# Patient Record
Sex: FEMALE | Race: WHITE | NOT HISPANIC OR LATINO | Employment: FULL TIME | URBAN - NONMETROPOLITAN AREA
[De-identification: names, ages, dates, MRNs, and addresses within clinical notes are randomized per-mention and may not be internally consistent; named-entity substitution may affect disease eponyms.]

---

## 2017-01-17 ENCOUNTER — TELEPHONE (OUTPATIENT)
Dept: FAMILY MEDICINE CLINIC | Facility: CLINIC | Age: 67
End: 2017-01-17

## 2017-01-17 ENCOUNTER — OFFICE VISIT (OUTPATIENT)
Dept: FAMILY MEDICINE CLINIC | Facility: CLINIC | Age: 67
End: 2017-01-17

## 2017-01-17 VITALS
SYSTOLIC BLOOD PRESSURE: 113 MMHG | HEART RATE: 76 BPM | DIASTOLIC BLOOD PRESSURE: 72 MMHG | OXYGEN SATURATION: 98 % | HEIGHT: 61 IN | WEIGHT: 171.8 LBS | BODY MASS INDEX: 32.44 KG/M2

## 2017-01-17 DIAGNOSIS — E11.9 TYPE 2 DIABETES MELLITUS WITHOUT COMPLICATION, UNSPECIFIED LONG TERM INSULIN USE STATUS: ICD-10-CM

## 2017-01-17 DIAGNOSIS — E03.9 HYPOTHYROIDISM, UNSPECIFIED TYPE: Primary | ICD-10-CM

## 2017-01-17 DIAGNOSIS — H92.02 OTALGIA, LEFT: ICD-10-CM

## 2017-01-17 LAB
25(OH)D3 SERPL-MCNC: 14 NG/ML
ALBUMIN SERPL-MCNC: 4.6 G/DL (ref 3.4–4.8)
ALBUMIN/GLOB SERPL: 1.3 G/DL (ref 1.5–2.5)
ALP SERPL-CCNC: 100 U/L (ref 46–116)
ALT SERPL W P-5'-P-CCNC: 17 U/L (ref 10–36)
ANION GAP SERPL CALCULATED.3IONS-SCNC: 9.2 MMOL/L (ref 3.6–11.2)
AST SERPL-CCNC: 22 U/L (ref 10–30)
BILIRUB SERPL-MCNC: 1.1 MG/DL (ref 0.2–1.8)
BUN BLD-MCNC: 10 MG/DL (ref 7–21)
BUN/CREAT SERPL: 12.5 (ref 7–25)
CALCIUM SPEC-SCNC: 9.8 MG/DL (ref 7.7–10)
CHLORIDE SERPL-SCNC: 107 MMOL/L (ref 99–112)
CHOLEST SERPL-MCNC: 184 MG/DL (ref 0–200)
CO2 SERPL-SCNC: 24.8 MMOL/L (ref 24.3–31.9)
CREAT BLD-MCNC: 0.8 MG/DL (ref 0.43–1.29)
GFR SERPL CREATININE-BSD FRML MDRD: 72 ML/MIN/1.73
GLOBULIN UR ELPH-MCNC: 3.6 GM/DL
GLUCOSE BLD-MCNC: 116 MG/DL (ref 70–110)
HBA1C MFR BLD: 5.4 % (ref 4.5–5.7)
HDLC SERPL-MCNC: 35 MG/DL (ref 60–100)
LDLC SERPL CALC-MCNC: 94 MG/DL (ref 0–100)
LDLC/HDLC SERPL: 2.67 {RATIO}
OSMOLALITY SERPL CALC.SUM OF ELEC: 281.3 MOSM/KG (ref 273–305)
POTASSIUM BLD-SCNC: 4.4 MMOL/L (ref 3.5–5.3)
PROT SERPL-MCNC: 8.2 G/DL (ref 6–8)
SODIUM BLD-SCNC: 141 MMOL/L (ref 135–153)
TRIGL SERPL-MCNC: 277 MG/DL (ref 0–150)
TSH SERPL DL<=0.05 MIU/L-ACNC: 0.02 MIU/ML (ref 0.55–4.78)
VLDLC SERPL-MCNC: 55.4 MG/DL

## 2017-01-17 PROCEDURE — 84443 ASSAY THYROID STIM HORMONE: CPT | Performed by: NURSE PRACTITIONER

## 2017-01-17 PROCEDURE — 80061 LIPID PANEL: CPT | Performed by: NURSE PRACTITIONER

## 2017-01-17 PROCEDURE — 82306 VITAMIN D 25 HYDROXY: CPT | Performed by: NURSE PRACTITIONER

## 2017-01-17 PROCEDURE — 36415 COLL VENOUS BLD VENIPUNCTURE: CPT | Performed by: NURSE PRACTITIONER

## 2017-01-17 PROCEDURE — 80053 COMPREHEN METABOLIC PANEL: CPT | Performed by: NURSE PRACTITIONER

## 2017-01-17 PROCEDURE — 83036 HEMOGLOBIN GLYCOSYLATED A1C: CPT | Performed by: NURSE PRACTITIONER

## 2017-01-17 PROCEDURE — 99213 OFFICE O/P EST LOW 20 MIN: CPT | Performed by: NURSE PRACTITIONER

## 2017-01-17 RX ORDER — CHLORAL HYDRATE 500 MG
2000 CAPSULE ORAL 2 TIMES DAILY WITH MEALS
Refills: 0
Start: 2017-01-17 | End: 2020-06-09

## 2017-01-17 RX ORDER — LEVOTHYROXINE SODIUM 0.12 MG/1
125 TABLET ORAL DAILY
Qty: 90 TABLET | Refills: 3 | Status: SHIPPED | OUTPATIENT
Start: 2017-01-17 | End: 2017-04-17 | Stop reason: SDUPTHER

## 2017-01-17 NOTE — TELEPHONE ENCOUNTER
----- Message from BERT Lucas sent at 1/17/2017  3:04 PM EST -----  TSH just resulted and medication needs to be adjusted lower Synthroid to 100mcg daily Repeat TSH in 4 -6 weeks      She said she is on the 150mcg do you still want to go to 100 or 125?    Rx for 125mcg sent today & patient notified.

## 2017-01-17 NOTE — TELEPHONE ENCOUNTER
----- Message from BERT Lucas sent at 1/17/2017  1:56 PM EST -----  Sugar is great she can actually reduce metformin to 500 mg once daily.  She does need to start fish oil 4 gram daily her triglycerides are 277 goal is less than 150      Left a message for her to return call.    Patient returned call & verbalized understanding.

## 2017-01-17 NOTE — PROGRESS NOTES
Subjective   Shey King is a 66 y.o. female.     Earache    There is pain in the left ear. This is a new problem. The current episode started 1 to 4 weeks ago. The problem has been waxing and waning. The patient is experiencing no pain. Pertinent negatives include no coughing, ear discharge, rash, rhinorrhea or sore throat. Associated symptoms comments: Itching aggravating   . She has tried nothing for the symptoms.   Diabetes   She presents for her follow-up diabetic visit. She has type 2 diabetes mellitus. Her disease course has been stable. There are no hypoglycemic associated symptoms. There are no diabetic associated symptoms. There are no hypoglycemic complications. Symptoms are stable. There are no diabetic complications. Risk factors for coronary artery disease include family history. Current diabetic treatment includes oral agent (monotherapy). She is compliant with treatment all of the time. Her weight is decreasing steadily (recetn diet change with 40# weight loss ). She is following a generally healthy diet. Meal planning includes avoidance of concentrated sweets. She participates in exercise three times a week. Home blood sugar record trend: not monitoring. She does not see a podiatrist.Eye exam is current.   Thyroid Problem   Presents for follow-up visit. (Doing well without problems consistent medication use feeling overall better    ) The symptoms have been stable.      The following portions of the patient's history were reviewed and updated as appropriate: allergies, current medications, past family history, past medical history, past social history, past surgical history and problem list.      Review of Systems   Constitutional: Negative.    HENT: Positive for ear pain. Negative for ear discharge, rhinorrhea and sore throat.    Respiratory: Negative.  Negative for cough.    Cardiovascular: Negative.    Gastrointestinal: Negative.    Skin: Negative.  Negative for rash.   All other systems  reviewed and are negative.      Procedures    Objective   Physical Exam   Constitutional: She is oriented to person, place, and time. She appears well-developed and well-nourished. No distress.   HENT:   Head: Normocephalic.   Right Ear: External ear normal.   Left Ear: External ear normal.   Nose: Nose normal.   Mouth/Throat: Oropharynx is clear and moist. No oropharyngeal exudate.   Eyes: Conjunctivae are normal. Right eye exhibits no discharge.   Neck: Normal range of motion. Neck supple. No thyromegaly present.   Cardiovascular: Normal rate, regular rhythm, normal heart sounds and intact distal pulses.    No murmur heard.  Pulmonary/Chest: Effort normal and breath sounds normal. No respiratory distress.   Neurological: She is alert and oriented to person, place, and time.   Skin: Skin is warm and dry. She is not diaphoretic.   Psychiatric: She has a normal mood and affect. Her behavior is normal.   Nursing note and vitals reviewed.      Assessment/Plan   Discussed with patient impression and plan, patient verbalizes understanding. Continue with diet and exercise.    Shey was seen today for earache and follow-up.    Diagnoses and all orders for this visit:    Hypothyroidism, unspecified type  -     TSH    Type 2 diabetes mellitus without complication, unspecified long term insulin use status  -     Comprehensive Metabolic Panel  -     Hemoglobin A1c  -     Lipid Panel  -     Vitamin D 25 Hydroxy  -     metFORMIN (GLUCOPHAGE) 500 MG tablet; Take 1 tablet by mouth Daily With Breakfast.    Otalgia, left    Other orders  -     levothyroxine (SYNTHROID) 125 MCG tablet; Take 1 tablet by mouth Daily.  -     triamcinolone (KENALOG) 0.1 % ointment; Apply  topically 2 (Two) Times a Day.

## 2017-01-17 NOTE — MR AVS SNAPSHOT
Shey King   1/17/2017 8:20 AM   Office Visit    Dept Phone:  [phone]   Encounter #:  [id number]    Provider:  BERT Lucas   Department:  [facility]                Your Full Care Plan              Today's Medication Changes          These changes are accurate as of: 1/17/17  9:13 AM.  If you have any questions, ask your nurse or doctor.               New Medication(s)Ordered:     triamcinolone 0.1 % ointment   Commonly known as:  KENALOG   Apply  topically 2 (Two) Times a Day.         Stop taking medication(s)listed here:     erythromycin base 500 MG tablet   Commonly known as:  E-MYCIN                Where to Get Your Medications      These medications were sent to [address] [phone] [facility] [phone] [address] [address]     Phone:  [phone]     levothyroxine 125 MCG tablet    metFORMIN 500 MG tablet    triamcinolone 0.1 % ointment                  Your Updated Medication List          This list is accurate as of: 1/17/17  9:13 AM.  Always use your most recent med list.                aspirin 81 MG chewable tablet       glucose blood test strip   Use as instructed       glucose monitor monitoring kit   1 each as needed (DM II).       levothyroxine 125 MCG tablet   Commonly known as:  SYNTHROID   Take 1 tablet by mouth Daily.       metFORMIN 500 MG tablet   Commonly known as:  GLUCOPHAGE   Take 1 tablet by mouth Daily With Breakfast.       triamcinolone 0.1 % ointment   Commonly known as:  KENALOG   Apply  topically 2 (Two) Times a Day.               We Performed the Following     Comprehensive Metabolic Panel     Hemoglobin A1c     Lipid Panel     TSH     Vitamin D 25 Hydroxy       You Were Diagnosed With        Codes Comments    Hypothyroidism, unspecified type    -  Primary ICD-10-CM: E03.9  ICD-9-CM: 244.9     Type 2 diabetes mellitus without  "complication, unspecified long term insulin use status     ICD-10-CM: E11.9  ICD-9-CM: 250.00       Instructions     None    Patient Instructions History      Upcoming Appointments     Visit Type Date Time Department    FOLLOW UP 2017  8:20 AM [facility]    [facility]     [facility] [employer] allows you to send messages to your doctor, view your test results, renew your prescriptions, schedule appointments, and more. To sign up, go to [employer] and click on the Sign Up Now link in the New User? box. Enter your [employer] Activation Code exactly as it appears below along with the last four digits of your Social Security Number and your Date of Birth () to complete the sign-up process. If you do not sign up before the expiration date, you must request a new code.    [employer] Activation Code: [email or web address]  Expires: 2017  9:13 AM    If you have questions, you can email [email or web address] or call [phone] to talk to our [employer] staff. Remember, [employer] is NOT to be used for urgent needs. For medical emergencies, dial 911.               Other Info from Your Visit           Your Appointments     2017  8:40 AM EDT   Follow Up with BERT Lucas   [facility] (--)    [address]  [address]  [facility] [address] [phone]           Arrive 15 minutes prior to appointment.              Allergies     Ciprofloxacin      Penicillins      Sulfa Antibiotics        Reason for Visit     Earache bilateral; itchy    Follow-up           Vital Signs     Blood Pressure Pulse Height Weight Oxygen Saturation Body Mass Index    113/72 76 61\" (154.9 cm) 171 lb 12.8 oz (77.9 kg) 98% 32.46 kg/m2    Smoking Status                   Never Smoker           Problems and Diagnoses Noted     Underactive thyroid    Type 2 diabetes mellitus without complication, unspecified long term insulin use status     "

## 2017-02-28 ENCOUNTER — OFFICE VISIT (OUTPATIENT)
Dept: FAMILY MEDICINE CLINIC | Facility: CLINIC | Age: 67
End: 2017-02-28

## 2017-02-28 VITALS
HEIGHT: 61 IN | BODY MASS INDEX: 32.02 KG/M2 | OXYGEN SATURATION: 99 % | WEIGHT: 169.6 LBS | HEART RATE: 80 BPM | SYSTOLIC BLOOD PRESSURE: 120 MMHG | DIASTOLIC BLOOD PRESSURE: 76 MMHG | TEMPERATURE: 98.5 F

## 2017-02-28 DIAGNOSIS — R50.9 FEVER, UNSPECIFIED FEVER CAUSE: Primary | ICD-10-CM

## 2017-02-28 LAB
BILIRUB BLD-MCNC: NEGATIVE MG/DL
EXPIRATION DATE: NORMAL
FLUAV AG NPH QL: NORMAL
FLUBV AG NPH QL: NORMAL
GLUCOSE UR STRIP-MCNC: NEGATIVE MG/DL
INTERNAL CONTROL: NORMAL
KETONES UR QL: NEGATIVE
LEUKOCYTE EST, POC: NEGATIVE
Lab: NORMAL
NITRITE UR-MCNC: NEGATIVE MG/ML
PH UR: 6 [PH] (ref 5–8)
PROT UR STRIP-MCNC: NEGATIVE MG/DL
RBC # UR STRIP: NEGATIVE /UL
SP GR UR: 1.02 (ref 1–1.03)
UROBILINOGEN UR QL: NORMAL

## 2017-02-28 PROCEDURE — 99213 OFFICE O/P EST LOW 20 MIN: CPT | Performed by: NURSE PRACTITIONER

## 2017-02-28 PROCEDURE — 81003 URINALYSIS AUTO W/O SCOPE: CPT | Performed by: NURSE PRACTITIONER

## 2017-02-28 PROCEDURE — 87804 INFLUENZA ASSAY W/OPTIC: CPT | Performed by: NURSE PRACTITIONER

## 2017-02-28 RX ORDER — LEVOTHYROXINE SODIUM 0.15 MG/1
150 TABLET ORAL DAILY
Refills: 0 | COMMUNITY
Start: 2016-12-28 | End: 2017-04-18 | Stop reason: DRUGHIGH

## 2017-02-28 NOTE — PROGRESS NOTES
Subjective   Shey King is a 66 y.o. female.     URI    This is a new problem. The current episode started yesterday. The problem has been gradually worsening. The maximum temperature recorded prior to her arrival was 100.4 - 100.9 F. The fever has been present for less than 1 day. Associated symptoms include abdominal pain (pressure initally with history of recurrent UTI.  Todya those symptoms have improved with incresased fluids), congestion, coughing, ear pain, headaches, rhinorrhea and a sore throat. Pertinent negatives include no nausea, rash, vomiting or wheezing. She has tried NSAIDs and increased fluids for the symptoms. The treatment provided mild relief.      The following portions of the patient's history were reviewed and updated as appropriate: allergies, current medications, past family history, past medical history, past social history, past surgical history and problem list.      Review of Systems   Constitutional: Positive for chills, fatigue and fever.   HENT: Positive for congestion, ear pain, rhinorrhea and sore throat. Negative for sinus pressure.    Respiratory: Positive for cough. Negative for shortness of breath and wheezing.    Cardiovascular: Negative.    Gastrointestinal: Positive for abdominal pain (pressure initally with history of recurrent UTI.  Todya those symptoms have improved with incresased fluids). Negative for nausea and vomiting.   Genitourinary: Negative.    Musculoskeletal: Positive for myalgias (achy all over).   Skin: Negative.  Negative for rash.   Neurological: Positive for headaches.   All other systems reviewed and are negative.      Procedures    Objective   Physical Exam   Constitutional: She is oriented to person, place, and time. She appears well-developed and well-nourished. No distress.   HENT:   Head: Normocephalic.   Right Ear: Hearing, tympanic membrane, external ear and ear canal normal.   Left Ear: Hearing, tympanic membrane, external ear and ear canal  normal.   Nose: Nose normal. No mucosal edema. Right sinus exhibits no maxillary sinus tenderness and no frontal sinus tenderness. Left sinus exhibits no maxillary sinus tenderness and no frontal sinus tenderness.   Mouth/Throat: Uvula is midline, oropharynx is clear and moist and mucous membranes are normal. No oropharyngeal exudate. No tonsillar exudate.   Eyes: Conjunctivae are normal. Right eye exhibits no discharge. Left eye exhibits no discharge.   Neck: Neck supple. No thyromegaly present.   Cardiovascular: Normal rate, regular rhythm, normal heart sounds and intact distal pulses.    No murmur heard.  Pulmonary/Chest: Effort normal and breath sounds normal.   Neurological: She is alert and oriented to person, place, and time.   Skin: Skin is warm and dry. She is not diaphoretic.   Psychiatric: She has a normal mood and affect. Her behavior is normal.   Nursing note and vitals reviewed.      Assessment/Plan   Discussed with patient impression and plan, patient verbalizes understanding.  Symptomatic treatment with rest and fluids   Shey was seen today for sore throat, urinary tract infection and earache.    Diagnoses and all orders for this visit:    Fever, unspecified fever cause  -     POC Urinalysis Dipstick, Automated  -     POCT Influenza A/B

## 2017-04-17 ENCOUNTER — OFFICE VISIT (OUTPATIENT)
Dept: FAMILY MEDICINE CLINIC | Facility: CLINIC | Age: 67
End: 2017-04-17

## 2017-04-17 ENCOUNTER — TELEPHONE (OUTPATIENT)
Dept: FAMILY MEDICINE CLINIC | Facility: CLINIC | Age: 67
End: 2017-04-17

## 2017-04-17 VITALS
SYSTOLIC BLOOD PRESSURE: 110 MMHG | HEART RATE: 84 BPM | DIASTOLIC BLOOD PRESSURE: 73 MMHG | HEIGHT: 61 IN | OXYGEN SATURATION: 99 % | WEIGHT: 169.6 LBS | BODY MASS INDEX: 32.02 KG/M2

## 2017-04-17 DIAGNOSIS — E03.9 HYPOTHYROIDISM, UNSPECIFIED TYPE: Primary | ICD-10-CM

## 2017-04-17 DIAGNOSIS — E11.9 TYPE 2 DIABETES MELLITUS WITHOUT COMPLICATION, UNSPECIFIED LONG TERM INSULIN USE STATUS: ICD-10-CM

## 2017-04-17 LAB
25(OH)D3 SERPL-MCNC: 13 NG/ML
ALBUMIN SERPL-MCNC: 4.4 G/DL (ref 3.4–4.8)
ALBUMIN/GLOB SERPL: 1.3 G/DL (ref 1.5–2.5)
ALP SERPL-CCNC: 100 U/L (ref 35–104)
ALT SERPL W P-5'-P-CCNC: 15 U/L (ref 10–36)
ANION GAP SERPL CALCULATED.3IONS-SCNC: 3.6 MMOL/L (ref 3.6–11.2)
AST SERPL-CCNC: 18 U/L (ref 10–30)
BILIRUB SERPL-MCNC: 0.6 MG/DL (ref 0.2–1.8)
BUN BLD-MCNC: 9 MG/DL (ref 7–21)
BUN/CREAT SERPL: 12.5 (ref 7–25)
CALCIUM SPEC-SCNC: 9.4 MG/DL (ref 7.7–10)
CHLORIDE SERPL-SCNC: 109 MMOL/L (ref 99–112)
CHOLEST SERPL-MCNC: 180 MG/DL (ref 0–200)
CO2 SERPL-SCNC: 28.4 MMOL/L (ref 24.3–31.9)
CREAT BLD-MCNC: 0.72 MG/DL (ref 0.43–1.29)
GFR SERPL CREATININE-BSD FRML MDRD: 82 ML/MIN/1.73
GLOBULIN UR ELPH-MCNC: 3.5 GM/DL
GLUCOSE BLD-MCNC: 91 MG/DL (ref 70–110)
HDLC SERPL-MCNC: 38 MG/DL (ref 60–100)
LDLC SERPL CALC-MCNC: 99 MG/DL (ref 0–100)
LDLC/HDLC SERPL: 2.61 {RATIO}
OSMOLALITY SERPL CALC.SUM OF ELEC: 279.5 MOSM/KG (ref 273–305)
POTASSIUM BLD-SCNC: 4.1 MMOL/L (ref 3.5–5.3)
PROT SERPL-MCNC: 7.9 G/DL (ref 6–8)
SODIUM BLD-SCNC: 141 MMOL/L (ref 135–153)
T3FREE SERPL-MCNC: 3.3 PG/ML (ref 2.3–4.2)
T4 FREE SERPL-MCNC: 1.65 NG/DL (ref 0.89–1.76)
TRIGL SERPL-MCNC: 215 MG/DL (ref 0–150)
TSH SERPL DL<=0.05 MIU/L-ACNC: 0.02 MIU/ML (ref 0.55–4.78)
VIT B12 BLD-MCNC: 190 PG/ML (ref 211–911)
VLDLC SERPL-MCNC: 43 MG/DL

## 2017-04-17 PROCEDURE — 99213 OFFICE O/P EST LOW 20 MIN: CPT | Performed by: NURSE PRACTITIONER

## 2017-04-17 PROCEDURE — 82306 VITAMIN D 25 HYDROXY: CPT | Performed by: NURSE PRACTITIONER

## 2017-04-17 PROCEDURE — 84481 FREE ASSAY (FT-3): CPT | Performed by: NURSE PRACTITIONER

## 2017-04-17 PROCEDURE — 84439 ASSAY OF FREE THYROXINE: CPT | Performed by: NURSE PRACTITIONER

## 2017-04-17 PROCEDURE — 84443 ASSAY THYROID STIM HORMONE: CPT | Performed by: NURSE PRACTITIONER

## 2017-04-17 PROCEDURE — 36415 COLL VENOUS BLD VENIPUNCTURE: CPT | Performed by: NURSE PRACTITIONER

## 2017-04-17 PROCEDURE — 80061 LIPID PANEL: CPT | Performed by: NURSE PRACTITIONER

## 2017-04-17 PROCEDURE — 80053 COMPREHEN METABOLIC PANEL: CPT | Performed by: NURSE PRACTITIONER

## 2017-04-17 PROCEDURE — 82607 VITAMIN B-12: CPT | Performed by: NURSE PRACTITIONER

## 2017-04-17 RX ORDER — LEVOTHYROXINE SODIUM 0.12 MG/1
125 TABLET ORAL DAILY
Qty: 90 TABLET | Refills: 3 | Status: SHIPPED | OUTPATIENT
Start: 2017-04-17 | End: 2017-04-18 | Stop reason: DRUGHIGH

## 2017-04-17 NOTE — PROGRESS NOTES
Subjective   Shey King is a 66 y.o. female.     Diabetes   She presents for her follow-up diabetic visit. She has type 2 diabetes mellitus. Her disease course has been stable. There are no hypoglycemic associated symptoms. There are no diabetic associated symptoms. Pertinent negatives for diabetes include no fatigue. There are no hypoglycemic complications. Symptoms are stable. There are no diabetic complications. Risk factors for coronary artery disease include family history. Current diabetic treatment includes oral agent (monotherapy). She is compliant with treatment all of the time. Her weight is decreasing steadily (continues with improved diet and exercise ). She is following a generally healthy diet. Meal planning includes avoidance of concentrated sweets. She participates in exercise three times a week. Home blood sugar record trend: not monitoring. She does not see a podiatrist.Eye exam is current.   Thyroid Problem   Presents for follow-up visit. Patient reports no cold intolerance, constipation, depressed mood, diaphoresis, dry skin, fatigue, hair loss or palpitations. (Since medication change feels that her fingers have been tingling at night and feeling her heart race at times    ) The symptoms have been stable.      The following portions of the patient's history were reviewed and updated as appropriate: allergies, current medications, past family history, past medical history, past social history, past surgical history and problem list.      Review of Systems   Constitutional: Negative.  Negative for diaphoresis and fatigue.   HENT: Negative.    Respiratory: Negative.    Cardiovascular: Negative.  Negative for palpitations.   Gastrointestinal: Negative.  Negative for constipation.   Endocrine: Negative for cold intolerance.   Musculoskeletal: Negative.    Skin: Negative.    Psychiatric/Behavioral: Negative.    All other systems reviewed and are negative.      Procedures    Objective   Physical  Exam   Constitutional: She is oriented to person, place, and time. She appears well-developed and well-nourished. No distress.   HENT:   Head: Normocephalic.   Right Ear: External ear normal.   Left Ear: External ear normal.   Nose: Nose normal.   Mouth/Throat: Oropharynx is clear and moist. No oropharyngeal exudate.   Eyes: Conjunctivae are normal. Right eye exhibits no discharge.   Neck: Normal range of motion. Neck supple. No thyromegaly present.   Cardiovascular: Normal rate, regular rhythm, normal heart sounds and intact distal pulses.    No murmur heard.  Pulmonary/Chest: Effort normal and breath sounds normal. No respiratory distress.   Neurological: She is alert and oriented to person, place, and time.   Skin: Skin is warm and dry. She is not diaphoretic.   Psychiatric: She has a normal mood and affect. Her behavior is normal.   Nursing note and vitals reviewed.      Assessment/Plan   Discussed with patient impression and plan, patient verbalizes understanding. Continue with diet and exercise.    Shey was seen today for follow-up and hypothyroidism.    Diagnoses and all orders for this visit:    Hypothyroidism, unspecified type    Type 2 diabetes mellitus without complication, unspecified long term insulin use status  -     metFORMIN (GLUCOPHAGE) 500 MG tablet; Take 1 tablet by mouth Daily With Breakfast.  -     TSH  -     Vitamin B12; Future  -     Vitamin D 25 Hydroxy  -     Comprehensive Metabolic Panel  -     Lipid Panel  -     Vitamin B12    Other orders  -     levothyroxine (SYNTHROID) 125 MCG tablet; Take 1 tablet by mouth Daily.

## 2017-04-17 NOTE — TELEPHONE ENCOUNTER
----- Message from BRET Lucas sent at 4/17/2017  3:05 PM EDT -----  Can we add a free T3 And T4    Yes added

## 2017-04-18 DIAGNOSIS — E03.9 HYPOTHYROIDISM, UNSPECIFIED TYPE: Primary | ICD-10-CM

## 2017-04-18 RX ORDER — LEVOTHYROXINE SODIUM 0.1 MG/1
100 TABLET ORAL DAILY
Qty: 30 TABLET | Refills: 3 | Status: SHIPPED | OUTPATIENT
Start: 2017-04-18 | End: 2017-08-17 | Stop reason: SDUPTHER

## 2017-04-18 RX ORDER — ERGOCALCIFEROL 1.25 MG/1
50000 CAPSULE ORAL WEEKLY
Qty: 12 CAPSULE | Refills: 0 | Status: SHIPPED | OUTPATIENT
Start: 2017-04-18 | End: 2018-01-09

## 2017-04-18 NOTE — TELEPHONE ENCOUNTER
Yes she needs B12 injections weekly X 4 then biweekly X2 then monthly  Also 50,000 units vit d weekly

## 2017-04-18 NOTE — TELEPHONE ENCOUNTER
----- Message from BERT Lucas sent at 4/18/2017 11:18 AM EDT -----  Tell Shey there is no change in her TSH.  She says she felt better with the 150 mcg and not as well with the new 125  However with her number i need to lower her once again to 100 mcg.  Ask her to drop in ablut 4 weeks to have TSH checked and will follow her numbers closer.      Spoke with patient and she asked about her other labs i see that her B12 & vit. D are low any orders related to them?

## 2017-04-21 ENCOUNTER — CLINICAL SUPPORT (OUTPATIENT)
Dept: FAMILY MEDICINE CLINIC | Facility: CLINIC | Age: 67
End: 2017-04-21

## 2017-04-21 DIAGNOSIS — E53.8 VITAMIN B12 DEFICIENCY: Primary | ICD-10-CM

## 2017-04-21 PROCEDURE — 96372 THER/PROPH/DIAG INJ SC/IM: CPT | Performed by: NURSE PRACTITIONER

## 2017-04-21 RX ORDER — CYANOCOBALAMIN 1000 UG/ML
1000 INJECTION, SOLUTION INTRAMUSCULAR; SUBCUTANEOUS
Status: DISCONTINUED | OUTPATIENT
Start: 2017-04-21 | End: 2018-01-09 | Stop reason: ALTCHOICE

## 2017-04-21 RX ADMIN — CYANOCOBALAMIN 1000 MCG: 1000 INJECTION, SOLUTION INTRAMUSCULAR; SUBCUTANEOUS at 08:13

## 2017-08-17 ENCOUNTER — OFFICE VISIT (OUTPATIENT)
Dept: FAMILY MEDICINE CLINIC | Facility: CLINIC | Age: 67
End: 2017-08-17

## 2017-08-17 ENCOUNTER — TELEPHONE (OUTPATIENT)
Dept: FAMILY MEDICINE CLINIC | Facility: CLINIC | Age: 67
End: 2017-08-17

## 2017-08-17 VITALS
OXYGEN SATURATION: 98 % | HEIGHT: 61 IN | WEIGHT: 172 LBS | HEART RATE: 72 BPM | BODY MASS INDEX: 32.47 KG/M2 | SYSTOLIC BLOOD PRESSURE: 125 MMHG | DIASTOLIC BLOOD PRESSURE: 81 MMHG

## 2017-08-17 DIAGNOSIS — E78.2 MIXED HYPERLIPIDEMIA: ICD-10-CM

## 2017-08-17 DIAGNOSIS — E03.9 HYPOTHYROIDISM, UNSPECIFIED TYPE: Primary | ICD-10-CM

## 2017-08-17 DIAGNOSIS — E53.8 B12 DEFICIENCY: ICD-10-CM

## 2017-08-17 DIAGNOSIS — E03.8 ADULT ONSET HYPOTHYROIDISM: ICD-10-CM

## 2017-08-17 DIAGNOSIS — R73.9 HYPERGLYCEMIA: ICD-10-CM

## 2017-08-17 DIAGNOSIS — R53.83 FATIGUE, UNSPECIFIED TYPE: Primary | ICD-10-CM

## 2017-08-17 DIAGNOSIS — E55.9 VITAMIN D DEFICIENCY: ICD-10-CM

## 2017-08-17 LAB
25(OH)D3 SERPL-MCNC: 40 NG/ML
ALBUMIN SERPL-MCNC: 4.7 G/DL (ref 3.4–4.8)
ALBUMIN/GLOB SERPL: 1.4 G/DL (ref 1.5–2.5)
ALP SERPL-CCNC: 110 U/L (ref 35–104)
ALT SERPL W P-5'-P-CCNC: 18 U/L (ref 10–36)
ANION GAP SERPL CALCULATED.3IONS-SCNC: 6.6 MMOL/L (ref 3.6–11.2)
AST SERPL-CCNC: 20 U/L (ref 10–30)
BASOPHILS # BLD AUTO: 0.05 10*3/MM3 (ref 0–0.3)
BASOPHILS NFR BLD AUTO: 0.7 % (ref 0–2)
BILIRUB BLD-MCNC: NEGATIVE MG/DL
BILIRUB SERPL-MCNC: 0.8 MG/DL (ref 0.2–1.8)
BUN BLD-MCNC: 9 MG/DL (ref 7–21)
BUN/CREAT SERPL: 11.4 (ref 7–25)
CALCIUM SPEC-SCNC: 9.7 MG/DL (ref 7.7–10)
CHLORIDE SERPL-SCNC: 107 MMOL/L (ref 99–112)
CHOLEST SERPL-MCNC: 213 MG/DL (ref 0–200)
CO2 SERPL-SCNC: 27.4 MMOL/L (ref 24.3–31.9)
CREAT BLD-MCNC: 0.79 MG/DL (ref 0.43–1.29)
DEPRECATED RDW RBC AUTO: 43 FL (ref 37–54)
EOSINOPHIL # BLD AUTO: 0.25 10*3/MM3 (ref 0–0.7)
EOSINOPHIL NFR BLD AUTO: 3.6 % (ref 0–5)
ERYTHROCYTE [DISTWIDTH] IN BLOOD BY AUTOMATED COUNT: 13.8 % (ref 11.5–14.5)
GFR SERPL CREATININE-BSD FRML MDRD: 73 ML/MIN/1.73
GLOBULIN UR ELPH-MCNC: 3.4 GM/DL
GLUCOSE BLD-MCNC: 113 MG/DL (ref 70–110)
GLUCOSE UR STRIP-MCNC: NEGATIVE MG/DL
HBA1C MFR BLD: 5.3 % (ref 4.5–5.7)
HCT VFR BLD AUTO: 37.7 % (ref 37–47)
HDLC SERPL-MCNC: 41 MG/DL (ref 60–100)
HGB BLD-MCNC: 12.3 G/DL (ref 12–16)
IMM GRANULOCYTES # BLD: 0.02 10*3/MM3 (ref 0–0.03)
IMM GRANULOCYTES NFR BLD: 0.3 % (ref 0–0.5)
KETONES UR QL: NEGATIVE
LDLC SERPL CALC-MCNC: 128 MG/DL (ref 0–100)
LDLC/HDLC SERPL: 3.12 {RATIO}
LEUKOCYTE EST, POC: NEGATIVE
LYMPHOCYTES # BLD AUTO: 1.77 10*3/MM3 (ref 1–3)
LYMPHOCYTES NFR BLD AUTO: 25.2 % (ref 21–51)
MCH RBC QN AUTO: 28.5 PG (ref 27–33)
MCHC RBC AUTO-ENTMCNC: 32.6 G/DL (ref 33–37)
MCV RBC AUTO: 87.5 FL (ref 80–94)
MONOCYTES # BLD AUTO: 0.56 10*3/MM3 (ref 0.1–0.9)
MONOCYTES NFR BLD AUTO: 8 % (ref 0–10)
NEUTROPHILS # BLD AUTO: 4.38 10*3/MM3 (ref 1.4–6.5)
NEUTROPHILS NFR BLD AUTO: 62.2 % (ref 30–70)
NITRITE UR-MCNC: NEGATIVE MG/ML
OSMOLALITY SERPL CALC.SUM OF ELEC: 280.8 MOSM/KG (ref 273–305)
PH UR: 6 [PH] (ref 5–8)
PLATELET # BLD AUTO: 261 10*3/MM3 (ref 130–400)
PMV BLD AUTO: 10.9 FL (ref 6–10)
POTASSIUM BLD-SCNC: 4.5 MMOL/L (ref 3.5–5.3)
PROT SERPL-MCNC: 8.1 G/DL (ref 6–8)
PROT UR STRIP-MCNC: NEGATIVE MG/DL
RBC # BLD AUTO: 4.31 10*6/MM3 (ref 4.2–5.4)
RBC # UR STRIP: NEGATIVE /UL
SODIUM BLD-SCNC: 141 MMOL/L (ref 135–153)
SP GR UR: 1.01 (ref 1–1.03)
T4 FREE SERPL-MCNC: 1.59 NG/DL (ref 0.89–1.76)
TRIGL SERPL-MCNC: 220 MG/DL (ref 0–150)
TSH SERPL DL<=0.05 MIU/L-ACNC: 0.04 MIU/ML (ref 0.55–4.78)
UROBILINOGEN UR QL: NORMAL
VIT B12 BLD-MCNC: 1017 PG/ML (ref 211–911)
VLDLC SERPL-MCNC: 44 MG/DL
WBC NRBC COR # BLD: 7.03 10*3/MM3 (ref 4.5–12.5)

## 2017-08-17 PROCEDURE — 36415 COLL VENOUS BLD VENIPUNCTURE: CPT | Performed by: NURSE PRACTITIONER

## 2017-08-17 PROCEDURE — 80061 LIPID PANEL: CPT | Performed by: NURSE PRACTITIONER

## 2017-08-17 PROCEDURE — 81003 URINALYSIS AUTO W/O SCOPE: CPT | Performed by: NURSE PRACTITIONER

## 2017-08-17 PROCEDURE — 83036 HEMOGLOBIN GLYCOSYLATED A1C: CPT | Performed by: NURSE PRACTITIONER

## 2017-08-17 PROCEDURE — 84443 ASSAY THYROID STIM HORMONE: CPT | Performed by: NURSE PRACTITIONER

## 2017-08-17 PROCEDURE — 82306 VITAMIN D 25 HYDROXY: CPT | Performed by: NURSE PRACTITIONER

## 2017-08-17 PROCEDURE — 85025 COMPLETE CBC W/AUTO DIFF WBC: CPT | Performed by: NURSE PRACTITIONER

## 2017-08-17 PROCEDURE — 80053 COMPREHEN METABOLIC PANEL: CPT | Performed by: NURSE PRACTITIONER

## 2017-08-17 PROCEDURE — 82607 VITAMIN B-12: CPT | Performed by: NURSE PRACTITIONER

## 2017-08-17 PROCEDURE — 99214 OFFICE O/P EST MOD 30 MIN: CPT | Performed by: NURSE PRACTITIONER

## 2017-08-17 PROCEDURE — 84439 ASSAY OF FREE THYROXINE: CPT | Performed by: NURSE PRACTITIONER

## 2017-08-17 RX ORDER — LEVOTHYROXINE SODIUM 0.07 MG/1
75 TABLET ORAL DAILY
Qty: 30 TABLET | Refills: 1 | Status: SHIPPED | OUTPATIENT
Start: 2017-08-17 | End: 2017-09-19 | Stop reason: DRUGHIGH

## 2017-08-17 RX ORDER — LEVOTHYROXINE SODIUM 0.12 MG/1
125 TABLET ORAL DAILY
Refills: 0 | COMMUNITY
Start: 2017-08-07 | End: 2017-09-19 | Stop reason: DRUGHIGH

## 2017-08-17 NOTE — TELEPHONE ENCOUNTER
----- Message from BERT Lucas sent at 8/17/2017  3:56 PM EDT -----  Let her know that her thyroid is most likely the cause of her extreme fatigue.  Her synthroid has been reduced several time with persistent low TSh.  I want her dose reduced to 75 mcg and ask her to RTC 4 weeks for TSH only.  Her sugar is great however her cholesterol is not just up a bit and can improve with her being better with her diet and exercise as she did in the past      Patient notified & verbalized understanding.

## 2017-08-21 NOTE — PROGRESS NOTES
Subjective   Shey King is a 66 y.o. female.   Chief Compliant: The patient presents with Fatigue    Fatigue   This is a new (known history of hypothyroidism, hyperglycemia, vit d and B12 deficiency.  ) problem. The current episode started more than 1 month ago. The problem occurs daily. The problem has been unchanged (tired all the time.  Admits with the poor weather we had with rain she hasnt exercised as consistent. ). Associated symptoms include fatigue. Pertinent negatives include no arthralgias, change in bowel habit, chest pain, chills, congestion, coughing, diaphoresis, fever, headaches, joint swelling, myalgias or nausea. Nothing aggravates the symptoms. She has tried rest for the symptoms. The treatment provided no relief.   Hyperlipidemia   This is a chronic problem. Recent lipid tests were reviewed and are variable. Exacerbating diseases include hypothyroidism. Pertinent negatives include no chest pain or myalgias. Current antihyperlipidemic treatment includes statins, exercise and diet change. The current treatment provides moderate improvement of lipids. Compliance problems include adherence to diet and adherence to exercise.  Risk factors for coronary artery disease include family history, obesity and post-menopausal.      The following portions of the patient's history were reviewed and updated as appropriate: allergies, current medications, past family history, past medical history, past social history, past surgical history and problem list.      Review of Systems   Constitutional: Positive for fatigue. Negative for chills, diaphoresis and fever.   HENT: Negative.  Negative for congestion.    Eyes: Negative.    Respiratory: Negative.  Negative for cough.    Cardiovascular: Negative.  Negative for chest pain.   Gastrointestinal: Negative.  Negative for change in bowel habit and nausea.   Genitourinary:        Requested UA as she often has a UTI     Musculoskeletal: Negative.  Negative for  "arthralgias, joint swelling and myalgias.   Skin: Negative.    Neurological: Negative for headaches.   Psychiatric/Behavioral: Negative.    All other systems reviewed and are negative.      Procedures    Vitals: Blood pressure 125/81, pulse 72, height 61\" (154.9 cm), weight 172 lb (78 kg), SpO2 98 %, not currently breastfeeding.     Allergies:   Allergies   Allergen Reactions   • Bactrim [Sulfamethoxazole-Trimethoprim]    • Ciprofloxacin    • Penicillins    • Sulfa Antibiotics           Objective   Physical Exam   Constitutional: She is oriented to person, place, and time. She appears well-developed and well-nourished. No distress.   HENT:   Head: Normocephalic.   Right Ear: External ear normal.   Left Ear: External ear normal.   Nose: Nose normal.   Mouth/Throat: Oropharynx is clear and moist. No oropharyngeal exudate.   Eyes: Conjunctivae are normal. Right eye exhibits no discharge. Left eye exhibits no discharge.   Neck: Neck supple. No thyromegaly present.   Cardiovascular: Normal rate, regular rhythm, normal heart sounds and intact distal pulses.    No murmur heard.  Pulmonary/Chest: Effort normal and breath sounds normal.   Abdominal: Soft. Bowel sounds are normal. She exhibits no distension and no mass. There is no tenderness. No hernia.   Musculoskeletal: She exhibits no edema.   Lymphadenopathy:     She has no cervical adenopathy.   Neurological: She is alert and oriented to person, place, and time.   Skin: Skin is warm and dry. She is not diaphoretic.   Psychiatric: She has a normal mood and affect. Her behavior is normal.   Nursing note and vitals reviewed.      Assessment/Plan   Discussed with patient impression and plan, patient verbalizes understanding.  For now will continue with routine medications and change her diet and exercise.  Will Await further changes pending lab results. Shey was seen today for fatigue.    Diagnoses and all orders for this visit:    Fatigue, unspecified type  -     CBC & " Differential  -     CBC Auto Differential  -     POCT urinalysis dipstick, automated    Adult onset hypothyroidism  -     TSH  -     T4, Free  -     POCT urinalysis dipstick, automated    Hyperglycemia  -     Hemoglobin A1c  -     POCT urinalysis dipstick, automated    Mixed hyperlipidemia  -     Comprehensive Metabolic Panel  -     Lipid Panel  -     POCT urinalysis dipstick, automated  -     Osmolality, Calculated; Future  -     Osmolality, Calculated    B12 deficiency  -     CBC & Differential  -     Vitamin B12  -     CBC Auto Differential  -     POCT urinalysis dipstick, automated    Vitamin D deficiency  -     Vitamin D 25 Hydroxy  -     POCT urinalysis dipstick, automated

## 2017-09-19 ENCOUNTER — LAB (OUTPATIENT)
Dept: FAMILY MEDICINE CLINIC | Facility: CLINIC | Age: 67
End: 2017-09-19

## 2017-09-19 ENCOUNTER — TELEPHONE (OUTPATIENT)
Dept: FAMILY MEDICINE CLINIC | Facility: CLINIC | Age: 67
End: 2017-09-19

## 2017-09-19 DIAGNOSIS — E03.9 HYPOTHYROIDISM, UNSPECIFIED TYPE: Primary | ICD-10-CM

## 2017-09-19 DIAGNOSIS — E03.9 HYPOTHYROIDISM, UNSPECIFIED TYPE: ICD-10-CM

## 2017-09-19 LAB — TSH SERPL DL<=0.05 MIU/L-ACNC: 0.94 MIU/ML (ref 0.55–4.78)

## 2017-09-19 PROCEDURE — 84443 ASSAY THYROID STIM HORMONE: CPT | Performed by: NURSE PRACTITIONER

## 2017-09-19 PROCEDURE — 36415 COLL VENOUS BLD VENIPUNCTURE: CPT

## 2017-09-19 RX ORDER — LEVOTHYROXINE SODIUM 0.05 MG/1
50 TABLET ORAL DAILY
Qty: 30 TABLET | Refills: 1 | Status: SHIPPED | OUTPATIENT
Start: 2017-09-19 | End: 2017-10-23 | Stop reason: DRUGHIGH

## 2017-09-19 NOTE — TELEPHONE ENCOUNTER
----- Message from BERT Lucas sent at 9/19/2017  2:46 PM EDT -----  Synthroid needs further reduced to 50 mcg daily and repeat again in 4 weeks  TSH Free t3 tand T4        Patient notified & verbalized understanding.

## 2017-10-02 ENCOUNTER — TRANSCRIBE ORDERS (OUTPATIENT)
Dept: ADMINISTRATIVE | Facility: HOSPITAL | Age: 67
End: 2017-10-02

## 2017-10-02 DIAGNOSIS — Z12.31 VISIT FOR SCREENING MAMMOGRAM: Primary | ICD-10-CM

## 2017-10-19 ENCOUNTER — OFFICE VISIT (OUTPATIENT)
Dept: FAMILY MEDICINE CLINIC | Facility: CLINIC | Age: 67
End: 2017-10-19

## 2017-10-19 VITALS
HEART RATE: 69 BPM | BODY MASS INDEX: 33.83 KG/M2 | DIASTOLIC BLOOD PRESSURE: 84 MMHG | TEMPERATURE: 97.9 F | WEIGHT: 179.2 LBS | SYSTOLIC BLOOD PRESSURE: 124 MMHG | HEIGHT: 61 IN | OXYGEN SATURATION: 98 %

## 2017-10-19 DIAGNOSIS — H65.91 RIGHT SEROUS OTITIS MEDIA, UNSPECIFIED CHRONICITY: ICD-10-CM

## 2017-10-19 DIAGNOSIS — R22.1 NECK FULLNESS: ICD-10-CM

## 2017-10-19 DIAGNOSIS — E03.9 HYPOTHYROIDISM, UNSPECIFIED TYPE: ICD-10-CM

## 2017-10-19 DIAGNOSIS — E03.8 ADULT ONSET HYPOTHYROIDISM: Primary | ICD-10-CM

## 2017-10-19 LAB
BASOPHILS # BLD AUTO: 0.05 10*3/MM3 (ref 0–0.3)
BASOPHILS NFR BLD AUTO: 0.7 % (ref 0–2)
DEPRECATED RDW RBC AUTO: 43.7 FL (ref 37–54)
EOSINOPHIL # BLD AUTO: 0.2 10*3/MM3 (ref 0–0.7)
EOSINOPHIL NFR BLD AUTO: 2.9 % (ref 0–5)
ERYTHROCYTE [DISTWIDTH] IN BLOOD BY AUTOMATED COUNT: 14 % (ref 11.5–14.5)
HCT VFR BLD AUTO: 37.7 % (ref 37–47)
HGB BLD-MCNC: 12.7 G/DL (ref 12–16)
IMM GRANULOCYTES # BLD: 0.02 10*3/MM3 (ref 0–0.03)
IMM GRANULOCYTES NFR BLD: 0.3 % (ref 0–0.5)
LYMPHOCYTES # BLD AUTO: 2 10*3/MM3 (ref 1–3)
LYMPHOCYTES NFR BLD AUTO: 29 % (ref 21–51)
MCH RBC QN AUTO: 29.4 PG (ref 27–33)
MCHC RBC AUTO-ENTMCNC: 33.7 G/DL (ref 33–37)
MCV RBC AUTO: 87.3 FL (ref 80–94)
MONOCYTES # BLD AUTO: 0.46 10*3/MM3 (ref 0.1–0.9)
MONOCYTES NFR BLD AUTO: 6.7 % (ref 0–10)
NEUTROPHILS # BLD AUTO: 4.17 10*3/MM3 (ref 1.4–6.5)
NEUTROPHILS NFR BLD AUTO: 60.4 % (ref 30–70)
PLATELET # BLD AUTO: 252 10*3/MM3 (ref 130–400)
PMV BLD AUTO: 10.7 FL (ref 6–10)
RBC # BLD AUTO: 4.32 10*6/MM3 (ref 4.2–5.4)
T3FREE SERPL-MCNC: 2.4 PG/ML (ref 2.3–4.2)
T4 FREE SERPL-MCNC: 1.13 NG/DL (ref 0.89–1.76)
TSH SERPL DL<=0.05 MIU/L-ACNC: 2.81 MIU/ML (ref 0.55–4.78)
WBC NRBC COR # BLD: 6.9 10*3/MM3 (ref 4.5–12.5)

## 2017-10-19 PROCEDURE — 84443 ASSAY THYROID STIM HORMONE: CPT | Performed by: NURSE PRACTITIONER

## 2017-10-19 PROCEDURE — 99214 OFFICE O/P EST MOD 30 MIN: CPT | Performed by: NURSE PRACTITIONER

## 2017-10-19 PROCEDURE — 90686 IIV4 VACC NO PRSV 0.5 ML IM: CPT | Performed by: NURSE PRACTITIONER

## 2017-10-19 PROCEDURE — 84481 FREE ASSAY (FT-3): CPT | Performed by: NURSE PRACTITIONER

## 2017-10-19 PROCEDURE — 90471 IMMUNIZATION ADMIN: CPT | Performed by: NURSE PRACTITIONER

## 2017-10-19 PROCEDURE — 84439 ASSAY OF FREE THYROXINE: CPT | Performed by: NURSE PRACTITIONER

## 2017-10-19 PROCEDURE — 85025 COMPLETE CBC W/AUTO DIFF WBC: CPT | Performed by: NURSE PRACTITIONER

## 2017-10-19 NOTE — PROGRESS NOTES
"Subjective   Shey King is a 66 y.o. female.   Chief Compliant: The patient presents with Earache    Earache    There is pain in the right ear. This is a new problem. The current episode started 1 to 4 weeks ago. The problem occurs every few hours. The problem has been waxing and waning. There has been no fever. The pain is at a severity of 4/10. The pain is mild. Pertinent negatives include no abdominal pain, coughing, diarrhea, ear discharge, headaches, hearing loss, neck pain, rash, rhinorrhea, sore throat or vomiting. She has tried nothing for the symptoms.   Thyroid Problem   Presents for follow-up (recently noticed she has been taking the old strength of her thyroid medication.  ) visit. Symptoms include anxiety, constipation and fatigue. Patient reports no diarrhea. (Night sweats) The symptoms have been stable.      The following portions of the patient's history were reviewed and updated as appropriate: allergies, current medications, past family history, past medical history, past social history, past surgical history and problem list.      Review of Systems   Constitutional: Positive for fatigue.   HENT: Positive for ear pain. Negative for ear discharge, hearing loss, rhinorrhea and sore throat.    Respiratory: Negative.  Negative for cough.    Cardiovascular: Negative.    Gastrointestinal: Positive for constipation. Negative for abdominal pain, diarrhea and vomiting.   Genitourinary: Negative.    Musculoskeletal: Negative.  Negative for neck pain.   Skin: Negative.  Negative for rash.   Neurological: Negative.  Negative for headaches.   Psychiatric/Behavioral: The patient is nervous/anxious.    All other systems reviewed and are negative.      Procedures    Vitals: Blood pressure 124/84, pulse 69, temperature 97.9 °F (36.6 °C), temperature source Oral, height 61\" (154.9 cm), weight 179 lb 3.2 oz (81.3 kg), SpO2 98 %, not currently breastfeeding.     Allergies:   Allergies   Allergen Reactions   • " Bactrim [Sulfamethoxazole-Trimethoprim]    • Ciprofloxacin    • Penicillins    • Sulfa Antibiotics           Objective   Physical Exam   Constitutional: She is oriented to person, place, and time. She appears well-developed and well-nourished.   HENT:   Head: Normocephalic.   Right Ear: Hearing, external ear and ear canal normal. A middle ear effusion is present.   Left Ear: Hearing, tympanic membrane, external ear and ear canal normal.   Nose: Nose normal. Right sinus exhibits no maxillary sinus tenderness and no frontal sinus tenderness. Left sinus exhibits no maxillary sinus tenderness and no frontal sinus tenderness.   Mouth/Throat: Uvula is midline, oropharynx is clear and moist and mucous membranes are normal. No oropharyngeal exudate. No tonsillar exudate.   Neck: Thyromegaly present.   Cardiovascular: Normal rate, regular rhythm, normal heart sounds and intact distal pulses.    No murmur heard.  Pulmonary/Chest: Effort normal and breath sounds normal.   Musculoskeletal: She exhibits no edema.   Lymphadenopathy:     She has cervical adenopathy.   Neurological: She is alert and oriented to person, place, and time.   Skin: Skin is warm and dry. No rash noted. She is not diaphoretic. No erythema. No pallor.   Psychiatric: She has a normal mood and affect. Her behavior is normal.   Nursing note and vitals reviewed.      Assessment/Plan   Discussed with patient impression and plan, patient verbalizes understanding.  Discussed finding on physical exam with patient.  Will follow after ultrasound and lab   Shey was seen today for earache.    Diagnoses and all orders for this visit:    Adult onset hypothyroidism  -     US Thyroid  -     CBC & Differential  -     CBC Auto Differential    Neck fullness  -     US Thyroid  -     CBC & Differential  -     CBC Auto Differential    Right serous otitis media, unspecified chronicity    Other orders  -     neomycin-polymyxin-hydrocortisone (CORTISPORIN) 3.5-82683-8 otic  solution; Administer 3 drops to the right ear 4 (Four) Times a Day.

## 2017-10-23 RX ORDER — LEVOTHYROXINE SODIUM 0.12 MG/1
125 TABLET ORAL DAILY
Qty: 90 TABLET | Refills: 1 | Status: SHIPPED | OUTPATIENT
Start: 2017-10-23 | End: 2018-01-23 | Stop reason: SDUPTHER

## 2017-10-30 ENCOUNTER — HOSPITAL ENCOUNTER (OUTPATIENT)
Dept: ULTRASOUND IMAGING | Facility: HOSPITAL | Age: 67
Discharge: HOME | End: 2017-10-30
Admitting: NURSE PRACTITIONER

## 2017-10-30 ENCOUNTER — TELEPHONE (OUTPATIENT)
Dept: FAMILY MEDICINE CLINIC | Facility: CLINIC | Age: 67
End: 2017-10-30

## 2017-10-30 PROCEDURE — 76536 US EXAM OF HEAD AND NECK: CPT

## 2017-10-30 PROCEDURE — 76536 US EXAM OF HEAD AND NECK: CPT | Performed by: RADIOLOGY

## 2017-10-30 NOTE — TELEPHONE ENCOUNTER
----- Message from BERT Lucas sent at 10/30/2017  3:37 PM EDT -----  Let her know that the thyroid scan is normal        Patient notified & verbalized understanding.

## 2017-11-08 NOTE — TELEPHONE ENCOUNTER
Yes she needs B12 injections weekly X 4 then biweekly X2 then monthly  Also 50,000 units vit d weekly        Patient notified & verbalized understanding.   No

## 2017-11-11 ENCOUNTER — HOSPITAL ENCOUNTER (OUTPATIENT)
Dept: MAMMOGRAPHY | Facility: HOSPITAL | Age: 67
Discharge: HOME | End: 2017-11-11
Admitting: NURSE PRACTITIONER

## 2017-11-11 DIAGNOSIS — Z12.31 VISIT FOR SCREENING MAMMOGRAM: ICD-10-CM

## 2017-11-11 PROCEDURE — G0202 SCR MAMMO BI INCL CAD: HCPCS

## 2017-11-11 PROCEDURE — 77067 SCR MAMMO BI INCL CAD: CPT | Performed by: RADIOLOGY

## 2017-11-11 PROCEDURE — 77063 BREAST TOMOSYNTHESIS BI: CPT

## 2017-11-11 PROCEDURE — 77063 BREAST TOMOSYNTHESIS BI: CPT | Performed by: RADIOLOGY

## 2017-11-16 ENCOUNTER — OFFICE VISIT (OUTPATIENT)
Dept: FAMILY MEDICINE CLINIC | Facility: CLINIC | Age: 67
End: 2017-11-16

## 2017-11-16 ENCOUNTER — TELEPHONE (OUTPATIENT)
Dept: FAMILY MEDICINE CLINIC | Facility: CLINIC | Age: 67
End: 2017-11-16

## 2017-11-16 VITALS
OXYGEN SATURATION: 96 % | WEIGHT: 179 LBS | BODY MASS INDEX: 33.79 KG/M2 | HEIGHT: 61 IN | DIASTOLIC BLOOD PRESSURE: 90 MMHG | TEMPERATURE: 98.3 F | HEART RATE: 90 BPM | SYSTOLIC BLOOD PRESSURE: 145 MMHG

## 2017-11-16 DIAGNOSIS — E11.9 TYPE 2 DIABETES MELLITUS WITHOUT COMPLICATION, UNSPECIFIED LONG TERM INSULIN USE STATUS: ICD-10-CM

## 2017-11-16 DIAGNOSIS — J06.9 URI WITH COUGH AND CONGESTION: Primary | ICD-10-CM

## 2017-11-16 DIAGNOSIS — E03.9 HYPOTHYROIDISM, UNSPECIFIED TYPE: ICD-10-CM

## 2017-11-16 DIAGNOSIS — K80.20 GALLSTONES: ICD-10-CM

## 2017-11-16 PROCEDURE — 99213 OFFICE O/P EST LOW 20 MIN: CPT | Performed by: NURSE PRACTITIONER

## 2017-11-16 RX ORDER — ERYTHROMYCIN STEARATE 250 MG
500 TABLET ORAL 2 TIMES DAILY
Qty: 40 TABLET | Refills: 0 | Status: SHIPPED | OUTPATIENT
Start: 2017-11-16 | End: 2017-11-16 | Stop reason: DRUGHIGH

## 2017-11-16 NOTE — TELEPHONE ENCOUNTER
Patient called reports no pharmacies in [place] have the Erythromycin 250mg,only [facility] has erythromycin but it is 333mg,do you want to change the strength or the antibiotic?

## 2017-11-16 NOTE — PROGRESS NOTES
Subjective   Shey King is a 66 y.o. female.   Chief Compliant: The patient presents with Earache (bilateral)    Earache    There is pain in both ears. This is a new problem. The current episode started in the past 7 days. The problem occurs constantly. The problem has been unchanged. The maximum temperature recorded prior to her arrival was 100.4 - 100.9 F. The pain is at a severity of 4/10. The pain is moderate. Associated symptoms include coughing. Pertinent negatives include no abdominal pain, diarrhea, ear discharge, headaches, hearing loss, neck pain, rash, rhinorrhea, sore throat or vomiting. Associated symptoms comments: Congestion   . Treatments tried: decongestants. The treatment provided mild relief.   Thyroid Problem   Presents for follow-up (Feeling better.  Denies any concerns. ) visit. Symptoms include constipation and fatigue. Patient reports no anxiety or diarrhea. The symptoms have been stable.   Abdominal Pain   Chronicity: known gallstones and wishes to proceed with surgical consultation as the pain is more consistently aggravating. Associated symptoms include constipation. Pertinent negatives include no diarrhea, headaches or vomiting. Associated symptoms comments: Bloating and fullness.      The following portions of the patient's history were reviewed and updated as appropriate: allergies, current medications, past family history, past medical history, past social history, past surgical history and problem list.      Review of Systems   Constitutional: Positive for fatigue.   HENT: Positive for congestion and ear pain. Negative for ear discharge, hearing loss, rhinorrhea and sore throat.    Respiratory: Positive for cough. Negative for wheezing.    Cardiovascular: Negative.    Gastrointestinal: Positive for constipation. Negative for abdominal pain, diarrhea and vomiting.   Genitourinary: Negative.    Musculoskeletal: Negative.  Negative for neck pain.   Skin: Negative.  Negative for rash.  "  Neurological: Negative.  Negative for headaches.   Psychiatric/Behavioral: The patient is not nervous/anxious.    All other systems reviewed and are negative.      Procedures    Vitals: Blood pressure 145/90, pulse 90, temperature 98.3 °F (36.8 °C), height 61\" (154.9 cm), weight 179 lb (81.2 kg), SpO2 96 %, not currently breastfeeding.     Allergies:   Allergies   Allergen Reactions   • Bactrim [Sulfamethoxazole-Trimethoprim]    • Ciprofloxacin    • Penicillins    • Sulfa Antibiotics           Objective   Physical Exam   Constitutional: She is oriented to person, place, and time. She appears well-developed and well-nourished.   HENT:   Head: Normocephalic.   Right Ear: Hearing, external ear and ear canal normal. Tympanic membrane is erythematous. A middle ear effusion is present.   Left Ear: Hearing, external ear and ear canal normal. Tympanic membrane is erythematous. A middle ear effusion is present.   Nose: Mucosal edema and rhinorrhea present. Right sinus exhibits no maxillary sinus tenderness and no frontal sinus tenderness. Left sinus exhibits no maxillary sinus tenderness and no frontal sinus tenderness.   Mouth/Throat: Uvula is midline and mucous membranes are normal. Posterior oropharyngeal erythema present. No oropharyngeal exudate. No tonsillar exudate.   Neck: Thyromegaly present.   Cardiovascular: Normal rate, regular rhythm, normal heart sounds and intact distal pulses.    No murmur heard.  Pulmonary/Chest: Effort normal and breath sounds normal.   Abdominal: Soft. Bowel sounds are normal. She exhibits no distension. There is no tenderness.   Musculoskeletal: She exhibits no edema.   Lymphadenopathy:     She has cervical adenopathy.   Neurological: She is alert and oriented to person, place, and time.   Skin: Skin is warm and dry. No rash noted. She is not diaphoretic. No erythema. No pallor.   Psychiatric: She has a normal mood and affect. Her behavior is normal.   Nursing note and vitals " reviewed.      Assessment/Plan   Discussed with patient impression and plan, patient verbalizes understanding.  Discussed finding on physical exam with patient.   Shey was seen today for earache.    Diagnoses and all orders for this visit:    URI with cough and congestion    Type 2 diabetes mellitus without complication, unspecified long term insulin use status  -     metFORMIN (GLUCOPHAGE) 500 MG tablet; Take 1 tablet by mouth Daily With Breakfast.  -     Comprehensive Metabolic Panel  -     Lipid Panel  -     Hemoglobin A1c  -     TSH    Hypothyroidism, unspecified type    Gallstones  -     Ambulatory Referral to General Surgery    Other orders  -     Discontinue: erythromycin (ERYTHROCIN) 250 MG tablet; Take 2 tablets by mouth 2 (Two) Times a Day.

## 2017-11-16 NOTE — TELEPHONE ENCOUNTER
333mg tid is ok to do  She has many allergies and very sensitive      Sent as requested & patient notified.

## 2017-12-06 ENCOUNTER — OFFICE VISIT (OUTPATIENT)
Dept: SURGERY | Facility: CLINIC | Age: 67
End: 2017-12-06

## 2017-12-06 VITALS
HEIGHT: 61 IN | SYSTOLIC BLOOD PRESSURE: 130 MMHG | HEART RATE: 72 BPM | DIASTOLIC BLOOD PRESSURE: 82 MMHG | BODY MASS INDEX: 33.79 KG/M2 | WEIGHT: 179 LBS

## 2017-12-06 DIAGNOSIS — K44.9 HIATAL HERNIA: ICD-10-CM

## 2017-12-06 DIAGNOSIS — K80.20 GALLSTONES: Primary | ICD-10-CM

## 2017-12-06 PROCEDURE — 99204 OFFICE O/P NEW MOD 45 MIN: CPT | Performed by: SURGERY

## 2017-12-06 NOTE — PROGRESS NOTES
Subjective   Shey King is a 66 y.o. female is being seen for consultation today at the request of BERT Lucas Comments: 66-year-old female with several year history of constant right upper quadrant pain radiating to the right shoulder.  No nausea or vomiting reported.  No specific food associations.  The patient has a known large hiatal hernia.  She states symptoms of reflux associated to her hernia but this improved with 20 pound weight loss.  She denies heartburn reflux or sensation of dysphagia or food getting stuck in her lower esophagus at this time.  No jaundice reported.  Gallstones are noted on 2015 CT scan      Past Medical History:   Diagnosis Date   • Abdominal pain, LLQ (left lower quadrant)    • Cystitis    • Diarrhea    • Gallstones    • Hiatal hernia    • Hyperglycemia    • Hyperlipidemia    • Hypothyroidism    • Muscle spasm     FLANK PAIN   • OAB (overactive bladder)    • UTI (urinary tract infection)        Family History   Problem Relation Age of Onset   • Heart disease Mother    • Cancer Mother      RENAL    • No Known Problems Father    • Breast cancer Neg Hx        Social History     Social History   • Marital status:      Spouse name: N/A   • Number of children: N/A   • Years of education: N/A     Occupational History   • Not on file.     Social History Main Topics   • Smoking status: Never Smoker   • Smokeless tobacco: Never Used   • Alcohol use No   • Drug use: No   • Sexual activity: Defer     Other Topics Concern   • Not on file     Social History Narrative       Past Surgical History:   Procedure Laterality Date   • BREAST BIOPSY Right 2010    benign   • MAMMO BILATERAL  09/2015       The following portions of the patient's history were reviewed and updated as appropriate: allergies, current medications, past family history, past medical history, past social history, past surgical history and problem list.    Review of Systems   Constitutional: Negative for activity  "change, appetite change, chills and fever.   HENT: Negative for sore throat and trouble swallowing.    Eyes: Negative for visual disturbance.   Respiratory: Negative for cough and shortness of breath.    Cardiovascular: Negative for chest pain and palpitations.   Gastrointestinal: Positive for abdominal pain. Negative for abdominal distention, blood in stool, constipation, diarrhea, nausea and vomiting.   Endocrine: Negative for cold intolerance and heat intolerance.   Genitourinary: Negative for dysuria.   Musculoskeletal: Negative for joint swelling.   Skin: Negative for color change, rash and wound.   Allergic/Immunologic: Negative for immunocompromised state.   Neurological: Negative for dizziness, seizures, weakness and headaches.   Hematological: Negative for adenopathy. Does not bruise/bleed easily.   Psychiatric/Behavioral: Negative for agitation and confusion.         /82  Pulse 72  Ht 154.9 cm (61\")  Wt 81.2 kg (179 lb)  BMI 33.82 kg/m2  Objective   Physical Exam   Constitutional: She is oriented to person, place, and time. She appears well-developed.   HENT:   Head: Normocephalic and atraumatic.   Mouth/Throat: Mucous membranes are normal.   Eyes: Conjunctivae are normal. Pupils are equal, round, and reactive to light.   Neck: Neck supple. No JVD present. No tracheal deviation present. No thyromegaly present.   Cardiovascular: Normal rate and regular rhythm.  Exam reveals no gallop and no friction rub.    No murmur heard.  Pulmonary/Chest: Effort normal and breath sounds normal.   Abdominal: Soft. She exhibits no distension. There is no splenomegaly or hepatomegaly. There is no tenderness. No hernia.   Musculoskeletal: Normal range of motion. She exhibits no deformity.   Neurological: She is alert and oriented to person, place, and time.   Skin: Skin is warm and dry.   Psychiatric: She has a normal mood and affect.     OC-ABDOMEN WITHOUT CONTRAST-       CLINICAL INDICATION- Hiatal hernia. "           COMPARISON- 01/19/2015.      PROCEDURE- Axial images were acquired from the lung bases through the  iliac crest after oral, but no IV contrast.      FINDINGS- Today's study shows lung bases to be clear.      There is a large hiatal hernia.      The liver and spleen are homogeneous.      High density material is seen in the dependent portion of the  gallbladder, most likely representing cholelithiasis.      No free fluid or walled off fluid collections are present.      All of the sigmoid colon was not imaged. There are left-sided  diverticula.      IMPRESSION-  Hiatal hernia.  Cholelithiasis.     Shey was seen today for gallstones and hiatal hernia.    Diagnoses and all orders for this visit:    Gallstones  -     US Gallbladder    Hiatal hernia        Assessment     66-year-old female with persistent right upper quadrant pain radiating to the right shoulder.  She states she has a known large hiatal hernia visible on imaging but her reflux symptoms have improved with weight loss.  The pain is not consistent with classic gallstone disease but gallstones are noted on the 2015 CT scan.  I'll obtain ultrasound of the gallbladder further evaluate for signs of chronic or acute inflammation.  Patient will follow-up after imaging to discuss further surgical management.

## 2017-12-08 ENCOUNTER — HOSPITAL ENCOUNTER (OUTPATIENT)
Dept: ULTRASOUND IMAGING | Facility: HOSPITAL | Age: 67
Discharge: HOME | End: 2017-12-08
Attending: SURGERY | Admitting: SURGERY

## 2017-12-08 PROCEDURE — 76705 ECHO EXAM OF ABDOMEN: CPT | Performed by: RADIOLOGY

## 2017-12-08 PROCEDURE — 76705 ECHO EXAM OF ABDOMEN: CPT

## 2017-12-27 ENCOUNTER — OFFICE VISIT (OUTPATIENT)
Dept: SURGERY | Facility: CLINIC | Age: 67
End: 2017-12-27

## 2017-12-27 VITALS — HEIGHT: 61 IN | WEIGHT: 179 LBS | BODY MASS INDEX: 33.79 KG/M2

## 2017-12-27 DIAGNOSIS — K44.9 HIATAL HERNIA: Primary | ICD-10-CM

## 2017-12-27 DIAGNOSIS — K80.20 GALLSTONES: ICD-10-CM

## 2017-12-27 PROCEDURE — 99214 OFFICE O/P EST MOD 30 MIN: CPT | Performed by: SURGERY

## 2017-12-27 NOTE — PROGRESS NOTES
Subjective   Shey King is a 67 y.o. female is here today for follow-up.    HPI Comments: 67-year-old female here for follow-up after ultrasound.  She has gallstones on ultrasound but no signs of chronic or acute cholecystitis.  The patient has a known moderate-sized paraesophageal hernia.  Her symptoms are consistent with back pain and right-sided abdominal pain with minimal food association.  No dysphasia or classic symptoms of paraesophageal hernia.      Past Medical History:   Diagnosis Date   • Abdominal pain, LLQ (left lower quadrant)    • Cystitis    • Diarrhea    • Gallstones    • Hiatal hernia    • Hyperglycemia    • Hyperlipidemia    • Hypothyroidism    • Muscle spasm     FLANK PAIN   • OAB (overactive bladder)    • UTI (urinary tract infection)        Family History   Problem Relation Age of Onset   • Heart disease Mother    • Cancer Mother      RENAL    • No Known Problems Father    • Breast cancer Neg Hx        Social History     Social History   • Marital status:      Spouse name: N/A   • Number of children: N/A   • Years of education: N/A     Occupational History   • Not on file.     Social History Main Topics   • Smoking status: Never Smoker   • Smokeless tobacco: Never Used   • Alcohol use No   • Drug use: No   • Sexual activity: Defer     Other Topics Concern   • Not on file     Social History Narrative       Past Surgical History:   Procedure Laterality Date   • BREAST BIOPSY Right 2010    benign   • MAMMO BILATERAL  09/2015       The following portions of the patient's history were reviewed and updated as appropriate: allergies, current medications, past family history, past medical history, past social history, past surgical history and problem list.    Review of Systems   Constitutional: Negative for activity change, appetite change, chills and fever.   HENT: Negative for sore throat and trouble swallowing.    Eyes: Negative for visual disturbance.   Respiratory: Negative for cough  "and shortness of breath.    Cardiovascular: Negative for chest pain and palpitations.   Gastrointestinal: Positive for abdominal pain. Negative for abdominal distention, blood in stool, constipation, diarrhea, nausea and vomiting.   Endocrine: Negative for cold intolerance and heat intolerance.   Genitourinary: Negative for dysuria.   Musculoskeletal: Negative for joint swelling.   Skin: Negative for color change, rash and wound.   Allergic/Immunologic: Negative for immunocompromised state.   Neurological: Negative for dizziness, seizures, weakness and headaches.   Hematological: Negative for adenopathy. Does not bruise/bleed easily.   Psychiatric/Behavioral: Negative for agitation and confusion.         Ht 154.9 cm (61\")  Wt 81.2 kg (179 lb)  BMI 33.82 kg/m2  Objective   Physical Exam   Constitutional: She is oriented to person, place, and time. She appears well-developed.   HENT:   Head: Normocephalic and atraumatic.   Mouth/Throat: Mucous membranes are normal.   Eyes: Conjunctivae are normal. Pupils are equal, round, and reactive to light.   Neck: Neck supple. No JVD present. No tracheal deviation present. No thyromegaly present.   Cardiovascular: Normal rate and regular rhythm.  Exam reveals no gallop and no friction rub.    No murmur heard.  Pulmonary/Chest: Effort normal and breath sounds normal.   Abdominal: Soft. She exhibits no distension. There is no splenomegaly or hepatomegaly. There is no tenderness. No hernia.   Musculoskeletal: Normal range of motion. She exhibits no deformity.   Neurological: She is alert and oriented to person, place, and time.   Skin: Skin is warm and dry.   Psychiatric: She has a normal mood and affect.         Shey was seen today for gallstones and us follow up.    Diagnoses and all orders for this visit:    Hiatal hernia  -     Case Request; Standing  -     CBC and Differential; Future  -     Comprehensive metabolic panel; Future  -     metroNIDAZOLE (FLAGYL) IVPB 500 mg; " Infuse 100 mL into a venous catheter 1 (One) Time.  -     Case Request    Gallstones  -     Case Request; Standing  -     CBC and Differential; Future  -     Comprehensive metabolic panel; Future  -     metroNIDAZOLE (FLAGYL) IVPB 500 mg; Infuse 100 mL into a venous catheter 1 (One) Time.  -     Case Request    Other orders  -     Provide instructions to patient on NPO status  -     Clorhexidine skin prep  -     Follow Anesthesia Guidelines / Standing Orders; Standing  -     Verify NPO Status; Standing  -     Obtain informed consent; Standing  -     SCD (sequential compression device)- to be placed on patient in Pre-op; Standing  -     Instructions on coughing, deep breathing, and incentive spirometry.; Standing        Assessment     67-year-old female with moderate-sized paraesophageal hernia and gallstones.  It is unclear whether her symptoms are related to the paraesophageal hernia or gallstones.  After discussion of risks and benefits of surgery with the patient will undergo laparoscopic cholecystectomy and EGD to evaluate her paraesophageal hernia.  If symptoms resolve with cholecystectomy she may not require paraesophageal hernia at this time.  If symptoms persist and is truly a symptomatic paraesophageal hernia surgical repair will be recommended a later date.

## 2018-01-09 ENCOUNTER — APPOINTMENT (OUTPATIENT)
Dept: PREADMISSION TESTING | Facility: HOSPITAL | Age: 68
End: 2018-01-09

## 2018-01-09 DIAGNOSIS — K44.9 HIATAL HERNIA: ICD-10-CM

## 2018-01-09 DIAGNOSIS — K80.20 GALLSTONES: ICD-10-CM

## 2018-01-09 LAB
ALBUMIN SERPL-MCNC: 4.4 G/DL (ref 3.4–4.8)
ALBUMIN/GLOB SERPL: 1.3 G/DL (ref 1.5–2.5)
ALP SERPL-CCNC: 99 U/L (ref 35–104)
ALT SERPL W P-5'-P-CCNC: 22 U/L (ref 10–36)
ANION GAP SERPL CALCULATED.3IONS-SCNC: 10.1 MMOL/L (ref 3.6–11.2)
AST SERPL-CCNC: 21 U/L (ref 10–30)
BASOPHILS # BLD AUTO: 0.04 10*3/MM3 (ref 0–0.3)
BASOPHILS NFR BLD AUTO: 0.4 % (ref 0–2)
BILIRUB SERPL-MCNC: 0.5 MG/DL (ref 0.2–1.8)
BUN BLD-MCNC: 8 MG/DL (ref 7–21)
BUN/CREAT SERPL: 10 (ref 7–25)
CALCIUM SPEC-SCNC: 9.2 MG/DL (ref 7.7–10)
CHLORIDE SERPL-SCNC: 103 MMOL/L (ref 99–112)
CO2 SERPL-SCNC: 26.9 MMOL/L (ref 24.3–31.9)
CREAT BLD-MCNC: 0.8 MG/DL (ref 0.43–1.29)
DEPRECATED RDW RBC AUTO: 43 FL (ref 37–54)
EOSINOPHIL # BLD AUTO: 0.19 10*3/MM3 (ref 0–0.7)
EOSINOPHIL NFR BLD AUTO: 2 % (ref 0–7)
ERYTHROCYTE [DISTWIDTH] IN BLOOD BY AUTOMATED COUNT: 13.7 % (ref 11.5–14.5)
GFR SERPL CREATININE-BSD FRML MDRD: 72 ML/MIN/1.73
GLOBULIN UR ELPH-MCNC: 3.5 GM/DL
GLUCOSE BLD-MCNC: 90 MG/DL (ref 70–110)
HCT VFR BLD AUTO: 37.1 % (ref 37–47)
HGB BLD-MCNC: 12.4 G/DL (ref 12–16)
IMM GRANULOCYTES # BLD: 0.02 10*3/MM3 (ref 0–0.03)
IMM GRANULOCYTES NFR BLD: 0.2 % (ref 0–0.5)
LYMPHOCYTES # BLD AUTO: 2.28 10*3/MM3 (ref 1–3)
LYMPHOCYTES NFR BLD AUTO: 23.6 % (ref 16–46)
MCH RBC QN AUTO: 29.5 PG (ref 27–33)
MCHC RBC AUTO-ENTMCNC: 33.4 G/DL (ref 33–37)
MCV RBC AUTO: 88.3 FL (ref 80–94)
MONOCYTES # BLD AUTO: 0.62 10*3/MM3 (ref 0.1–0.9)
MONOCYTES NFR BLD AUTO: 6.4 % (ref 0–12)
NEUTROPHILS # BLD AUTO: 6.51 10*3/MM3 (ref 1.4–6.5)
NEUTROPHILS NFR BLD AUTO: 67.4 % (ref 40–75)
OSMOLALITY SERPL CALC.SUM OF ELEC: 277.3 MOSM/KG (ref 273–305)
PLATELET # BLD AUTO: 264 10*3/MM3 (ref 130–400)
PMV BLD AUTO: 11.2 FL (ref 6–10)
POTASSIUM BLD-SCNC: 4.1 MMOL/L (ref 3.5–5.3)
PROT SERPL-MCNC: 7.9 G/DL (ref 6–8)
RBC # BLD AUTO: 4.2 10*6/MM3 (ref 4.2–5.4)
SODIUM BLD-SCNC: 140 MMOL/L (ref 135–153)
WBC NRBC COR # BLD: 9.66 10*3/MM3 (ref 4.5–12.5)

## 2018-01-09 PROCEDURE — 80053 COMPREHEN METABOLIC PANEL: CPT | Performed by: SURGERY

## 2018-01-09 PROCEDURE — 85025 COMPLETE CBC W/AUTO DIFF WBC: CPT | Performed by: SURGERY

## 2018-01-09 PROCEDURE — 36415 COLL VENOUS BLD VENIPUNCTURE: CPT

## 2018-01-09 RX ORDER — LANOLIN ALCOHOL/MO/W.PET/CERES
1000 CREAM (GRAM) TOPICAL DAILY
COMMUNITY
End: 2019-12-10

## 2018-01-09 NOTE — DISCHARGE INSTRUCTIONS
0730-------1/11/18---------ARRIVAL TIME  TAKE the following medications the morning of surgery:  All heart or blood pressure medications    HOLD all diabetic medications the morning of surgery as ordered by physician.    Please discontinue all blood thinners and anticoagulants (except aspirin) prior to surgery as per your surgeon and cardiologist instructions.  Aspirin may be continued up to the day prior to surgery.     CHLORHEXIDINE CLOTHS GIVEN WITH INSTRUCTIONS AND FORM TO RETURN TO HOSPITAL    General Instructions:  · Do not eat or drink after midnight: includes water, mints, or gum. You may brush your teeth.  Dental appliances that are removable must be taken out day of surgery.  · Do not smoke, chew tobacco, or drink alcohol.  · Bring medications in original bottles, any inhalers and if applicable your C-PAP/BI-PAP machine.  · Bring any papers given to you in the doctor's office.  · Wear clean comfortable clothes and socks.  · Do not wear contact lenses or make-up. Bring a case for your glasses if applicable.  · Bring crutches or walker if applicable.  · Leave all other valuables and jewelry at home.    If you were given a blood bank ID arm band remember to bring it with you the day of surgery.    Preventing a Surgical Site Infection:  Shower on the morning of surgery using a fresh bar of anti-bacterial soap (such as Dial) and clean washcloth. Dry with a clean towel and dress in clean clothing.  For 2 to 3 days before surgery, avoid shaving with a razor near where you will have surgery because the razor can irritate skin and make it easier to develop an infection. Ask your surgeon if you will be receiving antibiotics prior to surgery.  Make sure you, your family, and all healthcare providers clear their hands with soap and water or an alcohol-based hand  before caring for you or your wound.  If at all possible, quit smoking as many days before surgery as you can.    Day of surgery:  Upon arrival, a  Pre-op nurse and Anesthesiologist will review your health history, obtain vital signs, and answer questions you may have. The only belongings needed at this time will be your home medications and if applicable your C-PAP/BI-PAP machine. If you are staying overnight your family can leave the rest of your belongings in the car and bring them to your room later. A Pre-op nurse will start an IV and you may receive medication in preparation for surgery, including something to help you relax. Your family will be able to see you in the Pre-op area. While you are in surgery your family should notify the waiting room  if they leave the waiting room area and provide a contact phone number.    Please be aware that surgery does come with discomfort. We want to make every effort to control your discomfort so please discuss any uncontrolled symptoms with your nurse. Your doctor will most likely have prescribed pain medications.    If you are going home after surgery you will receive individualized written care instructions before being discharged. A responsible adult must drive you to and from the hospital on the day of surgery and stay with you for 24 hours.    If you are staying overnight following surgery, you will be transported to your hospital room following the recovery period.  [facility] has all private rooms.    If you have any questions please call Pre-Admission Testing at [phone].  Deductibles and co-payments are collected on the day of service. Please be prepared to pay the required co-pay, deductible or deposit on the day of service as defined by your plan.

## 2018-01-11 ENCOUNTER — ANESTHESIA (OUTPATIENT)
Dept: PERIOP | Facility: HOSPITAL | Age: 68
End: 2018-01-11

## 2018-01-11 ENCOUNTER — HOSPITAL ENCOUNTER (OUTPATIENT)
Facility: HOSPITAL | Age: 68
Setting detail: HOSPITAL OUTPATIENT SURGERY
Discharge: HOME | End: 2018-01-11
Attending: SURGERY | Admitting: SURGERY

## 2018-01-11 ENCOUNTER — ANESTHESIA EVENT (OUTPATIENT)
Dept: PERIOP | Facility: HOSPITAL | Age: 68
End: 2018-01-11

## 2018-01-11 VITALS
DIASTOLIC BLOOD PRESSURE: 80 MMHG | HEART RATE: 78 BPM | SYSTOLIC BLOOD PRESSURE: 145 MMHG | WEIGHT: 175 LBS | RESPIRATION RATE: 16 BRPM | HEIGHT: 61 IN | OXYGEN SATURATION: 96 % | TEMPERATURE: 97 F | BODY MASS INDEX: 33.04 KG/M2

## 2018-01-11 DIAGNOSIS — K80.20 GALLSTONES: ICD-10-CM

## 2018-01-11 DIAGNOSIS — K44.9 HIATAL HERNIA: ICD-10-CM

## 2018-01-11 LAB — GLUCOSE BLDC GLUCOMTR-MCNC: 113 MG/DL (ref 70–130)

## 2018-01-11 PROCEDURE — 25010000002 ONDANSETRON PER 1 MG: Performed by: NURSE ANESTHETIST, CERTIFIED REGISTERED

## 2018-01-11 PROCEDURE — 47562 LAPAROSCOPIC CHOLECYSTECTOMY: CPT | Performed by: SURGERY

## 2018-01-11 PROCEDURE — 25010000002 FENTANYL CITRATE (PF) 100 MCG/2ML SOLUTION: Performed by: NURSE ANESTHETIST, CERTIFIED REGISTERED

## 2018-01-11 PROCEDURE — 82962 GLUCOSE BLOOD TEST: CPT

## 2018-01-11 PROCEDURE — 25010000002 PROPOFOL 10 MG/ML EMULSION: Performed by: NURSE ANESTHETIST, CERTIFIED REGISTERED

## 2018-01-11 PROCEDURE — 25010000002 MIDAZOLAM PER 1 MG: Performed by: NURSE ANESTHETIST, CERTIFIED REGISTERED

## 2018-01-11 PROCEDURE — 94799 UNLISTED PULMONARY SVC/PX: CPT

## 2018-01-11 PROCEDURE — 43235 EGD DIAGNOSTIC BRUSH WASH: CPT | Performed by: SURGERY

## 2018-01-11 PROCEDURE — 25010000002 NEOSTIGMINE 10 MG/10ML SOLUTION: Performed by: NURSE ANESTHETIST, CERTIFIED REGISTERED

## 2018-01-11 PROCEDURE — 88304 TISSUE EXAM BY PATHOLOGIST: CPT | Performed by: SURGERY

## 2018-01-11 RX ORDER — MAGNESIUM HYDROXIDE 1200 MG/15ML
LIQUID ORAL AS NEEDED
Status: DISCONTINUED | OUTPATIENT
Start: 2018-01-11 | End: 2018-01-11 | Stop reason: HOSPADM

## 2018-01-11 RX ORDER — LIDOCAINE HYDROCHLORIDE 20 MG/ML
INJECTION, SOLUTION INFILTRATION; PERINEURAL AS NEEDED
Status: DISCONTINUED | OUTPATIENT
Start: 2018-01-11 | End: 2018-01-11 | Stop reason: SURG

## 2018-01-11 RX ORDER — HYDROCODONE BITARTRATE AND ACETAMINOPHEN 5; 325 MG/1; MG/1
1-2 TABLET ORAL EVERY 4 HOURS PRN
Qty: 20 TABLET | Refills: 0 | Status: SHIPPED | OUTPATIENT
Start: 2018-01-11 | End: 2018-03-05

## 2018-01-11 RX ORDER — MIDAZOLAM HYDROCHLORIDE 1 MG/ML
INJECTION INTRAMUSCULAR; INTRAVENOUS AS NEEDED
Status: DISCONTINUED | OUTPATIENT
Start: 2018-01-11 | End: 2018-01-11 | Stop reason: SURG

## 2018-01-11 RX ORDER — GLYCOPYRROLATE 0.2 MG/ML
INJECTION INTRAMUSCULAR; INTRAVENOUS AS NEEDED
Status: DISCONTINUED | OUTPATIENT
Start: 2018-01-11 | End: 2018-01-11 | Stop reason: SURG

## 2018-01-11 RX ORDER — BUPIVACAINE HYDROCHLORIDE 5 MG/ML
INJECTION, SOLUTION PERINEURAL AS NEEDED
Status: DISCONTINUED | OUTPATIENT
Start: 2018-01-11 | End: 2018-01-11 | Stop reason: HOSPADM

## 2018-01-11 RX ORDER — FENTANYL CITRATE 50 UG/ML
50 INJECTION, SOLUTION INTRAMUSCULAR; INTRAVENOUS
Status: DISCONTINUED | OUTPATIENT
Start: 2018-01-11 | End: 2018-01-11 | Stop reason: HOSPADM

## 2018-01-11 RX ORDER — IPRATROPIUM BROMIDE AND ALBUTEROL SULFATE 2.5; .5 MG/3ML; MG/3ML
3 SOLUTION RESPIRATORY (INHALATION) ONCE AS NEEDED
Status: DISCONTINUED | OUTPATIENT
Start: 2018-01-11 | End: 2018-01-11 | Stop reason: HOSPADM

## 2018-01-11 RX ORDER — ONDANSETRON 2 MG/ML
INJECTION INTRAMUSCULAR; INTRAVENOUS AS NEEDED
Status: DISCONTINUED | OUTPATIENT
Start: 2018-01-11 | End: 2018-01-11 | Stop reason: SURG

## 2018-01-11 RX ORDER — OXYCODONE HYDROCHLORIDE AND ACETAMINOPHEN 5; 325 MG/1; MG/1
1 TABLET ORAL ONCE AS NEEDED
Status: DISCONTINUED | OUTPATIENT
Start: 2018-01-11 | End: 2018-01-11 | Stop reason: HOSPADM

## 2018-01-11 RX ORDER — ROCURONIUM BROMIDE 10 MG/ML
INJECTION, SOLUTION INTRAVENOUS AS NEEDED
Status: DISCONTINUED | OUTPATIENT
Start: 2018-01-11 | End: 2018-01-11 | Stop reason: SURG

## 2018-01-11 RX ORDER — MEPERIDINE HYDROCHLORIDE 25 MG/ML
12.5 INJECTION INTRAMUSCULAR; INTRAVENOUS; SUBCUTANEOUS
Status: DISCONTINUED | OUTPATIENT
Start: 2018-01-11 | End: 2018-01-11 | Stop reason: HOSPADM

## 2018-01-11 RX ORDER — FENTANYL CITRATE 50 UG/ML
INJECTION, SOLUTION INTRAMUSCULAR; INTRAVENOUS AS NEEDED
Status: DISCONTINUED | OUTPATIENT
Start: 2018-01-11 | End: 2018-01-11 | Stop reason: SURG

## 2018-01-11 RX ORDER — NEOSTIGMINE METHYLSULFATE 1 MG/ML
INJECTION, SOLUTION INTRAVENOUS AS NEEDED
Status: DISCONTINUED | OUTPATIENT
Start: 2018-01-11 | End: 2018-01-11 | Stop reason: SURG

## 2018-01-11 RX ORDER — ONDANSETRON 2 MG/ML
4 INJECTION INTRAMUSCULAR; INTRAVENOUS ONCE AS NEEDED
Status: DISCONTINUED | OUTPATIENT
Start: 2018-01-11 | End: 2018-01-11 | Stop reason: HOSPADM

## 2018-01-11 RX ORDER — FAMOTIDINE 10 MG/ML
INJECTION, SOLUTION INTRAVENOUS AS NEEDED
Status: DISCONTINUED | OUTPATIENT
Start: 2018-01-11 | End: 2018-01-11 | Stop reason: SURG

## 2018-01-11 RX ORDER — SODIUM CHLORIDE 0.9 % (FLUSH) 0.9 %
1-10 SYRINGE (ML) INJECTION AS NEEDED
Status: DISCONTINUED | OUTPATIENT
Start: 2018-01-11 | End: 2018-01-11 | Stop reason: HOSPADM

## 2018-01-11 RX ORDER — PROPOFOL 10 MG/ML
VIAL (ML) INTRAVENOUS AS NEEDED
Status: DISCONTINUED | OUTPATIENT
Start: 2018-01-11 | End: 2018-01-11 | Stop reason: SURG

## 2018-01-11 RX ORDER — SODIUM CHLORIDE, SODIUM LACTATE, POTASSIUM CHLORIDE, CALCIUM CHLORIDE 600; 310; 30; 20 MG/100ML; MG/100ML; MG/100ML; MG/100ML
125 INJECTION, SOLUTION INTRAVENOUS CONTINUOUS
Status: DISCONTINUED | OUTPATIENT
Start: 2018-01-11 | End: 2018-01-11 | Stop reason: HOSPADM

## 2018-01-11 RX ADMIN — GLYCOPYRROLATE 0.4 MG: 0.2 INJECTION, SOLUTION INTRAMUSCULAR; INTRAVENOUS at 09:57

## 2018-01-11 RX ADMIN — ONDANSETRON 4 MG: 2 INJECTION, SOLUTION INTRAMUSCULAR; INTRAVENOUS at 11:55

## 2018-01-11 RX ADMIN — LIDOCAINE HYDROCHLORIDE 60 MG: 20 INJECTION, SOLUTION INFILTRATION; PERINEURAL at 09:29

## 2018-01-11 RX ADMIN — FENTANYL CITRATE 50 MCG: 50 INJECTION INTRAMUSCULAR; INTRAVENOUS at 09:55

## 2018-01-11 RX ADMIN — METRONIDAZOLE 500 MG: 500 INJECTION, SOLUTION INTRAVENOUS at 09:33

## 2018-01-11 RX ADMIN — PROPOFOL 150 MG: 10 INJECTION, EMULSION INTRAVENOUS at 09:29

## 2018-01-11 RX ADMIN — SODIUM CHLORIDE, POTASSIUM CHLORIDE, SODIUM LACTATE AND CALCIUM CHLORIDE 125 ML/HR: 600; 310; 30; 20 INJECTION, SOLUTION INTRAVENOUS at 09:15

## 2018-01-11 RX ADMIN — FAMOTIDINE 20 MG: 10 INJECTION, SOLUTION INTRAVENOUS at 09:33

## 2018-01-11 RX ADMIN — FENTANYL CITRATE 100 MCG: 50 INJECTION INTRAMUSCULAR; INTRAVENOUS at 09:29

## 2018-01-11 RX ADMIN — EPHEDRINE SULFATE 10 MG: 50 INJECTION INTRAMUSCULAR; INTRAVENOUS; SUBCUTANEOUS at 09:47

## 2018-01-11 RX ADMIN — FENTANYL CITRATE 50 MCG: 50 INJECTION INTRAMUSCULAR; INTRAVENOUS at 10:30

## 2018-01-11 RX ADMIN — ROCURONIUM BROMIDE 30 MG: 10 INJECTION INTRAVENOUS at 09:29

## 2018-01-11 RX ADMIN — FENTANYL CITRATE 50 MCG: 50 INJECTION INTRAMUSCULAR; INTRAVENOUS at 10:05

## 2018-01-11 RX ADMIN — FENTANYL CITRATE 50 MCG: 50 INJECTION INTRAMUSCULAR; INTRAVENOUS at 10:35

## 2018-01-11 RX ADMIN — FENTANYL CITRATE 50 MCG: 50 INJECTION INTRAMUSCULAR; INTRAVENOUS at 10:00

## 2018-01-11 RX ADMIN — MIDAZOLAM HYDROCHLORIDE 2 MG: 1 INJECTION, SOLUTION INTRAMUSCULAR; INTRAVENOUS at 09:23

## 2018-01-11 RX ADMIN — ONDANSETRON 4 MG: 2 INJECTION, SOLUTION INTRAMUSCULAR; INTRAVENOUS at 09:33

## 2018-01-11 RX ADMIN — NEOSTIGMINE METHYLSULFATE 3 MG: 1 INJECTION, SOLUTION INTRAVENOUS at 09:57

## 2018-01-11 NOTE — ANESTHESIA PREPROCEDURE EVALUATION
Anesthesia Evaluation     Patient summary reviewed and Nursing notes reviewed   no history of anesthetic complications:  NPO Solid Status: > 8 hours  NPO Liquid Status: > 8 hours     Airway   Mallampati: II  TM distance: >3 FB  Neck ROM: full  no difficulty expected  Dental - normal exam     Pulmonary - negative pulmonary ROS and normal exam   (-) asthma, not a smoker  Cardiovascular - normal exam  Exercise tolerance: good (4-7 METS)    NYHA Classification: II    (+) hyperlipidemia  (-) past MI, dysrhythmias, angina, CHF      Neuro/Psych- negative ROS  (-) seizures, CVA  GI/Hepatic/Renal/Endo    (+) obesity,  hiatal hernia, GERD, diabetes mellitus, hypothyroidism,   (-) liver disease, no renal disease    Musculoskeletal     (+) back pain,   (-) neck pain  Abdominal  - normal exam    Bowel sounds: normal.   Substance History - negative use     OB/GYN negative ob/gyn ROS         Other - negative ROS       (-) arthritis                                          Anesthesia Plan    ASA 2     general     intravenous induction   Anesthetic plan and risks discussed with patient.  Use of blood products discussed with patient  Consented to blood products.

## 2018-01-11 NOTE — ANESTHESIA POSTPROCEDURE EVALUATION
Patient: Shey King    Procedure Summary     Date Anesthesia Start Anesthesia Stop Room / Location    01/11/18 0924 1009 [facility] OR 02 / [facility]       Procedure Diagnosis Surgeon Provider    CHOLECYSTECTOMY LAPAROSCOPIC (N/A Abdomen); ESOPHAGOGASTRODUODENOSCOPY (N/A Esophagus) Hiatal hernia; Gallstones  (Hiatal hernia [K44.9]; Gallstones [K80.20]) MD Glenn Garcia MD          Anesthesia Type: general  Last vitals  BP   117/68 (01/11/18 0904)   Temp   97.8 °F (36.6 °C) (01/11/18 0904)   Pulse   68 (01/11/18 0904)   Resp   20 (01/11/18 0904)     SpO2   97 % (01/11/18 0904)     Post Anesthesia Care and Evaluation    Patient location during evaluation: PHASE II  Patient participation: complete - patient participated  Level of consciousness: awake and alert  Pain score: 1  Pain management: adequate  Airway patency: patent  Anesthetic complications: No anesthetic complications  PONV Status: controlled  Cardiovascular status: acceptable  Respiratory status: acceptable  Hydration status: acceptable

## 2018-01-11 NOTE — ANESTHESIA PROCEDURE NOTES
Airway  Urgency: elective    Airway not difficult    General Information and Staff    Patient location during procedure: OR  CRNA: [facility]    Indications and Patient Condition  Indications for airway management: airway protection    Preoxygenated: yes  MILS maintained throughout  Mask difficulty assessment: 1 - vent by mask    Final Airway Details  Final airway type: endotracheal airway      Successful airway: ETT  Cuffed: yes   Successful intubation technique: direct laryngoscopy  Facilitating devices/methods: intubating stylet  Endotracheal tube insertion site: oral  Blade: Marshall  Blade size: #3  ETT size: 7.0 mm  Cormack-Lehane Classification: grade IIa - partial view of glottis  Placement verified by: chest auscultation and capnometry   Measured from: lips  ETT to lips (cm): 20  Number of attempts at approach: 1

## 2018-01-11 NOTE — OP NOTE
CHOLECYSTECTOMY LAPAROSCOPIC, ESOPHAGOGASTRODUODENOSCOPY  Procedure Note    Shey King  1/11/2018    Pre-op Diagnosis:   Hiatal hernia [K44.9]  Gallstones [K80.20]    Post-op Diagnosis:     Post-Op Diagnosis Codes:     * Hiatal hernia [K44.9]     * Gallstones [K80.20]    Procedure(s):  CHOLECYSTECTOMY LAPAROSCOPIC  ESOPHAGOGASTRODUODENOSCOPY    Surgeon(s):  Dong Mata MD    Anesthesia: General    Staff:   Circulator: Fay Barbour RN  Scrub Person: Christel Hallman  Assistant: Rene Kwong CSA  Other: Kierra Bell    Findings: Distended gallbladder, critical view safety obtained, 3-4 cm hiatal hernia  Operative Procedure:  The patient was taken operating suite and placed supine on operating table.  Bilateral sequential compression devices were applied and general endotracheal anesthesia administered.  The abdomen was prepped and draped in usual sterile fashion.  Preoperative antibiotics were confirmed.  Timeout procedure was performed.   A Veress needle was inserted palmers point the left upper quadrant and saline drop test confirmed entry into the peritoneal space.  Pneumoperitoneum was established.  An 11mm Optiview trocar was inserted through an infraumbilical incision.  Veress needle site was without injury.  Three 5 mm working ports were placed along the right costal margin in the standard fashion.  Omental adhesions to the undersurface of the liver were taken down with sharp dissection.  The fundus of the gallbladder was grasped and retracted anteriorly and superiorly.  The infundibulum was retracted laterally inferiorly.  The peritoneum on the anterior and posterior surface of the gallbladder was opened bluntly.  The cystic duct and artery were dissected free circumferentially.  The gallbladder was elevated from the gallbladder fossa for portion.  The cystic plate was then visible from the anterior posterior views with only the cystic duct and artery entering the gallbladder.  With the  critical view safety obtained 5 mm hemoclips were placed with 2 proximal and one distal on each structure.  The cystic duct and artery were transected between clips with laparoscopic scissors.  The gallbladder was then removed from the gallbladder fossa intact.  The gallbladder was placed in an Endo Catch bag was removed through the infraumbilical fascial defect.  The gallbladder fossa was then made hemostatic and all ports removed under direct visualization.  Pneumoperitoneum was evacuated and all skin incisions were closed with Monocryl subcuticular suture after closure of the infra umbilical fascial defect with Vicryl suture.  Counts were correct.  EGD was then performed.  The endoscope was inserted into the oropharynx and the esophagus was intubated.  The scope was advanced to the third portion of the duodenum and slowly withdrawn.  No abnormalities were identified in the duodenum.  The gastric body was without ulceration or abnormality.  Retroflexion was performed revealing a mild to moderate sized hiatal hernia.  Scope was withdrawn and the length of the hernia was determined to be 3-4 cm.  There is no ulceration and the GE junction showed no evidence of reflux esophagitis.  The remainder the esophageal mucosa was within normal limits and the scope was removed and the patient was awakened from anesthesia and taken recovery.    Estimated Blood Loss: 5 mL    Specimens:   gallbladder                 Drains: None    Grafts/Implants:  None    Complications: None      Dong Mata MD     Date: 1/11/2018  Time: 10:20 AM

## 2018-01-15 LAB
LAB AP CASE REPORT: NORMAL
Lab: NORMAL
PATH REPORT.FINAL DX SPEC: NORMAL

## 2018-01-23 ENCOUNTER — TELEPHONE (OUTPATIENT)
Dept: FAMILY MEDICINE CLINIC | Facility: CLINIC | Age: 68
End: 2018-01-23

## 2018-01-23 ENCOUNTER — HOSPITAL ENCOUNTER (OUTPATIENT)
Dept: GENERAL RADIOLOGY | Facility: HOSPITAL | Age: 68
Discharge: HOME | End: 2018-01-23
Admitting: NURSE PRACTITIONER

## 2018-01-23 ENCOUNTER — OFFICE VISIT (OUTPATIENT)
Dept: FAMILY MEDICINE CLINIC | Facility: CLINIC | Age: 68
End: 2018-01-23

## 2018-01-23 ENCOUNTER — HOSPITAL ENCOUNTER (OUTPATIENT)
Dept: ULTRASOUND IMAGING | Facility: HOSPITAL | Age: 68
Discharge: HOME | End: 2018-01-23

## 2018-01-23 VITALS
SYSTOLIC BLOOD PRESSURE: 122 MMHG | HEIGHT: 61 IN | WEIGHT: 175.8 LBS | OXYGEN SATURATION: 99 % | DIASTOLIC BLOOD PRESSURE: 81 MMHG | HEART RATE: 98 BPM | BODY MASS INDEX: 33.19 KG/M2

## 2018-01-23 DIAGNOSIS — R59.9 PALPABLE LYMPH NODE: ICD-10-CM

## 2018-01-23 DIAGNOSIS — R05.9 COUGH: ICD-10-CM

## 2018-01-23 DIAGNOSIS — R07.89 RIGHT-SIDED CHEST WALL PAIN: ICD-10-CM

## 2018-01-23 DIAGNOSIS — H92.01 OTALGIA OF RIGHT EAR: ICD-10-CM

## 2018-01-23 DIAGNOSIS — M25.511 RIGHT SHOULDER PAIN, UNSPECIFIED CHRONICITY: ICD-10-CM

## 2018-01-23 DIAGNOSIS — R00.0 TACHYCARDIA: Primary | ICD-10-CM

## 2018-01-23 LAB — TSH SERPL DL<=0.05 MIU/L-ACNC: 0.03 MIU/ML (ref 0.55–4.78)

## 2018-01-23 PROCEDURE — 93000 ELECTROCARDIOGRAM COMPLETE: CPT | Performed by: NURSE PRACTITIONER

## 2018-01-23 PROCEDURE — 99214 OFFICE O/P EST MOD 30 MIN: CPT | Performed by: NURSE PRACTITIONER

## 2018-01-23 PROCEDURE — 71046 X-RAY EXAM CHEST 2 VIEWS: CPT | Performed by: RADIOLOGY

## 2018-01-23 PROCEDURE — 76536 US EXAM OF HEAD AND NECK: CPT

## 2018-01-23 PROCEDURE — 84443 ASSAY THYROID STIM HORMONE: CPT | Performed by: NURSE PRACTITIONER

## 2018-01-23 PROCEDURE — 76536 US EXAM OF HEAD AND NECK: CPT | Performed by: RADIOLOGY

## 2018-01-23 PROCEDURE — 71046 X-RAY EXAM CHEST 2 VIEWS: CPT

## 2018-01-23 RX ORDER — LEVOTHYROXINE SODIUM 0.1 MG/1
100 TABLET ORAL DAILY
Qty: 30 TABLET | Refills: 2 | Status: SHIPPED | OUTPATIENT
Start: 2018-01-23 | End: 2018-03-05 | Stop reason: SDUPTHER

## 2018-01-23 NOTE — TELEPHONE ENCOUNTER
Patient called wants to know if she should go ahead & start on some antibiotics?,CT is scheduled for the AM.

## 2018-01-23 NOTE — TELEPHONE ENCOUNTER
Wait till morning \  You have second message regarding her thyroid       Patient notified & verbalized understanding.

## 2018-01-23 NOTE — TELEPHONE ENCOUNTER
----- Message from BERT Lucas sent at 1/23/2018  3:54 PM EST -----  Synthroid needs to be decreased to 100 mcg daily   Make sure she is taking on an empty stomach 30 min before any food    Patient notified & verbalized understanding.

## 2018-01-23 NOTE — PROGRESS NOTES
Subjective   Shey King is a 67 y.o. female.   Chief Compliant: The patient presents with Earache    HPI Comments: Returns today follow up after recent lap cholecystectomy and EGD on Jan 11, 2018.  States procedures went well and on the Third post operative day she experienced  significant pain in the right shoulder and right upper chest severe enough she considered going to the ED.  Pain subsided eventually with ambulation and drinking a carbonated coke which she burped and had improvement.  Since states she has pain in the same area with pain on inspiration.  Cough thick productive early of the morning.  Denies any fever, chills or shortness of breath.       Earache    There is pain in the right ear. This is a recurrent problem. The current episode started 1 to 4 weeks ago. The problem occurs constantly. The problem has been waxing and waning. There has been no fever. The pain is at a severity of 4/10. The pain is mild. Associated symptoms include coughing. Pertinent negatives include no ear discharge, hearing loss, neck pain, rash, rhinorrhea or sore throat. Associated symptoms comments: Congestion, cough and always has a swollen lymph node on the right side of her neck.     . Treatments tried: decongestants. The treatment provided mild relief.   Thyroid Problem   Presents for follow-up (Denies any concerns. Feels seh has been stable for a while) visit. Symptoms include fatigue. Patient reports no anxiety or palpitations. The symptoms have been stable.      The following portions of the patient's history were reviewed and updated as appropriate: allergies, current medications, past family history, past medical history, past social history, past surgical history and problem list.      Review of Systems   Constitutional: Positive for fatigue. Negative for activity change.   HENT: Positive for congestion and ear pain. Negative for ear discharge, hearing loss, rhinorrhea and sore throat.    Respiratory: Positive  "for cough. Negative for wheezing.    Cardiovascular: Negative.  Negative for palpitations.   Genitourinary: Negative.    Musculoskeletal: Negative.  Negative for neck pain.   Skin: Negative.  Negative for rash.   Neurological: Negative.    Psychiatric/Behavioral: The patient is not nervous/anxious.    All other systems reviewed and are negative.        ECG 12 Lead  Date/Time: 1/23/2018 11:02 AM  Performed by: VICKY FERGUSON  Authorized by: VICKY FERGUSON   Comparison: not compared with previous ECG   Previous ECG: no previous ECG available  Rhythm: sinus rhythm  Rate: normal  BPM: 73  Conduction: conduction normal  ST Segments: ST segments normal  T Waves: T waves normal  QRS axis: normal  Other: no other findings  Clinical impression: normal ECG            Vitals: Blood pressure 122/81, pulse 98, height 154.9 cm (61\"), weight 79.7 kg (175 lb 12.8 oz), SpO2 99 %, not currently breastfeeding.     Allergies:   Allergies   Allergen Reactions   • Bactrim [Sulfamethoxazole-Trimethoprim]    • Ciprofloxacin    • Penicillins    • Steri-Strip Compound Benzoin [Benzoin Compound]    • Sulfa Antibiotics           Objective   Physical Exam   Constitutional: She is oriented to person, place, and time. She appears well-developed and well-nourished.   HENT:   Head: Normocephalic.   Right Ear: Hearing, tympanic membrane, external ear and ear canal normal.   Left Ear: Hearing, tympanic membrane, external ear and ear canal normal.   Nose: Mucosal edema and rhinorrhea present. Right sinus exhibits no maxillary sinus tenderness and no frontal sinus tenderness. Left sinus exhibits no maxillary sinus tenderness and no frontal sinus tenderness.   Mouth/Throat: Uvula is midline and mucous membranes are normal. No oropharyngeal exudate or posterior oropharyngeal erythema. No tonsillar exudate.   Neck: Thyromegaly present.   Cardiovascular: Normal rate, regular rhythm, normal heart sounds and intact distal pulses.    No murmur heard.  Pulmonary/Chest: " Effort normal and breath sounds normal.   Abdominal: Soft. Bowel sounds are normal. She exhibits no distension. There is no tenderness.   Musculoskeletal: She exhibits no edema.   Lymphadenopathy:     She has cervical adenopathy.   Neurological: She is alert and oriented to person, place, and time.   Skin: Skin is warm and dry. No rash noted. She is not diaphoretic. No erythema. No pallor.   Psychiatric: She has a normal mood and affect. Her behavior is normal.   Nursing note and vitals reviewed.      Assessment/Plan   Discussed with patient impression and plan, patient verbalizes understanding.  Discussed finding on physical exam with patient.  Agreed to pursue with further testing      Shey was seen today for earache.    Diagnoses and all orders for this visit:    Tachycardia  -     ECG 12 Lead    Palpable lymph node  -     US head neck soft tissue; Future    Cough  -     XR Chest 2 View  -     CT chest pulmonary embolism w contrast; Future    Right shoulder pain, unspecified chronicity  -     XR Chest 2 View  -     CT chest pulmonary embolism w contrast; Future    Right-sided chest wall pain  -     XR Chest 2 View  -     CT chest pulmonary embolism w contrast; Future    Otalgia of right ear

## 2018-01-24 ENCOUNTER — HOSPITAL ENCOUNTER (OUTPATIENT)
Dept: CT IMAGING | Facility: HOSPITAL | Age: 68
Discharge: HOME | End: 2018-01-24
Admitting: NURSE PRACTITIONER

## 2018-01-24 DIAGNOSIS — R07.89 RIGHT-SIDED CHEST WALL PAIN: ICD-10-CM

## 2018-01-24 DIAGNOSIS — R91.1 PULMONARY NODULE: Primary | ICD-10-CM

## 2018-01-24 DIAGNOSIS — R05.9 COUGH: ICD-10-CM

## 2018-01-24 DIAGNOSIS — M25.511 RIGHT SHOULDER PAIN, UNSPECIFIED CHRONICITY: ICD-10-CM

## 2018-01-24 PROCEDURE — 71275 CT ANGIOGRAPHY CHEST: CPT

## 2018-01-24 PROCEDURE — 71275 CT ANGIOGRAPHY CHEST: CPT | Performed by: RADIOLOGY

## 2018-01-24 PROCEDURE — 0 IOPAMIDOL 61 % SOLUTION: Performed by: NURSE PRACTITIONER

## 2018-01-24 RX ADMIN — IOPAMIDOL 80 ML: 612 INJECTION, SOLUTION INTRAVENOUS at 10:30

## 2018-01-26 ENCOUNTER — OFFICE VISIT (OUTPATIENT)
Dept: PULMONOLOGY | Facility: CLINIC | Age: 68
End: 2018-01-26

## 2018-01-26 VITALS
TEMPERATURE: 98 F | DIASTOLIC BLOOD PRESSURE: 80 MMHG | HEART RATE: 86 BPM | SYSTOLIC BLOOD PRESSURE: 130 MMHG | WEIGHT: 175 LBS | OXYGEN SATURATION: 97 % | HEIGHT: 62 IN | BODY MASS INDEX: 32.2 KG/M2

## 2018-01-26 DIAGNOSIS — R91.1 PULMONARY NODULE: Primary | ICD-10-CM

## 2018-01-26 DIAGNOSIS — E66.09 CLASS 1 OBESITY DUE TO EXCESS CALORIES WITHOUT SERIOUS COMORBIDITY WITH BODY MASS INDEX (BMI) OF 32.0 TO 32.9 IN ADULT: ICD-10-CM

## 2018-01-26 PROCEDURE — 99203 OFFICE O/P NEW LOW 30 MIN: CPT | Performed by: INTERNAL MEDICINE

## 2018-01-26 NOTE — PROGRESS NOTES
Subjective    Shey King presents for the following Abnormal CT      History of Present Illness     Mrs. King is a 67 year old female who is a new patient to the clinic today. She had presented to her PCP this month 2 week post gallbladder surgery with complaints of shoulder pain and right chest wall pain. She underwent a CT with PE protocol, negative for PE, however, a 6mm nodule was found. She was referred to pulmonary for further evaluation. She does not smoke and has no history of tobacco use, very minimal second hand exposure in her lifetime. 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 Her medical history consist of hypothyroidism, OAB, Hiatal hernia, hyperlipidemia and b12 deficiency. She has come to her apt alone today.    Were you born premature?  no    Any Childhood infections? no      Breathing problems when you were a child? no    Any childhood allergies?    no             At what age did you begin smoking? No    Smoking marijuana? no    Any IV drugs? no    How many packs per day? 0    Lung Function Test? no  Chest X-Ray? yes    CT Chest? yes Allergy Test? no    Family hx of Lung disease or Lung Cancer?no    If FHx is posivitive for lung cancer, what is the relationship of the family member?    Any hospitalization in the last year? Yes, Gall Bladder Surgery    How far can you walk without getting short of breath? None    Any coughing? no    Any wheezing? no    Acid Reflux? no    Do you snore? yes    Daytime Fatigue? no    Any pets? yes   Any pet allergies? no    Occupation?     Have you been exposed to any chemicals at your job? no    What inhalers are you currently using? None    Have you had the Influenza Vaccine? yes    Would you like to receive this Vaccine today? no    Have you had the Pneumonia Vaccine?  no  Would you like to receive this Vaccine today? no    Review of Systems   Constitutional: Negative for activity change, appetite change, chills, fatigue and unexpected weight change.    HENT: Negative for congestion, postnasal drip and rhinorrhea.    Respiratory: Negative for apnea, cough, chest tightness, shortness of breath and wheezing.    Cardiovascular: Negative for chest pain, palpitations and leg swelling.   Gastrointestinal: Negative for nausea.   Musculoskeletal: Negative for gait problem.   Skin: Negative for pallor.   Allergic/Immunologic: Negative for environmental allergies.   Neurological: Negative for syncope.   Psychiatric/Behavioral: Negative for confusion. The patient is not nervous/anxious.        Active Problems:  Problem List Items Addressed This Visit        Respiratory    Pulmonary nodule - Primary          Past Medical History:  Past Medical History:   Diagnosis Date   • Abdominal pain, LLQ (left lower quadrant)    • Cystitis    • Diabetes mellitus    • Diarrhea    • Gallstones    • Hiatal hernia    • Hyperglycemia    • Hyperlipidemia    • Hypothyroidism    • Muscle spasm     FLANK PAIN   • OAB (overactive bladder)    • UTI (urinary tract infection)        Family History:  Family History   Problem Relation Age of Onset   • Heart disease Mother    • Cancer Mother      RENAL    • No Known Problems Father    • Breast cancer Neg Hx        Social History:  Social History   Substance Use Topics   • Smoking status: Never Smoker   • Smokeless tobacco: Never Used   • Alcohol use No       Current Medications:  Current Outpatient Prescriptions   Medication Sig Dispense Refill   • aspirin 81 MG chewable tablet Chew 81 mg daily.     • glucose blood test strip Use as instructed 50 each 12   • glucose monitor monitoring kit 1 each as needed (DM II). 1 each 0   • HYDROcodone-acetaminophen (NORCO) 5-325 MG per tablet Take 1-2 tablets by mouth Every 4 (Four) Hours As Needed for pain 20 tablet 0   • levothyroxine (SYNTHROID, LEVOTHROID) 100 MCG tablet Take 1 tablet by mouth Daily. Make sure you take on an empty stomach,alone,30 minutes before any food 30 tablet 2   • metFORMIN (GLUCOPHAGE)  "500 MG tablet Take 1 tablet by mouth Daily With Breakfast. 90 tablet 3   • Omega-3 Fatty Acids (FISH OIL) 1000 MG capsule capsule Take 2 capsules by mouth 2 (Two) Times a Day With Meals.  0   • triamcinolone (KENALOG) 0.1 % ointment Apply  topically 2 (Two) Times a Day. 80 g 1   • vitamin B-12 (CYANOCOBALAMIN) 1000 MCG tablet Take 1,000 mcg by mouth Daily.       No current facility-administered medications for this visit.        Allergies:  Allergies   Allergen Reactions   • Bactrim [Sulfamethoxazole-Trimethoprim]    • Ciprofloxacin    • Penicillins    • Steri-Strip Compound Benzoin [Benzoin Compound]    • Sulfa Antibiotics        Vitals:  /80  Pulse 86  Temp 98 °F (36.7 °C) (Oral)   Ht 157.5 cm (62\")  Wt 79.4 kg (175 lb)  SpO2 97%  BMI 32.01 kg/m2    Imaging:    Imaging Results (most recent)     None          Pulmonary Functions Testing Results:    No results found for: FEV1, FVC, VFL4KUP, TLC, DLCO    Results for orders placed or performed during the hospital encounter of 01/11/18   POC Glucose Once   Result Value Ref Range    Glucose 113 70 - 130 mg/dL   Tissue Pathology Exam - Tissue, Gallbladder   Result Value Ref Range    Case Report       Surgical Pathology Report                         Case: [id number]                                  Authorizing Provider:  Dong Mata MD    Collected:           01/11/2018 09:48 AM          Ordering Location:     [facility]      Received:            01/11/2018 10:40 AM                                 OPERATING ROOM DEPARTMENT                                                    Pathologist:           Manuela Arora MD                                                        Specimen:    Gallbladder                                                                                Final Diagnosis       See Scanned Report        Embedded Images         Objective   Physical Exam   Constitutional: She is oriented to person, place, and time. She " appears well-developed.   HENT:   Head: Normocephalic.   Eyes: Pupils are equal, round, and reactive to light.   Neck: Normal range of motion.   Cardiovascular: Normal rate, regular rhythm, normal heart sounds and intact distal pulses.    Pulmonary/Chest: Effort normal.   Abdominal: Soft. Bowel sounds are normal.   Musculoskeletal: Normal range of motion.   Neurological: She is alert and oriented to person, place, and time.   Skin: Skin is warm and dry.   Psychiatric: She has a normal mood and affect. Her behavior is normal. Judgment and thought content normal.   Vitals reviewed.      Assessment/Plan     Pulmonary Nodule: 6mm RLL, no weight loss, no adenopathy, no night sweats or unexplained fevers. Will repeat CT scan in 1 year or sooner if symptoms discussed occurs.    Obesity: BMI is 32 weight loss encouraged diet and exercise discussed for over 10 minutes. Discussed diet and exercise plan.    NOAH- patient does not describe any symptoms. Will keep on monitoring.    Vaccination- up to date for flu, will get pneumonia vaccine near future      ICD-10-CM ICD-9-CM   1. Pulmonary nodule R91.1 793.11       Return in about 6 months (around 7/26/2018).      Scribed for Dr. Carter by BERT Ornelas.     ILeoncio M.D. attest that the above note accurately reflects the work and decisions made  by me.  Patient was seen and evaluated by Dr. Carter, including history of present illness, physical exam, assessment, and treatment plan.  The above note was reviewed and edited by Dr. Carter.

## 2018-01-27 PROBLEM — E66.09 CLASS 1 OBESITY DUE TO EXCESS CALORIES WITHOUT SERIOUS COMORBIDITY WITH BODY MASS INDEX (BMI) OF 32.0 TO 32.9 IN ADULT: Status: ACTIVE | Noted: 2018-01-27

## 2018-01-27 PROBLEM — E66.811 CLASS 1 OBESITY DUE TO EXCESS CALORIES WITHOUT SERIOUS COMORBIDITY WITH BODY MASS INDEX (BMI) OF 32.0 TO 32.9 IN ADULT: Status: ACTIVE | Noted: 2018-01-27

## 2018-01-31 ENCOUNTER — OFFICE VISIT (OUTPATIENT)
Dept: SURGERY | Facility: CLINIC | Age: 68
End: 2018-01-31

## 2018-01-31 VITALS
SYSTOLIC BLOOD PRESSURE: 111 MMHG | BODY MASS INDEX: 32.2 KG/M2 | WEIGHT: 175 LBS | HEIGHT: 62 IN | DIASTOLIC BLOOD PRESSURE: 71 MMHG | HEART RATE: 79 BPM

## 2018-01-31 DIAGNOSIS — K80.20 GALLSTONES: Primary | ICD-10-CM

## 2018-01-31 PROCEDURE — 99024 POSTOP FOLLOW-UP VISIT: CPT | Performed by: SURGERY

## 2018-02-02 NOTE — PROGRESS NOTES
Subjective   Shey King is a 67 y.o. female  is here today for follow-up.         Shey King is a 67 y.o. female here for followup after cholecystectomy.  The patient is doing well and tolerating diet.  Their incisions are healing well.  No complaints reported.              Shey was seen today for post-op lap denny.    Diagnoses and all orders for this visit:    Gallstones        Assessment     67 y.o. female s/p cholecystectomy doing well.  Her symptoms of resolved and her incisions are healed well.  Follow-up as needed.

## 2018-03-05 ENCOUNTER — TELEPHONE (OUTPATIENT)
Dept: FAMILY MEDICINE CLINIC | Facility: CLINIC | Age: 68
End: 2018-03-05

## 2018-03-05 ENCOUNTER — OFFICE VISIT (OUTPATIENT)
Dept: FAMILY MEDICINE CLINIC | Facility: CLINIC | Age: 68
End: 2018-03-05

## 2018-03-05 VITALS
SYSTOLIC BLOOD PRESSURE: 130 MMHG | OXYGEN SATURATION: 98 % | HEART RATE: 82 BPM | DIASTOLIC BLOOD PRESSURE: 83 MMHG | HEIGHT: 62 IN | WEIGHT: 178.8 LBS | BODY MASS INDEX: 32.9 KG/M2

## 2018-03-05 DIAGNOSIS — N64.4 BREAST PAIN, RIGHT: ICD-10-CM

## 2018-03-05 DIAGNOSIS — E03.8 ADULT ONSET HYPOTHYROIDISM: ICD-10-CM

## 2018-03-05 DIAGNOSIS — E03.9 HYPOTHYROIDISM, UNSPECIFIED TYPE: Primary | ICD-10-CM

## 2018-03-05 DIAGNOSIS — E11.9 TYPE 2 DIABETES MELLITUS WITHOUT COMPLICATION, WITHOUT LONG-TERM CURRENT USE OF INSULIN (HCC): ICD-10-CM

## 2018-03-05 DIAGNOSIS — R59.9 ENLARGED LYMPH NODES: ICD-10-CM

## 2018-03-05 DIAGNOSIS — E53.8 B12 DEFICIENCY: Primary | ICD-10-CM

## 2018-03-05 LAB
ALBUMIN SERPL-MCNC: 4.6 G/DL (ref 3.4–4.8)
ALBUMIN/GLOB SERPL: 1.4 G/DL (ref 1.5–2.5)
ALP SERPL-CCNC: 108 U/L (ref 35–104)
ALT SERPL W P-5'-P-CCNC: 19 U/L (ref 10–36)
ANION GAP SERPL CALCULATED.3IONS-SCNC: 8.9 MMOL/L (ref 3.6–11.2)
AST SERPL-CCNC: 21 U/L (ref 10–30)
BASOPHILS # BLD AUTO: 0.04 10*3/MM3 (ref 0–0.3)
BASOPHILS NFR BLD AUTO: 0.5 % (ref 0–2)
BILIRUB SERPL-MCNC: 0.6 MG/DL (ref 0.2–1.8)
BUN BLD-MCNC: 8 MG/DL (ref 7–21)
BUN/CREAT SERPL: 9.9 (ref 7–25)
CALCIUM SPEC-SCNC: 9.3 MG/DL (ref 7.7–10)
CHLORIDE SERPL-SCNC: 104 MMOL/L (ref 99–112)
CO2 SERPL-SCNC: 26.1 MMOL/L (ref 24.3–31.9)
CREAT BLD-MCNC: 0.81 MG/DL (ref 0.43–1.29)
DEPRECATED RDW RBC AUTO: 44.6 FL (ref 37–54)
EOSINOPHIL # BLD AUTO: 0.23 10*3/MM3 (ref 0–0.7)
EOSINOPHIL NFR BLD AUTO: 3.1 % (ref 0–7)
ERYTHROCYTE [DISTWIDTH] IN BLOOD BY AUTOMATED COUNT: 14.1 % (ref 11.5–14.5)
GFR SERPL CREATININE-BSD FRML MDRD: 71 ML/MIN/1.73
GLOBULIN UR ELPH-MCNC: 3.3 GM/DL
GLUCOSE BLD-MCNC: 107 MG/DL (ref 70–110)
HBA1C MFR BLD: 5.3 % (ref 4.5–5.7)
HCT VFR BLD AUTO: 38.3 % (ref 37–47)
HGB BLD-MCNC: 12.8 G/DL (ref 12–16)
IMM GRANULOCYTES # BLD: 0.02 10*3/MM3 (ref 0–0.03)
IMM GRANULOCYTES NFR BLD: 0.3 % (ref 0–0.5)
LYMPHOCYTES # BLD AUTO: 1.85 10*3/MM3 (ref 1–3)
LYMPHOCYTES NFR BLD AUTO: 24.8 % (ref 16–46)
MCH RBC QN AUTO: 29.2 PG (ref 27–33)
MCHC RBC AUTO-ENTMCNC: 33.4 G/DL (ref 33–37)
MCV RBC AUTO: 87.4 FL (ref 80–94)
MONOCYTES # BLD AUTO: 0.52 10*3/MM3 (ref 0.1–0.9)
MONOCYTES NFR BLD AUTO: 7 % (ref 0–12)
NEUTROPHILS # BLD AUTO: 4.79 10*3/MM3 (ref 1.4–6.5)
NEUTROPHILS NFR BLD AUTO: 64.3 % (ref 40–75)
OSMOLALITY SERPL CALC.SUM OF ELEC: 276.3 MOSM/KG (ref 273–305)
PLATELET # BLD AUTO: 282 10*3/MM3 (ref 130–400)
PMV BLD AUTO: 10.7 FL (ref 6–10)
POTASSIUM BLD-SCNC: 4.3 MMOL/L (ref 3.5–5.3)
PROT SERPL-MCNC: 7.9 G/DL (ref 6–8)
RBC # BLD AUTO: 4.38 10*6/MM3 (ref 4.2–5.4)
SODIUM BLD-SCNC: 139 MMOL/L (ref 135–153)
TSH SERPL DL<=0.05 MIU/L-ACNC: 0.06 MIU/ML (ref 0.55–4.78)
VIT B12 BLD-MCNC: 1061 PG/ML (ref 211–911)
WBC NRBC COR # BLD: 7.45 10*3/MM3 (ref 4.5–12.5)

## 2018-03-05 PROCEDURE — 80053 COMPREHEN METABOLIC PANEL: CPT | Performed by: NURSE PRACTITIONER

## 2018-03-05 PROCEDURE — 82607 VITAMIN B-12: CPT | Performed by: NURSE PRACTITIONER

## 2018-03-05 PROCEDURE — 83036 HEMOGLOBIN GLYCOSYLATED A1C: CPT | Performed by: NURSE PRACTITIONER

## 2018-03-05 PROCEDURE — 36415 COLL VENOUS BLD VENIPUNCTURE: CPT | Performed by: NURSE PRACTITIONER

## 2018-03-05 PROCEDURE — 84443 ASSAY THYROID STIM HORMONE: CPT | Performed by: NURSE PRACTITIONER

## 2018-03-05 PROCEDURE — 99214 OFFICE O/P EST MOD 30 MIN: CPT | Performed by: NURSE PRACTITIONER

## 2018-03-05 PROCEDURE — 85025 COMPLETE CBC W/AUTO DIFF WBC: CPT | Performed by: NURSE PRACTITIONER

## 2018-03-05 RX ORDER — LEVOTHYROXINE SODIUM 0.07 MG/1
75 TABLET ORAL DAILY
Qty: 30 TABLET | Refills: 2 | Status: SHIPPED | OUTPATIENT
Start: 2018-03-05 | End: 2018-07-25 | Stop reason: SDUPTHER

## 2018-03-05 RX ORDER — LEVOTHYROXINE SODIUM 0.1 MG/1
100 TABLET ORAL DAILY
Qty: 30 TABLET | Refills: 5 | Status: SHIPPED | OUTPATIENT
Start: 2018-03-05 | End: 2018-03-05 | Stop reason: SDUPTHER

## 2018-03-05 NOTE — PROGRESS NOTES
"Subjective   Shey King is a 67 y.o. female.   Chief Compliant: The patient presents with Cyst (neck); Breast Problem (right); and Follow-up (labs needed)    HPI Comments: Returns today follow up after recent ultrasound and CT chest.  Since here seen Pulmonology with new findings of lung nodule.  Will follow in 6 months.  Denies any further chest pain, shortness of breath or palpitations.  Following also after ultrasound confirming node of right posterior cervical.  Patient states the cervical lymph node has been present for a long time gets bigger at times and now with smaller nodes.              Breast Problem   This is a new (States she has always had tenderness of her breast now she is more aware and concerned of the discomfort of her right breast.  Very tender and always aware. ) problem. The current episode started 1 to 4 weeks ago. The problem occurs constantly. The problem has been unchanged. Treatments tried: Normal mammogram in November 2017.   Thyroid Problem   Presents for follow-up (Needs to repeat lab today as she recently had a dose adjustment) visit. Patient reports no anxiety or palpitations. (Denies any symptoms  ) The symptoms have been stable.      The following portions of the patient's history were reviewed and updated as appropriate: allergies, current medications, past family history, past medical history, past social history, past surgical history and problem list.      Review of Systems   Constitutional: Negative.  Negative for activity change.   HENT: Negative.    Respiratory: Negative.  Negative for wheezing.    Cardiovascular: Negative.  Negative for palpitations.   Genitourinary: Negative.    Musculoskeletal: Negative.    Skin: Negative.    Neurological: Negative.    Psychiatric/Behavioral: The patient is not nervous/anxious.    All other systems reviewed and are negative.      Procedures    Vitals: Blood pressure 130/83, pulse 82, height 157.5 cm (62\"), weight 81.1 kg (178 lb 12.8 " oz), SpO2 98 %, not currently breastfeeding.     Allergies:   Allergies   Allergen Reactions   • Bactrim [Sulfamethoxazole-Trimethoprim]    • Ciprofloxacin    • Penicillins    • Steri-Strip Compound Benzoin [Benzoin Compound]    • Sulfa Antibiotics           Objective   Physical Exam   Constitutional: She is oriented to person, place, and time. She appears well-developed and well-nourished.   HENT:   Head: Normocephalic.   Right Ear: Hearing, tympanic membrane, external ear and ear canal normal.   Left Ear: Hearing, tympanic membrane, external ear and ear canal normal.   Nose: Mucosal edema and rhinorrhea present. Right sinus exhibits no maxillary sinus tenderness and no frontal sinus tenderness. Left sinus exhibits no maxillary sinus tenderness and no frontal sinus tenderness.   Mouth/Throat: Uvula is midline and mucous membranes are normal. No oropharyngeal exudate or posterior oropharyngeal erythema. No tonsillar exudate.   Neck: Thyromegaly present.   Cardiovascular: Normal rate, regular rhythm, normal heart sounds and intact distal pulses.    No murmur heard.  Pulmonary/Chest: Effort normal and breath sounds normal. Right breast exhibits tenderness. Right breast exhibits no inverted nipple, no mass, no nipple discharge and no skin change. Left breast exhibits no inverted nipple, no mass, no nipple discharge, no skin change and no tenderness. Breasts are symmetrical. There is no breast swelling.   Large pendulous breast   Abdominal: Soft. Bowel sounds are normal. She exhibits no distension. There is no tenderness.   Genitourinary: There is breast tenderness. No breast discharge or bleeding.   Musculoskeletal: She exhibits no edema.   Lymphadenopathy:        Head (right side): No submental, no submandibular, no tonsillar, no preauricular, no posterior auricular and no occipital adenopathy present.        Head (left side): No submental, no submandibular, no tonsillar, no preauricular, no posterior auricular and no  occipital adenopathy present.     She has cervical adenopathy.        Right cervical: Posterior cervical adenopathy present.        Left cervical: Posterior cervical adenopathy present.     She has no axillary adenopathy.   Neurological: She is alert and oriented to person, place, and time.   Skin: Skin is warm and dry. No rash noted. She is not diaphoretic. No erythema. No pallor.   Psychiatric: She has a normal mood and affect. Her behavior is normal.   Nursing note and vitals reviewed.      Assessment/Plan   Discussed with patient impression and plan, patient verbalizes understanding.  Reviewed CT and Ultrasound with patient.  Agreed for surgical evaluation of lymph node and repeat labs today        Shey was seen today for cyst, breast problem and follow-up.    Diagnoses and all orders for this visit:    B12 deficiency  -     CBC & Differential  -     Comprehensive Metabolic Panel  -     TSH  -     Vitamin B12  -     Hemoglobin A1c  -     CBC Auto Differential    Adult onset hypothyroidism  -     TSH    Type 2 diabetes mellitus without complication, without long-term current use of insulin    Enlarged lymph nodes  -     Ambulatory Referral to General Surgery    Breast pain, right  -     US breast right complete; Future    Other orders  -     levothyroxine (SYNTHROID, LEVOTHROID) 100 MCG tablet; Take 1 tablet by mouth Daily. Make sure you take on an empty stomach,alone,30 minutes before any food

## 2018-03-05 NOTE — TELEPHONE ENCOUNTER
----- Message from BERT Lucas sent at 3/5/2018  2:22 PM EST -----  Synthroid needs to be decreased to 75 mcg and repeat in 4 -6 weeks    Patient notified & verbalized understanding.

## 2018-03-09 ENCOUNTER — HOSPITAL ENCOUNTER (OUTPATIENT)
Dept: ULTRASOUND IMAGING | Facility: HOSPITAL | Age: 68
Discharge: HOME | End: 2018-03-09
Admitting: NURSE PRACTITIONER

## 2018-03-09 DIAGNOSIS — N64.4 BREAST PAIN, RIGHT: ICD-10-CM

## 2018-03-09 PROCEDURE — 76642 ULTRASOUND BREAST LIMITED: CPT | Performed by: RADIOLOGY

## 2018-03-09 PROCEDURE — 76642 ULTRASOUND BREAST LIMITED: CPT

## 2018-03-20 ENCOUNTER — OFFICE VISIT (OUTPATIENT)
Dept: SURGERY | Facility: CLINIC | Age: 68
End: 2018-03-20

## 2018-03-20 VITALS — BODY MASS INDEX: 32.76 KG/M2 | WEIGHT: 178 LBS | HEIGHT: 62 IN

## 2018-03-20 DIAGNOSIS — R59.1 LYMPHADENOPATHY: Primary | ICD-10-CM

## 2018-03-20 PROCEDURE — 99213 OFFICE O/P EST LOW 20 MIN: CPT | Performed by: SURGERY

## 2018-03-20 NOTE — PROGRESS NOTES
Subjective   Shey King is a 67 y.o. female is being seen for consultation today at the request of No ref. provider found    67-year-old female referred for lymphadenopathy.  No smoking or other risk factors.  She was noted to have 6 mm nodule on the lung on imaging that was stable.  She was however noted to have a 1.8 cm left axillary lymph node that is not clinically palpable.  The patient is had a negative breast workup.  She also has an enlarged right posterior cervical lymph node that fluctuates for several years.  No other lymphadenopathy identified on examination.  This isolated lymph node is readily palpable and measures approximately 2 cm.        Past Medical History:   Diagnosis Date   • Abdominal pain, LLQ (left lower quadrant)    • Cystitis    • Diabetes mellitus    • Diarrhea    • Gallstones    • Hiatal hernia    • Hyperglycemia    • Hyperlipidemia    • Hypothyroidism    • Muscle spasm     FLANK PAIN   • OAB (overactive bladder)    • UTI (urinary tract infection)        Family History   Problem Relation Age of Onset   • Heart disease Mother    • Cancer Mother      RENAL    • No Known Problems Father    • Breast cancer Neg Hx        Social History     Social History   • Marital status:      Spouse name: N/A   • Number of children: N/A   • Years of education: N/A     Occupational History   • Not on file.     Social History Main Topics   • Smoking status: Never Smoker   • Smokeless tobacco: Never Used   • Alcohol use No   • Drug use: No   • Sexual activity: Defer     Other Topics Concern   • Not on file     Social History Narrative   • No narrative on file       Past Surgical History:   Procedure Laterality Date   • BREAST BIOPSY Right 2010    benign   • ENDOSCOPY     • ENDOSCOPY N/A 1/11/2018    Procedure: ESOPHAGOGASTRODUODENOSCOPY;  Surgeon: Dong Mata MD;  Location: [facility];  Service:    • MAMMO BILATERAL  09/2015   • OVARIAN CYST DRAINAGE     • [place] LAP,CHOLECYSTECTOMY N/A  "1/11/2018    Procedure: CHOLECYSTECTOMY LAPAROSCOPIC;  Surgeon: Dong Mata MD;  Location: [facility];  Service: General       The following portions of the patient's history were reviewed and updated as appropriate: allergies, current medications, past family history, past medical history, past social history, past surgical history and problem list.    Review of Systems   Constitutional: Negative for activity change, appetite change, chills and fever.   HENT: Negative for sore throat and trouble swallowing.    Eyes: Negative for visual disturbance.   Respiratory: Negative for cough and shortness of breath.    Cardiovascular: Negative for chest pain and palpitations.   Gastrointestinal: Negative for abdominal distention, abdominal pain, blood in stool, constipation, diarrhea, nausea and vomiting.   Endocrine: Negative for cold intolerance and heat intolerance.   Genitourinary: Negative for dysuria.   Musculoskeletal: Negative for joint swelling.   Skin: Negative for color change, rash and wound.   Allergic/Immunologic: Negative for immunocompromised state.   Neurological: Negative for dizziness, seizures, weakness and headaches.   Hematological: Positive for adenopathy. Does not bruise/bleed easily.   Psychiatric/Behavioral: Negative for agitation and confusion.         Ht 157.5 cm (62\")   Wt 80.7 kg (178 lb)   BMI 32.56 kg/m²   Objective   Physical Exam   Constitutional: She is oriented to person, place, and time. She appears well-developed.   HENT:   Head: Normocephalic and atraumatic.   Mouth/Throat: Mucous membranes are normal.   Eyes: Conjunctivae are normal. Pupils are equal, round, and reactive to light.   Neck: Neck supple. No JVD present. No tracheal deviation present. No thyromegaly present.   Cardiovascular: Normal rate and regular rhythm.  Exam reveals no gallop and no friction rub.    No murmur heard.  Pulmonary/Chest: Effort normal and breath sounds normal.   Abdominal: Soft. She exhibits no " distension. There is no splenomegaly or hepatomegaly. There is no tenderness. No hernia.   Musculoskeletal: Normal range of motion. She exhibits no deformity.   Lymphadenopathy:   Right posterior cervical palpable lymph node that is nontender but greater than 2 cm in size.  No axillary adenopathy palpable clinically.   Neurological: She is alert and oriented to person, place, and time.   Skin: Skin is warm and dry.   Psychiatric: She has a normal mood and affect.         Shey was seen today for enalrged lymph nodes.    Diagnoses and all orders for this visit:    Lymphadenopathy  -     US Guided Lymph Node Biopsy; Future  -     Fine Needle Aspiration; Standing        Assessment     67-year-old female with right posterior cervical lymphadenopathy clinically and left axillary enlarged lymph node on imaging.  She'll be evaluated with ultrasound for biopsy of both sites.  She is aware that the left axillary node may no longer be enlarged and not require intervention.    Discussed the patient's BMI with her. BMI is above normal parameters. Follow-up plan includes:  educational material.

## 2018-03-22 DIAGNOSIS — E07.9 THYROID MASS: Primary | ICD-10-CM

## 2018-03-26 DIAGNOSIS — R22.1 NECK MASS: Primary | ICD-10-CM

## 2018-03-29 ENCOUNTER — OFFICE VISIT (OUTPATIENT)
Dept: FAMILY MEDICINE CLINIC | Facility: CLINIC | Age: 68
End: 2018-03-29

## 2018-03-29 VITALS
DIASTOLIC BLOOD PRESSURE: 77 MMHG | SYSTOLIC BLOOD PRESSURE: 132 MMHG | TEMPERATURE: 98.8 F | BODY MASS INDEX: 33.31 KG/M2 | HEART RATE: 101 BPM | WEIGHT: 181 LBS | HEIGHT: 62 IN | OXYGEN SATURATION: 98 %

## 2018-03-29 DIAGNOSIS — N39.0 URINARY TRACT INFECTION WITHOUT HEMATURIA, SITE UNSPECIFIED: Primary | ICD-10-CM

## 2018-03-29 DIAGNOSIS — R68.89 FLU-LIKE SYMPTOMS: ICD-10-CM

## 2018-03-29 DIAGNOSIS — J06.9 VIRAL UPPER RESPIRATORY TRACT INFECTION: ICD-10-CM

## 2018-03-29 LAB
BILIRUB BLD-MCNC: NEGATIVE MG/DL
CLARITY, POC: ABNORMAL
COLOR UR: YELLOW
EXPIRATION DATE: NORMAL
FLUAV AG NPH QL: NEGATIVE
FLUBV AG NPH QL: NEGATIVE
GLUCOSE UR STRIP-MCNC: NEGATIVE MG/DL
INTERNAL CONTROL: NORMAL
KETONES UR QL: NEGATIVE
LEUKOCYTE EST, POC: ABNORMAL
Lab: NORMAL
NITRITE UR-MCNC: POSITIVE MG/ML
PH UR: 6 [PH] (ref 5–8)
PROT UR STRIP-MCNC: ABNORMAL MG/DL
RBC # UR STRIP: NEGATIVE /UL
SP GR UR: 1.02 (ref 1–1.03)
UROBILINOGEN UR QL: NORMAL

## 2018-03-29 PROCEDURE — 81003 URINALYSIS AUTO W/O SCOPE: CPT | Performed by: FAMILY MEDICINE

## 2018-03-29 PROCEDURE — 99213 OFFICE O/P EST LOW 20 MIN: CPT | Performed by: FAMILY MEDICINE

## 2018-03-29 PROCEDURE — 87086 URINE CULTURE/COLONY COUNT: CPT | Performed by: FAMILY MEDICINE

## 2018-03-29 PROCEDURE — 87186 SC STD MICRODIL/AGAR DIL: CPT | Performed by: FAMILY MEDICINE

## 2018-03-29 PROCEDURE — 87077 CULTURE AEROBIC IDENTIFY: CPT | Performed by: FAMILY MEDICINE

## 2018-03-29 PROCEDURE — 87804 INFLUENZA ASSAY W/OPTIC: CPT | Performed by: FAMILY MEDICINE

## 2018-03-29 RX ORDER — ERYTHROMYCIN 500 MG/1
500 TABLET, COATED ORAL 2 TIMES DAILY
Qty: 14 TABLET | Refills: 0 | Status: SHIPPED | OUTPATIENT
Start: 2018-03-29 | End: 2018-03-29 | Stop reason: SDUPTHER

## 2018-03-29 RX ORDER — ERYTHROMYCIN 500 MG/1
500 TABLET, COATED ORAL 2 TIMES DAILY
Qty: 14 TABLET | Refills: 0 | Status: SHIPPED | OUTPATIENT
Start: 2018-03-29 | End: 2018-05-04

## 2018-03-29 NOTE — TELEPHONE ENCOUNTER
Patient called reported her regular pharmacy is out of Erythromycin,requested it be send to [facility],sent as ordered.

## 2018-03-30 ENCOUNTER — HOSPITAL ENCOUNTER (OUTPATIENT)
Dept: ULTRASOUND IMAGING | Facility: HOSPITAL | Age: 68
Discharge: HOME | End: 2018-03-30
Attending: SURGERY | Admitting: SURGERY

## 2018-03-30 ENCOUNTER — HOSPITAL ENCOUNTER (OUTPATIENT)
Dept: ULTRASOUND IMAGING | Facility: HOSPITAL | Age: 68
Discharge: HOME | End: 2018-03-30
Attending: SURGERY

## 2018-03-30 DIAGNOSIS — R22.1 NECK MASS: ICD-10-CM

## 2018-03-30 DIAGNOSIS — E07.9 THYROID MASS: ICD-10-CM

## 2018-03-30 PROCEDURE — 76536 US EXAM OF HEAD AND NECK: CPT | Performed by: RADIOLOGY

## 2018-03-30 PROCEDURE — 76536 US EXAM OF HEAD AND NECK: CPT

## 2018-04-01 LAB
BACTERIA SPEC AEROBE CULT: ABNORMAL
BACTERIA SPEC AEROBE CULT: ABNORMAL

## 2018-04-02 ENCOUNTER — TELEPHONE (OUTPATIENT)
Dept: FAMILY MEDICINE CLINIC | Facility: CLINIC | Age: 68
End: 2018-04-02

## 2018-04-02 NOTE — TELEPHONE ENCOUNTER
----- Message from Sheila Everett MD sent at 4/1/2018  2:04 PM EDT -----  Please call. She did have an UTI - the erythromycin should cover it. Is she feeling better? If she is not, may need to switch antibiotics. Thanks.      Left a message to return call.    Spoke with patient,she is feeling much better & was very appreciative of your concern.

## 2018-04-11 ENCOUNTER — HOSPITAL ENCOUNTER (OUTPATIENT)
Dept: ULTRASOUND IMAGING | Facility: HOSPITAL | Age: 68
Discharge: HOME | End: 2018-04-11
Attending: SURGERY | Admitting: SURGERY

## 2018-04-11 VITALS
OXYGEN SATURATION: 99 % | DIASTOLIC BLOOD PRESSURE: 76 MMHG | SYSTOLIC BLOOD PRESSURE: 155 MMHG | HEART RATE: 63 BPM | RESPIRATION RATE: 16 BRPM | TEMPERATURE: 97.9 F

## 2018-04-11 DIAGNOSIS — R59.1 LYMPHADENOPATHY: ICD-10-CM

## 2018-04-11 PROCEDURE — 76942 ECHO GUIDE FOR BIOPSY: CPT

## 2018-04-11 PROCEDURE — 10022 US GUIDED LYMPH NODE BIOPSY: CPT | Performed by: RADIOLOGY

## 2018-04-11 PROCEDURE — 88305 TISSUE EXAM BY PATHOLOGIST: CPT | Performed by: SURGERY

## 2018-04-11 PROCEDURE — 88172 CYTP DX EVAL FNA 1ST EA SITE: CPT | Performed by: SURGERY

## 2018-04-11 PROCEDURE — 88173 CYTOPATH EVAL FNA REPORT: CPT | Performed by: SURGERY

## 2018-04-12 NOTE — PROGRESS NOTES
"Shey King     VITALS: Blood pressure 132/77, pulse 101, temperature 98.8 °F (37.1 °C), temperature source Oral, height 157.6 cm (62.04\"), weight 82.1 kg (181 lb), SpO2 98 %, not currently breastfeeding.    Subjective  Chief Complaint:   Chief Complaint   Patient presents with   • Generalized Body Aches   • Sore Throat   • Back Pain   • URI   • Cough        History of Present Illness:  Patient is a 67 y.o.  female who presents to clinic secondary to an acute concern.    Patient was seen today for these conditions and concerns:  Patient is having a two day history of sore throat, nonproductive coughing, body aches, and ear pain with the right greater than the left. Feels drainage down her throat. Denies any fevers, chills, congestion, shortness of breath, or chest pain. Also complains of dysuria, but not hematuria, urinary frequency, or urinary retention. She does have a history of UTIs.     No complaints about any of the medications.    The following portions of the patient's history were reviewed and updated as appropriate: allergies, current medications, past family history, past medical history, past social history, past surgical history and problem list.    Past Medical History  Past Medical History:   Diagnosis Date   • Abdominal pain, LLQ (left lower quadrant)    • Cystitis    • Diabetes mellitus    • Diarrhea    • Gallstones    • Hiatal hernia    • Hyperglycemia    • Hyperlipidemia    • Hypothyroidism    • Muscle spasm     FLANK PAIN   • OAB (overactive bladder)    • UTI (urinary tract infection)        Review of Systems  Constitutional: Denies any recent history of HAs, dizziness,  itching.  Eyes: Denies any changes in vision. Denies any blurry vision or diplopia.  Cardiovascular: Denies any chest pain, pressure, or palpitations.  Respiratory: Denies any shortness of breath or wheezing.  Gastrointestinal: Denies any abdominal pain, nausea, vomiting, diarrhea, or constipation.  Musculoskeletal: Denies any " muscle weakness.  Skin and/or breasts: Denies any rashes.  Neurological: Denies any changes in balance or gait.  Psychiatric: Denies any anxiety, depression, or insomnia. Denies any suicidal or homicidal ideations.  Endocrine: Denies any heat or cold intolerance. Denies any voice changes, polydipsia, or polyuria.  Hematologic/Lymphatic: Denies any anemia or easy bruising.    Surgical History  Past Surgical History:   Procedure Laterality Date   • BREAST BIOPSY Right 2010    benign   • ENDOSCOPY     • ENDOSCOPY N/A 1/11/2018    Procedure: ESOPHAGOGASTRODUODENOSCOPY;  Surgeon: Dong Mata MD;  Location: [facility] OR;  Service:    • MAMMO BILATERAL  09/2015   • OVARIAN CYST DRAINAGE     • [place] LAP,CHOLECYSTECTOMY N/A 1/11/2018    Procedure: CHOLECYSTECTOMY LAPAROSCOPIC;  Surgeon: Dong Mata MD;  Location: [facility] OR;  Service: General   • US GUIDED LYMPH NODE BIOPSY  4/11/2018       Family History  Family History   Problem Relation Age of Onset   • Heart disease Mother    • Cancer Mother      RENAL    • No Known Problems Father    • Breast cancer Neg Hx        Social History  Social History     Social History   • Marital status:      Spouse name: N/A   • Number of children: N/A   • Years of education: N/A     Occupational History   • Not on file.     Social History Main Topics   • Smoking status: Never Smoker   • Smokeless tobacco: Never Used   • Alcohol use No   • Drug use: No   • Sexual activity: Defer     Other Topics Concern   • Not on file     Social History Narrative   • No narrative on file       Objective  Physical Exam  Gen: Patient in NAD. Pleasant and answers appropriately. A&Ox3.    Skin: Warm and dry with normal turgor. No purpura, rashes, or unusual pigmentation noted. Hair is normal in appearance and distribution.    HEENT: NC/AT. No lesions noted. Conjunctiva clear, sclera nonicteric. PERRL. EOMI without nystagmus or strabismus. Fundi appear benign. No hemorrhages or exudates of  eyes. Auditory canals are patent bilaterally without lesions. TMs intact,  erythematous, nonbulging without lesions. Nasal mucosa erythematous, and nonedematous. Frontal and maxillary sinuses are nontender. O/P erythematous and moist without exudate.    Neck: Supple without lymph nodes palpated. FROM.     Lungs: CTA B/L without rales, rhonchi, crackles, or wheezes.    Heart: RRR. S1 and S2 normal. No S3 or S4. No MRGT.    Abd: Soft, nontender,nondistended. (+)BSx4 quadrants.     Extrem: No CCE. Radial pulses 2+/4 and equal B/L. FROMx4. No bone, joint, or muscle tenderness noted.    Neuro: No focal motor/sensory deficits.    Procedures    During this visit the following were done:  Labs Reviewed [x]    Labs Ordered [x]    Radiology Reports Reviewed []    Radiology Ordered []    PCP Records Reviewed []    Referring Provider Records Reviewed []    ER Records Reviewed []    Hospital Records Reviewed []    History Obtained From Family []    Radiology Images Reviewed []    Other Reviewed []    Records Requested []      Assessment/Plan  Shey King is a 67 y.o. here for an acute concern.  Diagnoses and all orders for this visit:    Urinary tract infection without hematuria, site unspecified  -     POCT urinalysis dipstick, automated  -     Urine Culture - Urine, Urine, Clean Catch     -     erythromycin base (E-MYCIN) 500 MG tablet; Take 1 tablet by mouth 2 (Two) Times a Day.    Flu-like symptoms  -     POCT Influenza A/B  Negative.     Viral upper respiratory tract infection  Supportive care indicated, including increased fluids and rest. Patient to monitor. Patient to call if symptoms continue or worsen.     Findings and plans discussed with patient who verbalizes understanding and agreement. Will followup with patient once results are in. Patient to followup at clinic [facility] for further medical followup.    Sheila Everett MD

## 2018-04-16 LAB
LAB AP CASE REPORT: NORMAL
Lab: NORMAL

## 2018-04-18 ENCOUNTER — OFFICE VISIT (OUTPATIENT)
Dept: SURGERY | Facility: CLINIC | Age: 68
End: 2018-04-18

## 2018-04-18 VITALS — BODY MASS INDEX: 33.31 KG/M2 | HEIGHT: 62 IN | WEIGHT: 181 LBS

## 2018-04-18 DIAGNOSIS — R59.1 LYMPHADENOPATHY: Primary | ICD-10-CM

## 2018-04-18 PROCEDURE — 99212 OFFICE O/P EST SF 10 MIN: CPT | Performed by: SURGERY

## 2018-04-19 NOTE — PROGRESS NOTES
Subjective   Shey King is a 67 y.o. female  is here today for follow-up.         Shey King is a 67 y.o. female here for follow up after posterior cervical lymph node biopsy.  She is had long-standing palpable nontender lymphadenopathy of the right posterior cervical chain.  Fine-needle aspiration was performed and final pathology is consistent with benign lymph node.  No change in the distal examination or history.      Shey was seen today for lymph node biopsy follow up.    Diagnoses and all orders for this visit:    Lymphadenopathy        Assessment     Shey King is a 67 y.o. female status post fine-needle aspiration of persistent lymphadenopathy of the right posterior cervical chain.  Benign lymph node noted on pathology.  Patient will follow-up as needed.

## 2018-05-04 ENCOUNTER — OFFICE VISIT (OUTPATIENT)
Dept: FAMILY MEDICINE CLINIC | Facility: CLINIC | Age: 68
End: 2018-05-04

## 2018-05-04 VITALS
HEIGHT: 62 IN | WEIGHT: 186 LBS | HEART RATE: 87 BPM | BODY MASS INDEX: 34.23 KG/M2 | DIASTOLIC BLOOD PRESSURE: 78 MMHG | SYSTOLIC BLOOD PRESSURE: 140 MMHG | OXYGEN SATURATION: 96 %

## 2018-05-04 DIAGNOSIS — R30.0 DYSURIA: Primary | ICD-10-CM

## 2018-05-04 DIAGNOSIS — N39.0 ACUTE UTI: ICD-10-CM

## 2018-05-04 DIAGNOSIS — R82.90 ABNORMAL URINALYSIS: ICD-10-CM

## 2018-05-04 DIAGNOSIS — E03.9 HYPOTHYROIDISM, UNSPECIFIED TYPE: ICD-10-CM

## 2018-05-04 DIAGNOSIS — E55.9 VITAMIN D DEFICIENCY: ICD-10-CM

## 2018-05-04 DIAGNOSIS — E78.2 MIXED HYPERLIPIDEMIA: ICD-10-CM

## 2018-05-04 LAB
BILIRUB BLD-MCNC: NEGATIVE MG/DL
CLARITY, POC: ABNORMAL
COLOR UR: YELLOW
GLUCOSE UR STRIP-MCNC: NEGATIVE MG/DL
KETONES UR QL: NEGATIVE
LEUKOCYTE EST, POC: NEGATIVE
NITRITE UR-MCNC: POSITIVE MG/ML
PH UR: 5.5 [PH] (ref 5–8)
PROT UR STRIP-MCNC: NEGATIVE MG/DL
RBC # UR STRIP: NEGATIVE /UL
SP GR UR: 1.02 (ref 1–1.03)
UROBILINOGEN UR QL: NORMAL

## 2018-05-04 PROCEDURE — 99213 OFFICE O/P EST LOW 20 MIN: CPT | Performed by: NURSE PRACTITIONER

## 2018-05-04 PROCEDURE — 81003 URINALYSIS AUTO W/O SCOPE: CPT | Performed by: NURSE PRACTITIONER

## 2018-05-04 RX ORDER — ERYTHROMYCIN 500 MG/1
500 TABLET, COATED ORAL 2 TIMES DAILY
Qty: 20 TABLET | Refills: 0 | Status: SHIPPED | OUTPATIENT
Start: 2018-05-04 | End: 2018-09-26

## 2018-05-04 NOTE — PROGRESS NOTES
Subjective   Shey King is a 67 y.o. female.     Chief Complaint: Difficulty Urinating    Difficulty Urinating   This is a new problem. The current episode started in the past 7 days. The problem occurs constantly. The problem has been unchanged. Associated symptoms include urinary symptoms. Pertinent negatives include no abdominal pain, change in bowel habit, fever, nausea or vomiting. Nothing aggravates the symptoms. She has tried nothing for the symptoms.        Family History   Problem Relation Age of Onset   • Heart disease Mother    • Cancer Mother      RENAL    • No Known Problems Father    • Breast cancer Neg Hx        Social History     Social History   • Marital status:      Spouse name: N/A   • Number of children: N/A   • Years of education: N/A     Occupational History   • Not on file.     Social History Main Topics   • Smoking status: Never Smoker   • Smokeless tobacco: Never Used   • Alcohol use No   • Drug use: No   • Sexual activity: Defer     Other Topics Concern   • Not on file     Social History Narrative   • No narrative on file       Past Medical History:   Diagnosis Date   • Abdominal pain, LLQ (left lower quadrant)    • Cystitis    • Diabetes mellitus    • Diarrhea    • Gallstones    • Hiatal hernia    • Hyperglycemia    • Hyperlipidemia    • Hypothyroidism    • Muscle spasm     FLANK PAIN   • OAB (overactive bladder)    • UTI (urinary tract infection)        Review of Systems   Constitutional: Negative.  Negative for fever.   HENT: Negative.    Respiratory: Negative.    Cardiovascular: Negative.    Gastrointestinal: Negative.  Negative for abdominal pain, change in bowel habit, nausea and vomiting.   Genitourinary: Positive for difficulty urinating.   Musculoskeletal: Negative.    Skin: Negative.    Neurological: Negative.    Psychiatric/Behavioral: Negative.        Objective   Physical Exam   Constitutional: She is oriented to person, place, and time. She appears well-developed and  "well-nourished.   Neck: Normal range of motion. Neck supple.   Cardiovascular: Normal rate, regular rhythm and normal heart sounds.    Pulmonary/Chest: Effort normal and breath sounds normal.   Neurological: She is alert and oriented to person, place, and time.   Skin: Skin is warm and dry.   Psychiatric: She has a normal mood and affect. Her behavior is normal. Judgment and thought content normal.   Nursing note and vitals reviewed.      Procedures    Vitals: Blood pressure 140/78, pulse 87, height 157.5 cm (62\"), weight 84.4 kg (186 lb), SpO2 96 %, not currently breastfeeding.    Allergies:   Allergies   Allergen Reactions   • Bactrim [Sulfamethoxazole-Trimethoprim]    • Ciprofloxacin    • Penicillins    • Steri-Strip Compound Benzoin [Benzoin Compound]    • Sulfa Antibiotics         During this visit the following were done:  Labs Reviewed []    Labs Ordered []    Radiology Reports Reviewed []    Radiology Ordered []    PCP Records Reviewed []    Referring Provider Records Reviewed []    ER Records Reviewed []    Hospital Records Reviewed []    History Obtained From Family []    Radiology Images Reviewed []    Other Reviewed []    Records Requested []      Assessment/Plan   Shey was seen today for difficulty urinating.    Diagnoses and all orders for this visit:    Dysuria  -     POCT urinalysis dipstick, automated  -     Lipid Panel; Future  -     Magnesium; Future  -     Vitamin D 25 Hydroxy; Future  -     TSH; Future  -     T4, Free; Future  -     erythromycin base (E-MYCIN) 500 MG tablet; Take 1 tablet by mouth 2 (Two) Times a Day.    Abnormal urinalysis  -     Urine Culture - Urine, Urine, Clean Catch  -     Lipid Panel; Future  -     Magnesium; Future  -     Vitamin D 25 Hydroxy; Future  -     TSH; Future  -     T4, Free; Future  -     erythromycin base (E-MYCIN) 500 MG tablet; Take 1 tablet by mouth 2 (Two) Times a Day.    Mixed hyperlipidemia  -     Lipid Panel; Future  -     Magnesium; Future  -     " Vitamin D 25 Hydroxy; Future  -     TSH; Future  -     T4, Free; Future  -     erythromycin base (E-MYCIN) 500 MG tablet; Take 1 tablet by mouth 2 (Two) Times a Day.    Vitamin D deficiency  -     Lipid Panel; Future  -     Magnesium; Future  -     Vitamin D 25 Hydroxy; Future  -     TSH; Future  -     T4, Free; Future  -     erythromycin base (E-MYCIN) 500 MG tablet; Take 1 tablet by mouth 2 (Two) Times a Day.    Hypothyroidism, unspecified type  -     Lipid Panel; Future  -     Magnesium; Future  -     Vitamin D 25 Hydroxy; Future  -     TSH; Future  -     T4, Free; Future  -     erythromycin base (E-MYCIN) 500 MG tablet; Take 1 tablet by mouth 2 (Two) Times a Day.    Acute UTI  -     erythromycin base (E-MYCIN) 500 MG tablet; Take 1 tablet by mouth 2 (Two) Times a Day.

## 2018-05-10 ENCOUNTER — OFFICE VISIT (OUTPATIENT)
Dept: FAMILY MEDICINE CLINIC | Facility: CLINIC | Age: 68
End: 2018-05-10

## 2018-05-10 VITALS
SYSTOLIC BLOOD PRESSURE: 126 MMHG | DIASTOLIC BLOOD PRESSURE: 77 MMHG | HEIGHT: 62 IN | OXYGEN SATURATION: 98 % | HEART RATE: 85 BPM | BODY MASS INDEX: 34.23 KG/M2 | WEIGHT: 186 LBS

## 2018-05-10 DIAGNOSIS — G56.01 CARPAL TUNNEL SYNDROME OF RIGHT WRIST: Primary | ICD-10-CM

## 2018-05-10 PROCEDURE — 99213 OFFICE O/P EST LOW 20 MIN: CPT | Performed by: NURSE PRACTITIONER

## 2018-05-10 NOTE — PROGRESS NOTES
Subjective   Shey King is a 67 y.o. female.     Chief Complaint: Numbness (right hand )    Hand Pain    The incident occurred more than 1 week ago. There was no injury mechanism. The pain is present in the right hand and right fingers. The quality of the pain is described as aching. The pain does not radiate. The pain is mild. The pain has been intermittent since the incident. Associated symptoms include numbness and tingling. Pertinent negatives include no chest pain or muscle weakness. She has tried nothing for the symptoms.   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  She uses repetitive motions at work.  She has N/T in 3rd and 4th digits of right hand that comes and goes.      Family History   Problem Relation Age of Onset   • Heart disease Mother    • Cancer Mother      RENAL    • No Known Problems Father    • Breast cancer Neg Hx        Social History     Social History   • Marital status:      Spouse name: N/A   • Number of children: N/A   • Years of education: N/A     Occupational History   • Not on file.     Social History Main Topics   • Smoking status: Never Smoker   • Smokeless tobacco: Never Used   • Alcohol use No   • Drug use: No   • Sexual activity: Defer     Other Topics Concern   • Not on file     Social History Narrative   • No narrative on file       Past Medical History:   Diagnosis Date   • Abdominal pain, LLQ (left lower quadrant)    • Cystitis    • Diabetes mellitus    • Diarrhea    • Gallstones    • Hiatal hernia    • Hyperglycemia    • Hyperlipidemia    • Hypothyroidism    • Muscle spasm     FLANK PAIN   • OAB (overactive bladder)    • UTI (urinary tract infection)        Review of Systems   Constitutional: Negative.    HENT: Negative.    Respiratory: Negative.    Cardiovascular: Negative.  Negative for chest pain.   Gastrointestinal: Negative.    Musculoskeletal: Positive for arthralgias.   Skin: Negative.    Neurological: Positive for tingling and numbness.  "Negative for weakness.   Psychiatric/Behavioral: Negative.        Objective   Physical Exam   Constitutional: She is oriented to person, place, and time. She appears well-developed and well-nourished.   Neck: Normal range of motion. Neck supple.   Cardiovascular: Normal rate, regular rhythm and normal heart sounds.    Pulmonary/Chest: Effort normal and breath sounds normal.   Musculoskeletal:   Positive tinels sign; positive phalens sign   Neurological: She is alert and oriented to person, place, and time.   Skin: Skin is warm and dry.   Psychiatric: She has a normal mood and affect. Her behavior is normal. Judgment and thought content normal.   Nursing note and vitals reviewed.      Procedures    Vitals: Blood pressure 126/77, pulse 85, height 157.5 cm (62\"), weight 84.4 kg (186 lb), SpO2 98 %, not currently breastfeeding.    Allergies:   Allergies   Allergen Reactions   • Bactrim [Sulfamethoxazole-Trimethoprim]    • Ciprofloxacin    • Penicillins    • Steri-Strip Compound Benzoin [Benzoin Compound]    • Sulfa Antibiotics         During this visit the following were done:  Labs Reviewed []    Labs Ordered []    Radiology Reports Reviewed []    Radiology Ordered []    PCP Records Reviewed []    Referring Provider Records Reviewed []    ER Records Reviewed []    Hospital Records Reviewed []    History Obtained From Family []    Radiology Images Reviewed []    Other Reviewed []    Records Requested []      Assessment/Plan   Shey was seen today for numbness.    Diagnoses and all orders for this visit:    Carpal tunnel syndrome of right wrist    Wrist splint for work and at night  Naproxen BID           "

## 2018-06-12 ENCOUNTER — LAB (OUTPATIENT)
Dept: FAMILY MEDICINE CLINIC | Facility: CLINIC | Age: 68
End: 2018-06-12

## 2018-06-12 DIAGNOSIS — R30.0 DYSURIA: ICD-10-CM

## 2018-06-12 DIAGNOSIS — R82.90 ABNORMAL URINALYSIS: ICD-10-CM

## 2018-06-12 DIAGNOSIS — E55.9 VITAMIN D DEFICIENCY: ICD-10-CM

## 2018-06-12 DIAGNOSIS — E03.9 HYPOTHYROIDISM, UNSPECIFIED TYPE: ICD-10-CM

## 2018-06-12 DIAGNOSIS — E78.2 MIXED HYPERLIPIDEMIA: ICD-10-CM

## 2018-06-12 LAB
25(OH)D3 SERPL-MCNC: 21 NG/ML
CHOLEST SERPL-MCNC: 195 MG/DL (ref 0–200)
HDLC SERPL-MCNC: 39 MG/DL (ref 60–100)
LDLC SERPL CALC-MCNC: 102 MG/DL (ref 0–100)
LDLC/HDLC SERPL: 2.63 {RATIO}
MAGNESIUM SERPL-MCNC: 1.9 MG/DL (ref 1.7–2.6)
T4 FREE SERPL-MCNC: 1.08 NG/DL (ref 0.89–1.76)
TRIGL SERPL-MCNC: 268 MG/DL (ref 0–150)
TSH SERPL DL<=0.05 MIU/L-ACNC: 13.38 MIU/ML (ref 0.55–4.78)
VLDLC SERPL-MCNC: 53.6 MG/DL

## 2018-06-12 PROCEDURE — 87077 CULTURE AEROBIC IDENTIFY: CPT | Performed by: NURSE PRACTITIONER

## 2018-06-12 PROCEDURE — 36415 COLL VENOUS BLD VENIPUNCTURE: CPT

## 2018-06-12 PROCEDURE — 80061 LIPID PANEL: CPT | Performed by: NURSE PRACTITIONER

## 2018-06-12 PROCEDURE — 84439 ASSAY OF FREE THYROXINE: CPT | Performed by: NURSE PRACTITIONER

## 2018-06-12 PROCEDURE — 87186 SC STD MICRODIL/AGAR DIL: CPT | Performed by: NURSE PRACTITIONER

## 2018-06-12 PROCEDURE — 82306 VITAMIN D 25 HYDROXY: CPT | Performed by: NURSE PRACTITIONER

## 2018-06-12 PROCEDURE — 83735 ASSAY OF MAGNESIUM: CPT | Performed by: NURSE PRACTITIONER

## 2018-06-12 PROCEDURE — 87086 URINE CULTURE/COLONY COUNT: CPT | Performed by: NURSE PRACTITIONER

## 2018-06-12 PROCEDURE — 84443 ASSAY THYROID STIM HORMONE: CPT | Performed by: NURSE PRACTITIONER

## 2018-06-13 ENCOUNTER — TELEPHONE (OUTPATIENT)
Dept: FAMILY MEDICINE CLINIC | Facility: CLINIC | Age: 68
End: 2018-06-13

## 2018-06-13 RX ORDER — LEVOTHYROXINE SODIUM 88 UG/1
88 TABLET ORAL DAILY
Qty: 30 TABLET | Refills: 2 | Status: SHIPPED | OUTPATIENT
Start: 2018-06-13 | End: 2018-07-25

## 2018-06-13 RX ORDER — ERGOCALCIFEROL 1.25 MG/1
50000 CAPSULE ORAL WEEKLY
Qty: 12 CAPSULE | Refills: 0 | Status: SHIPPED | OUTPATIENT
Start: 2018-06-13 | End: 2019-07-02 | Stop reason: SDUPTHER

## 2018-06-13 NOTE — TELEPHONE ENCOUNTER
----- Message from BERT Collins sent at 6/13/2018  1:17 PM EDT -----  TSH is elevated so we need to increase her levothyroxine to 88mcg daily.  Please send to pharmacy.   Her Vit D is low.  She needs 17309a weekly x12 weeks. Please send to pharmacy.   Her triglycerides are elevated.  She needs to take fish oil 3000mg daily.  She can get otc.    Recheck labs in 3 months for effectiveness of medication.   Please let her  know.

## 2018-06-13 NOTE — PROGRESS NOTES
TSH is elevated so we need to increase her levothyroxine to 88mcg daily.  Please send to pharmacy.   Her Vit D is low.  She needs 63051q weekly x12 weeks. Please send to pharmacy.   Her triglycerides are elevated.  She needs to take fish oil 3000mg daily.  She can get otc.    Recheck labs in 3 months for effectiveness of medication.   Please let her  know.

## 2018-06-14 LAB — BACTERIA SPEC AEROBE CULT: NORMAL

## 2018-06-14 NOTE — TELEPHONE ENCOUNTER
Culture shows gram neg bacilli but  no sensitivity has been recorded.  Can you call lab and check on the sensitivity please?

## 2018-06-15 NOTE — TELEPHONE ENCOUNTER
Called pt and advised. Pt stated she feels much better. I advised pt that if she has any issues to please let us know and we can recheck her.

## 2018-07-25 ENCOUNTER — OFFICE VISIT (OUTPATIENT)
Dept: FAMILY MEDICINE CLINIC | Facility: CLINIC | Age: 68
End: 2018-07-25

## 2018-07-25 ENCOUNTER — TELEPHONE (OUTPATIENT)
Dept: FAMILY MEDICINE CLINIC | Facility: CLINIC | Age: 68
End: 2018-07-25

## 2018-07-25 VITALS
TEMPERATURE: 98.5 F | WEIGHT: 189.9 LBS | OXYGEN SATURATION: 99 % | SYSTOLIC BLOOD PRESSURE: 113 MMHG | HEIGHT: 62 IN | DIASTOLIC BLOOD PRESSURE: 73 MMHG | HEART RATE: 83 BPM | BODY MASS INDEX: 34.95 KG/M2

## 2018-07-25 DIAGNOSIS — E03.9 HYPOTHYROIDISM, UNSPECIFIED TYPE: Primary | ICD-10-CM

## 2018-07-25 DIAGNOSIS — N30.00 ACUTE CYSTITIS WITHOUT HEMATURIA: ICD-10-CM

## 2018-07-25 DIAGNOSIS — E03.8 ADULT ONSET HYPOTHYROIDISM: ICD-10-CM

## 2018-07-25 DIAGNOSIS — R10.2 PELVIC PRESSURE IN FEMALE: Primary | ICD-10-CM

## 2018-07-25 LAB
BILIRUB BLD-MCNC: NEGATIVE MG/DL
COLOR UR: YELLOW
GLUCOSE UR STRIP-MCNC: NEGATIVE MG/DL
KETONES UR QL: NEGATIVE
LEUKOCYTE EST, POC: ABNORMAL
NITRITE UR-MCNC: POSITIVE MG/ML
PH UR: 6 [PH] (ref 5–8)
PROT UR STRIP-MCNC: ABNORMAL MG/DL
RBC # UR STRIP: NEGATIVE /UL
SP GR UR: 1.03 (ref 1–1.03)
TSH SERPL DL<=0.05 MIU/L-ACNC: 25.21 MIU/ML (ref 0.55–4.78)
UROBILINOGEN UR QL: NORMAL

## 2018-07-25 PROCEDURE — 99214 OFFICE O/P EST MOD 30 MIN: CPT | Performed by: NURSE PRACTITIONER

## 2018-07-25 PROCEDURE — 81003 URINALYSIS AUTO W/O SCOPE: CPT | Performed by: NURSE PRACTITIONER

## 2018-07-25 PROCEDURE — 36415 COLL VENOUS BLD VENIPUNCTURE: CPT | Performed by: NURSE PRACTITIONER

## 2018-07-25 PROCEDURE — 87186 SC STD MICRODIL/AGAR DIL: CPT | Performed by: NURSE PRACTITIONER

## 2018-07-25 PROCEDURE — 84443 ASSAY THYROID STIM HORMONE: CPT | Performed by: NURSE PRACTITIONER

## 2018-07-25 PROCEDURE — 87086 URINE CULTURE/COLONY COUNT: CPT | Performed by: NURSE PRACTITIONER

## 2018-07-25 PROCEDURE — 87077 CULTURE AEROBIC IDENTIFY: CPT | Performed by: NURSE PRACTITIONER

## 2018-07-25 RX ORDER — LEVOTHYROXINE SODIUM 0.07 MG/1
75 TABLET ORAL DAILY
Qty: 90 TABLET | Refills: 1 | Status: SHIPPED | OUTPATIENT
Start: 2018-07-25 | End: 2018-07-25

## 2018-07-25 RX ORDER — LEVOTHYROXINE SODIUM 0.1 MG/1
100 TABLET ORAL DAILY
Qty: 30 TABLET | Refills: 3 | Status: SHIPPED | OUTPATIENT
Start: 2018-07-25 | End: 2018-10-31 | Stop reason: SDUPTHER

## 2018-07-25 RX ORDER — NITROFURANTOIN 25; 75 MG/1; MG/1
100 CAPSULE ORAL EVERY 12 HOURS SCHEDULED
Qty: 28 CAPSULE | Refills: 0 | Status: SHIPPED | OUTPATIENT
Start: 2018-07-25 | End: 2018-09-24

## 2018-07-25 NOTE — TELEPHONE ENCOUNTER
----- Message from BERT Lucas sent at 7/25/2018  2:42 PM EDT -----  Synthroid needs to be increased to 100 mcg  No wonder she is feeling bad   In June she should have been increased to 88 and for some reason this didn't happen.  Ask her to please RTC 4 week for repeat TSH    Patient notified & verbalized understanding.

## 2018-07-25 NOTE — PROGRESS NOTES
"Subjective   Shey King is a 67 y.o. female.   Chief Compliant: The patient presents with Urinary Tract Infection (urinary discomfort)    Urinary Tract Infection    This is a recurrent problem. The current episode started yesterday. The problem occurs every urination. The problem has been unchanged. The quality of the pain is described as aching and burning. The pain is at a severity of 5/10. The pain is mild. There has been no fever. She is not sexually active. There is a history of pyelonephritis. Associated symptoms include frequency, hesitancy and urgency. Pertinent negatives include no chills, discharge, flank pain, hematuria, nausea or possible pregnancy. Associated symptoms comments: Pelvic pressure with urination   . She has tried increased fluids for the symptoms. The treatment provided mild relief. Her past medical history is significant for recurrent UTIs.   Thyroid Problem   Presents for follow-up (Known hypothyroidism with recent medication adjustments) visit. Symptoms include fatigue. The symptoms have been stable.      The following portions of the patient's history were reviewed and updated as appropriate: allergies, current medications, past family history, past medical history, past social history, past surgical history and problem list.      Review of Systems   Constitutional: Positive for fatigue. Negative for chills.   HENT: Negative.    Respiratory: Negative.    Gastrointestinal: Positive for abdominal pain. Negative for nausea.   Genitourinary: Positive for dysuria, frequency, hesitancy, pelvic pain and urgency. Negative for flank pain and hematuria.   All other systems reviewed and are negative.      Procedures    Vitals: Blood pressure 113/73, pulse 83, temperature 98.5 °F (36.9 °C), temperature source Oral, height 157.5 cm (62\"), weight 86.1 kg (189 lb 14.4 oz), SpO2 99 %, not currently breastfeeding.     Allergies:   Allergies   Allergen Reactions   • Bactrim " [Sulfamethoxazole-Trimethoprim]    • Ciprofloxacin    • Penicillins    • Steri-Strip Compound Benzoin [Benzoin Compound]    • Sulfa Antibiotics           Objective   Physical Exam   Constitutional: She is oriented to person, place, and time. She appears well-developed and well-nourished. No distress.   HENT:   Head: Normocephalic.   Cardiovascular: Normal rate, regular rhythm and normal heart sounds.    No murmur heard.  Pulmonary/Chest: Effort normal and breath sounds normal.   Abdominal: Soft. Bowel sounds are normal. She exhibits no distension. There is tenderness in the suprapubic area. There is no guarding and no CVA tenderness.   Neurological: She is alert and oriented to person, place, and time.   Skin: Skin is warm and dry. She is not diaphoretic.   Psychiatric: She has a normal mood and affect. Her behavior is normal.   Nursing note and vitals reviewed.      During this visit the following were done:  Labs Reviewed [x]    Labs Ordered [x]    Radiology Reports Reviewed []    Radiology Ordered []    PCP Records Reviewed []    Referring Provider Records Reviewed []    ER Records Reviewed []    Hospital Records Reviewed []    History Obtained From Family []    Radiology Images Reviewed []    Other Reviewed []    Records Requested []      Assessment/Plan   Discussed with patient impression and plan, patient verbalizes understanding  Push fluids will follow with urine culture and todays labs    Shey was seen today for urinary tract infection.    Diagnoses and all orders for this visit:    Pelvic pressure in female  -     POCT urinalysis dipstick, automated    Acute cystitis without hematuria  -     Urine Culture - Urine, Urine, Clean Catch; Future  -     Urine Culture - Urine, Urine, Clean Catch    Adult onset hypothyroidism  -     TSH    Other orders  -     levothyroxine (SYNTHROID, LEVOTHROID) 75 MCG tablet; Take 1 tablet by mouth Daily. Make sure you take on an empty stomach,alone,30 minutes before any food  -      nitrofurantoin, macrocrystal-monohydrate, (MACROBID) 100 MG capsule; Take 1 capsule by mouth Every 12 (Twelve) Hours.

## 2018-07-27 LAB — BACTERIA SPEC AEROBE CULT: ABNORMAL

## 2018-09-24 ENCOUNTER — OFFICE VISIT (OUTPATIENT)
Dept: FAMILY MEDICINE CLINIC | Facility: CLINIC | Age: 68
End: 2018-09-24

## 2018-09-24 ENCOUNTER — TELEPHONE (OUTPATIENT)
Dept: FAMILY MEDICINE CLINIC | Facility: CLINIC | Age: 68
End: 2018-09-24

## 2018-09-24 VITALS
DIASTOLIC BLOOD PRESSURE: 81 MMHG | HEART RATE: 77 BPM | SYSTOLIC BLOOD PRESSURE: 119 MMHG | OXYGEN SATURATION: 95 % | HEIGHT: 62 IN | WEIGHT: 190.4 LBS | BODY MASS INDEX: 35.04 KG/M2

## 2018-09-24 DIAGNOSIS — R10.11 RUQ PAIN: Primary | ICD-10-CM

## 2018-09-24 DIAGNOSIS — E53.8 B12 DEFICIENCY: ICD-10-CM

## 2018-09-24 DIAGNOSIS — R10.9 RIGHT FLANK PAIN: ICD-10-CM

## 2018-09-24 DIAGNOSIS — R20.0 BILATERAL HAND NUMBNESS: ICD-10-CM

## 2018-09-24 DIAGNOSIS — E03.8 ADULT ONSET HYPOTHYROIDISM: ICD-10-CM

## 2018-09-24 DIAGNOSIS — E55.9 VITAMIN D DEFICIENCY: ICD-10-CM

## 2018-09-24 LAB
25(OH)D3 SERPL-MCNC: 37 NG/ML
ALBUMIN SERPL-MCNC: 4.4 G/DL (ref 3.4–4.8)
ALBUMIN/GLOB SERPL: 1.3 G/DL (ref 1.5–2.5)
ALP SERPL-CCNC: 105 U/L (ref 35–104)
ALT SERPL W P-5'-P-CCNC: 26 U/L (ref 10–36)
ANION GAP SERPL CALCULATED.3IONS-SCNC: 8 MMOL/L (ref 3.6–11.2)
AST SERPL-CCNC: 24 U/L (ref 10–30)
BACTERIA UR QL AUTO: ABNORMAL /HPF
BASOPHILS # BLD AUTO: 0.06 10*3/MM3 (ref 0–0.3)
BASOPHILS NFR BLD AUTO: 0.7 % (ref 0–2)
BILIRUB BLD-MCNC: NEGATIVE MG/DL
BILIRUB SERPL-MCNC: 0.6 MG/DL (ref 0.2–1.8)
BILIRUB UR QL STRIP: NEGATIVE
BUN BLD-MCNC: 9 MG/DL (ref 7–21)
BUN/CREAT SERPL: 11 (ref 7–25)
CALCIUM SPEC-SCNC: 9.1 MG/DL (ref 7.7–10)
CHLORIDE SERPL-SCNC: 106 MMOL/L (ref 99–112)
CLARITY UR: ABNORMAL
CO2 SERPL-SCNC: 24 MMOL/L (ref 24.3–31.9)
COLOR UR: YELLOW
CREAT BLD-MCNC: 0.82 MG/DL (ref 0.43–1.29)
DEPRECATED RDW RBC AUTO: 44.2 FL (ref 37–54)
EOSINOPHIL # BLD AUTO: 0.21 10*3/MM3 (ref 0–0.7)
EOSINOPHIL NFR BLD AUTO: 2.4 % (ref 0–7)
ERYTHROCYTE [DISTWIDTH] IN BLOOD BY AUTOMATED COUNT: 13.9 % (ref 11.5–14.5)
GFR SERPL CREATININE-BSD FRML MDRD: 70 ML/MIN/1.73
GLOBULIN UR ELPH-MCNC: 3.4 GM/DL
GLUCOSE BLD-MCNC: 98 MG/DL (ref 70–110)
GLUCOSE UR STRIP-MCNC: NEGATIVE MG/DL
GLUCOSE UR STRIP-MCNC: NEGATIVE MG/DL
HCT VFR BLD AUTO: 37.1 % (ref 37–47)
HGB BLD-MCNC: 12.4 G/DL (ref 12–16)
HGB UR QL STRIP.AUTO: NEGATIVE
HYALINE CASTS UR QL AUTO: ABNORMAL /LPF
IMM GRANULOCYTES # BLD: 0.01 10*3/MM3 (ref 0–0.03)
IMM GRANULOCYTES NFR BLD: 0.1 % (ref 0–0.5)
KETONES UR QL STRIP: NEGATIVE
KETONES UR QL: NEGATIVE
LEUKOCYTE EST, POC: ABNORMAL
LEUKOCYTE ESTERASE UR QL STRIP.AUTO: ABNORMAL
LYMPHOCYTES # BLD AUTO: 2.17 10*3/MM3 (ref 1–3)
LYMPHOCYTES NFR BLD AUTO: 24.3 % (ref 16–46)
MCH RBC QN AUTO: 29.6 PG (ref 27–33)
MCHC RBC AUTO-ENTMCNC: 33.4 G/DL (ref 33–37)
MCV RBC AUTO: 88.5 FL (ref 80–94)
MONOCYTES # BLD AUTO: 0.52 10*3/MM3 (ref 0.1–0.9)
MONOCYTES NFR BLD AUTO: 5.8 % (ref 0–12)
NEUTROPHILS # BLD AUTO: 5.96 10*3/MM3 (ref 1.4–6.5)
NEUTROPHILS NFR BLD AUTO: 66.7 % (ref 40–75)
NITRITE UR QL STRIP: POSITIVE
NITRITE UR-MCNC: POSITIVE MG/ML
OSMOLALITY SERPL CALC.SUM OF ELEC: 274.3 MOSM/KG (ref 273–305)
PH UR STRIP.AUTO: 7 [PH] (ref 5–8)
PH UR: 7 [PH] (ref 5–8)
PLATELET # BLD AUTO: 256 10*3/MM3 (ref 130–400)
PMV BLD AUTO: 11.4 FL (ref 6–10)
POTASSIUM BLD-SCNC: 4.1 MMOL/L (ref 3.5–5.3)
PROT SERPL-MCNC: 7.8 G/DL (ref 6–8)
PROT UR QL STRIP: NEGATIVE
PROT UR STRIP-MCNC: NEGATIVE MG/DL
RBC # BLD AUTO: 4.19 10*6/MM3 (ref 4.2–5.4)
RBC # UR STRIP: NEGATIVE /UL
RBC # UR: ABNORMAL /HPF
REF LAB TEST METHOD: ABNORMAL
SODIUM BLD-SCNC: 138 MMOL/L (ref 135–153)
SP GR UR STRIP: 1.01 (ref 1–1.03)
SP GR UR: 1.01 (ref 1–1.03)
SQUAMOUS #/AREA URNS HPF: ABNORMAL /HPF
TSH SERPL DL<=0.05 MIU/L-ACNC: 4.13 MIU/ML (ref 0.55–4.78)
UROBILINOGEN UR QL STRIP: ABNORMAL
UROBILINOGEN UR QL: NORMAL
VIT B12 BLD-MCNC: 405 PG/ML (ref 211–911)
WBC NRBC COR # BLD: 8.93 10*3/MM3 (ref 4.5–12.5)
WBC UR QL AUTO: ABNORMAL /HPF

## 2018-09-24 PROCEDURE — 84443 ASSAY THYROID STIM HORMONE: CPT | Performed by: NURSE PRACTITIONER

## 2018-09-24 PROCEDURE — 85025 COMPLETE CBC W/AUTO DIFF WBC: CPT | Performed by: NURSE PRACTITIONER

## 2018-09-24 PROCEDURE — 87086 URINE CULTURE/COLONY COUNT: CPT | Performed by: NURSE PRACTITIONER

## 2018-09-24 PROCEDURE — 81001 URINALYSIS AUTO W/SCOPE: CPT | Performed by: NURSE PRACTITIONER

## 2018-09-24 PROCEDURE — 87077 CULTURE AEROBIC IDENTIFY: CPT | Performed by: NURSE PRACTITIONER

## 2018-09-24 PROCEDURE — 87186 SC STD MICRODIL/AGAR DIL: CPT | Performed by: NURSE PRACTITIONER

## 2018-09-24 PROCEDURE — 82607 VITAMIN B-12: CPT | Performed by: NURSE PRACTITIONER

## 2018-09-24 PROCEDURE — 99214 OFFICE O/P EST MOD 30 MIN: CPT | Performed by: NURSE PRACTITIONER

## 2018-09-24 PROCEDURE — 82306 VITAMIN D 25 HYDROXY: CPT | Performed by: NURSE PRACTITIONER

## 2018-09-24 PROCEDURE — 80053 COMPREHEN METABOLIC PANEL: CPT | Performed by: NURSE PRACTITIONER

## 2018-09-24 PROCEDURE — 36415 COLL VENOUS BLD VENIPUNCTURE: CPT | Performed by: NURSE PRACTITIONER

## 2018-09-24 PROCEDURE — 81003 URINALYSIS AUTO W/O SCOPE: CPT | Performed by: NURSE PRACTITIONER

## 2018-09-24 NOTE — PROGRESS NOTES
Subjective   Shey King is a 67 y.o. female.   Chief Compliant: The patient presents with Back Pain and Numbness    Back Pain   This is a new problem. The current episode started more than 1 month ago. The problem occurs intermittently. The problem has been waxing and waning since onset. Pain location: Pin under right shoulder and mid thoracic.  Pain radiates to RUQ.  Onset sometime following lap denny.   The quality of the pain is described as aching. Radiates to: RUQ. The pain is moderate (varies dull at times other days intense). Associated symptoms include abdominal pain and numbness (bilateral hands at times). Risk factors: HX lap denny. Treatments tried: rest.   Thyroid Problem   Presents for follow-up (Known hypothyroidism with recent medication change.  States she is feeling better ) visit. Symptoms include fatigue. The symptoms have been improving.   Upper Extremity Issue   This is a new (Awakes at times with bilateral hands numb and tingling.  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 The current episode started more than 1 month ago. The problem has been waxing and waning. Associated symptoms include abdominal pain, fatigue and numbness (bilateral hands at times). Pertinent negatives include no nausea or vomiting. Exacerbated by: sleep. She has tried nothing for the symptoms.      The following portions of the patient's history were reviewed and updated as appropriate: allergies, current medications, past family history, past medical history, past social history, past surgical history and problem list.      Review of Systems   Constitutional: Positive for fatigue.   HENT: Negative.    Respiratory: Negative.    Cardiovascular: Negative.    Gastrointestinal: Positive for abdominal pain. Negative for abdominal distention, nausea and vomiting.   Musculoskeletal: Positive for back pain.   Skin: Negative.    Neurological: Positive for numbness  "(bilateral hands at times).   Psychiatric/Behavioral: Negative.    All other systems reviewed and are negative.      Procedures    Vitals: Blood pressure 119/81, pulse 77, height 157.5 cm (62\"), weight 86.4 kg (190 lb 6.4 oz), SpO2 95 %, not currently breastfeeding.     Allergies:   Allergies   Allergen Reactions   • Bactrim [Sulfamethoxazole-Trimethoprim]    • Ciprofloxacin    • Penicillins    • Steri-Strip Compound Benzoin [Benzoin Compound]    • Sulfa Antibiotics           Objective   Physical Exam   Constitutional: She is oriented to person, place, and time. She appears well-developed and well-nourished. No distress.   HENT:   Head: Normocephalic.   Right Ear: External ear normal.   Left Ear: External ear normal.   Nose: Nose normal.   Mouth/Throat: Oropharynx is clear and moist. No oropharyngeal exudate.   Eyes: Pupils are equal, round, and reactive to light. Conjunctivae and EOM are normal. Right eye exhibits no discharge. Left eye exhibits no discharge.   Neck: Neck supple. No thyromegaly present.   Cardiovascular: Normal rate, regular rhythm and normal heart sounds.    No murmur heard.  Pulmonary/Chest: Effort normal and breath sounds normal.   Abdominal: Soft. Normal appearance and bowel sounds are normal. She exhibits no distension. There is no hepatosplenomegaly, splenomegaly or hepatomegaly. There is tenderness (RUQ) in the right upper quadrant. There is no rebound and no CVA tenderness.   Musculoskeletal:        Right wrist: Normal.        Left wrist: Normal.   Neurological: She is alert and oriented to person, place, and time.   Skin: Skin is warm and dry. She is not diaphoretic.   Psychiatric: She has a normal mood and affect. Her behavior is normal.   Nursing note and vitals reviewed.      During this visit the following were done:  Labs Reviewed [x]    Labs Ordered []    Radiology Reports Reviewed []    Radiology Ordered []    PCP Records Reviewed []    Referring Provider Records Reviewed []    ER " Records Reviewed []    Hospital Records Reviewed []    History Obtained From Family []    Radiology Images Reviewed []    Other Reviewed []    Records Requested []      Assessment/Plan   Discussed with patient impression and plan, patient verbalizes understanding  Will follow up after labs  Will     Shey was seen today for back pain and numbness.    Diagnoses and all orders for this visit:    RUQ pain  -     POCT urinalysis dipstick, automated  -     CBC & Differential  -     Comprehensive Metabolic Panel  -     CBC Auto Differential  -     Ambulatory Referral to Gastroenterology    Right flank pain  -     POCT urinalysis dipstick, automated  -     CBC & Differential  -     Comprehensive Metabolic Panel  -     CBC Auto Differential  -     Ambulatory Referral to Gastroenterology    Bilateral hand numbness    B12 deficiency  -     Vitamin B12    Vitamin D deficiency  -     Vitamin D 25 Hydroxy    Adult onset hypothyroidism  -     TSH

## 2018-09-26 ENCOUNTER — TELEPHONE (OUTPATIENT)
Dept: FAMILY MEDICINE CLINIC | Facility: CLINIC | Age: 68
End: 2018-09-26

## 2018-09-26 LAB — BACTERIA SPEC AEROBE CULT: ABNORMAL

## 2018-09-26 RX ORDER — ERYTHROMYCIN 500 MG/1
500 TABLET, COATED ORAL 4 TIMES DAILY
Qty: 40 TABLET | Refills: 0 | Status: SHIPPED | OUTPATIENT
Start: 2018-09-26 | End: 2018-09-27 | Stop reason: SDUPTHER

## 2018-09-26 NOTE — TELEPHONE ENCOUNTER
----- Message from BERT Lucas sent at 9/26/2018 10:12 AM EDT -----  Let patient know her urine culture is positive and I sent erythromycin to her [facility] pharmacy  I want her to return when she completes the antibiotic to ensure this is gone as she had no symptoms    Left a message to return call.    Patient notified & verbalized understanding.

## 2018-09-27 RX ORDER — ERYTHROMYCIN 500 MG/1
500 TABLET, COATED ORAL 4 TIMES DAILY
Qty: 40 TABLET | Refills: 0 | Status: SHIPPED | OUTPATIENT
Start: 2018-09-27 | End: 2019-01-03 | Stop reason: SDUPTHER

## 2018-09-27 NOTE — TELEPHONE ENCOUNTER
Patient called requesting her antibiotic be changed to [facility],sent as requested. ([facility] has closed in [place]).

## 2018-10-31 ENCOUNTER — TELEPHONE (OUTPATIENT)
Dept: FAMILY MEDICINE CLINIC | Facility: CLINIC | Age: 68
End: 2018-10-31

## 2018-10-31 ENCOUNTER — OFFICE VISIT (OUTPATIENT)
Dept: FAMILY MEDICINE CLINIC | Facility: CLINIC | Age: 68
End: 2018-10-31

## 2018-10-31 VITALS
SYSTOLIC BLOOD PRESSURE: 128 MMHG | WEIGHT: 192.8 LBS | BODY MASS INDEX: 35.48 KG/M2 | OXYGEN SATURATION: 95 % | TEMPERATURE: 98.2 F | HEIGHT: 62 IN | DIASTOLIC BLOOD PRESSURE: 84 MMHG | HEART RATE: 66 BPM

## 2018-10-31 DIAGNOSIS — E55.9 VITAMIN D DEFICIENCY: ICD-10-CM

## 2018-10-31 DIAGNOSIS — E03.8 ADULT ONSET HYPOTHYROIDISM: ICD-10-CM

## 2018-10-31 DIAGNOSIS — R30.0 DYSURIA: ICD-10-CM

## 2018-10-31 DIAGNOSIS — R59.1 LYMPHADENOPATHY: ICD-10-CM

## 2018-10-31 DIAGNOSIS — E11.9 TYPE 2 DIABETES MELLITUS WITHOUT COMPLICATION, WITHOUT LONG-TERM CURRENT USE OF INSULIN (HCC): Primary | ICD-10-CM

## 2018-10-31 DIAGNOSIS — M25.50 ARTHRALGIA, UNSPECIFIED JOINT: ICD-10-CM

## 2018-10-31 LAB
25(OH)D3 SERPL-MCNC: 29 NG/ML
ALBUMIN SERPL-MCNC: 4.7 G/DL (ref 3.4–4.8)
ALBUMIN UR-MCNC: 1.3 MG/L
ALBUMIN/GLOB SERPL: 1.4 G/DL (ref 1.5–2.5)
ALP SERPL-CCNC: 100 U/L (ref 35–104)
ALT SERPL W P-5'-P-CCNC: 19 U/L (ref 10–36)
ANION GAP SERPL CALCULATED.3IONS-SCNC: 6.5 MMOL/L (ref 3.6–11.2)
AST SERPL-CCNC: 23 U/L (ref 10–30)
BASOPHILS # BLD AUTO: 0.05 10*3/MM3 (ref 0–0.3)
BASOPHILS NFR BLD AUTO: 0.6 % (ref 0–2)
BILIRUB BLD-MCNC: NEGATIVE MG/DL
BILIRUB SERPL-MCNC: 0.8 MG/DL (ref 0.2–1.8)
BUN BLD-MCNC: 10 MG/DL (ref 7–21)
BUN/CREAT SERPL: 10.3 (ref 7–25)
CALCIUM SPEC-SCNC: 9.4 MG/DL (ref 7.7–10)
CHLORIDE SERPL-SCNC: 105 MMOL/L (ref 99–112)
CHOLEST SERPL-MCNC: 233 MG/DL (ref 0–200)
CO2 SERPL-SCNC: 25.5 MMOL/L (ref 24.3–31.9)
CREAT BLD-MCNC: 0.97 MG/DL (ref 0.43–1.29)
DEPRECATED RDW RBC AUTO: 46.7 FL (ref 37–54)
EOSINOPHIL # BLD AUTO: 0.23 10*3/MM3 (ref 0–0.7)
EOSINOPHIL NFR BLD AUTO: 2.8 % (ref 0–7)
ERYTHROCYTE [DISTWIDTH] IN BLOOD BY AUTOMATED COUNT: 14.6 % (ref 11.5–14.5)
GFR SERPL CREATININE-BSD FRML MDRD: 58 ML/MIN/1.73
GLOBULIN UR ELPH-MCNC: 3.3 GM/DL
GLUCOSE BLD-MCNC: 95 MG/DL (ref 70–110)
GLUCOSE UR STRIP-MCNC: NEGATIVE MG/DL
HBA1C MFR BLD: 5.8 % (ref 4.5–5.7)
HCT VFR BLD AUTO: 37.5 % (ref 37–47)
HDLC SERPL-MCNC: 43 MG/DL (ref 60–100)
HGB BLD-MCNC: 12.3 G/DL (ref 12–16)
IMM GRANULOCYTES # BLD: 0.01 10*3/MM3 (ref 0–0.03)
IMM GRANULOCYTES NFR BLD: 0.1 % (ref 0–0.5)
KETONES UR QL: NEGATIVE
LDLC SERPL CALC-MCNC: 150 MG/DL (ref 0–100)
LDLC/HDLC SERPL: 3.5 {RATIO}
LEUKOCYTE EST, POC: NEGATIVE
LYMPHOCYTES # BLD AUTO: 2.38 10*3/MM3 (ref 1–3)
LYMPHOCYTES NFR BLD AUTO: 29.3 % (ref 16–46)
MAGNESIUM SERPL-MCNC: 2 MG/DL (ref 1.7–2.6)
MCH RBC QN AUTO: 29.1 PG (ref 27–33)
MCHC RBC AUTO-ENTMCNC: 32.8 G/DL (ref 33–37)
MCV RBC AUTO: 88.7 FL (ref 80–94)
MONOCYTES # BLD AUTO: 0.41 10*3/MM3 (ref 0.1–0.9)
MONOCYTES NFR BLD AUTO: 5 % (ref 0–12)
NEUTROPHILS # BLD AUTO: 5.05 10*3/MM3 (ref 1.4–6.5)
NEUTROPHILS NFR BLD AUTO: 62.2 % (ref 40–75)
NITRITE UR-MCNC: POSITIVE MG/ML
OSMOLALITY SERPL CALC.SUM OF ELEC: 272.7 MOSM/KG (ref 273–305)
PH UR: 6 [PH] (ref 5–8)
PLATELET # BLD AUTO: 257 10*3/MM3 (ref 130–400)
PMV BLD AUTO: 10.8 FL (ref 6–10)
POTASSIUM BLD-SCNC: 4.1 MMOL/L (ref 3.5–5.3)
PROT SERPL-MCNC: 8 G/DL (ref 6–8)
PROT UR STRIP-MCNC: NEGATIVE MG/DL
RBC # BLD AUTO: 4.23 10*6/MM3 (ref 4.2–5.4)
RBC # UR STRIP: NEGATIVE /UL
SODIUM BLD-SCNC: 137 MMOL/L (ref 135–153)
SP GR UR: 1.01 (ref 1–1.03)
TRIGL SERPL-MCNC: 198 MG/DL (ref 0–150)
TSH SERPL DL<=0.05 MIU/L-ACNC: 60.31 MIU/ML (ref 0.55–4.78)
UROBILINOGEN UR QL: NORMAL
VIT B12 BLD-MCNC: 451 PG/ML (ref 211–911)
VLDLC SERPL-MCNC: 39.6 MG/DL
WBC NRBC COR # BLD: 8.13 10*3/MM3 (ref 4.5–12.5)

## 2018-10-31 PROCEDURE — 86038 ANTINUCLEAR ANTIBODIES: CPT | Performed by: NURSE PRACTITIONER

## 2018-10-31 PROCEDURE — 80061 LIPID PANEL: CPT | Performed by: NURSE PRACTITIONER

## 2018-10-31 PROCEDURE — 83735 ASSAY OF MAGNESIUM: CPT | Performed by: NURSE PRACTITIONER

## 2018-10-31 PROCEDURE — 87086 URINE CULTURE/COLONY COUNT: CPT | Performed by: NURSE PRACTITIONER

## 2018-10-31 PROCEDURE — 82043 UR ALBUMIN QUANTITATIVE: CPT | Performed by: NURSE PRACTITIONER

## 2018-10-31 PROCEDURE — 84443 ASSAY THYROID STIM HORMONE: CPT | Performed by: NURSE PRACTITIONER

## 2018-10-31 PROCEDURE — 83036 HEMOGLOBIN GLYCOSYLATED A1C: CPT | Performed by: NURSE PRACTITIONER

## 2018-10-31 PROCEDURE — 87186 SC STD MICRODIL/AGAR DIL: CPT | Performed by: NURSE PRACTITIONER

## 2018-10-31 PROCEDURE — 80053 COMPREHEN METABOLIC PANEL: CPT | Performed by: NURSE PRACTITIONER

## 2018-10-31 PROCEDURE — 82306 VITAMIN D 25 HYDROXY: CPT | Performed by: NURSE PRACTITIONER

## 2018-10-31 PROCEDURE — 36415 COLL VENOUS BLD VENIPUNCTURE: CPT | Performed by: NURSE PRACTITIONER

## 2018-10-31 PROCEDURE — 82607 VITAMIN B-12: CPT | Performed by: NURSE PRACTITIONER

## 2018-10-31 PROCEDURE — 87077 CULTURE AEROBIC IDENTIFY: CPT | Performed by: NURSE PRACTITIONER

## 2018-10-31 PROCEDURE — 81003 URINALYSIS AUTO W/O SCOPE: CPT | Performed by: NURSE PRACTITIONER

## 2018-10-31 PROCEDURE — 99214 OFFICE O/P EST MOD 30 MIN: CPT | Performed by: NURSE PRACTITIONER

## 2018-10-31 PROCEDURE — 85025 COMPLETE CBC W/AUTO DIFF WBC: CPT | Performed by: NURSE PRACTITIONER

## 2018-10-31 RX ORDER — LEVOTHYROXINE SODIUM 100 MCG
100 TABLET ORAL DAILY
Qty: 31 TABLET | Refills: 1 | Status: SHIPPED | OUTPATIENT
Start: 2018-10-31 | End: 2018-12-11 | Stop reason: SDUPTHER

## 2018-10-31 RX ORDER — LEVOTHYROXINE SODIUM 0.1 MG/1
100 TABLET ORAL DAILY
Qty: 30 TABLET | Refills: 3 | Status: SHIPPED | OUTPATIENT
Start: 2018-10-31 | End: 2018-10-31

## 2018-10-31 NOTE — PROGRESS NOTES
Subjective   Shey King is a 67 y.o. female.   Chief Compliant: The patient presents with Back Pain (uti?)    Urinary Tract Infection    This is a recurrent problem. The current episode started yesterday. The problem occurs intermittently. The problem has been waxing and waning. The quality of the pain is described as aching and burning. The pain is at a severity of 2/10. The pain is mild. There has been no fever. She is not sexually active. There is a history of pyelonephritis. Associated symptoms include frequency. Pertinent negatives include no chills, discharge, flank pain, hematuria, hesitancy, nausea, possible pregnancy, sweats, urgency or vomiting. Associated symptoms comments: Lower back pain     . She has tried increased fluids for the symptoms. The treatment provided mild relief. Her past medical history is significant for recurrent UTIs.   Thyroid Problem   Presents for follow-up (Known hypothyroidism ) visit. Symptoms include fatigue. Patient reports no diaphoresis. The symptoms have been stable.   Pain   This is a recurrent (right sided lower back pain, bilateral knee and wrist pain.  ) problem. The current episode started more than 1 year ago. The problem occurs intermittently. The problem has been waxing and waning. Associated symptoms include arthralgias, fatigue, myalgias, swollen glands (continued swelling in nodes right side of neck and right axilla.  Biopsy earlier this year showed inflammatory ) and urinary symptoms. Pertinent negatives include no chills, diaphoresis, fever, nausea or vomiting. She has tried rest, heat, NSAIDs and lying down for the symptoms. The treatment provided mild relief.      The following portions of the patient's history were reviewed and updated as appropriate: allergies, current medications, past family history, past medical history, past social history, past surgical history and problem list.      Review of Systems   Constitutional: Positive for fatigue. Negative  "for activity change (afraid to do much activity to aggravate pain ), appetite change, chills, diaphoresis and fever.   HENT: Negative.         Swollen lymph nodes     Respiratory: Negative.    Cardiovascular: Negative.    Gastrointestinal: Negative for nausea and vomiting.   Genitourinary: Positive for frequency. Negative for flank pain, hematuria, hesitancy and urgency.   Musculoskeletal: Positive for arthralgias and myalgias.   All other systems reviewed and are negative.      Procedures    Vitals: Blood pressure 128/84, pulse 66, temperature 98.2 °F (36.8 °C), temperature source Oral, height 157.5 cm (62\"), weight 87.5 kg (192 lb 12.8 oz), SpO2 95 %, not currently breastfeeding.     Allergies:   Allergies   Allergen Reactions   • Bactrim [Sulfamethoxazole-Trimethoprim]    • Ciprofloxacin    • Penicillins    • Steri-Strip Compound Benzoin [Benzoin Compound]    • Sulfa Antibiotics           Objective   Physical Exam   Constitutional: She is oriented to person, place, and time. She appears well-developed and well-nourished. No distress.   HENT:   Head: Normocephalic.   Right Ear: External ear normal.   Left Ear: External ear normal.   Nose: Nose normal.   Mouth/Throat: Oropharynx is clear and moist. No oropharyngeal exudate.   Eyes: Conjunctivae are normal. Right eye exhibits no discharge. Left eye exhibits no discharge.   Neck: Trachea normal. No thyroid mass and no thyromegaly present.   Cardiovascular: Normal rate, regular rhythm and normal heart sounds.    No murmur heard.  Pulmonary/Chest: Effort normal and breath sounds normal.   Abdominal: Soft. Bowel sounds are normal. She exhibits no distension. There is tenderness in the suprapubic area. There is no guarding and no CVA tenderness.   Lymphadenopathy:     She has cervical adenopathy (right sided with node present righ taxillary).   Neurological: She is alert and oriented to person, place, and time.   Skin: Skin is warm and dry. She is not diaphoretic. "   Psychiatric: She has a normal mood and affect. Her behavior is normal.   Nursing note and vitals reviewed.      During this visit the following were done:  Labs Reviewed [x]    Labs Ordered [x]    Radiology Reports Reviewed []    Radiology Ordered []    PCP Records Reviewed []    Referring Provider Records Reviewed []    ER Records Reviewed []    Hospital Records Reviewed []    History Obtained From Family []    Radiology Images Reviewed []    Other Reviewed []    Records Requested []      Assessment/Plan   Discussed with patient impression and plan, patient verbalizes understanding  Push fluids will follow with urine culture and todays labs    Shey was seen today for back pain.    Diagnoses and all orders for this visit:    Type 2 diabetes mellitus without complication, without long-term current use of insulin (CMS/[facility])  -     Comprehensive Metabolic Panel  -     CBC & Differential  -     Vitamin B12  -     TSH  -     Lipid Panel  -     Magnesium  -     MicroAlbumin, Urine, Random - Urine, Clean Catch  -     Hemoglobin A1c  -     CBC Auto Differential    Arthralgia, unspecified joint    Lymphadenopathy  -     Comprehensive Metabolic Panel  -     CBC & Differential  -     Antinuclear Antibody With Reflex Cascade  -     CBC Auto Differential    Adult onset hypothyroidism    Dysuria  -     POCT urinalysis dipstick, automated  -     Urine Culture - Urine, Urine, Clean Catch; Future  -     Urine Culture - Urine, Urine, Clean Catch    Vitamin D deficiency  -     Vitamin D 25 Hydroxy    Other orders  -     levothyroxine (SYNTHROID) 100 MCG tablet; Take 1 tablet by mouth Daily.  -     metFORMIN (GLUCOPHAGE) 500 MG tablet; Take 1 tablet by mouth Daily With Breakfast.

## 2018-11-01 ENCOUNTER — TELEPHONE (OUTPATIENT)
Dept: FAMILY MEDICINE CLINIC | Facility: CLINIC | Age: 68
End: 2018-11-01

## 2018-11-01 NOTE — TELEPHONE ENCOUNTER
----- Message from BERT Lucas sent at 10/31/2018  6:47 PM EDT -----  Tell patient to stop her metformin her kidney function has changed.  Her sugar is fine will also address on return  She is aware of the changes with her thyroid and understands the brand name may have to be authorized.  I will send a RX to her pharmacy   Her cholesterol is elevated we will discuss that once all results have completed and we have a plan on follow up      Left a message to return call.    Patient notified & verbalized understanding.

## 2018-11-02 LAB
ANA SER QL: NEGATIVE
BACTERIA SPEC AEROBE CULT: ABNORMAL
Lab: NORMAL

## 2018-11-05 ENCOUNTER — TELEPHONE (OUTPATIENT)
Dept: FAMILY MEDICINE CLINIC | Facility: CLINIC | Age: 68
End: 2018-11-05

## 2018-11-05 DIAGNOSIS — R59.1 LYMPHADENOPATHY: Primary | ICD-10-CM

## 2018-11-05 RX ORDER — NITROFURANTOIN 25; 75 MG/1; MG/1
100 CAPSULE ORAL EVERY 12 HOURS SCHEDULED
Qty: 20 CAPSULE | Refills: 0 | Status: SHIPPED | OUTPATIENT
Start: 2018-11-05 | End: 2018-12-11

## 2018-11-05 NOTE — TELEPHONE ENCOUNTER
----- Message from BERT Lucas sent at 11/5/2018  9:01 AM EST -----  Sent Macrobid to her pharmacy as she has tolerated in the past  JULIO CESAR panel negative  Agreed to hematology/oncology appt    Patient notified & verbalized understanding.

## 2018-11-07 ENCOUNTER — TRANSCRIBE ORDERS (OUTPATIENT)
Dept: ADMINISTRATIVE | Facility: HOSPITAL | Age: 68
End: 2018-11-07

## 2018-11-07 DIAGNOSIS — R10.11 ABDOMINAL PAIN, RIGHT UPPER QUADRANT: Primary | ICD-10-CM

## 2018-11-12 ENCOUNTER — APPOINTMENT (OUTPATIENT)
Dept: MRI IMAGING | Facility: HOSPITAL | Age: 68
End: 2018-11-12
Attending: INTERNAL MEDICINE

## 2018-11-13 ENCOUNTER — HOSPITAL ENCOUNTER (OUTPATIENT)
Dept: MRI IMAGING | Facility: HOSPITAL | Age: 68
Discharge: HOME | End: 2018-11-13
Attending: INTERNAL MEDICINE | Admitting: INTERNAL MEDICINE

## 2018-11-13 DIAGNOSIS — R10.11 ABDOMINAL PAIN, RIGHT UPPER QUADRANT: ICD-10-CM

## 2018-11-13 PROCEDURE — 74181 MRI ABDOMEN W/O CONTRAST: CPT

## 2018-11-13 PROCEDURE — 74181 MRI ABDOMEN W/O CONTRAST: CPT | Performed by: RADIOLOGY

## 2018-11-14 DIAGNOSIS — R91.1 LUNG NODULE: Primary | ICD-10-CM

## 2018-11-14 DIAGNOSIS — R59.1 LYMPHADENOPATHY: ICD-10-CM

## 2018-11-14 DIAGNOSIS — R53.83 FATIGUE, UNSPECIFIED TYPE: ICD-10-CM

## 2018-11-21 ENCOUNTER — TELEPHONE (OUTPATIENT)
Dept: FAMILY MEDICINE CLINIC | Facility: CLINIC | Age: 68
End: 2018-11-21

## 2018-11-21 DIAGNOSIS — R59.0 LYMPHADENOPATHY OF RIGHT CERVICAL REGION: Primary | ICD-10-CM

## 2018-11-21 NOTE — TELEPHONE ENCOUNTER
Scheduling called and stated pt was scheduled for Thyroid uptake and scan, you have to be off thyroid meds for 1 month inorder to have test completed. Pt has not held meds. Scheduling stated they are sending the order back and if Tashia wants pt to hold meds and have test done we can reschedule that.

## 2018-11-27 ENCOUNTER — OFFICE VISIT (OUTPATIENT)
Dept: FAMILY MEDICINE CLINIC | Facility: CLINIC | Age: 68
End: 2018-11-27

## 2018-11-27 VITALS
OXYGEN SATURATION: 98 % | SYSTOLIC BLOOD PRESSURE: 122 MMHG | WEIGHT: 191.4 LBS | HEIGHT: 62 IN | HEART RATE: 78 BPM | DIASTOLIC BLOOD PRESSURE: 84 MMHG | BODY MASS INDEX: 35.22 KG/M2 | TEMPERATURE: 98.1 F

## 2018-11-27 DIAGNOSIS — R59.0 LYMPHADENOPATHY OF RIGHT CERVICAL REGION: ICD-10-CM

## 2018-11-27 DIAGNOSIS — Z87.440 HISTORY OF UTI: ICD-10-CM

## 2018-11-27 DIAGNOSIS — E03.8 ADULT ONSET HYPOTHYROIDISM: ICD-10-CM

## 2018-11-27 DIAGNOSIS — E11.9 TYPE 2 DIABETES MELLITUS WITHOUT COMPLICATION, WITHOUT LONG-TERM CURRENT USE OF INSULIN (HCC): ICD-10-CM

## 2018-11-27 DIAGNOSIS — R31.9 URINARY TRACT INFECTION WITH HEMATURIA, SITE UNSPECIFIED: Primary | ICD-10-CM

## 2018-11-27 DIAGNOSIS — N39.0 URINARY TRACT INFECTION WITH HEMATURIA, SITE UNSPECIFIED: Primary | ICD-10-CM

## 2018-11-27 PROCEDURE — 99214 OFFICE O/P EST MOD 30 MIN: CPT | Performed by: FAMILY MEDICINE

## 2018-11-27 PROCEDURE — 87086 URINE CULTURE/COLONY COUNT: CPT | Performed by: FAMILY MEDICINE

## 2018-11-30 ENCOUNTER — HOSPITAL ENCOUNTER (OUTPATIENT)
Dept: CT IMAGING | Facility: HOSPITAL | Age: 68
Discharge: HOME | End: 2018-11-30
Admitting: NURSE PRACTITIONER

## 2018-11-30 ENCOUNTER — TELEPHONE (OUTPATIENT)
Dept: FAMILY MEDICINE CLINIC | Facility: CLINIC | Age: 68
End: 2018-11-30

## 2018-11-30 ENCOUNTER — HOSPITAL ENCOUNTER (OUTPATIENT)
Dept: ULTRASOUND IMAGING | Facility: HOSPITAL | Age: 68
Discharge: HOME | End: 2018-11-30

## 2018-11-30 LAB — BACTERIA SPEC AEROBE CULT: NORMAL

## 2018-11-30 PROCEDURE — 76536 US EXAM OF HEAD AND NECK: CPT

## 2018-11-30 PROCEDURE — 25010000002 IOPAMIDOL 61 % SOLUTION: Performed by: NURSE PRACTITIONER

## 2018-11-30 PROCEDURE — 71260 CT THORAX DX C+: CPT

## 2018-11-30 PROCEDURE — 76536 US EXAM OF HEAD AND NECK: CPT | Performed by: RADIOLOGY

## 2018-11-30 PROCEDURE — 71260 CT THORAX DX C+: CPT | Performed by: RADIOLOGY

## 2018-11-30 RX ADMIN — IOPAMIDOL 70 ML: 612 INJECTION, SOLUTION INTRAVENOUS at 10:20

## 2018-12-03 NOTE — TELEPHONE ENCOUNTER
1) Diverticula vs bladder vs female?  2) Thyroid uptake?   3) EGD?    Just thoughts - forwarding to you, Tashia MACE.

## 2018-12-11 ENCOUNTER — OFFICE VISIT (OUTPATIENT)
Dept: FAMILY MEDICINE CLINIC | Facility: CLINIC | Age: 68
End: 2018-12-11

## 2018-12-11 VITALS
OXYGEN SATURATION: 99 % | BODY MASS INDEX: 36.14 KG/M2 | HEART RATE: 68 BPM | HEIGHT: 62 IN | DIASTOLIC BLOOD PRESSURE: 81 MMHG | SYSTOLIC BLOOD PRESSURE: 135 MMHG | TEMPERATURE: 97.9 F | WEIGHT: 196.4 LBS

## 2018-12-11 DIAGNOSIS — E06.9 THYROIDITIS: Primary | ICD-10-CM

## 2018-12-11 DIAGNOSIS — R59.1 LYMPHADENOPATHY: ICD-10-CM

## 2018-12-11 DIAGNOSIS — K44.9 HIATAL HERNIA: ICD-10-CM

## 2018-12-11 PROCEDURE — 90662 IIV NO PRSV INCREASED AG IM: CPT | Performed by: NURSE PRACTITIONER

## 2018-12-11 PROCEDURE — 90472 IMMUNIZATION ADMIN EACH ADD: CPT | Performed by: NURSE PRACTITIONER

## 2018-12-11 PROCEDURE — 99213 OFFICE O/P EST LOW 20 MIN: CPT | Performed by: NURSE PRACTITIONER

## 2018-12-11 PROCEDURE — 90632 HEPA VACCINE ADULT IM: CPT | Performed by: NURSE PRACTITIONER

## 2018-12-11 PROCEDURE — 90471 IMMUNIZATION ADMIN: CPT | Performed by: NURSE PRACTITIONER

## 2018-12-11 RX ORDER — LEVOTHYROXINE SODIUM 100 MCG
100 TABLET ORAL DAILY
Qty: 31 TABLET | Refills: 1 | Status: SHIPPED | OUTPATIENT
Start: 2018-12-11 | End: 2019-02-13 | Stop reason: SDUPTHER

## 2018-12-11 NOTE — PROGRESS NOTES
"Shey King     VITALS: Blood pressure 122/84, pulse 78, temperature 98.1 °F (36.7 °C), temperature source Oral, height 157.5 cm (62\"), weight 86.8 kg (191 lb 6.4 oz), SpO2 98 %, not currently breastfeeding.    Subjective  Chief Complaint:   Chief Complaint   Patient presents with   • Urinary Tract Infection   • Follow-up        History of Present Illness:  Patient is a 68 y.o.  female who presents to clinic secondary to medical followup. She has recently finished a course of antibiotics for an UTI. She feels as though her UTI is back. She is having increased urinary frequency and possible retention. Denies any dysuria, hematuria, fevers or chills.     No complaints about any of the medications.    The following portions of the patient's history were reviewed and updated as appropriate: allergies, current medications, past family history, past medical history, past social history, past surgical history and problem list.    Past Medical History  Past Medical History:   Diagnosis Date   • Abdominal pain, LLQ (left lower quadrant)    • Cystitis    • Diabetes mellitus (CMS/HCC)    • Diarrhea    • Gallstones    • Hiatal hernia    • Hyperglycemia    • Hyperlipidemia    • Hypothyroidism    • Muscle spasm     FLANK PAIN   • OAB (overactive bladder)    • UTI (urinary tract infection)        Review of Systems  Constitutional: Denies any recent history of HAs, dizziness, fevers, chills, itching.  Eyes: Denies any changes in vision. Denies any blurry vision or diplopia.  Ears, Nose, Mouth, Throat: Denies any sore throat, rhinorrhea, or cough.  Cardiovascular: Denies any chest pain, pressure, or palpitations.  Respiratory: Denies any shortness of breath or wheezing.  Gastrointestinal: Denies any abdominal pain, nausea, vomiting, diarrhea, or constipation.  Musculoskeletal: Denies any muscle weakness.  Skin and/or breasts: Denies any rashes.  Neurological: Denies any changes in balance or gait.  Psychiatric: Denies any anxiety, " depression, or insomnia. Denies any suicidal or homicidal ideations.  Endocrine: Denies any heat or cold intolerance. Denies any voice changes, polydipsia, or polyuria.  Hematologic/Lymphatic: Denies any anemia or easy bruising.    Surgical History  Past Surgical History:   Procedure Laterality Date   • BREAST BIOPSY Right 2010    benign   • ENDOSCOPY     • MAMMO BILATERAL  09/2015   • OVARIAN CYST DRAINAGE     • US GUIDED LYMPH NODE BIOPSY  4/11/2018       Family History  Family History   Problem Relation Age of Onset   • Heart disease Mother    • Cancer Mother         RENAL    • No Known Problems Father    • Breast cancer Neg Hx        Social History  Social History     Socioeconomic History   • Marital status:      Spouse name: Not on file   • Number of children: Not on file   • Years of education: Not on file   • Highest education level: Not on file   Social Needs   • Financial resource strain: Not on file   • Food insecurity - worry: Not on file   • Food insecurity - inability: Not on file   • Transportation needs - medical: Not on file   • Transportation needs - non-medical: Not on file   Occupational History   • Not on file   Tobacco Use   • Smoking status: Never Smoker   • Smokeless tobacco: Never Used   Substance and Sexual Activity   • Alcohol use: No   • Drug use: No   • Sexual activity: Defer   Other Topics Concern   • Not on file   Social History Narrative   • Not on file       Objective  Physical Exam  Gen: Patient in NAD. Pleasant and answers appropriately. A&Ox3.    Skin: Warm and dry with normal turgor. No purpura, rashes, or unusual pigmentation noted. Hair is normal in appearance and distribution.    HEENT: NC/AT. No lesions noted. Conjunctiva clear, sclera nonicteric. PERRL. EOMI without nystagmus or strabismus. Fundi appear benign. No hemorrhages or exudates of eyes. Auditory canals are patent bilaterally without lesions. TMs intact,  nonerythematous, nonbulging without lesions. Nasal  mucosa pink, nonerythematous, and nonedematous. Frontal and maxillary sinuses are nontender. O/P nonerythematous and moist without exudate.    Neck: Supple without lymph nodes palpated. FROM.     Lungs: CTA B/L without rales, rhonchi, crackles, or wheezes.    Heart: RRR. S1 and S2 normal. No S3 or S4. No MRGT.    Abd: Soft, nontender,nondistended. (+)BSx4 quadrants.     Extrem: No CCE. Radial pulses 2+/4 and equal B/L. FROMx4. No bone, joint, or muscle tenderness noted.    Neuro: No focal motor/sensory deficits.    Procedures    Assessment/Plan  Shey King is a 68 y.o. here for medical followup.  Diagnoses and all orders for this visit:    Urinary tract infection with hematuria, site unspecified  -     POCT urinalysis dipstick, automated    History of UTI  -     Urine Culture - Urine, Urine, Clean Catch  U/A negative. Will send for culture.     Lymphadenopathy of right cervical region    Type 2 diabetes mellitus without complication, without long-term current use of insulin (CMS/[facility])    Adult onset hypothyroidism          Patient's Body mass index is 35.01 kg/m². BMI is above normal parameters. Recommendations include: exercise counseling and nutrition counseling.     Findings and plans discussed with patient who verbalizes understanding and agreement. Will followup with patient once results are in. Patient to followup at clinic PRN or in one month for further medical followup.    Sheila Everett MD

## 2018-12-11 NOTE — PROGRESS NOTES
"Subjective   Shey King is a 68 y.o. female.   Chief Compliant: The patient presents with Thyroid Problem; Back Pain; and Follow-up    History of Present Illness   Returns today follow up after recent scans.  Denies any changes.  Continues with pain mid right back. No changes.  Continues with work up with GI.  Recent EGD      The following portions of the patient's history were reviewed and updated as appropriate: allergies, current medications, past family history, past medical history, past social history, past surgical history and problem list.      Review of Systems   HENT: Negative.    Gastrointestinal: Positive for abdominal pain.        Right to mid side pain      Genitourinary: Negative.    All other systems reviewed and are negative.      Procedures    Vitals: Blood pressure 135/81, pulse 68, temperature 97.9 °F (36.6 °C), temperature source Oral, height 157.5 cm (62\"), weight 89.1 kg (196 lb 6.4 oz), SpO2 99 %, not currently breastfeeding.     Allergies:   Allergies   Allergen Reactions   • Bactrim [Sulfamethoxazole-Trimethoprim]    • Ciprofloxacin    • Penicillins    • Steri-Strip Compound Benzoin [Benzoin Compound]    • Sulfa Antibiotics           Objective   Physical Exam   Constitutional: She is oriented to person, place, and time. She appears well-developed and well-nourished. No distress.   HENT:   Head: Normocephalic.   Cardiovascular: Normal rate, regular rhythm and normal heart sounds.   No murmur heard.  Pulmonary/Chest: Effort normal and breath sounds normal.   Neurological: She is alert and oriented to person, place, and time.   Skin: Skin is warm and dry. She is not diaphoretic.   Psychiatric: She has a normal mood and affect. Her behavior is normal.   Nursing note and vitals reviewed.      During this visit the following were done:  Labs Reviewed []    Labs Ordered []    Radiology Reports Reviewed [x]    Radiology Ordered []    PCP Records Reviewed []    Referring Provider Records Reviewed " []    ER Records Reviewed []    Hospital Records Reviewed []    History Obtained From Family []    Radiology Images Reviewed []    Other Reviewed []    Records Requested []      Assessment/Plan   Discussed with patient impression and plan, patient verbalizes understanding  Will await EGD and biopsy reports. Will go ahead and follow with endocrinology      Shey was seen today for thyroid problem, back pain and follow-up.    Diagnoses and all orders for this visit:    Thyroiditis  -     Ambulatory Referral to Endocrinology    Hiatal hernia    Lymphadenopathy    Other orders  -     SYNTHROID 100 MCG tablet; Take 1 tablet by mouth Daily.  -     Flu Vaccine High Dose PF 65YR+ ([id number])  -     Hepatitis A Vaccine Adult IM

## 2019-01-03 ENCOUNTER — OFFICE VISIT (OUTPATIENT)
Dept: FAMILY MEDICINE CLINIC | Facility: CLINIC | Age: 69
End: 2019-01-03

## 2019-01-03 VITALS
DIASTOLIC BLOOD PRESSURE: 83 MMHG | OXYGEN SATURATION: 97 % | WEIGHT: 194 LBS | TEMPERATURE: 98.5 F | BODY MASS INDEX: 35.7 KG/M2 | HEIGHT: 62 IN | SYSTOLIC BLOOD PRESSURE: 138 MMHG | HEART RATE: 100 BPM

## 2019-01-03 DIAGNOSIS — J01.80 ACUTE NON-RECURRENT SINUSITIS OF OTHER SINUS: ICD-10-CM

## 2019-01-03 DIAGNOSIS — N39.0 URINARY TRACT INFECTION WITHOUT HEMATURIA, SITE UNSPECIFIED: Primary | ICD-10-CM

## 2019-01-03 LAB
BILIRUB BLD-MCNC: NEGATIVE MG/DL
CLARITY, POC: ABNORMAL
COLOR UR: YELLOW
GLUCOSE UR STRIP-MCNC: NEGATIVE MG/DL
KETONES UR QL: NEGATIVE
LEUKOCYTE EST, POC: ABNORMAL
NITRITE UR-MCNC: POSITIVE MG/ML
PH UR: 6 [PH] (ref 5–8)
PROT UR STRIP-MCNC: NEGATIVE MG/DL
RBC # UR STRIP: NEGATIVE /UL
SP GR UR: 1.02 (ref 1–1.03)
UROBILINOGEN UR QL: NORMAL

## 2019-01-03 PROCEDURE — 99213 OFFICE O/P EST LOW 20 MIN: CPT | Performed by: FAMILY MEDICINE

## 2019-01-03 PROCEDURE — 87086 URINE CULTURE/COLONY COUNT: CPT | Performed by: FAMILY MEDICINE

## 2019-01-03 PROCEDURE — 87077 CULTURE AEROBIC IDENTIFY: CPT | Performed by: FAMILY MEDICINE

## 2019-01-03 PROCEDURE — 81003 URINALYSIS AUTO W/O SCOPE: CPT | Performed by: FAMILY MEDICINE

## 2019-01-03 PROCEDURE — 87186 SC STD MICRODIL/AGAR DIL: CPT | Performed by: FAMILY MEDICINE

## 2019-01-03 RX ORDER — PANTOPRAZOLE SODIUM 40 MG/1
40 TABLET, DELAYED RELEASE ORAL DAILY
Refills: 4 | COMMUNITY
Start: 2018-11-07 | End: 2020-06-09

## 2019-01-03 RX ORDER — PSEUDOEPHEDRINE HCL 120 MG/1
120 TABLET, FILM COATED, EXTENDED RELEASE ORAL EVERY 12 HOURS
Qty: 40 TABLET | Refills: 0 | Status: SHIPPED | OUTPATIENT
Start: 2019-01-03 | End: 2019-12-10

## 2019-01-03 RX ORDER — ERYTHROMYCIN 500 MG/1
500 TABLET, COATED ORAL 4 TIMES DAILY
Qty: 40 TABLET | Refills: 0 | Status: SHIPPED | OUTPATIENT
Start: 2019-01-03 | End: 2019-02-13

## 2019-01-06 LAB — BACTERIA SPEC AEROBE CULT: ABNORMAL

## 2019-01-08 ENCOUNTER — TELEPHONE (OUTPATIENT)
Dept: FAMILY MEDICINE CLINIC | Facility: CLINIC | Age: 69
End: 2019-01-08

## 2019-01-08 NOTE — TELEPHONE ENCOUNTER
----- Message from Sheila Everett MD sent at 1/7/2019 11:17 PM EST -----  Please call. Her urine culture grew out Klebsiella. It is susceptible to the erythromycin. She should be feeling better. Is she? Thanks.

## 2019-01-08 NOTE — TELEPHONE ENCOUNTER
Left a message to return call.    Spoke with patient ,she reports she feels 100% better,feels like a new person.

## 2019-01-22 NOTE — PROGRESS NOTES
"Shey King     VITALS: Blood pressure 138/83, pulse 100, temperature 98.5 °F (36.9 °C), temperature source Oral, height 157.5 cm (62.01\"), weight 88 kg (194 lb), SpO2 97 %, not currently breastfeeding.    Subjective  Chief Complaint:   Chief Complaint   Patient presents with   • Cough   • URI   • Nasal Congestion        History of Present Illness:  Patient is a 68 y.o.  female who presents to clinic secondary to an acute concern. She is having increased urinary frequency and possible retention. Denies any dysuria, hematuria, fevers or chills. She is also complaining of a sore throat, ear congestion, sinus congestion, chest congestion, and nonproductive coughing. No one else is sick around her. These symptoms have been going on for a week. She has not tried any OTC medications.     No complaints about any of the medications.    The following portions of the patient's history were reviewed and updated as appropriate: allergies, current medications, past family history, past medical history, past social history, past surgical history and problem list.    Past Medical History  Past Medical History:   Diagnosis Date   • Abdominal pain, LLQ (left lower quadrant)    • Cystitis    • Diabetes mellitus (CMS/HCC)    • Diarrhea    • Gallstones    • Hiatal hernia    • Hyperglycemia    • Hyperlipidemia    • Hypothyroidism    • Muscle spasm     FLANK PAIN   • OAB (overactive bladder)    • UTI (urinary tract infection)        Review of Systems  Constitutional: Denies any recent history of HAs, dizziness, fevers, chills, itching.  Eyes: Denies any changes in vision. Denies any blurry vision or diplopia.  Cardiovascular: Denies any chest pain, pressure, or palpitations.  Respiratory: Denies any shortness of breath or wheezing.  Gastrointestinal: Denies any abdominal pain, nausea, vomiting, diarrhea, or constipation.  Musculoskeletal: Denies any muscle weakness.  Skin and/or breasts: Denies any rashes.  Neurological: Denies any " changes in balance or gait.  Psychiatric: Denies any anxiety, depression, or insomnia. Denies any suicidal or homicidal ideations.  Endocrine: Denies any heat or cold intolerance. Denies any voice changes, polydipsia, or polyuria.  Hematologic/Lymphatic: Denies any anemia or easy bruising.    Surgical History  Past Surgical History:   Procedure Laterality Date   • BREAST BIOPSY Right 2010    benign   • ENDOSCOPY     • ENDOSCOPY N/A 1/11/2018    Procedure: ESOPHAGOGASTRODUODENOSCOPY;  Surgeon: Dong Mata MD;  Location: [facility];  Service:    • MAMMO BILATERAL  09/2015   • OVARIAN CYST DRAINAGE     • TN LAP,CHOLECYSTECTOMY N/A 1/11/2018    Procedure: CHOLECYSTECTOMY LAPAROSCOPIC;  Surgeon: Dong Mata MD;  Location: [facility];  Service: General   • US GUIDED LYMPH NODE BIOPSY  4/11/2018       Family History  Family History   Problem Relation Age of Onset   • Heart disease Mother    • Cancer Mother         RENAL    • No Known Problems Father    • Breast cancer Neg Hx        Social History  Social History     Socioeconomic History   • Marital status:      Spouse name: Not on file   • Number of children: Not on file   • Years of education: Not on file   • Highest education level: Not on file   Social Needs   • Financial resource strain: Not on file   • Food insecurity - worry: Not on file   • Food insecurity - inability: Not on file   • Transportation needs - medical: Not on file   • Transportation needs - non-medical: Not on file   Occupational History   • Not on file   Tobacco Use   • Smoking status: Never Smoker   • Smokeless tobacco: Never Used   Substance and Sexual Activity   • Alcohol use: No   • Drug use: No   • Sexual activity: Defer   Other Topics Concern   • Not on file   Social History Narrative   • Not on file       Objective  Physical Exam  Gen: Patient in NAD. Pleasant and answers appropriately. A&Ox3.    Skin: Warm and dry with normal turgor. No purpura, rashes, or unusual  pigmentation noted. Hair is normal in appearance and distribution.    HEENT: NC/AT. No lesions noted. Conjunctiva clear, sclera nonicteric. PERRL. EOMI without nystagmus or strabismus. Fundi appear benign. No hemorrhages or exudates of eyes. Auditory canals are patent bilaterally without lesions. TMs intact, erythematous, bulging without lesions. Nasal mucosa erythematous, and nonedematous. Frontal and maxillary sinuses are tender. O/P erythematous and moist without exudate.    Neck: Supple without lymph nodes palpated. FROM.     Lungs: CTA B/L without rales, rhonchi, crackles, or wheezes.    Heart: RRR. S1 and S2 normal. No S3 or S4. No MRGT.    Abd: Soft, nontender,nondistended. (+)BSx4 quadrants.     Extrem: No CCE. Radial pulses 2+/4 and equal B/L. FROMx4. No bone, joint, or muscle tenderness noted.    Neuro: No focal motor/sensory deficits.    Procedures    Assessment/Plan  Shey King is a 68 y.o. here for an acute concern.  Diagnoses and all orders for this visit:    Urinary tract infection without hematuria, site unspecified  -     POCT urinalysis dipstick, automated  -     Urine Culture - Urine, Urine, Clean Catch  -     Ambulatory Referral to Gynecology    Acute non-recurrent sinusitis of other sinus  -     pseudoephedrine (SUDAFED) 120 MG 12 hr tablet; Take 1 tablet by mouth Every 12 (Twelve) Hours.  -     erythromycin base (E-MYCIN) 500 MG tablet; Take 1 tablet by mouth 4 (Four) Times a Day.      Patient's Body mass index is 35.47 kg/m². BMI is above normal parameters. Recommendations include: exercise counseling and nutrition counseling.     Findings and plans discussed with patient who verbalizes understanding and agreement. Will followup with patient once results are in. Patient to followup at clinic PRN for further medical followup.    Sheila Everett MD

## 2019-02-07 ENCOUNTER — HOSPITAL ENCOUNTER (OUTPATIENT)
Dept: MAMMOGRAPHY | Facility: HOSPITAL | Age: 69
Discharge: HOME | End: 2019-02-07
Admitting: NURSE PRACTITIONER

## 2019-02-07 DIAGNOSIS — Z12.39 SCREENING BREAST EXAMINATION: ICD-10-CM

## 2019-02-07 PROCEDURE — 77067 SCR MAMMO BI INCL CAD: CPT

## 2019-02-07 PROCEDURE — 77067 SCR MAMMO BI INCL CAD: CPT | Performed by: RADIOLOGY

## 2019-02-07 PROCEDURE — 77063 BREAST TOMOSYNTHESIS BI: CPT | Performed by: RADIOLOGY

## 2019-02-07 PROCEDURE — 77063 BREAST TOMOSYNTHESIS BI: CPT

## 2019-02-13 ENCOUNTER — OFFICE VISIT (OUTPATIENT)
Dept: FAMILY MEDICINE CLINIC | Facility: CLINIC | Age: 69
End: 2019-02-13

## 2019-02-13 ENCOUNTER — TELEPHONE (OUTPATIENT)
Dept: FAMILY MEDICINE CLINIC | Facility: CLINIC | Age: 69
End: 2019-02-13

## 2019-02-13 VITALS
BODY MASS INDEX: 35.77 KG/M2 | WEIGHT: 194.4 LBS | HEART RATE: 87 BPM | HEIGHT: 62 IN | DIASTOLIC BLOOD PRESSURE: 80 MMHG | OXYGEN SATURATION: 98 % | SYSTOLIC BLOOD PRESSURE: 131 MMHG | TEMPERATURE: 98.5 F

## 2019-02-13 DIAGNOSIS — M25.562 ARTHRALGIA OF BOTH KNEES: ICD-10-CM

## 2019-02-13 DIAGNOSIS — R21 RASH AND NONSPECIFIC SKIN ERUPTION: ICD-10-CM

## 2019-02-13 DIAGNOSIS — E03.8 ADULT ONSET HYPOTHYROIDISM: ICD-10-CM

## 2019-02-13 DIAGNOSIS — N39.0 FREQUENT UTI: ICD-10-CM

## 2019-02-13 DIAGNOSIS — E11.9 TYPE 2 DIABETES MELLITUS WITHOUT COMPLICATION, WITHOUT LONG-TERM CURRENT USE OF INSULIN (HCC): Primary | ICD-10-CM

## 2019-02-13 DIAGNOSIS — E66.01 MORBIDLY OBESE (HCC): ICD-10-CM

## 2019-02-13 DIAGNOSIS — M25.561 ARTHRALGIA OF BOTH KNEES: ICD-10-CM

## 2019-02-13 LAB
25(OH)D3 SERPL-MCNC: 24 NG/ML
ALBUMIN SERPL-MCNC: 4.5 G/DL (ref 3.4–4.8)
ALBUMIN UR-MCNC: 2.6 MG/L
ALBUMIN/GLOB SERPL: 1.3 G/DL (ref 1.5–2.5)
ALP SERPL-CCNC: 106 U/L (ref 35–104)
ALT SERPL W P-5'-P-CCNC: 29 U/L (ref 10–36)
ANION GAP SERPL CALCULATED.3IONS-SCNC: 6.5 MMOL/L (ref 3.6–11.2)
AST SERPL-CCNC: 35 U/L (ref 10–30)
BASOPHILS # BLD AUTO: 0.07 10*3/MM3 (ref 0–0.3)
BASOPHILS NFR BLD AUTO: 0.8 % (ref 0–2)
BILIRUB BLD-MCNC: NEGATIVE MG/DL
BILIRUB SERPL-MCNC: 0.7 MG/DL (ref 0.2–1.8)
BUN BLD-MCNC: 8 MG/DL (ref 7–21)
BUN/CREAT SERPL: 9.6 (ref 7–25)
CALCIUM SPEC-SCNC: 9.2 MG/DL (ref 7.7–10)
CHLORIDE SERPL-SCNC: 106 MMOL/L (ref 99–112)
CHOLEST SERPL-MCNC: 207 MG/DL (ref 0–200)
CHROMATIN AB SERPL-ACNC: 0 IU/ML (ref 0–14)
CLARITY, POC: CLEAR
CO2 SERPL-SCNC: 26.5 MMOL/L (ref 24.3–31.9)
COLOR UR: YELLOW
CREAT BLD-MCNC: 0.83 MG/DL (ref 0.43–1.29)
DEPRECATED RDW RBC AUTO: 45.4 FL (ref 37–54)
EOSINOPHIL # BLD AUTO: 0.27 10*3/MM3 (ref 0–0.7)
EOSINOPHIL NFR BLD AUTO: 3.2 % (ref 0–7)
ERYTHROCYTE [DISTWIDTH] IN BLOOD BY AUTOMATED COUNT: 14.3 % (ref 11.5–14.5)
ERYTHROCYTE [SEDIMENTATION RATE] IN BLOOD: 26 MM/HR (ref 0–30)
GFR SERPL CREATININE-BSD FRML MDRD: 69 ML/MIN/1.73
GLOBULIN UR ELPH-MCNC: 3.4 GM/DL
GLUCOSE BLD-MCNC: 107 MG/DL (ref 70–110)
GLUCOSE UR STRIP-MCNC: NEGATIVE MG/DL
HBA1C MFR BLD: 6.2 % (ref 4.5–5.7)
HCT VFR BLD AUTO: 36.8 % (ref 37–47)
HDLC SERPL-MCNC: 38 MG/DL (ref 60–100)
HGB BLD-MCNC: 12.4 G/DL (ref 12–16)
IMM GRANULOCYTES # BLD AUTO: 0.02 10*3/MM3 (ref 0–0.03)
IMM GRANULOCYTES NFR BLD AUTO: 0.2 % (ref 0–0.5)
KETONES UR QL: NEGATIVE
LDLC SERPL CALC-MCNC: 123 MG/DL (ref 0–100)
LDLC/HDLC SERPL: 3.24 {RATIO}
LEUKOCYTE EST, POC: NEGATIVE
LYMPHOCYTES # BLD AUTO: 2.11 10*3/MM3 (ref 1–3)
LYMPHOCYTES NFR BLD AUTO: 25.4 % (ref 16–46)
MAGNESIUM SERPL-MCNC: 1.9 MG/DL (ref 1.7–2.6)
MCH RBC QN AUTO: 29.5 PG (ref 27–33)
MCHC RBC AUTO-ENTMCNC: 33.7 G/DL (ref 33–37)
MCV RBC AUTO: 87.4 FL (ref 80–94)
MONOCYTES # BLD AUTO: 0.48 10*3/MM3 (ref 0.1–0.9)
MONOCYTES NFR BLD AUTO: 5.8 % (ref 0–12)
NEUTROPHILS # BLD AUTO: 5.37 10*3/MM3 (ref 1.4–6.5)
NEUTROPHILS NFR BLD AUTO: 64.6 % (ref 40–75)
NITRITE UR-MCNC: NEGATIVE MG/ML
OSMOLALITY SERPL CALC.SUM OF ELEC: 276.3 MOSM/KG (ref 273–305)
PH UR: 6 [PH] (ref 5–8)
PLATELET # BLD AUTO: 278 10*3/MM3 (ref 130–400)
PMV BLD AUTO: 11.4 FL (ref 6–10)
POTASSIUM BLD-SCNC: 3.9 MMOL/L (ref 3.5–5.3)
PROT SERPL-MCNC: 7.9 G/DL (ref 6–8)
PROT UR STRIP-MCNC: NEGATIVE MG/DL
RBC # BLD AUTO: 4.21 10*6/MM3 (ref 4.2–5.4)
RBC # UR STRIP: NEGATIVE /UL
SODIUM BLD-SCNC: 139 MMOL/L (ref 135–153)
SP GR UR: 1.02 (ref 1–1.03)
TRIGL SERPL-MCNC: 229 MG/DL (ref 0–150)
TSH SERPL DL<=0.05 MIU/L-ACNC: 6.86 MIU/ML (ref 0.55–4.78)
URATE SERPL-MCNC: 4.1 MG/DL (ref 2.4–5.7)
UROBILINOGEN UR QL: NORMAL
VIT B12 BLD-MCNC: 289 PG/ML (ref 211–911)
VLDLC SERPL-MCNC: 45.8 MG/DL
WBC NRBC COR # BLD: 8.32 10*3/MM3 (ref 4.5–12.5)

## 2019-02-13 PROCEDURE — 83735 ASSAY OF MAGNESIUM: CPT | Performed by: NURSE PRACTITIONER

## 2019-02-13 PROCEDURE — 86038 ANTINUCLEAR ANTIBODIES: CPT | Performed by: NURSE PRACTITIONER

## 2019-02-13 PROCEDURE — 83036 HEMOGLOBIN GLYCOSYLATED A1C: CPT | Performed by: NURSE PRACTITIONER

## 2019-02-13 PROCEDURE — 99214 OFFICE O/P EST MOD 30 MIN: CPT | Performed by: NURSE PRACTITIONER

## 2019-02-13 PROCEDURE — 82607 VITAMIN B-12: CPT | Performed by: NURSE PRACTITIONER

## 2019-02-13 PROCEDURE — 81003 URINALYSIS AUTO W/O SCOPE: CPT | Performed by: NURSE PRACTITIONER

## 2019-02-13 PROCEDURE — 85025 COMPLETE CBC W/AUTO DIFF WBC: CPT | Performed by: NURSE PRACTITIONER

## 2019-02-13 PROCEDURE — 85652 RBC SED RATE AUTOMATED: CPT | Performed by: NURSE PRACTITIONER

## 2019-02-13 PROCEDURE — 80061 LIPID PANEL: CPT | Performed by: NURSE PRACTITIONER

## 2019-02-13 PROCEDURE — 84550 ASSAY OF BLOOD/URIC ACID: CPT | Performed by: NURSE PRACTITIONER

## 2019-02-13 PROCEDURE — 82306 VITAMIN D 25 HYDROXY: CPT | Performed by: NURSE PRACTITIONER

## 2019-02-13 PROCEDURE — 86431 RHEUMATOID FACTOR QUANT: CPT | Performed by: NURSE PRACTITIONER

## 2019-02-13 PROCEDURE — 80053 COMPREHEN METABOLIC PANEL: CPT | Performed by: NURSE PRACTITIONER

## 2019-02-13 PROCEDURE — 84443 ASSAY THYROID STIM HORMONE: CPT | Performed by: NURSE PRACTITIONER

## 2019-02-13 PROCEDURE — 82043 UR ALBUMIN QUANTITATIVE: CPT | Performed by: NURSE PRACTITIONER

## 2019-02-13 RX ORDER — LEVOTHYROXINE SODIUM 0.12 MG/1
125 TABLET ORAL DAILY
Qty: 30 TABLET | Refills: 5 | Status: SHIPPED | OUTPATIENT
Start: 2019-02-13 | End: 2019-06-11 | Stop reason: SDUPTHER

## 2019-02-13 RX ORDER — METFORMIN HYDROCHLORIDE 500 MG/1
500 TABLET, EXTENDED RELEASE ORAL
Qty: 30 TABLET | Refills: 5 | Status: SHIPPED | OUTPATIENT
Start: 2019-02-13 | End: 2020-01-27

## 2019-02-13 RX ORDER — LEVOTHYROXINE SODIUM 100 MCG
100 TABLET ORAL DAILY
Qty: 31 TABLET | Refills: 1 | Status: SHIPPED | OUTPATIENT
Start: 2019-02-13 | End: 2019-02-13

## 2019-02-13 NOTE — TELEPHONE ENCOUNTER
----- Message from BERT Lucas sent at 2/13/2019  2:32 PM EST -----  Needs increase of synthroid sent to pharmacy  Needs to start metformin for sugar and once labs are all resulted need to consider starting cholesterol medication        Called and spoke with patient verbalized understand.

## 2019-02-14 LAB — ANA SER QL: NEGATIVE

## 2019-04-08 ENCOUNTER — OFFICE VISIT (OUTPATIENT)
Dept: FAMILY MEDICINE CLINIC | Facility: CLINIC | Age: 69
End: 2019-04-08

## 2019-04-08 VITALS
SYSTOLIC BLOOD PRESSURE: 135 MMHG | WEIGHT: 198.4 LBS | DIASTOLIC BLOOD PRESSURE: 80 MMHG | HEIGHT: 62 IN | OXYGEN SATURATION: 98 % | HEART RATE: 101 BPM | BODY MASS INDEX: 36.51 KG/M2 | TEMPERATURE: 99 F

## 2019-04-08 DIAGNOSIS — E03.8 ADULT ONSET HYPOTHYROIDISM: ICD-10-CM

## 2019-04-08 DIAGNOSIS — N39.0 RECURRENT UTI: ICD-10-CM

## 2019-04-08 DIAGNOSIS — R10.2 PELVIC PRESSURE IN FEMALE: Primary | ICD-10-CM

## 2019-04-08 DIAGNOSIS — E06.9 THYROIDITIS: ICD-10-CM

## 2019-04-08 DIAGNOSIS — J06.9 URI WITH COUGH AND CONGESTION: ICD-10-CM

## 2019-04-08 LAB
BILIRUB BLD-MCNC: NEGATIVE MG/DL
GLUCOSE UR STRIP-MCNC: NEGATIVE MG/DL
KETONES UR QL: NEGATIVE
LEUKOCYTE EST, POC: NEGATIVE
NITRITE UR-MCNC: POSITIVE MG/ML
PH UR: 6 [PH] (ref 5–8)
PROT UR STRIP-MCNC: NEGATIVE MG/DL
RBC # UR STRIP: NEGATIVE /UL
SP GR UR: 1.02 (ref 1–1.03)
UROBILINOGEN UR QL: NORMAL

## 2019-04-08 PROCEDURE — 87077 CULTURE AEROBIC IDENTIFY: CPT | Performed by: NURSE PRACTITIONER

## 2019-04-08 PROCEDURE — 99214 OFFICE O/P EST MOD 30 MIN: CPT | Performed by: NURSE PRACTITIONER

## 2019-04-08 PROCEDURE — 87086 URINE CULTURE/COLONY COUNT: CPT | Performed by: NURSE PRACTITIONER

## 2019-04-08 PROCEDURE — 87186 SC STD MICRODIL/AGAR DIL: CPT | Performed by: NURSE PRACTITIONER

## 2019-04-08 PROCEDURE — 81003 URINALYSIS AUTO W/O SCOPE: CPT | Performed by: NURSE PRACTITIONER

## 2019-04-08 RX ORDER — ERYTHROMYCIN 500 MG/1
500 TABLET, COATED ORAL 2 TIMES DAILY
Qty: 20 TABLET | Refills: 0 | Status: SHIPPED | OUTPATIENT
Start: 2019-04-08 | End: 2019-12-10

## 2019-04-08 NOTE — PROGRESS NOTES
Subjective   Shey King is a 68 y.o. female.   Chief Compliant: The patient presents with Urinary Tract Infection (pressure, low back pain) and Follow-up (start on cholesterol med?)    Missed ENDO appt due to best friends death.  States she would like to go somewhere else as the staff were extremly rude with rescheduling her appt        Urinary Tract Infection    This is a recurrent problem. The current episode started in the past 7 days. The problem has been waxing and waning. The quality of the pain is described as aching and burning. The pain is at a severity of 5/10. The pain is moderate. Associated symptoms include frequency and urgency. Associated symptoms comments: Pelvic pressure  . She has tried increased fluids for the symptoms. The treatment provided no relief. Her past medical history is significant for recurrent UTIs.   Cough   This is a new problem. Episode onset: Onset 3 days ago.   The problem has been unchanged. The problem occurs every few minutes. The cough is non-productive. Associated symptoms include ear congestion, a fever, myalgias, nasal congestion, postnasal drip, rhinorrhea and a sore throat. Pertinent negatives include no wheezing. Nothing aggravates the symptoms. Risk factors: flu exposure. She has tried rest for the symptoms. The treatment provided no relief.      The following portions of the patient's history were reviewed and updated as appropriate: allergies, current medications, past family history, past medical history, past social history, past surgical history and problem list.      Review of Systems   Constitutional: Positive for activity change, appetite change, fatigue and fever.   HENT: Positive for congestion, postnasal drip, rhinorrhea and sore throat.    Respiratory: Positive for cough. Negative for wheezing.    Genitourinary: Positive for frequency, pelvic pain and urgency.   Musculoskeletal: Positive for myalgias.   All other systems reviewed and are  "negative.      Procedures    Vitals: Blood pressure 135/80, pulse 101, temperature 99 °F (37.2 °C), height 157.5 cm (62\"), weight 90 kg (198 lb 6.4 oz), SpO2 98 %, not currently breastfeeding.     Allergies:   Allergies   Allergen Reactions   • Bactrim [Sulfamethoxazole-Trimethoprim]    • Ciprofloxacin    • Penicillins    • Steri-Strip Compound Benzoin [Benzoin Compound]    • Sulfa Antibiotics           Objective   Physical Exam   Constitutional: She is oriented to person, place, and time. She appears well-developed and well-nourished. No distress.   HENT:   Head: Normocephalic.   Right Ear: External ear normal.   Left Ear: External ear normal.   Nose: Nose normal.   Mouth/Throat: Oropharynx is clear and moist. No oropharyngeal exudate.   Eyes: Conjunctivae are normal. Right eye exhibits no discharge. Left eye exhibits no discharge.   Neck: Neck supple.   Cardiovascular: Normal rate, regular rhythm and normal heart sounds.   No murmur heard.  Pulmonary/Chest: Effort normal and breath sounds normal. No respiratory distress. She has no wheezes.   Abdominal: Soft. Bowel sounds are normal. There is tenderness.   Neurological: She is alert and oriented to person, place, and time.   Skin: Skin is warm and dry. She is not diaphoretic.   Psychiatric: She has a normal mood and affect. Her behavior is normal.   Nursing note and vitals reviewed.      During this visit the following were done:  Labs Reviewed [x]    Labs Ordered [x]    Radiology Reports Reviewed []    Radiology Ordered []    PCP Records Reviewed []    Referring Provider Records Reviewed []    ER Records Reviewed []    Hospital Records Reviewed []    History Obtained From Family []    Radiology Images Reviewed []    Other Reviewed [x]    Records Requested []      Assessment/Plan   Discussed with patient impression and plan, patient verbalizes understanding  Shey was seen today for urinary tract infection and follow-up.    Diagnoses and all orders for this " visit:    Pelvic pressure in female  -     POCT urinalysis dipstick, automated  -     Urine Culture - Urine, Urine, Clean Catch  -     erythromycin base (E-MYCIN) 500 MG tablet; Take 1 tablet by mouth 2 (Two) Times a Day.    Adult onset hypothyroidism  -     Ambulatory Referral to Endocrinology    Thyroiditis  -     Ambulatory Referral to Endocrinology    Recurrent UTI  -     Urine Culture - Urine, Urine, Clean Catch  -     erythromycin base (E-MYCIN) 500 MG tablet; Take 1 tablet by mouth 2 (Two) Times a Day.    URI with cough and congestion  Rest and fluids OTC mucinex  RTC sooner if any new or worsening symptoms

## 2019-04-10 LAB — BACTERIA SPEC AEROBE CULT: ABNORMAL

## 2019-05-13 ENCOUNTER — CLINICAL SUPPORT (OUTPATIENT)
Dept: FAMILY MEDICINE CLINIC | Facility: CLINIC | Age: 69
End: 2019-05-13

## 2019-05-13 DIAGNOSIS — N39.0 FREQUENT UTI: Primary | ICD-10-CM

## 2019-05-13 PROCEDURE — 87086 URINE CULTURE/COLONY COUNT: CPT | Performed by: FAMILY MEDICINE

## 2019-05-13 PROCEDURE — 87186 SC STD MICRODIL/AGAR DIL: CPT | Performed by: FAMILY MEDICINE

## 2019-05-13 PROCEDURE — 99211 OFF/OP EST MAY X REQ PHY/QHP: CPT | Performed by: FAMILY MEDICINE

## 2019-05-13 PROCEDURE — 87077 CULTURE AEROBIC IDENTIFY: CPT | Performed by: FAMILY MEDICINE

## 2019-05-15 LAB — BACTERIA SPEC AEROBE CULT: ABNORMAL

## 2019-05-16 ENCOUNTER — TELEPHONE (OUTPATIENT)
Dept: FAMILY MEDICINE CLINIC | Facility: CLINIC | Age: 69
End: 2019-05-16

## 2019-05-16 RX ORDER — DOXYCYCLINE 100 MG/1
100 CAPSULE ORAL DAILY
Qty: 7 CAPSULE | Refills: 0 | Status: SHIPPED | OUTPATIENT
Start: 2019-05-16 | End: 2019-12-10

## 2019-05-16 NOTE — TELEPHONE ENCOUNTER
----- Message from Sheila Everett MD sent at 5/15/2019  9:57 PM EDT -----  Please call Shey. Urine culture is still positive, and it is the same Klebsiella bug. I'd like to try her on a different antibiotic - doxycycline. What I'd really like to do is also send her to urology if she is okay with it - she may need a cystoscopy. They will re-test her before they make a decision, but she has had this same bug for way too long - I would like to try one last effort to eradicate it with a different antibiotic and if that doesn't work, we need to do other things. If she is okay with this, can send doxycycline 100 mg orally daily x 7 days to the pharmacy. Thanks.      Spoke with patient & she is agreeable to Doxycycline & the urology referral,no preference,Doxy sent to pharmacy as requested.

## 2019-05-16 NOTE — TELEPHONE ENCOUNTER
Regarding: Test Results Question  Contact: [phone]  ----- Message from Hail Varsity, Generic sent at 5/16/2019  8:47 AM EDT -----    Since those results came back positive on the culture, can you prescribe another antibiotic and recommend a gyn?      Called patient ,see previous note.

## 2019-05-16 NOTE — TELEPHONE ENCOUNTER
Can you make sure she's okay with urology and not gyne? Urology would do the cystoscopy. Thanks.    Yes she understood that when I talked to her.

## 2019-05-22 DIAGNOSIS — B96.89 UTI DUE TO KLEBSIELLA SPECIES: Primary | ICD-10-CM

## 2019-05-22 DIAGNOSIS — N39.0 UTI DUE TO KLEBSIELLA SPECIES: Primary | ICD-10-CM

## 2019-05-22 NOTE — PROGRESS NOTES
Patient has a history of UTIs. She came in to give a sample to ensure that the previous UTI was gone. Urine culture sent. Patient asymptomatic. No other concerns. Denies any fevers, chills, dysuria, hematuria, urinary retention, or urinary frequency.

## 2019-06-10 ENCOUNTER — OFFICE VISIT (OUTPATIENT)
Dept: UROLOGY | Facility: CLINIC | Age: 69
End: 2019-06-10

## 2019-06-10 VITALS — WEIGHT: 198 LBS | BODY MASS INDEX: 36.44 KG/M2 | HEIGHT: 62 IN

## 2019-06-10 DIAGNOSIS — N39.0 URINARY TRACT INFECTION WITHOUT HEMATURIA, SITE UNSPECIFIED: Primary | ICD-10-CM

## 2019-06-10 DIAGNOSIS — R33.9 INCOMPLETE BLADDER EMPTYING: ICD-10-CM

## 2019-06-10 LAB
BILIRUB BLD-MCNC: ABNORMAL MG/DL
CLARITY, POC: CLEAR
COLOR UR: YELLOW
GLUCOSE UR STRIP-MCNC: NEGATIVE MG/DL
KETONES UR QL: NEGATIVE
LEUKOCYTE EST, POC: NEGATIVE
NITRITE UR-MCNC: POSITIVE MG/ML
PH UR: 5 [PH] (ref 5–8)
PROT UR STRIP-MCNC: NEGATIVE MG/DL
RBC # UR STRIP: NEGATIVE /UL
SP GR UR: 1.02 (ref 1–1.03)
UROBILINOGEN UR QL: ABNORMAL

## 2019-06-10 PROCEDURE — 87186 SC STD MICRODIL/AGAR DIL: CPT | Performed by: UROLOGY

## 2019-06-10 PROCEDURE — 87086 URINE CULTURE/COLONY COUNT: CPT | Performed by: UROLOGY

## 2019-06-10 PROCEDURE — 87077 CULTURE AEROBIC IDENTIFY: CPT | Performed by: UROLOGY

## 2019-06-10 PROCEDURE — 81003 URINALYSIS AUTO W/O SCOPE: CPT | Performed by: UROLOGY

## 2019-06-10 PROCEDURE — 51798 US URINE CAPACITY MEASURE: CPT | Performed by: UROLOGY

## 2019-06-10 PROCEDURE — 99203 OFFICE O/P NEW LOW 30 MIN: CPT | Performed by: UROLOGY

## 2019-06-10 RX ORDER — NITROFURANTOIN 25; 75 MG/1; MG/1
100 CAPSULE ORAL DAILY
Qty: 56 CAPSULE | Refills: 3 | Status: SHIPPED | OUTPATIENT
Start: 2019-06-10 | End: 2019-09-10 | Stop reason: SDUPTHER

## 2019-06-10 NOTE — PROGRESS NOTES
Chief Complaint:          Chief Complaint   Patient presents with   • Urinary Tract Infection       HPI:   68 y.o. female referred with recurrent urinary tract infections.  Most the time she is actually asymptomatic it is a mildly resistant Klebsiella resistant to ampicillin.  Her sugars well controlled today her urinalysis is negative.  Her postvoid residual is negligible.  She has normal bowel movements.  She has stress urinary incontinence.  She has a cystocele and rectocele that is grade 1 diabetes is well controlled she is had cyst on her ovary that was drained.  We will start her on probiotics and prophylactic Macrobid which is sensitive to her current organism and see her back in 3 months.      Past Medical History:        Past Medical History:   Diagnosis Date   • Abdominal pain, LLQ (left lower quadrant)    • Cystitis    • Diabetes mellitus (CMS/HCC)    • Diarrhea    • Gallstones    • Hiatal hernia    • Hyperglycemia    • Hyperlipidemia    • Hypothyroidism    • Muscle spasm     FLANK PAIN   • OAB (overactive bladder)    • UTI (urinary tract infection)          Current Meds:     Current Outpatient Medications   Medication Sig Dispense Refill   • aspirin 81 MG chewable tablet Chew 81 mg daily.     • Cholecalciferol (VITAMIN D) 1000 units tablet Take 5,000 Units by mouth Daily.     • doxycycline (MONODOX) 100 MG capsule Take 1 capsule by mouth Daily. 7 capsule 0   • erythromycin base (E-MYCIN) 500 MG tablet Take 1 tablet by mouth 2 (Two) Times a Day. 20 tablet 0   • glucose blood test strip Use as instructed 50 each 12   • glucose monitor monitoring kit 1 each as needed (DM II). 1 each 0   • levothyroxine (SYNTHROID) 125 MCG tablet Take 1 tablet by mouth Daily. 30 tablet 5   • metFORMIN ER (GLUCOPHAGE-XR) 500 MG 24 hr tablet Take 1 tablet by mouth Daily With Breakfast. 30 tablet 5   • Omega-3 Fatty Acids (FISH OIL) 1000 MG capsule capsule Take 2 capsules by mouth 2 (Two) Times a Day With Meals.  0   •  pantoprazole (PROTONIX) 40 MG EC tablet Take 40 mg by mouth Daily.  4   • pseudoephedrine (SUDAFED) 120 MG 12 hr tablet Take 1 tablet by mouth Every 12 (Twelve) Hours. 40 tablet 0   • triamcinolone (KENALOG) 0.1 % ointment Apply  topically 2 (Two) Times a Day. 80 g 1   • vitamin B-12 (CYANOCOBALAMIN) 1000 MCG tablet Take 1,000 mcg by mouth Daily.     • vitamin D (ERGOCALCIFEROL) 67691 units capsule capsule Take 1 capsule by mouth 1 (One) Time Per Week. 12 capsule 0     No current facility-administered medications for this visit.         Allergies:      Allergies   Allergen Reactions   • Bactrim [Sulfamethoxazole-Trimethoprim]    • Ciprofloxacin    • Penicillins    • Steri-Strip Compound Benzoin [Benzoin Compound]    • Sulfa Antibiotics         Past Surgical History:     Past Surgical History:   Procedure Laterality Date   • BREAST BIOPSY Right 2010    benign   • ENDOSCOPY     • ENDOSCOPY N/A 1/11/2018    Procedure: ESOPHAGOGASTRODUODENOSCOPY;  Surgeon: Dong Mata MD;  Location: [facility];  Service:    • MAMMO BILATERAL  09/2015   • OVARIAN CYST DRAINAGE     • UT LAP,CHOLECYSTECTOMY N/A 1/11/2018    Procedure: CHOLECYSTECTOMY LAPAROSCOPIC;  Surgeon: Dong Mata MD;  Location: [facility];  Service: General   • US GUIDED LYMPH NODE BIOPSY  4/11/2018         Social History:     Social History     Socioeconomic History   • Marital status:      Spouse name: Not on file   • Number of children: Not on file   • Years of education: Not on file   • Highest education level: Not on file   Tobacco Use   • Smoking status: Never Smoker   • Smokeless tobacco: Never Used   Substance and Sexual Activity   • Alcohol use: No   • Drug use: No   • Sexual activity: Defer       Family History:     Family History   Problem Relation Age of Onset   • Heart disease Mother    • Cancer Mother         RENAL    • No Known Problems Father    • Breast cancer Neg Hx        Review of Systems:     Review of Systems    Constitutional: Negative.  Negative for activity change, appetite change, chills, diaphoresis, fatigue and unexpected weight change.   HENT: Negative for congestion, dental problem, drooling, ear discharge, ear pain, facial swelling, hearing loss, mouth sores, nosebleeds, postnasal drip, rhinorrhea, sinus pressure, sneezing, sore throat, tinnitus, trouble swallowing and voice change.    Eyes: Negative.  Negative for photophobia, pain, discharge, redness, itching and visual disturbance.   Respiratory: Negative.  Negative for apnea, cough, choking, chest tightness, shortness of breath, wheezing and stridor.    Cardiovascular: Negative.  Negative for chest pain, palpitations and leg swelling.   Gastrointestinal: Negative.  Negative for abdominal distention, abdominal pain, anal bleeding, blood in stool, constipation, diarrhea, nausea, rectal pain and vomiting.   Endocrine: Negative.  Negative for cold intolerance, heat intolerance, polydipsia, polyphagia and polyuria.   Musculoskeletal: Negative.  Negative for arthralgias, back pain, gait problem, joint swelling, myalgias, neck pain and neck stiffness.   Skin: Negative.  Negative for color change, pallor, rash and wound.   Allergic/Immunologic: Negative.  Negative for environmental allergies, food allergies and immunocompromised state.   Neurological: Negative.  Negative for dizziness, tremors, seizures, syncope, facial asymmetry, speech difficulty, weakness, light-headedness, numbness and headaches.   Hematological: Negative.  Negative for adenopathy. Does not bruise/bleed easily.   Psychiatric/Behavioral: Negative for agitation, behavioral problems, confusion, decreased concentration, dysphoric mood, hallucinations, self-injury, sleep disturbance and suicidal ideas. The patient is not nervous/anxious and is not hyperactive.    All other systems reviewed and are negative.      Physical Exam:     Physical Exam   Constitutional: She appears well-developed and  well-nourished.   HENT:   Head: Normocephalic and atraumatic.   Right Ear: External ear normal.   Left Ear: External ear normal.   Mouth/Throat: Oropharynx is clear and moist.   Eyes: Conjunctivae are normal. Pupils are equal, round, and reactive to light.   Cardiovascular: Normal rate, regular rhythm, normal heart sounds and intact distal pulses.   Pulmonary/Chest: Effort normal and breath sounds normal.   Abdominal: Soft. Bowel sounds are normal. She exhibits no distension and no mass. There is no tenderness. There is no rebound and no guarding.   Genitourinary: No vaginal discharge found.   Genitourinary Comments: Soft obese abdomen grade 1 cystocele and grade 1 rectocele with no stress urinary incontinence with Valsalva   Musculoskeletal: Normal range of motion.   Neurological: She is alert. She has normal reflexes.   Skin: Skin is warm and dry.   Psychiatric: She has a normal mood and affect. Her behavior is normal. Judgment and thought content normal.       I have reviewed the following portions of the patient's history: allergies, current medications, past family history, past medical history, past social history, past surgical history, problem list and ROS and confirm it's accurate.      Procedure:       Assessment/Plan:   Recurrent urinary tract infections-patient has been referred and diagnosed with recurrent urinary tract infections.  We discussed the types of organisms that are found in the urinary tract indicating that the vast majority are results of the patient's own gastrointestinal isha.  We discussed how many of the antibiotics that are utilized can actually exacerbate these infections by creating resistant organisms and there is only a very few antibiotics that are concentrated in the urine and do not affect the rectal reservoir nor cause recurrent yeast vaginitis.  We discussed the risk factors for recurrent infections being intercourse in younger patients and atrophic changes in older patients.   We discussed the symptoms that are found including pain, pressure, burning, frequency, urgency suprapubic pain and painful intercourse.  I discussed upper tract symptoms including fevers, chills, and indicated the workup would be much more aggressive if the patient were to present with recurrent infections in the face of upper tract symptomatology such as fever.  I discussed the history of vesicoureteral reflux in young patients and finally chronic renal scarring as a result of such.  I recommend concomitant probiotics with treatment with antibiotics to protect the rectal reservoir including over-the-counter yogurt preparations to stone oral pills containing the appropriate probiotics.  Genuine stress urinary incontinence-grade 1 recommend observation      Patient reports that she is  currently experiencing any symptoms of urinary incontinence.      Patient's Body mass index is 36.21 kg/m². BMI is above normal parameters. Recommendations include: educational material.              This document has been electronically signed by JULIANNA SPENCER MD Bhumika 10, 2019 3:16 PM

## 2019-06-11 RX ORDER — LEVOTHYROXINE SODIUM 100 MCG
100 TABLET ORAL DAILY
Qty: 31 TABLET | Refills: 0 | OUTPATIENT
Start: 2019-06-11

## 2019-06-11 RX ORDER — LEVOTHYROXINE SODIUM 0.12 MG/1
125 TABLET ORAL DAILY
Qty: 30 TABLET | Refills: 5 | Status: SHIPPED | OUTPATIENT
Start: 2019-06-11 | End: 2019-11-12 | Stop reason: SDUPTHER

## 2019-06-12 LAB — BACTERIA SPEC AEROBE CULT: ABNORMAL

## 2019-06-19 ENCOUNTER — LAB (OUTPATIENT)
Dept: FAMILY MEDICINE CLINIC | Facility: CLINIC | Age: 69
End: 2019-06-19

## 2019-06-19 DIAGNOSIS — E11.9 TYPE 2 DIABETES MELLITUS WITHOUT COMPLICATION, WITHOUT LONG-TERM CURRENT USE OF INSULIN (HCC): ICD-10-CM

## 2019-06-19 DIAGNOSIS — E55.9 VITAMIN D DEFICIENCY: ICD-10-CM

## 2019-06-19 DIAGNOSIS — E06.9 THYROIDITIS: Primary | ICD-10-CM

## 2019-06-19 DIAGNOSIS — R53.83 FATIGUE, UNSPECIFIED TYPE: ICD-10-CM

## 2019-06-19 LAB
25(OH)D3 SERPL-MCNC: 15.4 NG/ML (ref 30–100)
ALBUMIN SERPL-MCNC: 4.4 G/DL (ref 3.5–5.2)
ALBUMIN/GLOB SERPL: 1.3 G/DL
ALP SERPL-CCNC: 113 U/L (ref 39–117)
ALT SERPL W P-5'-P-CCNC: 27 U/L (ref 1–33)
ANION GAP SERPL CALCULATED.3IONS-SCNC: 12.4 MMOL/L
AST SERPL-CCNC: 33 U/L (ref 1–32)
BILIRUB SERPL-MCNC: 0.6 MG/DL (ref 0.2–1.2)
BUN BLD-MCNC: 7 MG/DL (ref 8–23)
BUN/CREAT SERPL: 7.4 (ref 7–25)
CALCIUM SPEC-SCNC: 9.3 MG/DL (ref 8.6–10.5)
CHLORIDE SERPL-SCNC: 103 MMOL/L (ref 98–107)
CHOLEST SERPL-MCNC: 201 MG/DL (ref 0–200)
CO2 SERPL-SCNC: 23.6 MMOL/L (ref 22–29)
CREAT BLD-MCNC: 0.95 MG/DL (ref 0.57–1)
GFR SERPL CREATININE-BSD FRML MDRD: 59 ML/MIN/1.73
GLOBULIN UR ELPH-MCNC: 3.3 GM/DL
GLUCOSE BLD-MCNC: 123 MG/DL (ref 65–99)
HDLC SERPL-MCNC: 30 MG/DL (ref 40–60)
LDLC SERPL CALC-MCNC: 130 MG/DL (ref 0–100)
LDLC/HDLC SERPL: 4.35 {RATIO}
POTASSIUM BLD-SCNC: 4.4 MMOL/L (ref 3.5–5.2)
PROT SERPL-MCNC: 7.7 G/DL (ref 6–8.5)
SODIUM BLD-SCNC: 139 MMOL/L (ref 136–145)
T4 FREE SERPL-MCNC: 1.59 NG/DL (ref 0.93–1.7)
TRIGL SERPL-MCNC: 203 MG/DL (ref 0–150)
TSH SERPL DL<=0.05 MIU/L-ACNC: 2.09 MIU/ML (ref 0.27–4.2)
VIT B12 BLD-MCNC: 366 PG/ML (ref 211–946)
VLDLC SERPL-MCNC: 40.6 MG/DL (ref 5–40)

## 2019-06-19 PROCEDURE — 82306 VITAMIN D 25 HYDROXY: CPT | Performed by: INTERNAL MEDICINE

## 2019-06-19 PROCEDURE — 80061 LIPID PANEL: CPT | Performed by: INTERNAL MEDICINE

## 2019-06-19 PROCEDURE — 80053 COMPREHEN METABOLIC PANEL: CPT | Performed by: INTERNAL MEDICINE

## 2019-06-19 PROCEDURE — 84439 ASSAY OF FREE THYROXINE: CPT | Performed by: INTERNAL MEDICINE

## 2019-06-19 PROCEDURE — 82607 VITAMIN B-12: CPT | Performed by: NURSE PRACTITIONER

## 2019-06-19 PROCEDURE — 84443 ASSAY THYROID STIM HORMONE: CPT | Performed by: INTERNAL MEDICINE

## 2019-06-24 ENCOUNTER — TELEPHONE (OUTPATIENT)
Dept: FAMILY MEDICINE CLINIC | Facility: CLINIC | Age: 69
End: 2019-06-24

## 2019-06-24 RX ORDER — ROSUVASTATIN CALCIUM 10 MG/1
10 TABLET, COATED ORAL DAILY
Qty: 30 TABLET | Refills: 5 | Status: SHIPPED | OUTPATIENT
Start: 2019-06-24 | End: 2019-07-02 | Stop reason: SDUPTHER

## 2019-06-24 NOTE — TELEPHONE ENCOUNTER
----- Message from BERT Lucas sent at 6/24/2019 10:08 AM EDT -----  Sugar up a little.  Vit D too low needs to be sure she is taking the 50,000 units of VIT D weekly  Cholesterol a little better but needs to start Crestor 10 mg daily

## 2019-07-02 RX ORDER — ERGOCALCIFEROL 1.25 MG/1
50000 CAPSULE ORAL WEEKLY
Qty: 12 CAPSULE | Refills: 0 | Status: SHIPPED | OUTPATIENT
Start: 2019-07-02 | End: 2019-11-12 | Stop reason: SDUPTHER

## 2019-07-02 RX ORDER — ROSUVASTATIN CALCIUM 10 MG/1
10 TABLET, COATED ORAL DAILY
Qty: 30 TABLET | Refills: 5 | Status: SHIPPED | OUTPATIENT
Start: 2019-07-02 | End: 2020-06-09

## 2019-08-20 ENCOUNTER — OFFICE VISIT (OUTPATIENT)
Dept: PULMONOLOGY | Facility: CLINIC | Age: 69
End: 2019-08-20

## 2019-08-20 VITALS
HEART RATE: 117 BPM | SYSTOLIC BLOOD PRESSURE: 128 MMHG | HEIGHT: 62 IN | WEIGHT: 193 LBS | TEMPERATURE: 98.2 F | DIASTOLIC BLOOD PRESSURE: 70 MMHG | BODY MASS INDEX: 35.51 KG/M2 | OXYGEN SATURATION: 93 %

## 2019-08-20 DIAGNOSIS — R91.1 PULMONARY NODULE: Primary | ICD-10-CM

## 2019-08-20 DIAGNOSIS — E66.09 CLASS 2 OBESITY DUE TO EXCESS CALORIES WITHOUT SERIOUS COMORBIDITY WITH BODY MASS INDEX (BMI) OF 35.0 TO 35.9 IN ADULT: ICD-10-CM

## 2019-08-20 PROCEDURE — 99213 OFFICE O/P EST LOW 20 MIN: CPT | Performed by: NURSE PRACTITIONER

## 2019-08-20 NOTE — PROGRESS NOTES
Interval history since last visit: None    Recent hospitalizations: None    Investigations (imaging, PFT's, labs, sleep study, record requests, etc.) None    Have you had the Influenza Vaccine? yes    Would you like to receive this Vaccine today? no    Have you had the Pneumonia Vaccine?  no  Would you like to receive this Vaccine today? no    Subjective    Shey King presents for the following Lung Nodule      History of Present Illness     Ms. King is a 68 year old female that has a medical history significant for diabetes, hyperlipemia, hypothyroidism, and pulmonary nodule.    She is here today for a routine follow up on a pulmonary nodule.  A CT chest without contrast was completed in November 2018 and showed a 6 mm right lower lobe pulmonary nodule.  She denies any complaints at todays visit.  She states that she is a life long non smoker.  She also denies any unintentional weight loss, cough, hemoptysis or night sweats.      Review of Systems   Constitutional: Negative for activity change, fatigue and unexpected weight change.   HENT: Negative for congestion, postnasal drip and rhinorrhea.    Respiratory: Negative for apnea, cough, chest tightness, shortness of breath and wheezing.    Cardiovascular: Negative for chest pain and palpitations.   Gastrointestinal: Negative for nausea.   Allergic/Immunologic: Negative for environmental allergies.   Psychiatric/Behavioral: Negative for agitation and confusion.       Active Problems:  Problem List Items Addressed This Visit        Respiratory    Pulmonary nodule - Primary       Digestive    Class 2 obesity due to excess calories without serious comorbidity with body mass index (BMI) of 35.0 to 35.9 in adult          Past Medical History:  Past Medical History:   Diagnosis Date   • Abdominal pain, LLQ (left lower quadrant)    • Cystitis    • Diabetes mellitus (CMS/HCC)    • Diarrhea    • Gallstones    • Hiatal hernia    • Hyperglycemia    • Hyperlipidemia    •  Hypothyroidism    • Muscle spasm     FLANK PAIN   • OAB (overactive bladder)    • UTI (urinary tract infection)        Family History:  Family History   Problem Relation Age of Onset   • Heart disease Mother    • Cancer Mother         RENAL    • No Known Problems Father    • Breast cancer Neg Hx        Social History:  Social History     Tobacco Use   • Smoking status: Never Smoker   • Smokeless tobacco: Never Used   Substance Use Topics   • Alcohol use: No       Current Medications:  Current Outpatient Medications   Medication Sig Dispense Refill   • aspirin 81 MG chewable tablet Chew 81 mg daily.     • Cholecalciferol (VITAMIN D) 1000 units tablet Take 5,000 Units by mouth Daily.     • doxycycline (MONODOX) 100 MG capsule Take 1 capsule by mouth Daily. 7 capsule 0   • erythromycin base (E-MYCIN) 500 MG tablet Take 1 tablet by mouth 2 (Two) Times a Day. 20 tablet 0   • glucose blood test strip Use as instructed 50 each 12   • glucose monitor monitoring kit 1 each as needed (DM II). 1 each 0   • levothyroxine (SYNTHROID) 125 MCG tablet Take 1 tablet by mouth Daily. 30 tablet 5   • metFORMIN ER (GLUCOPHAGE-XR) 500 MG 24 hr tablet Take 1 tablet by mouth Daily With Breakfast. 30 tablet 5   • nitrofurantoin, macrocrystal-monohydrate, (MACROBID) 100 MG capsule Take 1 capsule by mouth Daily. 56 capsule 3   • Omega-3 Fatty Acids (FISH OIL) 1000 MG capsule capsule Take 2 capsules by mouth 2 (Two) Times a Day With Meals.  0   • pantoprazole (PROTONIX) 40 MG EC tablet Take 40 mg by mouth Daily.  4   • pseudoephedrine (SUDAFED) 120 MG 12 hr tablet Take 1 tablet by mouth Every 12 (Twelve) Hours. 40 tablet 0   • rosuvastatin (CRESTOR) 10 MG tablet Take 1 tablet by mouth Daily. 30 tablet 5   • triamcinolone (KENALOG) 0.1 % ointment Apply  topically 2 (Two) Times a Day. 80 g 1   • vitamin B-12 (CYANOCOBALAMIN) 1000 MCG tablet Take 1,000 mcg by mouth Daily.     • vitamin D (ERGOCALCIFEROL) 39795 units capsule capsule Take 1 capsule  "by mouth 1 (One) Time Per Week. 12 capsule 0     No current facility-administered medications for this visit.        Allergies:  Allergies   Allergen Reactions   • Bactrim [Sulfamethoxazole-Trimethoprim]    • Ciprofloxacin    • Penicillins    • Steri-Strip Compound Benzoin [Benzoin Compound]    • Sulfa Antibiotics        Vitals:  /70   Pulse 117   Temp 98.2 °F (36.8 °C) (Oral)   Ht 156.2 cm (61.5\")   Wt 87.5 kg (193 lb)   SpO2 93%   BMI 35.88 kg/m²     Imaging:    Imaging Results (most recent)     None          Pulmonary Functions Testing Results:    No results found for: FEV1, FVC, PTG1MUG, TLC, DLCO    Results for orders placed or performed in visit on 06/19/19   TSH   Result Value Ref Range    TSH 2.090 0.270 - 4.200 mIU/mL   T4, free   Result Value Ref Range    Free T4 1.59 0.93 - 1.70 ng/dL   Comprehensive metabolic panel   Result Value Ref Range    Glucose 123 (H) 65 - 99 mg/dL    BUN 7 (L) 8 - 23 mg/dL    Creatinine 0.95 0.57 - 1.00 mg/dL    Sodium 139 136 - 145 mmol/L    Potassium 4.4 3.5 - 5.2 mmol/L    Chloride 103 98 - 107 mmol/L    CO2 23.6 22.0 - 29.0 mmol/L    Calcium 9.3 8.6 - 10.5 mg/dL    Total Protein 7.7 6.0 - 8.5 g/dL    Albumin 4.40 3.50 - 5.20 g/dL    ALT (SGPT) 27 1 - 33 U/L    AST (SGOT) 33 (H) 1 - 32 U/L    Alkaline Phosphatase 113 39 - 117 U/L    Total Bilirubin 0.6 0.2 - 1.2 mg/dL    eGFR Non African Amer 59 (L) >60 mL/min/1.73    Globulin 3.3 gm/dL    A/G Ratio 1.3 g/dL    BUN/Creatinine Ratio 7.4 7.0 - 25.0    Anion Gap 12.4 mmol/L   Lipid Panel   Result Value Ref Range    Total Cholesterol 201 (H) 0 - 200 mg/dL    Triglycerides 203 (H) 0 - 150 mg/dL    HDL Cholesterol 30 (L) 40 - 60 mg/dL    LDL Cholesterol  130 (H) 0 - 100 mg/dL    VLDL Cholesterol 40.6 (H) 5 - 40 mg/dL    LDL/HDL Ratio 4.35    Vitamin D 25 Hydroxy   Result Value Ref Range    25 Hydroxy, Vitamin D 15.4 (L) 30.0 - 100.0 ng/ml   Vitamin B12   Result Value Ref Range    Vitamin B-12 366 211 - 946 pg/mL "       Objective   Physical Exam     GENERAL APPEARANCE: Well developed, well nourished, alert and cooperative, and appears to be in no acute distress.    HEAD: normocephalic. Atraumatic.    EYES: PERRL, EOMI.     THROAT: Oral cavity and pharynx normal. No inflammation, swelling, exudate, or lesions.     NECK: Neck supple.  No thyromegaly.    CARDIAC: Normal S1 and S2. No S3, S4 or murmurs. Rhythm is regular. There is no peripheral edema, cyanosis or pallor. Extremities are warm and well perfused. Capillary refill is less than 2 seconds. No carotid bruits.    RESPIRATORY:Bilateral air entry positive. Bilateral diminished breath sounds. No wheezing, crackles or rhonchi noted.    GI: Positive bowel sounds. Soft, nondistended, nontender.     MUSCULOSKELETAL: No significant deformity or joint abnormality. No edema. Peripheral pulses intact. No varicosities.    NEUROLOGICAL: Strength and sensation symmetric and intact throughout.     PSYCHIATRIC: The mental examination revealed the patient was oriented to person, place, and time.       Assessment/Plan      Pulmonary Nodule:  -Previous CT chest completed on 11/30/18 shows  A 6 mm nodule in the right lower lobe.  -Will repeat CT chest without contrast in November 2019 for evaluation of stability of pulmonary nodule.    Patient's Body mass index is 35.88 kg/m². BMI is above normal parameters. Recommendations include: exercise counseling and nutrition counseling.        ICD-10-CM ICD-9-CM   1. Pulmonary nodule R91.1 793.11   2. Class 2 obesity due to excess calories without serious comorbidity with body mass index (BMI) of 35.0 to 35.9 in adult E66.09 278.00    Z68.35 V85.35       Return in about 6 months (around 2/20/2020).

## 2019-08-21 PROBLEM — E66.812 CLASS 2 OBESITY DUE TO EXCESS CALORIES WITHOUT SERIOUS COMORBIDITY WITH BODY MASS INDEX (BMI) OF 35.0 TO 35.9 IN ADULT: Status: ACTIVE | Noted: 2018-01-27

## 2019-09-10 ENCOUNTER — OFFICE VISIT (OUTPATIENT)
Dept: UROLOGY | Facility: CLINIC | Age: 69
End: 2019-09-10

## 2019-09-10 VITALS
BODY MASS INDEX: 36.22 KG/M2 | SYSTOLIC BLOOD PRESSURE: 130 MMHG | TEMPERATURE: 98.8 F | DIASTOLIC BLOOD PRESSURE: 68 MMHG | WEIGHT: 196.8 LBS | HEIGHT: 62 IN

## 2019-09-10 DIAGNOSIS — Z87.440 HISTORY OF RECURRENT UTIS: Primary | ICD-10-CM

## 2019-09-10 DIAGNOSIS — N39.0 URINARY TRACT INFECTION WITHOUT HEMATURIA, SITE UNSPECIFIED: ICD-10-CM

## 2019-09-10 DIAGNOSIS — K59.1 FUNCTIONAL DIARRHEA: ICD-10-CM

## 2019-09-10 LAB
BILIRUB BLD-MCNC: NEGATIVE MG/DL
CLARITY, POC: ABNORMAL
COLOR UR: YELLOW
GLUCOSE UR STRIP-MCNC: NEGATIVE MG/DL
KETONES UR QL: NEGATIVE
LEUKOCYTE EST, POC: ABNORMAL
NITRITE UR-MCNC: POSITIVE MG/ML
PH UR: 6 [PH] (ref 5–8)
PROT UR STRIP-MCNC: ABNORMAL MG/DL
RBC # UR STRIP: NEGATIVE /UL
SP GR UR: 1.02 (ref 1–1.03)
UROBILINOGEN UR QL: NORMAL

## 2019-09-10 PROCEDURE — 99213 OFFICE O/P EST LOW 20 MIN: CPT | Performed by: UROLOGY

## 2019-09-10 PROCEDURE — 81003 URINALYSIS AUTO W/O SCOPE: CPT | Performed by: UROLOGY

## 2019-09-10 PROCEDURE — 87086 URINE CULTURE/COLONY COUNT: CPT | Performed by: NURSE PRACTITIONER

## 2019-09-10 RX ORDER — NITROFURANTOIN 25; 75 MG/1; MG/1
100 CAPSULE ORAL DAILY
Qty: 56 CAPSULE | Refills: 3 | Status: SHIPPED | OUTPATIENT
Start: 2019-09-10 | End: 2020-02-06 | Stop reason: SDUPTHER

## 2019-09-10 NOTE — PROGRESS NOTES
Chief Complaint:          Chief Complaint   Patient presents with   • Urinary Urgency       HPI:   68 y.o. female referred with recurrent urinary tract infections.  Most the time she is actually asymptomatic it is a mildly resistant Klebsiella resistant to ampicillin.  Her sugars well controlled today her urinalysis is negative.  Her postvoid residual is negligible.  She has normal bowel movements.  She has stress urinary incontinence.  She has a cystocele and rectocele that is grade 1 diabetes is well controlled she is had cyst on her ovary that was drained.  We will start her on probiotics and prophylactic Macrobid which is sensitive to her current organism and see her back in 3 months.  She returns today.  Her last diagnosis of Klebsiella was on Bhumika 10, 2019.  She has decreased nocturia still occasional dysuria overall she is better but her urinalysis persisted with 3+ leukocytes and I want to do a culture her bowel movements are fine with probiotics.  I will continue prophylaxis I will see her back in 3 months I have a urine culture pending at the time of this dictation      Past Medical History:        Past Medical History:   Diagnosis Date   • Abdominal pain, LLQ (left lower quadrant)    • Cystitis    • Diabetes mellitus (CMS/[facility])    • Diarrhea    • Encounter for cholecystectomy 2018   • Gallstones    • Hiatal hernia    • Hyperglycemia    • Hyperlipidemia    • Hypothyroidism    • Muscle spasm     FLANK PAIN   • OAB (overactive bladder)    • UTI (urinary tract infection)          Current Meds:     Current Outpatient Medications   Medication Sig Dispense Refill   • aspirin 81 MG chewable tablet Chew 81 mg daily.     • Cholecalciferol (VITAMIN D) 1000 units tablet Take 5,000 Units by mouth Daily.     • erythromycin base (E-MYCIN) 500 MG tablet Take 1 tablet by mouth 2 (Two) Times a Day. 20 tablet 0   • glucose blood test strip Use as instructed 50 each 12   • glucose monitor monitoring kit 1 each as needed (DM  II). 1 each 0   • levothyroxine (SYNTHROID) 125 MCG tablet Take 1 tablet by mouth Daily. 30 tablet 5   • metFORMIN ER (GLUCOPHAGE-XR) 500 MG 24 hr tablet Take 1 tablet by mouth Daily With Breakfast. 30 tablet 5   • nitrofurantoin, macrocrystal-monohydrate, (MACROBID) 100 MG capsule Take 1 capsule by mouth Daily. 56 capsule 3   • Omega-3 Fatty Acids (FISH OIL) 1000 MG capsule capsule Take 2 capsules by mouth 2 (Two) Times a Day With Meals.  0   • pantoprazole (PROTONIX) 40 MG EC tablet Take 40 mg by mouth Daily.  4   • pseudoephedrine (SUDAFED) 120 MG 12 hr tablet Take 1 tablet by mouth Every 12 (Twelve) Hours. 40 tablet 0   • rosuvastatin (CRESTOR) 10 MG tablet Take 1 tablet by mouth Daily. 30 tablet 5   • triamcinolone (KENALOG) 0.1 % ointment Apply  topically 2 (Two) Times a Day. 80 g 1   • vitamin B-12 (CYANOCOBALAMIN) 1000 MCG tablet Take 1,000 mcg by mouth Daily.     • vitamin D (ERGOCALCIFEROL) 24863 units capsule capsule Take 1 capsule by mouth 1 (One) Time Per Week. 12 capsule 0   • doxycycline (MONODOX) 100 MG capsule Take 1 capsule by mouth Daily. 7 capsule 0     No current facility-administered medications for this visit.         Allergies:      Allergies   Allergen Reactions   • Bactrim [Sulfamethoxazole-Trimethoprim]    • Ciprofloxacin    • Penicillins    • Steri-Strip Compound Benzoin [Benzoin Compound]    • Sulfa Antibiotics         Past Surgical History:     Past Surgical History:   Procedure Laterality Date   • BREAST BIOPSY Right 2010    benign   • ENDOSCOPY     • ENDOSCOPY N/A 1/11/2018    Procedure: ESOPHAGOGASTRODUODENOSCOPY;  Surgeon: Dong Mata MD;  Location: [facility] OR;  Service:    • MAMMO BILATERAL  09/2015   • OVARIAN CYST DRAINAGE     • [place] LAP,CHOLECYSTECTOMY N/A 1/11/2018    Procedure: CHOLECYSTECTOMY LAPAROSCOPIC;  Surgeon: Dong Mata MD;  Location: [facility] OR;  Service: General   • US GUIDED LYMPH NODE BIOPSY  4/11/2018         Social History:     Social History      Socioeconomic History   • Marital status:      Spouse name: Not on file   • Number of children: Not on file   • Years of education: Not on file   • Highest education level: Not on file   Tobacco Use   • Smoking status: Never Smoker   • Smokeless tobacco: Never Used   Substance and Sexual Activity   • Alcohol use: No   • Drug use: No   • Sexual activity: Defer       Family History:     Family History   Problem Relation Age of Onset   • Heart disease Mother    • Cancer Mother         RENAL    • No Known Problems Father    • Breast cancer Neg Hx        Review of Systems:     Review of Systems   Constitutional: Negative.  Negative for activity change, appetite change, chills, diaphoresis, fatigue and unexpected weight change.   HENT: Negative for congestion, dental problem, drooling, ear discharge, ear pain, facial swelling, hearing loss, mouth sores, nosebleeds, postnasal drip, rhinorrhea, sinus pressure, sneezing, sore throat, tinnitus, trouble swallowing and voice change.    Eyes: Negative.  Negative for photophobia, pain, discharge, redness, itching and visual disturbance.   Respiratory: Negative.  Negative for apnea, cough, choking, chest tightness, shortness of breath, wheezing and stridor.    Cardiovascular: Negative.  Negative for chest pain, palpitations and leg swelling.   Gastrointestinal: Negative.  Negative for abdominal distention, abdominal pain, anal bleeding, blood in stool, constipation, diarrhea, nausea, rectal pain and vomiting.   Endocrine: Negative.  Negative for cold intolerance, heat intolerance, polydipsia, polyphagia and polyuria.   Genitourinary: Positive for dysuria.   Musculoskeletal: Negative.  Negative for arthralgias, back pain, gait problem, joint swelling, myalgias, neck pain and neck stiffness.   Skin: Negative.  Negative for color change, pallor, rash and wound.   Allergic/Immunologic: Negative.  Negative for environmental allergies, food allergies and immunocompromised  state.   Neurological: Negative.  Negative for dizziness, tremors, seizures, syncope, facial asymmetry, speech difficulty, weakness, light-headedness, numbness and headaches.   Hematological: Negative.  Negative for adenopathy. Does not bruise/bleed easily.   Psychiatric/Behavioral: Negative for agitation, behavioral problems, confusion, decreased concentration, dysphoric mood, hallucinations, self-injury, sleep disturbance and suicidal ideas. The patient is not nervous/anxious and is not hyperactive.    All other systems reviewed and are negative.      Physical Exam:     Physical Exam   Constitutional: She appears well-developed and well-nourished.   HENT:   Head: Normocephalic and atraumatic.   Right Ear: External ear normal.   Left Ear: External ear normal.   Mouth/Throat: Oropharynx is clear and moist.   Eyes: Conjunctivae are normal. Pupils are equal, round, and reactive to light.   Cardiovascular: Normal rate, regular rhythm, normal heart sounds and intact distal pulses.   Pulmonary/Chest: Effort normal and breath sounds normal.   Abdominal: Soft. Bowel sounds are normal. She exhibits no distension and no mass. There is no tenderness. There is no rebound and no guarding.   Genitourinary: No vaginal discharge found.   Musculoskeletal: Normal range of motion.   Neurological: She is alert. She has normal reflexes.   Skin: Skin is warm and dry.   Psychiatric: She has a normal mood and affect. Her behavior is normal. Judgment and thought content normal.       I have reviewed the following portions of the patient's history: allergies, current medications, past family history, past medical history, past social history, past surgical history, problem list and ROS and confirm it's accurate.      Procedure:       Assessment/Plan:   Recurrent urinary tract infections-patient has been referred and diagnosed with recurrent urinary tract infections.  We discussed the types of organisms that are found in the urinary tract  indicating that the vast majority are results of the patient's own gastrointestinal isha.  We discussed how many of the antibiotics that are utilized can actually exacerbate these infections by creating resistant organisms and there is only a very few antibiotics that are concentrated in the urine and do not affect the rectal reservoir nor cause recurrent yeast vaginitis.  We discussed the risk factors for recurrent infections being intercourse in younger patients and atrophic changes in older patients.  We discussed the symptoms that are found including pain, pressure, burning, frequency, urgency suprapubic pain and painful intercourse.  I discussed upper tract symptoms including fevers, chills, and indicated the workup would be much more aggressive if the patient were to present with recurrent infections in the face of upper tract symptomatology such as fever.  I discussed the history of vesicoureteral reflux in young patients and finally chronic renal scarring as a result of such.  I recommend concomitant probiotics with treatment with antibiotics to protect the rectal reservoir including over-the-counter yogurt preparations to stone oral pills containing the appropriate probiotics.  She continues on Macrobid prophylaxis.  She continues with very rare dysuria and a positive urine today with a urine culture pending.  I will see her back in 3 months.  IBS-patient has significant irritable bowel syndrome characterized by extreme amounts of constipation.  We spoke about the impact of this on bladder function.  And its relationship to recurrent urinary tract infections.  We discussed the need for increasing fluid and discussed the physiology of colonic motility as well as use of MiraLAX as a bulk laxative versus the newer class of serotonin uptake blockers such as Linzess.  We stressed the need for a daily bowel movement and discussed the [place] stool scale at length.  We also discussed a referral to the GI nurse  practitioner her bowels are much better    Patient reports that she is not currently experiencing any symptoms of urinary incontinence.      Patient's Body mass index is 36 kg/m². BMI is above normal parameters. Recommendations include: educational material.              This document has been electronically signed by JULIANNA SPENCER MD September 10, 2019 8:33 AM

## 2019-09-11 LAB — BACTERIA SPEC AEROBE CULT: ABNORMAL

## 2019-11-12 RX ORDER — ERGOCALCIFEROL 1.25 MG/1
CAPSULE ORAL
Qty: 12 CAPSULE | Refills: 0 | Status: SHIPPED | OUTPATIENT
Start: 2019-11-12 | End: 2020-03-17

## 2019-11-12 RX ORDER — LEVOTHYROXINE SODIUM 125 MCG
TABLET ORAL
Qty: 90 TABLET | Refills: 0 | Status: SHIPPED | OUTPATIENT
Start: 2019-11-12 | End: 2020-01-27

## 2019-11-20 ENCOUNTER — HOSPITAL ENCOUNTER (OUTPATIENT)
Dept: CT IMAGING | Facility: HOSPITAL | Age: 69
Discharge: HOME | End: 2019-11-20
Admitting: NURSE PRACTITIONER

## 2019-11-20 DIAGNOSIS — R91.1 PULMONARY NODULE: ICD-10-CM

## 2019-11-20 PROCEDURE — 71250 CT THORAX DX C-: CPT | Performed by: RADIOLOGY

## 2019-11-20 PROCEDURE — 71250 CT THORAX DX C-: CPT

## 2019-11-22 ENCOUNTER — TELEPHONE (OUTPATIENT)
Dept: PULMONOLOGY | Facility: CLINIC | Age: 69
End: 2019-11-22

## 2019-11-22 NOTE — TELEPHONE ENCOUNTER
Kymberly Hough, BERT Sherman, Silver Montiel MA             Will you let her know that I reviewed her CT chest.  The pulmonary nodule that was there is no longer present and the enlarged lymph node in her axilla is decreasing in size.

## 2019-12-10 ENCOUNTER — OFFICE VISIT (OUTPATIENT)
Dept: UROLOGY | Facility: CLINIC | Age: 69
End: 2019-12-10

## 2019-12-10 VITALS — BODY MASS INDEX: 37.36 KG/M2 | HEIGHT: 62 IN | WEIGHT: 203 LBS

## 2019-12-10 DIAGNOSIS — Z87.440 HISTORY OF RECURRENT UTIS: Primary | ICD-10-CM

## 2019-12-10 DIAGNOSIS — N30.00 ACUTE CYSTITIS WITHOUT HEMATURIA: ICD-10-CM

## 2019-12-10 LAB
BILIRUB BLD-MCNC: ABNORMAL MG/DL
CLARITY, POC: ABNORMAL
COLOR UR: YELLOW
GLUCOSE UR STRIP-MCNC: NEGATIVE MG/DL
KETONES UR QL: NEGATIVE
LEUKOCYTE EST, POC: NEGATIVE
NITRITE UR-MCNC: POSITIVE MG/ML
PH UR: 5.5 [PH] (ref 5–8)
PROT UR STRIP-MCNC: NEGATIVE MG/DL
RBC # UR STRIP: NEGATIVE /UL
SP GR UR: 1.03 (ref 1–1.03)
UROBILINOGEN UR QL: NORMAL

## 2019-12-10 PROCEDURE — 87186 SC STD MICRODIL/AGAR DIL: CPT | Performed by: UROLOGY

## 2019-12-10 PROCEDURE — 87086 URINE CULTURE/COLONY COUNT: CPT | Performed by: UROLOGY

## 2019-12-10 PROCEDURE — 87077 CULTURE AEROBIC IDENTIFY: CPT | Performed by: UROLOGY

## 2019-12-10 PROCEDURE — 81003 URINALYSIS AUTO W/O SCOPE: CPT | Performed by: UROLOGY

## 2019-12-10 PROCEDURE — 99213 OFFICE O/P EST LOW 20 MIN: CPT | Performed by: UROLOGY

## 2019-12-10 RX ORDER — NITROFURANTOIN 25; 75 MG/1; MG/1
100 CAPSULE ORAL DAILY
Qty: 56 CAPSULE | Refills: 3 | Status: SHIPPED | OUTPATIENT
Start: 2019-12-10 | End: 2020-06-09 | Stop reason: SDUPTHER

## 2019-12-10 NOTE — PROGRESS NOTES
Chief Complaint:          Chief Complaint   Patient presents with   • History of UTI     6 mnth f/u       HPI:   69 y.o. female  referred with recurrent urinary tract infections.  Most the time she is actually asymptomatic it is a mildly resistant Klebsiella resistant to ampicillin.  Her sugars well controlled today her urinalysis is negative.  Her postvoid residual is negligible.  She has normal bowel movements.  She has stress urinary incontinence.  She has a cystocele and rectocele that is grade 1 diabetes is well controlled she is had cyst on her ovary that was drained.  We will start her on probiotics and prophylactic Macrobid which is sensitive to her current organism and see her back in 3 months.  She returns today.  Her last diagnosis of Klebsiella was on Bhumika 10, 2019.  She has decreased nocturia still occasional dysuria overall she is better but her urinalysis persisted with 3+ leukocytes and I want to do a culture her bowel movements are fine with probiotics.  I will continue prophylaxis I will see her back in 3 months I have a urine culture pending at the time of this dictation.  She returns today she is doing great she ran out of Macrobid and developed an infection therefore she clearly needs it her urine was positive we will culture it.  Otherwise she is doing well her bowels are better she continues on probiotics I will see her back in 6 months      Past Medical History:        Past Medical History:   Diagnosis Date   • Abdominal pain, LLQ (left lower quadrant)    • Cystitis    • Diabetes mellitus (CMS/[facility])    • Diarrhea    • Encounter for cholecystectomy 2018   • Gallstones    • Hiatal hernia    • Hyperglycemia    • Hyperlipidemia    • Hypothyroidism    • Muscle spasm     FLANK PAIN   • OAB (overactive bladder)    • UTI (urinary tract infection)          Current Meds:     Current Outpatient Medications   Medication Sig Dispense Refill   • aspirin 81 MG chewable tablet Chew 81 mg daily.     •  Cholecalciferol (VITAMIN D) 1000 units tablet Take 5,000 Units by mouth Daily.     • glucose blood test strip Use as instructed 50 each 12   • glucose monitor monitoring kit 1 each as needed (DM II). 1 each 0   • metFORMIN ER (GLUCOPHAGE-XR) 500 MG 24 hr tablet Take 1 tablet by mouth Daily With Breakfast. 30 tablet 5   • nitrofurantoin, macrocrystal-monohydrate, (MACROBID) 100 MG capsule Take 1 capsule by mouth Daily. 56 capsule 3   • Omega-3 Fatty Acids (FISH OIL) 1000 MG capsule capsule Take 2 capsules by mouth 2 (Two) Times a Day With Meals.  0   • pantoprazole (PROTONIX) 40 MG EC tablet Take 40 mg by mouth Daily.  4   • rosuvastatin (CRESTOR) 10 MG tablet Take 1 tablet by mouth Daily. 30 tablet 5   • SYNTHROID 125 MCG tablet TAKE 1 TABLET BY MOUTH EVERY DAY 90 tablet 0   • triamcinolone (KENALOG) 0.1 % ointment Apply  topically 2 (Two) Times a Day. 80 g 1   • vitamin D (ERGOCALCIFEROL) 1.25 MG (14085 UT) capsule capsule TAKE 1 CAPSULE BY MOUTH 1 TIME EVERY WEEK 12 capsule 0     No current facility-administered medications for this visit.         Allergies:      Allergies   Allergen Reactions   • Bactrim [Sulfamethoxazole-Trimethoprim] Hives   • Ciprofloxacin Hives   • Penicillins Hives   • Steri-Strip Compound Benzoin [Benzoin Compound] Hives   • Sulfa Antibiotics Hives        Past Surgical History:     Past Surgical History:   Procedure Laterality Date   • BREAST BIOPSY Right 2010    benign   • ENDOSCOPY     • ENDOSCOPY N/A 1/11/2018    Procedure: ESOPHAGOGASTRODUODENOSCOPY;  Surgeon: Dong Mata MD;  Location: [facility] OR;  Service:    • MAMMO BILATERAL  09/2015   • OVARIAN CYST DRAINAGE     • [place] LAP,CHOLECYSTECTOMY N/A 1/11/2018    Procedure: CHOLECYSTECTOMY LAPAROSCOPIC;  Surgeon: Dong Mata MD;  Location: [facility] OR;  Service: General   • US GUIDED LYMPH NODE BIOPSY  4/11/2018         Social History:     Social History     Socioeconomic History   • Marital status:      Spouse name:  Not on file   • Number of children: Not on file   • Years of education: Not on file   • Highest education level: Not on file   Tobacco Use   • Smoking status: Never Smoker   • Smokeless tobacco: Never Used   Substance and Sexual Activity   • Alcohol use: No   • Drug use: No   • Sexual activity: Defer       Family History:     Family History   Problem Relation Age of Onset   • Heart disease Mother    • Cancer Mother         RENAL    • No Known Problems Father    • Breast cancer Neg Hx        Review of Systems:     Review of Systems   Constitutional: Negative.  Negative for activity change, appetite change, chills, diaphoresis, fatigue and unexpected weight change.   HENT: Negative for congestion, dental problem, drooling, ear discharge, ear pain, facial swelling, hearing loss, mouth sores, nosebleeds, postnasal drip, rhinorrhea, sinus pressure, sneezing, sore throat, tinnitus, trouble swallowing and voice change.    Eyes: Negative.  Negative for photophobia, pain, discharge, redness, itching and visual disturbance.   Respiratory: Negative.  Negative for apnea, cough, choking, chest tightness, shortness of breath, wheezing and stridor.    Cardiovascular: Negative.  Negative for chest pain, palpitations and leg swelling.   Gastrointestinal: Negative.  Negative for abdominal distention, abdominal pain, anal bleeding, blood in stool, constipation, diarrhea, nausea, rectal pain and vomiting.   Endocrine: Negative.  Negative for cold intolerance, heat intolerance, polydipsia, polyphagia and polyuria.   Musculoskeletal: Negative.  Negative for arthralgias, back pain, gait problem, joint swelling, myalgias, neck pain and neck stiffness.   Skin: Negative.  Negative for color change, pallor, rash and wound.   Allergic/Immunologic: Negative.  Negative for environmental allergies, food allergies and immunocompromised state.   Neurological: Negative.  Negative for dizziness, tremors, seizures, syncope, facial asymmetry, speech  difficulty, weakness, light-headedness, numbness and headaches.   Hematological: Negative.  Negative for adenopathy. Does not bruise/bleed easily.   Psychiatric/Behavioral: Negative for agitation, behavioral problems, confusion, decreased concentration, dysphoric mood, hallucinations, self-injury, sleep disturbance and suicidal ideas. The patient is not nervous/anxious and is not hyperactive.    All other systems reviewed and are negative.      Physical Exam:     Physical Exam   Constitutional: She appears well-developed and well-nourished.   HENT:   Head: Normocephalic and atraumatic.   Right Ear: External ear normal.   Left Ear: External ear normal.   Mouth/Throat: Oropharynx is clear and moist.   Eyes: Pupils are equal, round, and reactive to light. Conjunctivae are normal.   Cardiovascular: Normal rate, regular rhythm, normal heart sounds and intact distal pulses.   Pulmonary/Chest: Effort normal and breath sounds normal.   Abdominal: Soft. Bowel sounds are normal. She exhibits no distension and no mass. There is no tenderness. There is no rebound and no guarding.   Genitourinary: No vaginal discharge found.   Musculoskeletal: Normal range of motion.   Neurological: She is alert. She has normal reflexes.   Skin: Skin is warm and dry.   Psychiatric: She has a normal mood and affect. Her behavior is normal. Judgment and thought content normal.       I have reviewed the following portions of the patient's history: allergies, current medications, past family history, past medical history, past social history, past surgical history, problem list and ROS and confirm it's accurate.      Procedure:       Assessment/Plan:   Recurrent urinary tract infections-patient has been referred and diagnosed with recurrent urinary tract infections.  We discussed the types of organisms that are found in the urinary tract indicating that the vast majority are results of the patient's own gastrointestinal isha.  We discussed how many  of the antibiotics that are utilized can actually exacerbate these infections by creating resistant organisms and there is only a very few antibiotics that are concentrated in the urine and do not affect the rectal reservoir nor cause recurrent yeast vaginitis.  We discussed the risk factors for recurrent infections being intercourse in younger patients and atrophic changes in older patients.  We discussed the symptoms that are found including pain, pressure, burning, frequency, urgency suprapubic pain and painful intercourse.  I discussed upper tract symptoms including fevers, chills, and indicated the workup would be much more aggressive if the patient were to present with recurrent infections in the face of upper tract symptomatology such as fever.  I discussed the history of vesicoureteral reflux in young patients and finally chronic renal scarring as a result of such.  I recommend concomitant probiotics with treatment with antibiotics to protect the rectal reservoir including over-the-counter yogurt preparations to stone oral pills containing the appropriate probiotics.      Patient reports that she is not currently experiencing any symptoms of urinary incontinence.      Patient's Body mass index is 37.13 kg/m². BMI is above normal parameters. Recommendations include: educational material.              This document has been electronically signed by JULIANNA SPENCER MD December 10, 2019 9:01 AM

## 2019-12-12 ENCOUNTER — TELEPHONE (OUTPATIENT)
Dept: UROLOGY | Facility: CLINIC | Age: 69
End: 2019-12-12

## 2019-12-12 DIAGNOSIS — N39.0 URINARY TRACT INFECTION WITHOUT HEMATURIA, SITE UNSPECIFIED: Primary | ICD-10-CM

## 2019-12-12 LAB — BACTERIA SPEC AEROBE CULT: ABNORMAL

## 2019-12-12 RX ORDER — DOXYCYCLINE HYCLATE 100 MG/1
100 CAPSULE ORAL 2 TIMES DAILY
Qty: 20 CAPSULE | Refills: 0 | Status: SHIPPED | OUTPATIENT
Start: 2019-12-12 | End: 2019-12-22

## 2019-12-12 NOTE — TELEPHONE ENCOUNTER
Pt is allergic to Bactrim will call in vibramycin 100 mg BID for 10 days and then back on her Macrobid - notified pt and she voiced understanding.

## 2019-12-12 NOTE — TELEPHONE ENCOUNTER
----- Message from Claudy Conte MD sent at 12/12/2019  7:59 AM EST -----  Bactrim DS 1 po BID x 7 days if not allergtic then back on Macrobid  ----- Message -----  From: Neena Ortiz MA  Sent: 12/10/2019   9:06 AM EST  To: Claudy Conte MD

## 2020-01-13 ENCOUNTER — TELEPHONE (OUTPATIENT)
Dept: UROLOGY | Facility: CLINIC | Age: 70
End: 2020-01-13

## 2020-01-13 NOTE — TELEPHONE ENCOUNTER
Pt called she had been to Urgent care over the wkend just wants to call and let us know she had a very bad UTI and that they had started her on some medication

## 2020-01-15 ENCOUNTER — OFFICE VISIT (OUTPATIENT)
Dept: FAMILY MEDICINE CLINIC | Facility: CLINIC | Age: 70
End: 2020-01-15

## 2020-01-15 VITALS
OXYGEN SATURATION: 95 % | WEIGHT: 193 LBS | HEIGHT: 62 IN | SYSTOLIC BLOOD PRESSURE: 140 MMHG | HEART RATE: 115 BPM | TEMPERATURE: 98.3 F | BODY MASS INDEX: 35.51 KG/M2 | DIASTOLIC BLOOD PRESSURE: 72 MMHG

## 2020-01-15 DIAGNOSIS — N39.0 RECURRENT UTI: Primary | ICD-10-CM

## 2020-01-15 LAB
BILIRUB BLD-MCNC: NEGATIVE MG/DL
GLUCOSE UR STRIP-MCNC: NEGATIVE MG/DL
KETONES UR QL: ABNORMAL
LEUKOCYTE EST, POC: ABNORMAL
NITRITE UR-MCNC: NEGATIVE MG/ML
PH UR: 6 [PH] (ref 5–8)
PROT UR STRIP-MCNC: ABNORMAL MG/DL
RBC # UR STRIP: NEGATIVE /UL
SP GR UR: 1.02 (ref 1–1.03)
UROBILINOGEN UR QL: ABNORMAL

## 2020-01-15 PROCEDURE — 99214 OFFICE O/P EST MOD 30 MIN: CPT | Performed by: NURSE PRACTITIONER

## 2020-01-15 PROCEDURE — 85025 COMPLETE CBC W/AUTO DIFF WBC: CPT | Performed by: NURSE PRACTITIONER

## 2020-01-15 PROCEDURE — 81003 URINALYSIS AUTO W/O SCOPE: CPT | Performed by: NURSE PRACTITIONER

## 2020-01-15 PROCEDURE — 84443 ASSAY THYROID STIM HORMONE: CPT | Performed by: NURSE PRACTITIONER

## 2020-01-15 PROCEDURE — 80053 COMPREHEN METABOLIC PANEL: CPT | Performed by: NURSE PRACTITIONER

## 2020-01-15 RX ORDER — CEPHALEXIN 500 MG/1
1000 CAPSULE ORAL 3 TIMES DAILY
Qty: 84 CAPSULE | Refills: 0 | Status: SHIPPED | OUTPATIENT
Start: 2020-01-15 | End: 2020-02-06

## 2020-01-15 RX ORDER — CEPHALEXIN 500 MG/1
CAPSULE ORAL
COMMUNITY
Start: 2020-01-12 | End: 2020-01-15 | Stop reason: SDUPTHER

## 2020-01-15 NOTE — PROGRESS NOTES
"Subjective   Shey King is a 69 y.o. female.   Chief Compliant: The patient presents with Difficulty Urinating    Urinary Tract Infection    This is a recurrent (HX chronic cystitis.  Seen urgent care on jan 12th for increased urinary frequency and fever.  Started on keflex with no improvments) problem. The current episode started in the past 7 days. The problem has been unchanged. The quality of the pain is described as aching and burning. The pain is at a severity of 6/10. The pain is moderate. The maximum temperature recorded prior to her arrival was 100 - 100.9 F. She is sexually active. There is no history of pyelonephritis. Associated symptoms include chills, flank pain, frequency, hesitancy, nausea and urgency. Pertinent negatives include no discharge or hematuria. She has tried antibiotics and increased fluids for the symptoms. The treatment provided no relief. Her past medical history is significant for recurrent UTIs.      The following portions of the patient's history were reviewed and updated as appropriate: allergies, current medications, past family history, past medical history, past social history, past surgical history and problem list.      Review of Systems   Constitutional: Positive for chills, fatigue and fever.   HENT: Negative.    Respiratory: Negative.    Cardiovascular: Negative.    Gastrointestinal: Positive for nausea.   Genitourinary: Positive for dysuria, flank pain, frequency, hesitancy, pelvic pain and urgency. Negative for hematuria.   Musculoskeletal: Positive for myalgias.   Skin: Negative.    All other systems reviewed and are negative.      Procedures    Vitals: Blood pressure 140/72, pulse 115, temperature 98.3 °F (36.8 °C), temperature source Oral, height 157.5 cm (62\"), weight 87.5 kg (193 lb), SpO2 95 %, not currently breastfeeding.     Allergies:   Allergies   Allergen Reactions   • Bactrim [Sulfamethoxazole-Trimethoprim] Hives   • Ciprofloxacin Hives   • Penicillins " Hives   • Steri-Strip Compound Benzoin [Benzoin Compound] Hives   • Sulfa Antibiotics Hives          Objective   Physical Exam   Constitutional: She is oriented to person, place, and time. She appears well-developed and well-nourished. No distress.   Cardiovascular: Normal rate, regular rhythm and normal heart sounds.   No murmur heard.  Pulmonary/Chest: Effort normal and breath sounds normal.   Abdominal: Soft. Normal appearance and bowel sounds are normal. There is tenderness (generalized). There is CVA tenderness.   Neurological: She is alert and oriented to person, place, and time.   Skin: Skin is warm and dry. No rash noted. She is not diaphoretic.   Psychiatric: She has a normal mood and affect. Her behavior is normal.   Nursing note and vitals reviewed.      During this visit the following were done:  Labs Reviewed []    Labs Ordered []    Radiology Reports Reviewed []    Radiology Ordered []    PCP Records Reviewed []    Referring Provider Records Reviewed []    ER Records Reviewed []    Hospital Records Reviewed []    History Obtained From Family []    Radiology Images Reviewed []    Other Reviewed [x]    Records Requested [x]      Assessment/Plan   Discussed with patient impression and plan, patient verbalizes understanding    Shey was seen today for difficulty urinating.    Diagnoses and all orders for this visit:    Recurrent UTI  -     cephalexin (KEFLEX) 500 MG capsule; Take 2 capsules by mouth 3 (Three) Times a Day.  -     CBC Auto Differential; Future  -     Comprehensive Metabolic Panel; Future  -     TSH; Future  -     CBC Auto Differential  -     Comprehensive Metabolic Panel  -     TSH  -     POCT urinalysis dipstick, automated      Adjusted antibiotic dosage and encouraged rest with increased fluids

## 2020-01-16 LAB
ALBUMIN SERPL-MCNC: 4.2 G/DL (ref 3.5–5.2)
ALBUMIN/GLOB SERPL: 1 G/DL
ALP SERPL-CCNC: 110 U/L (ref 39–117)
ALT SERPL W P-5'-P-CCNC: 17 U/L (ref 1–33)
ANION GAP SERPL CALCULATED.3IONS-SCNC: 15.8 MMOL/L (ref 5–15)
AST SERPL-CCNC: 16 U/L (ref 1–32)
BASOPHILS # BLD AUTO: 0.07 10*3/MM3 (ref 0–0.2)
BASOPHILS NFR BLD AUTO: 0.6 % (ref 0–1.5)
BILIRUB SERPL-MCNC: 1.4 MG/DL (ref 0.2–1.2)
BUN BLD-MCNC: 10 MG/DL (ref 8–23)
BUN/CREAT SERPL: 10.3 (ref 7–25)
CALCIUM SPEC-SCNC: 9.2 MG/DL (ref 8.6–10.5)
CHLORIDE SERPL-SCNC: 96 MMOL/L (ref 98–107)
CO2 SERPL-SCNC: 22.2 MMOL/L (ref 22–29)
CREAT BLD-MCNC: 0.97 MG/DL (ref 0.57–1)
DEPRECATED RDW RBC AUTO: 44.2 FL (ref 37–54)
EOSINOPHIL # BLD AUTO: 0.22 10*3/MM3 (ref 0–0.4)
EOSINOPHIL NFR BLD AUTO: 1.8 % (ref 0.3–6.2)
ERYTHROCYTE [DISTWIDTH] IN BLOOD BY AUTOMATED COUNT: 14.2 % (ref 12.3–15.4)
GFR SERPL CREATININE-BSD FRML MDRD: 57 ML/MIN/1.73
GLOBULIN UR ELPH-MCNC: 4.1 GM/DL
GLUCOSE BLD-MCNC: 124 MG/DL (ref 65–99)
HCT VFR BLD AUTO: 37.1 % (ref 34–46.6)
HGB BLD-MCNC: 12.3 G/DL (ref 12–15.9)
IMM GRANULOCYTES # BLD AUTO: 0.06 10*3/MM3 (ref 0–0.05)
IMM GRANULOCYTES NFR BLD AUTO: 0.5 % (ref 0–0.5)
LYMPHOCYTES # BLD AUTO: 1.69 10*3/MM3 (ref 0.7–3.1)
LYMPHOCYTES NFR BLD AUTO: 13.9 % (ref 19.6–45.3)
MCH RBC QN AUTO: 28.3 PG (ref 26.6–33)
MCHC RBC AUTO-ENTMCNC: 33.2 G/DL (ref 31.5–35.7)
MCV RBC AUTO: 85.3 FL (ref 79–97)
MONOCYTES # BLD AUTO: 0.89 10*3/MM3 (ref 0.1–0.9)
MONOCYTES NFR BLD AUTO: 7.3 % (ref 5–12)
NEUTROPHILS # BLD AUTO: 9.25 10*3/MM3 (ref 1.7–7)
NEUTROPHILS NFR BLD AUTO: 75.9 % (ref 42.7–76)
NRBC BLD AUTO-RTO: 0 /100 WBC (ref 0–0.2)
PLATELET # BLD AUTO: 350 10*3/MM3 (ref 140–450)
PMV BLD AUTO: 11.7 FL (ref 6–12)
POTASSIUM BLD-SCNC: 4.4 MMOL/L (ref 3.5–5.2)
PROT SERPL-MCNC: 8.3 G/DL (ref 6–8.5)
RBC # BLD AUTO: 4.35 10*6/MM3 (ref 3.77–5.28)
SODIUM BLD-SCNC: 134 MMOL/L (ref 136–145)
TSH SERPL DL<=0.05 MIU/L-ACNC: 0.67 UIU/ML (ref 0.27–4.2)
WBC NRBC COR # BLD: 12.18 10*3/MM3 (ref 3.4–10.8)

## 2020-01-20 ENCOUNTER — TELEPHONE (OUTPATIENT)
Dept: FAMILY MEDICINE CLINIC | Facility: CLINIC | Age: 70
End: 2020-01-20

## 2020-01-20 NOTE — TELEPHONE ENCOUNTER
Her white count is elevated appears dehydrated.  Apart from dizziness is she feeling any better?  With her allergies difficult to change her antibiotics.  Is she sick to her stomach would zofran maybe help her better tolerate    Spoke with patient,she is feeling some better,just the headache & dizziness,denies any nausea just concerned if she should continue the keflex,wants to know if she should continue it?

## 2020-01-20 NOTE — TELEPHONE ENCOUNTER
Her white count is elevated appears dehydrated.  Apart from dizziness is she feeling any better?  With her allergies difficult to change her antibiotics.  Is she sick to her stomach would zofran maybe help her better tolerate

## 2020-01-20 NOTE — TELEPHONE ENCOUNTER
Patient called reports she is being treated with Keflex,having problems with Vertigo,looked up is a side effect is questioning if you want to change it?,also requesting her lab results.

## 2020-01-27 RX ORDER — METFORMIN HYDROCHLORIDE 500 MG/1
500 TABLET, EXTENDED RELEASE ORAL
Qty: 30 TABLET | Refills: 5 | Status: SHIPPED | OUTPATIENT
Start: 2020-01-27 | End: 2020-09-08 | Stop reason: SDUPTHER

## 2020-01-27 RX ORDER — LEVOTHYROXINE SODIUM 125 MCG
TABLET ORAL
Qty: 30 TABLET | Refills: 3 | Status: SHIPPED | OUTPATIENT
Start: 2020-01-27 | End: 2020-07-06

## 2020-01-27 RX ORDER — ERGOCALCIFEROL 1.25 MG/1
CAPSULE ORAL
Qty: 12 CAPSULE | Refills: 0 | OUTPATIENT
Start: 2020-01-27

## 2020-02-06 ENCOUNTER — OFFICE VISIT (OUTPATIENT)
Dept: FAMILY MEDICINE CLINIC | Facility: CLINIC | Age: 70
End: 2020-02-06

## 2020-02-06 VITALS
TEMPERATURE: 98.2 F | SYSTOLIC BLOOD PRESSURE: 120 MMHG | WEIGHT: 190.4 LBS | HEIGHT: 62 IN | OXYGEN SATURATION: 97 % | BODY MASS INDEX: 35.04 KG/M2 | HEART RATE: 109 BPM | DIASTOLIC BLOOD PRESSURE: 70 MMHG

## 2020-02-06 DIAGNOSIS — N39.0 RECURRENT UTI: Primary | ICD-10-CM

## 2020-02-06 DIAGNOSIS — J03.90 TONSILLITIS: ICD-10-CM

## 2020-02-06 LAB
BILIRUB BLD-MCNC: NEGATIVE MG/DL
EXPIRATION DATE: NORMAL
EXPIRATION DATE: NORMAL
FLUAV AG NPH QL: NEGATIVE
FLUBV AG NPH QL: NEGATIVE
GLUCOSE UR STRIP-MCNC: NEGATIVE MG/DL
INTERNAL CONTROL: NORMAL
INTERNAL CONTROL: NORMAL
KETONES UR QL: ABNORMAL
LEUKOCYTE EST, POC: NEGATIVE
Lab: NORMAL
Lab: NORMAL
NITRITE UR-MCNC: NEGATIVE MG/ML
PH UR: 6 [PH] (ref 5–8)
PROT UR STRIP-MCNC: ABNORMAL MG/DL
RBC # UR STRIP: ABNORMAL /UL
S PYO AG THROAT QL: NEGATIVE
SP GR UR: 1.01 (ref 1–1.03)
UROBILINOGEN UR QL: NORMAL

## 2020-02-06 PROCEDURE — 81003 URINALYSIS AUTO W/O SCOPE: CPT | Performed by: FAMILY MEDICINE

## 2020-02-06 PROCEDURE — 87880 STREP A ASSAY W/OPTIC: CPT | Performed by: FAMILY MEDICINE

## 2020-02-06 PROCEDURE — 99213 OFFICE O/P EST LOW 20 MIN: CPT | Performed by: FAMILY MEDICINE

## 2020-02-06 PROCEDURE — 87804 INFLUENZA ASSAY W/OPTIC: CPT | Performed by: FAMILY MEDICINE

## 2020-02-06 RX ORDER — ERYTHROMYCIN 500 MG/1
500 TABLET, COATED ORAL 4 TIMES DAILY
Qty: 40 TABLET | Refills: 0 | Status: SHIPPED | OUTPATIENT
Start: 2020-02-06 | End: 2020-03-17

## 2020-02-06 RX ORDER — ERYTHROMYCIN 500 MG/1
500 TABLET, COATED ORAL 4 TIMES DAILY
Qty: 40 TABLET | Refills: 0 | Status: SHIPPED | OUTPATIENT
Start: 2020-02-06 | End: 2020-02-06 | Stop reason: SDUPTHER

## 2020-02-07 PROCEDURE — 87086 URINE CULTURE/COLONY COUNT: CPT | Performed by: FAMILY MEDICINE

## 2020-02-08 LAB — BACTERIA SPEC AEROBE CULT: NORMAL

## 2020-02-10 ENCOUNTER — TELEPHONE (OUTPATIENT)
Dept: FAMILY MEDICINE CLINIC | Facility: CLINIC | Age: 70
End: 2020-02-10

## 2020-02-10 NOTE — TELEPHONE ENCOUNTER
----- Message from Sheila Everett MD sent at 2/10/2020  9:02 AM EST -----  Please call Shey. Let her know that no specific germ was isolated in her urine culture - she may not have wiped well. Is she feeling better with the erythromycin? If she is not, I would have her come in and repeat the urine culture. Thanks.        Left message for patient to call back.      Patient notified, she stated she is feeling much better.

## 2020-02-24 NOTE — PROGRESS NOTES
"Shey King     VITALS: Blood pressure 120/70, pulse 109, temperature 98.2 °F (36.8 °C), temperature source Oral, height 157.5 cm (62\"), weight 86.4 kg (190 lb 6.4 oz), SpO2 97 %, not currently breastfeeding.    Subjective  Chief Complaint:   Chief Complaint   Patient presents with   • Urinary Tract Infection     abdominal pressure        History of Present Illness:  Patient is a 69 y.o.  female with a medical history significant for overactive bladder and interstitial cystitis along with frequent UTIs who presents to clinic secondary to an acute concern.  Patient currently is on UTI prophylaxis of Macrobid 100 mg orally daily.  However, she feels as if she is having another UTI.  She complains of dysuria, urinary frequency, urinary retention.  She denies any fevers, chills, or hematuria.  She denies any vaginal discharge, odor, abnormal bleeding.  Patient also complains of a sore throat today.  She denies any congestion, ear pain, rhinorrhea, coughing, shortness of breath, or chest pain.  She has not taken anything for her symptoms.    No complaints about any of the medications.    The following portions of the patient's history were reviewed and updated as appropriate: allergies, current medications, past family history, past medical history, past social history, past surgical history and problem list.    Past Medical History  Past Medical History:   Diagnosis Date   • Abdominal pain, LLQ (left lower quadrant)    • Abnormal Pap smear of cervix     several years ago   • Cystitis    • Diabetes mellitus (CMS/[facility])    • Diarrhea    • Encounter for cholecystectomy 2018   • Gallstones    • Hiatal hernia    • Hyperglycemia    • Hyperlipidemia    • Hypothyroidism    • Muscle spasm     FLANK PAIN   • OAB (overactive bladder)    • UTI (urinary tract infection)        Review of Systems   Respiratory: Negative for shortness of breath.    Cardiovascular: Negative for chest pain and palpitations.       Surgical History  Past " Surgical History:   Procedure Laterality Date   • BREAST BIOPSY Right 2010    benign   • ENDOSCOPY     • ENDOSCOPY N/A 1/11/2018    Procedure: ESOPHAGOGASTRODUODENOSCOPY;  Surgeon: Dong Mata MD;  Location:  [facility] OR;  Service:    • MAMMO BILATERAL  09/2015   • OVARIAN CYST DRAINAGE     • KS LAP,CHOLECYSTECTOMY N/A 1/11/2018    Procedure: CHOLECYSTECTOMY LAPAROSCOPIC;  Surgeon: Dong Mata MD;  [facility];  Service: General   • US GUIDED LYMPH NODE BIOPSY  4/11/2018       Family History  Family History   Problem Relation Age of Onset   • Heart disease Mother    • Cancer Mother         kidney   • Heart disease Father    • Breast cancer Neg Hx        Social History  Social History     Socioeconomic History   • Marital status:      Spouse name: Not on file   • Number of children: Not on file   • Years of education: Not on file   • Highest education level: Not on file   Tobacco Use   • Smoking status: Never Smoker   • Smokeless tobacco: Never Used   • Tobacco comment: never   Substance and Sexual Activity   • Alcohol use: No   • Drug use: No   • Sexual activity: Yes     Partners: Male       Objective  Physical Exam    Gen: Patient in NAD. Pleasant and answers appropriately. A&Ox3.    Skin: Warm and dry with normal turgor. No purpura, rashes, or unusual pigmentation noted. Hair is normal in appearance and distribution.    HEENT: NC/AT. No lesions noted. Conjunctiva clear, sclera nonicteric. PERRL. EOMI without nystagmus or strabismus. Fundi appear benign. No hemorrhages or exudates of eyes. Auditory canals are patent bilaterally without lesions. TMs intact,  nonerythematous, nonbulging without lesions. Nasal mucosa pink, nonerythematous, and nonedematous. Frontal and maxillary sinuses are nontender. O/P erythematous and moist without exudate.    Neck: Supple without lymph nodes palpated. FROM.     Lungs: CTA B/L without rales, rhonchi, crackles, or wheezes.    Heart: RRR. S1 and S2  normal. No S3 or S4. No MRGT.    Abd: Soft, nontender,nondistended. (+)BSx4 quadrants.     Extrem: No CCE. Radial pulses 2+/4 and equal B/L. FROMx4. No bone, joint, or muscle tenderness noted.    Neuro: No focal motor/sensory deficits.    Procedures    Assessment/Plan  Shey King is a 69 y.o. here for an acute concern.  Diagnoses and all orders for this visit:    Recurrent UTI  -     POCT urinalysis dipstick, automated  -     Urine Culture - Urine, Urine, Clean Catch; Future  -     erythromycin base (E-MYCIN) 500 MG tablet; Take 1 tablet by mouth 4 (Four) Times a Day.  -     Urine Culture - Urine, Urine, Clean Catch  Urinalysis is positive for uro-bilinogen, protein, and ketones.  Will send for urine culture.  We will add on erythromycin to her Macrobid regimen.    Tonsillitis  -     POC Rapid Strep A  -     POC Influenza A / B  Supportive care indicated, including increased fluids and rest. Patient to monitor. Patient to call if symptoms continue or worsen.     Patient's Body mass index is 34.82 kg/m². BMI is above normal parameters. Recommendations include: exercise counseling.     Findings and plans discussed with patient who verbalizes understanding and agreement. Will followup with patient once results are in. Patient to followup at clinic [facility] for further medical followup.    MD NAHID Lewis/Transcription Disclaimer:  Much of this encounter note is an electronic transcription/translation of spoken language to printed text.  The electronic translation of spoken language may permit erroneous, or at times, nonsensical words or phrases to be inadvertently transcribed.  Although I have reviewed the note for such errors, some may still exist.

## 2020-03-05 ENCOUNTER — TRANSCRIBE ORDERS (OUTPATIENT)
Dept: ADMINISTRATIVE | Facility: HOSPITAL | Age: 70
End: 2020-03-05

## 2020-03-05 DIAGNOSIS — H47.521: Primary | ICD-10-CM

## 2020-03-11 ENCOUNTER — HOSPITAL ENCOUNTER (OUTPATIENT)
Dept: MRI IMAGING | Facility: HOSPITAL | Age: 70
Discharge: HOME | End: 2020-03-11
Admitting: OPHTHALMOLOGY

## 2020-03-11 DIAGNOSIS — H47.521: ICD-10-CM

## 2020-03-11 PROCEDURE — 70540 MRI ORBIT/FACE/NECK W/O DYE: CPT

## 2020-03-11 PROCEDURE — 70540 MRI ORBIT/FACE/NECK W/O DYE: CPT | Performed by: RADIOLOGY

## 2020-03-17 ENCOUNTER — OFFICE VISIT (OUTPATIENT)
Dept: FAMILY MEDICINE CLINIC | Facility: CLINIC | Age: 70
End: 2020-03-17

## 2020-03-17 VITALS
SYSTOLIC BLOOD PRESSURE: 120 MMHG | HEART RATE: 85 BPM | BODY MASS INDEX: 35.44 KG/M2 | DIASTOLIC BLOOD PRESSURE: 78 MMHG | OXYGEN SATURATION: 98 % | TEMPERATURE: 97.9 F | HEIGHT: 62 IN | WEIGHT: 192.6 LBS

## 2020-03-17 DIAGNOSIS — S39.012A STRAIN OF LUMBAR REGION, INITIAL ENCOUNTER: Primary | ICD-10-CM

## 2020-03-17 LAB
BILIRUB BLD-MCNC: NEGATIVE MG/DL
GLUCOSE UR STRIP-MCNC: NEGATIVE MG/DL
KETONES UR QL: NEGATIVE
LEUKOCYTE EST, POC: NEGATIVE
NITRITE UR-MCNC: NEGATIVE MG/ML
PH UR: 5.5 [PH] (ref 5–8)
PROT UR STRIP-MCNC: NEGATIVE MG/DL
RBC # UR STRIP: NEGATIVE /UL
SP GR UR: 1.02 (ref 1–1.03)
UROBILINOGEN UR QL: NORMAL

## 2020-03-17 PROCEDURE — 99213 OFFICE O/P EST LOW 20 MIN: CPT | Performed by: NURSE PRACTITIONER

## 2020-03-17 PROCEDURE — 81003 URINALYSIS AUTO W/O SCOPE: CPT | Performed by: NURSE PRACTITIONER

## 2020-03-17 RX ORDER — CYCLOBENZAPRINE HCL 5 MG
5 TABLET ORAL 3 TIMES DAILY PRN
Qty: 30 TABLET | Refills: 0 | Status: SHIPPED | OUTPATIENT
Start: 2020-03-17 | End: 2020-06-09

## 2020-03-17 NOTE — PROGRESS NOTES
"Subjective   Shey King is a 69 y.o. female.   Chief Compliant: The patient presents with Back Pain (lower left side)    Back Pain   This is a new problem. The current episode started 1 to 4 weeks ago. The problem occurs intermittently. The problem has been gradually worsening since onset. The pain is present in the lumbar spine. The quality of the pain is described as aching. The pain is at a severity of 4/10. The pain is mild. The pain is the same all the time. The symptoms are aggravated by bending, standing and twisting. Pertinent negatives include no bladder incontinence, bowel incontinence, dysuria, leg pain, numbness, paresis, paresthesias, pelvic pain, perianal numbness, tingling or weakness. (HX recurrent UTI  ) She has tried heat for the symptoms. The treatment provided mild relief.      The following portions of the patient's history were reviewed and updated as appropriate: allergies, current medications, past family history, past medical history, past social history, past surgical history and problem list.      Review of Systems   Constitutional: Negative.    HENT: Negative.    Respiratory: Negative.    Cardiovascular: Negative.    Gastrointestinal: Negative for bowel incontinence.   Genitourinary: Negative for bladder incontinence, dysuria and pelvic pain.   Musculoskeletal: Positive for back pain.   Neurological: Negative for tingling, weakness, numbness and paresthesias.   All other systems reviewed and are negative.      Procedures    Vitals: Blood pressure 120/78, pulse 85, temperature 97.9 °F (36.6 °C), temperature source Oral, height 157.5 cm (62\"), weight 87.4 kg (192 lb 9.6 oz), SpO2 98 %, not currently breastfeeding.     Allergies:   Allergies   Allergen Reactions   • Bactrim [Sulfamethoxazole-Trimethoprim] Hives   • Ciprofloxacin Hives   • Penicillins Hives   • Steri-Strip Compound Benzoin [Benzoin Compound] Hives   • Sulfa Antibiotics Hives          Objective   Physical Exam "   Constitutional: She is oriented to person, place, and time. She appears well-developed and well-nourished. No distress.   Cardiovascular: Normal rate, regular rhythm and normal heart sounds.   No murmur heard.  Pulmonary/Chest: Effort normal and breath sounds normal.   Abdominal: Soft. Bowel sounds are normal.   Musculoskeletal:   Tenderness right lower paraspinal     Neurological: She is alert and oriented to person, place, and time.   Skin: Skin is warm and dry. She is not diaphoretic.   Psychiatric: She has a normal mood and affect. Her behavior is normal.   Nursing note and vitals reviewed.      During this visit the following were done:  Labs Reviewed []    Labs Ordered []    Radiology Reports Reviewed []    Radiology Ordered []    PCP Records Reviewed []    Referring Provider Records Reviewed []    ER Records Reviewed []    Hospital Records Reviewed []    History Obtained From Family []    Radiology Images Reviewed []    Other Reviewed [x]    Records Requested []      Assessment/Plan   Discussed with patient impression and plan, patient verbalizes understanding  Shey was seen today for back pain.    Diagnoses and all orders for this visit:    Strain of lumbar region, initial encounter  -     POCT urinalysis dipstick, automated  -     cyclobenzaprine (FLEXERIL) 5 MG tablet; Take 1 tablet by mouth 3 (Three) Times a Day As Needed for Muscle Spasms.    RTC if symptoms persist or worsen in any way

## 2020-04-06 ENCOUNTER — APPOINTMENT (OUTPATIENT)
Dept: MAMMOGRAPHY | Facility: HOSPITAL | Age: 70
End: 2020-04-06

## 2020-04-15 ENCOUNTER — TELEPHONE (OUTPATIENT)
Dept: FAMILY MEDICINE CLINIC | Facility: CLINIC | Age: 70
End: 2020-04-15

## 2020-04-15 DIAGNOSIS — Z87.440 HISTORY OF RECURRENT UTIS: ICD-10-CM

## 2020-04-15 RX ORDER — ERYTHROMYCIN 333 MG/1
333 TABLET, DELAYED RELEASE ORAL 4 TIMES DAILY
Qty: 40 TABLET | Refills: 0 | Status: SHIPPED | OUTPATIENT
Start: 2020-04-15 | End: 2020-06-09

## 2020-04-15 NOTE — TELEPHONE ENCOUNTER
Notified Shey and she said the nitrofurantoin does nothing for her, that she only takes it because she has to.  Is there something else you can suggest? She said erythromycin always works well for her.  Please advise.

## 2020-04-15 NOTE — TELEPHONE ENCOUNTER
Shey called complaining of UTI symptoms (kidney pressure, abdomen swelling, burning with urination, low grade fever) and requests an antibiotic.   Please advise.

## 2020-04-15 NOTE — TELEPHONE ENCOUNTER
She is already taking nitrofurantoin from Dr Sutton she can increase to BID to see if this improves her symptoms

## 2020-06-09 ENCOUNTER — OFFICE VISIT (OUTPATIENT)
Dept: UROLOGY | Facility: CLINIC | Age: 70
End: 2020-06-09

## 2020-06-09 VITALS — BODY MASS INDEX: 36.07 KG/M2 | HEIGHT: 62 IN | TEMPERATURE: 98.4 F | WEIGHT: 196 LBS

## 2020-06-09 DIAGNOSIS — Z87.440 HISTORY OF RECURRENT UTIS: ICD-10-CM

## 2020-06-09 DIAGNOSIS — N39.0 RECURRENT UTI: ICD-10-CM

## 2020-06-09 DIAGNOSIS — R35.0 FREQUENCY OF MICTURITION: Primary | ICD-10-CM

## 2020-06-09 DIAGNOSIS — N34.2 INFECTIVE URETHRITIS: ICD-10-CM

## 2020-06-09 LAB
BILIRUB BLD-MCNC: ABNORMAL MG/DL
CLARITY, POC: CLEAR
COLOR UR: YELLOW
GLUCOSE UR STRIP-MCNC: NEGATIVE MG/DL
KETONES UR QL: NEGATIVE
LEUKOCYTE EST, POC: NEGATIVE
NITRITE UR-MCNC: NEGATIVE MG/ML
PH UR: 5 [PH] (ref 5–8)
PROT UR STRIP-MCNC: NEGATIVE MG/DL
RBC # UR STRIP: NEGATIVE /UL
SP GR UR: 1.02 (ref 1–1.03)
UROBILINOGEN UR QL: NORMAL

## 2020-06-09 PROCEDURE — 99213 OFFICE O/P EST LOW 20 MIN: CPT | Performed by: UROLOGY

## 2020-06-09 PROCEDURE — 81003 URINALYSIS AUTO W/O SCOPE: CPT | Performed by: UROLOGY

## 2020-06-09 PROCEDURE — 87086 URINE CULTURE/COLONY COUNT: CPT | Performed by: UROLOGY

## 2020-06-09 RX ORDER — NITROFURANTOIN 25; 75 MG/1; MG/1
100 CAPSULE ORAL DAILY
Qty: 56 CAPSULE | Refills: 3 | Status: SHIPPED | OUTPATIENT
Start: 2020-06-09 | End: 2020-12-10 | Stop reason: SDUPTHER

## 2020-06-09 NOTE — PROGRESS NOTES
"Chief Complaint:          Chief Complaint   Patient presents with   • Recurrent UTI     6 mnth f/u       HPI:   69 y.o. female referred with recurrent urinary tract infections.  Most the time she is actually asymptomatic it is a mildly resistant Klebsiella resistant to ampicillin.  Her sugars well controlled today her urinalysis is negative.  Her postvoid residual is negligible.  She has normal bowel movements.  She has stress urinary incontinence.  She has a cystocele and rectocele that is grade 1 diabetes is well controlled she is had cyst on her ovary that was drained.  We will start her on probiotics and prophylactic Macrobid which is sensitive to her current organism and see her back in 3 months.  She returns today.  Her last diagnosis of Klebsiella was on Bhumika 10, 2019.  She has decreased nocturia still occasional dysuria overall she is better but her urinalysis persisted with 3+ leukocytes and I want to do a culture her bowel movements are fine with probiotics.  I will continue prophylaxis I will see her back in 3 months I have a urine culture pending at the time of this dictation.  She returns today she is doing great she ran out of Macrobid and developed an infection therefore she clearly needs it her urine was positive we will culture it.  Otherwise she is doing well her bowels are better she continues on probiotics I will see her back in 6 months.  She returns today she says she has been \"no good\" and had 3-4 kidney infections since she was last seen.  She been going to her primary care physician.  She still remained attained on nitrofurantoin.  She said she was very sick.  I reviewed her prior information the last time I saw her was Bhumika 10 at which time she had a positive culture for Klebsiella and treated appropriately.  She then had a negative urinalysis on 2 6, negative urine culture with mixed isha on 27- urine culture in January 2015 and a negative urinalysis in March 2017.  I reviewed her chart " extensively.  She really does not understand why she is taking the nitrofurantoin.  She calls and requests antibiotics on a fairly regular basis and states erythromycin works well.  I explained to her again very extensively that the indiscriminate use of antibiotics defeats the purpose of taking prophylaxis.  I have a culture pending today.  I refilled her medication I will follow-up with her based on this.  She is not taking any probiotics.  She currently has no voiding symptomatology.      Past Medical History:        Past Medical History:   Diagnosis Date   • Abdominal pain, LLQ (left lower quadrant)    • Abnormal Pap smear of cervix     several years ago   • Cystitis    • Diabetes mellitus (CMS/HCC)    • Diarrhea    • Encounter for cholecystectomy 2018   • Gallstones    • Hiatal hernia    • Hyperglycemia    • Hyperlipidemia    • Hypothyroidism    • Muscle spasm     FLANK PAIN   • OAB (overactive bladder)    • UTI (urinary tract infection)          Current Meds:     Current Outpatient Medications   Medication Sig Dispense Refill   • aspirin 81 MG chewable tablet Chew 81 mg daily.     • glucose blood test strip Use as instructed 50 each 12   • glucose monitor monitoring kit 1 each as needed (DM II). 1 each 0   • metFORMIN ER (GLUCOPHAGE-XR) 500 MG 24 hr tablet TAKE 1 TABLET BY MOUTH DAILY WITH BREAKFAST 30 tablet 5   • nitrofurantoin, macrocrystal-monohydrate, (MACROBID) 100 MG capsule Take 1 capsule by mouth Daily. 56 capsule 3   • SYNTHROID 125 MCG tablet TAKE 1 TABLET BY MOUTH EVERY DAY 30 tablet 3   • triamcinolone (KENALOG) 0.1 % ointment Apply  topically 2 (Two) Times a Day. 80 g 1     No current facility-administered medications for this visit.         Allergies:      Allergies   Allergen Reactions   • Bactrim [Sulfamethoxazole-Trimethoprim] Hives   • Ciprofloxacin Hives   • Penicillins Hives   • Steri-Strip Compound Benzoin [Benzoin Compound] Hives   • Sulfa Antibiotics Hives        Past Surgical History:       Past Surgical History:   Procedure Laterality Date   • BREAST BIOPSY Right 2010    benign   • ENDOSCOPY     • ENDOSCOPY N/A 1/11/2018    Procedure: ESOPHAGOGASTRODUODENOSCOPY;  Surgeon: Dong Mata MD;  Location: [facility] OR;  Service:    • MAMMO BILATERAL  09/2015   • OVARIAN CYST DRAINAGE     • [place] LAP,CHOLECYSTECTOMY N/A 1/11/2018    Procedure: CHOLECYSTECTOMY LAPAROSCOPIC;  Surgeon: Dong Mata MD;  Location: [facility] OR;  Service: General   • US GUIDED LYMPH NODE BIOPSY  4/11/2018         Social History:     Social History     Socioeconomic History   • Marital status:      Spouse name: Not on file   • Number of children: Not on file   • Years of education: Not on file   • Highest education level: Not on file   Tobacco Use   • Smoking status: Never Smoker   • Smokeless tobacco: Never Used   • Tobacco comment: never   Substance and Sexual Activity   • Alcohol use: No   • Drug use: No   • Sexual activity: Yes     Partners: Male       Family History:     Family History   Problem Relation Age of Onset   • Heart disease Mother    • Cancer Mother         kidney   • Heart disease Father    • Breast cancer Neg Hx        Review of Systems:     Review of Systems   Constitutional: Negative.  Negative for activity change, appetite change, chills, diaphoresis, fatigue and unexpected weight change.   HENT: Negative for congestion, dental problem, drooling, ear discharge, ear pain, facial swelling, hearing loss, mouth sores, nosebleeds, postnasal drip, rhinorrhea, sinus pressure, sneezing, sore throat, tinnitus, trouble swallowing and voice change.    Eyes: Negative.  Negative for photophobia, pain, discharge, redness, itching and visual disturbance.   Respiratory: Negative.  Negative for apnea, cough, choking, chest tightness, shortness of breath, wheezing and stridor.    Cardiovascular: Negative.  Negative for chest pain, palpitations and leg swelling.   Gastrointestinal: Negative.  Negative for  abdominal distention, abdominal pain, anal bleeding, blood in stool, constipation, diarrhea, nausea, rectal pain and vomiting.   Endocrine: Negative.  Negative for cold intolerance, heat intolerance, polydipsia, polyphagia and polyuria.   Musculoskeletal: Negative.  Negative for arthralgias, back pain, gait problem, joint swelling, myalgias, neck pain and neck stiffness.   Skin: Negative.  Negative for color change, pallor, rash and wound.   Allergic/Immunologic: Negative.  Negative for environmental allergies, food allergies and immunocompromised state.   Neurological: Negative.  Negative for dizziness, tremors, seizures, syncope, facial asymmetry, speech difficulty, weakness, light-headedness, numbness and headaches.   Hematological: Negative.  Negative for adenopathy. Does not bruise/bleed easily.   Psychiatric/Behavioral: Negative for agitation, behavioral problems, confusion, decreased concentration, dysphoric mood, hallucinations, self-injury, sleep disturbance and suicidal ideas. The patient is not nervous/anxious and is not hyperactive.    All other systems reviewed and are negative.      Physical Exam:     Physical Exam   Constitutional: She appears well-developed and well-nourished.   HENT:   Head: Normocephalic and atraumatic.   Right Ear: External ear normal.   Left Ear: External ear normal.   Mouth/Throat: Oropharynx is clear and moist.   Eyes: Pupils are equal, round, and reactive to light. Conjunctivae are normal.   Cardiovascular: Normal rate, regular rhythm, normal heart sounds and intact distal pulses.   Pulmonary/Chest: Effort normal and breath sounds normal.   Abdominal: Soft. Bowel sounds are normal. She exhibits no distension and no mass. There is no tenderness. There is no rebound and no guarding.   Genitourinary: No vaginal discharge found.   Musculoskeletal: Normal range of motion.   Neurological: She is alert. She has normal reflexes.   Skin: Skin is warm and dry.   Psychiatric: She has a  normal mood and affect. Her behavior is normal. Judgment and thought content normal.       I have reviewed the following portions of the patient's history: allergies, current medications, past family history, past medical history, past social history, past surgical history, problem list and ROS and confirm it's accurate.      Procedure:       Assessment/Plan:   Recurrent urinary tract infections-patient has been referred and diagnosed with recurrent urinary tract infections.  We discussed the types of organisms that are found in the urinary tract indicating that the vast majority are results of the patient's own gastrointestinal isha.  We discussed how many of the antibiotics that are utilized can actually exacerbate these infections by creating resistant organisms and there is only a very few antibiotics that are concentrated in the urine and do not affect the rectal reservoir nor cause recurrent yeast vaginitis.  We discussed the risk factors for recurrent infections being intercourse in younger patients and atrophic changes in older patients.  We discussed the symptoms that are found including pain, pressure, burning, frequency, urgency suprapubic pain and painful intercourse.  I discussed upper tract symptoms including fevers, chills, and indicated the workup would be much more aggressive if the patient were to present with recurrent infections in the face of upper tract symptomatology such as fever.  I discussed the history of vesicoureteral reflux in young patients and finally chronic renal scarring as a result of such.  I recommend concomitant probiotics with treatment with antibiotics to protect the rectal reservoir including over-the-counter yogurt preparations to stone oral pills containing the appropriate probiotics.  She is on chronic prophylaxis.  She had 1+ infection back in December.  She really does not understand why she is taking prophylaxis.  She calls her primary care physician a fairly regular  basis for an antibiotic for a putative urinary tract infection despite 3- urines and 1- culture.  I again reaffirmed to her the importance of avoidance of indiscriminate antibiotics and the effect on the resistance pattern of urinary tract infections.  I explained to her that not all symptomatology necessarily mean she has infection.  I also explained her the extreme importance of probiotics to treat the rectal reservoir where she is obtaining her chronic reinfection.  I explained to her that erythromycin generally is a poor choice in the urinary tract      Patient reports that she is not currently experiencing any symptoms of urinary incontinence.      Patient's Body mass index is 35.85 kg/m². BMI is above normal parameters. Recommendations include: educational material.              This document has been electronically signed by JULIANNA SPENCER MD June 9, 2020 08:12

## 2020-06-10 LAB — BACTERIA SPEC AEROBE CULT: NORMAL

## 2020-06-11 ENCOUNTER — TELEPHONE (OUTPATIENT)
Dept: UROLOGY | Facility: CLINIC | Age: 70
End: 2020-06-11

## 2020-06-11 NOTE — TELEPHONE ENCOUNTER
----- Message from Claudy Conte MD sent at 6/11/2020  7:57 AM EDT -----  Notify that she has a negative culture  ----- Message -----  From: Neena Ortiz MA  Sent: 6/9/2020   8:14 AM EDT  To: Claudy Conte MD

## 2020-06-19 ENCOUNTER — HOSPITAL ENCOUNTER (OUTPATIENT)
Dept: MAMMOGRAPHY | Facility: HOSPITAL | Age: 70
Discharge: HOME | End: 2020-06-19
Admitting: NURSE PRACTITIONER

## 2020-06-19 DIAGNOSIS — Z12.31 VISIT FOR SCREENING MAMMOGRAM: ICD-10-CM

## 2020-06-19 PROCEDURE — 77067 SCR MAMMO BI INCL CAD: CPT | Performed by: RADIOLOGY

## 2020-06-19 PROCEDURE — 77063 BREAST TOMOSYNTHESIS BI: CPT

## 2020-06-19 PROCEDURE — 77063 BREAST TOMOSYNTHESIS BI: CPT | Performed by: RADIOLOGY

## 2020-06-19 PROCEDURE — 77067 SCR MAMMO BI INCL CAD: CPT

## 2020-07-06 RX ORDER — ERGOCALCIFEROL 1.25 MG/1
CAPSULE ORAL
Qty: 12 CAPSULE | Refills: 0 | OUTPATIENT
Start: 2020-07-06

## 2020-07-06 RX ORDER — LEVOTHYROXINE SODIUM 125 MCG
TABLET ORAL
Qty: 30 TABLET | Refills: 3 | Status: SHIPPED | OUTPATIENT
Start: 2020-07-06 | End: 2020-12-01

## 2020-08-04 ENCOUNTER — OFFICE VISIT (OUTPATIENT)
Dept: PULMONOLOGY | Facility: CLINIC | Age: 70
End: 2020-08-04

## 2020-08-04 VITALS
SYSTOLIC BLOOD PRESSURE: 118 MMHG | HEART RATE: 88 BPM | OXYGEN SATURATION: 96 % | BODY MASS INDEX: 35.88 KG/M2 | TEMPERATURE: 98.4 F | DIASTOLIC BLOOD PRESSURE: 68 MMHG | WEIGHT: 195 LBS | HEIGHT: 62 IN

## 2020-08-04 DIAGNOSIS — R91.1 PULMONARY NODULE: Primary | ICD-10-CM

## 2020-08-04 PROCEDURE — 99212 OFFICE O/P EST SF 10 MIN: CPT | Performed by: INTERNAL MEDICINE

## 2020-08-04 RX ORDER — CYCLOBENZAPRINE HCL 5 MG
TABLET ORAL
COMMUNITY
Start: 2020-06-10 | End: 2021-03-09

## 2020-08-04 NOTE — PROGRESS NOTES
Subjective    Shey King presents for the following Lung Nodule  .    History of Present Illness     Ms. King is a very pleasant 69-year-old female with a past medical history of pulmonary nodule.  She presents to pulmonary outpatient clinic as a regular follow-up.  Repeat CT scan of the chest showed resolution of the nodule.  She denies any shortness of breath at rest on exertion.  She denies any chest pain wheezing or coughing.  She denies any fever or chills.  She denies any night sweats or weight loss.  She denies any morning headaches, excessive daytime sleepiness and unrefreshed sleep.      Review of system  ROS  General: Negative for fatigue or fever  Psychological: Negative for anxiety or depression  Ophthalmic: Negative for blurry vision or double vision  ENT: Negative for epistaxis or hearing changes  Allergy and immunology: Negative for hives or nasal congestion  Hematological and lymphatic: Negative for jaundice or night sweats  Endocrine: Negative for lethargy, temperature intolerance  Respiratory: Negative for shortness of breath.  Negative for cough.  Negative for wheezing.  Cardiovascular: Negative for chest pain.  Negative for dyspnea on exertion  Gastrointestinal: No abdominal pain, no change in bowel habits  Musculoskeletal: Negative for joint swelling and joint pain  Neurological: No TIA or stroke symptoms  Dermatological: Negative for acne or eczema      Past Medical History:  Past Medical History:   Diagnosis Date   • Abdominal pain, LLQ (left lower quadrant)    • Abnormal Pap smear of cervix     several years ago   • Cystitis    • Diabetes mellitus (CMS/HCC)    • Diarrhea    • Encounter for cholecystectomy 2018   • Gallstones    • Hiatal hernia    • Hyperglycemia    • Hyperlipidemia    • Hypothyroidism    • Muscle spasm     FLANK PAIN   • OAB (overactive bladder)    • UTI (urinary tract infection)        Family History:  Family History   Problem Relation Age of Onset   • Heart disease  "Mother    • Cancer Mother         kidney   • Heart disease Father    • Breast cancer Neg Hx        Social History:  Social History     Tobacco Use   • Smoking status: Never Smoker   • Smokeless tobacco: Never Used   • Tobacco comment: never   Substance Use Topics   • Alcohol use: No       Current Medications:  Current Outpatient Medications   Medication Sig Dispense Refill   • aspirin 81 MG chewable tablet Chew 81 mg daily.     • cyclobenzaprine (FLEXERIL) 5 MG tablet      • glucose blood test strip Use as instructed 50 each 12   • glucose monitor monitoring kit 1 each as needed (DM II). 1 each 0   • metFORMIN ER (GLUCOPHAGE-XR) 500 MG 24 hr tablet TAKE 1 TABLET BY MOUTH DAILY WITH BREAKFAST 30 tablet 5   • nitrofurantoin, macrocrystal-monohydrate, (Macrobid) 100 MG capsule Take 1 capsule by mouth Daily. 56 capsule 3   • SYNTHROID 125 MCG tablet TAKE 1 TABLET BY MOUTH EVERY DAY 30 tablet 3   • triamcinolone (KENALOG) 0.1 % ointment Apply  topically 2 (Two) Times a Day. 80 g 1     No current facility-administered medications for this visit.        Allergies:  Allergies   Allergen Reactions   • Bactrim [Sulfamethoxazole-Trimethoprim] Hives   • Ciprofloxacin Hives   • Penicillins Hives   • Steri-Strip Compound Benzoin [Benzoin Compound] Hives   • Sulfa Antibiotics Hives       Vitals:  /68   Pulse 88   Temp 98.4 °F (36.9 °C) (Temporal)   Ht 157.5 cm (62\")   Wt 88.5 kg (195 lb)   SpO2 96%   BMI 35.67 kg/m²     Results for orders placed or performed in visit on 06/09/20   Urine Culture - Urine, Urine, Random Void   Result Value Ref Range    Urine Culture 50,000 CFU/mL Mixed Kathy Isolated    POC Urinalysis Dipstick, Automated   Result Value Ref Range    Color Yellow Yellow, Straw, Dark Yellow, Ana    Clarity, UA Clear Clear    Specific Gravity  1.025 1.005 - 1.030    pH, Urine 5.0 5.0 - 8.0    Leukocytes Negative Negative    Nitrite, UA Negative Negative    Protein, POC Negative Negative mg/dL    Glucose, UA " Negative Negative, 1000 mg/dL (3+) mg/dL    Ketones, UA Negative Negative    Urobilinogen, UA Normal Normal    Bilirubin Small (1+) (A) Negative    Blood, UA Negative Negative       Objective   Physical Exam  General Appearance:  In no acute distress and comfortable.    HEENT: Normocephalic, atraumatic, normal right and the left external ear.  Normal nose.  Lungs:  Normal effort and normal respiratory rate.  Negative for rales.  Negative for wheezes.  Negative for rhonchi.    Heart: Normal rate.  Regular rhythm.  S1 normal and S2 normal.    Abdomen: Abdomen is soft.  Bowel sounds are normal.   There is no abdominal tenderness.     Extremities: Normal range of motion.  Negative for dependent edema    Pulses: Distal pulses are intact.  There are no decreased pulses.    Neurological: Patient is alert and oriented to person, place and time.  Normal strength.    Pupils:  Pupils are equal, round, and reactive to light.    Skin:  Warm and dry.      I have reviewed the past medical history, past surgical history, family history and social history of the patient.        Labs:  No results found for: PHART, ZUF9PRG, PO2ART, XZL4JLA, BASEEXCESS, Q0DXUHMV  Lab Results   Component Value Date    GLUCOSE 124 (H) 01/15/2020    CALCIUM 9.2 01/15/2020     (L) 01/15/2020    K 4.4 01/15/2020    CO2 22.2 01/15/2020    CL 96 (L) 01/15/2020    BUN 10 01/15/2020    CREATININE 0.97 01/15/2020    EGFRIFAFRI 74 09/06/2016    EGFRIFNONA 57 (L) 01/15/2020    BCR 10.3 01/15/2020    ANIONGAP 15.8 (H) 01/15/2020     Lab Results   Component Value Date    WBC 12.18 (H) 01/15/2020    HGB 12.3 01/15/2020    HCT 37.1 01/15/2020    MCV 85.3 01/15/2020     01/15/2020         Imaging  I have reviewed the images of the CT scan of the chest was performed in November 2019 which showed right lower lobe nodule not demonstratable.      Assessment/Plan    Diagnosis Plan   1. Pulmonary nodule   repeat CT scan of the chest in November 2019 showed no  right lower lobe nodule.  No further imaging required.        juana understanding of the disease process and treatment modality.  Patient also understands the risk of untreated obstructive sleep apnea and benefits of the treatment.      Follow up as needed.

## 2020-09-08 ENCOUNTER — OFFICE VISIT (OUTPATIENT)
Dept: FAMILY MEDICINE CLINIC | Facility: CLINIC | Age: 70
End: 2020-09-08

## 2020-09-08 VITALS
TEMPERATURE: 97.8 F | BODY MASS INDEX: 36.14 KG/M2 | OXYGEN SATURATION: 98 % | HEIGHT: 62 IN | DIASTOLIC BLOOD PRESSURE: 78 MMHG | WEIGHT: 196.4 LBS | HEART RATE: 97 BPM | SYSTOLIC BLOOD PRESSURE: 140 MMHG

## 2020-09-08 DIAGNOSIS — R10.12 LEFT UPPER QUADRANT PAIN: ICD-10-CM

## 2020-09-08 DIAGNOSIS — E06.9 THYROIDITIS: ICD-10-CM

## 2020-09-08 DIAGNOSIS — E11.9 TYPE 2 DIABETES MELLITUS WITHOUT COMPLICATION, WITHOUT LONG-TERM CURRENT USE OF INSULIN (HCC): ICD-10-CM

## 2020-09-08 DIAGNOSIS — R53.83 FATIGUE, UNSPECIFIED TYPE: ICD-10-CM

## 2020-09-08 DIAGNOSIS — E55.9 VITAMIN D DEFICIENCY: ICD-10-CM

## 2020-09-08 DIAGNOSIS — M72.0 DUPUYTREN'S DISEASE OF PALM OF RIGHT HAND: Primary | ICD-10-CM

## 2020-09-08 PROCEDURE — 99214 OFFICE O/P EST MOD 30 MIN: CPT | Performed by: NURSE PRACTITIONER

## 2020-09-08 RX ORDER — METFORMIN HYDROCHLORIDE 500 MG/1
500 TABLET, EXTENDED RELEASE ORAL
Qty: 30 TABLET | Refills: 5 | Status: SHIPPED | OUTPATIENT
Start: 2020-09-08 | End: 2021-03-25

## 2020-09-09 NOTE — PROGRESS NOTES
"Subjective   Shey King is a 69 y.o. female.   Chief Compliant: The patient presents with Numbness (left side); Ear Fullness; and Cyst ((knots) right hand)    Ear Fullness    There is pain in both (HX cerumen impaction..  Using ear drops at home.  ) ears. This is a recurrent problem. The problem has been waxing and waning. There has been no fever. The patient is experiencing no pain. Associated symptoms include abdominal pain. Pertinent negatives include no diarrhea or vomiting.   Hand Pain    Incident onset: new formations of \"knots\" in the palm of her hand.  Painful with use such as a .   There was no injury mechanism. Pain location: right dominant hand. The quality of the pain is described as shooting. The symptoms are aggravated by palpation. She has tried nothing for the symptoms.   Abdominal Pain   This is a new problem. The current episode started more than 1 month ago. The onset quality is undetermined. The problem occurs daily. The problem has been waxing and waning. The pain is located in the LUQ. The pain is at a severity of 4/10. The pain is mild. The quality of the pain is aching and a sensation of fullness (Feels numb when laying on her left side ). The abdominal pain does not radiate. Pertinent negatives include no constipation, diarrhea, fever, flatus, frequency, hematochezia, nausea or vomiting. The pain is aggravated by certain positions. The pain is relieved by movement.   Thyroid Problem   Presents for follow-up (Thyroiditis.  Denies any current concerns) visit. Patient reports no constipation or diarrhea. The symptoms have been stable.   Diabetes   She presents for her follow-up (Denies any concerns and admits she is not monitoring but trying to watch her diet) diabetic visit. She has type 2 diabetes mellitus.      The following portions of the patient's history were reviewed and updated as appropriate: allergies, current medications, past family history, past medical history, past social " "history, past surgical history and problem list.      Review of Systems   Constitutional: Negative.  Negative for chills and fever.   HENT: Negative.    Respiratory: Negative.    Cardiovascular: Negative.    Gastrointestinal: Positive for abdominal pain. Negative for abdominal distention, anal bleeding, blood in stool, constipation, diarrhea, flatus, hematochezia, nausea and vomiting.   Genitourinary: Negative.  Negative for frequency.   Musculoskeletal:        Right hand pain    Neurological: Negative.    All other systems reviewed and are negative.      Procedures    Vitals: Blood pressure 140/78, pulse 97, temperature 97.8 °F (36.6 °C), temperature source Temporal, height 157.5 cm (62\"), weight 89.1 kg (196 lb 6.4 oz), SpO2 98 %, not currently breastfeeding.     Allergies:   Allergies   Allergen Reactions   • Bactrim [Sulfamethoxazole-Trimethoprim] Hives   • Ciprofloxacin Hives   • Penicillins Hives   • Steri-Strip Compound Benzoin [Benzoin Compound] Hives   • Sulfa Antibiotics Hives          Objective   Physical Exam   Constitutional: She is oriented to person, place, and time. She appears well-developed and well-nourished. No distress.   HENT:   Head: Normocephalic.   Right Ear: Hearing, tympanic membrane, external ear and ear canal normal.   Left Ear: Hearing, tympanic membrane, external ear and ear canal normal.   Neck: Neck supple.   Cardiovascular: Normal rate, regular rhythm and normal heart sounds.   No murmur heard.  Pulmonary/Chest: Effort normal and breath sounds normal.   Abdominal: Soft. Bowel sounds are normal. There is tenderness (LUQ).   Musculoskeletal:   Right palm with formation of cyst   Neurological: She is alert and oriented to person, place, and time.   Skin: Skin is warm and dry. She is not diaphoretic.   Psychiatric: She has a normal mood and affect. Her behavior is normal.   Nursing note and vitals reviewed.      During this visit the following were done:  Labs Reviewed []    Labs " Ordered []    Radiology Reports Reviewed []    Radiology Ordered []    PCP Records Reviewed []    Referring Provider Records Reviewed []    ER Records Reviewed []    Hospital Records Reviewed []    History Obtained From Family []    Radiology Images Reviewed []    Other Reviewed []    Records Requested []      Assessment/Plan   Discussed with patient impression and plan, patient verbalizes understanding  Shey was seen today for numbness, ear fullness and cyst.    Diagnoses and all orders for this visit:    Dupuytren's disease of palm of right hand  -     Ambulatory Referral to Orthopedic Surgery    Left upper quadrant pain  -     US Abdomen Complete    Type 2 diabetes mellitus without complication, without long-term current use of insulin (CMS/[facility])  -     metFORMIN ER (GLUCOPHAGE-XR) 500 MG 24 hr tablet; Take 1 tablet by mouth Daily With Breakfast.  -     CBC Auto Differential; Future  -     Comprehensive Metabolic Panel; Future  -     Hemoglobin A1c; Future  -     Lipid Panel; Future  -     Vitamin B12; Future    Fatigue, unspecified type    Vitamin D deficiency   -     Vitamin D 25 Hydroxy; Future    Thyroiditis  -     TSH; Future      Patient's Body mass index is 35.92 kg/m². BMI is above normal parameters. Recommendations include: exercise counseling and nutrition counseling.

## 2020-09-14 ENCOUNTER — LAB (OUTPATIENT)
Dept: FAMILY MEDICINE CLINIC | Facility: CLINIC | Age: 70
End: 2020-09-14

## 2020-09-14 DIAGNOSIS — E06.9 THYROIDITIS: ICD-10-CM

## 2020-09-14 DIAGNOSIS — E55.9 VITAMIN D DEFICIENCY: ICD-10-CM

## 2020-09-14 DIAGNOSIS — E11.9 TYPE 2 DIABETES MELLITUS WITHOUT COMPLICATION, WITHOUT LONG-TERM CURRENT USE OF INSULIN (HCC): ICD-10-CM

## 2020-09-14 PROCEDURE — 83036 HEMOGLOBIN GLYCOSYLATED A1C: CPT | Performed by: NURSE PRACTITIONER

## 2020-09-14 PROCEDURE — 82306 VITAMIN D 25 HYDROXY: CPT | Performed by: NURSE PRACTITIONER

## 2020-09-14 PROCEDURE — 85025 COMPLETE CBC W/AUTO DIFF WBC: CPT | Performed by: NURSE PRACTITIONER

## 2020-09-14 PROCEDURE — 80053 COMPREHEN METABOLIC PANEL: CPT | Performed by: NURSE PRACTITIONER

## 2020-09-14 PROCEDURE — 80061 LIPID PANEL: CPT | Performed by: NURSE PRACTITIONER

## 2020-09-14 PROCEDURE — 82607 VITAMIN B-12: CPT | Performed by: NURSE PRACTITIONER

## 2020-09-14 PROCEDURE — 84443 ASSAY THYROID STIM HORMONE: CPT | Performed by: NURSE PRACTITIONER

## 2020-09-15 ENCOUNTER — HOSPITAL ENCOUNTER (OUTPATIENT)
Dept: ULTRASOUND IMAGING | Facility: HOSPITAL | Age: 70
Discharge: HOME | End: 2020-09-15
Admitting: NURSE PRACTITIONER

## 2020-09-15 LAB
25(OH)D3 SERPL-MCNC: 17 NG/ML (ref 30–100)
ALBUMIN SERPL-MCNC: 4.3 G/DL (ref 3.5–5.2)
ALBUMIN/GLOB SERPL: 1.4 G/DL
ALP SERPL-CCNC: 111 U/L (ref 39–117)
ALT SERPL W P-5'-P-CCNC: 31 U/L (ref 1–33)
ANION GAP SERPL CALCULATED.3IONS-SCNC: 14.1 MMOL/L (ref 5–15)
AST SERPL-CCNC: 37 U/L (ref 1–32)
BASOPHILS # BLD AUTO: 0.07 10*3/MM3 (ref 0–0.2)
BASOPHILS NFR BLD AUTO: 0.9 % (ref 0–1.5)
BILIRUB SERPL-MCNC: 0.5 MG/DL (ref 0–1.2)
BUN SERPL-MCNC: 6 MG/DL (ref 8–23)
BUN/CREAT SERPL: 6.3 (ref 7–25)
CALCIUM SPEC-SCNC: 8.6 MG/DL (ref 8.6–10.5)
CHLORIDE SERPL-SCNC: 105 MMOL/L (ref 98–107)
CHOLEST SERPL-MCNC: 184 MG/DL (ref 0–200)
CO2 SERPL-SCNC: 21.9 MMOL/L (ref 22–29)
CREAT SERPL-MCNC: 0.96 MG/DL (ref 0.57–1)
DEPRECATED RDW RBC AUTO: 42.7 FL (ref 37–54)
EOSINOPHIL # BLD AUTO: 0.23 10*3/MM3 (ref 0–0.4)
EOSINOPHIL NFR BLD AUTO: 2.9 % (ref 0.3–6.2)
ERYTHROCYTE [DISTWIDTH] IN BLOOD BY AUTOMATED COUNT: 13.9 % (ref 12.3–15.4)
GFR SERPL CREATININE-BSD FRML MDRD: 58 ML/MIN/1.73
GLOBULIN UR ELPH-MCNC: 3 GM/DL
GLUCOSE SERPL-MCNC: 132 MG/DL (ref 65–99)
HBA1C MFR BLD: 6.53 % (ref 4.8–5.6)
HCT VFR BLD AUTO: 34.7 % (ref 34–46.6)
HDLC SERPL-MCNC: 27 MG/DL (ref 40–60)
HGB BLD-MCNC: 11.9 G/DL (ref 12–15.9)
IMM GRANULOCYTES # BLD AUTO: 0.05 10*3/MM3 (ref 0–0.05)
IMM GRANULOCYTES NFR BLD AUTO: 0.6 % (ref 0–0.5)
LDLC SERPL CALC-MCNC: 104 MG/DL (ref 0–100)
LDLC/HDLC SERPL: 3.85 {RATIO}
LYMPHOCYTES # BLD AUTO: 1.89 10*3/MM3 (ref 0.7–3.1)
LYMPHOCYTES NFR BLD AUTO: 24.1 % (ref 19.6–45.3)
MCH RBC QN AUTO: 28.7 PG (ref 26.6–33)
MCHC RBC AUTO-ENTMCNC: 34.3 G/DL (ref 31.5–35.7)
MCV RBC AUTO: 83.8 FL (ref 79–97)
MONOCYTES # BLD AUTO: 0.48 10*3/MM3 (ref 0.1–0.9)
MONOCYTES NFR BLD AUTO: 6.1 % (ref 5–12)
NEUTROPHILS NFR BLD AUTO: 5.13 10*3/MM3 (ref 1.7–7)
NEUTROPHILS NFR BLD AUTO: 65.4 % (ref 42.7–76)
NRBC BLD AUTO-RTO: 0 /100 WBC (ref 0–0.2)
PLATELET # BLD AUTO: 254 10*3/MM3 (ref 140–450)
PMV BLD AUTO: 11.6 FL (ref 6–12)
POTASSIUM SERPL-SCNC: 4.3 MMOL/L (ref 3.5–5.2)
PROT SERPL-MCNC: 7.3 G/DL (ref 6–8.5)
RBC # BLD AUTO: 4.14 10*6/MM3 (ref 3.77–5.28)
SODIUM SERPL-SCNC: 141 MMOL/L (ref 136–145)
TRIGL SERPL-MCNC: 265 MG/DL (ref 0–150)
TSH SERPL DL<=0.05 MIU/L-ACNC: 0.83 UIU/ML (ref 0.27–4.2)
VIT B12 BLD-MCNC: 273 PG/ML (ref 211–946)
VLDLC SERPL-MCNC: 53 MG/DL (ref 5–40)
WBC # BLD AUTO: 7.85 10*3/MM3 (ref 3.4–10.8)

## 2020-09-15 PROCEDURE — 76705 ECHO EXAM OF ABDOMEN: CPT | Performed by: RADIOLOGY

## 2020-09-15 PROCEDURE — 76700 US EXAM ABDOM COMPLETE: CPT

## 2020-09-22 ENCOUNTER — TELEPHONE (OUTPATIENT)
Dept: FAMILY MEDICINE CLINIC | Facility: CLINIC | Age: 70
End: 2020-09-22

## 2020-09-22 RX ORDER — FENOFIBRATE 145 MG/1
145 TABLET, COATED ORAL DAILY
Qty: 90 TABLET | Refills: 1 | Status: SHIPPED | OUTPATIENT
Start: 2020-09-22 | End: 2021-03-25

## 2020-09-22 NOTE — TELEPHONE ENCOUNTER
No changes to her current medications.  She does need to start a cholesterol medication to improve her triglycerides.  I will send tricor to her pharmacy

## 2020-09-22 NOTE — TELEPHONE ENCOUNTER
Patient states that she had lab work done last week and you all had discussed changing her medication. Patient is completely out of medication and wondering about her lab results.

## 2020-09-23 RX ORDER — ERGOCALCIFEROL 1.25 MG/1
50000 CAPSULE ORAL WEEKLY
Qty: 5 CAPSULE | Refills: 2 | Status: SHIPPED | OUTPATIENT
Start: 2020-09-23 | End: 2021-06-09 | Stop reason: SDUPTHER

## 2020-09-23 NOTE — TELEPHONE ENCOUNTER
Called pt no answer.       Spoke with patient & she is agreeable to Tricor but is questioning her Vit. D level she saw on My Chart?

## 2020-11-23 ENCOUNTER — OFFICE VISIT (OUTPATIENT)
Dept: ORTHOPEDIC SURGERY | Facility: CLINIC | Age: 70
End: 2020-11-23

## 2020-11-23 VITALS
TEMPERATURE: 97.1 F | SYSTOLIC BLOOD PRESSURE: 148 MMHG | WEIGHT: 190 LBS | HEART RATE: 71 BPM | BODY MASS INDEX: 34.96 KG/M2 | HEIGHT: 62 IN | DIASTOLIC BLOOD PRESSURE: 76 MMHG

## 2020-11-23 DIAGNOSIS — M72.0 DUPUYTREN'S CONTRACTURE OF RIGHT HAND: Primary | ICD-10-CM

## 2020-11-23 PROCEDURE — 99203 OFFICE O/P NEW LOW 30 MIN: CPT | Performed by: ORTHOPAEDIC SURGERY

## 2020-11-23 NOTE — PROGRESS NOTES
New Patient Visit      Patient: Shey King  YOB: 1950  Date of Encounter: 11/23/2020        Chief Complaint:   Chief Complaint   Patient presents with   • Right Hand - Pain, Edema, Initial Evaluation           HPI:   Shey King, 69 y.o. female, referred by Tashia Vallejo APRN presents for evaluation of painful nodule within her right hand.  She has noticed gradual onset.  Has noticed slight worsening.  She does not have contractures of her hand and she does not complain of numbness or significant pain in her hand.  Her medical history is remarkable for type 2 diabetes reports allergies to Bactrim Cipro penicillin and sulfa drugs.  Family history is positive for heart disease in mother and father.        Active Problem List:  Patient Active Problem List   Diagnosis   • Abdominal pain, LLQ (left lower quadrant)   • Cystitis   • Diarrhea   • Hiatal hernia   • Hyperglycemia   • Hyperlipidemia   • Adult onset hypothyroidism   • Muscle spasm   • OAB (overactive bladder)   • UTI (urinary tract infection)   • Gallstones   • B12 deficiency   • Pulmonary nodule   • Class 2 obesity due to excess calories without serious comorbidity with body mass index (BMI) of 35.0 to 35.9 in adult   • Diabetes mellitus (CMS/HCC)   • Lymphadenopathy   • Dupuytren's contracture of right hand           Past Medical History:  Past Medical History:   Diagnosis Date   • Abdominal pain, LLQ (left lower quadrant)    • Abnormal Pap smear of cervix     several years ago   • Cystitis    • Diabetes mellitus (CMS/HCC)    • Diarrhea    • Encounter for cholecystectomy 2018   • Gallstones    • Hiatal hernia    • Hyperglycemia    • Hyperlipidemia    • Hypothyroidism    • Muscle spasm     FLANK PAIN   • OAB (overactive bladder)    • UTI (urinary tract infection)            Past Surgical History:  Past Surgical History:   Procedure Laterality Date   • BREAST BIOPSY Bilateral 2005    benign   • ENDOSCOPY     • ENDOSCOPY N/A 1/11/2018     Procedure: ESOPHAGOGASTRODUODENOSCOPY;  Surgeon: Dong Mata MD;  Location: [facility];  Service:    • MAMMO BILATERAL  09/2015   • OVARIAN CYST DRAINAGE     • [place] LAP,CHOLECYSTECTOMY N/A 1/11/2018    Procedure: CHOLECYSTECTOMY LAPAROSCOPIC;  Surgeon: Dong Mata MD;  Location: [facility];  Service: General   • US GUIDED LYMPH NODE BIOPSY  4/11/2018           Family History:  Family History   Problem Relation Age of Onset   • Heart disease Mother    • Cancer Mother         kidney   • Heart disease Father    • Breast cancer Neg Hx            Social History:  Social History     Socioeconomic History   • Marital status:      Spouse name: Not on file   • Number of children: Not on file   • Years of education: Not on file   • Highest education level: Not on file   Tobacco Use   • Smoking status: Never Smoker   • Smokeless tobacco: Never Used   • Tobacco comment: never   Substance and Sexual Activity   • Alcohol use: No   • Drug use: No   • Sexual activity: Yes     Partners: Male     Body mass index is 34.75 kg/m².      Medications:  Current Outpatient Medications   Medication Sig Dispense Refill   • aspirin 81 MG chewable tablet Chew 81 mg daily.     • fenofibrate (Tricor) 145 MG tablet Take 1 tablet by mouth Daily. 90 tablet 1   • metFORMIN ER (GLUCOPHAGE-XR) 500 MG 24 hr tablet Take 1 tablet by mouth Daily With Breakfast. 30 tablet 5   • nitrofurantoin, macrocrystal-monohydrate, (Macrobid) 100 MG capsule Take 1 capsule by mouth Daily. 56 capsule 3   • SYNTHROID 125 MCG tablet TAKE 1 TABLET BY MOUTH EVERY DAY 30 tablet 3   • vitamin D (ERGOCALCIFEROL) 1.25 MG (11463 UT) capsule capsule Take 1 capsule by mouth 1 (One) Time Per Week. 5 capsule 2   • cyclobenzaprine (FLEXERIL) 5 MG tablet      • glucose blood test strip Use as instructed 50 each 12   • glucose monitor monitoring kit 1 each as needed (DM II). 1 each 0     No current facility-administered medications for this visit.   "          Allergies:  Allergies   Allergen Reactions   • Bactrim [Sulfamethoxazole-Trimethoprim] Hives   • Ciprofloxacin Hives   • Penicillins Hives   • Steri-Strip Compound Benzoin [Benzoin Compound] Hives   • Sulfa Antibiotics Hives           Review of Systems:   Review of Systems   Constitutional: Negative.    HENT: Negative.    Eyes: Negative.    Respiratory: Negative.    Cardiovascular: Negative.    Gastrointestinal: Negative.    Endocrine: Negative.    Genitourinary: Negative.    Musculoskeletal: Positive for arthralgias.   Skin: Negative.    Allergic/Immunologic: Negative.    Neurological: Negative.    Hematological: Negative.    Psychiatric/Behavioral: Negative.            Physical Exam:   Physical Exam  GENERAL: 69 y.o. female, alert and oriented X 3 in no acute distress.   Visit Vitals  /76   Pulse 71   Temp 97.1 °F (36.2 °C)   Ht 157.5 cm (62\")   Wt 86.2 kg (190 lb)   BMI 34.75 kg/m²         Musculoskeletal:   Examination right hand reveals a transcontracture within the right palm with palpable nodules mildly tender she has no contractures or extension into her fingers.  She has normal sensation.        Radiology/Labs:     No radiology results for the last 30 days.        Assessment & Plan:   69 y.o. female presents with early Dupuytren's contracture right palm without involvement of fingers.  As she demonstrates no contracture and has minimal discomfort has agreed to return in the future if symptoms worsen.  For now we will simply observe.      ICD-10-CM ICD-9-CM   1. Dupuytren's contracture of right hand  M72.0 728.6             Cc:   Tashia Vallejo APRN                This document has been electronically signed by Christopher Peter MD   November 23, 2020 17:40 EST      "

## 2020-12-01 RX ORDER — LEVOTHYROXINE SODIUM 125 MCG
TABLET ORAL
Qty: 30 TABLET | Refills: 3 | Status: SHIPPED | OUTPATIENT
Start: 2020-12-01 | End: 2021-03-25

## 2020-12-10 ENCOUNTER — OFFICE VISIT (OUTPATIENT)
Dept: UROLOGY | Facility: CLINIC | Age: 70
End: 2020-12-10

## 2020-12-10 VITALS — BODY MASS INDEX: 34.96 KG/M2 | TEMPERATURE: 98 F | HEIGHT: 62 IN | WEIGHT: 190 LBS

## 2020-12-10 DIAGNOSIS — N39.0 RECURRENT UTI: Primary | ICD-10-CM

## 2020-12-10 DIAGNOSIS — N30.00 ACUTE CYSTITIS WITHOUT HEMATURIA: ICD-10-CM

## 2020-12-10 DIAGNOSIS — Z87.440 HISTORY OF RECURRENT UTIS: ICD-10-CM

## 2020-12-10 LAB
BILIRUB BLD-MCNC: NEGATIVE MG/DL
CLARITY, POC: CLEAR
COLOR UR: YELLOW
GLUCOSE UR STRIP-MCNC: NEGATIVE MG/DL
KETONES UR QL: NEGATIVE
LEUKOCYTE EST, POC: ABNORMAL
NITRITE UR-MCNC: NEGATIVE MG/ML
PH UR: 5.5 [PH] (ref 5–8)
PROT UR STRIP-MCNC: NEGATIVE MG/DL
RBC # UR STRIP: NEGATIVE /UL
SP GR UR: 1.01 (ref 1–1.03)
UROBILINOGEN UR QL: NORMAL

## 2020-12-10 PROCEDURE — 99213 OFFICE O/P EST LOW 20 MIN: CPT | Performed by: UROLOGY

## 2020-12-10 PROCEDURE — 87086 URINE CULTURE/COLONY COUNT: CPT | Performed by: UROLOGY

## 2020-12-10 PROCEDURE — 81003 URINALYSIS AUTO W/O SCOPE: CPT | Performed by: UROLOGY

## 2020-12-10 PROCEDURE — 87077 CULTURE AEROBIC IDENTIFY: CPT | Performed by: UROLOGY

## 2020-12-10 PROCEDURE — 87186 SC STD MICRODIL/AGAR DIL: CPT | Performed by: UROLOGY

## 2020-12-10 RX ORDER — PHENAZOPYRIDINE HYDROCHLORIDE 200 MG/1
200 TABLET, FILM COATED ORAL 3 TIMES DAILY PRN
Qty: 60 TABLET | Refills: 3 | Status: SHIPPED | OUTPATIENT
Start: 2020-12-10 | End: 2021-06-10

## 2020-12-10 RX ORDER — NITROFURANTOIN 25; 75 MG/1; MG/1
100 CAPSULE ORAL DAILY
Qty: 56 CAPSULE | Refills: 3 | Status: SHIPPED | OUTPATIENT
Start: 2020-12-10 | End: 2021-07-13

## 2020-12-10 NOTE — TELEPHONE ENCOUNTER
Patient requesting a prescription for azighromiycin for her uti sent into [facility] in [place].

## 2020-12-10 NOTE — TELEPHONE ENCOUNTER
unable to reach the pt - Azithromycin is for upper respiratory infections and will not help with urinary symptoms. Dr Conte sent in Macrobid which is meant for UTI symptoms.

## 2020-12-10 NOTE — PROGRESS NOTES
Chief Complaint:          Chief Complaint   Patient presents with   • Urinary Tract Infection     6 month fu        HPI:   70 y.o. female known to me with recurrent infections been on Macrobid prophylaxis.  She drinks copious cola products she thinks she has an infection I would culture it call I refilled her Macrobid we will see her back based on this.      Past Medical History:        Past Medical History:   Diagnosis Date   • Abdominal pain, LLQ (left lower quadrant)    • Abnormal Pap smear of cervix     several years ago   • Cystitis    • Diabetes mellitus (CMS/HCC)    • Diarrhea    • Encounter for cholecystectomy 2018   • Gallstones    • Hiatal hernia    • Hyperglycemia    • Hyperlipidemia    • Hypothyroidism    • Muscle spasm     FLANK PAIN   • OAB (overactive bladder)    • UTI (urinary tract infection)          Current Meds:     Current Outpatient Medications   Medication Sig Dispense Refill   • aspirin 81 MG chewable tablet Chew 81 mg daily.     • fenofibrate (Tricor) 145 MG tablet Take 1 tablet by mouth Daily. 90 tablet 1   • glucose blood test strip Use as instructed 50 each 12   • glucose monitor monitoring kit 1 each as needed (DM II). 1 each 0   • metFORMIN ER (GLUCOPHAGE-XR) 500 MG 24 hr tablet Take 1 tablet by mouth Daily With Breakfast. 30 tablet 5   • nitrofurantoin, macrocrystal-monohydrate, (Macrobid) 100 MG capsule Take 1 capsule by mouth Daily. 56 capsule 3   • Synthroid 125 MCG tablet TAKE 1 TABLET BY MOUTH EVERY DAY 30 tablet 3   • vitamin D (ERGOCALCIFEROL) 1.25 MG (95570 UT) capsule capsule Take 1 capsule by mouth 1 (One) Time Per Week. 5 capsule 2   • cyclobenzaprine (FLEXERIL) 5 MG tablet        No current facility-administered medications for this visit.         Allergies:      Allergies   Allergen Reactions   • Bactrim [Sulfamethoxazole-Trimethoprim] Hives   • Ciprofloxacin Hives   • Penicillins Hives   • Steri-Strip Compound Benzoin [Benzoin Compound] Hives   • Sulfa Antibiotics Hives           Past Surgical History:     Past Surgical History:   Procedure Laterality Date   • BREAST BIOPSY Bilateral 2005    benign   • ENDOSCOPY     • ENDOSCOPY N/A 1/11/2018    Procedure: ESOPHAGOGASTRODUODENOSCOPY;  Surgeon: Dong Mtaa MD;  Location: [facility] OR;  Service:    • MAMMO BILATERAL  09/2015   • OVARIAN CYST DRAINAGE     • [place] LAP,CHOLECYSTECTOMY N/A 1/11/2018    Procedure: CHOLECYSTECTOMY LAPAROSCOPIC;  Surgeon: Dong Mata MD;  Location: [facility] OR;  Service: General   • US GUIDED LYMPH NODE BIOPSY  4/11/2018         Social History:     Social History     Socioeconomic History   • Marital status:      Spouse name: Not on file   • Number of children: Not on file   • Years of education: Not on file   • Highest education level: Not on file   Tobacco Use   • Smoking status: Never Smoker   • Smokeless tobacco: Never Used   • Tobacco comment: never   Substance and Sexual Activity   • Alcohol use: No   • Drug use: No   • Sexual activity: Yes     Partners: Male       Family History:     Family History   Problem Relation Age of Onset   • Heart disease Mother    • Cancer Mother         kidney   • Heart disease Father    • Breast cancer Neg Hx        Review of Systems:     Review of Systems   Constitutional: Negative.  Negative for activity change, appetite change, chills, diaphoresis, fatigue and unexpected weight change.   HENT: Negative for congestion, dental problem, drooling, ear discharge, ear pain, facial swelling, hearing loss, mouth sores, nosebleeds, postnasal drip, rhinorrhea, sinus pressure, sneezing, sore throat, tinnitus, trouble swallowing and voice change.    Eyes: Negative.  Negative for photophobia, pain, discharge, redness, itching and visual disturbance.   Respiratory: Negative.  Negative for apnea, cough, choking, chest tightness, shortness of breath, wheezing and stridor.    Cardiovascular: Negative.  Negative for chest pain, palpitations and leg swelling.    Gastrointestinal: Negative.  Negative for abdominal distention, abdominal pain, anal bleeding, blood in stool, constipation, diarrhea, nausea, rectal pain and vomiting.   Endocrine: Negative.  Negative for cold intolerance, heat intolerance, polydipsia, polyphagia and polyuria.   Genitourinary: Positive for difficulty urinating, dysuria, frequency, pelvic pain and urgency.   Musculoskeletal: Negative.  Negative for arthralgias, back pain, gait problem, joint swelling, myalgias, neck pain and neck stiffness.   Skin: Negative.  Negative for color change, pallor, rash and wound.   Allergic/Immunologic: Negative.  Negative for environmental allergies, food allergies and immunocompromised state.   Neurological: Negative.  Negative for dizziness, tremors, seizures, syncope, facial asymmetry, speech difficulty, weakness, light-headedness, numbness and headaches.   Hematological: Negative.  Negative for adenopathy. Does not bruise/bleed easily.   Psychiatric/Behavioral: Negative for agitation, behavioral problems, confusion, decreased concentration, dysphoric mood, hallucinations, self-injury, sleep disturbance and suicidal ideas. The patient is not nervous/anxious and is not hyperactive.    All other systems reviewed and are negative.      Physical Exam:     Physical Exam  Constitutional:       Appearance: She is well-developed.   HENT:      Head: Normocephalic and atraumatic.      Right Ear: External ear normal.      Left Ear: External ear normal.   Eyes:      Conjunctiva/sclera: Conjunctivae normal.      Pupils: Pupils are equal, round, and reactive to light.   Cardiovascular:      Rate and Rhythm: Normal rate and regular rhythm.      Heart sounds: Normal heart sounds.   Pulmonary:      Effort: Pulmonary effort is normal.      Breath sounds: Normal breath sounds.   Abdominal:      General: Bowel sounds are normal. There is no distension.      Palpations: Abdomen is soft. There is no mass.      Tenderness: There is no  abdominal tenderness. There is no guarding or rebound.   Genitourinary:     Vagina: No vaginal discharge.   Musculoskeletal: Normal range of motion.   Skin:     General: Skin is warm and dry.   Neurological:      Mental Status: She is alert.      Deep Tendon Reflexes: Reflexes are normal and symmetric.   Psychiatric:         Behavior: Behavior normal.         Thought Content: Thought content normal.         Judgment: Judgment normal.         I have reviewed the following portions of the patient's history: allergies, current medications, past family history, past medical history, past social history, past surgical history, problem list and ROS and confirm it's accurate.      Procedure:       Assessment/Plan:   Recurrent urinary tract infections-patient has been referred and diagnosed with recurrent urinary tract infections.  We discussed the types of organisms that are found in the urinary tract indicating that the vast majority are results of the patient's own gastrointestinal isha.  We discussed how many of the antibiotics that are utilized can actually exacerbate these infections by creating resistant organisms and there is only a very few antibiotics that are concentrated in the urine and do not affect the rectal reservoir nor cause recurrent yeast vaginitis.  We discussed the risk factors for recurrent infections being intercourse in younger patients and atrophic changes in older patients.  We discussed the symptoms that are found including pain, pressure, burning, frequency, urgency suprapubic pain and painful intercourse.  I discussed upper tract symptoms including fevers, chills, and indicated the workup would be much more aggressive if the patient were to present with recurrent infections in the face of upper tract symptomatology such as fever.  I discussed the history of vesicoureteral reflux in young patients and finally chronic renal scarring as a result of such.  I recommend concomitant probiotics with  treatment with antibiotics to protect the rectal reservoir including over-the-counter yogurt preparations to stone oral pills containing the appropriate probiotics.  I have a urine culture pending I refilled her Macrobid I will follow up with her based on this            Patient's Body mass index is 34.75 kg/m². BMI is above normal parameters. Recommendations include: educational material.              This document has been electronically signed by JULIANNA SPENCER MD December 10, 2020 09:19 EST

## 2020-12-12 LAB — BACTERIA SPEC AEROBE CULT: ABNORMAL

## 2020-12-14 NOTE — TELEPHONE ENCOUNTER
"Pt called back to speak to me about my message - tried returning her call - no answer - upon reviewing her chart, she had stated to Dr Conte in his last note \"  She calls and requests antibiotics on a fairly regular basis and states erythromycin works well.\" Looking at the cx that came back from her appt on 12/10/20 is resistant to Macrobid, will have Dr Conte review her cx and let me know what our next step should be.   "

## 2020-12-15 RX ORDER — DOXYCYCLINE HYCLATE 100 MG/1
100 CAPSULE ORAL 2 TIMES DAILY
Qty: 20 CAPSULE | Refills: 0 | Status: SHIPPED | OUTPATIENT
Start: 2020-12-15 | End: 2020-12-25

## 2020-12-15 NOTE — TELEPHONE ENCOUNTER
Claudy Conte MD McNeil, Katanna, MA                Doxycycline 100 mg p.o. twice daily x10 days      Unable to reach the pt to tell her that Dr Conte's recommendation is to take the above medication, sent into local pharmacy - please advise pt of this if she calls back.

## 2021-02-22 DIAGNOSIS — Z23 IMMUNIZATION DUE: ICD-10-CM

## 2021-02-26 ENCOUNTER — TELEPHONE (OUTPATIENT)
Dept: FAMILY MEDICINE CLINIC | Facility: CLINIC | Age: 71
End: 2021-02-26

## 2021-02-26 NOTE — TELEPHONE ENCOUNTER
PATIENT CALLED AND SAID THAT HER INSURANCE WONT PAY FOR HER SYNTHROID ANY LONGER AND SHE WILL NEED A GENERIC...    REN: [phone]    [facility]     SHE STILL HAS PLENTY BUT CALLING AHEAD OF TIME

## 2021-03-09 ENCOUNTER — OFFICE VISIT (OUTPATIENT)
Dept: FAMILY MEDICINE CLINIC | Facility: CLINIC | Age: 71
End: 2021-03-09

## 2021-03-09 VITALS
TEMPERATURE: 97.3 F | BODY MASS INDEX: 37.58 KG/M2 | HEIGHT: 62 IN | OXYGEN SATURATION: 98 % | HEART RATE: 78 BPM | DIASTOLIC BLOOD PRESSURE: 88 MMHG | WEIGHT: 204.2 LBS | SYSTOLIC BLOOD PRESSURE: 142 MMHG

## 2021-03-09 DIAGNOSIS — E55.9 VITAMIN D DEFICIENCY: ICD-10-CM

## 2021-03-09 DIAGNOSIS — I10 HTN (HYPERTENSION), BENIGN: ICD-10-CM

## 2021-03-09 DIAGNOSIS — E11.9 TYPE 2 DIABETES MELLITUS WITHOUT COMPLICATION, WITHOUT LONG-TERM CURRENT USE OF INSULIN (HCC): ICD-10-CM

## 2021-03-09 DIAGNOSIS — E03.8 ADULT ONSET HYPOTHYROIDISM: ICD-10-CM

## 2021-03-09 DIAGNOSIS — R35.0 URINARY FREQUENCY: Primary | ICD-10-CM

## 2021-03-09 DIAGNOSIS — B37.9 CANDIDIASIS: ICD-10-CM

## 2021-03-09 LAB
BILIRUB BLD-MCNC: NEGATIVE MG/DL
CLARITY, POC: CLEAR
COLOR UR: YELLOW
GLUCOSE UR STRIP-MCNC: NEGATIVE MG/DL
KETONES UR QL: NEGATIVE
LEUKOCYTE EST, POC: NEGATIVE
NITRITE UR-MCNC: NEGATIVE MG/ML
PH UR: 6 [PH] (ref 5–8)
PROT UR STRIP-MCNC: NEGATIVE MG/DL
RBC # UR STRIP: NEGATIVE /UL
SP GR UR: 1.02 (ref 1–1.03)
UROBILINOGEN UR QL: NORMAL

## 2021-03-09 PROCEDURE — 81003 URINALYSIS AUTO W/O SCOPE: CPT | Performed by: NURSE PRACTITIONER

## 2021-03-09 PROCEDURE — 83036 HEMOGLOBIN GLYCOSYLATED A1C: CPT | Performed by: NURSE PRACTITIONER

## 2021-03-09 PROCEDURE — 99214 OFFICE O/P EST MOD 30 MIN: CPT | Performed by: NURSE PRACTITIONER

## 2021-03-09 PROCEDURE — 80053 COMPREHEN METABOLIC PANEL: CPT | Performed by: NURSE PRACTITIONER

## 2021-03-09 PROCEDURE — 84443 ASSAY THYROID STIM HORMONE: CPT | Performed by: NURSE PRACTITIONER

## 2021-03-09 PROCEDURE — 82306 VITAMIN D 25 HYDROXY: CPT | Performed by: NURSE PRACTITIONER

## 2021-03-09 PROCEDURE — 36415 COLL VENOUS BLD VENIPUNCTURE: CPT | Performed by: NURSE PRACTITIONER

## 2021-03-09 PROCEDURE — 80061 LIPID PANEL: CPT | Performed by: NURSE PRACTITIONER

## 2021-03-09 PROCEDURE — 85025 COMPLETE CBC W/AUTO DIFF WBC: CPT | Performed by: NURSE PRACTITIONER

## 2021-03-09 PROCEDURE — 82607 VITAMIN B-12: CPT | Performed by: NURSE PRACTITIONER

## 2021-03-09 RX ORDER — NYSTATIN AND TRIAMCINOLONE ACETONIDE 100000; 1 [USP'U]/G; MG/G
OINTMENT TOPICAL 2 TIMES DAILY
Qty: 60 G | Refills: 1 | Status: SHIPPED | OUTPATIENT
Start: 2021-03-09 | End: 2021-09-21

## 2021-03-09 RX ORDER — LISINOPRIL 2.5 MG/1
2.5 TABLET ORAL DAILY
Qty: 90 TABLET | Refills: 1 | Status: SHIPPED | OUTPATIENT
Start: 2021-03-09 | End: 2021-08-24

## 2021-03-10 ENCOUNTER — TELEPHONE (OUTPATIENT)
Dept: FAMILY MEDICINE CLINIC | Facility: CLINIC | Age: 71
End: 2021-03-10

## 2021-03-10 LAB
25(OH)D3 SERPL-MCNC: 37.3 NG/ML (ref 30–100)
ALBUMIN SERPL-MCNC: 4.4 G/DL (ref 3.5–5.2)
ALBUMIN/GLOB SERPL: 1.3 G/DL
ALP SERPL-CCNC: 73 U/L (ref 39–117)
ALT SERPL W P-5'-P-CCNC: 26 U/L (ref 1–33)
ANION GAP SERPL CALCULATED.3IONS-SCNC: 11.4 MMOL/L (ref 5–15)
AST SERPL-CCNC: 31 U/L (ref 1–32)
BASOPHILS # BLD AUTO: 0.08 10*3/MM3 (ref 0–0.2)
BASOPHILS NFR BLD AUTO: 1.1 % (ref 0–1.5)
BILIRUB SERPL-MCNC: 0.5 MG/DL (ref 0–1.2)
BUN SERPL-MCNC: 9 MG/DL (ref 8–23)
BUN/CREAT SERPL: 10.5 (ref 7–25)
CALCIUM SPEC-SCNC: 9.5 MG/DL (ref 8.6–10.5)
CHLORIDE SERPL-SCNC: 104 MMOL/L (ref 98–107)
CHOLEST SERPL-MCNC: 204 MG/DL (ref 0–200)
CO2 SERPL-SCNC: 22.6 MMOL/L (ref 22–29)
CREAT SERPL-MCNC: 0.86 MG/DL (ref 0.57–1)
DEPRECATED RDW RBC AUTO: 43.7 FL (ref 37–54)
EOSINOPHIL # BLD AUTO: 0.21 10*3/MM3 (ref 0–0.4)
EOSINOPHIL NFR BLD AUTO: 2.9 % (ref 0.3–6.2)
ERYTHROCYTE [DISTWIDTH] IN BLOOD BY AUTOMATED COUNT: 13.3 % (ref 12.3–15.4)
GFR SERPL CREATININE-BSD FRML MDRD: 66 ML/MIN/1.73
GLOBULIN UR ELPH-MCNC: 3.5 GM/DL
GLUCOSE SERPL-MCNC: 132 MG/DL (ref 65–99)
HBA1C MFR BLD: 6.53 % (ref 4.8–5.6)
HCT VFR BLD AUTO: 37.7 % (ref 34–46.6)
HDLC SERPL-MCNC: 36 MG/DL (ref 40–60)
HGB BLD-MCNC: 12.5 G/DL (ref 12–15.9)
IMM GRANULOCYTES # BLD AUTO: 0.02 10*3/MM3 (ref 0–0.05)
IMM GRANULOCYTES NFR BLD AUTO: 0.3 % (ref 0–0.5)
LDLC SERPL CALC-MCNC: 136 MG/DL (ref 0–100)
LDLC/HDLC SERPL: 3.69 {RATIO}
LYMPHOCYTES # BLD AUTO: 1.66 10*3/MM3 (ref 0.7–3.1)
LYMPHOCYTES NFR BLD AUTO: 23.3 % (ref 19.6–45.3)
MCH RBC QN AUTO: 29.7 PG (ref 26.6–33)
MCHC RBC AUTO-ENTMCNC: 33.2 G/DL (ref 31.5–35.7)
MCV RBC AUTO: 89.5 FL (ref 79–97)
MONOCYTES # BLD AUTO: 0.43 10*3/MM3 (ref 0.1–0.9)
MONOCYTES NFR BLD AUTO: 6 % (ref 5–12)
NEUTROPHILS NFR BLD AUTO: 4.73 10*3/MM3 (ref 1.7–7)
NEUTROPHILS NFR BLD AUTO: 66.4 % (ref 42.7–76)
NRBC BLD AUTO-RTO: 0 /100 WBC (ref 0–0.2)
PLATELET # BLD AUTO: 275 10*3/MM3 (ref 140–450)
PMV BLD AUTO: 11.8 FL (ref 6–12)
POTASSIUM SERPL-SCNC: 4.4 MMOL/L (ref 3.5–5.2)
PROT SERPL-MCNC: 7.9 G/DL (ref 6–8.5)
RBC # BLD AUTO: 4.21 10*6/MM3 (ref 3.77–5.28)
SODIUM SERPL-SCNC: 138 MMOL/L (ref 136–145)
TRIGL SERPL-MCNC: 175 MG/DL (ref 0–150)
TSH SERPL DL<=0.05 MIU/L-ACNC: 0.73 UIU/ML (ref 0.27–4.2)
VIT B12 BLD-MCNC: 250 PG/ML (ref 211–946)
VLDLC SERPL-MCNC: 32 MG/DL (ref 5–40)
WBC # BLD AUTO: 7.13 10*3/MM3 (ref 3.4–10.8)

## 2021-03-10 NOTE — PROGRESS NOTES
Subjective   Shey King is a 70 y.o. female.   Chief Compliant: The patient presents with Urinary Frequency    Thyroid Problem  Presents for follow-up (Thyroiditis.  Denies any current concerns) visit. Symptoms include fatigue. Patient reports no palpitations. The symptoms have been stable.   Diabetes  She presents for her follow-up diabetic visit. She has type 2 diabetes mellitus. There are no hypoglycemic associated symptoms. Pertinent negatives for hypoglycemia include no headaches or sweats. Associated symptoms include fatigue. Pertinent negatives for diabetes include no blurred vision and no chest pain. There are no hypoglycemic complications. (Not monitoring  )   Urinary Frequency   This is a recurrent problem. The current episode started 1 to 4 weeks ago. The problem has been waxing and waning. The quality of the pain is described as burning. The pain is at a severity of 2/10. The pain is mild. There has been no fever. She is not sexually active. There is no history of pyelonephritis. Pertinent negatives include no chills, flank pain, hesitancy, sweats or urgency. She has tried increased fluids for the symptoms. The treatment provided mild relief.   Hypertension  This is a new problem. Associated symptoms include malaise/fatigue. Pertinent negatives include no anxiety, blurred vision, chest pain, headaches, neck pain, orthopnea, palpitations, peripheral edema, PND, shortness of breath or sweats. Past treatments include nothing. Identifiable causes of hypertension include a thyroid problem.      The following portions of the patient's history were reviewed and updated as appropriate: allergies, current medications, past family history, past medical history, past social history, past surgical history and problem list.      Review of Systems   Constitutional: Positive for fatigue and malaise/fatigue. Negative for chills.   HENT: Negative.    Eyes: Negative for blurred vision.   Respiratory: Negative.   "Negative for shortness of breath.    Cardiovascular: Negative.  Negative for chest pain, palpitations, orthopnea and PND.   Gastrointestinal: Negative for abdominal distention, anal bleeding and blood in stool.   Genitourinary: Negative for flank pain, hesitancy and urgency.   Musculoskeletal: Negative for neck pain.        Right hand pain    Neurological: Negative.  Negative for headaches.   All other systems reviewed and are negative.      Procedures    Vitals: Blood pressure 142/88, pulse 78, temperature 97.3 °F (36.3 °C), temperature source Temporal, height 157.5 cm (62\"), weight 92.6 kg (204 lb 3.2 oz), SpO2 98 %, not currently breastfeeding.     Allergies:   Allergies   Allergen Reactions   • Bactrim [Sulfamethoxazole-Trimethoprim] Hives   • Ciprofloxacin Hives   • Penicillins Hives   • Steri-Strip Compound Benzoin [Benzoin Compound] Hives   • Sulfa Antibiotics Hives          Objective   Physical Exam   Constitutional: She is oriented to person, place, and time. She appears well-developed. No distress.   HENT:   Head: Normocephalic.   Right Ear: Hearing normal.   Left Ear: Hearing normal.   Cardiovascular: Normal rate, regular rhythm and normal heart sounds.   No murmur heard.  Pulmonary/Chest: Effort normal and breath sounds normal.   Abdominal: Soft. Bowel sounds are normal.   Musculoskeletal:      Comments: Right palm with formation of cyst   Neurological: She is alert and oriented to person, place, and time.   Skin: Skin is warm and dry. She is not diaphoretic.   Psychiatric: Her behavior is normal.   Nursing note and vitals reviewed.      During this visit the following were done:  Labs Reviewed []    Labs Ordered [x]    Radiology Reports Reviewed []    Radiology Ordered []    PCP Records Reviewed []    Referring Provider Records Reviewed []    ER Records Reviewed []    Hospital Records Reviewed []    History Obtained From Family []    Radiology Images Reviewed []    Other Reviewed []    Records Requested " []      Assessment/Plan   Discussed with patient impression and plan, patient verbalizes understanding  Diagnoses and all orders for this visit:    1. Urinary frequency (Primary)  -     POCT urinalysis dipstick, automated  Encouraged to increase water intake and monitor glucose    2. Type 2 diabetes mellitus without complication, without long-term current use of insulin (CMS/[facility])  -     CBC Auto Differential; Future  -     Comprehensive Metabolic Panel; Future  -     Hemoglobin A1c; Future  -     Lipid Panel; Future  -     TSH; Future  -     Vitamin B12; Future  -     Vitamin D 25 hydroxy; Future  -     CBC Auto Differential  -     Comprehensive Metabolic Panel  -     Hemoglobin A1c  -     Lipid Panel  -     TSH  -     Vitamin B12  -     Vitamin D 25 hydroxy    3. Adult onset hypothyroidism  -     TSH; Future  -     TSH    4. Vitamin D deficiency  -     Vitamin D 25 hydroxy; Future  -     Vitamin D 25 hydroxy    5. HTN (hypertension), benign  -     lisinopril (Zestril) 2.5 MG tablet; Take 1 tablet by mouth Daily.  Dispense: 90 tablet; Refill: 1  -     Comprehensive Metabolic Panel  -     Lipid Panel  -     TSH    6. Candidiasis  -     nystatin-triamcinolone (MYCOLOG) 909257-0.1 UNIT/GM-% ointment; Apply  topically to the appropriate area as directed 2 (Two) Times a Day.  Dispense: 60 g; Refill: 1      Patient's Body mass index is 37.35 kg/m². BMI is above normal parameters. Recommendations include: exercise counseling and nutrition counseling.

## 2021-03-10 NOTE — TELEPHONE ENCOUNTER
----- Message from BERT Lucas sent at 3/10/2021 11:31 AM EST -----  Labs are ok except her cholesterol and triglycerides.  Is she taking the Tricor daily.  Results may be a reflection of her diet change.  Sugar is ok.        Patient notified & verbalized understanding.

## 2021-03-25 DIAGNOSIS — E11.9 TYPE 2 DIABETES MELLITUS WITHOUT COMPLICATION, WITHOUT LONG-TERM CURRENT USE OF INSULIN (HCC): ICD-10-CM

## 2021-03-25 RX ORDER — LEVOTHYROXINE SODIUM 125 MCG
TABLET ORAL
Qty: 30 TABLET | Refills: 2 | Status: SHIPPED | OUTPATIENT
Start: 2021-03-25 | End: 2021-06-09 | Stop reason: SDUPTHER

## 2021-03-25 RX ORDER — METFORMIN HYDROCHLORIDE 500 MG/1
500 TABLET, EXTENDED RELEASE ORAL
Qty: 30 TABLET | Refills: 5 | Status: SHIPPED | OUTPATIENT
Start: 2021-03-25 | End: 2021-07-13 | Stop reason: SDUPTHER

## 2021-03-25 RX ORDER — FENOFIBRATE 145 MG/1
145 TABLET, COATED ORAL DAILY
Qty: 90 TABLET | Refills: 1 | Status: SHIPPED | OUTPATIENT
Start: 2021-03-25 | End: 2021-08-24 | Stop reason: SDUPTHER

## 2021-05-18 ENCOUNTER — TELEPHONE (OUTPATIENT)
Dept: FAMILY MEDICINE CLINIC | Facility: CLINIC | Age: 71
End: 2021-05-18

## 2021-05-18 ENCOUNTER — OFFICE VISIT (OUTPATIENT)
Dept: FAMILY MEDICINE CLINIC | Facility: CLINIC | Age: 71
End: 2021-05-18

## 2021-05-18 VITALS
HEIGHT: 62 IN | TEMPERATURE: 96.8 F | BODY MASS INDEX: 38.35 KG/M2 | WEIGHT: 208.4 LBS | DIASTOLIC BLOOD PRESSURE: 82 MMHG | OXYGEN SATURATION: 97 % | HEART RATE: 84 BPM | SYSTOLIC BLOOD PRESSURE: 130 MMHG

## 2021-05-18 DIAGNOSIS — I10 HTN (HYPERTENSION), BENIGN: ICD-10-CM

## 2021-05-18 DIAGNOSIS — E66.01 MORBID (SEVERE) OBESITY DUE TO EXCESS CALORIES (HCC): ICD-10-CM

## 2021-05-18 DIAGNOSIS — E03.8 ADULT ONSET HYPOTHYROIDISM: ICD-10-CM

## 2021-05-18 DIAGNOSIS — E11.9 TYPE 2 DIABETES MELLITUS WITHOUT COMPLICATION, WITHOUT LONG-TERM CURRENT USE OF INSULIN (HCC): ICD-10-CM

## 2021-05-18 DIAGNOSIS — R35.0 URINARY FREQUENCY: Primary | ICD-10-CM

## 2021-05-18 LAB
BILIRUB BLD-MCNC: NEGATIVE MG/DL
CLARITY, POC: CLEAR
COLOR UR: YELLOW
GLUCOSE UR STRIP-MCNC: ABNORMAL MG/DL
KETONES UR QL: NEGATIVE
LEUKOCYTE EST, POC: NEGATIVE
NITRITE UR-MCNC: NEGATIVE MG/ML
PH UR: 6 [PH] (ref 5–8)
PROT UR STRIP-MCNC: NEGATIVE MG/DL
RBC # UR STRIP: ABNORMAL /UL
SP GR UR: 1.02 (ref 1–1.03)
UROBILINOGEN UR QL: NORMAL

## 2021-05-18 PROCEDURE — 87086 URINE CULTURE/COLONY COUNT: CPT | Performed by: NURSE PRACTITIONER

## 2021-05-18 PROCEDURE — 84443 ASSAY THYROID STIM HORMONE: CPT | Performed by: NURSE PRACTITIONER

## 2021-05-18 PROCEDURE — 36415 COLL VENOUS BLD VENIPUNCTURE: CPT | Performed by: NURSE PRACTITIONER

## 2021-05-18 PROCEDURE — 82607 VITAMIN B-12: CPT | Performed by: NURSE PRACTITIONER

## 2021-05-18 PROCEDURE — 99214 OFFICE O/P EST MOD 30 MIN: CPT | Performed by: NURSE PRACTITIONER

## 2021-05-18 PROCEDURE — 83036 HEMOGLOBIN GLYCOSYLATED A1C: CPT | Performed by: NURSE PRACTITIONER

## 2021-05-18 PROCEDURE — 87186 SC STD MICRODIL/AGAR DIL: CPT | Performed by: NURSE PRACTITIONER

## 2021-05-18 PROCEDURE — 81003 URINALYSIS AUTO W/O SCOPE: CPT | Performed by: NURSE PRACTITIONER

## 2021-05-18 PROCEDURE — 87077 CULTURE AEROBIC IDENTIFY: CPT | Performed by: NURSE PRACTITIONER

## 2021-05-18 RX ORDER — FLUCONAZOLE 150 MG/1
150 TABLET ORAL ONCE
Qty: 1 TABLET | Refills: 0 | Status: SHIPPED | OUTPATIENT
Start: 2021-05-18 | End: 2021-05-18

## 2021-05-18 RX ORDER — TRIAMCINOLONE ACETONIDE 0.25 MG/G
0.03 CREAM TOPICAL ONCE
COMMUNITY
Start: 2021-05-11 | End: 2021-09-21

## 2021-05-18 NOTE — PROGRESS NOTES
Subjective   Shey King is a 70 y.o. female.   Chief Compliant: The patient presents with Difficulty Urinating (dysuria)    Urinary Frequency   This is a recurrent (Following urology on daily antibiotic) problem. The current episode started 1 to 4 weeks ago. The problem occurs every urination. The problem has been unchanged. The quality of the pain is described as burning. The pain is at a severity of 2/10. The pain is mild. There has been no fever. She is not sexually active. There is no history of pyelonephritis. Associated symptoms include frequency. Pertinent negatives include no flank pain, hesitancy, sweats or urgency. She has tried increased fluids for the symptoms. The treatment provided mild relief.   Thyroid Problem  Presents for follow-up (Thyroiditis.  Denies any current concerns) visit. Symptoms include weight gain. Patient reports no palpitations. The symptoms have been stable.   Diabetes  She presents for her follow-up diabetic visit. She has type 2 diabetes mellitus. Her disease course has been stable. There are no hypoglycemic associated symptoms. Pertinent negatives for hypoglycemia include no sweats. There are no diabetic associated symptoms. Pertinent negatives for diabetes include no blurred vision. There are no hypoglycemic complications. Symptoms are stable. Risk factors for coronary artery disease include family history and dyslipidemia. (Not monitoring  )   Hypertension  This is a new problem. Pertinent negatives include no anxiety, blurred vision, orthopnea, palpitations, peripheral edema, PND, shortness of breath or sweats. Past treatments include nothing. Identifiable causes of hypertension include a thyroid problem.      The following portions of the patient's history were reviewed and updated as appropriate: allergies, current medications, past family history, past medical history, past social history, past surgical history and problem list.      Review of Systems   Constitutional:  "Positive for weight gain.   HENT: Negative.    Eyes: Negative for blurred vision.   Respiratory: Negative.  Negative for shortness of breath.    Cardiovascular: Negative.  Negative for palpitations, orthopnea and PND.   Gastrointestinal: Negative for abdominal distention, anal bleeding and blood in stool.   Genitourinary: Positive for frequency. Negative for flank pain, hesitancy and urgency.   Musculoskeletal:        Right hand pain    Neurological: Negative.    All other systems reviewed and are negative.      Procedures    Vitals: Blood pressure 130/82, pulse 84, temperature 96.8 °F (36 °C), height 157.5 cm (62\"), weight 94.5 kg (208 lb 6.4 oz), SpO2 97 %, not currently breastfeeding.     Allergies:   Allergies   Allergen Reactions   • Bactrim [Sulfamethoxazole-Trimethoprim] Hives   • Ciprofloxacin Hives   • Penicillins Hives   • Steri-Strip Compound Benzoin [Benzoin Compound] Hives   • Sulfa Antibiotics Hives          Objective   Physical Exam   Constitutional: She is oriented to person, place, and time. She appears well-developed. No distress.   HENT:   Head: Normocephalic.   Right Ear: Hearing normal.   Left Ear: Hearing normal.   Cardiovascular: Normal rate, regular rhythm and normal heart sounds.   No murmur heard.  Pulmonary/Chest: Effort normal and breath sounds normal.   Abdominal: Soft. Bowel sounds are normal.   Neurological: She is alert and oriented to person, place, and time.   Skin: Skin is warm and dry. She is not diaphoretic.   Psychiatric: Her behavior is normal.   Nursing note and vitals reviewed.      During this visit the following were done:  Labs Reviewed []    Labs Ordered [x]    Radiology Reports Reviewed []    Radiology Ordered []    PCP Records Reviewed []    Referring Provider Records Reviewed []    ER Records Reviewed []    Hospital Records Reviewed []    History Obtained From Family []    Radiology Images Reviewed []    Other Reviewed []    Records Requested []      Diagnoses and all " orders for this visit:    1. Urinary frequency (Primary)  -     POCT urinalysis dipstick, automated  -     Urine Culture - Urine, Urine, Clean Catch; Future  -     fluconazole (Diflucan) 150 MG tablet; Take 1 tablet by mouth 1 (One) Time for 1 dose.  Dispense: 1 tablet; Refill: 0  -     Urine Culture - Urine, Urine, Clean Catch    2. Type 2 diabetes mellitus without complication, without long-term current use of insulin (CMS/[facility])  -     Hemoglobin A1c; Future  -     Vitamin B12; Future  -     Empagliflozin 10 MG tablet; Take 10 mg by mouth Daily.  Dispense: 90 tablet; Refill: 1  -     Hemoglobin A1c  -     Vitamin B12    3. Adult onset hypothyroidism  -     TSH; Future  -     TSH    4. HTN (hypertension), benign  Continue with current medications    5. Morbid (severe) obesity due to excess calories (CMS/[facility])      Patient's Body mass index is 38.12 kg/m². BMI is above normal parameters. Recommendations include: exercise counseling and nutrition counseling.

## 2021-05-19 ENCOUNTER — TELEPHONE (OUTPATIENT)
Dept: FAMILY MEDICINE CLINIC | Facility: CLINIC | Age: 71
End: 2021-05-19

## 2021-05-19 LAB
HBA1C MFR BLD: 7.3 % (ref 4.8–5.6)
TSH SERPL DL<=0.05 MIU/L-ACNC: 0.96 UIU/ML (ref 0.27–4.2)
VIT B12 BLD-MCNC: 228 PG/ML (ref 211–946)

## 2021-05-19 RX ORDER — ERYTHROMYCIN STEARATE 250 MG
500 TABLET ORAL 2 TIMES DAILY
Qty: 40 TABLET | Refills: 0 | Status: SHIPPED | OUTPATIENT
Start: 2021-05-19 | End: 2021-06-10

## 2021-05-19 NOTE — TELEPHONE ENCOUNTER
----- Message from BERT Lucas sent at 5/19/2021 10:41 AM EDT -----  Thyroid is stable.  Sugar is up as assumed continue with plans as discussed yesterday.  Culture is positive I sent erythromycin to the pharmacy and will await the culture to make sure sensitive to medications         Patient notified & verbalized understanding.

## 2021-05-20 LAB — BACTERIA SPEC AEROBE CULT: ABNORMAL

## 2021-05-24 ENCOUNTER — TELEPHONE (OUTPATIENT)
Dept: FAMILY MEDICINE CLINIC | Facility: CLINIC | Age: 71
End: 2021-05-24

## 2021-05-24 NOTE — TELEPHONE ENCOUNTER
----- Message from BERT Lcuas sent at 5/24/2021  1:10 PM EDT -----  I had already sent erythromycin I just wanted her to know because she was on daily nitrofurantoin.     Not available at this time.      Patient notified & verbalized understanding.

## 2021-06-09 ENCOUNTER — OFFICE VISIT (OUTPATIENT)
Dept: FAMILY MEDICINE CLINIC | Facility: CLINIC | Age: 71
End: 2021-06-09

## 2021-06-09 VITALS
DIASTOLIC BLOOD PRESSURE: 80 MMHG | BODY MASS INDEX: 37.73 KG/M2 | TEMPERATURE: 96.9 F | OXYGEN SATURATION: 100 % | WEIGHT: 205 LBS | HEIGHT: 62 IN | SYSTOLIC BLOOD PRESSURE: 130 MMHG | HEART RATE: 80 BPM

## 2021-06-09 DIAGNOSIS — N39.0 RECURRENT UTI: Primary | ICD-10-CM

## 2021-06-09 PROCEDURE — 99213 OFFICE O/P EST LOW 20 MIN: CPT | Performed by: NURSE PRACTITIONER

## 2021-06-09 RX ORDER — LEVOTHYROXINE SODIUM 125 MCG
125 TABLET ORAL DAILY
Qty: 30 TABLET | Refills: 2 | Status: SHIPPED | OUTPATIENT
Start: 2021-06-09 | End: 2021-08-24 | Stop reason: DRUGHIGH

## 2021-06-09 RX ORDER — ERGOCALCIFEROL 1.25 MG/1
50000 CAPSULE ORAL WEEKLY
Qty: 5 CAPSULE | Refills: 2 | Status: SHIPPED | OUTPATIENT
Start: 2021-06-09 | End: 2021-12-29 | Stop reason: SDUPTHER

## 2021-06-09 NOTE — PROGRESS NOTES
"Answers for HPI/ROS submitted by the patient on 6/8/2021  Please describe your symptoms.: frequent urination pain. This is a follow up appointment  Have you had these symptoms before?: Yes  How long have you been having these symptoms?: Greater than 2 weeks  Please list any medications you are currently taking for this condition.: 1 tab macro bid  Please describe any probable cause for these symptoms. : dropped bladder  What is the primary reason for your visit?: Other    Chief Complaint  Urinary Tract Infection (follow up)    Subjective          Shey King presents to [facility]  Urinary Tract Infection   This is a chronic problem. The problem occurs intermittently. The problem has been waxing and waning. The quality of the pain is described as aching and burning. The pain is at a severity of 4/10. The pain is mild. There has been no fever. Associated symptoms include frequency and urgency. She has tried antibiotics and increased fluids for the symptoms. The treatment provided mild relief. Her past medical history is significant for recurrent UTIs.       Objective   Vital Signs:   /80   Pulse 80   Temp 96.9 °F (36.1 °C) (Temporal)   Ht 157.5 cm (62\")   Wt 93 kg (205 lb)   SpO2 100%   BMI 37.49 kg/m²     Physical Exam  Vitals and nursing note reviewed.   Constitutional:       Appearance: She is well-developed.   Cardiovascular:      Rate and Rhythm: Normal rate and regular rhythm.      Heart sounds: Normal heart sounds. No murmur heard.     Pulmonary:      Effort: Pulmonary effort is normal.      Breath sounds: Normal breath sounds.   Skin:     General: Skin is warm and dry.   Neurological:      Mental Status: She is alert and oriented to person, place, and time.   Psychiatric:         Behavior: Behavior normal.        Result Review :{Labs  Result Review  Imaging  Med Tab  Media  Procedures :23}                 Assessment and Plan    Diagnoses and all orders for " this visit:    1. Recurrent UTI (Primary)  Keep urology appt in the am    Other orders  -     Synthroid 125 MCG tablet; Take 1 tablet by mouth Daily.  Dispense: 30 tablet; Refill: 2  -     vitamin D (ERGOCALCIFEROL) 1.25 MG (13153 UT) capsule capsule; Take 1 capsule by mouth 1 (One) Time Per Week.  Dispense: 5 capsule; Refill: 2        Follow Up   Return in about 3 months (around 9/9/2021), or if symptoms worsen or fail to improve, for Recheck.  Patient was given instructions and counseling regarding her condition or for health maintenance advice. Please see specific information pulled into the AVS if appropriate.

## 2021-06-10 ENCOUNTER — OFFICE VISIT (OUTPATIENT)
Dept: UROLOGY | Facility: CLINIC | Age: 71
End: 2021-06-10

## 2021-06-10 VITALS — BODY MASS INDEX: 37.91 KG/M2 | HEIGHT: 62 IN | WEIGHT: 206 LBS

## 2021-06-10 DIAGNOSIS — N39.0 RECURRENT UTI: Primary | ICD-10-CM

## 2021-06-10 DIAGNOSIS — N30.00 ACUTE CYSTITIS WITHOUT HEMATURIA: ICD-10-CM

## 2021-06-10 LAB
BILIRUB BLD-MCNC: NEGATIVE MG/DL
CLARITY, POC: ABNORMAL
COLOR UR: YELLOW
GLUCOSE UR STRIP-MCNC: NEGATIVE MG/DL
KETONES UR QL: NEGATIVE
LEUKOCYTE EST, POC: ABNORMAL
NITRITE UR-MCNC: POSITIVE MG/ML
PH UR: 6 [PH] (ref 5–8)
PROT UR STRIP-MCNC: NEGATIVE MG/DL
RBC # UR STRIP: ABNORMAL /UL
SP GR UR: 1.02 (ref 1–1.03)
UROBILINOGEN UR QL: NORMAL

## 2021-06-10 PROCEDURE — 81003 URINALYSIS AUTO W/O SCOPE: CPT | Performed by: UROLOGY

## 2021-06-10 PROCEDURE — 99213 OFFICE O/P EST LOW 20 MIN: CPT | Performed by: UROLOGY

## 2021-06-10 PROCEDURE — 87086 URINE CULTURE/COLONY COUNT: CPT | Performed by: UROLOGY

## 2021-06-10 PROCEDURE — 87077 CULTURE AEROBIC IDENTIFY: CPT | Performed by: UROLOGY

## 2021-06-10 PROCEDURE — 87186 SC STD MICRODIL/AGAR DIL: CPT | Performed by: UROLOGY

## 2021-06-10 RX ORDER — TRIMETHOPRIM 100 MG/1
100 TABLET ORAL 2 TIMES DAILY
Qty: 30 TABLET | Refills: 3 | Status: SHIPPED | OUTPATIENT
Start: 2021-06-10 | End: 2021-07-30 | Stop reason: SDUPTHER

## 2021-06-10 NOTE — PROGRESS NOTES
Chief Complaint:          Chief Complaint   Patient presents with   • Recurrent UTI       HPI:   70 y.o. female returns today for follow-up. She has had 2-3 infections since her last visit. She had a culture yesterday with her PCP and had her urine was positive today unfortunately she has had got problems. She has had multiple rounds of different antibiotics, to restart her on trimethoprim pending the results of the culture and see her back in 2 weeks      Past Medical History:        Past Medical History:   Diagnosis Date   • Abdominal pain, LLQ (left lower quadrant)    • Abnormal Pap smear of cervix     several years ago   • Cystitis    • Diabetes mellitus (CMS/HCC)    • Diarrhea    • Encounter for cholecystectomy 2018   • Gallstones    • Hiatal hernia    • Hyperglycemia    • Hyperlipidemia    • Hypothyroidism    • Muscle spasm     FLANK PAIN   • OAB (overactive bladder)    • UTI (urinary tract infection)          Current Meds:     Current Outpatient Medications   Medication Sig Dispense Refill   • aspirin 81 MG chewable tablet Chew 81 mg daily.     • Empagliflozin 10 MG tablet Take 10 mg by mouth Daily. 90 tablet 1   • fenofibrate (TRICOR) 145 MG tablet TAKE 1 TABLET BY MOUTH DAILY 90 tablet 1   • glucose blood test strip Use as instructed 50 each 12   • glucose monitor monitoring kit 1 each as needed (DM II). 1 each 0   • lisinopril (Zestril) 2.5 MG tablet Take 1 tablet by mouth Daily. 90 tablet 1   • metFORMIN ER (GLUCOPHAGE-XR) 500 MG 24 hr tablet TAKE 1 TABLET BY MOUTH DAILY WITH BREAKFAST 30 tablet 5   • nitrofurantoin, macrocrystal-monohydrate, (Macrobid) 100 MG capsule Take 1 capsule by mouth Daily. 56 capsule 3   • nystatin-triamcinolone (MYCOLOG) 869554-5.1 UNIT/GM-% ointment Apply  topically to the appropriate area as directed 2 (Two) Times a Day. 60 g 1   • Synthroid 125 MCG tablet Take 1 tablet by mouth Daily. 30 tablet 2   • triamcinolone (KENALOG) 0.025 % cream Apply 0.025 application topically to  the appropriate area as directed 1 (One) Time.     • vitamin D (ERGOCALCIFEROL) 1.25 MG (50963 UT) capsule capsule Take 1 capsule by mouth 1 (One) Time Per Week. 5 capsule 2     No current facility-administered medications for this visit.        Allergies:      Allergies   Allergen Reactions   • Bactrim [Sulfamethoxazole-Trimethoprim] Hives   • Ciprofloxacin Hives   • Penicillins Hives   • Steri-Strip Compound Benzoin [Benzoin Compound] Hives   • Sulfa Antibiotics Hives        Past Surgical History:     Past Surgical History:   Procedure Laterality Date   • BREAST BIOPSY Bilateral 2005    benign   • ENDOSCOPY     • ENDOSCOPY N/A 1/11/2018    Procedure: ESOPHAGOGASTRODUODENOSCOPY;  Surgeon: Dong Mata MD;  Location: [facility] OR;  Service:    • MAMMO BILATERAL  09/2015   • OVARIAN CYST DRAINAGE     • MN LAP,CHOLECYSTECTOMY N/A 1/11/2018    Procedure: CHOLECYSTECTOMY LAPAROSCOPIC;  Surgeon: Dong Mata MD;  Location: [facility] OR;  Service: General   • US GUIDED LYMPH NODE BIOPSY  4/11/2018         Social History:     Social History     Socioeconomic History   • Marital status:      Spouse name: Not on file   • Number of children: Not on file   • Years of education: Not on file   • Highest education level: Not on file   Tobacco Use   • Smoking status: Never Smoker   • Smokeless tobacco: Never Used   • Tobacco comment: never   Substance and Sexual Activity   • Alcohol use: No   • Drug use: No   • Sexual activity: Yes     Partners: Male       Family History:     Family History   Problem Relation Age of Onset   • Heart disease Mother    • Cancer Mother         kidney   • Heart disease Father    • Breast cancer Neg Hx        Review of Systems:     Review of Systems   Constitutional: Negative.  Negative for activity change, appetite change, chills, diaphoresis, fatigue and unexpected weight change.   HENT: Negative for congestion, dental problem, drooling, ear discharge, ear pain, facial swelling,  hearing loss, mouth sores, nosebleeds, postnasal drip, rhinorrhea, sinus pressure, sneezing, sore throat, tinnitus, trouble swallowing and voice change.    Eyes: Negative.  Negative for photophobia, pain, discharge, redness, itching and visual disturbance.   Respiratory: Negative.  Negative for apnea, cough, choking, chest tightness, shortness of breath, wheezing and stridor.    Cardiovascular: Negative.  Negative for chest pain, palpitations and leg swelling.   Gastrointestinal: Negative.  Negative for abdominal distention, abdominal pain, anal bleeding, blood in stool, constipation, diarrhea, nausea, rectal pain and vomiting.   Endocrine: Negative.  Negative for cold intolerance, heat intolerance, polydipsia, polyphagia and polyuria.   Musculoskeletal: Negative.  Negative for arthralgias, back pain, gait problem, joint swelling, myalgias, neck pain and neck stiffness.   Skin: Negative.  Negative for color change, pallor, rash and wound.   Allergic/Immunologic: Negative.  Negative for environmental allergies, food allergies and immunocompromised state.   Neurological: Negative.  Negative for dizziness, tremors, seizures, syncope, facial asymmetry, speech difficulty, weakness, light-headedness, numbness and headaches.   Hematological: Negative.  Negative for adenopathy. Does not bruise/bleed easily.   Psychiatric/Behavioral: Negative for agitation, behavioral problems, confusion, decreased concentration, dysphoric mood, hallucinations, self-injury, sleep disturbance and suicidal ideas. The patient is not nervous/anxious and is not hyperactive.    All other systems reviewed and are negative.      Physical Exam:     Physical Exam  Constitutional:       Appearance: She is well-developed.   HENT:      Head: Normocephalic and atraumatic.      Right Ear: External ear normal.      Left Ear: External ear normal.   Eyes:      Conjunctiva/sclera: Conjunctivae normal.      Pupils: Pupils are equal, round, and reactive to  light.   Cardiovascular:      Rate and Rhythm: Normal rate and regular rhythm.      Heart sounds: Normal heart sounds.   Pulmonary:      Effort: Pulmonary effort is normal.      Breath sounds: Normal breath sounds.   Abdominal:      General: Bowel sounds are normal. There is no distension.      Palpations: Abdomen is soft. There is no mass.      Tenderness: There is no abdominal tenderness. There is no guarding or rebound.   Genitourinary:     Vagina: No vaginal discharge.   Musculoskeletal:         General: Normal range of motion.   Skin:     General: Skin is warm and dry.   Neurological:      Mental Status: She is alert.      Deep Tendon Reflexes: Reflexes are normal and symmetric.   Psychiatric:         Behavior: Behavior normal.         Thought Content: Thought content normal.         Judgment: Judgment normal.         I have reviewed the following portions of the patient's history: allergies, current medications, past family history, past medical history, past social history, past surgical history, problem list and ROS and confirm it's accurate.      Procedure:       Assessment/Plan:   Recurrent urinary tract infections-patient has been referred and diagnosed with recurrent urinary tract infections.  We discussed the types of organisms that are found in the urinary tract indicating that the vast majority are results of the patient's own gastrointestinal isha.  We discussed how many of the antibiotics that are utilized can actually exacerbate these infections by creating resistant organisms and there is only a very few antibiotics that are concentrated in the urine and do not affect the rectal reservoir nor cause recurrent yeast vaginitis.  We discussed the risk factors for recurrent infections being intercourse in younger patients and atrophic changes in older patients.  We discussed the symptoms that are found including pain, pressure, burning, frequency, urgency suprapubic pain and painful intercourse.  I  discussed upper tract symptoms including fevers, chills, and indicated the workup would be much more aggressive if the patient were to present with recurrent infections in the face of upper tract symptomatology such as fever.  I discussed the history of vesicoureteral reflux in young patients and finally chronic renal scarring as a result of such.  I recommend concomitant probiotics with treatment with antibiotics to protect the rectal reservoir including over-the-counter yogurt preparations to stone oral pills containing the appropriate probiotics. Has been on multiple rounds of different antibiotics per her PCP I reaffirmed the importance of avoiding toxic antibiotics. We discussed gout activity etc. I will recommend trimethoprim pending the results of his current urine culture  IBS-patient has significant irritable bowel syndrome characterized by extreme amounts of constipation.  We spoke about the impact of this on bladder function.  And its relationship to recurrent urinary tract infections.  We discussed the need for increasing fluid and discussed the physiology of colonic motility as well as use of MiraLAX as a bulk laxative versus the newer class of serotonin uptake blockers such as Linzess.  We stressed the need for a daily bowel movement and discussed the [place] stool scale at length.  We also discussed a referral to the GI nurse practitioner      Patient reports that she is not currently experiencing any symptoms of urinary incontinence.                    This document has been electronically signed by JULIANNA SPENCER MD Bhumika 10, 2021 08:14 EDT

## 2021-06-12 LAB — BACTERIA SPEC AEROBE CULT: ABNORMAL

## 2021-06-24 ENCOUNTER — OFFICE VISIT (OUTPATIENT)
Dept: UROLOGY | Facility: CLINIC | Age: 71
End: 2021-06-24

## 2021-06-24 VITALS — HEIGHT: 62 IN | BODY MASS INDEX: 37.89 KG/M2 | WEIGHT: 205.91 LBS

## 2021-06-24 DIAGNOSIS — N39.0 RECURRENT UTI: Primary | ICD-10-CM

## 2021-06-24 DIAGNOSIS — N30.00 ACUTE CYSTITIS WITHOUT HEMATURIA: ICD-10-CM

## 2021-06-24 DIAGNOSIS — N30.90 CYSTITIS: ICD-10-CM

## 2021-06-24 LAB
BILIRUB BLD-MCNC: ABNORMAL MG/DL
CLARITY, POC: ABNORMAL
COLOR UR: YELLOW
GLUCOSE UR STRIP-MCNC: NEGATIVE MG/DL
KETONES UR QL: NEGATIVE
LEUKOCYTE EST, POC: ABNORMAL
NITRITE UR-MCNC: POSITIVE MG/ML
PH UR: 5.5 [PH] (ref 5–8)
PROT UR STRIP-MCNC: NEGATIVE MG/DL
RBC # UR STRIP: NEGATIVE /UL
SP GR UR: 1.02 (ref 1–1.03)
UROBILINOGEN UR QL: NORMAL

## 2021-06-24 PROCEDURE — 81003 URINALYSIS AUTO W/O SCOPE: CPT | Performed by: UROLOGY

## 2021-06-24 PROCEDURE — 87077 CULTURE AEROBIC IDENTIFY: CPT | Performed by: UROLOGY

## 2021-06-24 PROCEDURE — 87186 SC STD MICRODIL/AGAR DIL: CPT | Performed by: UROLOGY

## 2021-06-24 PROCEDURE — 99213 OFFICE O/P EST LOW 20 MIN: CPT | Performed by: UROLOGY

## 2021-06-24 PROCEDURE — 87086 URINE CULTURE/COLONY COUNT: CPT | Performed by: UROLOGY

## 2021-06-24 PROCEDURE — 96372 THER/PROPH/DIAG INJ SC/IM: CPT | Performed by: UROLOGY

## 2021-06-24 RX ORDER — GENTAMICIN SULFATE 40 MG/ML
80 INJECTION, SOLUTION INTRAMUSCULAR; INTRAVENOUS ONCE
Status: COMPLETED | OUTPATIENT
Start: 2021-06-24 | End: 2021-06-24

## 2021-06-24 RX ADMIN — GENTAMICIN SULFATE 80 MG: 40 INJECTION, SOLUTION INTRAMUSCULAR; INTRAVENOUS at 09:37

## 2021-06-24 NOTE — PROGRESS NOTES
Chief Complaint:          Chief Complaint   Patient presents with   • RECURRENT UTI       HPI:   70 y.o. female turns today. She has a positive urinalysis. She has multiple allergies including Macrobid, Cipro, sulfa and penicillin. She is exhausted from frequency. I am to start her on trimethoprim. I am to give her an injection of gentamicin pending the results of her culture I will follow up with her based on this.      Past Medical History:        Past Medical History:   Diagnosis Date   • Abdominal pain, LLQ (left lower quadrant)    • Abnormal Pap smear of cervix     several years ago   • Anemia     years ago   • Cystitis    • Diabetes mellitus (CMS/HCC)    • Diarrhea    • Diverticulosis    • Encounter for cholecystectomy 2018   • Gallstones    • GERD (gastroesophageal reflux disease)    • Hiatal hernia    • Hyperglycemia    • Hyperlipidemia    • Hypothyroidism    • Muscle spasm     FLANK PAIN   • OAB (overactive bladder)    • Pneumonia     years ago   • UTI (urinary tract infection)          Current Meds:     Current Outpatient Medications   Medication Sig Dispense Refill   • aspirin 81 MG chewable tablet Chew 81 mg daily.     • Empagliflozin 10 MG tablet Take 10 mg by mouth Daily. 90 tablet 1   • fenofibrate (TRICOR) 145 MG tablet TAKE 1 TABLET BY MOUTH DAILY 90 tablet 1   • glucose blood test strip Use as instructed 50 each 12   • glucose monitor monitoring kit 1 each as needed (DM II). 1 each 0   • lisinopril (Zestril) 2.5 MG tablet Take 1 tablet by mouth Daily. 90 tablet 1   • metFORMIN ER (GLUCOPHAGE-XR) 500 MG 24 hr tablet TAKE 1 TABLET BY MOUTH DAILY WITH BREAKFAST 30 tablet 5   • nitrofurantoin, macrocrystal-monohydrate, (Macrobid) 100 MG capsule Take 1 capsule by mouth Daily. 56 capsule 3   • nystatin-triamcinolone (MYCOLOG) 587204-9.1 UNIT/GM-% ointment Apply  topically to the appropriate area as directed 2 (Two) Times a Day. 60 g 1   • Synthroid 125 MCG tablet Take 1 tablet by mouth Daily. 30 tablet 2    • triamcinolone (KENALOG) 0.025 % cream Apply 0.025 application topically to the appropriate area as directed 1 (One) Time.     • trimethoprim (TRIMPEX) 100 MG tablet Take 1 tablet by mouth 2 (Two) Times a Day. 30 tablet 3   • vitamin D (ERGOCALCIFEROL) 1.25 MG (91247 UT) capsule capsule Take 1 capsule by mouth 1 (One) Time Per Week. 5 capsule 2     No current facility-administered medications for this visit.        Allergies:      Allergies   Allergen Reactions   • Bactrim [Sulfamethoxazole-Trimethoprim] Hives   • Ciprofloxacin Hives   • Penicillins Hives   • Steri-Strip Compound Benzoin [Benzoin Compound] Hives   • Sulfa Antibiotics Hives        Past Surgical History:     Past Surgical History:   Procedure Laterality Date   • BREAST BIOPSY Bilateral 2005    benign   • CHOLECYSTECTOMY  2018?   • ENDOSCOPY     • ENDOSCOPY N/A 1/11/2018    Procedure: ESOPHAGOGASTRODUODENOSCOPY;  Surgeon: Dong Mata MD;  Location: [facility];  Service:    • MAMMO BILATERAL  09/2015   • OVARIAN CYST DRAINAGE     • OH LAP,CHOLECYSTECTOMY N/A 1/11/2018    Procedure: CHOLECYSTECTOMY LAPAROSCOPIC;  Surgeon: Dong Mata MD;  Location: [facility];  Service: General   • US GUIDED LYMPH NODE BIOPSY  4/11/2018         Social History:     Social History     Socioeconomic History   • Marital status:      Spouse name: Not on file   • Number of children: Not on file   • Years of education: Not on file   • Highest education level: Not on file   Tobacco Use   • Smoking status: Never Smoker   • Smokeless tobacco: Never Used   • Tobacco comment: never   Substance and Sexual Activity   • Alcohol use: No   • Drug use: No   • Sexual activity: Yes     Partners: Male       Family History:     Family History   Problem Relation Age of Onset   • Heart disease Mother    • Cancer Mother         kidney   • Heart disease Father    • Diabetes Brother    • Breast cancer Neg Hx        Review of Systems:     Review of Systems    Constitutional: Negative.  Negative for activity change, appetite change, chills, diaphoresis, fatigue and unexpected weight change.   HENT: Negative for congestion, dental problem, drooling, ear discharge, ear pain, facial swelling, hearing loss, mouth sores, nosebleeds, postnasal drip, rhinorrhea, sinus pressure, sneezing, sore throat, tinnitus, trouble swallowing and voice change.    Eyes: Negative.  Negative for photophobia, pain, discharge, redness, itching and visual disturbance.   Respiratory: Negative.  Negative for apnea, cough, choking, chest tightness, shortness of breath, wheezing and stridor.    Cardiovascular: Negative.  Negative for chest pain, palpitations and leg swelling.   Gastrointestinal: Negative.  Negative for abdominal distention, abdominal pain, anal bleeding, blood in stool, constipation, diarrhea, nausea, rectal pain and vomiting.   Endocrine: Negative.  Negative for cold intolerance, heat intolerance, polydipsia, polyphagia and polyuria.   Genitourinary: Positive for frequency, pelvic pain and urgency.   Musculoskeletal: Negative.  Negative for arthralgias, back pain, gait problem, joint swelling, myalgias, neck pain and neck stiffness.   Skin: Negative.  Negative for color change, pallor, rash and wound.   Allergic/Immunologic: Negative.  Negative for environmental allergies, food allergies and immunocompromised state.   Neurological: Negative.  Negative for dizziness, tremors, seizures, syncope, facial asymmetry, speech difficulty, weakness, light-headedness, numbness and headaches.   Hematological: Negative.  Negative for adenopathy. Does not bruise/bleed easily.   Psychiatric/Behavioral: Negative for agitation, behavioral problems, confusion, decreased concentration, dysphoric mood, hallucinations, self-injury, sleep disturbance and suicidal ideas. The patient is not nervous/anxious and is not hyperactive.    All other systems reviewed and are negative.      Physical Exam:      Physical Exam  Constitutional:       Appearance: She is well-developed.   HENT:      Head: Normocephalic and atraumatic.      Right Ear: External ear normal.      Left Ear: External ear normal.   Eyes:      Conjunctiva/sclera: Conjunctivae normal.      Pupils: Pupils are equal, round, and reactive to light.   Cardiovascular:      Rate and Rhythm: Normal rate and regular rhythm.      Heart sounds: Normal heart sounds.   Pulmonary:      Effort: Pulmonary effort is normal.      Breath sounds: Normal breath sounds.   Abdominal:      General: Bowel sounds are normal. There is no distension.      Palpations: Abdomen is soft. There is no mass.      Tenderness: There is no abdominal tenderness. There is no guarding or rebound.   Genitourinary:     Vagina: No vaginal discharge.   Musculoskeletal:         General: Normal range of motion.   Skin:     General: Skin is warm and dry.   Neurological:      Mental Status: She is alert.      Deep Tendon Reflexes: Reflexes are normal and symmetric.   Psychiatric:         Behavior: Behavior normal.         Thought Content: Thought content normal.         Judgment: Judgment normal.         I have reviewed the following portions of the patient's history: allergies, current medications, past family history, past medical history, past social history, past surgical history, problem list and ROS and confirm it's accurate.      Procedure:       Assessment/Plan:   Recurrent urinary tract infections-patient has been referred and diagnosed with recurrent urinary tract infections.  We discussed the types of organisms that are found in the urinary tract indicating that the vast majority are results of the patient's own gastrointestinal isha.  We discussed how many of the antibiotics that are utilized can actually exacerbate these infections by creating resistant organisms and there is only a very few antibiotics that are concentrated in the urine and do not affect the rectal reservoir nor  cause recurrent yeast vaginitis.  We discussed the risk factors for recurrent infections being intercourse in younger patients and atrophic changes in older patients.  We discussed the symptoms that are found including pain, pressure, burning, frequency, urgency suprapubic pain and painful intercourse.  I discussed upper tract symptoms including fevers, chills, and indicated the workup would be much more aggressive if the patient were to present with recurrent infections in the face of upper tract symptomatology such as fever.  I discussed the history of vesicoureteral reflux in young patients and finally chronic renal scarring as a result of such.  I recommend concomitant probiotics with treatment with antibiotics to protect the rectal reservoir including over-the-counter yogurt preparations to stone oral pills containing the appropriate probiotics. She was given a dose of gentamicin parenterally to initiate therapy because of significant symptomatology should be started on trimethoprim because of multiple allergies. The next step would be fosfomycin. I will follow up with her based on this.      Patient reports that she is not currently experiencing any symptoms of urinary incontinence.                    This document has been electronically signed by JULIANNA SPENCER MD June 24, 2021 09:11 EDT

## 2021-06-26 LAB — BACTERIA SPEC AEROBE CULT: ABNORMAL

## 2021-07-01 ENCOUNTER — TELEPHONE (OUTPATIENT)
Dept: UROLOGY | Facility: CLINIC | Age: 71
End: 2021-07-01

## 2021-07-01 NOTE — TELEPHONE ENCOUNTER
Routing to Dg    I called the patient and told her this based on what this is what Dg sent me:    She had a resistant organism as reported to my chart however she was placed on trimethoprim which should be sensitive she is resistant to the sulfa component of the medication unfortunately, she has multiple allergies and should get some relief from the gentamicin the next step will be IV therapy.       I also made her a 1 mth follow up zeeshan to make sure her infection does clear up

## 2021-07-02 ENCOUNTER — TELEPHONE (OUTPATIENT)
Dept: FAMILY MEDICINE CLINIC | Facility: CLINIC | Age: 71
End: 2021-07-02

## 2021-07-02 NOTE — TELEPHONE ENCOUNTER
SPOKE WITH PATIENT, SHE STATES THAT LEG IS GETTING BETTER WITH WALKING. SHE STATES IT IS NOT RED, SWOLLEN, OR HOT. ADVISED PATIENT TO CALL OUR OFFICE AT 8:00 ON 7/6/21 IF STILL HAVING SYMPTOMS AND WE CAN WORK IN FOR SAME DAY APPT. ALSO ADVISED PATIENT IF SYMPTOMS CHANGE TO GO TO EMERGENCY ROOM. PATIENT VERBALIZED UNDERSTANDING.

## 2021-07-02 NOTE — TELEPHONE ENCOUNTER
Caller: Shey King    Relationship to patient: Self    Best call back number: [phone]    Chief complaint: LEFT LEG PAIN. SHARP PAINS. SORE TO WALK    Type of visit: OFFICE    Requested date: 07/02/21     If rescheduling, when is the original appointment: N/A    Additional notes: PATIENT CALLED TO SCHEDULE AN APPOINTMENT FOR TODAY WITH LILIANE FERGUSON OR DR. GUO. CALENDER WAS BOOKED TIL AUGUST. PLEASE ADVISE AND CALL PATIENT REGARDING THIS MATTER.  [phone]    [facility][id number] [address] AT [facility] [place] [address] - [phone] PH - [phone] FX  [phone]

## 2021-07-08 ENCOUNTER — TELEPHONE (OUTPATIENT)
Dept: FAMILY MEDICINE CLINIC | Facility: CLINIC | Age: 71
End: 2021-07-08

## 2021-07-08 NOTE — TELEPHONE ENCOUNTER
I would be concerned too. Please have her schedule a followup appointment with either me or Tashia so we can run some tests and do some in person evaluation. Thanks.

## 2021-07-08 NOTE — TELEPHONE ENCOUNTER
Patient called & is concerned because her heart rate goes up to 112 with any activity,is fine when she lays down but goes up as soon as she gets up,no C/P,B/P she reports has been normal,please advise.

## 2021-07-08 NOTE — TELEPHONE ENCOUNTER
I would be concerned too. Please have her schedule a followup appointment with either me or Tashia so we can run some tests and do some in person evaluation. Thanks.      Spoke with patient & a follow up was scheduled.

## 2021-07-13 ENCOUNTER — OFFICE VISIT (OUTPATIENT)
Dept: FAMILY MEDICINE CLINIC | Facility: CLINIC | Age: 71
End: 2021-07-13

## 2021-07-13 VITALS
TEMPERATURE: 97.1 F | OXYGEN SATURATION: 98 % | HEIGHT: 62 IN | WEIGHT: 202.8 LBS | HEART RATE: 82 BPM | BODY MASS INDEX: 37.32 KG/M2 | SYSTOLIC BLOOD PRESSURE: 128 MMHG | DIASTOLIC BLOOD PRESSURE: 78 MMHG

## 2021-07-13 DIAGNOSIS — E03.8 ADULT ONSET HYPOTHYROIDISM: ICD-10-CM

## 2021-07-13 DIAGNOSIS — R30.0 DYSURIA: Primary | ICD-10-CM

## 2021-07-13 DIAGNOSIS — E11.9 TYPE 2 DIABETES MELLITUS WITHOUT COMPLICATION, WITHOUT LONG-TERM CURRENT USE OF INSULIN (HCC): ICD-10-CM

## 2021-07-13 DIAGNOSIS — R53.83 FATIGUE, UNSPECIFIED TYPE: ICD-10-CM

## 2021-07-13 DIAGNOSIS — R00.0 EXERCISE-INDUCED TACHYCARDIA: ICD-10-CM

## 2021-07-13 LAB
BILIRUB BLD-MCNC: NEGATIVE MG/DL
CLARITY, POC: ABNORMAL
COLOR UR: YELLOW
GLUCOSE BLDC GLUCOMTR-MCNC: 148 MG/DL (ref 70–130)
GLUCOSE UR STRIP-MCNC: NEGATIVE MG/DL
KETONES UR QL: NEGATIVE
LEUKOCYTE EST, POC: ABNORMAL
NITRITE UR-MCNC: NEGATIVE MG/ML
PH UR: 6 [PH] (ref 5–8)
PROT UR STRIP-MCNC: ABNORMAL MG/DL
RBC # UR STRIP: ABNORMAL /UL
SP GR UR: 1.02 (ref 1–1.03)
UROBILINOGEN UR QL: NORMAL

## 2021-07-13 PROCEDURE — 82947 ASSAY GLUCOSE BLOOD QUANT: CPT | Performed by: FAMILY MEDICINE

## 2021-07-13 PROCEDURE — 84439 ASSAY OF FREE THYROXINE: CPT | Performed by: FAMILY MEDICINE

## 2021-07-13 PROCEDURE — 87077 CULTURE AEROBIC IDENTIFY: CPT | Performed by: FAMILY MEDICINE

## 2021-07-13 PROCEDURE — 84443 ASSAY THYROID STIM HORMONE: CPT | Performed by: FAMILY MEDICINE

## 2021-07-13 PROCEDURE — 85652 RBC SED RATE AUTOMATED: CPT | Performed by: FAMILY MEDICINE

## 2021-07-13 PROCEDURE — 80053 COMPREHEN METABOLIC PANEL: CPT | Performed by: FAMILY MEDICINE

## 2021-07-13 PROCEDURE — 83880 ASSAY OF NATRIURETIC PEPTIDE: CPT | Performed by: FAMILY MEDICINE

## 2021-07-13 PROCEDURE — 96372 THER/PROPH/DIAG INJ SC/IM: CPT | Performed by: FAMILY MEDICINE

## 2021-07-13 PROCEDURE — 87186 SC STD MICRODIL/AGAR DIL: CPT | Performed by: FAMILY MEDICINE

## 2021-07-13 PROCEDURE — 87086 URINE CULTURE/COLONY COUNT: CPT | Performed by: FAMILY MEDICINE

## 2021-07-13 PROCEDURE — 85025 COMPLETE CBC W/AUTO DIFF WBC: CPT | Performed by: FAMILY MEDICINE

## 2021-07-13 PROCEDURE — 99214 OFFICE O/P EST MOD 30 MIN: CPT | Performed by: FAMILY MEDICINE

## 2021-07-13 PROCEDURE — 82043 UR ALBUMIN QUANTITATIVE: CPT | Performed by: FAMILY MEDICINE

## 2021-07-13 PROCEDURE — 81003 URINALYSIS AUTO W/O SCOPE: CPT | Performed by: FAMILY MEDICINE

## 2021-07-13 PROCEDURE — 83735 ASSAY OF MAGNESIUM: CPT | Performed by: FAMILY MEDICINE

## 2021-07-13 PROCEDURE — 36415 COLL VENOUS BLD VENIPUNCTURE: CPT | Performed by: FAMILY MEDICINE

## 2021-07-13 RX ORDER — CYANOCOBALAMIN 1000 UG/ML
1000 INJECTION, SOLUTION INTRAMUSCULAR; SUBCUTANEOUS ONCE
Status: COMPLETED | OUTPATIENT
Start: 2021-07-13 | End: 2021-07-13

## 2021-07-13 RX ORDER — METFORMIN HYDROCHLORIDE 500 MG/1
500 TABLET, EXTENDED RELEASE ORAL
Qty: 30 TABLET | Refills: 5 | Status: SHIPPED | OUTPATIENT
Start: 2021-07-13 | End: 2021-12-21

## 2021-07-13 RX ADMIN — CYANOCOBALAMIN 1000 MCG: 1000 INJECTION, SOLUTION INTRAMUSCULAR; SUBCUTANEOUS at 11:43

## 2021-07-14 LAB
ALBUMIN SERPL-MCNC: 4.3 G/DL (ref 3.5–5.2)
ALBUMIN UR-MCNC: 4.4 MG/DL
ALBUMIN/GLOB SERPL: 1.4 G/DL
ALP SERPL-CCNC: 66 U/L (ref 39–117)
ALT SERPL W P-5'-P-CCNC: 24 U/L (ref 1–33)
ANION GAP SERPL CALCULATED.3IONS-SCNC: 8 MMOL/L (ref 5–15)
AST SERPL-CCNC: 30 U/L (ref 1–32)
BACTERIA SPEC AEROBE CULT: ABNORMAL
BASOPHILS # BLD AUTO: 0.06 10*3/MM3 (ref 0–0.2)
BASOPHILS NFR BLD AUTO: 0.8 % (ref 0–1.5)
BILIRUB SERPL-MCNC: 0.5 MG/DL (ref 0–1.2)
BUN SERPL-MCNC: 8 MG/DL (ref 8–23)
BUN/CREAT SERPL: 7 (ref 7–25)
CALCIUM SPEC-SCNC: 9.1 MG/DL (ref 8.6–10.5)
CHLORIDE SERPL-SCNC: 109 MMOL/L (ref 98–107)
CO2 SERPL-SCNC: 23 MMOL/L (ref 22–29)
CREAT SERPL-MCNC: 1.15 MG/DL (ref 0.57–1)
DEPRECATED RDW RBC AUTO: 43.4 FL (ref 37–54)
EOSINOPHIL # BLD AUTO: 0.26 10*3/MM3 (ref 0–0.4)
EOSINOPHIL NFR BLD AUTO: 3.5 % (ref 0.3–6.2)
ERYTHROCYTE [DISTWIDTH] IN BLOOD BY AUTOMATED COUNT: 13.3 % (ref 12.3–15.4)
ERYTHROCYTE [SEDIMENTATION RATE] IN BLOOD: 29 MM/HR (ref 0–30)
GFR SERPL CREATININE-BSD FRML MDRD: 47 ML/MIN/1.73
GLOBULIN UR ELPH-MCNC: 3.1 GM/DL
GLUCOSE SERPL-MCNC: 122 MG/DL (ref 65–99)
HCT VFR BLD AUTO: 34 % (ref 34–46.6)
HGB BLD-MCNC: 11.5 G/DL (ref 12–15.9)
IMM GRANULOCYTES # BLD AUTO: 0.05 10*3/MM3 (ref 0–0.05)
IMM GRANULOCYTES NFR BLD AUTO: 0.7 % (ref 0–0.5)
LYMPHOCYTES # BLD AUTO: 1.81 10*3/MM3 (ref 0.7–3.1)
LYMPHOCYTES NFR BLD AUTO: 24.4 % (ref 19.6–45.3)
MAGNESIUM SERPL-MCNC: 1.7 MG/DL (ref 1.6–2.4)
MCH RBC QN AUTO: 29.9 PG (ref 26.6–33)
MCHC RBC AUTO-ENTMCNC: 33.8 G/DL (ref 31.5–35.7)
MCV RBC AUTO: 88.5 FL (ref 79–97)
MONOCYTES # BLD AUTO: 0.43 10*3/MM3 (ref 0.1–0.9)
MONOCYTES NFR BLD AUTO: 5.8 % (ref 5–12)
NEUTROPHILS NFR BLD AUTO: 4.82 10*3/MM3 (ref 1.7–7)
NEUTROPHILS NFR BLD AUTO: 64.8 % (ref 42.7–76)
NRBC BLD AUTO-RTO: 0 /100 WBC (ref 0–0.2)
NT-PROBNP SERPL-MCNC: 141.4 PG/ML (ref 0–900)
PLATELET # BLD AUTO: 305 10*3/MM3 (ref 140–450)
PMV BLD AUTO: 11.3 FL (ref 6–12)
POTASSIUM SERPL-SCNC: 4.3 MMOL/L (ref 3.5–5.2)
PROT SERPL-MCNC: 7.4 G/DL (ref 6–8.5)
RBC # BLD AUTO: 3.84 10*6/MM3 (ref 3.77–5.28)
SODIUM SERPL-SCNC: 140 MMOL/L (ref 136–145)
T4 FREE SERPL-MCNC: 2.11 NG/DL (ref 0.93–1.7)
TSH SERPL DL<=0.05 MIU/L-ACNC: 0.14 UIU/ML (ref 0.27–4.2)
WBC # BLD AUTO: 7.43 10*3/MM3 (ref 3.4–10.8)

## 2021-07-16 ENCOUNTER — TELEPHONE (OUTPATIENT)
Dept: FAMILY MEDICINE CLINIC | Facility: CLINIC | Age: 71
End: 2021-07-16

## 2021-07-16 DIAGNOSIS — R00.0 TACHYCARDIA: ICD-10-CM

## 2021-07-16 DIAGNOSIS — N39.0 RECURRENT UTI: Primary | ICD-10-CM

## 2021-07-16 RX ORDER — DOXYCYCLINE 100 MG/1
100 TABLET ORAL 2 TIMES DAILY
Qty: 14 TABLET | Refills: 0 | Status: SHIPPED | OUTPATIENT
Start: 2021-07-16 | End: 2021-08-17

## 2021-07-16 NOTE — TELEPHONE ENCOUNTER
----- Message from Sheila Everett MD sent at 7/16/2021  1:07 AM EDT -----  Please call Shey. I have lots of things for her.   1) First, the UCx came back positive. I am going to send in doxycycline in for her UTI to [facility].  and start taking. Will also send to Dr. Conte so he is aware that you guys might like to do something more. This could cause some tachycardia.  2) Her body thinks that her thyroid numbers are too much. This too could be causing symptoms. She is on 125 mcg. Would she agree to a decrease to 112 mcg and see how she feels then?  3) Her creatinine is a little elevated. Is she drinking enough fluids? 64 oz? Cut the coffee and pop. This also might be causing her symptoms.  4) She's also lost some blood. Used to be 12.5 4 months ago, now 11.5. Has she noticed any tarry stools? Not enough blood (but 11.5 is not that bad) could also be giving her symptoms.Would monitor this one and recheck if she is still having tachycardia after all of the above changes.  5) So I curbsided cardiology regarding her EKG and we all think that it just might be artefact instead of something. However, if she continues to have tachycardia, a Holter monitor would be reasonable. I'm going to put it in and if she is still having problems after the UTI is resolved and after two weeks of the new thyroid medication, it would be reasonable for her to pick this up and do.   6) I would like her back in about a month for followup. Please either schedule with me or Tashia, whoever she wants. Will see how she is, maybe do a repeat EKG, will definitely recheck labs.     Please also forward a copy of the UCx to Dr. Conte. I'll also send him a message. Thanks.      Spoke with Shey & she verbalized understanding,she is questioning if she should continue the Trimethoprim Dr. Conte has her on also?,Results faxed to Dr oCnte as requested.Agreeable to decreasing the Synthroid. Reports she probably does not drink  enough,will increase to 64 oz daily & cut out the coffee & Soda,denies any dark tarry stools,Scheduled the F/U as requested.        Patient called back,is she supposed to take both antibiotics? Reports you gave angy a B12 injection on her last visit & it made her feel so much better is questioning if she could have another & take it on a regular basis?

## 2021-07-21 NOTE — TELEPHONE ENCOUNTER
Yes. Take both antibiotics. My antibiotic to get rid of it (because it is just this one urine at this point) and Dr. Conte's antibiotic for maintenance. I had sent a note to Dr. Conte about this. If he hasn't called her, he still wants her on it. I think he might want more evidence that his antibiotic is truly not working anymore before changing it as this one might have been a fluke. How is she feeling?    Does she want to come in for the B12 injections or does she want to give them to herself at home? 1-2 weeks every time here. If she wants them at home, I'll send in a script.

## 2021-07-21 NOTE — TELEPHONE ENCOUNTER
Yes. Take both antibiotics. My antibiotic to get rid of it (because it is just this one urine at this point) and Dr. Conte's antibiotic for maintenance. I had sent a note to Dr. Conte about this. If he hasn't called her, he still wants her on it. I think he might want more evidence that his antibiotic is truly not working anymore before changing it as this one might have been a fluke. How is she feeling?    Does she want to come in for the B12 injections or does she want to give them to herself at home? 1-2 weeks every time here. If she wants them at home, I'll send in a script.      Spoke with patient & she verbalized understanding,feeling better,prefers to come here for B12 injections.

## 2021-07-22 ENCOUNTER — CLINICAL SUPPORT (OUTPATIENT)
Dept: FAMILY MEDICINE CLINIC | Facility: CLINIC | Age: 71
End: 2021-07-22

## 2021-07-22 DIAGNOSIS — E53.8 B12 DEFICIENCY: Primary | ICD-10-CM

## 2021-07-22 PROCEDURE — 96372 THER/PROPH/DIAG INJ SC/IM: CPT | Performed by: FAMILY MEDICINE

## 2021-07-22 RX ORDER — CYANOCOBALAMIN 1000 UG/ML
1000 INJECTION, SOLUTION INTRAMUSCULAR; SUBCUTANEOUS ONCE
Status: COMPLETED | OUTPATIENT
Start: 2021-07-22 | End: 2021-07-22

## 2021-07-22 RX ADMIN — CYANOCOBALAMIN 1000 MCG: 1000 INJECTION, SOLUTION INTRAMUSCULAR; SUBCUTANEOUS at 15:52

## 2021-07-30 DIAGNOSIS — N39.0 RECURRENT UTI: ICD-10-CM

## 2021-07-30 RX ORDER — TRIMETHOPRIM 100 MG/1
100 TABLET ORAL 2 TIMES DAILY
Qty: 30 TABLET | Refills: 3 | Status: SHIPPED | OUTPATIENT
Start: 2021-07-30 | End: 2021-10-26

## 2021-08-04 ENCOUNTER — CLINICAL SUPPORT (OUTPATIENT)
Dept: FAMILY MEDICINE CLINIC | Facility: CLINIC | Age: 71
End: 2021-08-04

## 2021-08-04 DIAGNOSIS — E53.8 B12 DEFICIENCY: Primary | ICD-10-CM

## 2021-08-04 PROCEDURE — 96372 THER/PROPH/DIAG INJ SC/IM: CPT | Performed by: FAMILY MEDICINE

## 2021-08-04 RX ORDER — CYANOCOBALAMIN 1000 UG/ML
1000 INJECTION, SOLUTION INTRAMUSCULAR; SUBCUTANEOUS ONCE
Status: COMPLETED | OUTPATIENT
Start: 2021-08-04 | End: 2021-08-04

## 2021-08-04 RX ADMIN — CYANOCOBALAMIN 1000 MCG: 1000 INJECTION, SOLUTION INTRAMUSCULAR; SUBCUTANEOUS at 15:21

## 2021-08-17 ENCOUNTER — OFFICE VISIT (OUTPATIENT)
Dept: UROLOGY | Facility: CLINIC | Age: 71
End: 2021-08-17

## 2021-08-17 VITALS — WEIGHT: 201 LBS | HEIGHT: 62 IN | BODY MASS INDEX: 36.99 KG/M2

## 2021-08-17 DIAGNOSIS — N30.90 CYSTITIS: ICD-10-CM

## 2021-08-17 DIAGNOSIS — N39.0 RECURRENT UTI: Primary | ICD-10-CM

## 2021-08-17 LAB
BILIRUB BLD-MCNC: ABNORMAL MG/DL
CLARITY, POC: ABNORMAL
COLOR UR: YELLOW
GLUCOSE UR STRIP-MCNC: NEGATIVE MG/DL
KETONES UR QL: NEGATIVE
LEUKOCYTE EST, POC: ABNORMAL
NITRITE UR-MCNC: POSITIVE MG/ML
PH UR: 7 [PH] (ref 5–8)
PROT UR STRIP-MCNC: ABNORMAL MG/DL
RBC # UR STRIP: NEGATIVE /UL
SP GR UR: 1.01 (ref 1–1.03)
UROBILINOGEN UR QL: NORMAL

## 2021-08-17 PROCEDURE — 87086 URINE CULTURE/COLONY COUNT: CPT | Performed by: UROLOGY

## 2021-08-17 PROCEDURE — 87077 CULTURE AEROBIC IDENTIFY: CPT | Performed by: UROLOGY

## 2021-08-17 PROCEDURE — 81003 URINALYSIS AUTO W/O SCOPE: CPT | Performed by: UROLOGY

## 2021-08-17 PROCEDURE — 87186 SC STD MICRODIL/AGAR DIL: CPT | Performed by: UROLOGY

## 2021-08-17 PROCEDURE — 99213 OFFICE O/P EST LOW 20 MIN: CPT | Performed by: UROLOGY

## 2021-08-17 NOTE — PROGRESS NOTES
Chief Complaint:          Chief Complaint   Patient presents with   • Recurrent Uti     follow up        HPI:   70 y.o. female very well-known to me with recurrent urinary tract infections a positive urine today she has been on so many antibiotics is hard to keep track she most recently was on doxycycline.  She has a culture pending.  I will await the results of the culture she is a very difficult situation as she is resistant to most everything we have      Past Medical History:        Past Medical History:   Diagnosis Date   • Abdominal pain, LLQ (left lower quadrant)    • Abnormal Pap smear of cervix     several years ago   • Anemia     years ago   • Cystitis    • Diabetes mellitus (CMS/HCC)    • Diarrhea    • Diverticulosis    • Encounter for cholecystectomy 2018   • Gallstones    • GERD (gastroesophageal reflux disease)    • Hiatal hernia    • Hyperglycemia    • Hyperlipidemia    • Hypothyroidism    • Muscle spasm     FLANK PAIN   • OAB (overactive bladder)    • Pneumonia     years ago   • UTI (urinary tract infection)          Current Meds:     Current Outpatient Medications   Medication Sig Dispense Refill   • aspirin 81 MG chewable tablet Chew 81 mg daily.     • Empagliflozin 10 MG tablet Take 10 mg by mouth Daily. 90 tablet 1   • fenofibrate (TRICOR) 145 MG tablet TAKE 1 TABLET BY MOUTH DAILY 90 tablet 1   • glucose blood test strip Use as instructed 50 each 12   • glucose monitor monitoring kit 1 each as needed (DM II). 1 each 0   • lisinopril (Zestril) 2.5 MG tablet Take 1 tablet by mouth Daily. 90 tablet 1   • metFORMIN ER (GLUCOPHAGE-XR) 500 MG 24 hr tablet Take 1 tablet by mouth Daily With Breakfast. 30 tablet 5   • nystatin-triamcinolone (MYCOLOG) 721097-4.1 UNIT/GM-% ointment Apply  topically to the appropriate area as directed 2 (Two) Times a Day. 60 g 1   • Synthroid 125 MCG tablet Take 1 tablet by mouth Daily. 30 tablet 2   • triamcinolone (KENALOG) 0.025 % cream Apply 0.025 application topically  to the appropriate area as directed 1 (One) Time.     • trimethoprim (TRIMPEX) 100 MG tablet Take 1 tablet by mouth 2 (Two) Times a Day. 30 tablet 3   • vitamin D (ERGOCALCIFEROL) 1.25 MG (85303 UT) capsule capsule Take 1 capsule by mouth 1 (One) Time Per Week. 5 capsule 2     No current facility-administered medications for this visit.        Allergies:      Allergies   Allergen Reactions   • Bactrim [Sulfamethoxazole-Trimethoprim] Hives   • Ciprofloxacin Hives   • Penicillins Hives   • Steri-Strip Compound Benzoin [Benzoin Compound] Hives   • Sulfa Antibiotics Hives        Past Surgical History:     Past Surgical History:   Procedure Laterality Date   • BREAST BIOPSY Bilateral 2005    benign   • CHOLECYSTECTOMY  2018?   • ENDOSCOPY     • ENDOSCOPY N/A 1/11/2018    Procedure: ESOPHAGOGASTRODUODENOSCOPY;  Surgeon: Dong Mata MD;  Location: [facility];  Service:    • MAMMO BILATERAL  09/2015   • OVARIAN CYST DRAINAGE     • AL LAP,CHOLECYSTECTOMY N/A 1/11/2018    Procedure: CHOLECYSTECTOMY LAPAROSCOPIC;  Surgeon: Dong Mata MD;  Location: [facility];  Service: General   • US GUIDED LYMPH NODE BIOPSY  4/11/2018         Social History:     Social History     Socioeconomic History   • Marital status:      Spouse name: Not on file   • Number of children: Not on file   • Years of education: Not on file   • Highest education level: Not on file   Tobacco Use   • Smoking status: Never Smoker   • Smokeless tobacco: Never Used   • Tobacco comment: never   Substance and Sexual Activity   • Alcohol use: No   • Drug use: No   • Sexual activity: Yes     Partners: Male       Family History:     Family History   Problem Relation Age of Onset   • Heart disease Mother    • Cancer Mother         kidney   • Heart disease Father    • Diabetes Brother    • Breast cancer Neg Hx        Review of Systems:     Review of Systems   Constitutional: Negative.  Negative for activity change, appetite change, chills,  diaphoresis, fatigue and unexpected weight change.   HENT: Negative for congestion, dental problem, drooling, ear discharge, ear pain, facial swelling, hearing loss, mouth sores, nosebleeds, postnasal drip, rhinorrhea, sinus pressure, sneezing, sore throat, tinnitus, trouble swallowing and voice change.    Eyes: Negative.  Negative for photophobia, pain, discharge, redness, itching and visual disturbance.   Respiratory: Negative.  Negative for apnea, cough, choking, chest tightness, shortness of breath, wheezing and stridor.    Cardiovascular: Negative.  Negative for chest pain, palpitations and leg swelling.   Gastrointestinal: Negative.  Negative for abdominal distention, abdominal pain, anal bleeding, blood in stool, constipation, diarrhea, nausea, rectal pain and vomiting.   Endocrine: Negative.  Negative for cold intolerance, heat intolerance, polydipsia, polyphagia and polyuria.   Musculoskeletal: Negative.  Negative for arthralgias, back pain, gait problem, joint swelling, myalgias, neck pain and neck stiffness.   Skin: Negative.  Negative for color change, pallor, rash and wound.   Allergic/Immunologic: Negative.  Negative for environmental allergies, food allergies and immunocompromised state.   Neurological: Negative.  Negative for dizziness, tremors, seizures, syncope, facial asymmetry, speech difficulty, weakness, light-headedness, numbness and headaches.   Hematological: Negative.  Negative for adenopathy. Does not bruise/bleed easily.   Psychiatric/Behavioral: Negative for agitation, behavioral problems, confusion, decreased concentration, dysphoric mood, hallucinations, self-injury, sleep disturbance and suicidal ideas. The patient is not nervous/anxious and is not hyperactive.    All other systems reviewed and are negative.      Physical Exam:     Physical Exam  Constitutional:       Appearance: She is well-developed.   HENT:      Head: Normocephalic and atraumatic.      Right Ear: External ear  normal.      Left Ear: External ear normal.   Eyes:      Conjunctiva/sclera: Conjunctivae normal.      Pupils: Pupils are equal, round, and reactive to light.   Cardiovascular:      Rate and Rhythm: Normal rate and regular rhythm.      Heart sounds: Normal heart sounds.   Pulmonary:      Effort: Pulmonary effort is normal.      Breath sounds: Normal breath sounds.   Abdominal:      General: Bowel sounds are normal. There is no distension.      Palpations: Abdomen is soft. There is no mass.      Tenderness: There is no abdominal tenderness. There is no guarding or rebound.   Genitourinary:     Vagina: No vaginal discharge.   Musculoskeletal:         General: Normal range of motion.   Skin:     General: Skin is warm and dry.   Neurological:      Mental Status: She is alert.      Deep Tendon Reflexes: Reflexes are normal and symmetric.   Psychiatric:         Behavior: Behavior normal.         Thought Content: Thought content normal.         Judgment: Judgment normal.         I have reviewed the following portions of the patient's history: allergies, current medications, past family history, past medical history, past social history, past surgical history, problem list and ROS and confirm it's accurate.      Procedure:       Assessment/Plan:   Recurrent urinary tract infections-patient has been referred and diagnosed with recurrent urinary tract infections.  We discussed the types of organisms that are found in the urinary tract indicating that the vast majority are results of the patient's own gastrointestinal isha.  We discussed how many of the antibiotics that are utilized can actually exacerbate these infections by creating resistant organisms and there is only a very few antibiotics that are concentrated in the urine and do not affect the rectal reservoir nor cause recurrent yeast vaginitis.  We discussed the risk factors for recurrent infections being intercourse in younger patients and atrophic changes in older  patients.  We discussed the symptoms that are found including pain, pressure, burning, frequency, urgency suprapubic pain and painful intercourse.  I discussed upper tract symptoms including fevers, chills, and indicated the workup would be much more aggressive if the patient were to present with recurrent infections in the face of upper tract symptomatology such as fever.  I discussed the history of vesicoureteral reflux in young patients and finally chronic renal scarring as a result of such.  I recommend concomitant probiotics with treatment with antibiotics to protect the rectal reservoir including over-the-counter yogurt preparations to stone oral pills containing the appropriate probiotics.  She has a very resistant organism from polymicrobial treatments I will waiting the results of the culture to decide the next step      Patient reports that she is not currently experiencing any symptoms of urinary incontinence.                    This document has been electronically signed by JULIANNA SPENCER MD August 17, 2021 08:09 EDT

## 2021-08-19 LAB — BACTERIA SPEC AEROBE CULT: ABNORMAL

## 2021-08-23 ENCOUNTER — TELEPHONE (OUTPATIENT)
Dept: UROLOGY | Facility: CLINIC | Age: 71
End: 2021-08-23

## 2021-08-24 ENCOUNTER — TELEPHONE (OUTPATIENT)
Dept: UROLOGY | Facility: CLINIC | Age: 71
End: 2021-08-24

## 2021-08-24 ENCOUNTER — OFFICE VISIT (OUTPATIENT)
Dept: FAMILY MEDICINE CLINIC | Facility: CLINIC | Age: 71
End: 2021-08-24

## 2021-08-24 ENCOUNTER — TRANSCRIBE ORDERS (OUTPATIENT)
Dept: INFUSION THERAPY | Facility: HOSPITAL | Age: 71
End: 2021-08-24

## 2021-08-24 VITALS
OXYGEN SATURATION: 99 % | BODY MASS INDEX: 37.47 KG/M2 | SYSTOLIC BLOOD PRESSURE: 120 MMHG | HEIGHT: 62 IN | TEMPERATURE: 96.9 F | HEART RATE: 98 BPM | DIASTOLIC BLOOD PRESSURE: 62 MMHG | WEIGHT: 203.6 LBS

## 2021-08-24 DIAGNOSIS — R00.0 EXERCISE-INDUCED TACHYCARDIA: ICD-10-CM

## 2021-08-24 DIAGNOSIS — D64.9 ANEMIA, UNSPECIFIED TYPE: ICD-10-CM

## 2021-08-24 DIAGNOSIS — E53.8 B12 DEFICIENCY: ICD-10-CM

## 2021-08-24 DIAGNOSIS — E11.9 TYPE 2 DIABETES MELLITUS WITHOUT COMPLICATION, WITHOUT LONG-TERM CURRENT USE OF INSULIN (HCC): ICD-10-CM

## 2021-08-24 DIAGNOSIS — N39.0 RECURRENT UTI: ICD-10-CM

## 2021-08-24 DIAGNOSIS — N39.0 URINARY TRACT INFECTION WITHOUT HEMATURIA, SITE UNSPECIFIED: Primary | ICD-10-CM

## 2021-08-24 DIAGNOSIS — E66.01 CLASS 2 SEVERE OBESITY DUE TO EXCESS CALORIES WITH SERIOUS COMORBIDITY AND BODY MASS INDEX (BMI) OF 37.0 TO 37.9 IN ADULT (HCC): ICD-10-CM

## 2021-08-24 DIAGNOSIS — R00.2 PALPITATIONS: Primary | ICD-10-CM

## 2021-08-24 DIAGNOSIS — E03.8 ADULT ONSET HYPOTHYROIDISM: ICD-10-CM

## 2021-08-24 LAB
ANION GAP SERPL CALCULATED.3IONS-SCNC: 11.7 MMOL/L (ref 5–15)
BASOPHILS # BLD AUTO: 0.07 10*3/MM3 (ref 0–0.2)
BASOPHILS NFR BLD AUTO: 0.9 % (ref 0–1.5)
BILIRUB BLD-MCNC: NEGATIVE MG/DL
BUN SERPL-MCNC: 12 MG/DL (ref 8–23)
BUN/CREAT SERPL: 8.3 (ref 7–25)
CALCIUM SPEC-SCNC: 9.5 MG/DL (ref 8.6–10.5)
CHLORIDE SERPL-SCNC: 104 MMOL/L (ref 98–107)
CLARITY, POC: CLEAR
CO2 SERPL-SCNC: 22.3 MMOL/L (ref 22–29)
COLOR UR: YELLOW
CREAT SERPL-MCNC: 1.44 MG/DL (ref 0.57–1)
DEPRECATED RDW RBC AUTO: 47.1 FL (ref 37–54)
EOSINOPHIL # BLD AUTO: 0.19 10*3/MM3 (ref 0–0.4)
EOSINOPHIL NFR BLD AUTO: 2.5 % (ref 0.3–6.2)
ERYTHROCYTE [DISTWIDTH] IN BLOOD BY AUTOMATED COUNT: 14 % (ref 12.3–15.4)
GFR SERPL CREATININE-BSD FRML MDRD: 36 ML/MIN/1.73
GLUCOSE SERPL-MCNC: 178 MG/DL (ref 65–99)
GLUCOSE UR STRIP-MCNC: NEGATIVE MG/DL
HBA1C MFR BLD: 7.1 % (ref 4.8–5.6)
HCT VFR BLD AUTO: 35.4 % (ref 34–46.6)
HGB BLD-MCNC: 11.3 G/DL (ref 12–15.9)
IMM GRANULOCYTES # BLD AUTO: 0.05 10*3/MM3 (ref 0–0.05)
IMM GRANULOCYTES NFR BLD AUTO: 0.6 % (ref 0–0.5)
IRON 24H UR-MRATE: 54 MCG/DL (ref 37–145)
IRON SATN MFR SERPL: 9 % (ref 20–50)
KETONES UR QL: NEGATIVE
LEUKOCYTE EST, POC: ABNORMAL
LYMPHOCYTES # BLD AUTO: 1.74 10*3/MM3 (ref 0.7–3.1)
LYMPHOCYTES NFR BLD AUTO: 22.6 % (ref 19.6–45.3)
MCH RBC QN AUTO: 29.2 PG (ref 26.6–33)
MCHC RBC AUTO-ENTMCNC: 31.9 G/DL (ref 31.5–35.7)
MCV RBC AUTO: 91.5 FL (ref 79–97)
MONOCYTES # BLD AUTO: 0.56 10*3/MM3 (ref 0.1–0.9)
MONOCYTES NFR BLD AUTO: 7.3 % (ref 5–12)
NEUTROPHILS NFR BLD AUTO: 5.1 10*3/MM3 (ref 1.7–7)
NEUTROPHILS NFR BLD AUTO: 66.1 % (ref 42.7–76)
NITRITE UR-MCNC: POSITIVE MG/ML
NRBC BLD AUTO-RTO: 0 /100 WBC (ref 0–0.2)
PH UR: 6.5 [PH] (ref 5–8)
PLATELET # BLD AUTO: 299 10*3/MM3 (ref 140–450)
PMV BLD AUTO: 10.9 FL (ref 6–12)
POTASSIUM SERPL-SCNC: 4.6 MMOL/L (ref 3.5–5.2)
PROT UR STRIP-MCNC: ABNORMAL MG/DL
RBC # BLD AUTO: 3.87 10*6/MM3 (ref 3.77–5.28)
RBC # UR STRIP: ABNORMAL /UL
SODIUM SERPL-SCNC: 138 MMOL/L (ref 136–145)
SP GR UR: 1.02 (ref 1–1.03)
TIBC SERPL-MCNC: 581 MCG/DL (ref 298–536)
TRANSFERRIN SERPL-MCNC: 390 MG/DL (ref 200–360)
TSH SERPL DL<=0.05 MIU/L-ACNC: 0.85 UIU/ML (ref 0.27–4.2)
UROBILINOGEN UR QL: NORMAL
WBC # BLD AUTO: 7.71 10*3/MM3 (ref 3.4–10.8)

## 2021-08-24 PROCEDURE — 87186 SC STD MICRODIL/AGAR DIL: CPT | Performed by: NURSE PRACTITIONER

## 2021-08-24 PROCEDURE — 80048 BASIC METABOLIC PNL TOTAL CA: CPT | Performed by: NURSE PRACTITIONER

## 2021-08-24 PROCEDURE — 87086 URINE CULTURE/COLONY COUNT: CPT | Performed by: NURSE PRACTITIONER

## 2021-08-24 PROCEDURE — 83540 ASSAY OF IRON: CPT | Performed by: NURSE PRACTITIONER

## 2021-08-24 PROCEDURE — 85025 COMPLETE CBC W/AUTO DIFF WBC: CPT | Performed by: NURSE PRACTITIONER

## 2021-08-24 PROCEDURE — 84443 ASSAY THYROID STIM HORMONE: CPT | Performed by: NURSE PRACTITIONER

## 2021-08-24 PROCEDURE — 82607 VITAMIN B-12: CPT | Performed by: NURSE PRACTITIONER

## 2021-08-24 PROCEDURE — 83036 HEMOGLOBIN GLYCOSYLATED A1C: CPT | Performed by: NURSE PRACTITIONER

## 2021-08-24 PROCEDURE — 99214 OFFICE O/P EST MOD 30 MIN: CPT | Performed by: NURSE PRACTITIONER

## 2021-08-24 PROCEDURE — 84466 ASSAY OF TRANSFERRIN: CPT | Performed by: NURSE PRACTITIONER

## 2021-08-24 PROCEDURE — 93005 ELECTROCARDIOGRAM TRACING: CPT | Performed by: NURSE PRACTITIONER

## 2021-08-24 PROCEDURE — 81003 URINALYSIS AUTO W/O SCOPE: CPT | Performed by: NURSE PRACTITIONER

## 2021-08-24 PROCEDURE — 36415 COLL VENOUS BLD VENIPUNCTURE: CPT | Performed by: NURSE PRACTITIONER

## 2021-08-24 PROCEDURE — 87077 CULTURE AEROBIC IDENTIFY: CPT | Performed by: NURSE PRACTITIONER

## 2021-08-24 RX ORDER — FENOFIBRATE 145 MG/1
145 TABLET, COATED ORAL DAILY
Qty: 90 TABLET | Refills: 1 | Status: SHIPPED | OUTPATIENT
Start: 2021-08-24 | End: 2022-03-21 | Stop reason: SDUPTHER

## 2021-08-24 RX ORDER — LEVOTHYROXINE SODIUM 112 UG/1
112 TABLET ORAL DAILY
COMMUNITY
End: 2021-08-24 | Stop reason: SDUPTHER

## 2021-08-24 RX ORDER — LEVOTHYROXINE SODIUM 112 UG/1
112 TABLET ORAL DAILY
Qty: 90 TABLET | Refills: 0 | Status: SHIPPED | OUTPATIENT
Start: 2021-08-24 | End: 2021-12-29

## 2021-08-24 NOTE — PROGRESS NOTES
Subjective   Shey King is a 70 y.o. female.   Chief Compliant: The patient presents with Fatigue    Urinary Frequency   This is a recurrent (Following urology on daily antibiotic.  Scheduled to start IV antibiotics in the AM.  ) problem. The current episode started more than 1 month ago. The problem occurs every urination. The problem has been unchanged. The quality of the pain is described as burning and aching. The pain is at a severity of 3/10. The pain is mild. There has been no fever. She is not sexually active. There is no history of pyelonephritis. Associated symptoms include frequency. Pertinent negatives include no flank pain, hesitancy, sweats or urgency. She has tried increased fluids and antibiotics for the symptoms. The treatment provided no relief. Her past medical history is significant for recurrent UTIs.   Thyroid Problem  Presents for follow-up (Thyroiditis.  Denies any current concerns) visit. Symptoms include fatigue, palpitations and weight gain. Symptom course: unchanged.   Diabetes  She presents for her follow-up diabetic visit. She has type 2 diabetes mellitus. Her disease course has been stable. There are no hypoglycemic associated symptoms. Pertinent negatives for hypoglycemia include no sweats. Associated symptoms include fatigue. Pertinent negatives for diabetes include no blurred vision. There are no hypoglycemic complications. Symptoms are stable. There are no diabetic complications. Risk factors for coronary artery disease include family history and dyslipidemia. (Not monitoring  )   Hypertension  This is a new problem. Associated symptoms include palpitations and shortness of breath. Pertinent negatives include no anxiety, blurred vision, orthopnea, peripheral edema, PND or sweats. Past treatments include nothing. Identifiable causes of hypertension include a thyroid problem.   Fatigue  This is a recurrent (known B12 def) problem. The current episode started more than 1 year ago.  "The problem occurs intermittently. The problem has been waxing and waning. Associated symptoms include fatigue. Pertinent negatives include no fever.      The following portions of the patient's history were reviewed and updated as appropriate: allergies, current medications, past family history, past medical history, past social history, past surgical history and problem list.      Review of Systems   Constitutional: Positive for fatigue and weight gain. Negative for fever.   HENT: Negative.    Eyes: Negative for blurred vision.   Respiratory: Positive for shortness of breath.    Cardiovascular: Positive for palpitations. Negative for orthopnea and PND.   Gastrointestinal: Negative for abdominal distention, anal bleeding and blood in stool.   Genitourinary: Positive for frequency. Negative for flank pain, hesitancy and urgency.   Musculoskeletal:        Right hand pain    Neurological: Negative.    All other systems reviewed and are negative.      Procedures    Vitals: Blood pressure 120/62, pulse 98, temperature 96.9 °F (36.1 °C), temperature source Temporal, height 157.5 cm (62\"), weight 92.4 kg (203 lb 9.6 oz), SpO2 99 %, not currently breastfeeding.     Allergies:   Allergies   Allergen Reactions   • Bactrim [Sulfamethoxazole-Trimethoprim] Hives   • Ciprofloxacin Hives   • Penicillins Hives   • Steri-Strip Compound Benzoin [Benzoin Compound] Hives   • Sulfa Antibiotics Hives          Objective   Physical Exam   Constitutional: She is oriented to person, place, and time. She appears well-developed. No distress.   HENT:   Head: Normocephalic.   Right Ear: Hearing normal.   Left Ear: Hearing normal.   Cardiovascular: Normal rate, regular rhythm and normal heart sounds.   No murmur heard.  Pulmonary/Chest: Effort normal and breath sounds normal.   Abdominal: Soft. Bowel sounds are normal.   Neurological: She is alert and oriented to person, place, and time.   Skin: Skin is warm and dry. She is not diaphoretic. "   Psychiatric: Her behavior is normal.   Nursing note and vitals reviewed.      During this visit the following were done:  Labs Reviewed [x]    Labs Ordered [x]    Radiology Reports Reviewed []    Radiology Ordered []    PCP Records Reviewed []    Referring Provider Records Reviewed []    ER Records Reviewed []    Hospital Records Reviewed []    History Obtained From Family []    Radiology Images Reviewed []    Other Reviewed [x]    Records Requested []        Diagnoses and all orders for this visit:    1. Palpitations (Primary)  -     ECG 12 Lead    2. B12 deficiency  -     Vitamin B12; Future  -     Iron and TIBC; Future  -     Vitamin B12  -     Iron and TIBC    3. Recurrent UTI  -     Basic metabolic panel; Future  -     CBC Auto Differential; Future  -     Basic metabolic panel  -     CBC Auto Differential  -     Urine Culture - Urine, Urine, Clean Catch; Future  -     POCT urinalysis dipstick, automated  -     Urine Culture - Urine, Urine, Clean Catch    4. Type 2 diabetes mellitus without complication, without long-term current use of insulin (CMS/[facility])  -     Hemoglobin A1c; Future  -     Hemoglobin A1c    5. Exercise-induced tachycardia  -     Holter monitor - 48 hour; Future    6. Adult onset hypothyroidism  -     levothyroxine (SYNTHROID, LEVOTHROID) 112 MCG tablet; Take 1 tablet by mouth Daily.  Dispense: 90 tablet; Refill: 0  -     TSH; Future  -     TSH    7. Anemia, unspecified type  -     CBC Auto Differential; Future  -     Vitamin B12; Future  -     Iron and TIBC; Future  -     CBC Auto Differential  -     Vitamin B12  -     Iron and TIBC    8. Class 2 severe obesity due to excess calories with serious comorbidity and body mass index (BMI) of 37.0 to 37.9 in adult (CMS/[facility])    Other orders  -     fenofibrate (TRICOR) 145 MG tablet; Take 1 tablet by mouth Daily.  Dispense: 90 tablet; Refill: 1          Patient's Body mass index is 37.24 kg/m². BMI is above normal parameters. Recommendations include:  exercise counseling and nutrition counseling.

## 2021-08-24 NOTE — TELEPHONE ENCOUNTER
Called patient to check on her and to discuss Dr. She has been scheduled for IV antibiotics Rocephin 1 g x 7 days Dr. Conte's request.    Patient notified her appointment has been scheduled for 3 PM August 25 at infusion clinic.  Patient verbalized understanding, agreeable with plan of care and will follow up in clinic post IV antibiotics

## 2021-08-25 ENCOUNTER — TELEPHONE (OUTPATIENT)
Dept: FAMILY MEDICINE CLINIC | Facility: CLINIC | Age: 71
End: 2021-08-25

## 2021-08-25 ENCOUNTER — HOSPITAL ENCOUNTER (OUTPATIENT)
Dept: INFUSION THERAPY | Facility: HOSPITAL | Age: 71
Setting detail: INFUSION SERIES
Discharge: HOME | End: 2021-08-25

## 2021-08-25 DIAGNOSIS — N30.00 ACUTE CYSTITIS WITHOUT HEMATURIA: Primary | ICD-10-CM

## 2021-08-25 LAB — VIT B12 BLD-MCNC: 391 PG/ML (ref 211–946)

## 2021-08-25 PROCEDURE — 96365 THER/PROPH/DIAG IV INF INIT: CPT

## 2021-08-25 PROCEDURE — 25010000002 CEFTRIAXONE PER 250 MG: Performed by: UROLOGY

## 2021-08-25 RX ADMIN — CEFTRIAXONE 1 G: 1 INJECTION, POWDER, FOR SOLUTION INTRAMUSCULAR; INTRAVENOUS at 15:02

## 2021-08-25 NOTE — TELEPHONE ENCOUNTER
----- Message from BERT Lucas sent at 8/25/2021  9:27 AM EDT -----  Stop jardiance and metformin both.  Strict diet no sugar and limited carbs until kidney infection has been resolved..Renal function has further decreased and at present unsure if its the infection or medication related or a combination of both..  So for now try her best to control with diet and will follow closely.        Patient notified & verbalized understanding.

## 2021-08-25 NOTE — PATIENT INSTRUCTIONS
Ceftriaxone Injection  What is this medicine?  CEFTRIAXONE (sef try AX one) is a cephalosporin antibiotic. It treats some infections caused by bacteria. It will not work for colds, the flu, or other viruses.  This medicine may be used for other purposes; ask your health care provider or pharmacist if you have questions.  COMMON BRAND NAME(S): Ceftrisol Plus, Rocephin  What should I tell my health care provider before I take this medicine?  They need to know if you have any of these conditions:  · any chronic illness  · bowel disease, like colitis  · both kidney and liver disease  · high bilirubin level in  patients  · an unusual or allergic reaction to ceftriaxone, other cephalosporin or penicillin antibiotics, foods, dyes, or preservatives  · pregnant or trying to get pregnant  · breast-feeding  How should I use this medicine?  This drug is injected into a muscle or a vein. It is usually given by a health care provider in a hospital or clinic setting.  If you get this drug at home, you will be taught how to prepare and give it. Use exactly as directed. Take it as directed on the prescription label at the same time every day. Keep taking it unless your health care provider tells you to stop.  It is important that you put your used needles and syringes in a special sharps container. Do not put them in a trash can. If you do not have a sharps container, call your pharmacist or health care provider to get one.  Talk to your health care provider about the use of this drug in children. While it may be prescribed for children as young as newborns for selected conditions, precautions do apply.  Overdosage: If you think you have taken too much of this medicine contact a poison control center or emergency room at once.  NOTE: This medicine is only for you. Do not share this medicine with others.  What if I miss a dose?  It is important not to miss your dose. Call your health care provider if you are unable to keep an  appointment. If you give yourself this drug at home and you miss a dose, take it as soon as you can. If it is almost time for your next dose, take only that dose. Do not take double or extra doses.  What may interact with this medicine?  Do not take this medicine with any of the following medications:  · intravenous calcium  This medicine may also interact with the following medications:  · birth control pills  This list may not describe all possible interactions. Give your health care provider a list of all the medicines, herbs, non-prescription drugs, or dietary supplements you use. Also tell them if you smoke, drink alcohol, or use illegal drugs. Some items may interact with your medicine.  What should I watch for while using this medicine?  Tell your doctor or health care provider if your symptoms do not improve or if they get worse.  This medicine may cause serious skin reactions. They can happen weeks to months after starting the medicine. Contact your health care provider right away if you notice fevers or flu-like symptoms with a rash. The rash may be red or purple and then turn into blisters or peeling of the skin. Or, you might notice a red rash with swelling of the face, lips or lymph nodes in your neck or under your arms.  Do not treat diarrhea with over the counter products. Contact your doctor if you have diarrhea that lasts more than 2 days or if it is severe and watery.  If you are being treated for a sexually transmitted disease, avoid sexual contact until you have finished your treatment. Having sex can infect your sexual partner.  Calcium may bind to this medicine and cause lung or kidney problems. Avoid calcium products while taking this medicine and for 48 hours after taking the last dose of this medicine.  What side effects may I notice from receiving this medicine?  Side effects that you should report to your doctor or health care professional as soon as possible:  · allergic reactions like  skin rash, itching or hives, swelling of the face, lips, or tongue  · breathing problems  · fever, chills  · irregular heartbeat  · pain when passing urine  · redness, blistering, peeling, or loosening of the skin, including inside the mouth  · seizures  · stomach pain, cramps  · unusual bleeding, bruising  · unusually weak or tired  Side effects that usually do not require medical attention (report to your doctor or health care professional if they continue or are bothersome):  · diarrhea  · dizzy, drowsy  · headache  · nausea, vomiting  · pain, swelling, irritation where injected  · stomach upset  · sweating  This list may not describe all possible side effects. Call your doctor for medical advice about side effects. You may report side effects to FDA at 9-874-NSZ-5765.  Where should I keep my medicine?  Keep out of the reach of children and pets.  You will be instructed on how to store this drug. Protect from light. Throw away any unused drug after the expiration date.  NOTE: This sheet is a summary. It may not cover all possible information. If you have questions about this medicine, talk to your doctor, pharmacist, or health care provider.  © 2021 [employer] (2020-07-23 18:29:21)

## 2021-08-26 ENCOUNTER — HOSPITAL ENCOUNTER (OUTPATIENT)
Dept: INFUSION THERAPY | Facility: HOSPITAL | Age: 71
Setting detail: INFUSION SERIES
Discharge: HOME | End: 2021-08-26

## 2021-08-26 ENCOUNTER — HOSPITAL ENCOUNTER (OUTPATIENT)
Dept: RESPIRATORY THERAPY | Facility: HOSPITAL | Age: 71
Discharge: HOME | End: 2021-08-26
Admitting: NURSE PRACTITIONER

## 2021-08-26 VITALS
SYSTOLIC BLOOD PRESSURE: 150 MMHG | HEART RATE: 74 BPM | RESPIRATION RATE: 20 BRPM | DIASTOLIC BLOOD PRESSURE: 74 MMHG | TEMPERATURE: 98.1 F

## 2021-08-26 DIAGNOSIS — R00.0 EXERCISE-INDUCED TACHYCARDIA: ICD-10-CM

## 2021-08-26 DIAGNOSIS — N30.00 ACUTE CYSTITIS WITHOUT HEMATURIA: Primary | ICD-10-CM

## 2021-08-26 LAB — BACTERIA SPEC AEROBE CULT: ABNORMAL

## 2021-08-26 PROCEDURE — 25010000002 CEFTRIAXONE PER 250 MG: Performed by: UROLOGY

## 2021-08-26 PROCEDURE — 96365 THER/PROPH/DIAG IV INF INIT: CPT

## 2021-08-26 PROCEDURE — 93225 XTRNL ECG REC<48 HRS REC: CPT

## 2021-08-26 RX ADMIN — CEFTRIAXONE SODIUM 1 G: 1 INJECTION, POWDER, FOR SOLUTION INTRAMUSCULAR; INTRAVENOUS at 08:13

## 2021-08-27 ENCOUNTER — HOSPITAL ENCOUNTER (OUTPATIENT)
Dept: INFUSION THERAPY | Facility: HOSPITAL | Age: 71
Setting detail: INFUSION SERIES
Discharge: HOME | End: 2021-08-27

## 2021-08-27 VITALS
RESPIRATION RATE: 18 BRPM | SYSTOLIC BLOOD PRESSURE: 130 MMHG | TEMPERATURE: 97.7 F | DIASTOLIC BLOOD PRESSURE: 69 MMHG | HEART RATE: 81 BPM

## 2021-08-27 DIAGNOSIS — N30.00 ACUTE CYSTITIS WITHOUT HEMATURIA: Primary | ICD-10-CM

## 2021-08-27 PROCEDURE — 96365 THER/PROPH/DIAG IV INF INIT: CPT

## 2021-08-27 PROCEDURE — 25010000002 CEFTRIAXONE PER 250 MG: Performed by: UROLOGY

## 2021-08-27 RX ADMIN — CEFTRIAXONE 1 G: 1 INJECTION, POWDER, FOR SOLUTION INTRAMUSCULAR; INTRAVENOUS at 08:35

## 2021-08-28 ENCOUNTER — HOSPITAL ENCOUNTER (OUTPATIENT)
Dept: INFUSION THERAPY | Facility: HOSPITAL | Age: 71
Setting detail: INFUSION SERIES
Discharge: HOME | End: 2021-08-28

## 2021-08-28 VITALS
RESPIRATION RATE: 18 BRPM | SYSTOLIC BLOOD PRESSURE: 134 MMHG | TEMPERATURE: 97.9 F | HEART RATE: 70 BPM | DIASTOLIC BLOOD PRESSURE: 69 MMHG

## 2021-08-28 DIAGNOSIS — N30.00 ACUTE CYSTITIS WITHOUT HEMATURIA: Primary | ICD-10-CM

## 2021-08-28 PROCEDURE — 25010000002 CEFTRIAXONE PER 250 MG: Performed by: UROLOGY

## 2021-08-28 PROCEDURE — 96365 THER/PROPH/DIAG IV INF INIT: CPT

## 2021-08-28 RX ADMIN — CEFTRIAXONE 1 G: 1 INJECTION, POWDER, FOR SOLUTION INTRAMUSCULAR; INTRAVENOUS at 08:30

## 2021-08-29 ENCOUNTER — HOSPITAL ENCOUNTER (OUTPATIENT)
Dept: INFUSION THERAPY | Facility: HOSPITAL | Age: 71
Setting detail: INFUSION SERIES
Discharge: HOME | End: 2021-08-29

## 2021-08-29 VITALS
HEART RATE: 70 BPM | RESPIRATION RATE: 18 BRPM | SYSTOLIC BLOOD PRESSURE: 127 MMHG | DIASTOLIC BLOOD PRESSURE: 60 MMHG | TEMPERATURE: 97.3 F

## 2021-08-29 DIAGNOSIS — N30.00 ACUTE CYSTITIS WITHOUT HEMATURIA: Primary | ICD-10-CM

## 2021-08-29 PROCEDURE — 96365 THER/PROPH/DIAG IV INF INIT: CPT

## 2021-08-29 PROCEDURE — 25010000002 CEFTRIAXONE PER 250 MG: Performed by: UROLOGY

## 2021-08-29 RX ADMIN — CEFTRIAXONE 1 G: 1 INJECTION, POWDER, FOR SOLUTION INTRAMUSCULAR; INTRAVENOUS at 08:28

## 2021-08-30 ENCOUNTER — HOSPITAL ENCOUNTER (OUTPATIENT)
Dept: INFUSION THERAPY | Facility: HOSPITAL | Age: 71
Setting detail: INFUSION SERIES
Discharge: HOME | End: 2021-08-30

## 2021-08-30 VITALS
HEART RATE: 70 BPM | SYSTOLIC BLOOD PRESSURE: 125 MMHG | RESPIRATION RATE: 18 BRPM | TEMPERATURE: 97 F | DIASTOLIC BLOOD PRESSURE: 62 MMHG

## 2021-08-30 DIAGNOSIS — N30.00 ACUTE CYSTITIS WITHOUT HEMATURIA: Primary | ICD-10-CM

## 2021-08-30 PROCEDURE — 25010000002 CEFTRIAXONE PER 250 MG: Performed by: UROLOGY

## 2021-08-30 PROCEDURE — 96365 THER/PROPH/DIAG IV INF INIT: CPT

## 2021-08-30 RX ADMIN — CEFTRIAXONE 1 G: 1 INJECTION, POWDER, FOR SOLUTION INTRAMUSCULAR; INTRAVENOUS at 09:00

## 2021-08-31 ENCOUNTER — TELEPHONE (OUTPATIENT)
Dept: FAMILY MEDICINE CLINIC | Facility: CLINIC | Age: 71
End: 2021-08-31

## 2021-08-31 ENCOUNTER — TELEPHONE (OUTPATIENT)
Dept: UROLOGY | Facility: CLINIC | Age: 71
End: 2021-08-31

## 2021-08-31 ENCOUNTER — HOSPITAL ENCOUNTER (OUTPATIENT)
Dept: INFUSION THERAPY | Facility: HOSPITAL | Age: 71
Setting detail: INFUSION SERIES
Discharge: HOME | End: 2021-08-31

## 2021-08-31 VITALS
SYSTOLIC BLOOD PRESSURE: 120 MMHG | HEART RATE: 73 BPM | TEMPERATURE: 97.3 F | DIASTOLIC BLOOD PRESSURE: 64 MMHG | RESPIRATION RATE: 18 BRPM

## 2021-08-31 DIAGNOSIS — N30.00 ACUTE CYSTITIS WITHOUT HEMATURIA: Primary | ICD-10-CM

## 2021-08-31 PROCEDURE — 96365 THER/PROPH/DIAG IV INF INIT: CPT

## 2021-08-31 PROCEDURE — 25010000002 CEFTRIAXONE PER 250 MG: Performed by: UROLOGY

## 2021-08-31 RX ADMIN — CEFTRIAXONE 1 G: 1 INJECTION, POWDER, FOR SOLUTION INTRAMUSCULAR; INTRAVENOUS at 08:24

## 2021-08-31 NOTE — TELEPHONE ENCOUNTER
She can take her jardiance and yes return for BMP and can then decide to restart the metformin      Patient notified & verbalized understanding.

## 2021-08-31 NOTE — TELEPHONE ENCOUNTER
Shey called reports she has completed her Iv antibiotics,her blood sugar this am was 200,has been running 150-160's,is questioning if she needs to come in for repeat labs to see if she can restart her Diabetic medications?

## 2021-09-01 ENCOUNTER — LAB (OUTPATIENT)
Dept: FAMILY MEDICINE CLINIC | Facility: CLINIC | Age: 71
End: 2021-09-01

## 2021-09-01 DIAGNOSIS — E11.9 TYPE 2 DIABETES MELLITUS WITHOUT COMPLICATION, WITHOUT LONG-TERM CURRENT USE OF INSULIN (HCC): Primary | ICD-10-CM

## 2021-09-01 NOTE — TELEPHONE ENCOUNTER
Pt called back to see what her next step is to be, I looked thru the past 2 notes and they stated that the pt was start trimpex qhs and we will see her on Friday to drop off urine for a culture - pt voiced understanding.

## 2021-09-03 ENCOUNTER — LAB (OUTPATIENT)
Dept: FAMILY MEDICINE CLINIC | Facility: CLINIC | Age: 71
End: 2021-09-03

## 2021-09-03 ENCOUNTER — TELEPHONE (OUTPATIENT)
Dept: FAMILY MEDICINE CLINIC | Facility: CLINIC | Age: 71
End: 2021-09-03

## 2021-09-03 ENCOUNTER — LAB (OUTPATIENT)
Dept: UROLOGY | Facility: CLINIC | Age: 71
End: 2021-09-03

## 2021-09-03 DIAGNOSIS — N39.0 RECURRENT UTI: Primary | ICD-10-CM

## 2021-09-03 DIAGNOSIS — R30.0 DYSURIA: ICD-10-CM

## 2021-09-03 DIAGNOSIS — E53.8 B12 DEFICIENCY: ICD-10-CM

## 2021-09-03 DIAGNOSIS — E11.9 TYPE 2 DIABETES MELLITUS WITHOUT COMPLICATION (HCC): ICD-10-CM

## 2021-09-03 DIAGNOSIS — E03.8 ADULT ONSET HYPOTHYROIDISM: Primary | ICD-10-CM

## 2021-09-03 LAB
ANION GAP SERPL CALCULATED.3IONS-SCNC: 16.1 MMOL/L (ref 5–15)
BILIRUB BLD-MCNC: NEGATIVE MG/DL
BUN SERPL-MCNC: 20 MG/DL (ref 8–23)
BUN/CREAT SERPL: 14.9 (ref 7–25)
CALCIUM SPEC-SCNC: 9.5 MG/DL (ref 8.6–10.5)
CHLORIDE SERPL-SCNC: 97 MMOL/L (ref 98–107)
CLARITY, POC: CLEAR
CO2 SERPL-SCNC: 20.9 MMOL/L (ref 22–29)
COLOR UR: YELLOW
CREAT SERPL-MCNC: 1.34 MG/DL (ref 0.57–1)
GFR SERPL CREATININE-BSD FRML MDRD: 39 ML/MIN/1.73
GLUCOSE SERPL-MCNC: 112 MG/DL (ref 65–99)
GLUCOSE UR STRIP-MCNC: ABNORMAL MG/DL
KETONES UR QL: NEGATIVE
LEUKOCYTE EST, POC: NEGATIVE
NITRITE UR-MCNC: NEGATIVE MG/ML
PH UR: 6 [PH] (ref 5–8)
POTASSIUM SERPL-SCNC: 4.1 MMOL/L (ref 3.5–5.2)
PROT UR STRIP-MCNC: NEGATIVE MG/DL
RBC # UR STRIP: NEGATIVE /UL
SODIUM SERPL-SCNC: 134 MMOL/L (ref 136–145)
SP GR UR: 1.01 (ref 1–1.03)
UROBILINOGEN UR QL: NORMAL

## 2021-09-03 PROCEDURE — 81003 URINALYSIS AUTO W/O SCOPE: CPT | Performed by: NURSE PRACTITIONER

## 2021-09-03 PROCEDURE — 80048 BASIC METABOLIC PNL TOTAL CA: CPT | Performed by: NURSE PRACTITIONER

## 2021-09-03 PROCEDURE — 87086 URINE CULTURE/COLONY COUNT: CPT | Performed by: NURSE PRACTITIONER

## 2021-09-03 PROCEDURE — 36415 COLL VENOUS BLD VENIPUNCTURE: CPT

## 2021-09-04 LAB — BACTERIA SPEC AEROBE CULT: NORMAL

## 2021-09-07 ENCOUNTER — TELEPHONE (OUTPATIENT)
Dept: UROLOGY | Facility: CLINIC | Age: 71
End: 2021-09-07

## 2021-09-07 DIAGNOSIS — N28.9 RENAL INSUFFICIENCY: Primary | ICD-10-CM

## 2021-09-07 PROCEDURE — 93227 XTRNL ECG REC<48 HR R&I: CPT | Performed by: INTERNAL MEDICINE

## 2021-09-07 NOTE — TELEPHONE ENCOUNTER
Called patient to check on her post clinic visit and to discuss urine culture results negative at this time.    Urine Culture 50,000 CFU/mL Mixed Kathy Isolated        Up in clinic in 3 months as discussed, patient is agreeable plan of care.

## 2021-09-08 ENCOUNTER — TELEPHONE (OUTPATIENT)
Dept: FAMILY MEDICINE CLINIC | Facility: CLINIC | Age: 71
End: 2021-09-08

## 2021-09-08 NOTE — TELEPHONE ENCOUNTER
----- Message from BERT Lucas sent at 9/7/2021  7:26 PM EDT -----  Holter is ok     Patient notified and verbally understands.

## 2021-09-08 NOTE — TELEPHONE ENCOUNTER
----- Message from BERT Lucas sent at 9/7/2021  6:01 PM EDT -----  No improvements with renal function.  I am referring to nephrology

## 2021-09-21 ENCOUNTER — OFFICE VISIT (OUTPATIENT)
Dept: FAMILY MEDICINE CLINIC | Facility: CLINIC | Age: 71
End: 2021-09-21

## 2021-09-21 VITALS
BODY MASS INDEX: 36.17 KG/M2 | SYSTOLIC BLOOD PRESSURE: 120 MMHG | HEIGHT: 61 IN | HEART RATE: 88 BPM | WEIGHT: 191.6 LBS | OXYGEN SATURATION: 99 % | DIASTOLIC BLOOD PRESSURE: 68 MMHG | TEMPERATURE: 96.9 F

## 2021-09-21 DIAGNOSIS — E11.9 TYPE 2 DIABETES MELLITUS WITHOUT COMPLICATION, WITHOUT LONG-TERM CURRENT USE OF INSULIN (HCC): ICD-10-CM

## 2021-09-21 DIAGNOSIS — Z12.11 ENCOUNTER FOR SCREENING FOR MALIGNANT NEOPLASM OF COLON: ICD-10-CM

## 2021-09-21 DIAGNOSIS — N28.9 RENAL INSUFFICIENCY: Primary | ICD-10-CM

## 2021-09-21 DIAGNOSIS — R00.0 EXERCISE-INDUCED TACHYCARDIA: ICD-10-CM

## 2021-09-21 DIAGNOSIS — Z00.00 MEDICARE ANNUAL WELLNESS VISIT, SUBSEQUENT: ICD-10-CM

## 2021-09-21 LAB
BILIRUB BLD-MCNC: NEGATIVE MG/DL
CLARITY, POC: CLEAR
COLOR UR: YELLOW
GLUCOSE UR STRIP-MCNC: ABNORMAL MG/DL
KETONES UR QL: NEGATIVE
LEUKOCYTE EST, POC: NEGATIVE
NITRITE UR-MCNC: NEGATIVE MG/ML
PH UR: 6 [PH] (ref 5–8)
PROT UR STRIP-MCNC: NEGATIVE MG/DL
RBC # UR STRIP: NEGATIVE /UL
SP GR UR: 1.02 (ref 1–1.03)
UROBILINOGEN UR QL: NORMAL

## 2021-09-21 PROCEDURE — G0439 PPPS, SUBSEQ VISIT: HCPCS | Performed by: NURSE PRACTITIONER

## 2021-09-21 PROCEDURE — 81003 URINALYSIS AUTO W/O SCOPE: CPT | Performed by: NURSE PRACTITIONER

## 2021-09-21 PROCEDURE — 80048 BASIC METABOLIC PNL TOTAL CA: CPT | Performed by: NURSE PRACTITIONER

## 2021-09-21 PROCEDURE — 36415 COLL VENOUS BLD VENIPUNCTURE: CPT | Performed by: NURSE PRACTITIONER

## 2021-09-21 NOTE — PROGRESS NOTES
The ABCs of the Annual Wellness Visit  Subsequent Medicare Wellness Visit    Chief Complaint   Patient presents with   • Medicare Wellness-subsequent   • Follow-up      Subjective    History of Present Illness:  Shey King is a 70 y.o. female who presents for a Subsequent Medicare Wellness Visit.  Doing better following infusions from urology.  Sugar improving with diet.  Continues to have palpitations with minimal strenuous activity.    The following portions of the patient's history were reviewed and   updated as appropriate: allergies, current medications, past family history, past medical history, past social history, past surgical history and problem list.    Compared to one year ago, the patient feels her physical   health is better.    Compared to one year ago, the patient feels her mental   health is better.    Recent Hospitalizations:  This patient has had a [facility] admission record on file within the last 365 days.    Current Medical Providers:  Patient Care Team:  Tashia Vallejo APRN as PCP - General  Tashia Vallejo APRN as PCP - Family Medicine    Outpatient Medications Prior to Visit   Medication Sig Dispense Refill   • aspirin 81 MG chewable tablet Chew 81 mg daily.     • Empagliflozin 10 MG tablet Take 10 mg by mouth Daily. 90 tablet 1   • glucose blood test strip For daily testing  E11.65  (One Touch Ultra) 50 each 11   • glucose monitor monitoring kit 1 each as needed (DM II). 1 each 0   • Lancets Misc. misc For Daily testing  E11.65 50 each 11   • levothyroxine (SYNTHROID, LEVOTHROID) 112 MCG tablet Take 1 tablet by mouth Daily. 90 tablet 0   • trimethoprim (TRIMPEX) 100 MG tablet Take 1 tablet by mouth 2 (Two) Times a Day. 30 tablet 3   • vitamin D (ERGOCALCIFEROL) 1.25 MG (37568 UT) capsule capsule Take 1 capsule by mouth 1 (One) Time Per Week. 5 capsule 2   • fenofibrate (TRICOR) 145 MG tablet Take 1 tablet by mouth Daily. 90 tablet 1   • metFORMIN ER (GLUCOPHAGE-XR) 500 MG 24 hr tablet Take  "1 tablet by mouth Daily With Breakfast. 30 tablet 5   • nystatin-triamcinolone (MYCOLOG) 646429-8.1 UNIT/GM-% ointment Apply  topically to the appropriate area as directed 2 (Two) Times a Day. 60 g 1   • triamcinolone (KENALOG) 0.025 % cream Apply 0.025 application topically to the appropriate area as directed 1 (One) Time.       No facility-administered medications prior to visit.       No opioid medication identified on active medication list. I have reviewed chart for other potential  high risk medication/s and harmful drug interactions in the elderly.          Aspirin is on active medication list. Aspirin use is not indicated based on review of current medical condition/s. Risk of harm outweighs potential benefits. Patient instructed to discontinue this medication.  .      Patient Active Problem List   Diagnosis   • Abdominal pain, LLQ (left lower quadrant)   • Cystitis   • Diarrhea   • Hiatal hernia   • Hyperglycemia   • Hyperlipidemia   • Adult onset hypothyroidism   • Muscle spasm   • OAB (overactive bladder)   • UTI (urinary tract infection)   • Gallstones   • B12 deficiency   • Pulmonary nodule   • Class 2 obesity due to excess calories without serious comorbidity with body mass index (BMI) of 35.0 to 35.9 in adult   • Diabetes mellitus (CMS/[facility])   • Lymphadenopathy   • Dupuytren's contracture of right hand     Advance Care Planning  Advance Directive is not on file.  ACP discussion was declined by the patient. Patient does not have an advance directive, information provided.          Objective    Vitals:    09/21/21 0923   BP: 120/68   Pulse: 88   Temp: 96.9 °F (36.1 °C)   TempSrc: Temporal   SpO2: 99%   Weight: 86.9 kg (191 lb 9.6 oz)   Height: 154.9 cm (61\")     BMI Readings from Last 1 Encounters:   09/21/21 36.20 kg/m²   BMI is above normal parameters. Recommendations include: educational material, exercise counseling and nutrition counseling    Does the patient have evidence of cognitive impairment? " No    Physical Exam  Vitals and nursing note reviewed.   Constitutional:       General: She is not in acute distress.     Appearance: She is well-developed. She is obese. She is not ill-appearing.   HENT:      Head: Normocephalic.   Eyes:      General:         Right eye: No discharge.         Left eye: No discharge.      Conjunctiva/sclera: Conjunctivae normal.   Cardiovascular:      Rate and Rhythm: Normal rate and regular rhythm.      Heart sounds: Normal heart sounds. No murmur heard.     Pulmonary:      Effort: Pulmonary effort is normal. No respiratory distress.      Breath sounds: Normal breath sounds. No wheezing.   Musculoskeletal:      Right lower leg: No edema.      Left lower leg: No edema.   Skin:     General: Skin is warm and dry.   Neurological:      Mental Status: She is alert and oriented to person, place, and time.   Psychiatric:         Mood and Affect: Mood normal.         Behavior: Behavior normal.         Thought Content: Thought content normal.         Judgment: Judgment normal.       Lab Results   Component Value Date    HGBA1C 7.10 (H) 08/24/2021            HEALTH RISK ASSESSMENT    Smoking Status:  Social History     Tobacco Use   Smoking Status Never Smoker   Smokeless Tobacco Never Used   Tobacco Comment    never     Alcohol Consumption:  Social History     Substance and Sexual Activity   Alcohol Use No     Fall Risk Screen:    STEADI Fall Risk Assessment was completed, and patient is at MODERATE risk for falls. Assessment completed on:8/24/2021    Depression Screening:  PHQ-2/PHQ-9 Depression Screening 8/24/2021   Little interest or pleasure in doing things 0   Feeling down, depressed, or hopeless 0   Trouble falling or staying asleep, or sleeping too much -   Feeling tired or having little energy -   Poor appetite or overeating -   Feeling bad about yourself - or that you are a failure or have let yourself or your family down -   Trouble concentrating on things, such as reading the  newspaper or watching television -   Moving or speaking so slowly that other people could have noticed. Or the opposite - being so fidgety or restless that you have been moving around a lot more than usual -   Thoughts that you would be better off dead, or of hurting yourself in some way -   Total Score 0   If you checked off any problems, how difficult have these problems made it for you to do your work, take care of things at home, or get along with other people? -       Health Habits and Functional and Cognitive Screening:  Functional & Cognitive Status 8/24/2021   Do you have difficulty preparing food and eating? No   Do you have difficulty bathing yourself, getting dressed or grooming yourself? No   Do you have difficulty using the toilet? No   Do you have difficulty moving around from place to place? No   Do you have trouble with steps or getting out of a bed or a chair? No   Current Diet Well Balanced Diet   Dental Exam Up to date   Eye Exam Up to date   Exercise (times per week) 2 times per week   Current Exercises Include House Cleaning   Do you need help using the phone?  No   Are you deaf or do you have serious difficulty hearing?  No   Do you need help with transportation? No   Do you need help shopping? No   Do you need help preparing meals?  No   Do you need help with housework?  No   Do you need help with laundry? No   Do you need help taking your medications? No   Do you need help managing money? No   Do you ever drive or ride in a car without wearing a seat belt? No   Have you felt unusual stress, anger or loneliness in the last month? No   Who do you live with? Spouse   If you need help, do you have trouble finding someone available to you? No   Have you been bothered in the last four weeks by sexual problems? No   Do you have difficulty concentrating, remembering or making decisions? No       Age-appropriate Screening Schedule:  Refer to the list below for future screening recommendations based on  patient's age, sex and/or medical conditions. Orders for these recommended tests are listed in the plan section. The patient has been provided with a written plan.    Health Maintenance   Topic Date Due   • DXA SCAN  Never done   • TDAP/TD VACCINES (1 - Tdap) Never done   • ZOSTER VACCINE (1 of 2) Never done   • DIABETIC FOOT EXAM  Never done   • PAP SMEAR  01/01/2018   • DIABETIC EYE EXAM  02/26/2021   • INFLUENZA VACCINE  10/01/2021   • HEMOGLOBIN A1C  02/24/2022   • LIPID PANEL  03/09/2022   • MAMMOGRAM  06/19/2022   • URINE MICROALBUMIN  07/13/2022              Assessment/Plan   CMS Preventative Services Quick Reference  Risk Factors Identified During Encounter  Obesity/Overweight   The above risks/problems have been discussed with the patient.  Follow up actions/plans if indicated are seen below in the Assessment/Plan Section.  Pertinent information has been shared with the patient in the After Visit Summary.    Diagnoses and all orders for this visit:    1. Renal insufficiency (Primary)  -     POCT urinalysis dipstick, automated  -     Basic metabolic panel; Future  -     Basic metabolic panel    2. Type 2 diabetes mellitus without complication, without long-term current use of insulin (CMS/[facility])  -     Ambulatory Referral to Diabetic Education    3. Exercise-induced tachycardia  -     Ambulatory Referral to Cardiology    4. Medicare annual wellness visit, subsequent    5. Encounter for screening for malignant neoplasm of colon         Follow Up:   Return in about 3 months (around 12/21/2021), or if symptoms worsen or fail to improve, for Recheck lab today.     An After Visit Summary and PPPS were made available to the patient.

## 2021-09-22 ENCOUNTER — TELEPHONE (OUTPATIENT)
Dept: FAMILY MEDICINE CLINIC | Facility: CLINIC | Age: 71
End: 2021-09-22

## 2021-09-22 LAB
ANION GAP SERPL CALCULATED.3IONS-SCNC: 12.4 MMOL/L (ref 5–15)
BUN SERPL-MCNC: 14 MG/DL (ref 8–23)
BUN/CREAT SERPL: 11.6 (ref 7–25)
CALCIUM SPEC-SCNC: 9.7 MG/DL (ref 8.6–10.5)
CHLORIDE SERPL-SCNC: 102 MMOL/L (ref 98–107)
CO2 SERPL-SCNC: 21.6 MMOL/L (ref 22–29)
CREAT SERPL-MCNC: 1.21 MG/DL (ref 0.57–1)
GFR SERPL CREATININE-BSD FRML MDRD: 44 ML/MIN/1.73
GLUCOSE SERPL-MCNC: 108 MG/DL (ref 65–99)
POTASSIUM SERPL-SCNC: 4.6 MMOL/L (ref 3.5–5.2)
SODIUM SERPL-SCNC: 136 MMOL/L (ref 136–145)

## 2021-09-22 NOTE — TELEPHONE ENCOUNTER
----- Message from BERT Lucas sent at 9/22/2021 10:08 AM EDT -----  Slight improvement of renal function.  Keep Nephrology appt    Spoke with patient & she verbalized understanding.    Reports she faxed a handicap placard form a couple of weeks ago?do you have this

## 2021-10-11 ENCOUNTER — OFFICE VISIT (OUTPATIENT)
Dept: CARDIOLOGY | Facility: CLINIC | Age: 71
End: 2021-10-11

## 2021-10-11 VITALS — BODY MASS INDEX: 35.3 KG/M2 | OXYGEN SATURATION: 99 % | HEART RATE: 71 BPM | HEIGHT: 61 IN | WEIGHT: 187 LBS

## 2021-10-11 DIAGNOSIS — R00.2 PALPITATIONS: ICD-10-CM

## 2021-10-11 DIAGNOSIS — R73.9 HYPERGLYCEMIA: ICD-10-CM

## 2021-10-11 DIAGNOSIS — E78.2 MIXED HYPERLIPIDEMIA: ICD-10-CM

## 2021-10-11 DIAGNOSIS — R06.09 DOE (DYSPNEA ON EXERTION): Primary | ICD-10-CM

## 2021-10-11 PROCEDURE — 99204 OFFICE O/P NEW MOD 45 MIN: CPT | Performed by: INTERNAL MEDICINE

## 2021-10-11 PROCEDURE — 93000 ELECTROCARDIOGRAM COMPLETE: CPT | Performed by: INTERNAL MEDICINE

## 2021-10-11 RX ORDER — METOPROLOL SUCCINATE 25 MG/1
25 TABLET, EXTENDED RELEASE ORAL DAILY
Qty: 30 TABLET | Refills: 11 | Status: SHIPPED | OUTPATIENT
Start: 2021-10-11 | End: 2021-10-11

## 2021-10-11 RX ORDER — METOPROLOL SUCCINATE 25 MG/1
12.5 TABLET, EXTENDED RELEASE ORAL DAILY
Qty: 30 TABLET | Refills: 1 | Status: SHIPPED | OUTPATIENT
Start: 2021-10-11 | End: 2022-02-15 | Stop reason: ALTCHOICE

## 2021-10-11 NOTE — PROGRESS NOTES
Subjective   Chief Complaint   Patient presents with   • Palpitations   • Rapid Heart Rate       History of Present Illness  Patient is 70 years old white female who is here for cardiac evaluation because of abnormal Holter monitor.  According to her she gets fast heartbeat on exertion however is getting a lot better  She also states that she used to be very tired and fatigued which is getting better.    She denies any chest pain, tightness or pressure sensation.  There is no history of rheumatic fever or heart murmur.  No prior history of coronary artery disease.    She has history of hyperlipidemia and hyperglycemia  She is taking TriCor 145 mg daily but no statin therapy.    Diabetes mellitus is controlled with Metformin and empagliflozin    Past Surgical History:   Procedure Laterality Date   • BREAST BIOPSY Bilateral 2005    benign   • CHOLECYSTECTOMY  2018?   • ENDOSCOPY     • ENDOSCOPY N/A 1/11/2018    Procedure: ESOPHAGOGASTRODUODENOSCOPY;  Surgeon: Dong Mata MD;  Location: [facility];  Service:    • MAMMO BILATERAL  09/2015   • OVARIAN CYST DRAINAGE     • MN LAP,CHOLECYSTECTOMY N/A 1/11/2018    Procedure: CHOLECYSTECTOMY LAPAROSCOPIC;  Surgeon: Dong Mata MD;  Location: [facility];  Service: General   • US GUIDED LYMPH NODE BIOPSY  4/11/2018     Family History   Problem Relation Age of Onset   • Heart disease Mother         also had cancer kidney   • Cancer Mother         kidney   • Thyroid disease Mother    • Heart disease Father    • Diabetes Brother    • Breast cancer Neg Hx      Past Medical History:   Diagnosis Date   • Abdominal pain, LLQ (left lower quadrant)    • Abnormal Pap smear of cervix     several years ago   • Anemia     years ago   • Cataract early stage   • Cystitis    • Diabetes mellitus (HCC)    • Diarrhea    • Diverticulosis    • Encounter for cholecystectomy 2018   • Gallstones    • GERD (gastroesophageal reflux disease)    • Hiatal hernia    • Hyperglycemia    •  Hyperlipidemia    • Hypothyroidism    • Muscle spasm     FLANK PAIN   • OAB (overactive bladder)    • Pneumonia     years ago   • UTI (urinary tract infection)        Patient Active Problem List   Diagnosis   • Abdominal pain, LLQ (left lower quadrant)   • Cystitis   • Diarrhea   • Hiatal hernia   • Hyperglycemia   • Hyperlipidemia   • Adult onset hypothyroidism   • Muscle spasm   • OAB (overactive bladder)   • UTI (urinary tract infection)   • Gallstones   • B12 deficiency   • Pulmonary nodule   • Class 2 obesity due to excess calories without serious comorbidity with body mass index (BMI) of 35.0 to 35.9 in adult   • Diabetes mellitus (HCC)   • Lymphadenopathy   • Dupuytren's contracture of right hand   • Palpitations   • ELKINS (dyspnea on exertion)         Social History     Tobacco Use   • Smoking status: Never Smoker   • Smokeless tobacco: Never Used   • Tobacco comment: never   Substance Use Topics   • Alcohol use: No   • Drug use: No         The following portions of the patient's history were reviewed and updated as appropriate: allergies, current medications, past family history, past medical history, past social history, past surgical history and problem list.    Allergies   Allergen Reactions   • Bactrim [Sulfamethoxazole-Trimethoprim] Hives   • Ciprofloxacin Hives   • Penicillins Hives   • Steri-Strip Compound Benzoin [Benzoin Compound] Hives   • Sulfa Antibiotics Hives         Current Outpatient Medications:   •  aspirin 81 MG chewable tablet, Chew 81 mg daily., Disp: , Rfl:   •  Empagliflozin 10 MG tablet, Take 10 mg by mouth Daily., Disp: 90 tablet, Rfl: 1  •  fenofibrate (TRICOR) 145 MG tablet, Take 1 tablet by mouth Daily., Disp: 90 tablet, Rfl: 1  •  glucose blood test strip, For daily testing  E11.65  (One Touch Ultra), Disp: 50 each, Rfl: 11  •  glucose monitor monitoring kit, 1 each as needed (DM II)., Disp: 1 each, Rfl: 0  •  Lancets Misc. misc, For Daily testing  E11.65, Disp: 50 each, Rfl:  "11  •  levothyroxine (SYNTHROID, LEVOTHROID) 112 MCG tablet, Take 1 tablet by mouth Daily., Disp: 90 tablet, Rfl: 0  •  metFORMIN ER (GLUCOPHAGE-XR) 500 MG 24 hr tablet, Take 1 tablet by mouth Daily With Breakfast., Disp: 30 tablet, Rfl: 5  •  trimethoprim (TRIMPEX) 100 MG tablet, Take 1 tablet by mouth 2 (Two) Times a Day., Disp: 30 tablet, Rfl: 3  •  vitamin D (ERGOCALCIFEROL) 1.25 MG (34957 UT) capsule capsule, Take 1 capsule by mouth 1 (One) Time Per Week., Disp: 5 capsule, Rfl: 2  •  metoprolol succinate XL (TOPROL-XL) 25 MG 24 hr tablet, Take 1 tablet by mouth Daily., Disp: 30 tablet, Rfl: 11    Review of Systems   Constitutional: Negative.   HENT: Negative.  Negative for congestion.    Eyes: Negative.    Cardiovascular: Negative.  Negative for chest pain, cyanosis, dyspnea on exertion, irregular heartbeat, leg swelling, near-syncope, orthopnea, palpitations, paroxysmal nocturnal dyspnea and syncope.        Fast heartbeat   Respiratory: Negative.  Negative for shortness of breath.    Hematologic/Lymphatic: Negative.    Musculoskeletal: Negative.    Gastrointestinal: Negative.    Neurological: Negative.  Negative for headaches.        Objective      Pulse 71   Ht 154.9 cm (61\")   Wt 84.8 kg (187 lb)   SpO2 99%   BMI 35.33 kg/m²     Pulmonary:      Effort: Pulmonary effort is normal.      Breath sounds: Normal breath sounds. No stridor. No wheezing. No rhonchi. No rales.   Cardiovascular:      PMI at left midclavicular line. Normal rate. Regular rhythm. Normal S1. Normal S2.      Murmurs: There is no murmur.      No gallop. No click. No rub.   Pulses:     Intact distal pulses.   Edema:     Peripheral edema absent.         Lab Review:    Lab Results   Component Value Date     09/21/2021    K 4.6 09/21/2021     09/21/2021    BUN 14 09/21/2021    CREATININE 1.21 (H) 09/21/2021    GLU 99 09/06/2016    GLUCOSE 108 (H) 09/21/2021    CALCIUM 9.7 09/21/2021    ALT 24 07/13/2021    ALKPHOS 66 07/13/2021 "    LABIL2 1.2 (L) 09/06/2016     No results found for: CKTOTAL  Lab Results   Component Value Date    WBC 7.71 08/24/2021    HGB 11.3 (L) 08/24/2021    HCT 35.4 08/24/2021     08/24/2021     No results found for: INR  Lab Results   Component Value Date    MG 1.7 07/13/2021     Lab Results   Component Value Date    CHOL 204 (H) 03/09/2021    CHLPL 220 (H) 09/06/2016    TRIG 175 (H) 03/09/2021    HDL 36 (L) 03/09/2021    VLDL 32 03/09/2021    LDLHDL 3.69 03/09/2021     No results found for: BNP        1 Result Note     1 Follow-up Encounter     1  Topic    Component   Ref Range & Units 7 mo ago   (3/9/21) 1 yr ago   (9/14/20) 2 yr ago   (6/19/19) 2 yr ago   (2/13/19) 2 yr ago   (10/31/18) 3 yr ago   (6/12/18) 4 yr ago   (8/17/17)   Total Cholesterol   0 - 200 mg/dL 204 High   184  201 High   207 High   233 High   195  213 High     Triglycerides   0 - 150 mg/dL 175 High   265 High   203 High   229 High   198 High   268 High   220 High     HDL Cholesterol   40 - 60 mg/dL 36 Low   27 Low   30 Low   38 Low  R  43 Low  R  39 Low  R  41 Low  R    LDL Cholesterol    0 - 100 mg/dL 136 High   104 High   130 High   123 High   150 High   102 High   128 High     VLDL Cholesterol   5 - 40 mg/dL 32  53 High   40.6 High   45.8 R  39.6 R  53.6 R  44 R    LDL/HDL Ratio  3.69  3.85  4.35  3.24  3.50  2.63  3.12                 ECG 12 Lead    Date/Time: 10/11/2021 4:51 PM  Performed by: Garima Gallardo MD  Authorized by: Garima Gallardo MD   Comparison: compared with previous ECG from 1/23/2018  Similar to previous ECG  Rhythm: sinus rhythm  Rate: normal  BPM: 71  Conduction: conduction normal  QRS axis: normal  Other findings: non-specific ST-T wave changes    Clinical impression: non-specific ECG          Holter monitor did not show any significant arrhythmia  Maximum heart rate was 132 bpm average heart rate 75.  I reviewed the patient's new clinical results.  I personally viewed and interpreted the patient's  EKG/lab data        Assessment:   Diagnosis Plan   1. ELKINS (dyspnea on exertion)  Adult Transthoracic Echo Complete W/ Cont if Necessary Per Protocol    ECG 12 Lead   2. Palpitations  Adult Transthoracic Echo Complete W/ Cont if Necessary Per Protocol    ECG 12 Lead   3. Mixed hyperlipidemia     4. Hyperglycemia            Plan:  Patient complains of dyspnea on exertion and fatigue and heart beats fast after exertion.  EKG does not show any acute changes.  Holter monitor done earlier does not show any significant arrhythmia.    Patient was advised to take metoprolol ER 12.5 daily  An echocardiogram was scheduled for further evaluation.  Because her palpitations are worse after exercise and she gets short of breath is treadmill exercise stress test was recommended but patient declined she wants to wait until she gets stronger.  She is willing to have echocardiogram which was scheduled.    She is diabetic and should be taking statin therapy.  She will discuss that with BERT Lucas.  Crestor 5 mg daily to start with was recommended.        Thank you for giving me the oppertunity to participate in your patient's cardiac care.    Sincerely,    ECHO Gallardo M.D. FACP FACC     No follow-ups on file.

## 2021-10-15 ENCOUNTER — PATIENT ROUNDING (BHMG ONLY) (OUTPATIENT)
Dept: CARDIOLOGY | Facility: CLINIC | Age: 71
End: 2021-10-15

## 2021-10-15 NOTE — PROGRESS NOTES
"October 15, 2021    Hello, may I speak with Shey King?    My name is Chelsie Weiner.      I am  with [facility]  [facility]  [address] [place] [address] [phone].    Before we get started may I verify your date of birth? 1950    I am calling to officially welcome you to our practice and ask about your recent visit. Is this a good time to talk? yes    Tell me about your visit with us. What things went well?  \"I was very satisfied with my appointment. The doctor is a very nice man and he explained everything to me very well\".        We're always looking for ways to make our patients' experiences even better. Do you have recommendations on ways we may improve?  no    Overall were you satisfied with your first visit to our practice? yes       I appreciate you taking the time to speak with me today. Is there anything else I can do for you? no      Thank you, and have a great day.      "

## 2021-10-26 ENCOUNTER — OFFICE VISIT (OUTPATIENT)
Dept: FAMILY MEDICINE CLINIC | Facility: CLINIC | Age: 71
End: 2021-10-26

## 2021-10-26 VITALS
DIASTOLIC BLOOD PRESSURE: 68 MMHG | HEIGHT: 61 IN | OXYGEN SATURATION: 96 % | TEMPERATURE: 97.2 F | WEIGHT: 182.6 LBS | BODY MASS INDEX: 34.48 KG/M2 | SYSTOLIC BLOOD PRESSURE: 126 MMHG | HEART RATE: 100 BPM

## 2021-10-26 DIAGNOSIS — N39.0 RECURRENT UTI: ICD-10-CM

## 2021-10-26 DIAGNOSIS — R10.30 LOWER ABDOMINAL PAIN: ICD-10-CM

## 2021-10-26 DIAGNOSIS — K57.92 DIVERTICULITIS: Primary | ICD-10-CM

## 2021-10-26 DIAGNOSIS — R22.1 NECK MASS: ICD-10-CM

## 2021-10-26 DIAGNOSIS — M94.0 SLIPPED RIB SYNDROME: ICD-10-CM

## 2021-10-26 DIAGNOSIS — E03.8 ADULT ONSET HYPOTHYROIDISM: ICD-10-CM

## 2021-10-26 DIAGNOSIS — R30.0 DYSURIA: ICD-10-CM

## 2021-10-26 PROCEDURE — 81003 URINALYSIS AUTO W/O SCOPE: CPT | Performed by: FAMILY MEDICINE

## 2021-10-26 PROCEDURE — 87086 URINE CULTURE/COLONY COUNT: CPT | Performed by: FAMILY MEDICINE

## 2021-10-26 PROCEDURE — 99214 OFFICE O/P EST MOD 30 MIN: CPT | Performed by: FAMILY MEDICINE

## 2021-10-26 RX ORDER — METRONIDAZOLE 500 MG/1
500 TABLET ORAL 3 TIMES DAILY
Qty: 21 TABLET | Refills: 0 | Status: SHIPPED | OUTPATIENT
Start: 2021-10-26 | End: 2021-12-21

## 2021-10-26 RX ORDER — TRIMETHOPRIM 100 MG/1
TABLET ORAL
Qty: 108 TABLET | Refills: 0 | Status: SHIPPED | OUTPATIENT
Start: 2021-10-26 | End: 2021-12-21

## 2021-10-27 LAB — BACTERIA SPEC AEROBE CULT: NORMAL

## 2021-10-28 ENCOUNTER — TELEPHONE (OUTPATIENT)
Dept: FAMILY MEDICINE CLINIC | Facility: CLINIC | Age: 71
End: 2021-10-28

## 2021-10-28 NOTE — TELEPHONE ENCOUNTER
----- Message from Sheila Everett MD sent at 10/28/2021 11:47 AM EDT -----  Please call Shey and let her know that the UCx shows isha. If she is having symptoms, need to recheck. Otherwise, will just monitor.      Left a message to return call.

## 2021-10-29 ENCOUNTER — TELEPHONE (OUTPATIENT)
Dept: FAMILY MEDICINE CLINIC | Facility: CLINIC | Age: 71
End: 2021-10-29

## 2021-10-29 DIAGNOSIS — E11.9 TYPE 2 DIABETES MELLITUS WITHOUT COMPLICATION, WITHOUT LONG-TERM CURRENT USE OF INSULIN (HCC): ICD-10-CM

## 2021-11-01 ENCOUNTER — HOSPITAL ENCOUNTER (OUTPATIENT)
Dept: CARDIOLOGY | Facility: HOSPITAL | Age: 71
Discharge: HOME | End: 2021-11-01
Admitting: INTERNAL MEDICINE

## 2021-11-01 DIAGNOSIS — R00.2 PALPITATIONS: ICD-10-CM

## 2021-11-01 DIAGNOSIS — R06.09 DOE (DYSPNEA ON EXERTION): ICD-10-CM

## 2021-11-01 LAB
BH CV ECHO MEAS - % IVS THICK: 22.7 %
BH CV ECHO MEAS - % LVPW THICK: 60.2 %
BH CV ECHO MEAS - ACS: 2.1 CM
BH CV ECHO MEAS - AO MAX PG: 9.1 MMHG
BH CV ECHO MEAS - AO MEAN PG: 5 MMHG
BH CV ECHO MEAS - AO ROOT AREA (BSA CORRECTED): 1.7
BH CV ECHO MEAS - AO ROOT AREA: 7.5 CM^2
BH CV ECHO MEAS - AO ROOT DIAM: 3.1 CM
BH CV ECHO MEAS - AO V2 MAX: 151 CM/SEC
BH CV ECHO MEAS - AO V2 MEAN: 102 CM/SEC
BH CV ECHO MEAS - AO V2 VTI: 34.7 CM
BH CV ECHO MEAS - BSA(HAYCOCK): 1.9 M^2
BH CV ECHO MEAS - BSA: 1.8 M^2
BH CV ECHO MEAS - BZI_BMI: 35.5 KILOGRAMS/M^2
BH CV ECHO MEAS - BZI_METRIC_HEIGHT: 152.4 CM
BH CV ECHO MEAS - BZI_METRIC_WEIGHT: 82.6 KG
BH CV ECHO MEAS - EDV(CUBED): 105.5 ML
BH CV ECHO MEAS - EDV(MOD-SP4): 48.2 ML
BH CV ECHO MEAS - EDV(TEICH): 103.6 ML
BH CV ECHO MEAS - EF(CUBED): 57.8 %
BH CV ECHO MEAS - EF(MOD-SP4): 67.8 %
BH CV ECHO MEAS - EF(TEICH): 49.4 %
BH CV ECHO MEAS - ESV(CUBED): 44.6 ML
BH CV ECHO MEAS - ESV(MOD-SP4): 15.5 ML
BH CV ECHO MEAS - ESV(TEICH): 52.5 ML
BH CV ECHO MEAS - FS: 25 %
BH CV ECHO MEAS - IVS/LVPW: 1.2
BH CV ECHO MEAS - IVSD: 1.3 CM
BH CV ECHO MEAS - IVSS: 1.6 CM
BH CV ECHO MEAS - LA DIMENSION: 2.5 CM
BH CV ECHO MEAS - LA/AO: 0.81
BH CV ECHO MEAS - LV DIASTOLIC VOL/BSA (35-75): 26.9 ML/M^2
BH CV ECHO MEAS - LV MASS(C)D: 202.5 GRAMS
BH CV ECHO MEAS - LV MASS(C)DI: 112.9 GRAMS/M^2
BH CV ECHO MEAS - LV MASS(C)S: 221.1 GRAMS
BH CV ECHO MEAS - LV MASS(C)SI: 123.3 GRAMS/M^2
BH CV ECHO MEAS - LV SYSTOLIC VOL/BSA (12-30): 8.6 ML/M^2
BH CV ECHO MEAS - LVIDD: 4.7 CM
BH CV ECHO MEAS - LVIDS: 3.5 CM
BH CV ECHO MEAS - LVLD AP4: 6.8 CM
BH CV ECHO MEAS - LVLS AP4: 5.3 CM
BH CV ECHO MEAS - LVOT AREA (M): 3.1 CM^2
BH CV ECHO MEAS - LVOT AREA: 3.1 CM^2
BH CV ECHO MEAS - LVOT DIAM: 2 CM
BH CV ECHO MEAS - LVPWD: 1 CM
BH CV ECHO MEAS - LVPWS: 1.7 CM
BH CV ECHO MEAS - MV A MAX VEL: 119 CM/SEC
BH CV ECHO MEAS - MV E MAX VEL: 95.5 CM/SEC
BH CV ECHO MEAS - MV E/A: 0.8
BH CV ECHO MEAS - PA ACC TIME: 0.11 SEC
BH CV ECHO MEAS - PA PR(ACCEL): 31.3 MMHG
BH CV ECHO MEAS - RAP SYSTOLE: 10 MMHG
BH CV ECHO MEAS - RVSP: 33.2 MMHG
BH CV ECHO MEAS - SI(AO): 146 ML/M^2
BH CV ECHO MEAS - SI(CUBED): 34 ML/M^2
BH CV ECHO MEAS - SI(MOD-SP4): 18.2 ML/M^2
BH CV ECHO MEAS - SI(TEICH): 28.5 ML/M^2
BH CV ECHO MEAS - SV(AO): 261.9 ML
BH CV ECHO MEAS - SV(CUBED): 60.9 ML
BH CV ECHO MEAS - SV(MOD-SP4): 32.7 ML
BH CV ECHO MEAS - SV(TEICH): 51.2 ML
BH CV ECHO MEAS - TR MAX VEL: 240.7 CM/SEC
MAXIMAL PREDICTED HEART RATE: 152 BPM
STRESS TARGET HR: 129 BPM

## 2021-11-01 PROCEDURE — 93306 TTE W/DOPPLER COMPLETE: CPT

## 2021-11-01 PROCEDURE — 93306 TTE W/DOPPLER COMPLETE: CPT | Performed by: INTERNAL MEDICINE

## 2021-11-03 ENCOUNTER — APPOINTMENT (OUTPATIENT)
Dept: CARDIOLOGY | Facility: HOSPITAL | Age: 71
End: 2021-11-03

## 2021-11-15 LAB
BILIRUB BLD-MCNC: NEGATIVE MG/DL
CLARITY, POC: CLEAR
COLOR UR: YELLOW
EXPIRATION DATE: 0
GLUCOSE UR STRIP-MCNC: NEGATIVE MG/DL
KETONES UR QL: NEGATIVE
LEUKOCYTE EST, POC: NEGATIVE
Lab: 0
NITRITE UR-MCNC: NEGATIVE MG/ML
PH UR: 6 [PH] (ref 5–8)
PROT UR STRIP-MCNC: ABNORMAL MG/DL
RBC # UR STRIP: NEGATIVE /UL
SP GR UR: 1.02 (ref 1–1.03)
UROBILINOGEN UR QL: NORMAL

## 2021-11-19 ENCOUNTER — HOSPITAL ENCOUNTER (OUTPATIENT)
Dept: ULTRASOUND IMAGING | Facility: HOSPITAL | Age: 71
Discharge: HOME | End: 2021-11-19

## 2021-11-19 ENCOUNTER — HOSPITAL ENCOUNTER (OUTPATIENT)
Dept: CT IMAGING | Facility: HOSPITAL | Age: 71
Discharge: HOME | End: 2021-11-19

## 2021-11-19 DIAGNOSIS — R10.30 LOWER ABDOMINAL PAIN: ICD-10-CM

## 2021-11-19 PROCEDURE — 76536 US EXAM OF HEAD AND NECK: CPT | Performed by: RADIOLOGY

## 2021-11-19 PROCEDURE — 74176 CT ABD & PELVIS W/O CONTRAST: CPT

## 2021-11-19 PROCEDURE — 74176 CT ABD & PELVIS W/O CONTRAST: CPT | Performed by: RADIOLOGY

## 2021-11-19 PROCEDURE — 76536 US EXAM OF HEAD AND NECK: CPT

## 2021-11-22 ENCOUNTER — TELEPHONE (OUTPATIENT)
Dept: FAMILY MEDICINE CLINIC | Facility: CLINIC | Age: 71
End: 2021-11-22

## 2021-11-22 NOTE — TELEPHONE ENCOUNTER
----- Message from Sheila Everett MD sent at 11/21/2021 10:13 PM EST -----  Labs stable. Okay to either call or send letter to patient. Thanks.

## 2021-12-01 ENCOUNTER — LAB (OUTPATIENT)
Dept: FAMILY MEDICINE CLINIC | Facility: CLINIC | Age: 71
End: 2021-12-01

## 2021-12-01 DIAGNOSIS — N17.9 ACUTE RENAL FAILURE, UNSPECIFIED ACUTE RENAL FAILURE TYPE (HCC): Primary | ICD-10-CM

## 2021-12-01 PROCEDURE — 82550 ASSAY OF CK (CPK): CPT | Performed by: INTERNAL MEDICINE

## 2021-12-01 PROCEDURE — 83970 ASSAY OF PARATHORMONE: CPT | Performed by: INTERNAL MEDICINE

## 2021-12-01 PROCEDURE — 36415 COLL VENOUS BLD VENIPUNCTURE: CPT

## 2021-12-01 PROCEDURE — 86038 ANTINUCLEAR ANTIBODIES: CPT | Performed by: INTERNAL MEDICINE

## 2021-12-01 PROCEDURE — 80069 RENAL FUNCTION PANEL: CPT | Performed by: INTERNAL MEDICINE

## 2021-12-01 PROCEDURE — 84156 ASSAY OF PROTEIN URINE: CPT | Performed by: INTERNAL MEDICINE

## 2021-12-01 PROCEDURE — 82553 CREATINE MB FRACTION: CPT | Performed by: INTERNAL MEDICINE

## 2021-12-01 PROCEDURE — 87205 SMEAR GRAM STAIN: CPT | Performed by: INTERNAL MEDICINE

## 2021-12-01 PROCEDURE — 82570 ASSAY OF URINE CREATININE: CPT | Performed by: INTERNAL MEDICINE

## 2021-12-01 PROCEDURE — 81001 URINALYSIS AUTO W/SCOPE: CPT | Performed by: INTERNAL MEDICINE

## 2021-12-02 LAB
ALBUMIN SERPL-MCNC: 4.7 G/DL (ref 3.5–5.2)
ANA SER QL: NEGATIVE
ANION GAP SERPL CALCULATED.3IONS-SCNC: 12.8 MMOL/L (ref 5–15)
BACTERIA UR QL AUTO: ABNORMAL /HPF
BILIRUB UR QL STRIP: NEGATIVE
BUN SERPL-MCNC: 16 MG/DL (ref 8–23)
BUN/CREAT SERPL: 16.2 (ref 7–25)
CALCIUM SPEC-SCNC: 9.4 MG/DL (ref 8.6–10.5)
CHLORIDE SERPL-SCNC: 105 MMOL/L (ref 98–107)
CK MB SERPL-CCNC: 1.07 NG/ML
CK SERPL-CCNC: 49 U/L (ref 20–180)
CLARITY UR: CLEAR
CO2 SERPL-SCNC: 23.2 MMOL/L (ref 22–29)
COLOR UR: YELLOW
CREAT SERPL-MCNC: 0.99 MG/DL (ref 0.57–1)
CREAT UR-MCNC: 84.7 MG/DL
EOSINOPHIL SPEC QL MICRO: 0 % EOS/100 CELLS (ref 0–0)
GFR SERPL CREATININE-BSD FRML MDRD: 56 ML/MIN/1.73
GLUCOSE SERPL-MCNC: 119 MG/DL (ref 65–99)
GLUCOSE UR STRIP-MCNC: ABNORMAL MG/DL
HGB UR QL STRIP.AUTO: NEGATIVE
HYALINE CASTS UR QL AUTO: ABNORMAL /LPF
KETONES UR QL STRIP: NEGATIVE
LEUKOCYTE ESTERASE UR QL STRIP.AUTO: NEGATIVE
NITRITE UR QL STRIP: POSITIVE
PH UR STRIP.AUTO: 5.5 [PH] (ref 5–8)
PHOSPHATE SERPL-MCNC: 3.5 MG/DL (ref 2.5–4.5)
POTASSIUM SERPL-SCNC: 4.4 MMOL/L (ref 3.5–5.2)
PROT ?TM UR-MCNC: 8.6 MG/DL
PROT UR QL STRIP: NEGATIVE
PTH-INTACT SERPL-MCNC: 21.4 PG/ML (ref 15–65)
RBC # UR STRIP: ABNORMAL /HPF
REF LAB TEST METHOD: ABNORMAL
SODIUM SERPL-SCNC: 141 MMOL/L (ref 136–145)
SP GR UR STRIP: >=1.03 (ref 1–1.03)
SQUAMOUS #/AREA URNS HPF: ABNORMAL /HPF
UROBILINOGEN UR QL STRIP: ABNORMAL
WBC # UR STRIP: ABNORMAL /HPF

## 2021-12-06 ENCOUNTER — TELEPHONE (OUTPATIENT)
Dept: FAMILY MEDICINE CLINIC | Facility: CLINIC | Age: 71
End: 2021-12-06

## 2021-12-06 NOTE — TELEPHONE ENCOUNTER
----- Message from Sheila Everett MD sent at 12/5/2021 11:13 PM EST -----  Please call Shey. Her CT shows a large hiatal hernia and a lot of diverticulosis (pouches in her colon that can get easily infected if she does not have normal BMs). Is she still having any abdominal pain?      Spoke with patient & she verbalized understanding,reports she is doing good at present with no abdominal pain.

## 2021-12-07 ENCOUNTER — OFFICE VISIT (OUTPATIENT)
Dept: UROLOGY | Facility: CLINIC | Age: 71
End: 2021-12-07

## 2021-12-07 VITALS — WEIGHT: 187 LBS | BODY MASS INDEX: 35.3 KG/M2 | HEIGHT: 61 IN

## 2021-12-07 DIAGNOSIS — R30.0 DYSURIA: ICD-10-CM

## 2021-12-07 DIAGNOSIS — N39.0 RECURRENT UTI: Primary | ICD-10-CM

## 2021-12-07 DIAGNOSIS — B37.31 YEAST VAGINITIS: ICD-10-CM

## 2021-12-07 LAB
BILIRUB BLD-MCNC: NEGATIVE MG/DL
CLARITY, POC: CLEAR
COLOR UR: YELLOW
EXPIRATION DATE: ABNORMAL
GLUCOSE UR STRIP-MCNC: ABNORMAL MG/DL
KETONES UR QL: NEGATIVE
LEUKOCYTE EST, POC: NEGATIVE
Lab: ABNORMAL
NITRITE UR-MCNC: POSITIVE MG/ML
PH UR: 6 [PH] (ref 5–8)
PROT UR STRIP-MCNC: NEGATIVE MG/DL
RBC # UR STRIP: NEGATIVE /UL
SP GR UR: 1.02 (ref 1–1.03)
UROBILINOGEN UR QL: ABNORMAL

## 2021-12-07 PROCEDURE — 87086 URINE CULTURE/COLONY COUNT: CPT | Performed by: NURSE PRACTITIONER

## 2021-12-07 PROCEDURE — 87186 SC STD MICRODIL/AGAR DIL: CPT | Performed by: NURSE PRACTITIONER

## 2021-12-07 PROCEDURE — 81003 URINALYSIS AUTO W/O SCOPE: CPT | Performed by: NURSE PRACTITIONER

## 2021-12-07 PROCEDURE — 96372 THER/PROPH/DIAG INJ SC/IM: CPT | Performed by: NURSE PRACTITIONER

## 2021-12-07 PROCEDURE — 99214 OFFICE O/P EST MOD 30 MIN: CPT | Performed by: NURSE PRACTITIONER

## 2021-12-07 RX ORDER — GENTAMICIN SULFATE 40 MG/ML
80 INJECTION, SOLUTION INTRAMUSCULAR; INTRAVENOUS ONCE
Status: COMPLETED | OUTPATIENT
Start: 2021-12-07 | End: 2021-12-07

## 2021-12-07 RX ORDER — PHENAZOPYRIDINE HYDROCHLORIDE 200 MG/1
200 TABLET, FILM COATED ORAL 3 TIMES DAILY PRN
Qty: 20 TABLET | Refills: 0 | Status: SHIPPED | OUTPATIENT
Start: 2021-12-07 | End: 2022-01-19 | Stop reason: SDUPTHER

## 2021-12-07 RX ORDER — FLUCONAZOLE 100 MG/1
100 TABLET ORAL DAILY
Qty: 3 TABLET | Refills: 0 | Status: SHIPPED | OUTPATIENT
Start: 2021-12-07 | End: 2021-12-10

## 2021-12-07 RX ADMIN — GENTAMICIN SULFATE 80 MG: 40 INJECTION, SOLUTION INTRAMUSCULAR; INTRAVENOUS at 08:40

## 2021-12-07 NOTE — PROGRESS NOTES
Chief Complaint  Recurrent UTIs / Yeast Vaginitis (follow up )    Subjective     {Problem List  Visit Diagnosis   Encounters  Notes  Medications  Labs  Result Review Imaging  Media :23}     Shey King presents to [facility]  History of Present Illness    Ms Shey King is a pleasant 70 year old female, who returns to clinic today for follow-up on recurrent UTIs.  She reports she has been miserable the last few days, with vaginal itching, discharge and irritation consistent with yeast vaginitis. She is recently finished # 21  ABX Flagyl per her PCP for Diverticulitis she reports, she had resumed suppressive Antibiotic therapy with daily Trimethorprim. Patient was on IV rocephin x 7 days in august for recurrent utis(with positive cultures including Klebseilla in the past, Proteus, and Ecoli).    Sheis I no apparent distress today and actually  reports feeling great she states. Denies any side effects as she is currently doing better on antibiotic prophylaxis with Trimpex.  She also reports an improvement in her urinary symptoms until recently she has developed the yeast with minimal   episodes of frequency every 1-2 hour, urgency, and nocturia 2-3 times.  She denies dysuria, burning on urination, flank pain, back pain and denies any CVA tenderness..  She also denies  pelvic pain or pressure, denies suprapubic discomfort.  She denies fever or chills, she does  have nausea, denies vomiting, or diarrhea.  Her urine dipstick today is negative for any leukocyte leukocyte esterase, But positive for nitrites which is consistent in most of her urine dips. She is negative groos/ microscopic hematuria.  PVR is 28 cc.    Overall she reports doing better, she states she is currently taking her antibiotics and probiotics diligently.  She reports  increasing her p.o. fluid intake to at least 2 L daily, and has significantly reduced her intake of soda drinks.     Objective  "  Vital Signs:   Ht 154.9 cm (60.98\")   Wt 84.8 kg (187 lb)   BMI 35.35 kg/m²     Physical Exam  Constitutional:       General: She is not in acute distress.     Appearance: She is well-developed.   HENT:      Head: Normocephalic and atraumatic.   Eyes:      Pupils: Pupils are equal, round, and reactive to light.   Neck:      Thyroid: No thyromegaly.      Trachea: No tracheal deviation.   Cardiovascular:      Rate and Rhythm: Normal rate and regular rhythm.      Heart sounds: No murmur heard.      Pulmonary:      Effort: Pulmonary effort is normal. No respiratory distress.      Breath sounds: Normal breath sounds. No stridor. No wheezing.   Abdominal:      General: Bowel sounds are normal. There is distension.      Palpations: Abdomen is soft.      Tenderness: There is abdominal tenderness.   Genitourinary:     Labia:         Right: No tenderness.         Left: No tenderness.       Vagina: Normal. No vaginal discharge.   Musculoskeletal:         General: Tenderness present. No deformity. Normal range of motion.      Cervical back: Normal range of motion.   Skin:     General: Skin is warm and dry.      Capillary Refill: Capillary refill takes less than 2 seconds.      Coloration: Skin is pale.      Findings: No erythema or rash.   Neurological:      Mental Status: She is alert and oriented to person, place, and time.      Cranial Nerves: No cranial nerve deficit.      Sensory: No sensory deficit.      Coordination: Coordination normal.   Psychiatric:         Behavior: Behavior normal.         Thought Content: Thought content normal.         Judgment: Judgment normal.        Result Review :     CMP    CMP 9/3/21 9/21/21 12/1/21   Glucose 112 (A) 108 (A) 119 (A)   BUN 20 14 16   Creatinine 1.34 (A) 1.21 (A) 0.99   eGFR Non African Am 39 (A) 44 (A) 56 (A)   Sodium 134 (A) 136 141   Potassium 4.1 4.6 4.4   Chloride 97 (A) 102 105   Calcium 9.5 9.7 9.4   Albumin   4.70   (A) Abnormal value            CBC w/diff    CBC " w/Diff 3/9/21 7/13/21 8/24/21   WBC 7.13 7.43 7.71   RBC 4.21 3.84 3.87   Hemoglobin 12.5 11.5 (A) 11.3 (A)   Hematocrit 37.7 34.0 35.4   MCV 89.5 88.5 91.5   MCH 29.7 29.9 29.2   MCHC 33.2 33.8 31.9   RDW 13.3 13.3 14.0   Platelets 275 305 299   Neutrophil Rel % 66.4 64.8 66.1   Immature Granulocyte Rel % 0.3 0.7 (A) 0.6 (A)   Lymphocyte Rel % 23.3 24.4 22.6   Monocyte Rel % 6.0 5.8 7.3   Eosinophil Rel % 2.9 3.5 2.5   Basophil Rel % 1.1 0.8 0.9   (A) Abnormal value            UA    Urinalysis 11/15/21 12/1/21 12/1/21 12/7/21     0848 0848    Squamous Epithelial Cells, UA   3-6 (A)    Specific Gravity, UA  >=1.030     Ketones, UA Negative Negative  Negative   Blood, UA  Negative     Leukocytes, UA Negative Negative  Negative   Nitrite, UA  Positive (A)     RBC, UA   0-2    WBC, UA   3-5 (A)    Bacteria, UA   4+ (A)    (A) Abnormal value            Urine Culture    Urine Culture 8/24/21 9/3/21 10/26/21   Urine Culture >100,000 CFU/mL Proteus mirabilis (A) 50,000 CFU/mL Mixed Kathy Isolated 50,000 CFU/mL Mixed Kathy Isolated   (A) Abnormal value                          Assessment and Plan    Diagnoses and all orders for this visit:    1. Recurrent UTI (Primary)  -     POC Urinalysis Dipstick, Automated  -     fluconazole (Diflucan) 100 MG tablet; Take 1 tablet by mouth Daily for 3 days.  Dispense: 3 tablet; Refill: 0  -     phenazopyridine (Pyridium) 200 MG tablet; Take 1 tablet by mouth 3 (Three) Times a Day As Needed for Bladder Spasms.  Dispense: 20 tablet; Refill: 0  -     gentamicin (GARAMYCIN) injection 80 mg  -     Urine Culture - Urine, Urine, Clean Catch    2. Dysuria  -     POC Urinalysis Dipstick, Automated  -     fluconazole (Diflucan) 100 MG tablet; Take 1 tablet by mouth Daily for 3 days.  Dispense: 3 tablet; Refill: 0  -     phenazopyridine (Pyridium) 200 MG tablet; Take 1 tablet by mouth 3 (Three) Times a Day As Needed for Bladder Spasms.  Dispense: 20 tablet; Refill: 0  -     Urine Culture - Urine,  Urine, Clean Catch    3. Yeast vaginitis  -     fluconazole (Diflucan) 100 MG tablet; Take 1 tablet by mouth Daily for 3 days.  Dispense: 3 tablet; Refill: 0  -     phenazopyridine (Pyridium) 200 MG tablet; Take 1 tablet by mouth 3 (Three) Times a Day As Needed for Bladder Spasms.  Dispense: 20 tablet; Refill: 0                                                         ASSESSMENT  Recurrent urinary tract infections/Atrophic/yeast vaginitis: Besides her irritation from the yeast vaginitis, She is in no apparent distress and reports a remarkable improvement in her overall symptoms. She is happy to be feeling better she states.She reports doing relatively better on her antibiotic prophylaxis, and would like to continue.     Again,  We discussed the types of organisms that are found in the urinary tract indicating that the vast majority are results of the patient's own gastrointestinal isha.  We discussed how many of the antibiotics that are utilized can actually exacerbate these infections by creating resistant organisms and there is only a very few antibiotics that are concentrated in the urine and do not affect the rectal reservoir nor cause recurrent yeast vaginitis.      We discussed the risk factors for recurrent infections being intercourse in younger patients and atrophic changes in older patients.  We discussed the symptoms that are found including pain, pressure, burning, frequency, urgency suprapubic pain and painful intercourse.  I discussed upper tract symptoms including fevers, chills, and indicated the workup would be much more aggressive if the patient were to present with recurrent infections in the face of upper tract symptomatology such as fever. I discussed the history of vesicoureteral reflux in young patients and finally chronic renal scarring as a result of such.         PLAN  We resent her urine for culture. I will call her with results if any bacteria growth not sensitive to her current therapy  of Trimpex.    Gentamycin 80 mg IM x 1 dose given in clinic    Will Continue Trimpex 100 mg PO Daily -Suppressive Therapy. She should increase her p.o. fluid intake to at least 1 to 2 L daily and avoid bladder irritants such as caffeine products, spicy foods, and citrusy foods.     I recommend concomitant probiotics with treatment with antibiotics to protect the rectal reservoir including over-the-counter yogurt preparations to stone oral pills containing the appropriate probiotics. Patient reports the diligent use of Probiotics.    She is to also continue other medications for yeast vaginitis, atrophic vaginitis as prescribed above.    Will see her back in 3 months for Follow up in clinic and recheck her urinalysis at that time.    Patient is agreeable to plan of care.    Follow Up   Return in about 3 months (around 3/7/2022) for Next scheduled follow up, RECURRENT UTI/DYSURIA/DETRUSSOR INSTABILITY.  Patient was given instructions and counseling regarding her condition or for health maintenance advice. Please see specific information pulled into the AVS if appropriate.

## 2021-12-07 NOTE — PATIENT INSTRUCTIONS
Urinary Tract Infection, Adult  A urinary tract infection (UTI) is an infection of any part of the urinary tract. The urinary tract includes:  · The kidneys.  · The ureters.  · The bladder.  · The urethra.  These organs make, store, and get rid of pee (urine) in the body.  What are the causes?  This is caused by germs (bacteria) in your genital area. These germs grow and cause swelling (inflammation) of your urinary tract.  What increases the risk?  You are more likely to develop this condition if:  · You have a small, thin tube (catheter) to drain pee.  · You cannot control when you pee or poop (incontinence).  · You are female, and:  ? You use these methods to prevent pregnancy:  § A medicine that kills sperm (spermicide).  § A device that blocks sperm (diaphragm).  ? You have low levels of a female hormone (estrogen).  ? You are pregnant.  · You have genes that add to your risk.  · You are sexually active.  · You take antibiotic medicines.  · You have trouble peeing because of:  ? A prostate that is bigger than normal, if you are male.  ? A blockage in the part of your body that drains pee from the bladder (urethra).  ? A kidney stone.  ? A nerve condition that affects your bladder (neurogenic bladder).  ? Not getting enough to drink.  ? Not peeing often enough.  · You have other conditions, such as:  ? Diabetes.  ? A weak disease-fighting system (immune system).  ? Sickle cell disease.  ? Gout.  ? Injury of the spine.  What are the signs or symptoms?  Symptoms of this condition include:  · Needing to pee right away (urgently).  · Peeing often.  · Peeing small amounts often.  · Pain or burning when peeing.  · Blood in the pee.  · Pee that smells bad or not like normal.  · Trouble peeing.  · Pee that is cloudy.  · Fluid coming from the vagina, if you are female.  · Pain in the belly or lower back.  Other symptoms include:  · Throwing up (vomiting).  · No urge to eat.  · Feeling mixed up (confused).  · Being tired  and grouchy (irritable).  · A fever.  · Watery poop (diarrhea).  How is this treated?  This condition may be treated with:  · Antibiotic medicine.  · Other medicines.  · Drinking enough water.  Follow these instructions at home:    Medicines  · Take over-the-counter and prescription medicines only as told by your doctor.  · If you were prescribed an antibiotic medicine, take it as told by your doctor. Do not stop taking it even if you start to feel better.  General instructions  · Make sure you:  ? Pee until your bladder is empty.  ? Do not hold pee for a long time.  ? Empty your bladder after sex.  ? Wipe from front to back after pooping if you are a female. Use each tissue one time when you wipe.  · Drink enough fluid to keep your pee pale yellow.  · Keep all follow-up visits as told by your doctor. This is important.  Contact a doctor if:  · You do not get better after 1-2 days.  · Your symptoms go away and then come back.  Get help right away if:  · You have very bad back pain.  · You have very bad pain in your lower belly.  · You have a fever.  · You are sick to your stomach (nauseous).  · You are throwing up.  Summary  · A urinary tract infection (UTI) is an infection of any part of the urinary tract.  · This condition is caused by germs in your genital area.  · There are many risk factors for a UTI. These include having a small, thin tube to drain pee and not being able to control when you pee or poop.  · Treatment includes antibiotic medicines for germs.  · Drink enough fluid to keep your pee pale yellow.  This information is not intended to replace advice given to you by your health care provider. Make sure you discuss any questions you have with your health care provider.  Document Revised: 12/05/2019 Document Reviewed: 06/27/2019  [employer] Patient Education © 2021 [employer]  Vaginal Yeast Infection, Adult    Vaginal yeast infection is a condition that causes vaginal discharge as well as soreness,  swelling, and redness (inflammation) of the vagina. This is a common condition. Some women get this infection frequently.  What are the causes?  This condition is caused by a change in the normal balance of the yeast (candida) and bacteria that live in the vagina. This change causes an overgrowth of yeast, which causes the inflammation.  What increases the risk?  The condition is more likely to develop in women who:  · Take antibiotic medicines.  · Have diabetes.  · Take birth control pills.  · Are pregnant.  · Douche often.  · Have a weak body defense system (immune system).  · Have been taking steroid medicines for a long time.  · Frequently wear tight clothing.  What are the signs or symptoms?  Symptoms of this condition include:  · White, thick, creamy vaginal discharge.  · Swelling, itching, redness, and irritation of the vagina. The lips of the vagina (vulva) may be affected as well.  · Pain or a burning feeling while urinating.  · Pain during sex.  How is this diagnosed?  This condition is diagnosed based on:  · Your medical history.  · A physical exam.  · A pelvic exam. Your health care provider will examine a sample of your vaginal discharge under a microscope. Your health care provider may send this sample for testing to confirm the diagnosis.  How is this treated?  This condition is treated with medicine. Medicines may be over-the-counter or prescription. You may be told to use one or more of the following:  · Medicine that is taken by mouth (orally).  · Medicine that is applied as a cream (topically).  · Medicine that is inserted directly into the vagina (suppository).  Follow these instructions at home:    Lifestyle  · Do not have sex until your health care provider approves. Tell your sex partner that you have a yeast infection. That person should go to his or her health care provider and ask if they should also be treated.  · Do not wear tight clothes, such as pantyhose or tight pants.  · Wear  breathable cotton underwear.  General instructions  · Take or apply over-the-counter and prescription medicines only as told by your health care provider.  · Eat more yogurt. This may help to keep your yeast infection from returning.  · Do not use tampons until your health care provider approves.  · Try taking a sitz bath to help with discomfort. This is a warm water bath that is taken while you are sitting down. The water should only come up to your hips and should cover your buttocks. Do this 3-4 times per day or as told by your health care provider.  · Do not douche.  · If you have diabetes, keep your blood sugar levels under control.  · Keep all follow-up visits as told by your health care provider. This is important.  Contact a health care provider if:  · You have a fever.  · Your symptoms go away and then return.  · Your symptoms do not get better with treatment.  · Your symptoms get worse.  · You have new symptoms.  · You develop blisters in or around your vagina.  · You have blood coming from your vagina and it is not your menstrual period.  · You develop pain in your abdomen.  Summary  · Vaginal yeast infection is a condition that causes discharge as well as soreness, swelling, and redness (inflammation) of the vagina.  · This condition is treated with medicine. Medicines may be over-the-counter or prescription.  · Take or apply over-the-counter and prescription medicines only as told by your health care provider.  · Do not douche. Do not have sex or use tampons until your health care provider approves.  · Contact a health care provider if your symptoms do not get better with treatment or your symptoms go away and then return.  This information is not intended to replace advice given to you by your health care provider. Make sure you discuss any questions you have with your health care provider.  Document Revised: 07/17/2020 Document Reviewed: 05/06/2019  [employer] Patient Education © 2021 [employer]

## 2021-12-10 LAB
BACTERIA UR CULT: ABNORMAL
BACTERIA UR CULT: ABNORMAL
OTHER ANTIBIOTIC SUSC ISLT: ABNORMAL

## 2021-12-13 ENCOUNTER — TELEPHONE (OUTPATIENT)
Dept: UROLOGY | Facility: CLINIC | Age: 71
End: 2021-12-13

## 2021-12-13 DIAGNOSIS — Z16.12 INFECTION DUE TO ESBL-PRODUCING KLEBSIELLA PNEUMONIAE: ICD-10-CM

## 2021-12-13 DIAGNOSIS — N39.0 RECURRENT UTI: Primary | ICD-10-CM

## 2021-12-13 DIAGNOSIS — A49.8 INFECTION DUE TO ESBL-PRODUCING KLEBSIELLA PNEUMONIAE: ICD-10-CM

## 2021-12-13 RX ORDER — GRANULES FOR ORAL 3 G/1
3 POWDER ORAL DAILY
Qty: 9 G | Refills: 0 | Status: SHIPPED | OUTPATIENT
Start: 2021-12-13 | End: 2021-12-16

## 2021-12-13 NOTE — TELEPHONE ENCOUNTER
Called patient post clinic visit, to check on her, and to also discuss urine culture results again positive for UTI.    Sent fosfomycin 3 g daily x3 days, recheck urine culture on Friday.      Component    Urine Culture Final report Abnormal     Result 1 Klebsiella pneumoniae Abnormal      Comment: Cefazolin <=4 ug/mL   Cefazolin with an RACHELE <=16 predicts susceptibility to the oral agents   cefaclor, cefdinir, cefpodoxime, cefprozil, cefuroxime, cephalexin,   and loracarbef when used for therapy of uncomplicated urinary tract   infections due to E. coli, Klebsiella pneumoniae, and Proteus   mirabilis.   Greater than 100,000 colony forming units per mL   Susceptibility Testing Comment     Comment:       ** S = Susceptible; I = Intermediate; R = Resistant **                      P = Positive; N = Negative               MICS are expressed in micrograms per mL      Antibiotic                 RSLT#1    RSLT#2    RSLT#3    RSLT#4   Amoxicillin/Clavulanic Acid    S   Ampicillin                     R   Cefepime                       S   Ceftriaxone                    S   Cefuroxime                     S   Ciprofloxacin                  S   Ertapenem                      S   Gentamicin                     S   Imipenem                       S   Levofloxacin                   S   Meropenem                      S   Nitrofurantoin                 I   Piperacillin/Tazobactam        S   Tetracycline                   R   Tobramycin                     S   Trimethoprim/Sulfa             R

## 2021-12-15 ENCOUNTER — TELEPHONE (OUTPATIENT)
Dept: UROLOGY | Facility: CLINIC | Age: 71
End: 2021-12-15

## 2021-12-21 ENCOUNTER — OFFICE VISIT (OUTPATIENT)
Dept: FAMILY MEDICINE CLINIC | Facility: CLINIC | Age: 71
End: 2021-12-21

## 2021-12-21 ENCOUNTER — TELEPHONE (OUTPATIENT)
Dept: UROLOGY | Facility: CLINIC | Age: 71
End: 2021-12-21

## 2021-12-21 VITALS
SYSTOLIC BLOOD PRESSURE: 120 MMHG | TEMPERATURE: 97.1 F | BODY MASS INDEX: 34.95 KG/M2 | HEART RATE: 82 BPM | WEIGHT: 178 LBS | HEIGHT: 60 IN | OXYGEN SATURATION: 98 % | DIASTOLIC BLOOD PRESSURE: 64 MMHG

## 2021-12-21 DIAGNOSIS — I10 HTN (HYPERTENSION), BENIGN: ICD-10-CM

## 2021-12-21 DIAGNOSIS — E55.9 VITAMIN D DEFICIENCY: ICD-10-CM

## 2021-12-21 DIAGNOSIS — N39.0 RECURRENT UTI: Primary | ICD-10-CM

## 2021-12-21 DIAGNOSIS — E11.22 TYPE 2 DIABETES MELLITUS WITH CHRONIC KIDNEY DISEASE, WITHOUT LONG-TERM CURRENT USE OF INSULIN, UNSPECIFIED CKD STAGE (HCC): ICD-10-CM

## 2021-12-21 DIAGNOSIS — E66.9 OBESITY (BMI 30.0-34.9): ICD-10-CM

## 2021-12-21 DIAGNOSIS — E03.8 ADULT ONSET HYPOTHYROIDISM: ICD-10-CM

## 2021-12-21 LAB
BILIRUB BLD-MCNC: NEGATIVE MG/DL
CLARITY, POC: ABNORMAL
COLOR UR: YELLOW
EXPIRATION DATE: ABNORMAL
GLUCOSE UR STRIP-MCNC: ABNORMAL MG/DL
KETONES UR QL: NEGATIVE
LEUKOCYTE EST, POC: NEGATIVE
Lab: ABNORMAL
NITRITE UR-MCNC: POSITIVE MG/ML
PH UR: 6 [PH] (ref 5–8)
PROT UR STRIP-MCNC: NEGATIVE MG/DL
RBC # UR STRIP: NEGATIVE /UL
SP GR UR: 1.02 (ref 1–1.03)
UROBILINOGEN UR QL: NORMAL

## 2021-12-21 PROCEDURE — 82306 VITAMIN D 25 HYDROXY: CPT | Performed by: NURSE PRACTITIONER

## 2021-12-21 PROCEDURE — 87186 SC STD MICRODIL/AGAR DIL: CPT | Performed by: NURSE PRACTITIONER

## 2021-12-21 PROCEDURE — 99214 OFFICE O/P EST MOD 30 MIN: CPT | Performed by: NURSE PRACTITIONER

## 2021-12-21 PROCEDURE — 81003 URINALYSIS AUTO W/O SCOPE: CPT | Performed by: NURSE PRACTITIONER

## 2021-12-21 PROCEDURE — 83036 HEMOGLOBIN GLYCOSYLATED A1C: CPT | Performed by: NURSE PRACTITIONER

## 2021-12-21 PROCEDURE — 84443 ASSAY THYROID STIM HORMONE: CPT | Performed by: NURSE PRACTITIONER

## 2021-12-21 PROCEDURE — 87086 URINE CULTURE/COLONY COUNT: CPT | Performed by: NURSE PRACTITIONER

## 2021-12-21 PROCEDURE — 80053 COMPREHEN METABOLIC PANEL: CPT | Performed by: NURSE PRACTITIONER

## 2021-12-21 PROCEDURE — 80061 LIPID PANEL: CPT | Performed by: NURSE PRACTITIONER

## 2021-12-21 PROCEDURE — 36415 COLL VENOUS BLD VENIPUNCTURE: CPT | Performed by: NURSE PRACTITIONER

## 2021-12-21 PROCEDURE — 82607 VITAMIN B-12: CPT | Performed by: NURSE PRACTITIONER

## 2021-12-21 RX ORDER — GRANULES FOR ORAL 3 G/1
3 POWDER ORAL ONCE
COMMUNITY
Start: 2021-12-17 | End: 2021-12-21

## 2021-12-21 RX ORDER — COVID-19 MOLECULAR TEST ASSAY
KIT MISCELLANEOUS
COMMUNITY
Start: 2021-11-23 | End: 2021-12-21

## 2021-12-21 NOTE — PROGRESS NOTES
Subjective   Shey King is a 71 y.o. female.   Chief Compliant: The patient presents with Follow-up and Chronic Kidney Disease    Fatigue  This is a recurrent (known B12 def and VIT D def) problem. The current episode started more than 1 year ago. The problem occurs intermittently. The problem has been waxing and waning. Associated symptoms include fatigue. Pertinent negatives include no chest pain, fever, headaches or neck pain.   Thyroid Problem  Presents for follow-up (Hypothyroid) visit. Symptoms include fatigue. Patient reports no palpitations or weight gain. The symptoms have been stable.   Diabetes  She presents for her follow-up diabetic visit. She has type 2 diabetes mellitus. Her disease course has been improving. There are no hypoglycemic associated symptoms. Pertinent negatives for hypoglycemia include no headaches or sweats. Associated symptoms include fatigue. Pertinent negatives for diabetes include no blurred vision and no chest pain. There are no hypoglycemic complications. Symptoms are stable. There are no diabetic complications. Risk factors for coronary artery disease include family history and dyslipidemia. She is compliant with treatment most of the time. (Improved under 130 on most days fasting    )   Hypertension  This is a chronic (following along with cardiology) problem. Associated symptoms include malaise/fatigue and shortness of breath. Pertinent negatives include no anxiety, blurred vision, chest pain, headaches, neck pain, orthopnea, palpitations, peripheral edema, PND or sweats. Current antihypertension treatment includes beta blockers. The current treatment provides moderate improvement. Compliance problems include diet and exercise.  Hypertensive end-organ damage includes kidney disease. Identifiable causes of hypertension include a thyroid problem.   Chronic Kidney Disease  Chronicity: with recurrent UTI's following Urology and Nephrology. The problem has been waxing and waning.  "Associated symptoms include fatigue. Pertinent negatives include no chest pain, fever, headaches or neck pain. Exacerbated by: unknown.      The following portions of the patient's history were reviewed and updated as appropriate: allergies, current medications, past family history, past medical history, past social history, past surgical history and problem list.      Review of Systems   Constitutional: Positive for fatigue and malaise/fatigue. Negative for fever and weight gain.   HENT: Negative.    Eyes: Negative for blurred vision.   Respiratory: Positive for shortness of breath.    Cardiovascular: Negative for chest pain, palpitations, orthopnea and PND.   Gastrointestinal: Negative for abdominal distention, anal bleeding and blood in stool.   Musculoskeletal: Negative for neck pain.        Right hand pain    Neurological: Negative.  Negative for headaches.   All other systems reviewed and are negative.      Procedures    Vitals: Blood pressure 120/64, pulse 82, temperature 97.1 °F (36.2 °C), height 152.4 cm (60\"), weight 80.7 kg (178 lb), SpO2 98 %, not currently breastfeeding.     Allergies:   Allergies   Allergen Reactions   • Bactrim [Sulfamethoxazole-Trimethoprim] Hives   • Ciprofloxacin Hives   • Penicillins Hives   • Steri-Strip Compound Benzoin [Benzoin Compound] Hives   • Sulfa Antibiotics Hives          Objective   Physical Exam   Constitutional: She is oriented to person, place, and time. She appears well-developed. No distress.   HENT:   Head: Normocephalic.   Right Ear: Hearing normal.   Left Ear: Hearing normal.   Cardiovascular: Normal rate, regular rhythm and normal heart sounds.   No murmur heard.  Pulmonary/Chest: Effort normal and breath sounds normal.   Abdominal: Soft. Bowel sounds are normal.   Musculoskeletal:      Right lower leg: No edema.      Left lower leg: No edema.   Neurological: She is alert and oriented to person, place, and time.   Skin: Skin is warm and dry. She is not " diaphoretic.   Psychiatric: Her behavior is normal.   Nursing note and vitals reviewed.      During this visit the following were done:  Labs Reviewed [x]    Labs Ordered [x]    Radiology Reports Reviewed []    Radiology Ordered []    PCP Records Reviewed []    Referring Provider Records Reviewed []    ER Records Reviewed []    Hospital Records Reviewed []    History Obtained From Family []    Radiology Images Reviewed []    Other Reviewed [x]    Records Requested []      Diagnoses and all orders for this visit:    1. Recurrent UTI (Primary)  -     POCT urinalysis dipstick, automated  -     Urine Culture - Urine, Urine, Clean Catch  Await urine culture    2. Type 2 diabetes mellitus with chronic kidney disease, without long-term current use of insulin, unspecified CKD stage (HCC)  -     Comprehensive Metabolic Panel; Future  -     Lipid Panel; Future  -     Hemoglobin A1c; Future  -     TSH; Future  -     Vitamin B12; Future  -     Vitamin D 25 Hydroxy; Future  -     Comprehensive Metabolic Panel  -     Lipid Panel  -     Hemoglobin A1c  -     TSH  -     Vitamin B12  -     Vitamin D 25 Hydroxy  Continue with diet and current medications    3. Vitamin D deficiency  -     Vitamin D 25 Hydroxy; Future  -     Vitamin D 25 Hydroxy  Continue supplements    4. Adult onset hypothyroidism  Continue with current dose of synthroid    5. HTN (hypertension), benign  Continue with current meds    6. Obesity (BMI 30.0-34.9)    Patient's Body mass index is 34.76 kg/m². BMI is above normal parameters. Recommendations include: exercise counseling and nutrition counseling.

## 2021-12-21 NOTE — TELEPHONE ENCOUNTER
PATIENT SEEN VICKY FERGUSON TODAY SHE HAS A UTI AND WAS WANTING TO KNOW IF SHE NEEDS TO COME IN OR IF SHE CAN HAVE SOMETHING SENT IN FOR HER

## 2021-12-22 ENCOUNTER — TELEPHONE (OUTPATIENT)
Dept: FAMILY MEDICINE CLINIC | Facility: CLINIC | Age: 71
End: 2021-12-22

## 2021-12-22 LAB
25(OH)D3 SERPL-MCNC: 25.7 NG/ML (ref 30–100)
ALBUMIN SERPL-MCNC: 4.4 G/DL (ref 3.5–5.2)
ALBUMIN/GLOB SERPL: 1.2 G/DL
ALP SERPL-CCNC: 62 U/L (ref 39–117)
ALT SERPL W P-5'-P-CCNC: 21 U/L (ref 1–33)
ANION GAP SERPL CALCULATED.3IONS-SCNC: 9.2 MMOL/L (ref 5–15)
AST SERPL-CCNC: 27 U/L (ref 1–32)
BILIRUB SERPL-MCNC: 0.3 MG/DL (ref 0–1.2)
BUN SERPL-MCNC: 11 MG/DL (ref 8–23)
BUN/CREAT SERPL: 14.1 (ref 7–25)
CALCIUM SPEC-SCNC: 9.6 MG/DL (ref 8.6–10.5)
CHLORIDE SERPL-SCNC: 106 MMOL/L (ref 98–107)
CHOLEST SERPL-MCNC: 148 MG/DL (ref 0–200)
CO2 SERPL-SCNC: 24.8 MMOL/L (ref 22–29)
CREAT SERPL-MCNC: 0.78 MG/DL (ref 0.57–1)
GFR SERPL CREATININE-BSD FRML MDRD: 73 ML/MIN/1.73
GLOBULIN UR ELPH-MCNC: 3.6 GM/DL
GLUCOSE SERPL-MCNC: 107 MG/DL (ref 65–99)
HBA1C MFR BLD: 6.03 % (ref 4.8–5.6)
HDLC SERPL-MCNC: 38 MG/DL (ref 40–60)
LDLC SERPL CALC-MCNC: 81 MG/DL (ref 0–100)
LDLC/HDLC SERPL: 1.99 {RATIO}
POTASSIUM SERPL-SCNC: 4.4 MMOL/L (ref 3.5–5.2)
PROT SERPL-MCNC: 8 G/DL (ref 6–8.5)
SODIUM SERPL-SCNC: 140 MMOL/L (ref 136–145)
TRIGL SERPL-MCNC: 171 MG/DL (ref 0–150)
TSH SERPL DL<=0.05 MIU/L-ACNC: 0.55 UIU/ML (ref 0.27–4.2)
VIT B12 BLD-MCNC: 305 PG/ML (ref 211–946)
VLDLC SERPL-MCNC: 29 MG/DL (ref 5–40)

## 2021-12-22 NOTE — TELEPHONE ENCOUNTER
----- Message from BERT Lucas sent at 12/22/2021 10:36 AM EST -----  Urine culture is still pending.  Labs look great Sugar is perfect cholesterol improved.  Keep up the good work with sugar control.  VIT D and B 12 needs daily OTC supplements      Patient notified & verbalized understanding. She wants to know if you are going to sign for her a Handicapped sticker? She said the paperwork has been faxed.

## 2021-12-27 ENCOUNTER — TELEPHONE (OUTPATIENT)
Dept: FAMILY MEDICINE CLINIC | Facility: CLINIC | Age: 71
End: 2021-12-27

## 2021-12-27 LAB
BACTERIA UR CULT: ABNORMAL
BACTERIA UR CULT: ABNORMAL
OTHER ANTIBIOTIC SUSC ISLT: ABNORMAL

## 2021-12-27 NOTE — TELEPHONE ENCOUNTER
I dont see any documentation of that conversation or medication.  This culture and sensitivity has just resulted today.  Ask her to contact urology and I will send a message as well.       Spoke with patient& she verbalized understanding.

## 2021-12-27 NOTE — TELEPHONE ENCOUNTER
----- Message from BERT Lucas sent at 12/27/2021  3:31 PM EST -----  Urine culture is positive.  Sensitivity returned susceptible to Rocephin is she agreeable to return for daily injections X 5?  She tolerated this in the past.  I would prefer she follow with urology sooner as well as she is difficult to treat and they may a different plan in place for the return of infection      Spoke with patient she reports the NP from urology called her & she took 2 doses of a powder that you mixed in water she does not know the name of it,something new but she cannot tell it did anything for her,her question is would she need to repeat the urine before taking the Rocephin or just go ahead & do it?

## 2021-12-27 NOTE — TELEPHONE ENCOUNTER
I dont see any documentation of that conversation or medication.  This culture and sensitivity has just resulted today.  Ask her to contact urology and I will send a message as well.

## 2021-12-28 ENCOUNTER — TELEPHONE (OUTPATIENT)
Dept: FAMILY MEDICINE CLINIC | Facility: CLINIC | Age: 71
End: 2021-12-28

## 2021-12-28 ENCOUNTER — LAB (OUTPATIENT)
Dept: FAMILY MEDICINE CLINIC | Facility: CLINIC | Age: 71
End: 2021-12-28

## 2021-12-28 DIAGNOSIS — N39.0 RECURRENT UTI: Primary | ICD-10-CM

## 2021-12-28 DIAGNOSIS — N39.0 RECURRENT UTI: ICD-10-CM

## 2021-12-28 PROCEDURE — 87077 CULTURE AEROBIC IDENTIFY: CPT | Performed by: NURSE PRACTITIONER

## 2021-12-28 PROCEDURE — 87186 SC STD MICRODIL/AGAR DIL: CPT

## 2021-12-28 PROCEDURE — 81001 URINALYSIS AUTO W/SCOPE: CPT | Performed by: NURSE PRACTITIONER

## 2021-12-28 PROCEDURE — 87086 URINE CULTURE/COLONY COUNT: CPT | Performed by: NURSE PRACTITIONER

## 2021-12-28 NOTE — TELEPHONE ENCOUNTER
Lets ask her to return for repeat UA and culture    Spoke with patient she will come by later today & leave a urine.

## 2021-12-28 NOTE — TELEPHONE ENCOUNTER
----- Message from BERT Lucas sent at 12/28/2021 11:55 AM EST -----  We did place her on Fosfomycin 3gm to take  daily x 3 days ONLY, which should  have cleared her UTI completely. Initially she only took 1 dose, due to pharmacy not having meds in stock SHE REPORTED. I had told her to wait after treatment to recheck her urine but apparently this was done prior to her FINISHING therapy.     We recommend she finish the dosage of fosfomycin  as prescribed, then recheck he urine. If still positive, ok  with us to repeat the IM rocephin after a repeat urine culture.     We do appreciate your input in ms Fernando Gibson.   Again thank you.     Spoke with Shey she took it for 2 days on 12/17/2021 & 12/18/2021.

## 2021-12-29 ENCOUNTER — CLINICAL SUPPORT (OUTPATIENT)
Dept: FAMILY MEDICINE CLINIC | Facility: CLINIC | Age: 71
End: 2021-12-29

## 2021-12-29 ENCOUNTER — TELEPHONE (OUTPATIENT)
Dept: FAMILY MEDICINE CLINIC | Facility: CLINIC | Age: 71
End: 2021-12-29

## 2021-12-29 DIAGNOSIS — N30.00 ACUTE CYSTITIS WITHOUT HEMATURIA: ICD-10-CM

## 2021-12-29 DIAGNOSIS — E03.8 ADULT ONSET HYPOTHYROIDISM: ICD-10-CM

## 2021-12-29 DIAGNOSIS — N39.0 RECURRENT UTI: Primary | ICD-10-CM

## 2021-12-29 LAB
BACTERIA UR QL AUTO: ABNORMAL /HPF
BILIRUB UR QL STRIP: NEGATIVE
CLARITY UR: CLEAR
COLOR UR: YELLOW
GLUCOSE UR STRIP-MCNC: ABNORMAL MG/DL
HGB UR QL STRIP.AUTO: ABNORMAL
HYALINE CASTS UR QL AUTO: ABNORMAL /LPF
KETONES UR QL STRIP: NEGATIVE
LEUKOCYTE ESTERASE UR QL STRIP.AUTO: ABNORMAL
NITRITE UR QL STRIP: POSITIVE
PH UR STRIP.AUTO: 5.5 [PH] (ref 5–8)
PROT UR QL STRIP: NEGATIVE
RBC # UR STRIP: ABNORMAL /HPF
REF LAB TEST METHOD: ABNORMAL
SP GR UR STRIP: 1.03 (ref 1–1.03)
SQUAMOUS #/AREA URNS HPF: ABNORMAL /HPF
UROBILINOGEN UR QL STRIP: ABNORMAL
WBC # UR STRIP: ABNORMAL /HPF

## 2021-12-29 PROCEDURE — 96372 THER/PROPH/DIAG INJ SC/IM: CPT | Performed by: NURSE PRACTITIONER

## 2021-12-29 RX ORDER — CEFTRIAXONE 1 G/1
1 INJECTION, POWDER, FOR SOLUTION INTRAMUSCULAR; INTRAVENOUS EVERY 24 HOURS
Status: SHIPPED | OUTPATIENT
Start: 2021-12-29 | End: 2022-01-03

## 2021-12-29 RX ORDER — ERGOCALCIFEROL 1.25 MG/1
50000 CAPSULE ORAL WEEKLY
Qty: 5 CAPSULE | Refills: 2 | Status: CANCELLED | OUTPATIENT
Start: 2021-12-29

## 2021-12-29 RX ORDER — ERGOCALCIFEROL 1.25 MG/1
50000 CAPSULE ORAL WEEKLY
Qty: 5 CAPSULE | Refills: 2 | Status: SHIPPED | OUTPATIENT
Start: 2021-12-29 | End: 2022-03-21 | Stop reason: SDUPTHER

## 2021-12-29 RX ORDER — FLUCONAZOLE 150 MG/1
150 TABLET ORAL ONCE
Qty: 1 TABLET | Refills: 0 | Status: SHIPPED | OUTPATIENT
Start: 2021-12-29 | End: 2021-12-29

## 2021-12-29 RX ORDER — LEVOTHYROXINE SODIUM 112 UG/1
112 TABLET ORAL DAILY
Qty: 90 TABLET | Refills: 0 | Status: SHIPPED | OUTPATIENT
Start: 2021-12-29 | End: 2022-03-21 | Stop reason: SDUPTHER

## 2021-12-29 RX ADMIN — CEFTRIAXONE 1 G: 1 INJECTION, POWDER, FOR SOLUTION INTRAMUSCULAR; INTRAVENOUS at 12:56

## 2021-12-29 NOTE — TELEPHONE ENCOUNTER
----- Message from BERT Lucas sent at 12/29/2021  9:44 AM EST -----  With positive urine.  Go ahead and set up Rocephin 1GM daily 5 days  Keep urology follow up      Patient notified and expressed understanding.  She will come to our office today and tomorrow for Rocephin 1gm IM and will go to the infusion clinic Friday, Saturday and Sunday.  I spoke with Silver at the infusion clinic and she will call the patient to set up a convenient time as the clinic is on modified hours on the weekend.

## 2021-12-30 ENCOUNTER — CLINICAL SUPPORT (OUTPATIENT)
Dept: FAMILY MEDICINE CLINIC | Facility: CLINIC | Age: 71
End: 2021-12-30

## 2021-12-30 LAB — BACTERIA SPEC AEROBE CULT: ABNORMAL

## 2021-12-30 PROCEDURE — 96372 THER/PROPH/DIAG INJ SC/IM: CPT | Performed by: NURSE PRACTITIONER

## 2021-12-30 RX ADMIN — CEFTRIAXONE 1 G: 1 INJECTION, POWDER, FOR SOLUTION INTRAMUSCULAR; INTRAVENOUS at 11:39

## 2021-12-31 ENCOUNTER — INFUSION (OUTPATIENT)
Dept: ONCOLOGY | Facility: HOSPITAL | Age: 71
End: 2021-12-31

## 2021-12-31 DIAGNOSIS — R31.9 URINARY TRACT INFECTION WITH HEMATURIA, SITE UNSPECIFIED: Primary | ICD-10-CM

## 2021-12-31 DIAGNOSIS — N39.0 URINARY TRACT INFECTION WITH HEMATURIA, SITE UNSPECIFIED: Primary | ICD-10-CM

## 2021-12-31 PROCEDURE — 96372 THER/PROPH/DIAG INJ SC/IM: CPT

## 2021-12-31 PROCEDURE — 25010000002 CEFTRIAXONE PER 250 MG: Performed by: NURSE PRACTITIONER

## 2021-12-31 RX ADMIN — CEFTRIAXONE 1 G: 1 INJECTION, POWDER, FOR SOLUTION INTRAMUSCULAR; INTRAVENOUS at 08:09

## 2022-01-01 ENCOUNTER — INFUSION (OUTPATIENT)
Dept: ONCOLOGY | Facility: HOSPITAL | Age: 72
End: 2022-01-01

## 2022-01-01 VITALS
RESPIRATION RATE: 20 BRPM | DIASTOLIC BLOOD PRESSURE: 71 MMHG | HEART RATE: 74 BPM | OXYGEN SATURATION: 97 % | TEMPERATURE: 97.8 F | SYSTOLIC BLOOD PRESSURE: 138 MMHG

## 2022-01-01 DIAGNOSIS — N30.00 ACUTE CYSTITIS WITHOUT HEMATURIA: Primary | ICD-10-CM

## 2022-01-01 PROCEDURE — 0 LIDOCAINE 1 % SOLUTION: Performed by: NURSE PRACTITIONER

## 2022-01-01 PROCEDURE — 25010000002 CEFTRIAXONE PER 250 MG: Performed by: NURSE PRACTITIONER

## 2022-01-01 PROCEDURE — 96372 THER/PROPH/DIAG INJ SC/IM: CPT

## 2022-01-01 RX ORDER — LIDOCAINE HYDROCHLORIDE 10 MG/ML
2.1 INJECTION, SOLUTION INFILTRATION; PERINEURAL ONCE
Status: COMPLETED | OUTPATIENT
Start: 2022-01-01 | End: 2022-01-01

## 2022-01-01 RX ORDER — LIDOCAINE HYDROCHLORIDE 10 MG/ML
2.1 INJECTION, SOLUTION INFILTRATION; PERINEURAL ONCE
Status: CANCELLED
Start: 2022-01-02 | End: 2022-01-02

## 2022-01-01 RX ORDER — CEFTRIAXONE 1 G/1
1 INJECTION, POWDER, FOR SOLUTION INTRAMUSCULAR; INTRAVENOUS ONCE
Status: CANCELLED
Start: 2022-01-02 | End: 2022-01-02

## 2022-01-01 RX ORDER — CEFTRIAXONE 1 G/1
1 INJECTION, POWDER, FOR SOLUTION INTRAMUSCULAR; INTRAVENOUS ONCE
Status: COMPLETED | OUTPATIENT
Start: 2022-01-01 | End: 2022-01-01

## 2022-01-01 RX ADMIN — LIDOCAINE HYDROCHLORIDE 2.1 ML: 10 INJECTION, SOLUTION INFILTRATION; PERINEURAL at 11:13

## 2022-01-01 RX ADMIN — CEFTRIAXONE 1 G: 1 INJECTION, POWDER, FOR SOLUTION INTRAMUSCULAR; INTRAVENOUS at 11:12

## 2022-01-02 ENCOUNTER — INFUSION (OUTPATIENT)
Dept: ONCOLOGY | Facility: HOSPITAL | Age: 72
End: 2022-01-02

## 2022-01-02 VITALS
TEMPERATURE: 97.7 F | RESPIRATION RATE: 18 BRPM | SYSTOLIC BLOOD PRESSURE: 130 MMHG | DIASTOLIC BLOOD PRESSURE: 65 MMHG | HEART RATE: 81 BPM | OXYGEN SATURATION: 97 %

## 2022-01-02 DIAGNOSIS — N30.00 ACUTE CYSTITIS WITHOUT HEMATURIA: Primary | ICD-10-CM

## 2022-01-02 PROCEDURE — 0 LIDOCAINE 1 % SOLUTION: Performed by: NURSE PRACTITIONER

## 2022-01-02 PROCEDURE — 25010000002 CEFTRIAXONE PER 250 MG: Performed by: NURSE PRACTITIONER

## 2022-01-02 PROCEDURE — 96372 THER/PROPH/DIAG INJ SC/IM: CPT

## 2022-01-02 RX ORDER — LIDOCAINE HYDROCHLORIDE 10 MG/ML
2.1 INJECTION, SOLUTION INFILTRATION; PERINEURAL ONCE
Status: COMPLETED | OUTPATIENT
Start: 2022-01-02 | End: 2022-01-02

## 2022-01-02 RX ORDER — LIDOCAINE HYDROCHLORIDE 10 MG/ML
2.1 INJECTION, SOLUTION INFILTRATION; PERINEURAL ONCE
Status: CANCELLED
Start: 2022-01-02 | End: 2022-01-02

## 2022-01-02 RX ORDER — CEFTRIAXONE 1 G/1
1 INJECTION, POWDER, FOR SOLUTION INTRAMUSCULAR; INTRAVENOUS ONCE
Status: COMPLETED | OUTPATIENT
Start: 2022-01-02 | End: 2022-01-02

## 2022-01-02 RX ORDER — CEFTRIAXONE 1 G/1
1 INJECTION, POWDER, FOR SOLUTION INTRAMUSCULAR; INTRAVENOUS ONCE
Status: CANCELLED
Start: 2022-01-02 | End: 2022-01-02

## 2022-01-02 RX ADMIN — CEFTRIAXONE 1 G: 1 INJECTION, POWDER, FOR SOLUTION INTRAMUSCULAR; INTRAVENOUS at 09:15

## 2022-01-02 RX ADMIN — LIDOCAINE HYDROCHLORIDE 2.1 ML: 10 INJECTION, SOLUTION INFILTRATION; PERINEURAL at 09:16

## 2022-01-03 ENCOUNTER — TELEPHONE (OUTPATIENT)
Dept: FAMILY MEDICINE CLINIC | Facility: CLINIC | Age: 72
End: 2022-01-03

## 2022-01-03 NOTE — TELEPHONE ENCOUNTER
Patient called reports she has completed her injections & wanted to know if she needs to come in for a repeat UA,F/U with urology is on the 19th.

## 2022-01-19 ENCOUNTER — OFFICE VISIT (OUTPATIENT)
Dept: UROLOGY | Facility: CLINIC | Age: 72
End: 2022-01-19

## 2022-01-19 VITALS — WEIGHT: 178 LBS | BODY MASS INDEX: 34.95 KG/M2 | HEIGHT: 60 IN

## 2022-01-19 DIAGNOSIS — R30.0 DYSURIA: ICD-10-CM

## 2022-01-19 DIAGNOSIS — N39.0 RECURRENT UTI: ICD-10-CM

## 2022-01-19 DIAGNOSIS — B37.31 YEAST VAGINITIS: Primary | ICD-10-CM

## 2022-01-19 DIAGNOSIS — R35.0 FREQUENCY OF MICTURITION: ICD-10-CM

## 2022-01-19 LAB
BILIRUB BLD-MCNC: NEGATIVE MG/DL
CLARITY, POC: CLEAR
COLOR UR: YELLOW
EXPIRATION DATE: ABNORMAL
GLUCOSE UR STRIP-MCNC: ABNORMAL MG/DL
KETONES UR QL: NEGATIVE
LEUKOCYTE EST, POC: NEGATIVE
Lab: ABNORMAL
NITRITE UR-MCNC: NEGATIVE MG/ML
PH UR: 6 [PH] (ref 5–8)
PROT UR STRIP-MCNC: NEGATIVE MG/DL
RBC # UR STRIP: NEGATIVE /UL
SP GR UR: 1.02 (ref 1–1.03)
UROBILINOGEN UR QL: NORMAL

## 2022-01-19 PROCEDURE — 87086 URINE CULTURE/COLONY COUNT: CPT | Performed by: NURSE PRACTITIONER

## 2022-01-19 PROCEDURE — 81003 URINALYSIS AUTO W/O SCOPE: CPT | Performed by: NURSE PRACTITIONER

## 2022-01-19 PROCEDURE — 87147 CULTURE TYPE IMMUNOLOGIC: CPT | Performed by: NURSE PRACTITIONER

## 2022-01-19 PROCEDURE — 99214 OFFICE O/P EST MOD 30 MIN: CPT | Performed by: NURSE PRACTITIONER

## 2022-01-19 RX ORDER — FLUCONAZOLE 100 MG/1
100 TABLET ORAL DAILY
Qty: 3 TABLET | Refills: 0 | Status: SHIPPED | OUTPATIENT
Start: 2022-01-19 | End: 2022-01-22

## 2022-01-19 RX ORDER — PHENAZOPYRIDINE HYDROCHLORIDE 200 MG/1
200 TABLET, FILM COATED ORAL 3 TIMES DAILY PRN
Qty: 20 TABLET | Refills: 0 | Status: SHIPPED | OUTPATIENT
Start: 2022-01-19 | End: 2022-02-15

## 2022-01-19 NOTE — PATIENT INSTRUCTIONS
Urinary Tract Infection, Adult  A urinary tract infection (UTI) is an infection of any part of the urinary tract. The urinary tract includes:  · The kidneys.  · The ureters.  · The bladder.  · The urethra.  These organs make, store, and get rid of pee (urine) in the body.  What are the causes?  This is caused by germs (bacteria) in your genital area. These germs grow and cause swelling (inflammation) of your urinary tract.  What increases the risk?  You are more likely to develop this condition if:  · You have a small, thin tube (catheter) to drain pee.  · You cannot control when you pee or poop (incontinence).  · You are female, and:  ? You use these methods to prevent pregnancy:  § A medicine that kills sperm (spermicide).  § A device that blocks sperm (diaphragm).  ? You have low levels of a female hormone (estrogen).  ? You are pregnant.  · You have genes that add to your risk.  · You are sexually active.  · You take antibiotic medicines.  · You have trouble peeing because of:  ? A prostate that is bigger than normal, if you are male.  ? A blockage in the part of your body that drains pee from the bladder (urethra).  ? A kidney stone.  ? A nerve condition that affects your bladder (neurogenic bladder).  ? Not getting enough to drink.  ? Not peeing often enough.  · You have other conditions, such as:  ? Diabetes.  ? A weak disease-fighting system (immune system).  ? Sickle cell disease.  ? Gout.  ? Injury of the spine.  What are the signs or symptoms?  Symptoms of this condition include:  · Needing to pee right away (urgently).  · Peeing often.  · Peeing small amounts often.  · Pain or burning when peeing.  · Blood in the pee.  · Pee that smells bad or not like normal.  · Trouble peeing.  · Pee that is cloudy.  · Fluid coming from the vagina, if you are female.  · Pain in the belly or lower back.  Other symptoms include:  · Throwing up (vomiting).  · No urge to eat.  · Feeling mixed up (confused).  · Being tired  and grouchy (irritable).  · A fever.  · Watery poop (diarrhea).  How is this treated?  This condition may be treated with:  · Antibiotic medicine.  · Other medicines.  · Drinking enough water.  Follow these instructions at home:    Medicines  · Take over-the-counter and prescription medicines only as told by your doctor.  · If you were prescribed an antibiotic medicine, take it as told by your doctor. Do not stop taking it even if you start to feel better.  General instructions  · Make sure you:  ? Pee until your bladder is empty.  ? Do not hold pee for a long time.  ? Empty your bladder after sex.  ? Wipe from front to back after pooping if you are a female. Use each tissue one time when you wipe.  · Drink enough fluid to keep your pee pale yellow.  · Keep all follow-up visits as told by your doctor. This is important.  Contact a doctor if:  · You do not get better after 1-2 days.  · Your symptoms go away and then come back.  Get help right away if:  · You have very bad back pain.  · You have very bad pain in your lower belly.  · You have a fever.  · You are sick to your stomach (nauseous).  · You are throwing up.  Summary  · A urinary tract infection (UTI) is an infection of any part of the urinary tract.  · This condition is caused by germs in your genital area.  · There are many risk factors for a UTI. These include having a small, thin tube to drain pee and not being able to control when you pee or poop.  · Treatment includes antibiotic medicines for germs.  · Drink enough fluid to keep your pee pale yellow.  This information is not intended to replace advice given to you by your health care provider. Make sure you discuss any questions you have with your health care provider.  Document Revised: 12/05/2019 Document Reviewed: 06/27/2019  [employer] Patient Education © 2021 [employer]  Dysuria  Dysuria is pain or discomfort while urinating. The pain or discomfort may be felt in the part of your body that drains  urine from the bladder (urethra) or in the surrounding tissue of the genitals. The pain may also be felt in the groin area, lower abdomen, or lower back. You may have to urinate frequently or have the sudden feeling that you have to urinate (urgency). Dysuria can affect both men and women, but it is more common in women.  Dysuria can be caused by many different things, including:  · Urinary tract infection.  · Kidney stones or bladder stones.  · Certain sexually transmitted infections (STIs), such as chlamydia.  · Dehydration.  · Inflammation of the tissues of the vagina.  · Use of certain medicines.  · Use of certain soaps or scented products that cause irritation.  Follow these instructions at home:  General instructions  · Watch your condition for any changes.  · Urinate often. Avoid holding urine for long periods of time.  · After a bowel movement or urination, women should cleanse from front to back, using each tissue only once.  · Urinate after sexual intercourse.  · Keep all follow-up visits as told by your health care provider. This is important.  · If you had any tests done to find the cause of dysuria, it is up to you to get your test results. Ask your health care provider, or the department that is doing the test, when your results will be ready.  Eating and drinking    · Drink enough fluid to keep your urine pale yellow.  · Avoid caffeine, tea, and alcohol. They can irritate the bladder and make dysuria worse. In men, alcohol may irritate the prostate.    Medicines  · Take over-the-counter and prescription medicines only as told by your health care provider.  · If you were prescribed an antibiotic medicine, take it as told by your health care provider. Do not stop taking the antibiotic even if you start to feel better.  Contact a health care provider if:  · You have a fever.  · You develop pain in your back or sides.  · You have nausea or vomiting.  · You have blood in your urine.  · You are not  urinating as often as you usually do.  Get help right away if:  · Your pain is severe and not relieved with medicines.  · You cannot eat or drink without vomiting.  · You are confused.  · You have a rapid heartbeat while at rest.  · You have shaking or chills.  · You feel extremely weak.  Summary  · Dysuria is pain or discomfort while urinating. Many different conditions can lead to dysuria.  · If you have dysuria, you may have to urinate frequently or have the sudden feeling that you have to urinate (urgency).  · Watch your condition for any changes. Keep all follow-up visits as told by your health care provider.  · Make sure that you urinate often and drink enough fluid to keep your urine pale yellow.  This information is not intended to replace advice given to you by your health care provider. Make sure you discuss any questions you have with your health care provider.  Document Revised: 11/30/2018 Document Reviewed: 10/04/2018  [employer] Patient Education © 2021 [employer]

## 2022-01-19 NOTE — PROGRESS NOTES
Chief Complaint  E. coli/Klebsiella Pneumonia Recurrent UTIs /YEAST VAGINITIS (FOLLOW UP)    Subjective          Shey King presents to [facility]  History of Present Illness    Ms Shey King is a pleasant 70 year old female, who returns to clinic today for evaluation. This is her 6 week follow-up on Klebsiella P. recurrent UTIs. She is in no apparent distress today and reports doing relatively well, until recently in The last few days, she has been miserable with vaginal itching, discharge and irritation consistent with yeast vaginitis.     She is 2 weeks post IV infusions by her PCP with Rocephin for her recent bout of Klebsiella pneumonia.  (December 30 -January 4), prior to therapy, patient had completed 3-day therapy with fosfomycin 3 g December 14 through 17.  She had been on suppressive antibiotic therapy with trimethoprim, patient reports noncompliance.  However prior to that, she had recently finished # 21  ABX Flagyl per her PCP for Diverticulitis she reports, she had resumed suppressive Antibiotic therapy with daily Trimethorprim. Patient was on IV rocephin x 7 days in august for recurrent utis(with positive cultures including Klebseilla in the past, Proteus, and Ecoli).     Overall she reports doing better and would like to take a break off all antibiotics..  She also reports an improvement in her urinary symptoms.  She denies any recent   episodes of frequency, urgency, or nocturia .  She denies dysuria, burning on urination, flank pain, back pain and denies any CVA tenderness..  She also denies  pelvic pain or pressure, denies suprapubic discomfort.  She denies fever or chills, she does  have nausea, denies vomiting, or diarrhea.  Her urine dipstick today showed 3+ glucosuria otherwise is completely negative for any  leukocyte esterase, negative nitrites, it is also negative for groos/ microscopic hematuria.  PVR is 20 cc.     She states she is  "currently taking her probiotics diligently. She reports  increasing her p.o. fluid intake to at least 2 L daily, and has significantly reduced her intake of soda drinks.     Objective   Vital Signs:   Ht 152.4 cm (60\")   Wt 80.7 kg (178 lb)   BMI 34.76 kg/m²     Physical Exam  Constitutional:       General: She is in acute distress.      Appearance: She is well-developed. She is obese.   HENT:      Head: Normocephalic and atraumatic.   Eyes:      Pupils: Pupils are equal, round, and reactive to light.   Neck:      Thyroid: No thyromegaly.      Trachea: No tracheal deviation.   Cardiovascular:      Rate and Rhythm: Normal rate and regular rhythm.      Heart sounds: No murmur heard.      Pulmonary:      Effort: Pulmonary effort is normal. No respiratory distress.      Breath sounds: Normal breath sounds. No stridor. No wheezing.   Abdominal:      General: Bowel sounds are normal. There is distension.      Palpations: Abdomen is soft.      Tenderness: There is abdominal tenderness.   Genitourinary:     Labia:         Right: No tenderness.         Left: No tenderness.       Vagina: Normal. No vaginal discharge.   Musculoskeletal:         General: Tenderness present. No deformity. Normal range of motion.      Cervical back: Normal range of motion.   Skin:     General: Skin is warm and dry.      Capillary Refill: Capillary refill takes less than 2 seconds.      Coloration: Skin is not pale.      Findings: No erythema or rash.   Neurological:      Mental Status: She is alert and oriented to person, place, and time.      Cranial Nerves: No cranial nerve deficit.      Sensory: No sensory deficit.      Coordination: Coordination normal.   Psychiatric:         Behavior: Behavior normal.         Thought Content: Thought content normal.         Judgment: Judgment normal.        Result Review :     UA    Urinalysis 12/21/21 12/28/21 12/28/21 1/19/22     1301 1301    Squamous Epithelial Cells, UA   0-2    Specific [place], UA  " 1.027     Ketones, UA Negative Negative  Negative   Blood, UA  Trace (A)     Leukocytes, UA Negative Small (1+) (A)  Negative   Nitrite, UA  Positive (A)     RBC, UA   0-2    WBC, UA   Too Numerous to Count (A)    Bacteria, UA   4+ (A)    (A) Abnormal value            Urine Culture    Urine Culture 12/7/21 12/21/21 12/28/21   Urine Culture Final report (A) Final report (A) >100,000 CFU/mL Klebsiella pneumoniae ssp pneumoniae (A)   (A) Abnormal value                      Assessment and Plan    Diagnoses and all orders for this visit:    1. Yeast vaginitis (Primary)  -     phenazopyridine (Pyridium) 200 MG tablet; Take 1 tablet by mouth 3 (Three) Times a Day As Needed for Bladder Spasms.  Dispense: 20 tablet; Refill: 0  -     fluconazole (Diflucan) 100 MG tablet; Take 1 tablet by mouth Daily for 3 days.  Dispense: 3 tablet; Refill: 0    2. Recurrent UTI  -     phenazopyridine (Pyridium) 200 MG tablet; Take 1 tablet by mouth 3 (Three) Times a Day As Needed for Bladder Spasms.  Dispense: 20 tablet; Refill: 0  -     fluconazole (Diflucan) 100 MG tablet; Take 1 tablet by mouth Daily for 3 days.  Dispense: 3 tablet; Refill: 0    3. Frequency of micturition  -     POC Urinalysis Dipstick, Automated  -     Urine Culture - Urine, Urine, Clean Catch  -     phenazopyridine (Pyridium) 200 MG tablet; Take 1 tablet by mouth 3 (Three) Times a Day As Needed for Bladder Spasms.  Dispense: 20 tablet; Refill: 0  -     fluconazole (Diflucan) 100 MG tablet; Take 1 tablet by mouth Daily for 3 days.  Dispense: 3 tablet; Refill: 0    4. Dysuria  -     phenazopyridine (Pyridium) 200 MG tablet; Take 1 tablet by mouth 3 (Three) Times a Day As Needed for Bladder Spasms.  Dispense: 20 tablet; Refill: 0  -     fluconazole (Diflucan) 100 MG tablet; Take 1 tablet by mouth Daily for 3 days.  Dispense: 3 tablet; Refill: 0                          ASSESSMENT  Recurrent urinary tract infections/Atrophic/yeast vaginitis: Besides her irritation from the  yeast vaginitis, She is in no apparent distress and reports a remarkable improvement in her overall symptoms. She is happy to be feeling better she states.She reports doing relatively better off her antibiotic prophylaxis, and would like to take a break(finished 5-7 day therapy with IV rocephin 2 weeks ago.      Again,  We discussed the types of organisms that are found in the urinary tract indicating that the vast majority are results of the patient's own gastrointestinal isha.  We discussed how many of the antibiotics that are utilized can actually exacerbate these infections by creating resistant organisms and there is only a very few antibiotics that are concentrated in the urine and do not affect the rectal reservoir nor cause recurrent yeast vaginitis.       We discussed the risk factors for recurrent infections being intercourse in younger patients and atrophic changes in older patients.  We discussed the symptoms that are found including pain, pressure, burning, frequency, urgency suprapubic pain and painful intercourse.  I discussed upper tract symptoms including fevers, chills, and indicated the workup would be much more aggressive if the patient were to present with recurrent infections in the face of upper tract symptomatology such as fever. I discussed the history of vesicoureteral reflux in young patients and finally chronic renal scarring as a result of such.                                                      PLAN  AGAIN, We resent her urine for Culture. I will call her with results if any bacteria growth post IV infusions with Rocephin.      We stopped her Trimpex 100 mg PO Daily -Suppressive Therapy, patient would like a break off ABX she reports. We will treat her infectios as they surface intermittently.     She should increase her p.o. fluid intake to at least 1 to 2 L daily and avoid bladder irritants such as caffeine products, spicy foods, and citrusy foods.      I recommend  She continue  probiotics with any treatment with antibiotics to protect the rectal reservoir including over-the-counter yogurt preparations to stone oral pills containing the appropriate probiotics. Patient reports the diligent use of Probiotics.     She is to also continue other medications for yeast vaginitis, atrophic vaginitis as prescribed above.    SHE WILL follow up with her PCP Ms Genevieve LOVE 3 months for urine recheck.     Will see her back in 6 months for Follow up in clinic and also recheck her urinalysis at that time.She may return sooner if need be.     Patient is agreeable to plan of care.     Follow Up   Return in about 6 months (around 7/19/2022) for RECURRENT UTI/DYSURIA/DETRUSSOR INSTABILITY.     Patient was given instructions and counseling regarding her condition or for health maintenance advice. Please see specific information pulled into the AVS if appropriate.

## 2022-01-20 LAB — BACTERIA SPEC AEROBE CULT: ABNORMAL

## 2022-01-21 ENCOUNTER — TELEPHONE (OUTPATIENT)
Dept: GASTROENTEROLOGY | Facility: CLINIC | Age: 72
End: 2022-01-21

## 2022-01-24 ENCOUNTER — TELEPHONE (OUTPATIENT)
Dept: UROLOGY | Facility: CLINIC | Age: 72
End: 2022-01-24

## 2022-01-24 NOTE — TELEPHONE ENCOUNTER
I called the pt and let her know that her urine culture was good overall that it strarted to grow a little infection and that she needed to continue her probiotics and restrart her suppressive therapy.

## 2022-01-26 ENCOUNTER — OFFICE VISIT (OUTPATIENT)
Dept: FAMILY MEDICINE CLINIC | Facility: CLINIC | Age: 72
End: 2022-01-26

## 2022-01-26 VITALS
SYSTOLIC BLOOD PRESSURE: 122 MMHG | TEMPERATURE: 97 F | BODY MASS INDEX: 32.04 KG/M2 | HEART RATE: 82 BPM | WEIGHT: 163.2 LBS | HEIGHT: 60 IN | OXYGEN SATURATION: 99 % | DIASTOLIC BLOOD PRESSURE: 72 MMHG

## 2022-01-26 DIAGNOSIS — R50.9 FEVER, UNSPECIFIED FEVER CAUSE: Primary | ICD-10-CM

## 2022-01-26 DIAGNOSIS — R00.0 INCREASED HEART RATE: ICD-10-CM

## 2022-01-26 DIAGNOSIS — R53.83 OTHER FATIGUE: ICD-10-CM

## 2022-01-26 LAB
BILIRUB BLD-MCNC: NEGATIVE MG/DL
CLARITY, POC: CLEAR
COLOR UR: YELLOW
EXPIRATION DATE: ABNORMAL
EXPIRATION DATE: NORMAL
FLUAV AG NPH QL: NEGATIVE
FLUBV AG NPH QL: NEGATIVE
GLUCOSE UR STRIP-MCNC: ABNORMAL MG/DL
INTERNAL CONTROL: NORMAL
KETONES UR QL: ABNORMAL
LEUKOCYTE EST, POC: NEGATIVE
Lab: ABNORMAL
Lab: NORMAL
NITRITE UR-MCNC: NEGATIVE MG/ML
PH UR: 6 [PH] (ref 5–8)
PROT UR STRIP-MCNC: ABNORMAL MG/DL
RBC # UR STRIP: NEGATIVE /UL
SP GR UR: 1.02 (ref 1–1.03)
UROBILINOGEN UR QL: NORMAL

## 2022-01-26 PROCEDURE — 36415 COLL VENOUS BLD VENIPUNCTURE: CPT | Performed by: FAMILY MEDICINE

## 2022-01-26 PROCEDURE — U0005 INFEC AGEN DETEC AMPLI PROBE: HCPCS | Performed by: FAMILY MEDICINE

## 2022-01-26 PROCEDURE — 99214 OFFICE O/P EST MOD 30 MIN: CPT | Performed by: FAMILY MEDICINE

## 2022-01-26 PROCEDURE — 85025 COMPLETE CBC W/AUTO DIFF WBC: CPT | Performed by: FAMILY MEDICINE

## 2022-01-26 PROCEDURE — U0004 COV-19 TEST NON-CDC HGH THRU: HCPCS | Performed by: FAMILY MEDICINE

## 2022-01-26 PROCEDURE — 81003 URINALYSIS AUTO W/O SCOPE: CPT | Performed by: FAMILY MEDICINE

## 2022-01-26 PROCEDURE — 93005 ELECTROCARDIOGRAM TRACING: CPT | Performed by: FAMILY MEDICINE

## 2022-01-26 PROCEDURE — 87086 URINE CULTURE/COLONY COUNT: CPT | Performed by: FAMILY MEDICINE

## 2022-01-26 PROCEDURE — 80053 COMPREHEN METABOLIC PANEL: CPT | Performed by: FAMILY MEDICINE

## 2022-01-26 PROCEDURE — 87804 INFLUENZA ASSAY W/OPTIC: CPT | Performed by: FAMILY MEDICINE

## 2022-01-26 RX ORDER — TRIMETHOPRIM 100 MG/1
100 TABLET ORAL 2 TIMES DAILY
COMMUNITY
End: 2022-04-01 | Stop reason: SDUPTHER

## 2022-01-27 LAB
ALBUMIN SERPL-MCNC: 4.6 G/DL (ref 3.5–5.2)
ALBUMIN/GLOB SERPL: 1.3 G/DL
ALP SERPL-CCNC: 69 U/L (ref 39–117)
ALT SERPL W P-5'-P-CCNC: 20 U/L (ref 1–33)
ANION GAP SERPL CALCULATED.3IONS-SCNC: 16.5 MMOL/L (ref 5–15)
AST SERPL-CCNC: 31 U/L (ref 1–32)
BACTERIA SPEC AEROBE CULT: NORMAL
BASOPHILS # BLD AUTO: 0.03 10*3/MM3 (ref 0–0.2)
BASOPHILS NFR BLD AUTO: 0.6 % (ref 0–1.5)
BILIRUB SERPL-MCNC: 0.5 MG/DL (ref 0–1.2)
BUN SERPL-MCNC: 15 MG/DL (ref 8–23)
BUN/CREAT SERPL: 15.5 (ref 7–25)
CALCIUM SPEC-SCNC: 9.7 MG/DL (ref 8.6–10.5)
CHLORIDE SERPL-SCNC: 97 MMOL/L (ref 98–107)
CO2 SERPL-SCNC: 23.5 MMOL/L (ref 22–29)
CREAT SERPL-MCNC: 0.97 MG/DL (ref 0.57–1)
DEPRECATED RDW RBC AUTO: 44.2 FL (ref 37–54)
EOSINOPHIL # BLD AUTO: 0.1 10*3/MM3 (ref 0–0.4)
EOSINOPHIL NFR BLD AUTO: 2 % (ref 0.3–6.2)
ERYTHROCYTE [DISTWIDTH] IN BLOOD BY AUTOMATED COUNT: 13.9 % (ref 12.3–15.4)
GFR SERPL CREATININE-BSD FRML MDRD: 57 ML/MIN/1.73
GLOBULIN UR ELPH-MCNC: 3.6 GM/DL
GLUCOSE SERPL-MCNC: 88 MG/DL (ref 65–99)
HCT VFR BLD AUTO: 42.9 % (ref 34–46.6)
HGB BLD-MCNC: 14 G/DL (ref 12–15.9)
IMM GRANULOCYTES # BLD AUTO: 0.03 10*3/MM3 (ref 0–0.05)
IMM GRANULOCYTES NFR BLD AUTO: 0.6 % (ref 0–0.5)
LYMPHOCYTES # BLD AUTO: 1.53 10*3/MM3 (ref 0.7–3.1)
LYMPHOCYTES NFR BLD AUTO: 30.4 % (ref 19.6–45.3)
MCH RBC QN AUTO: 28.5 PG (ref 26.6–33)
MCHC RBC AUTO-ENTMCNC: 32.6 G/DL (ref 31.5–35.7)
MCV RBC AUTO: 87.2 FL (ref 79–97)
MONOCYTES # BLD AUTO: 0.39 10*3/MM3 (ref 0.1–0.9)
MONOCYTES NFR BLD AUTO: 7.8 % (ref 5–12)
NEUTROPHILS NFR BLD AUTO: 2.95 10*3/MM3 (ref 1.7–7)
NEUTROPHILS NFR BLD AUTO: 58.6 % (ref 42.7–76)
NRBC BLD AUTO-RTO: 0 /100 WBC (ref 0–0.2)
PLATELET # BLD AUTO: 276 10*3/MM3 (ref 140–450)
PMV BLD AUTO: 11.8 FL (ref 6–12)
POTASSIUM SERPL-SCNC: 4.6 MMOL/L (ref 3.5–5.2)
PROT SERPL-MCNC: 8.2 G/DL (ref 6–8.5)
RBC # BLD AUTO: 4.92 10*6/MM3 (ref 3.77–5.28)
SARS-COV-2 RNA PNL SPEC NAA+PROBE: DETECTED
SODIUM SERPL-SCNC: 137 MMOL/L (ref 136–145)
WBC NRBC COR # BLD: 5.03 10*3/MM3 (ref 3.4–10.8)

## 2022-01-28 ENCOUNTER — TELEPHONE (OUTPATIENT)
Dept: FAMILY MEDICINE CLINIC | Facility: CLINIC | Age: 72
End: 2022-01-28

## 2022-01-28 DIAGNOSIS — R30.0 DYSURIA: Primary | ICD-10-CM

## 2022-01-28 NOTE — TELEPHONE ENCOUNTER
----- Message from Sheila Everett MD sent at 1/28/2022  3:10 AM EST -----  Please call Shey and let her know she is COVID positive. How is she doing? Can she take her O2 sats/vitals? Symptoms other than being achy and fatigued? I don't think this is an UTI. Her culture suggested recollection so I have a new culture order that she can come in to do once she is out of isolation. The results on the U/A might just be dehydration so she needs to increase her fluids.     Please make sure she gets on Vitamin C, Vitamin D, zinc. She needs to isolate. She needs to sleep on her stomach. She needs to start doing deep breathing exercises.      We do not have monoclonal antibodies because it is not as effective with the omicron variant.

## 2022-01-28 NOTE — TELEPHONE ENCOUNTER
----- Message from Sheila Everett MD sent at 1/28/2022  3:10 AM EST -----  Please call Shey and let her know she is COVID positive. How is she doing? Can she take her O2 sats/vitals? Symptoms other than being achy and fatigued? I don't think this is an UTI. Her culture suggested recollection so I have a new culture order that she can come in to do once she is out of isolation. The results on the U/A might just be dehydration so she needs to increase her fluids.     Please make sure she gets on Vitamin C, Vitamin D, zinc. She needs to isolate. She needs to sleep on her stomach. She needs to start doing deep breathing exercises.      We do not have monoclonal antibodies because it is not as effective with the omicron variant.        Left voicemail message to return call.     Patient returned call.  Her symptoms seem to be improving.  She will return once she is out of isolation for repeat U/A.

## 2022-02-15 ENCOUNTER — OFFICE VISIT (OUTPATIENT)
Dept: CARDIOLOGY | Facility: CLINIC | Age: 72
End: 2022-02-15

## 2022-02-15 VITALS
BODY MASS INDEX: 32.98 KG/M2 | HEIGHT: 60 IN | OXYGEN SATURATION: 99 % | SYSTOLIC BLOOD PRESSURE: 104 MMHG | WEIGHT: 168 LBS | TEMPERATURE: 97.3 F | HEART RATE: 85 BPM | DIASTOLIC BLOOD PRESSURE: 62 MMHG

## 2022-02-15 DIAGNOSIS — E03.8 ADULT ONSET HYPOTHYROIDISM: ICD-10-CM

## 2022-02-15 DIAGNOSIS — R00.2 PALPITATIONS: Primary | ICD-10-CM

## 2022-02-15 DIAGNOSIS — E78.2 MIXED HYPERLIPIDEMIA: ICD-10-CM

## 2022-02-15 PROCEDURE — 99213 OFFICE O/P EST LOW 20 MIN: CPT | Performed by: INTERNAL MEDICINE

## 2022-02-15 NOTE — PROGRESS NOTES
subjective     Chief Complaint   Patient presents with   • Palpitations     follow up   • Results     heart echo     History of Present Illness  Shey is 71 years old white female who is here for cardiac evaluation because of palpitations.  She denies any chest pain or shortness of breath.  She was initially seen by me in October for palpitations.  She did not have any chest discomfort tightness pressure or pain at that time either.    She underwent echocardiogram and Holter.  Since then she had COVID and got quite sick and lost weight.  She is better now and is asymptomatic.  Palpitations are a lot better she actually discontinued her Toprol.  Currently she is not taking any medications and denies any palpitations chest pain or shortness of breath.    She also has hypothyroidism on Synthroid replacement therapy  She is diabetic and has hyperlipidemia also recent lab work showed much improvement in her lipid status.    Past Surgical History:   Procedure Laterality Date   • BREAST BIOPSY Bilateral 2005    benign   • CHOLECYSTECTOMY  2018?   • ENDOSCOPY     • ENDOSCOPY N/A 1/11/2018    Procedure: ESOPHAGOGASTRODUODENOSCOPY;  Surgeon: Dong Mata MD;  Location: [facility] OR;  Service:    • MAMMO BILATERAL  09/2015   • OVARIAN CYST DRAINAGE     • IL LAP,CHOLECYSTECTOMY N/A 1/11/2018    Procedure: CHOLECYSTECTOMY LAPAROSCOPIC;  Surgeon: Dong Mata MD;  Location: [facility] OR;  Service: General   • US GUIDED LYMPH NODE BIOPSY  4/11/2018     Family History   Problem Relation Age of Onset   • Heart disease Mother         also had cancer kidney   • Cancer Mother         kidney   • Thyroid disease Mother    • Heart disease Father    • Diabetes Brother    • Breast cancer Neg Hx      Past Medical History:   Diagnosis Date   • Abdominal pain, LLQ (left lower quadrant)    • Abnormal Pap smear of cervix     several years ago   • Anemia     years ago   • Cataract early stage   • Cystitis    • Diabetes mellitus (HCC)     • Diarrhea    • Diverticulosis    • Encounter for cholecystectomy 2018   • Gallstones    • GERD (gastroesophageal reflux disease)    • Hiatal hernia    • Hyperglycemia    • Hyperlipidemia    • Hypothyroidism    • Muscle spasm     FLANK PAIN   • OAB (overactive bladder)    • Pneumonia     years ago   • UTI (urinary tract infection)      Patient Active Problem List   Diagnosis   • Abdominal pain, LLQ (left lower quadrant)   • Cystitis   • Diarrhea   • Hiatal hernia   • Hyperglycemia   • Hyperlipidemia   • Adult onset hypothyroidism   • Muscle spasm   • OAB (overactive bladder)   • UTI (urinary tract infection)   • Gallstones   • B12 deficiency   • Pulmonary nodule   • Class 2 obesity due to excess calories without serious comorbidity with body mass index (BMI) of 35.0 to 35.9 in adult   • Diabetes mellitus (HCC)   • Lymphadenopathy   • Dupuytren's contracture of right hand   • Palpitations   • ELKINS (dyspnea on exertion)       Social History     Tobacco Use   • Smoking status: Never Smoker   • Smokeless tobacco: Never Used   • Tobacco comment: never   Vaping Use   • Vaping Use: Never used   Substance Use Topics   • Alcohol use: No   • Drug use: No       Allergies   Allergen Reactions   • Bactrim [Sulfamethoxazole-Trimethoprim] Hives   • Ciprofloxacin Hives   • Penicillins Hives   • Steri-Strip Compound Benzoin [Benzoin Compound] Hives   • Sulfa Antibiotics Hives       Current Outpatient Medications on File Prior to Visit   Medication Sig   • aspirin 81 MG chewable tablet Chew 81 mg daily.   • empagliflozin (JARDIANCE) 10 MG tablet tablet Take 1 tablet by mouth Daily.   • fenofibrate (TRICOR) 145 MG tablet Take 1 tablet by mouth Daily.   • glucose blood test strip For daily testing  E11.65  (One Touch Ultra)   • glucose monitor monitoring kit 1 each as needed (DM II).   • Lancets Misc. misc For Daily testing  E11.65   • levothyroxine (SYNTHROID, LEVOTHROID) 112 MCG tablet TAKE 1 TABLET BY MOUTH DAILY   • trimethoprim  "(TRIMPEX) 100 MG tablet Take 100 mg by mouth 2 (Two) Times a Day.   • vitamin D (ERGOCALCIFEROL) 1.25 MG (28769 UT) capsule capsule Take 1 capsule by mouth 1 (One) Time Per Week.   • [DISCONTINUED] metoprolol succinate XL (TOPROL-XL) 25 MG 24 hr tablet Take 0.5 tablets by mouth Daily.   • [DISCONTINUED] phenazopyridine (Pyridium) 200 MG tablet Take 1 tablet by mouth 3 (Three) Times a Day As Needed for Bladder Spasms.     No current facility-administered medications on file prior to visit.         The following portions of the patient's history were reviewed and updated as appropriate: allergies, current medications, past family history, past medical history, past social history, past surgical history and problem list.    Review of Systems   Constitutional: Negative.   HENT: Negative.  Negative for congestion.    Eyes: Negative.    Cardiovascular: Negative.  Negative for chest pain, cyanosis, dyspnea on exertion, irregular heartbeat, leg swelling, near-syncope, orthopnea, palpitations, paroxysmal nocturnal dyspnea and syncope.   Respiratory: Negative.  Negative for shortness of breath.    Hematologic/Lymphatic: Negative.    Musculoskeletal: Negative.    Gastrointestinal: Negative.    Neurological: Negative.  Negative for headaches.          Objective:     /62 (BP Location: Left arm, Patient Position: Sitting, Cuff Size: Adult)   Pulse 85   Temp 97.3 °F (36.3 °C)   Ht 152.4 cm (60\")   Wt 76.2 kg (168 lb)   SpO2 99%   BMI 32.81 kg/m²   Pulmonary:      Effort: Pulmonary effort is normal.      Breath sounds: Normal breath sounds. No stridor. No wheezing. No rhonchi. No rales.   Cardiovascular:      PMI at left midclavicular line. Normal rate. Regular rhythm. Normal S1. Normal S2.      Murmurs: There is no murmur.      No gallop. No click. No rub.   Pulses:     Intact distal pulses.   Edema:     Peripheral edema absent.           Lab Review  Lab Results   Component Value Date     01/26/2022    K 4.6 " 01/26/2022    CL 97 (L) 01/26/2022    BUN 15 01/26/2022    CREATININE 0.97 01/26/2022    GLUCOSE 88 01/26/2022    CALCIUM 9.7 01/26/2022    ALT 20 01/26/2022    ALKPHOS 69 01/26/2022    LABIL2 1.2 (L) 09/06/2016     Lab Results   Component Value Date    CKTOTAL 49 12/01/2021     Lab Results   Component Value Date    WBC 5.03 01/26/2022    HGB 14.0 01/26/2022    HCT 42.9 01/26/2022     01/26/2022     No results found for: INR  Lab Results   Component Value Date    MG 1.7 07/13/2021     Lab Results   Component Value Date    TSH 0.547 12/21/2021     No results found for: BNP  Lab Results   Component Value Date    CHLPL 220 (H) 09/06/2016    CHOL 148 12/21/2021    TRIG 171 (H) 12/21/2021    HDL 38 (L) 12/21/2021    VLDL 29 12/21/2021    LDLHDL 1.99 12/21/2021     Lab Results   Component Value Date    LDL 81 12/21/2021     (H) 03/09/2021       Procedures  Interpretation Summary  Echocardiogram November 11, 2021    · Normal left ventricular cavity size with mild concentric hypertrophy.  · Left ventricular ejection fraction appears to be 61 - 65%. Left ventricular systolic function is normal.  · Left ventricular diastolic function is consistent with (grade I) impaired relaxation.  · The aortic valve is structurally normal with no regurgitation or stenosis present. The aortic valve appears trileaflet.  · Mild mitral valve regurgitation is present.  · Mild tricuspid valve regurgitation is present. No evidence of pulmonary hypertension is present.  · There is trace pulmonic valve regurgitation present.  · There is no evidence of pericardial effusion.         I personally viewed and interpreted the patient's LAB data         Assessment:     1. Palpitations    2. Mixed hyperlipidemia    3. Adult onset hypothyroidism          Plan:     Patient was initially seen by me because of palpitations in October, she was started on Toprol-XL 12.5 mg daily however she discontinued it and states that she has no further  palpitations.  She had an EKG done at Dr Everett's office on January 26, 2022 which was normal, heart rate was 80.  Palpitations were not related to thyroid medication because her thyroid functions are normal with medicine.  She is clinically euthyroid normal TSH and T4 level    Echocardiogram results were reviewed with the patient.  LV ejection fraction was normal with mild concentric left ventricle hypertrophy and grade 1 LV diastolic dysfunction.  She also had mild mitral and tricuspid regurgitation which is hemodynamically insignificant.    Lab work were also reviewed.  Recent lab work from PCP showed significant improvement with LDL of 81.    Trying to lose weight, healthy lifestyle emphasized.    Patient is doing very well and was advised to continue care with her PCP and will be seen by me in 1 year however she can return anytime on as needed basis.    Thank you for giving me the oppertunity to participate in your patient's cardiac care.    Sincerely,    ECHO Gallardo M.D. FACP FAC        No follow-ups on file.

## 2022-03-07 ENCOUNTER — OFFICE VISIT (OUTPATIENT)
Dept: UROLOGY | Facility: CLINIC | Age: 72
End: 2022-03-07

## 2022-03-07 VITALS — WEIGHT: 167.99 LBS | BODY MASS INDEX: 32.98 KG/M2 | HEIGHT: 60 IN

## 2022-03-07 DIAGNOSIS — N39.0 RECURRENT UTI: Primary | ICD-10-CM

## 2022-03-07 DIAGNOSIS — N32.81 OAB (OVERACTIVE BLADDER): ICD-10-CM

## 2022-03-07 LAB
BILIRUB BLD-MCNC: NEGATIVE MG/DL
CLARITY, POC: CLEAR
COLOR UR: YELLOW
EXPIRATION DATE: NORMAL
GLUCOSE UR STRIP-MCNC: NORMAL MG/DL
KETONES UR QL: NEGATIVE
LEUKOCYTE EST, POC: NEGATIVE
Lab: NORMAL
NITRITE UR-MCNC: NEGATIVE MG/ML
PH UR: 5 [PH] (ref 5–8)
PROT UR STRIP-MCNC: NEGATIVE MG/DL
RBC # UR STRIP: NEGATIVE /UL
SP GR UR: 1 (ref 1–1.03)
UROBILINOGEN UR QL: NORMAL

## 2022-03-07 PROCEDURE — 99214 OFFICE O/P EST MOD 30 MIN: CPT | Performed by: NURSE PRACTITIONER

## 2022-03-07 PROCEDURE — 81003 URINALYSIS AUTO W/O SCOPE: CPT | Performed by: NURSE PRACTITIONER

## 2022-03-07 PROCEDURE — 87086 URINE CULTURE/COLONY COUNT: CPT | Performed by: NURSE PRACTITIONER

## 2022-03-07 NOTE — PROGRESS NOTES
"Chief Complaint  RECURRENT UTI (6 week follow up)    Subjective          Shey King presents to [facility] for   History of Present Illness    Ms Shey King is a pleasant 70 year old female with a significant hisory of recurrent UTIs, who returns to clinic today for evaluation. This is her 6 week follow-up on Klebsiella P. recurrent UTIs. She is in no apparent distress today and reports doing relatively well,  patient reports she has resumed antibiotic suppressive therapy with trimethoprim for recurrent UTIs.  She reports doing relatively well and denies any side effects from medications. She reports significant weight loss over 40 pounds from dieting, and feels great she states.    Overall she reports doing better. She also reports an improvement in her urinary symptoms.  She denies any recent   episodes of frequency, urgency, or nocturia . She denies dysuria, burning on urination, flank pain, back pain and denies any CVA tenderness..  She also denies  pelvic pain or pressure, denies suprapubic discomfort.  She denies fever or chills, she does  have nausea, denies vomiting, or diarrhea.  Her urine dipstick today  is complete negative for any infection, it is negative for gross/microscopic hematuria. She has 3+ glucosuria,   PVR is 21 cc.     Objective   Vital Signs:   Ht 152.4 cm (60\")   Wt 76.2 kg (167 lb 15.9 oz)   BMI 32.81 kg/m²     Physical Exam  Constitutional:       General: She is in acute distress.      Appearance: She is well-developed.   HENT:      Head: Normocephalic and atraumatic.   Eyes:      Pupils: Pupils are equal, round, and reactive to light.   Neck:      Thyroid: No thyromegaly.      Trachea: No tracheal deviation.   Cardiovascular:      Rate and Rhythm: Normal rate and regular rhythm.      Heart sounds: No murmur heard.  Pulmonary:      Effort: Pulmonary effort is normal. No respiratory distress.      Breath sounds: Normal breath sounds. No " stridor. No wheezing.   Abdominal:      General: Bowel sounds are normal.      Palpations: Abdomen is soft.      Tenderness: There is no abdominal tenderness.   Genitourinary:     Labia:         Right: No tenderness.         Left: No tenderness.       Vagina: Normal. No vaginal discharge.   Musculoskeletal:         General: No deformity. Normal range of motion.      Cervical back: Normal range of motion.   Skin:     General: Skin is warm and dry.      Capillary Refill: Capillary refill takes less than 2 seconds.      Coloration: Skin is not pale.      Findings: No erythema or rash.   Neurological:      Mental Status: She is alert and oriented to person, place, and time.      Cranial Nerves: No cranial nerve deficit.      Sensory: No sensory deficit.      Coordination: Coordination normal.   Psychiatric:         Behavior: Behavior normal.         Thought Content: Thought content normal.         Judgment: Judgment normal.        Result Review :     UA    Urinalysis 1/19/22 1/26/22 3/7/22   Ketones, UA Negative Trace (A) Negative   Leukocytes, UA Negative Negative Negative   (A) Abnormal value            Urine Culture    Urine Culture 1/19/22 1/26/22 3/7/22   Urine Culture <25,000 CFU/mL Streptococcus agalactiae (Group B) (A) <25,000 CFU/mL Mixed Kathy Isolated No growth (P)   (A) Abnormal value       Comments are available for some flowsheets but are not being displayed.                     Assessment and Plan    Problem List Items Addressed This Visit        Genitourinary and Reproductive     OAB (overactive bladder)      Other Visit Diagnoses     Recurrent UTI    -  Primary    Relevant Orders    POC Urinalysis Dipstick, Automated (Completed)    Urine Culture - Urine, Urine, Clean Catch (Completed)                                                  ASSESSMENT  Recurrent urinary tract infections/Atrophic/yeast vaginitis: Ms. Shey Veliz is a pleasant 71-year-old female who returns to clinic today for evaluation of  recurrent UTIs, acute cystitis, and yeast vaginitis. She is in no apparent distress and reports a remarkable improvement in her overall symptoms. She is happy to be feeling better she states.She reports doing relatively better, and had resumed suppressive therapy with Trimpex 100 mg nightly.  Her urine dipstick today is complete negative for any infection, it is negative for gross/microscopic hematuria, her PVR is 21 cc.      Again,  We discussed the types of organisms that are found in the urinary tract indicating that the vast majority are results of the patient's own gastrointestinal isha.  We discussed how many of the antibiotics that are utilized can actually exacerbate these infections by creating resistant organisms and there is only a very few antibiotics that are concentrated in the urine and do not affect the rectal reservoir nor cause recurrent yeast vaginitis.       We discussed the risk factors for recurrent infections being intercourse in younger patients and atrophic changes in older patients.  We discussed the symptoms that are found including pain, pressure, burning, frequency, urgency suprapubic pain and painful intercourse.  I discussed upper tract symptoms including fevers, chills, and indicated the workup would be much more aggressive if the patient were to present with recurrent infections in the face of upper tract symptomatology such as fever. I discussed the history of vesicoureteral reflux in young patients and finally chronic renal scarring as a result of such.                                                      PLAN  AGAIN, We resent her urine for Culture. I will call her with results if any bacteria growth resistant to her current therapy with Timethortprim     ContinueTrimpex 100 mg PO Daily -Suppressive Therapy, patient had requested  break off ABX , she reports she restarted.      She has been encouraged to increase her p.o. fluid intake to at least 1 to 2 L daily and avoid bladder  irritants such as caffeine products, spicy foods, and citrusy foods.      I recommend  She continue probiotics with any treatment with antibiotics to protect the rectal reservoir including over-the-counter yogurt preparations to stone oral pills containing the appropriate probiotics. Patient reports the diligent use of Probiotics.     She is to also continue other medications for yeast vaginitis, atrophic vaginitis as prescribed above.     Will see her back in 6 months for Follow up in clinic and also recheck her urinalysis at that time.She may return sooner if need be.     Patient is agreeable to plan of care.    Patient reports that she is not currently experiencing any symptoms of urinary incontinence.    Patient's Body mass index is 32.81 kg/m². indicating that she is obese (BMI >30). Obesity-related health conditions include the following: hypertension and diabetes mellitus. Obesity is improving with lifestyle modifications. BMI is is above average; BMI management plan is completed. We discussed portion control and increasing exercise..    Smoking Cessation Counseling:  Never a smoker.  Patient does not currently use any tobacco products.     Follow Up   Return in about 4 months (around 7/19/2022) for Next scheduled follow up, RECURRENT UTI/DYSURIA/DETRUSSOR INSTABILITY.  Patient was given instructions and counseling regarding her condition or for health maintenance advice. Please see specific information pulled into the AVS if appropriate.

## 2022-03-08 LAB — BACTERIA SPEC AEROBE CULT: NO GROWTH

## 2022-03-09 ENCOUNTER — TELEPHONE (OUTPATIENT)
Dept: UROLOGY | Facility: CLINIC | Age: 72
End: 2022-03-09

## 2022-03-09 NOTE — TELEPHONE ENCOUNTER
I called he patient and let her know her urine culture was negative of bacteria and that she should continue her suppressive therapy and follow us as discussed. She expressed understanding.    ----- Message from Griselda Cheng-Akwa, APRN sent at 3/8/2022  5:09 PM EST -----  PLEASE CALL HER WITH HER RESULTS. SHE REQUESTED TO KNOW    0 Result Notes    Urine Culture   No growth     CONTINUE SUPPRESSIVE THERAPY WITH TRIMPEX, FOLLOW UP AS DISCUSSED.  THANK YOU        ----- Message -----  From: Carri Suazo MA  Sent: 3/7/2022   2:03 PM EST  To: Griselda Cheng-Akwa, APRN

## 2022-03-21 ENCOUNTER — TELEPHONE (OUTPATIENT)
Dept: FAMILY MEDICINE CLINIC | Facility: CLINIC | Age: 72
End: 2022-03-21

## 2022-03-21 ENCOUNTER — OFFICE VISIT (OUTPATIENT)
Dept: FAMILY MEDICINE CLINIC | Facility: CLINIC | Age: 72
End: 2022-03-21

## 2022-03-21 VITALS
BODY MASS INDEX: 32.63 KG/M2 | WEIGHT: 166.2 LBS | HEIGHT: 60 IN | SYSTOLIC BLOOD PRESSURE: 110 MMHG | DIASTOLIC BLOOD PRESSURE: 58 MMHG | HEART RATE: 63 BPM | TEMPERATURE: 96.6 F | OXYGEN SATURATION: 100 %

## 2022-03-21 DIAGNOSIS — I10 HTN (HYPERTENSION), BENIGN: ICD-10-CM

## 2022-03-21 DIAGNOSIS — E66.9 OBESITY (BMI 30.0-34.9): ICD-10-CM

## 2022-03-21 DIAGNOSIS — M25.561 RIGHT KNEE PAIN, UNSPECIFIED CHRONICITY: Primary | ICD-10-CM

## 2022-03-21 DIAGNOSIS — E78.5 HYPERLIPIDEMIA, UNSPECIFIED HYPERLIPIDEMIA TYPE: ICD-10-CM

## 2022-03-21 DIAGNOSIS — E11.9 TYPE 2 DIABETES MELLITUS WITHOUT COMPLICATION, WITHOUT LONG-TERM CURRENT USE OF INSULIN: ICD-10-CM

## 2022-03-21 DIAGNOSIS — E03.8 ADULT ONSET HYPOTHYROIDISM: ICD-10-CM

## 2022-03-21 DIAGNOSIS — E55.9 VITAMIN D DEFICIENCY: ICD-10-CM

## 2022-03-21 LAB
25(OH)D3 SERPL-MCNC: 31 NG/ML (ref 30–100)
ALBUMIN SERPL-MCNC: 5 G/DL (ref 3.5–5.2)
ALBUMIN/GLOB SERPL: 1.6 G/DL
ALP SERPL-CCNC: 70 U/L (ref 39–117)
ALT SERPL W P-5'-P-CCNC: 16 U/L (ref 1–33)
ANION GAP SERPL CALCULATED.3IONS-SCNC: 13.1 MMOL/L (ref 5–15)
AST SERPL-CCNC: 22 U/L (ref 1–32)
BILIRUB SERPL-MCNC: 0.5 MG/DL (ref 0–1.2)
BUN SERPL-MCNC: 16 MG/DL (ref 8–23)
BUN/CREAT SERPL: 14.5 (ref 7–25)
CALCIUM SPEC-SCNC: 9.8 MG/DL (ref 8.6–10.5)
CHLORIDE SERPL-SCNC: 103 MMOL/L (ref 98–107)
CHOLEST SERPL-MCNC: 177 MG/DL (ref 0–200)
CO2 SERPL-SCNC: 22.9 MMOL/L (ref 22–29)
CREAT SERPL-MCNC: 1.1 MG/DL (ref 0.57–1)
EGFRCR SERPLBLD CKD-EPI 2021: 54.5 ML/MIN/1.73
GLOBULIN UR ELPH-MCNC: 3.2 GM/DL
GLUCOSE SERPL-MCNC: 98 MG/DL (ref 65–99)
HBA1C MFR BLD: 5.2 % (ref 4.8–5.6)
HDLC SERPL-MCNC: 46 MG/DL (ref 40–60)
LDLC SERPL CALC-MCNC: 111 MG/DL (ref 0–100)
LDLC/HDLC SERPL: 2.38 {RATIO}
POTASSIUM SERPL-SCNC: 4.5 MMOL/L (ref 3.5–5.2)
PROT SERPL-MCNC: 8.2 G/DL (ref 6–8.5)
SODIUM SERPL-SCNC: 139 MMOL/L (ref 136–145)
TRIGL SERPL-MCNC: 108 MG/DL (ref 0–150)
TSH SERPL DL<=0.05 MIU/L-ACNC: 3.23 UIU/ML (ref 0.27–4.2)
VIT B12 BLD-MCNC: 290 PG/ML (ref 211–946)
VLDLC SERPL-MCNC: 20 MG/DL (ref 5–40)

## 2022-03-21 PROCEDURE — 82306 VITAMIN D 25 HYDROXY: CPT | Performed by: NURSE PRACTITIONER

## 2022-03-21 PROCEDURE — 82607 VITAMIN B-12: CPT | Performed by: NURSE PRACTITIONER

## 2022-03-21 PROCEDURE — 99214 OFFICE O/P EST MOD 30 MIN: CPT | Performed by: NURSE PRACTITIONER

## 2022-03-21 PROCEDURE — 84443 ASSAY THYROID STIM HORMONE: CPT | Performed by: NURSE PRACTITIONER

## 2022-03-21 PROCEDURE — 80053 COMPREHEN METABOLIC PANEL: CPT | Performed by: NURSE PRACTITIONER

## 2022-03-21 PROCEDURE — 36415 COLL VENOUS BLD VENIPUNCTURE: CPT | Performed by: NURSE PRACTITIONER

## 2022-03-21 PROCEDURE — 83036 HEMOGLOBIN GLYCOSYLATED A1C: CPT | Performed by: NURSE PRACTITIONER

## 2022-03-21 PROCEDURE — 80061 LIPID PANEL: CPT | Performed by: NURSE PRACTITIONER

## 2022-03-21 RX ORDER — ERGOCALCIFEROL 1.25 MG/1
50000 CAPSULE ORAL WEEKLY
Qty: 5 CAPSULE | Refills: 2 | Status: SHIPPED | OUTPATIENT
Start: 2022-03-21 | End: 2022-12-12

## 2022-03-21 RX ORDER — LEVOTHYROXINE SODIUM 112 UG/1
112 TABLET ORAL DAILY
Qty: 90 TABLET | Refills: 0 | Status: SHIPPED | OUTPATIENT
Start: 2022-03-21 | End: 2022-07-11

## 2022-03-21 RX ORDER — FENOFIBRATE 145 MG/1
145 TABLET, COATED ORAL DAILY
Qty: 90 TABLET | Refills: 1 | Status: SHIPPED | OUTPATIENT
Start: 2022-03-21 | End: 2023-01-30

## 2022-03-21 RX ORDER — FLUCONAZOLE 150 MG/1
150 TABLET ORAL ONCE
Qty: 1 TABLET | Refills: 0 | Status: SHIPPED | OUTPATIENT
Start: 2022-03-21 | End: 2022-03-21

## 2022-03-21 NOTE — TELEPHONE ENCOUNTER
----- Message from BERT Lucas sent at 3/21/2022 10:57 AM EDT -----  XR shows arthritis she may benefit from a orthopedic evaluation possibly injections may help    Spoke with patient & she verbalized understanding,she is agreeable,no preference,prefers someone local.

## 2022-03-21 NOTE — PROGRESS NOTES
Subjective   Shey King is a 71 y.o. female.   Chief Compliant: The patient presents with Knee Pain    Thyroid Problem  Presents for follow-up (Hypothyroid) visit. Patient reports no palpitations or weight gain. The symptoms have been stable.   Diabetes  She presents for her follow-up diabetic visit. She has type 2 diabetes mellitus. Her disease course has been improving (Since here chagned diet and feeling overall improved). There are no hypoglycemic associated symptoms. Pertinent negatives for hypoglycemia include no headaches or sweats. Pertinent negatives for diabetes include no blurred vision and no chest pain. There are no hypoglycemic complications. Symptoms are stable. There are no diabetic complications. Risk factors for coronary artery disease include family history and dyslipidemia. She is compliant with treatment most of the time.   Hypertension  This is a chronic (following along with cardiology) problem. Associated symptoms include malaise/fatigue. Pertinent negatives include no anxiety, blurred vision, chest pain, headaches, neck pain, orthopnea, palpitations, peripheral edema, PND, shortness of breath or sweats. Current antihypertension treatment includes beta blockers. The current treatment provides moderate improvement. Compliance problems include diet and exercise.  Hypertensive end-organ damage includes kidney disease. Identifiable causes of hypertension include a thyroid problem.   Knee Pain   There was no injury mechanism. The pain is present in the right knee. The quality of the pain is described as aching, burning and shooting. The pain is at a severity of 5/10. The pain is moderate. The pain has been intermittent since onset. Associated symptoms comments: Painful ambulation. She reports no foreign bodies present. The symptoms are aggravated by movement, palpation and weight bearing. She has tried rest for the symptoms. The treatment provided mild relief.      The following portions of the  "patient's history were reviewed and updated as appropriate: allergies, current medications, past family history, past medical history, past social history, past surgical history and problem list.      Review of Systems   Constitutional: Positive for malaise/fatigue. Negative for weight gain.   HENT: Negative.    Eyes: Negative for blurred vision.   Respiratory: Negative for shortness of breath.    Cardiovascular: Negative for chest pain, palpitations, orthopnea and PND.   Gastrointestinal: Negative for abdominal distention, anal bleeding and blood in stool.   Musculoskeletal: Negative for neck pain.        Right hand pain    Neurological: Negative.  Negative for headaches.   All other systems reviewed and are negative.      Procedures    Vitals: Blood pressure 110/58, pulse 63, temperature 96.6 °F (35.9 °C), temperature source Temporal, height 152.4 cm (60\"), weight 75.4 kg (166 lb 3.2 oz), SpO2 100 %, not currently breastfeeding.     Allergies:   Allergies   Allergen Reactions   • Bactrim [Sulfamethoxazole-Trimethoprim] Hives   • Ciprofloxacin Hives   • Penicillins Hives   • Steri-Strip Compound Benzoin [Benzoin Compound] Hives   • Sulfa Antibiotics Hives          Objective   Physical Exam   Constitutional: She is oriented to person, place, and time. She appears well-developed. No distress.   HENT:   Head: Normocephalic.   Right Ear: Hearing normal.   Left Ear: Hearing normal.   Cardiovascular: Normal rate, regular rhythm and normal heart sounds.   No murmur heard.  Pulmonary/Chest: Effort normal and breath sounds normal.   Abdominal: Soft. Bowel sounds are normal.   Musculoskeletal:      Right knee: Tenderness found. Lateral joint line and patellar tendon tenderness noted.   Neurological: She is alert and oriented to person, place, and time.   Skin: Skin is warm and dry. She is not diaphoretic.   Psychiatric: Her behavior is normal.   Nursing note and vitals reviewed.      During this visit the following were " done:  Labs Reviewed [x]    Labs Ordered [x]    Radiology Reports Reviewed [x]    Radiology Ordered [x]    PCP Records Reviewed []    Referring Provider Records Reviewed []    ER Records Reviewed []    Hospital Records Reviewed []    History Obtained From Family []    Radiology Images Reviewed []    Other Reviewed [x]    Records Requested []      Diagnoses and all orders for this visit:    1. Right knee pain, unspecified chronicity (Primary)  -     XR Knee 1 or 2 View Right (In Office)    2. Type 2 diabetes mellitus without complication, without long-term current use of insulin (HCC)  -     empagliflozin (JARDIANCE) 10 MG tablet tablet; Take 1 tablet by mouth Daily.  Dispense: 90 tablet; Refill: 1  -     Comprehensive Metabolic Panel; Future  -     Hemoglobin A1c; Future  -     Lipid Panel; Future  -     TSH; Future  -     Vitamin B12; Future  -     Vitamin D 25 Hydroxy; Future  -     Comprehensive Metabolic Panel  -     Hemoglobin A1c  -     Lipid Panel  -     TSH  -     Vitamin B12  -     Vitamin D 25 Hydroxy    3. Adult onset hypothyroidism  -     levothyroxine (SYNTHROID, LEVOTHROID) 112 MCG tablet; Take 1 tablet by mouth Daily.  Dispense: 90 tablet; Refill: 0  -     TSH; Future  -     TSH    4. Vitamin D deficiency  -     vitamin D (ERGOCALCIFEROL) 1.25 MG (05105 UT) capsule capsule; Take 1 capsule by mouth 1 (One) Time Per Week.  Dispense: 5 capsule; Refill: 2  -     Vitamin D 25 Hydroxy; Future  -     Vitamin D 25 Hydroxy    5. HTN (hypertension), benign    6. Obesity (BMI 30.0-34.9)    7. Hyperlipidemia, unspecified hyperlipidemia type  -     fenofibrate (TRICOR) 145 MG tablet; Take 1 tablet by mouth Daily.  Dispense: 90 tablet; Refill: 1    Other orders  -     fluconazole (Diflucan) 150 MG tablet; Take 1 tablet by mouth 1 (One) Time for 1 dose.  Dispense: 1 tablet; Refill: 0        Patient's Body mass index is 32.46 kg/m². indicating that she is obese (BMI >30). Obesity-related health conditions include the  following: hypertension, diabetes mellitus, dyslipidemias and osteoarthritis. Obesity is improving with lifestyle modifications. BMI is is above average; BMI management plan is completed. We discussed portion control and increasing exercise..

## 2022-03-22 ENCOUNTER — TELEPHONE (OUTPATIENT)
Dept: FAMILY MEDICINE CLINIC | Facility: CLINIC | Age: 72
End: 2022-03-22

## 2022-03-22 NOTE — TELEPHONE ENCOUNTER
----- Message from BERT Lucas sent at 3/22/2022  9:27 AM EDT -----  A1C is perfect.  Kidney function ok still a little low.  Cholesterol is much improved.  With her diet I think she can at least half the jardiance if not stop all together as long as she keeps control with the diet        Spoke with patient & she verbalized understanding,reports she needs something for a yeast infection.

## 2022-03-31 ENCOUNTER — TELEPHONE (OUTPATIENT)
Dept: FAMILY MEDICINE CLINIC | Facility: CLINIC | Age: 72
End: 2022-03-31

## 2022-03-31 ENCOUNTER — OFFICE VISIT (OUTPATIENT)
Dept: ORTHOPEDIC SURGERY | Facility: CLINIC | Age: 72
End: 2022-03-31

## 2022-03-31 VITALS
WEIGHT: 161 LBS | HEART RATE: 78 BPM | OXYGEN SATURATION: 96 % | SYSTOLIC BLOOD PRESSURE: 142 MMHG | BODY MASS INDEX: 29.63 KG/M2 | TEMPERATURE: 97.1 F | DIASTOLIC BLOOD PRESSURE: 81 MMHG | HEIGHT: 62 IN

## 2022-03-31 DIAGNOSIS — G89.29 CHRONIC PAIN OF RIGHT KNEE: Primary | ICD-10-CM

## 2022-03-31 DIAGNOSIS — M22.2X1 PATELLOFEMORAL PAIN SYNDROME OF RIGHT KNEE: ICD-10-CM

## 2022-03-31 DIAGNOSIS — M17.11 PRIMARY OSTEOARTHRITIS OF RIGHT KNEE: ICD-10-CM

## 2022-03-31 DIAGNOSIS — M25.561 CHRONIC PAIN OF RIGHT KNEE: Primary | ICD-10-CM

## 2022-03-31 PROCEDURE — 99213 OFFICE O/P EST LOW 20 MIN: CPT | Performed by: FAMILY MEDICINE

## 2022-04-01 ENCOUNTER — TELEPHONE (OUTPATIENT)
Dept: UROLOGY | Facility: CLINIC | Age: 72
End: 2022-04-01

## 2022-04-01 DIAGNOSIS — N39.0 RECURRENT UTI: Primary | ICD-10-CM

## 2022-04-01 RX ORDER — TRIMETHOPRIM 100 MG/1
100 TABLET ORAL 2 TIMES DAILY
Qty: 30 TABLET | Refills: 5 | Status: SHIPPED | OUTPATIENT
Start: 2022-04-01 | End: 2022-07-19 | Stop reason: SDUPTHER

## 2022-05-09 ENCOUNTER — OFFICE VISIT (OUTPATIENT)
Dept: FAMILY MEDICINE CLINIC | Facility: CLINIC | Age: 72
End: 2022-05-09

## 2022-05-09 VITALS
HEART RATE: 69 BPM | TEMPERATURE: 97.9 F | HEIGHT: 62 IN | OXYGEN SATURATION: 99 % | DIASTOLIC BLOOD PRESSURE: 60 MMHG | BODY MASS INDEX: 29.88 KG/M2 | SYSTOLIC BLOOD PRESSURE: 124 MMHG | WEIGHT: 162.4 LBS

## 2022-05-09 DIAGNOSIS — R30.0 DYSURIA: Primary | ICD-10-CM

## 2022-05-09 DIAGNOSIS — N39.0 RECURRENT UTI: ICD-10-CM

## 2022-05-09 LAB
BILIRUB BLD-MCNC: NEGATIVE MG/DL
CLARITY, POC: CLEAR
COLOR UR: YELLOW
EXPIRATION DATE: ABNORMAL
GLUCOSE UR STRIP-MCNC: ABNORMAL MG/DL
KETONES UR QL: NEGATIVE
LEUKOCYTE EST, POC: NEGATIVE
Lab: ABNORMAL
NITRITE UR-MCNC: NEGATIVE MG/ML
PH UR: 5.5 [PH] (ref 5–8)
PROT UR STRIP-MCNC: NEGATIVE MG/DL
RBC # UR STRIP: NEGATIVE /UL
SP GR UR: 1.02 (ref 1–1.03)
UROBILINOGEN UR QL: NORMAL

## 2022-05-09 PROCEDURE — 81003 URINALYSIS AUTO W/O SCOPE: CPT | Performed by: NURSE PRACTITIONER

## 2022-05-09 PROCEDURE — 87086 URINE CULTURE/COLONY COUNT: CPT | Performed by: NURSE PRACTITIONER

## 2022-05-09 PROCEDURE — 99213 OFFICE O/P EST LOW 20 MIN: CPT | Performed by: NURSE PRACTITIONER

## 2022-05-09 PROCEDURE — 96372 THER/PROPH/DIAG INJ SC/IM: CPT | Performed by: NURSE PRACTITIONER

## 2022-05-09 RX ORDER — CEFTRIAXONE 1 G/1
1 INJECTION, POWDER, FOR SOLUTION INTRAMUSCULAR; INTRAVENOUS ONCE
Status: DISCONTINUED | OUTPATIENT
Start: 2022-05-09 | End: 2022-05-09

## 2022-05-09 RX ORDER — CEFTRIAXONE 1 G/1
1 INJECTION, POWDER, FOR SOLUTION INTRAMUSCULAR; INTRAVENOUS EVERY 24 HOURS
Status: COMPLETED | OUTPATIENT
Start: 2022-05-09 | End: 2022-05-11

## 2022-05-09 RX ADMIN — CEFTRIAXONE 1 G: 1 INJECTION, POWDER, FOR SOLUTION INTRAMUSCULAR; INTRAVENOUS at 10:05

## 2022-05-09 NOTE — PROGRESS NOTES
"Chief Complaint  Difficulty Urinating    Subjective          Shey King presents to [facility]  Difficulty Urinating  This is a chronic (chronic UTI) problem. The current episode started in the past 7 days. The problem occurs constantly. The problem has been gradually worsening. Associated symptoms include fatigue and urinary symptoms (back pain). Pertinent negatives include no fever. Nothing aggravates the symptoms. She has tried drinking for the symptoms. The treatment provided no relief.       Objective   Vital Signs:  /60 (BP Location: Right arm, Patient Position: Sitting)   Pulse 69   Temp 97.9 °F (36.6 °C) (Temporal)   Ht 156.2 cm (61.5\")   Wt 73.7 kg (162 lb 6.4 oz)   SpO2 99%   BMI 30.19 kg/m²           Physical Exam  Vitals and nursing note reviewed.   Constitutional:       General: She is not in acute distress.     Appearance: She is well-developed. She is not ill-appearing.   HENT:      Head: Normocephalic.   Cardiovascular:      Rate and Rhythm: Normal rate and regular rhythm.      Heart sounds: Normal heart sounds. No murmur heard.  Pulmonary:      Effort: Pulmonary effort is normal.      Breath sounds: Normal breath sounds.   Abdominal:      Palpations: Abdomen is soft.      Tenderness: There is no abdominal tenderness.   Skin:     General: Skin is warm and dry.   Neurological:      Mental Status: She is alert and oriented to person, place, and time.   Psychiatric:         Behavior: Behavior normal.        Result Review :  During this visit the following were done:  Labs Reviewed [x]    Labs Ordered []    Radiology Reports Reviewed []    Radiology Ordered []    PCP Records Reviewed []    Referring Provider Records Reviewed []    ER Records Reviewed []    Hospital Records Reviewed []    History Obtained From Family []    Radiology Images Reviewed []    Other Reviewed []    Records Requested []          Assessment and Plan    Diagnoses and all orders for " this visit:    1. Dysuria (Primary)  -     POCT urinalysis dipstick, automated  -     cefTRIAXone (ROCEPHIN) injection 1 g  -     Urine Culture - Urine, Urine, Clean Catch  -     Discontinue: cefTRIAXone (ROCEPHIN) injection 1 g    2. Recurrent UTI  -     POCT urinalysis dipstick, automated  -     cefTRIAXone (ROCEPHIN) injection 1 g  -     Urine Culture - Urine, Urine, Clean Catch      BMI is >= 30 and <= 34.9 (Class 1 obesity). The following options were offered after discussion: exercise counseling/recommendations and nutrition counseling/recommendations         Follow Up   Return in about 1 week (around 5/16/2022), or if symptoms worsen or fail to improve, for Recheck.  Patient was given instructions and counseling regarding her condition or for health maintenance advice. Please see specific information pulled into the AVS if appropriate.

## 2022-05-10 ENCOUNTER — CLINICAL SUPPORT (OUTPATIENT)
Dept: FAMILY MEDICINE CLINIC | Facility: CLINIC | Age: 72
End: 2022-05-10

## 2022-05-10 LAB — BACTERIA SPEC AEROBE CULT: NO GROWTH

## 2022-05-10 PROCEDURE — 96372 THER/PROPH/DIAG INJ SC/IM: CPT | Performed by: NURSE PRACTITIONER

## 2022-05-10 RX ADMIN — CEFTRIAXONE 1 G: 1 INJECTION, POWDER, FOR SOLUTION INTRAMUSCULAR; INTRAVENOUS at 09:36

## 2022-05-11 ENCOUNTER — TELEPHONE (OUTPATIENT)
Dept: FAMILY MEDICINE CLINIC | Facility: CLINIC | Age: 72
End: 2022-05-11

## 2022-05-11 ENCOUNTER — CLINICAL SUPPORT (OUTPATIENT)
Dept: FAMILY MEDICINE CLINIC | Facility: CLINIC | Age: 72
End: 2022-05-11

## 2022-05-11 DIAGNOSIS — R30.0 DYSURIA: Primary | ICD-10-CM

## 2022-05-11 PROCEDURE — 96372 THER/PROPH/DIAG INJ SC/IM: CPT | Performed by: NURSE PRACTITIONER

## 2022-05-11 RX ORDER — FLUCONAZOLE 150 MG/1
150 TABLET ORAL ONCE
Qty: 1 TABLET | Refills: 0 | Status: SHIPPED | OUTPATIENT
Start: 2022-05-11 | End: 2022-05-11

## 2022-05-11 RX ADMIN — CEFTRIAXONE 1 G: 1 INJECTION, POWDER, FOR SOLUTION INTRAMUSCULAR; INTRAVENOUS at 08:31

## 2022-05-11 NOTE — TELEPHONE ENCOUNTER
Patient called indicating she felt much better since Rocephin shots, but now has a yeast infection and is requesting something called in.    Farideh Mccord

## 2022-06-22 ENCOUNTER — OFFICE VISIT (OUTPATIENT)
Dept: FAMILY MEDICINE CLINIC | Facility: CLINIC | Age: 72
End: 2022-06-22

## 2022-06-22 VITALS
SYSTOLIC BLOOD PRESSURE: 118 MMHG | HEIGHT: 62 IN | WEIGHT: 165.8 LBS | HEART RATE: 64 BPM | OXYGEN SATURATION: 99 % | DIASTOLIC BLOOD PRESSURE: 60 MMHG | BODY MASS INDEX: 30.51 KG/M2 | TEMPERATURE: 97.3 F

## 2022-06-22 DIAGNOSIS — E03.8 OTHER SPECIFIED HYPOTHYROIDISM: ICD-10-CM

## 2022-06-22 DIAGNOSIS — N39.0 RECURRENT UTI: ICD-10-CM

## 2022-06-22 DIAGNOSIS — I10 HTN (HYPERTENSION), BENIGN: ICD-10-CM

## 2022-06-22 DIAGNOSIS — E11.9 TYPE 2 DIABETES MELLITUS WITHOUT COMPLICATION, WITHOUT LONG-TERM CURRENT USE OF INSULIN: Primary | ICD-10-CM

## 2022-06-22 LAB
BILIRUB BLD-MCNC: NEGATIVE MG/DL
CLARITY, POC: CLEAR
COLOR UR: YELLOW
EXPIRATION DATE: ABNORMAL
GLUCOSE UR STRIP-MCNC: ABNORMAL MG/DL
KETONES UR QL: NEGATIVE
LEUKOCYTE EST, POC: NEGATIVE
Lab: ABNORMAL
NITRITE UR-MCNC: NEGATIVE MG/ML
PH UR: 6.5 [PH] (ref 5–8)
PROT UR STRIP-MCNC: NEGATIVE MG/DL
RBC # UR STRIP: NEGATIVE /UL
SP GR UR: 1.01 (ref 1–1.03)
UROBILINOGEN UR QL: NORMAL

## 2022-06-22 PROCEDURE — 81003 URINALYSIS AUTO W/O SCOPE: CPT | Performed by: NURSE PRACTITIONER

## 2022-06-22 PROCEDURE — 99214 OFFICE O/P EST MOD 30 MIN: CPT | Performed by: NURSE PRACTITIONER

## 2022-06-22 RX ORDER — LANCETS 33 GAUGE
EACH MISCELLANEOUS
COMMUNITY
Start: 2022-06-02

## 2022-06-22 RX ORDER — FLUCONAZOLE 150 MG/1
150 TABLET ORAL ONCE
COMMUNITY
Start: 2022-05-11 | End: 2022-06-22

## 2022-06-22 NOTE — PROGRESS NOTES
Subjective   Shey King is a 71 y.o. female.   Chief Compliant: The patient presents with Difficulty Urinating (Follow up)    Thyroid Problem  Presents for follow-up (Hypothyroid) visit. Symptoms include fatigue. Patient reports no palpitations or weight gain. The symptoms have been stable.   Diabetes  She presents for her follow-up diabetic visit. She has type 2 diabetes mellitus. Her disease course has been improving (Struggles with continued diet restrictions). There are no hypoglycemic associated symptoms. Pertinent negatives for hypoglycemia include no headaches or sweats. Associated symptoms include fatigue. Pertinent negatives for diabetes include no blurred vision and no chest pain. There are no hypoglycemic complications. Symptoms are stable. There are no diabetic complications. Risk factors for coronary artery disease include family history and dyslipidemia. She is compliant with treatment most of the time.   Hypertension  This is a chronic (following along with cardiology) problem. Associated symptoms include malaise/fatigue. Pertinent negatives include no anxiety, blurred vision, chest pain, headaches, neck pain, orthopnea, palpitations, peripheral edema, PND, shortness of breath or sweats. Current antihypertension treatment includes beta blockers. The current treatment provides moderate improvement. Compliance problems include diet and exercise.  Hypertensive end-organ damage includes kidney disease. Identifiable causes of hypertension include a thyroid problem.   Difficulty Urinating  This is a chronic (Chronic cystitis no concerns today) problem. Associated symptoms include fatigue. Pertinent negatives include no chest pain, headaches or neck pain.      The following portions of the patient's history were reviewed and updated as appropriate: allergies, current medications, past family history, past medical history, past social history, past surgical history and problem list.      Review of Systems  "  Constitutional: Positive for fatigue and malaise/fatigue. Negative for weight gain.   HENT: Negative.    Eyes: Negative for blurred vision.   Respiratory: Negative for shortness of breath.    Cardiovascular: Negative for chest pain, palpitations, orthopnea and PND.   Gastrointestinal: Negative for abdominal distention, anal bleeding and blood in stool.   Genitourinary: Positive for difficulty urinating.   Musculoskeletal: Negative for neck pain.        Right hand pain    Neurological: Negative.  Negative for headaches.   All other systems reviewed and are negative.      Procedures    Vitals: Blood pressure 118/60, pulse 64, temperature 97.3 °F (36.3 °C), temperature source Temporal, height 156.2 cm (61.5\"), weight 75.2 kg (165 lb 12.8 oz), SpO2 99 %, not currently breastfeeding.     Allergies:   Allergies   Allergen Reactions   • Bactrim [Sulfamethoxazole-Trimethoprim] Hives   • Ciprofloxacin Hives   • Penicillins Hives   • Steri-Strip Compound Benzoin [Benzoin Compound] Hives   • Sulfa Antibiotics Hives          Objective   Physical Exam   Constitutional: She is oriented to person, place, and time. She appears well-developed. No distress.   HENT:   Head: Normocephalic.   Right Ear: Hearing normal.   Left Ear: Hearing normal.   Cardiovascular: Normal rate, regular rhythm and normal heart sounds.   No murmur heard.  Pulmonary/Chest: Effort normal and breath sounds normal.   Abdominal: Soft. Bowel sounds are normal.   Neurological: She is alert and oriented to person, place, and time.   Skin: Skin is warm and dry. She is not diaphoretic.   Psychiatric: Her behavior is normal.   Nursing note and vitals reviewed.      During this visit the following were done:  Labs Reviewed [x]    Labs Ordered [x]    Radiology Reports Reviewed []    Radiology Ordered []    PCP Records Reviewed []    Referring Provider Records Reviewed []    ER Records Reviewed []    Hospital Records Reviewed []    History Obtained From Family []  "   Radiology Images Reviewed []    Other Reviewed [x]    Records Requested []      Diagnoses and all orders for this visit:    1. Type 2 diabetes mellitus without complication, without long-term current use of insulin (HCC) (Primary)  -     Comprehensive Metabolic Panel; Future  -     Hemoglobin A1c; Future  -     Lipid Panel; Future  -     Semaglutide,0.25 or 0.5MG/DOS, (OZEMPIC) 2 MG/1.5ML solution pen-injector; Inject 0.25 mg under the skin into the appropriate area as directed 1 (One) Time Per Week for 14 days, THEN 0.5 mg 1 (One) Time Per Week for 14 days, THEN 1 mg 1 (One) Time Per Week for 30 days.  Dispense: 3 pen; Refill: 0    2. Recurrent UTI  -     POCT urinalysis dipstick, automated    3. HTN (hypertension), benign  -     Comprehensive Metabolic Panel; Future  -     Lipid Panel; Future    4. Other specified hypothyroidism  -     TSH; Future        Patient's Body mass index is 30.82 kg/m². indicating that she is obese (BMI >30). Obesity-related health conditions include the following: hypertension, diabetes mellitus, dyslipidemias and osteoarthritis. Obesity is improving with lifestyle modifications. BMI is is above average; BMI management plan is completed. We discussed portion control and increasing exercise..

## 2022-06-24 ENCOUNTER — LAB (OUTPATIENT)
Dept: FAMILY MEDICINE CLINIC | Facility: CLINIC | Age: 72
End: 2022-06-24

## 2022-06-24 DIAGNOSIS — E11.9 TYPE 2 DIABETES MELLITUS WITHOUT COMPLICATION, WITHOUT LONG-TERM CURRENT USE OF INSULIN: ICD-10-CM

## 2022-06-24 DIAGNOSIS — E03.8 OTHER SPECIFIED HYPOTHYROIDISM: ICD-10-CM

## 2022-06-24 DIAGNOSIS — I10 HTN (HYPERTENSION), BENIGN: ICD-10-CM

## 2022-06-24 PROCEDURE — 80061 LIPID PANEL: CPT | Performed by: NURSE PRACTITIONER

## 2022-06-24 PROCEDURE — 80053 COMPREHEN METABOLIC PANEL: CPT | Performed by: NURSE PRACTITIONER

## 2022-06-24 PROCEDURE — 83036 HEMOGLOBIN GLYCOSYLATED A1C: CPT | Performed by: NURSE PRACTITIONER

## 2022-06-24 PROCEDURE — 84443 ASSAY THYROID STIM HORMONE: CPT | Performed by: NURSE PRACTITIONER

## 2022-06-25 LAB
ALBUMIN SERPL-MCNC: 4.5 G/DL (ref 3.5–5.2)
ALBUMIN/GLOB SERPL: 1.5 G/DL
ALP SERPL-CCNC: 74 U/L (ref 39–117)
ALT SERPL W P-5'-P-CCNC: 15 U/L (ref 1–33)
ANION GAP SERPL CALCULATED.3IONS-SCNC: 12 MMOL/L (ref 5–15)
AST SERPL-CCNC: 19 U/L (ref 1–32)
BILIRUB SERPL-MCNC: 0.4 MG/DL (ref 0–1.2)
BUN SERPL-MCNC: 17 MG/DL (ref 8–23)
BUN/CREAT SERPL: 17 (ref 7–25)
CALCIUM SPEC-SCNC: 9.6 MG/DL (ref 8.6–10.5)
CHLORIDE SERPL-SCNC: 105 MMOL/L (ref 98–107)
CHOLEST SERPL-MCNC: 204 MG/DL (ref 0–200)
CO2 SERPL-SCNC: 23 MMOL/L (ref 22–29)
CREAT SERPL-MCNC: 1 MG/DL (ref 0.57–1)
EGFRCR SERPLBLD CKD-EPI 2021: 61.1 ML/MIN/1.73
GLOBULIN UR ELPH-MCNC: 3.1 GM/DL
GLUCOSE SERPL-MCNC: 93 MG/DL (ref 65–99)
HBA1C MFR BLD: 5.2 % (ref 4.8–5.6)
HDLC SERPL-MCNC: 52 MG/DL (ref 40–60)
LDLC SERPL CALC-MCNC: 131 MG/DL (ref 0–100)
LDLC/HDLC SERPL: 2.47 {RATIO}
POTASSIUM SERPL-SCNC: 4.8 MMOL/L (ref 3.5–5.2)
PROT SERPL-MCNC: 7.6 G/DL (ref 6–8.5)
SODIUM SERPL-SCNC: 140 MMOL/L (ref 136–145)
TRIGL SERPL-MCNC: 119 MG/DL (ref 0–150)
TSH SERPL DL<=0.05 MIU/L-ACNC: 1.45 UIU/ML (ref 0.27–4.2)
VLDLC SERPL-MCNC: 21 MG/DL (ref 5–40)

## 2022-07-06 ENCOUNTER — TELEPHONE (OUTPATIENT)
Dept: FAMILY MEDICINE CLINIC | Facility: CLINIC | Age: 72
End: 2022-07-06

## 2022-07-07 NOTE — TELEPHONE ENCOUNTER
07/07/2022 PA for Ozempic was approved, [facility] [phone] and patient notified.

## 2022-07-11 DIAGNOSIS — E03.8 ADULT ONSET HYPOTHYROIDISM: ICD-10-CM

## 2022-07-11 RX ORDER — LEVOTHYROXINE SODIUM 112 UG/1
112 TABLET ORAL DAILY
Qty: 90 TABLET | Refills: 0 | Status: SHIPPED | OUTPATIENT
Start: 2022-07-11 | End: 2022-10-24

## 2022-07-19 ENCOUNTER — OFFICE VISIT (OUTPATIENT)
Dept: UROLOGY | Facility: CLINIC | Age: 72
End: 2022-07-19

## 2022-07-19 VITALS — BODY MASS INDEX: 31.15 KG/M2 | HEIGHT: 61 IN | WEIGHT: 165 LBS

## 2022-07-19 DIAGNOSIS — R33.9 URINARY RETENTION: ICD-10-CM

## 2022-07-19 DIAGNOSIS — R35.0 FREQUENCY OF MICTURITION: ICD-10-CM

## 2022-07-19 DIAGNOSIS — N39.0 RECURRENT UTI: Primary | ICD-10-CM

## 2022-07-19 DIAGNOSIS — N39.0 RECURRENT UTI: ICD-10-CM

## 2022-07-19 LAB
BILIRUB BLD-MCNC: NEGATIVE MG/DL
CLARITY, POC: CLEAR
COLOR UR: YELLOW
EXPIRATION DATE: ABNORMAL
GLUCOSE UR STRIP-MCNC: ABNORMAL MG/DL
KETONES UR QL: NEGATIVE
LEUKOCYTE EST, POC: NEGATIVE
Lab: ABNORMAL
NITRITE UR-MCNC: NEGATIVE MG/ML
PH UR: 5 [PH] (ref 5–8)
PROT UR STRIP-MCNC: NEGATIVE MG/DL
RBC # UR STRIP: NEGATIVE /UL
SP GR UR: 1.02 (ref 1–1.03)
UROBILINOGEN UR QL: NORMAL

## 2022-07-19 PROCEDURE — 99214 OFFICE O/P EST MOD 30 MIN: CPT | Performed by: NURSE PRACTITIONER

## 2022-07-19 PROCEDURE — 87086 URINE CULTURE/COLONY COUNT: CPT | Performed by: NURSE PRACTITIONER

## 2022-07-19 PROCEDURE — 81003 URINALYSIS AUTO W/O SCOPE: CPT | Performed by: NURSE PRACTITIONER

## 2022-07-19 PROCEDURE — 51798 US URINE CAPACITY MEASURE: CPT | Performed by: NURSE PRACTITIONER

## 2022-07-19 RX ORDER — TAMSULOSIN HYDROCHLORIDE 0.4 MG/1
1 CAPSULE ORAL DAILY
Qty: 30 CAPSULE | Refills: 1 | Status: SHIPPED | OUTPATIENT
Start: 2022-07-19 | End: 2022-07-19

## 2022-07-19 RX ORDER — TRIMETHOPRIM 100 MG/1
100 TABLET ORAL DAILY
Qty: 30 TABLET | Refills: 5 | Status: SHIPPED | OUTPATIENT
Start: 2022-07-19 | End: 2022-11-14

## 2022-07-19 RX ORDER — TAMSULOSIN HYDROCHLORIDE 0.4 MG/1
1 CAPSULE ORAL DAILY
Qty: 90 CAPSULE | Refills: 1 | Status: SHIPPED | OUTPATIENT
Start: 2022-07-19 | End: 2022-12-27

## 2022-07-19 NOTE — PROGRESS NOTES
"Chief Complaint  RECURRENT UTI  (3 month follow up FOR RECURRENT UTIS/ACUTE CYSTITIS/ATROPHIC VAGINITIS)    Subjective          Shey King presents to [facility] for   History of Present Illness       Ms Shey King is a pleasant 70 year old female with a significant hisory of recurrent UTIs, who returns to clinic today for evaluation. This is her 3 MONTH  follow-up on recurrent UTIs. She is in no apparent distress today and reports doing relatively well,  patient reports she has resumed antibiotic suppressive therapy with trimethoprim for recurrent UTIs.  She reports doing relatively well and denies any side effects from medications. She reports significant weight loss over 40 pounds from dieting, and feels great she states.     Overall she reports doing better. She also reports an improvement in her urinary symptoms.  She denies any recent   episodes of frequency, urgency, or nocturia . She denies dysuria, burning on urination, flank pain, back pain and denies any CVA tenderness..  She also denies  pelvic pain or pressure, denies suprapubic discomfort.  She denies fever or chills, she does  have nausea, denies vomiting, or diarrhea.  Her urine dipstick today  is complete negative for any infection, it is negative for gross/microscopic hematuria. She has 3+ glucosuria,   PVR is >156 cc.     Objective   Vital Signs:   Ht 156.2 cm (61.5\")   Wt 74.8 kg (165 lb)   BMI 30.68 kg/m²       ROS:   Review of Systems   Constitutional: Positive for activity change and fatigue. Negative for appetite change, chills, diaphoresis, fever, unexpected weight gain and unexpected weight loss.   HENT: Negative for congestion, ear discharge, ear pain, nosebleeds, rhinorrhea, sinus pressure and sore throat.    Eyes: Negative for blurred vision, double vision, photophobia, pain, redness and visual disturbance.   Respiratory: Negative for apnea, cough, chest tightness, shortness of " breath, wheezing and stridor.    Cardiovascular: Negative for chest pain and palpitations.   Gastrointestinal: Positive for abdominal pain. Negative for abdominal distention, constipation, diarrhea, nausea and vomiting.   Endocrine: Negative for polydipsia, polyphagia and polyuria.   Genitourinary: Positive for decreased urine volume. Negative for difficulty urinating, dyspareunia, dysuria, flank pain, frequency, genital sores, hematuria, pelvic pain, pelvic pressure, urgency, urinary incontinence and vaginal discharge.   Musculoskeletal: Negative for arthralgias, back pain and joint swelling.   Skin: Negative for pallor, rash and wound.   Neurological: Negative for dizziness, tremors, syncope, weakness, light-headedness, headache, memory problem and confusion.   Psychiatric/Behavioral: Negative for behavioral problems and decreased concentration.        Physical Exam  Constitutional:       General: She is not in acute distress.     Appearance: She is well-developed.   HENT:      Head: Normocephalic and atraumatic.   Eyes:      Pupils: Pupils are equal, round, and reactive to light.   Neck:      Thyroid: No thyromegaly.      Trachea: No tracheal deviation.   Cardiovascular:      Rate and Rhythm: Normal rate and regular rhythm.      Heart sounds: No murmur heard.  Pulmonary:      Effort: Pulmonary effort is normal. No respiratory distress.      Breath sounds: Normal breath sounds. No stridor. No wheezing.   Abdominal:      General: Bowel sounds are normal.      Palpations: Abdomen is soft.      Tenderness: There is abdominal tenderness.   Genitourinary:     Labia:         Right: No tenderness.         Left: No tenderness.       Vagina: Normal. No vaginal discharge.   Musculoskeletal:         General: No deformity. Normal range of motion.      Cervical back: Normal range of motion.   Skin:     General: Skin is warm and dry.      Capillary Refill: Capillary refill takes less than 2 seconds.      Coloration: Skin is not  pale.      Findings: No erythema or rash.   Neurological:      General: No focal deficit present.      Mental Status: She is alert and oriented to person, place, and time.      Cranial Nerves: No cranial nerve deficit.      Sensory: No sensory deficit.      Motor: Weakness present.      Coordination: Coordination normal.   Psychiatric:         Behavior: Behavior normal.         Thought Content: Thought content normal.         Judgment: Judgment normal.        Result Review :     UA    Urinalysis 5/9/22 6/22/22 7/19/22   Ketones, UA Negative Negative Negative   Leukocytes, UA Negative Negative Negative           Urine Culture    Urine Culture 3/7/22 5/9/22 7/19/22   Urine Culture No growth No growth No growth                Assessment and Plan    Problem List Items Addressed This Visit    None     Visit Diagnoses     Recurrent UTI    -  Primary    Relevant Medications    trimethoprim (TRIMPEX) 100 MG tablet    Other Relevant Orders    Urine Culture - Urine, Urine, Clean Catch (Completed)    Bladder Scan (Completed)    Frequency of micturition        Relevant Medications    trimethoprim (TRIMPEX) 100 MG tablet    Other Relevant Orders    POC Urinalysis Dipstick, Automated (Completed)    Urinary retention              Patient reports that she is not currently experiencing any symptoms of urinary incontinence.    BMI is >= 30 and <35. (Class 1 Obesity). The following options were offered after discussion;: weight loss educational material (shared in after visit summary), exercise counseling/recommendations and nutrition counseling/recommendations    RADIOLOGY (CT AND/OR KUB):    CT Abdomen and Pelvis: No results found for this or any previous visit.     CT Stone Protocol: No results found for this or any previous visit.     KUB: No results found for this or any previous visit.       LABS (3 MONTHS):    Office Visit on 07/19/2022   Component Date Value Ref Range Status   • Color 07/19/2022 Yellow  Yellow, Straw, Dark  Yellow, Ana Final   • Clarity, UA 07/19/2022 Clear  Clear Final   • Specific Gravity  07/19/2022 1.020  1.005 - 1.030 Final   • pH, Urine 07/19/2022 5.0  5.0 - 8.0 Final   • Leukocytes 07/19/2022 Negative  Negative Final   • Nitrite, UA 07/19/2022 Negative  Negative Final   • Protein, POC 07/19/2022 Negative  Negative mg/dL Final   • Glucose, UA 07/19/2022 3+ (A) Negative mg/dL Final   • Ketones, UA 07/19/2022 Negative  Negative Final   • Urobilinogen, UA 07/19/2022 Normal  Normal Final   • Bilirubin 07/19/2022 Negative  Negative Final   • Blood, UA 07/19/2022 Negative  Negative Final   • Lot Number 07/19/2022 [id number]   Final   • Expiration Date 07/19/2022 12/21/23   Final   • Urine Culture 07/19/2022 No growth   Final   Lab on 06/24/2022   Component Date Value Ref Range Status   • Glucose 06/24/2022 93  65 - 99 mg/dL Final   • BUN 06/24/2022 17  8 - 23 mg/dL Final   • Creatinine 06/24/2022 1.00  0.57 - 1.00 mg/dL Final   • Sodium 06/24/2022 140  136 - 145 mmol/L Final   • Potassium 06/24/2022 4.8  3.5 - 5.2 mmol/L Final   • Chloride 06/24/2022 105  98 - 107 mmol/L Final   • CO2 06/24/2022 23.0  22.0 - 29.0 mmol/L Final   • Calcium 06/24/2022 9.6  8.6 - 10.5 mg/dL Final   • Total Protein 06/24/2022 7.6  6.0 - 8.5 g/dL Final   • Albumin 06/24/2022 4.50  3.50 - 5.20 g/dL Final   • ALT (SGPT) 06/24/2022 15  1 - 33 U/L Final   • AST (SGOT) 06/24/2022 19  1 - 32 U/L Final   • Alkaline Phosphatase 06/24/2022 74  39 - 117 U/L Final   • Total Bilirubin 06/24/2022 0.4  0.0 - 1.2 mg/dL Final   • Globulin 06/24/2022 3.1  gm/dL Final   • A/G Ratio 06/24/2022 1.5  g/dL Final   • BUN/Creatinine Ratio 06/24/2022 17.0  7.0 - 25.0 Final   • Anion Gap 06/24/2022 12.0  5.0 - 15.0 mmol/L Final   • eGFR 06/24/2022 61.1  >60.0 mL/min/1.73 Final    National Kidney Foundation and American Society of Nephrology (ASN) Task Force recommended calculation based on the Chronic Kidney Disease Epidemiology Collaboration (CKD-EPI) equation  refit without adjustment for race.   • Hemoglobin A1C 06/24/2022 5.20  4.80 - 5.60 % Final   • Total Cholesterol 06/24/2022 204 (A) 0 - 200 mg/dL Final   • Triglycerides 06/24/2022 119  0 - 150 mg/dL Final   • HDL Cholesterol 06/24/2022 52  40 - 60 mg/dL Final   • LDL Cholesterol  06/24/2022 131 (A) 0 - 100 mg/dL Final   • VLDL Cholesterol 06/24/2022 21  5 - 40 mg/dL Final   • LDL/HDL Ratio 06/24/2022 2.47   Final   • TSH 06/24/2022 1.450  0.270 - 4.200 uIU/mL Final   Office Visit on 06/22/2022   Component Date Value Ref Range Status   • Color 06/22/2022 Yellow  Yellow, Straw, Dark Yellow, Ana Final   • Clarity, UA 06/22/2022 Clear  Clear Final   • Specific Gravity  06/22/2022 1.015  1.005 - 1.030 Final   • pH, Urine 06/22/2022 6.5  5.0 - 8.0 Final   • Leukocytes 06/22/2022 Negative  Negative Final   • Nitrite, UA 06/22/2022 Negative  Negative Final   • Protein, POC 06/22/2022 Negative  Negative mg/dL Final   • Glucose, UA 06/22/2022 3+ (A) Negative mg/dL Final   • Ketones, UA 06/22/2022 Negative  Negative Final   • Urobilinogen, UA 06/22/2022 Normal  Normal Final   • Bilirubin 06/22/2022 Negative  Negative Final   • Blood, UA 06/22/2022 Negative  Negative Final   • Lot Number 06/22/2022 [id number]   Final   • Expiration Date 06/22/2022 12/17/2023   Final   Office Visit on 05/09/2022   Component Date Value Ref Range Status   • Color 05/09/2022 Yellow  Yellow, Straw, Dark Yellow, Ana Final   • Clarity, UA 05/09/2022 Clear  Clear Final   • Specific Gravity  05/09/2022 1.025  1.005 - 1.030 Final   • pH, Urine 05/09/2022 5.5  5.0 - 8.0 Final   • Leukocytes 05/09/2022 Negative  Negative Final   • Nitrite, UA 05/09/2022 Negative  Negative Final   • Protein, POC 05/09/2022 Negative  Negative mg/dL Final   • Glucose, UA 05/09/2022 3+ (A) Negative, 1000 mg/dL (3+) mg/dL Final   • Ketones, UA 05/09/2022 Negative  Negative Final   • Urobilinogen, UA 05/09/2022 Normal  Normal Final   • Bilirubin 05/09/2022 Negative   Negative Final   • Blood, UA 05/09/2022 Negative  Negative Final   • Lot Number 05/09/2022 [id number]   Final   • Expiration Date 05/09/2022 06/30/2023   Final   • Urine Culture 05/09/2022 No growth   Final                              ASSESSMENT  Recurrent urinary UTIs/Atrophic/yeast vaginitis:     Ms. Shey Veliz is a pleasant 71-year-old female who returns to clinic today for evaluation of recurrent UTIs, acute cystitis, and yeast vaginitis. She is in no apparent distress and reports a remarkable improvement in her overall symptoms. She is happy to be feeling better she states.She reports doing relatively better, and had resumed suppressive therapy with Trimpex 100 mg nightly.  Her urine dipstick today is completely negative for any infection, it is negative for gross/microscopic hematuria, her PVR is >156 cc.      Again,  We discussed the types of organisms that are found in the urinary tract indicating that the vast majority are results of the patient's own gastrointestinal isha.  We discussed how many of the antibiotics that are utilized can actually exacerbate these infections by creating resistant organisms and there is only a very few antibiotics that are concentrated in the urine and do not affect the rectal reservoir nor cause recurrent yeast vaginitis.       We discussed the risk factors for recurrent infections being intercourse in younger patients and atrophic changes in older patients.  We discussed the symptoms that are found including pain, pressure, burning, frequency, urgency suprapubic pain and painful intercourse.  I discussed upper tract symptoms including fevers, chills, and indicated the workup would be much more aggressive if the patient were to present with recurrent infections in the face of upper tract symptomatology such as fever. I discussed the history of vesicoureteral reflux in young patients and finally chronic renal scarring as a result of such.                                  PLAN  AGAIN, We resent her urine for Culture. I will call her with results if any bacteria growth resistant to her current therapy with Timethortprim     ContinueTrimpex 100 mg PO Daily -Suppressive Therapy, patient had requested  break off ABX , she reports she restarted.      START FLOMAX 0.4 MG DAILY     She has been encouraged to increase her p.o. fluid intake to at least 1 to 2 L daily and avoid bladder irritants such as caffeine products, spicy foods, and citrusy foods.      I recommend  She continue probiotics with any treatment with antibiotics to protect the rectal reservoir including over-the-counter yogurt preparations to stone oral pills containing the appropriate probiotics. Patient reports the diligent use of Probiotics.     She is to also continue other medications for yeast vaginitis, atrophic vaginitis as prescribed above.     Will see her back in 1 month for Follow up in clinic and also recheck her urinalysis at that time AND ALSO BLADDER RESCAN .    She may return sooner if need be.     Patient is agreeable to plan of care.     Patient reports that she is not currently experiencing any symptoms of urinary incontinence.     Patient's Body mass index is 32.81 kg/m². indicating that she is obese (BMI >30). Obesity-related health conditions include the following: hypertension and diabetes mellitus. Obesity is improving with lifestyle modifications. BMI is is above average; BMI management plan is completed. We discussed portion control and increasing exercise..    Smoking Cessation Counseling:  Never a smoker.  Patient does not currently use any tobacco products.     Follow Up   Return in about 5 weeks (around 8/23/2022) for Next scheduled follow up, RECURRENT UTI/DYSURIA/DETRUSSOR INSTABILITY/ BLADDER RESCAN.    Patient was given instructions and counseling regarding her condition or for health maintenance advice. Please see specific information pulled into the AVS if  appropriate.          This document has been electronically signed by Griselda Cheng-Akwa, APRN   July 31, 2022 01:12 EDT      Please note that portions of this note were completed with a voice recognition program. Efforts were made to edit dictation, but occasionally words are mistranscribed.

## 2022-07-20 LAB — BACTERIA SPEC AEROBE CULT: NO GROWTH

## 2022-07-21 ENCOUNTER — TELEPHONE (OUTPATIENT)
Dept: UROLOGY | Facility: CLINIC | Age: 72
End: 2022-07-21

## 2022-07-21 NOTE — TELEPHONE ENCOUNTER
----- Message from Griselda Cheng-Akwa, APRN sent at 7/20/2022 11:43 PM EDT -----  Regarding: Urine culture negative please let her know    Urine culture negative please let her know to continue therapy and follow-up as discussed.  Thank you    Urine Culture   No growth     ----- Message -----  From: Matilda Zamarripa CMA  Sent: 7/19/2022   1:35 PM EDT  To: Griselda Cheng-Akwa, APRN

## 2022-08-11 ENCOUNTER — TELEPHONE (OUTPATIENT)
Dept: FAMILY MEDICINE CLINIC | Facility: CLINIC | Age: 72
End: 2022-08-11

## 2022-08-11 NOTE — TELEPHONE ENCOUNTER
Patient called indicating she had been on Ozempic  X 1 month and she wasn't sure if that is something you wanted her to continue or if you wanted her to resume Jardiance again.  I wasn't sure what to tell her due to the nationwide backorder of Ozempic.    Please advise...

## 2022-08-12 DIAGNOSIS — E11.9 TYPE 2 DIABETES MELLITUS WITHOUT COMPLICATION, WITHOUT LONG-TERM CURRENT USE OF INSULIN: ICD-10-CM

## 2022-08-17 RX ORDER — SEMAGLUTIDE 1.34 MG/ML
INJECTION, SOLUTION SUBCUTANEOUS
Qty: 1.5 ML | Refills: 2 | Status: SHIPPED | OUTPATIENT
Start: 2022-08-17 | End: 2022-12-27

## 2022-08-17 NOTE — TELEPHONE ENCOUNTER
Left voicemail message to return call.     Spoke with patient. She said pharmacy had told her she just needed a refill on Ozempic.  I told her you had sent it today and to notify us if there were any problems with filling.  Also, informed her to take Jardiance.  She expressed understanding.

## 2022-08-23 ENCOUNTER — OFFICE VISIT (OUTPATIENT)
Dept: UROLOGY | Facility: CLINIC | Age: 72
End: 2022-08-23

## 2022-08-23 VITALS — HEIGHT: 61 IN | BODY MASS INDEX: 30.21 KG/M2 | WEIGHT: 160 LBS

## 2022-08-23 DIAGNOSIS — N39.0 RECURRENT UTI: Primary | ICD-10-CM

## 2022-08-23 DIAGNOSIS — R39.14 FEELING OF INCOMPLETE BLADDER EMPTYING: ICD-10-CM

## 2022-08-23 DIAGNOSIS — N32.81 OAB (OVERACTIVE BLADDER): ICD-10-CM

## 2022-08-23 PROCEDURE — 99214 OFFICE O/P EST MOD 30 MIN: CPT | Performed by: NURSE PRACTITIONER

## 2022-08-23 PROCEDURE — 81003 URINALYSIS AUTO W/O SCOPE: CPT | Performed by: NURSE PRACTITIONER

## 2022-08-23 NOTE — PROGRESS NOTES
"Chief Complaint  Urinary Tract Infection (Follow up recurrent utis/acute cystitis/)    Subjective          Shey King presents to [facility] GASTROENTEROLOGY & UROLOGY RECURRENT UTIs/OAB  History of Present Illness    Ms Shey King is a pleasant 71 year old female with a significant hisory of recurrent UTIs, who returns to clinic today for evaluation. This is her 1 MONTH  follow-up on incomplete bladder emptying, and also recurrent UTIs.  When evaluated in clinic last month, patient's PVR was greater than 156 cc.  She was started on Flomax 0.4 mg daily and is here for bladder rescan.  Her PVR today is 26 cc, it was>156 PRIOR     She is in no apparent distress today and reports doing relatively well.  Patient reports tolerating tamsulosin with no discomfort, and is voiding well.  Patient reports she has also been consistent with p.o. antibiotic suppressive therapy with trimethoprim for recurrent UTIs.  She reports doing relatively well and denies any side effects from medications. She reports significant weight loss over 50 pounds from dieting, and feels great she states.     Overall she reports doing better. She also reports an improvement in her urinary symptoms.  She denies any recent   episodes of frequency, urgency, or nocturia . She denies dysuria, burning on urination, flank pain, back pain and denies any CVA tenderness..  She also denies  pelvic pain or pressure, denies suprapubic discomfort.  She denies fever or chills, she does  have nausea, denies vomiting, or diarrhea.  Her urine dipstick today  is completely negative for any infection, it is negative for gross/microscopic hematuria. She has 3+ glucosuria.    Rest of her PMHx as listed below     Objective   Vital Signs:   Ht 156.2 cm (61.5\")   Wt 72.6 kg (160 lb)   BMI 29.75 kg/m²       ROS:   Review of Systems   Constitutional: Positive for activity change, appetite change and fatigue. Negative for chills, diaphoresis, fever, " unexpected weight gain and unexpected weight loss.   HENT: Negative for congestion, ear discharge, ear pain, nosebleeds, rhinorrhea, sinus pressure and sore throat.    Eyes: Negative for blurred vision, double vision, photophobia, pain, redness and visual disturbance.   Respiratory: Negative for apnea, cough, chest tightness, shortness of breath, wheezing and stridor.    Cardiovascular: Negative for chest pain, palpitations and leg swelling.   Gastrointestinal: Positive for abdominal distention and nausea. Negative for abdominal pain, constipation, diarrhea and vomiting.   Endocrine: Negative for polydipsia, polyphagia and polyuria.   Genitourinary: Positive for decreased urine volume and flank pain. Negative for difficulty urinating, dyspareunia, dysuria, frequency, genital sores, hematuria, pelvic pain, pelvic pressure, urgency, urinary incontinence and vaginal discharge.   Musculoskeletal: Positive for gait problem. Negative for arthralgias, back pain and joint swelling.   Skin: Negative for pallor, rash and wound.   Neurological: Negative for dizziness, tremors, syncope, weakness, light-headedness, headache, memory problem and confusion.   Psychiatric/Behavioral: Negative for behavioral problems and decreased concentration.        Physical Exam  Constitutional:       General: She is in acute distress.      Appearance: She is well-developed. She is obese.   HENT:      Head: Normocephalic and atraumatic.   Eyes:      Pupils: Pupils are equal, round, and reactive to light.   Neck:      Thyroid: No thyromegaly.      Trachea: No tracheal deviation.   Cardiovascular:      Rate and Rhythm: Normal rate and regular rhythm.      Heart sounds: No murmur heard.  Pulmonary:      Effort: Pulmonary effort is normal. No respiratory distress.      Breath sounds: Normal breath sounds. No stridor. No wheezing.   Abdominal:      General: Bowel sounds are normal. There is distension.      Palpations: Abdomen is soft.       Tenderness: There is abdominal tenderness.   Genitourinary:     Labia:         Right: No tenderness.         Left: No tenderness.       Vagina: Normal. No vaginal discharge.   Musculoskeletal:         General: Tenderness present. No deformity. Normal range of motion.      Cervical back: Normal range of motion.   Skin:     General: Skin is warm and dry.      Capillary Refill: Capillary refill takes less than 2 seconds.      Coloration: Skin is pale.      Findings: No erythema or rash.   Neurological:      Mental Status: She is alert and oriented to person, place, and time.      Cranial Nerves: No cranial nerve deficit.      Sensory: No sensory deficit.      Coordination: Coordination normal.   Psychiatric:         Behavior: Behavior normal.         Thought Content: Thought content normal.         Judgment: Judgment normal.        Result Review :     UA    Urinalysis 6/22/22 7/19/22 8/23/22   Ketones, UA Negative Negative Negative   Leukocytes, UA Negative Negative Negative           Urine Culture    Urine Culture 3/7/22 5/9/22 7/19/22   Urine Culture No growth No growth No growth                Assessment and Plan    Problem List Items Addressed This Visit        Genitourinary and Reproductive     OAB (overactive bladder)      Other Visit Diagnoses     Recurrent UTI    -  Primary    Relevant Orders    POC Urinalysis Dipstick, Automated (Completed)    Feeling of incomplete bladder emptying              Patient reports that she is not currently experiencing any symptoms of urinary incontinence.    BMI is >= 30 and <35. (Class 1 Obesity). The following options were offered after discussion;: weight loss educational material (shared in after visit summary), exercise counseling/recommendations and nutrition counseling/recommendations    RADIOLOGY (CT AND/OR KUB):    CT Abdomen and Pelvis: No results found for this or any previous visit.     CT Stone Protocol: No results found for this or any previous visit.     KUB: No  results found for this or any previous visit.       LABS (3 MONTHS):    Office Visit on 08/23/2022   Component Date Value Ref Range Status   • Color 08/23/2022 Yellow  Yellow, Straw, Dark Yellow, Ana Final   • Clarity, UA 08/23/2022 Clear  Clear Final   • Specific Gravity  08/23/2022 1.020  1.005 - 1.030 Final   • pH, Urine 08/23/2022 5.0  5.0 - 8.0 Final   • Leukocytes 08/23/2022 Negative  Negative Final   • Nitrite, UA 08/23/2022 Negative  Negative Final   • Protein, POC 08/23/2022 Negative  Negative mg/dL Final   • Glucose, UA 08/23/2022 3+ (A) Negative mg/dL Final   • Ketones, UA 08/23/2022 Negative  Negative Final   • Urobilinogen, UA 08/23/2022 Normal  Normal, 0.2 E.U./dL Final   • Bilirubin 08/23/2022 Negative  Negative Final   • Blood, UA 08/23/2022 Negative  Negative Final   • Lot Number 08/23/2022 [id number]   Final   • Expiration Date 08/23/2022 122,[id number]   Final   Office Visit on 07/19/2022   Component Date Value Ref Range Status   • Color 07/19/2022 Yellow  Yellow, Straw, Dark Yellow, Ana Final   • Clarity, UA 07/19/2022 Clear  Clear Final   • Specific Gravity  07/19/2022 1.020  1.005 - 1.030 Final   • pH, Urine 07/19/2022 5.0  5.0 - 8.0 Final   • Leukocytes 07/19/2022 Negative  Negative Final   • Nitrite, UA 07/19/2022 Negative  Negative Final   • Protein, POC 07/19/2022 Negative  Negative mg/dL Final   • Glucose, UA 07/19/2022 3+ (A) Negative mg/dL Final   • Ketones, UA 07/19/2022 Negative  Negative Final   • Urobilinogen, UA 07/19/2022 Normal  Normal Final   • Bilirubin 07/19/2022 Negative  Negative Final   • Blood, UA 07/19/2022 Negative  Negative Final   • Lot Number 07/19/2022 [id number]   Final   • Expiration Date 07/19/2022 12/21/23   Final   • Urine Culture 07/19/2022 No growth   Final   Lab on 06/24/2022   Component Date Value Ref Range Status   • Glucose 06/24/2022 93  65 - 99 mg/dL Final   • BUN 06/24/2022 17  8 - 23 mg/dL Final   • Creatinine 06/24/2022 1.00  0.57 - 1.00 mg/dL Final    • Sodium 06/24/2022 140  136 - 145 mmol/L Final   • Potassium 06/24/2022 4.8  3.5 - 5.2 mmol/L Final   • Chloride 06/24/2022 105  98 - 107 mmol/L Final   • CO2 06/24/2022 23.0  22.0 - 29.0 mmol/L Final   • Calcium 06/24/2022 9.6  8.6 - 10.5 mg/dL Final   • Total Protein 06/24/2022 7.6  6.0 - 8.5 g/dL Final   • Albumin 06/24/2022 4.50  3.50 - 5.20 g/dL Final   • ALT (SGPT) 06/24/2022 15  1 - 33 U/L Final   • AST (SGOT) 06/24/2022 19  1 - 32 U/L Final   • Alkaline Phosphatase 06/24/2022 74  39 - 117 U/L Final   • Total Bilirubin 06/24/2022 0.4  0.0 - 1.2 mg/dL Final   • Globulin 06/24/2022 3.1  gm/dL Final   • A/G Ratio 06/24/2022 1.5  g/dL Final   • BUN/Creatinine Ratio 06/24/2022 17.0  7.0 - 25.0 Final   • Anion Gap 06/24/2022 12.0  5.0 - 15.0 mmol/L Final   • eGFR 06/24/2022 61.1  >60.0 mL/min/1.73 Final    National Kidney Foundation and American Society of Nephrology (ASN) Task Force recommended calculation based on the Chronic Kidney Disease Epidemiology Collaboration (CKD-EPI) equation refit without adjustment for race.   • Hemoglobin A1C 06/24/2022 5.20  4.80 - 5.60 % Final   • Total Cholesterol 06/24/2022 204 (A) 0 - 200 mg/dL Final   • Triglycerides 06/24/2022 119  0 - 150 mg/dL Final   • HDL Cholesterol 06/24/2022 52  40 - 60 mg/dL Final   • LDL Cholesterol  06/24/2022 131 (A) 0 - 100 mg/dL Final   • VLDL Cholesterol 06/24/2022 21  5 - 40 mg/dL Final   • LDL/HDL Ratio 06/24/2022 2.47   Final   • TSH 06/24/2022 1.450  0.270 - 4.200 uIU/mL Final   Office Visit on 06/22/2022   Component Date Value Ref Range Status   • Color 06/22/2022 Yellow  Yellow, Straw, Dark Yellow, Ana Final   • Clarity, UA 06/22/2022 Clear  Clear Final   • Specific Gravity  06/22/2022 1.015  1.005 - 1.030 Final   • pH, Urine 06/22/2022 6.5  5.0 - 8.0 Final   • Leukocytes 06/22/2022 Negative  Negative Final   • Nitrite, UA 06/22/2022 Negative  Negative Final   • Protein, POC 06/22/2022 Negative  Negative mg/dL Final   • Glucose, UA  06/22/2022 3+ (A) Negative mg/dL Final   • Ketones, UA 06/22/2022 Negative  Negative Final   • Urobilinogen, UA 06/22/2022 Normal  Normal Final   • Bilirubin 06/22/2022 Negative  Negative Final   • Blood, UA 06/22/2022 Negative  Negative Final   • Lot Number 06/22/2022 [id number]   Final   • Expiration Date 06/22/2022 12/17/2023   Final                                                ASSESSMENT    Recurrent urinary UTIs/Atrophic/yeast vaginitis/incomplete bladder emptying:      Ms. Shey Veliz is a pleasant 71-year-old female who returns to clinic today for evaluation of incomplete bladder emptying, recurrent UTIs, acute cystitis, and yeast vaginitis. She is in no apparent distress and reports a remarkable improvement in her overall symptoms. She is happy to be feeling better she states. She reports doing relatively better, since starting Flomax last month, She denies any issues with urinary frequency urgency or dysuria.  Today she denies any burning with urination.Her urine dipstick today is completely negative for any infection, it is negative for gross/microscopic hematuria, her PVR is >26CC today, it was >156 cc 4 weeks prior.      Again,  We discussed the types of organisms that are found in the urinary tract indicating that the vast majority are results of the patient's own gastrointestinal isha.  We discussed how many of the antibiotics that are utilized can actually exacerbate these infections by creating resistant organisms and there is only a very few antibiotics that are concentrated in the urine and do not affect the rectal reservoir nor cause recurrent yeast vaginitis.       We discussed the risk factors for recurrent infections being intercourse in younger patients and atrophic changes in older patients.  We discussed the symptoms that are found including pain, pressure, burning, frequency, urgency suprapubic pain and painful intercourse.  I discussed upper tract symptoms including fevers,  chills, and indicated the workup would be much more aggressive if the patient were to present with recurrent infections in the face of upper tract symptomatology such as fever. I discussed the history of vesicoureteral reflux in young patients and finally chronic renal scarring as a result of such.                                 PLAN  AGAIN, We resent her urine for Culture. I will call her with results if any bacteria growth resistant to her current therapy with Timethortprim     ContinueTrimpex 100 mg PO Daily -Suppressive Therapy, patient had requested  break off ABX , she reports she restarted.      START FLOMAX 0.4 MG DAILY      She has been encouraged to increase her p.o. fluid intake to at least 1 to 2 L daily and avoid bladder irritants such as caffeine products, spicy foods, and citrusy foods.      I recommend  She continue probiotics with any treatment with antibiotics to protect the rectal reservoir including over-the-counter yogurt preparations to stone oral pills containing the appropriate probiotics. Patient reports the diligent use of Probiotics.     She is to also continue other medications for yeast vaginitis, atrophic vaginitis as prescribed above.     Will see her back for Follow up in clinic and also recheck her urinalysis at that time AND ALSO BLADDER RESCAN .     She may return sooner if need be.     Patient is agreeable to plan of care.     Patient reports that she is not currently experiencing any symptoms of urinary incontinence.     Smoking Cessation Counseling:  Never a smoker.  Patient does not currently use any tobacco products.     Follow Up   Return in about 6 months (around 2/23/2023) for Next scheduled follow up, RECURRENT UTI/DYSURIA/DETRUSSOR INSTABILITY.    Patient was given instructions and counseling regarding her condition or for health maintenance advice. Please see specific information pulled into the AVS if appropriate.          This document has been electronically signed  by Griselda Cheng-Akwa, APRN   August 29, 2022 20:02 EDT      Dictated Utilizing Dragon Dictation: Part of this note may be an electronic transcription/translation of spoken language to printed text using the Dragon Dictation System.

## 2022-09-26 ENCOUNTER — OFFICE VISIT (OUTPATIENT)
Dept: FAMILY MEDICINE CLINIC | Facility: CLINIC | Age: 72
End: 2022-09-26

## 2022-09-26 VITALS
HEIGHT: 62 IN | HEART RATE: 70 BPM | WEIGHT: 171.2 LBS | SYSTOLIC BLOOD PRESSURE: 100 MMHG | DIASTOLIC BLOOD PRESSURE: 60 MMHG | OXYGEN SATURATION: 99 % | TEMPERATURE: 96.9 F | BODY MASS INDEX: 31.5 KG/M2

## 2022-09-26 DIAGNOSIS — Z87.19 HX OF DIVERTICULITIS OF COLON: ICD-10-CM

## 2022-09-26 DIAGNOSIS — M25.512 ACUTE PAIN OF LEFT SHOULDER: Primary | ICD-10-CM

## 2022-09-26 DIAGNOSIS — Z12.31 SCREENING MAMMOGRAM FOR BREAST CANCER: ICD-10-CM

## 2022-09-26 DIAGNOSIS — E11.9 TYPE 2 DIABETES MELLITUS WITHOUT COMPLICATION, WITHOUT LONG-TERM CURRENT USE OF INSULIN: ICD-10-CM

## 2022-09-26 DIAGNOSIS — R10.84 GENERALIZED ABDOMINAL PAIN: ICD-10-CM

## 2022-09-26 DIAGNOSIS — I10 HTN (HYPERTENSION), BENIGN: ICD-10-CM

## 2022-09-26 DIAGNOSIS — E03.8 ADULT ONSET HYPOTHYROIDISM: ICD-10-CM

## 2022-09-26 PROCEDURE — 80053 COMPREHEN METABOLIC PANEL: CPT | Performed by: NURSE PRACTITIONER

## 2022-09-26 PROCEDURE — 99214 OFFICE O/P EST MOD 30 MIN: CPT | Performed by: NURSE PRACTITIONER

## 2022-09-26 PROCEDURE — 83036 HEMOGLOBIN GLYCOSYLATED A1C: CPT | Performed by: NURSE PRACTITIONER

## 2022-09-26 PROCEDURE — 85025 COMPLETE CBC W/AUTO DIFF WBC: CPT | Performed by: NURSE PRACTITIONER

## 2022-09-26 PROCEDURE — 82607 VITAMIN B-12: CPT | Performed by: NURSE PRACTITIONER

## 2022-09-26 PROCEDURE — 80061 LIPID PANEL: CPT | Performed by: NURSE PRACTITIONER

## 2022-09-26 PROCEDURE — 82306 VITAMIN D 25 HYDROXY: CPT | Performed by: NURSE PRACTITIONER

## 2022-09-26 RX ORDER — DOXYCYCLINE 100 MG/1
100 CAPSULE ORAL 2 TIMES DAILY
Qty: 14 CAPSULE | Refills: 0 | Status: SHIPPED | OUTPATIENT
Start: 2022-09-26 | End: 2022-12-27

## 2022-09-26 NOTE — PROGRESS NOTES
Subjective   Shey Kign is a 71 y.o. female.   Chief Compliant: The patient presents with Shoulder Pain (Patient was jump-roping and hurt shoulder)    Thyroid Problem  Presents for follow-up (Hypothyroid) visit. Symptoms include diarrhea. Patient reports no constipation, palpitations or weight gain. The symptoms have been stable.   Diabetes  She presents for her follow-up diabetic visit. She has type 2 diabetes mellitus. Her disease course has been stable (Struggles with continued diet restrictions). There are no hypoglycemic associated symptoms. Pertinent negatives for hypoglycemia include no headaches or sweats. Pertinent negatives for diabetes include no blurred vision and no chest pain. There are no hypoglycemic complications. Symptoms are stable. There are no diabetic complications. Risk factors for coronary artery disease include family history and dyslipidemia. She is compliant with treatment most of the time.   Hypertension  This is a chronic (following along with cardiology) problem. The problem is controlled. Associated symptoms include malaise/fatigue. Pertinent negatives include no anxiety, blurred vision, chest pain, headaches, neck pain, orthopnea, palpitations, peripheral edema, PND, shortness of breath or sweats. Current antihypertension treatment includes beta blockers. The current treatment provides moderate improvement. Compliance problems include diet and exercise.  Hypertensive end-organ damage includes kidney disease. Identifiable causes of hypertension include a thyroid problem.   Pain  This is a new (left shoulder pain onset with jumping rope with grandchildren. ) problem. The current episode started 1 to 4 weeks ago (3 weeks). The problem occurs daily. Progression since onset: Gradual improvement over the past 2 days. Associated symptoms include abdominal pain. Pertinent negatives include no chest pain, fever, headaches, nausea, neck pain or vomiting. Exacerbated by: movement. She has  "tried rest for the symptoms. The treatment provided mild relief.   Abdominal Pain  This is a recurrent (HX diverticulitis) problem. The current episode started in the past 7 days. The problem occurs intermittently. The problem has been waxing and waning. The pain is located in the generalized abdominal region. The pain is at a severity of 4/10. The pain is mild. The quality of the pain is aching, colicky, dull and a sensation of fullness. The abdominal pain does not radiate. Associated symptoms include diarrhea. Pertinent negatives include no constipation, fever, headaches, nausea or vomiting.      The following portions of the patient's history were reviewed and updated as appropriate: allergies, current medications, past family history, past medical history, past social history, past surgical history and problem list.      Review of Systems   Constitutional: Positive for malaise/fatigue. Negative for fever and weight gain.   HENT: Negative.    Eyes: Negative for blurred vision.   Respiratory: Negative for shortness of breath.    Cardiovascular: Negative for chest pain, palpitations, orthopnea and PND.   Gastrointestinal: Positive for abdominal pain and diarrhea. Negative for abdominal distention, anal bleeding, blood in stool, constipation, nausea and vomiting.   Musculoskeletal: Negative for neck pain.        Right hand pain    Neurological: Negative.  Negative for headaches.   All other systems reviewed and are negative.      Procedures    Vitals: Blood pressure 100/60, pulse 70, temperature 96.9 °F (36.1 °C), temperature source Temporal, height 156.2 cm (61.5\"), weight 77.7 kg (171 lb 3.2 oz), SpO2 99 %, not currently breastfeeding.     Allergies:   Allergies   Allergen Reactions   • Bactrim [Sulfamethoxazole-Trimethoprim] Hives   • Ciprofloxacin Hives   • Penicillins Hives   • Steri-Strip Compound Benzoin [Benzoin Compound] Hives   • Sulfa Antibiotics Hives          Objective   Physical Exam   Constitutional: " She is oriented to person, place, and time. She appears well-developed. No distress.   HENT:   Head: Normocephalic.   Right Ear: Hearing normal.   Left Ear: Hearing normal.   Cardiovascular: Normal rate, regular rhythm and normal heart sounds.   No murmur heard.  Pulmonary/Chest: Effort normal and breath sounds normal.   Abdominal: Soft. Bowel sounds are normal.   Musculoskeletal:      Left shoulder: She exhibits bony tenderness. She exhibits normal range of motion.   Neurological: She is alert and oriented to person, place, and time.   Skin: Skin is warm and dry. She is not diaphoretic.   Psychiatric: Her behavior is normal.   Nursing note and vitals reviewed.      During this visit the following were done:  Labs Reviewed [x]    Labs Ordered [x]    Radiology Reports Reviewed []    Radiology Ordered []    PCP Records Reviewed []    Referring Provider Records Reviewed []    ER Records Reviewed []    Hospital Records Reviewed []    History Obtained From Family []    Radiology Images Reviewed []    Other Reviewed [x]    Records Requested []      Diagnoses and all orders for this visit:    1. Acute pain of left shoulder (Primary)  Patient wishes to continue with home exercise if no improvements will RTC     2. Screening mammogram for breast cancer  -     Mammo Screening Bilateral With CAD; Future    3. Type 2 diabetes mellitus without complication, without long-term current use of insulin (HCC)  -     Comprehensive Metabolic Panel; Future  -     Hemoglobin A1c; Future  -     Lipid Panel; Future  -     Vitamin B12; Future  -     Vitamin D 25 Hydroxy; Future  -     CBC w AUTO Differential; Future  -     Comprehensive Metabolic Panel  -     Hemoglobin A1c  -     Lipid Panel  -     Vitamin B12  -     Vitamin D 25 Hydroxy  -     CBC w AUTO Differential  Continue with Ozempic.    Encouraged improved diet and exercise    4. Adult onset hypothyroidism  Continue with current dose of synthroid    5. HTN (hypertension),  benign  Continue Current medications    Abdominal Pain   Hx diverticulitis  -     doxycycline (MONODOX) 100 MG capsule; Take 1 capsule by mouth 2 (Two) Times a Day.  Dispense: 14 capsule; Refill: 0  Diet restrictions with GI rest.   RTC if any new or worsening concerns        Patient's Body mass index is 31.82 kg/m². indicating that she is obese (BMI >30). Obesity-related health conditions include the following: hypertension, diabetes mellitus, dyslipidemias and osteoarthritis. Obesity is improving with lifestyle modifications. BMI is is above average; BMI management plan is completed. We discussed portion control and increasing exercise..

## 2022-09-27 ENCOUNTER — TELEPHONE (OUTPATIENT)
Dept: FAMILY MEDICINE CLINIC | Facility: CLINIC | Age: 72
End: 2022-09-27

## 2022-09-27 LAB
25(OH)D3 SERPL-MCNC: 34.2 NG/ML (ref 30–100)
ALBUMIN SERPL-MCNC: 4.5 G/DL (ref 3.5–5.2)
ALBUMIN/GLOB SERPL: 1.7 G/DL
ALP SERPL-CCNC: 72 U/L (ref 39–117)
ALT SERPL W P-5'-P-CCNC: 17 U/L (ref 1–33)
ANION GAP SERPL CALCULATED.3IONS-SCNC: 10.2 MMOL/L (ref 5–15)
AST SERPL-CCNC: 21 U/L (ref 1–32)
BASOPHILS # BLD AUTO: 0.09 10*3/MM3 (ref 0–0.2)
BASOPHILS NFR BLD AUTO: 1.2 % (ref 0–1.5)
BILIRUB SERPL-MCNC: 0.4 MG/DL (ref 0–1.2)
BUN SERPL-MCNC: 12 MG/DL (ref 8–23)
BUN/CREAT SERPL: 12.5 (ref 7–25)
CALCIUM SPEC-SCNC: 9.6 MG/DL (ref 8.6–10.5)
CHLORIDE SERPL-SCNC: 103 MMOL/L (ref 98–107)
CHOLEST SERPL-MCNC: 164 MG/DL (ref 0–200)
CO2 SERPL-SCNC: 23.8 MMOL/L (ref 22–29)
CREAT SERPL-MCNC: 0.96 MG/DL (ref 0.57–1)
DEPRECATED RDW RBC AUTO: 43 FL (ref 37–54)
EGFRCR SERPLBLD CKD-EPI 2021: 64.2 ML/MIN/1.73
EOSINOPHIL # BLD AUTO: 0.23 10*3/MM3 (ref 0–0.4)
EOSINOPHIL NFR BLD AUTO: 3 % (ref 0.3–6.2)
ERYTHROCYTE [DISTWIDTH] IN BLOOD BY AUTOMATED COUNT: 13.1 % (ref 12.3–15.4)
GLOBULIN UR ELPH-MCNC: 2.7 GM/DL
GLUCOSE SERPL-MCNC: 89 MG/DL (ref 65–99)
HBA1C MFR BLD: 5.2 % (ref 4.8–5.6)
HCT VFR BLD AUTO: 37.9 % (ref 34–46.6)
HDLC SERPL-MCNC: 49 MG/DL (ref 40–60)
HGB BLD-MCNC: 12.7 G/DL (ref 12–15.9)
IMM GRANULOCYTES # BLD AUTO: 0.04 10*3/MM3 (ref 0–0.05)
IMM GRANULOCYTES NFR BLD AUTO: 0.5 % (ref 0–0.5)
LDLC SERPL CALC-MCNC: 90 MG/DL (ref 0–100)
LDLC/HDLC SERPL: 1.76 {RATIO}
LYMPHOCYTES # BLD AUTO: 1.88 10*3/MM3 (ref 0.7–3.1)
LYMPHOCYTES NFR BLD AUTO: 24.6 % (ref 19.6–45.3)
MCH RBC QN AUTO: 30 PG (ref 26.6–33)
MCHC RBC AUTO-ENTMCNC: 33.5 G/DL (ref 31.5–35.7)
MCV RBC AUTO: 89.4 FL (ref 79–97)
MONOCYTES # BLD AUTO: 0.46 10*3/MM3 (ref 0.1–0.9)
MONOCYTES NFR BLD AUTO: 6 % (ref 5–12)
NEUTROPHILS NFR BLD AUTO: 4.94 10*3/MM3 (ref 1.7–7)
NEUTROPHILS NFR BLD AUTO: 64.7 % (ref 42.7–76)
NRBC BLD AUTO-RTO: 0 /100 WBC (ref 0–0.2)
PLATELET # BLD AUTO: 265 10*3/MM3 (ref 140–450)
PMV BLD AUTO: 11.6 FL (ref 6–12)
POTASSIUM SERPL-SCNC: 4.5 MMOL/L (ref 3.5–5.2)
PROT SERPL-MCNC: 7.2 G/DL (ref 6–8.5)
RBC # BLD AUTO: 4.24 10*6/MM3 (ref 3.77–5.28)
SODIUM SERPL-SCNC: 137 MMOL/L (ref 136–145)
TRIGL SERPL-MCNC: 144 MG/DL (ref 0–150)
VIT B12 BLD-MCNC: 337 PG/ML (ref 211–946)
VLDLC SERPL-MCNC: 25 MG/DL (ref 5–40)
WBC NRBC COR # BLD: 7.64 10*3/MM3 (ref 3.4–10.8)

## 2022-09-27 NOTE — TELEPHONE ENCOUNTER
----- Message from BERT Lucas sent at 9/27/2022  9:30 AM EDT -----  Labs look great.  Needs daily B12 supplement      Patient notified and verbalized understanding.   Patient said she thought you were going to call in weekly B 12 injections?     
Sent  
Sent    Patient notified.  
Labs

## 2022-10-20 DIAGNOSIS — E03.8 ADULT ONSET HYPOTHYROIDISM: ICD-10-CM

## 2022-10-20 DIAGNOSIS — E11.9 TYPE 2 DIABETES MELLITUS WITHOUT COMPLICATION, WITHOUT LONG-TERM CURRENT USE OF INSULIN: ICD-10-CM

## 2022-10-24 RX ORDER — EMPAGLIFLOZIN 10 MG/1
TABLET, FILM COATED ORAL
Qty: 90 TABLET | Refills: 1 | Status: SHIPPED | OUTPATIENT
Start: 2022-10-24 | End: 2022-12-27 | Stop reason: SDUPTHER

## 2022-10-24 RX ORDER — LEVOTHYROXINE SODIUM 112 UG/1
112 TABLET ORAL DAILY
Qty: 90 TABLET | Refills: 1 | Status: SHIPPED | OUTPATIENT
Start: 2022-10-24 | End: 2022-12-28 | Stop reason: SDUPTHER

## 2022-10-27 ENCOUNTER — HOSPITAL ENCOUNTER (OUTPATIENT)
Dept: MAMMOGRAPHY | Facility: HOSPITAL | Age: 72
Discharge: HOME | End: 2022-10-27
Admitting: NURSE PRACTITIONER

## 2022-10-27 DIAGNOSIS — Z12.31 SCREENING MAMMOGRAM FOR BREAST CANCER: ICD-10-CM

## 2022-10-27 PROCEDURE — 77063 BREAST TOMOSYNTHESIS BI: CPT | Performed by: RADIOLOGY

## 2022-10-27 PROCEDURE — 77063 BREAST TOMOSYNTHESIS BI: CPT

## 2022-10-27 PROCEDURE — 77067 SCR MAMMO BI INCL CAD: CPT

## 2022-10-27 PROCEDURE — 77067 SCR MAMMO BI INCL CAD: CPT | Performed by: RADIOLOGY

## 2022-11-12 DIAGNOSIS — R35.0 FREQUENCY OF MICTURITION: ICD-10-CM

## 2022-11-12 DIAGNOSIS — N39.0 RECURRENT UTI: ICD-10-CM

## 2022-11-14 RX ORDER — TRIMETHOPRIM 100 MG/1
TABLET ORAL
Qty: 30 TABLET | Refills: 5 | Status: SHIPPED | OUTPATIENT
Start: 2022-11-14

## 2022-12-06 ENCOUNTER — HOSPITAL ENCOUNTER (OUTPATIENT)
Dept: MAMMOGRAPHY | Facility: HOSPITAL | Age: 72
Discharge: HOME | End: 2022-12-06
Admitting: RADIOLOGY

## 2022-12-06 DIAGNOSIS — R92.8 ABNORMAL MAMMOGRAM OF LEFT BREAST: ICD-10-CM

## 2022-12-06 PROCEDURE — 77065 DX MAMMO INCL CAD UNI: CPT | Performed by: RADIOLOGY

## 2022-12-06 PROCEDURE — 77065 DX MAMMO INCL CAD UNI: CPT

## 2022-12-12 DIAGNOSIS — E55.9 VITAMIN D DEFICIENCY: ICD-10-CM

## 2022-12-12 RX ORDER — ERGOCALCIFEROL 1.25 MG/1
CAPSULE ORAL
Qty: 5 CAPSULE | Refills: 2 | Status: SHIPPED | OUTPATIENT
Start: 2022-12-12

## 2022-12-14 ENCOUNTER — HOSPITAL ENCOUNTER (OUTPATIENT)
Dept: MAMMOGRAPHY | Facility: HOSPITAL | Age: 72
Discharge: HOME | End: 2022-12-14

## 2022-12-14 DIAGNOSIS — R92.8 ABNORMAL MAMMOGRAM OF LEFT BREAST: ICD-10-CM

## 2022-12-14 PROCEDURE — 77065 DX MAMMO INCL CAD UNI: CPT | Performed by: RADIOLOGY

## 2022-12-14 PROCEDURE — 19081 BX BREAST 1ST LESION STRTCTC: CPT | Performed by: RADIOLOGY

## 2022-12-14 PROCEDURE — A4648 IMPLANTABLE TISSUE MARKER: HCPCS

## 2022-12-14 PROCEDURE — 88305 TISSUE EXAM BY PATHOLOGIST: CPT

## 2022-12-14 PROCEDURE — 88360 TUMOR IMMUNOHISTOCHEM/MANUAL: CPT

## 2022-12-16 LAB — REF LAB TEST METHOD: NORMAL

## 2022-12-20 ENCOUNTER — TELEPHONE (OUTPATIENT)
Dept: MAMMOGRAPHY | Facility: HOSPITAL | Age: 72
End: 2022-12-20

## 2022-12-20 NOTE — TELEPHONE ENCOUNTER
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    The pathology result is concordant with the imaging findings.    Recommend surgical consultation.    The patient will be notified of the results and recommendations by our breast care nurse.

## 2022-12-21 ENCOUNTER — OFFICE VISIT (OUTPATIENT)
Dept: SURGERY | Facility: CLINIC | Age: 72
End: 2022-12-21

## 2022-12-21 VITALS
BODY MASS INDEX: 34.97 KG/M2 | WEIGHT: 185.2 LBS | SYSTOLIC BLOOD PRESSURE: 130 MMHG | HEIGHT: 61 IN | DIASTOLIC BLOOD PRESSURE: 78 MMHG

## 2022-12-21 DIAGNOSIS — D05.12 DUCTAL CARCINOMA IN SITU (DCIS) OF LEFT BREAST: Primary | ICD-10-CM

## 2022-12-21 PROCEDURE — 99203 OFFICE O/P NEW LOW 30 MIN: CPT | Performed by: SURGERY

## 2022-12-21 RX ORDER — SODIUM CHLORIDE 9 MG/ML
40 INJECTION, SOLUTION INTRAVENOUS AS NEEDED
Status: CANCELLED | OUTPATIENT
Start: 2022-12-29

## 2022-12-21 RX ORDER — SODIUM CHLORIDE 0.9 % (FLUSH) 0.9 %
10 SYRINGE (ML) INJECTION EVERY 12 HOURS SCHEDULED
Status: CANCELLED | OUTPATIENT
Start: 2022-12-29

## 2022-12-21 RX ORDER — SODIUM CHLORIDE 0.9 % (FLUSH) 0.9 %
10 SYRINGE (ML) INJECTION AS NEEDED
Status: CANCELLED | OUTPATIENT
Start: 2022-12-29

## 2022-12-21 NOTE — H&P (VIEW-ONLY)
Subjective   Shey King is a 72 y.o. female is being seen for consultation today at the request of Tashia Vallejo APRN    Shey King is a 72 y.o. female History of Present Illness  With recent screening mammogram showing an imaging abnormality of the lower outer quadrant of the left breast.  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  No palpable mass noted.  No lymphadenopathy identified on examination.  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   • Encounter for cholecystectomy 2018   • Gallstones    • GERD (gastroesophageal reflux disease)    • Hiatal hernia    • Hyperglycemia    • Hyperlipidemia    • Hypothyroidism    • Muscle spasm     FLANK PAIN   • OAB (overactive bladder)    • Pneumonia     years ago   • Rectal bleeding    • UTI (urinary tract infection)        Family History   Problem Relation Age of Onset   • Heart disease Mother         also had cancer kidney   • Cancer Mother         kidney   • Thyroid disease Mother    • Heart disease Father    • Diabetes Brother    • Breast cancer Neg Hx        Social History     Socioeconomic History   • Marital status:    Tobacco Use   • Smoking status: Never   • Smokeless tobacco: Never   • Tobacco comments:     never   Vaping Use   • Vaping Use: Never used   Substance and Sexual Activity   • Alcohol use: No   • Drug use: No   • Sexual activity: Not Currently     Partners: Male       Past Surgical History:   Procedure Laterality Date   • BREAST BIOPSY Bilateral 2005     benign   • CHOLECYSTECTOMY  2018?   • ENDOSCOPY     • ENDOSCOPY N/A 1/11/2018    Procedure: ESOPHAGOGASTRODUODENOSCOPY;  Surgeon: Dong Mata MD;  Location:  [facility];  Service:    • MAMMO BILATERAL  09/2015   • OVARIAN CYST DRAINAGE     • [place] LAP,CHOLECYSTECTOMY N/A 1/11/2018    Procedure: CHOLECYSTECTOMY LAPAROSCOPIC;  Surgeon: Dong Mata MD;  Location:  [facility];  Service: General   • US GUIDED LYMPH NODE BIOPSY  4/11/2018       Review of Systems   Constitutional: Negative for activity change, appetite change, chills and fever.   HENT: Negative for sore throat and trouble swallowing.    Eyes: Negative for visual disturbance.   Respiratory: Negative for cough and shortness of breath.    Cardiovascular: Negative for chest pain and palpitations.   Gastrointestinal: Negative for abdominal distention, abdominal pain, blood in stool, constipation, diarrhea, nausea and vomiting.   Endocrine: Negative for cold intolerance and heat intolerance.   Genitourinary: Negative for dysuria.   Musculoskeletal: Negative for joint swelling.   Skin: Negative for color change, rash and wound.   Allergic/Immunologic: Negative for immunocompromised state.   Neurological: Negative for dizziness, seizures, weakness and headaches.   Hematological: Negative for adenopathy. Does not bruise/bleed easily.   Psychiatric/Behavioral: Negative for agitation and confusion.         /78   Ht 154.9 cm (61\")   Wt 84 kg (185 lb 3.2 oz)   BMI 34.99 kg/m²   Objective   Physical Exam  Vitals reviewed.   Constitutional:       Appearance: She is well-developed.   HENT:      Head: Normocephalic and atraumatic.   Eyes:      General: No scleral icterus.     Pupils: Pupils are equal, round, and reactive to light.   Neck:      Vascular: No JVD.   Cardiovascular:      Rate and Rhythm: Normal rate and regular rhythm.      Heart sounds: No murmur heard.    No friction rub. No gallop.   Pulmonary:      Effort: Pulmonary effort is normal. No  retractions.      Breath sounds: Normal breath sounds.   Chest:      Chest wall: No tenderness.   Abdominal:      General: Bowel sounds are normal. There is no distension.      Palpations: Abdomen is soft. There is no mass.      Tenderness: There is no abdominal tenderness.   Musculoskeletal:      Cervical back: Neck supple.   Lymphadenopathy:      Cervical: No cervical adenopathy.   Skin:     General: Skin is warm and dry.      Findings: No rash.   Neurological:      Mental Status: She is alert and oriented to person, place, and time.               Assessment   Diagnoses and all orders for this visit:    1. Ductal carcinoma in situ (DCIS) of left breast (Primary)  -     Case Request; Standing  -     sodium chloride 0.9 % flush 10 mL  -     sodium chloride 0.9 % flush 10 mL  -     sodium chloride 0.9 % infusion 40 mL  -     Mammo Breast Placement Device Initial Without Biopsy Left; Future  -     Case Request    Other orders  -     Follow Anesthesia Guidelines / Protocol; Future  -     Obtain Informed Consent; Future  -     Provide NPO Instructions to Patient; Future  -     Chlorhexidine Skin Prep; Future  -     Follow Anesthesia Guidelines / Protocol; Standing  -     Verify / Perform Chlorhexidine Skin Prep; Standing  -     Verify / Perform Chlorhexidine Skin Prep if Indicated (If Not Already Completed); Standing  -     Insert Peripheral IV; Standing  -     Saline Lock & Maintain IV Access; Standing  -     No Lab Testing Needed; Standing      Shey King is a 72 y.o. female with newly diagnosed left breast DCIS with comedonecrosis and calcifications.  The patient will undergo wire localized excisional biopsy and is aware of the risks and benefits of the procedure.     Body mass index is 34.99 kg/m².

## 2022-12-21 NOTE — PROGRESS NOTES
Subjective   Shey King is a 72 y.o. female is being seen for consultation today at the request of Tashia Vallejo APRN    Shey King is a 72 y.o. female History of Present Illness  With recent screening mammogram showing an imaging abnormality of the lower outer quadrant of the left breast.  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  No palpable mass noted.  No lymphadenopathy identified on examination.  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   • Encounter for cholecystectomy 2018   • Gallstones    • GERD (gastroesophageal reflux disease)    • Hiatal hernia    • Hyperglycemia    • Hyperlipidemia    • Hypothyroidism    • Muscle spasm     FLANK PAIN   • OAB (overactive bladder)    • Pneumonia     years ago   • Rectal bleeding    • UTI (urinary tract infection)        Family History   Problem Relation Age of Onset   • Heart disease Mother         also had cancer kidney   • Cancer Mother         kidney   • Thyroid disease Mother    • Heart disease Father    • Diabetes Brother    • Breast cancer Neg Hx        Social History     Socioeconomic History   • Marital status:    Tobacco Use   • Smoking status: Never   • Smokeless tobacco: Never   • Tobacco comments:     never   Vaping Use   • Vaping Use: Never used   Substance and Sexual Activity   • Alcohol use: No   • Drug use: No   • Sexual activity: Not Currently     Partners: Male       Past Surgical History:   Procedure Laterality Date   • BREAST BIOPSY Bilateral 2005     "benign   • CHOLECYSTECTOMY  2018?   • ENDOSCOPY     • ENDOSCOPY N/A 1/11/2018    Procedure: ESOPHAGOGASTRODUODENOSCOPY;  Surgeon: Dong Mata MD;  Location:  [facility];  Service:    • MAMMO BILATERAL  09/2015   • OVARIAN CYST DRAINAGE     • [place] LAP,CHOLECYSTECTOMY N/A 1/11/2018    Procedure: CHOLECYSTECTOMY LAPAROSCOPIC;  Surgeon: Dong Mata MD;  Location:  [facility];  Service: General   • US GUIDED LYMPH NODE BIOPSY  4/11/2018       Review of Systems   Constitutional: Negative for activity change, appetite change, chills and fever.   HENT: Negative for sore throat and trouble swallowing.    Eyes: Negative for visual disturbance.   Respiratory: Negative for cough and shortness of breath.    Cardiovascular: Negative for chest pain and palpitations.   Gastrointestinal: Negative for abdominal distention, abdominal pain, blood in stool, constipation, diarrhea, nausea and vomiting.   Endocrine: Negative for cold intolerance and heat intolerance.   Genitourinary: Negative for dysuria.   Musculoskeletal: Negative for joint swelling.   Skin: Negative for color change, rash and wound.   Allergic/Immunologic: Negative for immunocompromised state.   Neurological: Negative for dizziness, seizures, weakness and headaches.   Hematological: Negative for adenopathy. Does not bruise/bleed easily.   Psychiatric/Behavioral: Negative for agitation and confusion.         /78   Ht 154.9 cm (61\")   Wt 84 kg (185 lb 3.2 oz)   BMI 34.99 kg/m²   Objective   Physical Exam  Vitals reviewed.   Constitutional:       Appearance: She is well-developed.   HENT:      Head: Normocephalic and atraumatic.   Eyes:      General: No scleral icterus.     Pupils: Pupils are equal, round, and reactive to light.   Neck:      Vascular: No JVD.   Cardiovascular:      Rate and Rhythm: Normal rate and regular rhythm.      Heart sounds: No murmur heard.    No friction rub. No gallop.   Pulmonary:      Effort: Pulmonary effort is normal. No " retractions.      Breath sounds: Normal breath sounds.   Chest:      Chest wall: No tenderness.   Abdominal:      General: Bowel sounds are normal. There is no distension.      Palpations: Abdomen is soft. There is no mass.      Tenderness: There is no abdominal tenderness.   Musculoskeletal:      Cervical back: Neck supple.   Lymphadenopathy:      Cervical: No cervical adenopathy.   Skin:     General: Skin is warm and dry.      Findings: No rash.   Neurological:      Mental Status: She is alert and oriented to person, place, and time.               Assessment   Diagnoses and all orders for this visit:    1. Ductal carcinoma in situ (DCIS) of left breast (Primary)  -     Case Request; Standing  -     sodium chloride 0.9 % flush 10 mL  -     sodium chloride 0.9 % flush 10 mL  -     sodium chloride 0.9 % infusion 40 mL  -     Mammo Breast Placement Device Initial Without Biopsy Left; Future  -     Case Request    Other orders  -     Follow Anesthesia Guidelines / Protocol; Future  -     Obtain Informed Consent; Future  -     Provide NPO Instructions to Patient; Future  -     Chlorhexidine Skin Prep; Future  -     Follow Anesthesia Guidelines / Protocol; Standing  -     Verify / Perform Chlorhexidine Skin Prep; Standing  -     Verify / Perform Chlorhexidine Skin Prep if Indicated (If Not Already Completed); Standing  -     Insert Peripheral IV; Standing  -     Saline Lock & Maintain IV Access; Standing  -     No Lab Testing Needed; Standing      Shey King is a 72 y.o. female with newly diagnosed left breast DCIS with comedonecrosis and calcifications.  The patient will undergo wire localized excisional biopsy and is aware of the risks and benefits of the procedure.     Body mass index is 34.99 kg/m².

## 2022-12-22 ENCOUNTER — TELEPHONE (OUTPATIENT)
Dept: SURGERY | Facility: CLINIC | Age: 72
End: 2022-12-22

## 2022-12-27 ENCOUNTER — OFFICE VISIT (OUTPATIENT)
Dept: FAMILY MEDICINE CLINIC | Facility: CLINIC | Age: 72
End: 2022-12-27

## 2022-12-27 ENCOUNTER — PRE-ADMISSION TESTING (OUTPATIENT)
Dept: PREADMISSION TESTING | Facility: HOSPITAL | Age: 72
End: 2022-12-27
Payer: COMMERCIAL

## 2022-12-27 VITALS
TEMPERATURE: 97.5 F | OXYGEN SATURATION: 99 % | HEIGHT: 61 IN | BODY MASS INDEX: 34.85 KG/M2 | WEIGHT: 184.6 LBS | DIASTOLIC BLOOD PRESSURE: 78 MMHG | SYSTOLIC BLOOD PRESSURE: 126 MMHG | HEART RATE: 73 BPM

## 2022-12-27 DIAGNOSIS — E03.8 ADULT ONSET HYPOTHYROIDISM: ICD-10-CM

## 2022-12-27 DIAGNOSIS — I10 HTN (HYPERTENSION), BENIGN: ICD-10-CM

## 2022-12-27 DIAGNOSIS — E11.9 TYPE 2 DIABETES MELLITUS WITHOUT COMPLICATION, WITHOUT LONG-TERM CURRENT USE OF INSULIN: Primary | ICD-10-CM

## 2022-12-27 DIAGNOSIS — N30.20 CHRONIC CYSTITIS: ICD-10-CM

## 2022-12-27 LAB
ALBUMIN SERPL-MCNC: 4.7 G/DL (ref 3.5–5.2)
ALBUMIN/GLOB SERPL: 1.5 G/DL
ALP SERPL-CCNC: 86 U/L (ref 39–117)
ALT SERPL W P-5'-P-CCNC: 12 U/L (ref 1–33)
ANION GAP SERPL CALCULATED.3IONS-SCNC: 9 MMOL/L (ref 5–15)
AST SERPL-CCNC: 16 U/L (ref 1–32)
BASOPHILS # BLD AUTO: 0.08 10*3/MM3 (ref 0–0.2)
BASOPHILS NFR BLD AUTO: 1.1 % (ref 0–1.5)
BILIRUB BLD-MCNC: NEGATIVE MG/DL
BILIRUB SERPL-MCNC: 0.5 MG/DL (ref 0–1.2)
BUN SERPL-MCNC: 17 MG/DL (ref 8–23)
BUN/CREAT SERPL: 16.8 (ref 7–25)
CALCIUM SPEC-SCNC: 9.6 MG/DL (ref 8.6–10.5)
CHLORIDE SERPL-SCNC: 104 MMOL/L (ref 98–107)
CHOLEST SERPL-MCNC: 229 MG/DL (ref 0–200)
CLARITY, POC: CLEAR
CO2 SERPL-SCNC: 27 MMOL/L (ref 22–29)
COLOR UR: ABNORMAL
CREAT SERPL-MCNC: 1.01 MG/DL (ref 0.57–1)
DEPRECATED RDW RBC AUTO: 43.8 FL (ref 37–54)
EGFRCR SERPLBLD CKD-EPI 2021: 60 ML/MIN/1.73
EOSINOPHIL # BLD AUTO: 0.2 10*3/MM3 (ref 0–0.4)
EOSINOPHIL NFR BLD AUTO: 2.6 % (ref 0.3–6.2)
ERYTHROCYTE [DISTWIDTH] IN BLOOD BY AUTOMATED COUNT: 13.3 % (ref 12.3–15.4)
EXPIRATION DATE: ABNORMAL
GLOBULIN UR ELPH-MCNC: 3.2 GM/DL
GLUCOSE SERPL-MCNC: 111 MG/DL (ref 65–99)
GLUCOSE UR STRIP-MCNC: ABNORMAL MG/DL
HBA1C MFR BLD: 6.1 % (ref 4.8–5.6)
HCT VFR BLD AUTO: 42 % (ref 34–46.6)
HDLC SERPL-MCNC: 50 MG/DL (ref 40–60)
HGB BLD-MCNC: 13.5 G/DL (ref 12–15.9)
IMM GRANULOCYTES # BLD AUTO: 0.05 10*3/MM3 (ref 0–0.05)
IMM GRANULOCYTES NFR BLD AUTO: 0.7 % (ref 0–0.5)
KETONES UR QL: NEGATIVE
LDLC SERPL CALC-MCNC: 146 MG/DL (ref 0–100)
LDLC/HDLC SERPL: 2.84 {RATIO}
LEUKOCYTE EST, POC: NEGATIVE
LYMPHOCYTES # BLD AUTO: 1.72 10*3/MM3 (ref 0.7–3.1)
LYMPHOCYTES NFR BLD AUTO: 22.7 % (ref 19.6–45.3)
Lab: ABNORMAL
MCH RBC QN AUTO: 28.7 PG (ref 26.6–33)
MCHC RBC AUTO-ENTMCNC: 32.1 G/DL (ref 31.5–35.7)
MCV RBC AUTO: 89.4 FL (ref 79–97)
MONOCYTES # BLD AUTO: 0.56 10*3/MM3 (ref 0.1–0.9)
MONOCYTES NFR BLD AUTO: 7.4 % (ref 5–12)
NEUTROPHILS NFR BLD AUTO: 4.98 10*3/MM3 (ref 1.7–7)
NEUTROPHILS NFR BLD AUTO: 65.5 % (ref 42.7–76)
NITRITE UR-MCNC: NEGATIVE MG/ML
NRBC BLD AUTO-RTO: 0 /100 WBC (ref 0–0.2)
PH UR: 5.5 [PH] (ref 5–8)
PLATELET # BLD AUTO: 266 10*3/MM3 (ref 140–450)
PMV BLD AUTO: 11.3 FL (ref 6–12)
POTASSIUM SERPL-SCNC: 4.6 MMOL/L (ref 3.5–5.2)
PROT SERPL-MCNC: 7.9 G/DL (ref 6–8.5)
PROT UR STRIP-MCNC: ABNORMAL MG/DL
RBC # BLD AUTO: 4.7 10*6/MM3 (ref 3.77–5.28)
RBC # UR STRIP: ABNORMAL /UL
SODIUM SERPL-SCNC: 140 MMOL/L (ref 136–145)
SP GR UR: 1.03 (ref 1–1.03)
TRIGL SERPL-MCNC: 185 MG/DL (ref 0–150)
TSH SERPL DL<=0.05 MIU/L-ACNC: 14.1 UIU/ML (ref 0.27–4.2)
UROBILINOGEN UR QL: NORMAL
VLDLC SERPL-MCNC: 33 MG/DL (ref 5–40)
WBC NRBC COR # BLD: 7.59 10*3/MM3 (ref 3.4–10.8)

## 2022-12-27 PROCEDURE — 80061 LIPID PANEL: CPT | Performed by: NURSE PRACTITIONER

## 2022-12-27 PROCEDURE — 80053 COMPREHEN METABOLIC PANEL: CPT | Performed by: NURSE PRACTITIONER

## 2022-12-27 PROCEDURE — 81003 URINALYSIS AUTO W/O SCOPE: CPT | Performed by: NURSE PRACTITIONER

## 2022-12-27 PROCEDURE — 99214 OFFICE O/P EST MOD 30 MIN: CPT | Performed by: NURSE PRACTITIONER

## 2022-12-27 PROCEDURE — 83036 HEMOGLOBIN GLYCOSYLATED A1C: CPT | Performed by: NURSE PRACTITIONER

## 2022-12-27 PROCEDURE — 84443 ASSAY THYROID STIM HORMONE: CPT | Performed by: NURSE PRACTITIONER

## 2022-12-27 PROCEDURE — 85025 COMPLETE CBC W/AUTO DIFF WBC: CPT | Performed by: NURSE PRACTITIONER

## 2022-12-27 RX ORDER — FLUCONAZOLE 150 MG/1
150 TABLET ORAL ONCE
Qty: 1 TABLET | Refills: 0 | Status: SHIPPED | OUTPATIENT
Start: 2022-12-27 | End: 2022-12-27

## 2022-12-27 NOTE — PROGRESS NOTES
Subjective   Shey King is a 72 y.o. female.   Chief Compliant: The patient presents with Diabetes (Follow up )    History of Present Illness  New findings breast cancer following with surgeon scheduled this week for removal    Thyroid Problem  Presents for follow-up (Hypothyroid) visit. Patient reports no palpitations or weight gain. The symptoms have been stable.   Diabetes  She presents for her follow-up diabetic visit. She has type 2 diabetes mellitus. Her disease course has been fluctuating (Admits she is eating anything she wants with the added stress of Breast cancer and upcoming surgery this week). There are no hypoglycemic associated symptoms. Pertinent negatives for hypoglycemia include no headaches or sweats. Pertinent negatives for diabetes include no blurred vision and no chest pain. There are no hypoglycemic complications. There are no diabetic complications. Risk factors for coronary artery disease include family history and dyslipidemia. She is compliant with treatment most of the time.   Hypertension  This is a chronic (following along with cardiology) problem. The problem is controlled. Associated symptoms include malaise/fatigue. Pertinent negatives include no anxiety, blurred vision, chest pain, headaches, neck pain, orthopnea, palpitations, peripheral edema, PND, shortness of breath or sweats. Current antihypertension treatment includes beta blockers. The current treatment provides moderate improvement. Compliance problems include diet and exercise.  Hypertensive end-organ damage includes kidney disease. Identifiable causes of hypertension include a thyroid problem.      The following portions of the patient's history were reviewed and updated as appropriate: allergies, current medications, past family history, past medical history, past social history, past surgical history and problem list.      Review of Systems   Constitutional: Positive for malaise/fatigue. Negative for weight gain.  "  HENT: Negative.    Eyes: Negative for blurred vision.   Respiratory: Negative for shortness of breath.    Cardiovascular: Negative for chest pain, palpitations, orthopnea and PND.   Gastrointestinal: Negative for abdominal distention, anal bleeding and blood in stool.   Genitourinary: Positive for difficulty urinating.   Musculoskeletal: Negative for neck pain.        Right hand pain    Neurological: Negative.  Negative for headaches.   All other systems reviewed and are negative.      Procedures    Vitals: Blood pressure 126/78, pulse 73, temperature 97.5 °F (36.4 °C), temperature source Temporal, height 154.9 cm (61\"), weight 83.7 kg (184 lb 9.6 oz), SpO2 99 %, not currently breastfeeding.     Allergies:   Allergies   Allergen Reactions   • Bactrim [Sulfamethoxazole-Trimethoprim] Hives   • Ciprofloxacin Hives   • Penicillins Hives   • Steri-Strip Compound Benzoin [Benzoin Compound] Hives   • Sulfa Antibiotics Hives          Objective   Physical Exam   Constitutional: She is oriented to person, place, and time. She appears well-developed. No distress.   HENT:   Head: Normocephalic.   Right Ear: Hearing normal.   Left Ear: Hearing normal.   Cardiovascular: Normal rate, regular rhythm and normal heart sounds.   No murmur heard.  Pulmonary/Chest: Effort normal and breath sounds normal.   Abdominal: Soft. Bowel sounds are normal.   Neurological: She is alert and oriented to person, place, and time.   Skin: Skin is warm and dry. She is not diaphoretic.   Psychiatric: Her behavior is normal.   Nursing note and vitals reviewed.      During this visit the following were done:  Labs Reviewed [x]    Labs Ordered [x]    Radiology Reports Reviewed []    Radiology Ordered []    PCP Records Reviewed []    Referring Provider Records Reviewed []    ER Records Reviewed []    Hospital Records Reviewed []    History Obtained From Family []    Radiology Images Reviewed []    Other Reviewed [x]    Records Requested []      Diagnoses " and all orders for this visit:    1. Type 2 diabetes mellitus without complication, without long-term current use of insulin (HCC) (Primary)  -     empagliflozin (Jardiance) 25 MG tablet tablet; Take 1 tablet by mouth Daily.  Dispense: 90 tablet; Refill: 1  -     CBC Auto Differential; Future  -     Comprehensive Metabolic Panel; Future  -     Hemoglobin A1c; Future  -     Lipid Panel; Future  -     TSH; Future  -     POCT urinalysis dipstick, automated  -     CBC Auto Differential  -     Comprehensive Metabolic Panel  -     Hemoglobin A1c  -     Lipid Panel  -     TSH    2. Chronic cystitis  -     fluconazole (Diflucan) 150 MG tablet; Take 1 tablet by mouth 1 (One) Time for 1 dose.  Dispense: 1 tablet; Refill: 0  -     POCT urinalysis dipstick, automated    3. Adult onset hypothyroidism  -     TSH; Future  Continue with current dose of synthroid    4. HTN (hypertension), benign  -     Comprehensive Metabolic Panel; Future  -     Lipid Panel; Future  Continue with current medications              Patient's Body mass index is 34.88 kg/m². indicating that she is obese (BMI >30). Obesity-related health conditions include the following: hypertension, diabetes mellitus, dyslipidemias and osteoarthritis. Obesity is improving with lifestyle modifications. BMI is is above average; BMI management plan is completed. We discussed portion control and increasing exercise..

## 2022-12-27 NOTE — DISCHARGE INSTRUCTIONS
ARRIVAL TIME per Dr. Mata's office    TAKE the following medications the morning of surgery:  All heart or blood pressure medications    HOLD all diabetic medications the morning of surgery as ordered by physician.    Please discontinue all blood thinners and anticoagulants (except aspirin) prior to surgery as per your surgeon and cardiologist instructions.  Aspirin may be continued up to the day prior to surgery.     CHLORHEXIDINE CLOTHS GIVEN WITH INSTRUCTIONS AND FORM TO RETURN TO HOSPITAL, IF APPLICABLE.    General Instructions:  Do not eat or drink after midnight: includes water, mints, or gum. You may brush your teeth.  Dental appliances that are removable must be taken out day of surgery.  Do not smoke, chew tobacco, or drink alcohol.  Bring medications in original bottles, any inhalers and if applicable your C-PAP/BI-PAP machine.  Bring any papers given to you in the doctor's office.  Wear clean comfortable clothes and socks.  Do not wear contact lenses or make-up. Bring a case for your glasses if applicable.  Bring crutches or walker if applicable.  Leave all other valuables and jewelry at home.    If you were given a blood bank ID arm band remember to bring it with you the day of surgery.    A RESPONSIBLE PERSON MUST REMAIN IN THE WAITING ROOM DURING YOUR PROCEDURE AND A RESPONSIBLE  MUST BE AVAILABLE UPON YOUR DISCHARGE.    Preventing a Surgical Site Infection:  Shower the night before surgery (unless instructed other wise) using a fresh bar of anti-bacterial soap (such as Dial) and clean washcloth. Dry with a clean towel and dress in clean clothing.  For 2 to 3 days before surgery, avoid shaving with a razor near where you will have surgery because the razor can irritate skin and make it easier to develop an infection. Ask your surgeon if you will be receiving antibiotics prior to surgery.  Make sure you, your family, and all healthcare providers clear their hands with soap and water or an  alcohol-based hand  before caring for you or your wound.  If at all possible, quit smoking as many days before surgery as you can.    Day of surgery:  Upon arrival, a Pre-op nurse and Anesthesiologist will review your health history, obtain vital signs, and answer questions you may have. The only belongings needed at this time will be your home medications and if applicable your C-PAP/BI-PAP machine. If you are staying overnight your family can leave the rest of your belongings in the car and bring them to your room later. A Pre-op nurse will start an IV and you may receive medication in preparation for surgery, including something to help you relax. Your family will be able to see you in the Pre-op area. While you are in surgery your family should notify the waiting room  if they leave the waiting room area and provide a contact phone number.    Please be aware that surgery does come with discomfort. We want to make every effort to control your discomfort so please discuss any uncontrolled symptoms with your nurse. Your doctor will most likely have prescribed pain medications.    If you are going home after surgery you will receive individualized written care instructions before being discharged. A responsible adult must drive you to and from the hospital on the day of surgery and stay with you for 24 hours.    If you are staying overnight following surgery, you will be transported to your hospital room following the recovery period.  [facility] has all private rooms.    If you have any questions please call Pre-Admission Testing at [phone].  Deductibles and co-payments are collected on the day of service. Please be prepared to pay the required co-pay, deductible or deposit on the day of service as defined by your plan.

## 2022-12-28 DIAGNOSIS — E11.9 TYPE 2 DIABETES MELLITUS WITHOUT COMPLICATION, WITHOUT LONG-TERM CURRENT USE OF INSULIN: ICD-10-CM

## 2022-12-28 DIAGNOSIS — E03.8 ADULT ONSET HYPOTHYROIDISM: ICD-10-CM

## 2022-12-28 DIAGNOSIS — E03.8 ADULT ONSET HYPOTHYROIDISM: Primary | ICD-10-CM

## 2022-12-28 RX ORDER — LEVOTHYROXINE SODIUM 0.12 MG/1
125 TABLET ORAL DAILY
Qty: 90 TABLET | Refills: 1 | Status: SHIPPED | OUTPATIENT
Start: 2022-12-28

## 2022-12-29 ENCOUNTER — HOSPITAL ENCOUNTER (OUTPATIENT)
Facility: HOSPITAL | Age: 72
Setting detail: HOSPITAL OUTPATIENT SURGERY
Discharge: HOME | End: 2022-12-29
Attending: SURGERY | Admitting: SURGERY
Payer: COMMERCIAL

## 2022-12-29 ENCOUNTER — ANESTHESIA EVENT (OUTPATIENT)
Dept: PERIOP | Facility: HOSPITAL | Age: 72
End: 2022-12-29
Payer: COMMERCIAL

## 2022-12-29 ENCOUNTER — HOSPITAL ENCOUNTER (OUTPATIENT)
Dept: MAMMOGRAPHY | Facility: HOSPITAL | Age: 72
Discharge: HOME | End: 2022-12-29
Payer: COMMERCIAL

## 2022-12-29 ENCOUNTER — ANESTHESIA (OUTPATIENT)
Dept: PERIOP | Facility: HOSPITAL | Age: 72
End: 2022-12-29
Payer: COMMERCIAL

## 2022-12-29 VITALS
HEIGHT: 62 IN | OXYGEN SATURATION: 96 % | DIASTOLIC BLOOD PRESSURE: 75 MMHG | WEIGHT: 184 LBS | HEART RATE: 66 BPM | RESPIRATION RATE: 16 BRPM | TEMPERATURE: 97.3 F | SYSTOLIC BLOOD PRESSURE: 139 MMHG | BODY MASS INDEX: 33.86 KG/M2

## 2022-12-29 DIAGNOSIS — D05.12 DUCTAL CARCINOMA IN SITU (DCIS) OF LEFT BREAST: ICD-10-CM

## 2022-12-29 LAB — GLUCOSE BLDC GLUCOMTR-MCNC: 122 MG/DL (ref 70–130)

## 2022-12-29 PROCEDURE — 25010000002 FENTANYL CITRATE (PF) 50 MCG/ML SOLUTION: Performed by: NURSE ANESTHETIST, CERTIFIED REGISTERED

## 2022-12-29 PROCEDURE — 25010000002 PROPOFOL 10 MG/ML EMULSION: Performed by: NURSE ANESTHETIST, CERTIFIED REGISTERED

## 2022-12-29 PROCEDURE — 19281 PERQ DEVICE BREAST 1ST IMAG: CPT | Performed by: RADIOLOGY

## 2022-12-29 PROCEDURE — 82962 GLUCOSE BLOOD TEST: CPT

## 2022-12-29 PROCEDURE — 25010000002 ONDANSETRON PER 1 MG: Performed by: NURSE ANESTHETIST, CERTIFIED REGISTERED

## 2022-12-29 PROCEDURE — 88307 TISSUE EXAM BY PATHOLOGIST: CPT

## 2022-12-29 PROCEDURE — 19125 EXCISION BREAST LESION: CPT | Performed by: SURGERY

## 2022-12-29 PROCEDURE — C1819 TISSUE LOCALIZATION-EXCISION: HCPCS

## 2022-12-29 RX ORDER — SODIUM CHLORIDE 9 MG/ML
40 INJECTION, SOLUTION INTRAVENOUS AS NEEDED
Status: DISCONTINUED | OUTPATIENT
Start: 2022-12-29 | End: 2022-12-29 | Stop reason: HOSPADM

## 2022-12-29 RX ORDER — ONDANSETRON 2 MG/ML
4 INJECTION INTRAMUSCULAR; INTRAVENOUS AS NEEDED
Status: DISCONTINUED | OUTPATIENT
Start: 2022-12-29 | End: 2022-12-29 | Stop reason: HOSPADM

## 2022-12-29 RX ORDER — ACETAMINOPHEN 325 MG/1
650 TABLET ORAL EVERY 4 HOURS PRN
Qty: 30 TABLET | Refills: 0 | Status: SHIPPED | OUTPATIENT
Start: 2022-12-29

## 2022-12-29 RX ORDER — SODIUM CHLORIDE 0.9 % (FLUSH) 0.9 %
10 SYRINGE (ML) INJECTION EVERY 12 HOURS SCHEDULED
Status: DISCONTINUED | OUTPATIENT
Start: 2022-12-29 | End: 2022-12-29 | Stop reason: HOSPADM

## 2022-12-29 RX ORDER — TRAMADOL HYDROCHLORIDE 50 MG/1
50 TABLET ORAL EVERY 8 HOURS PRN
Qty: 6 TABLET | Refills: 0 | Status: SHIPPED | OUTPATIENT
Start: 2022-12-29 | End: 2023-02-01

## 2022-12-29 RX ORDER — IBUPROFEN 600 MG/1
600 TABLET ORAL EVERY 6 HOURS PRN
Qty: 30 TABLET | Refills: 0 | Status: SHIPPED | OUTPATIENT
Start: 2022-12-29 | End: 2023-02-01

## 2022-12-29 RX ORDER — PROPOFOL 10 MG/ML
VIAL (ML) INTRAVENOUS AS NEEDED
Status: DISCONTINUED | OUTPATIENT
Start: 2022-12-29 | End: 2022-12-29 | Stop reason: SURG

## 2022-12-29 RX ORDER — FENTANYL CITRATE 50 UG/ML
50 INJECTION, SOLUTION INTRAMUSCULAR; INTRAVENOUS
Status: DISCONTINUED | OUTPATIENT
Start: 2022-12-29 | End: 2022-12-29 | Stop reason: HOSPADM

## 2022-12-29 RX ORDER — MAGNESIUM HYDROXIDE 1200 MG/15ML
LIQUID ORAL AS NEEDED
Status: DISCONTINUED | OUTPATIENT
Start: 2022-12-29 | End: 2022-12-29 | Stop reason: HOSPADM

## 2022-12-29 RX ORDER — IPRATROPIUM BROMIDE AND ALBUTEROL SULFATE 2.5; .5 MG/3ML; MG/3ML
3 SOLUTION RESPIRATORY (INHALATION) ONCE AS NEEDED
Status: DISCONTINUED | OUTPATIENT
Start: 2022-12-29 | End: 2022-12-29 | Stop reason: HOSPADM

## 2022-12-29 RX ORDER — SODIUM CHLORIDE 0.9 % (FLUSH) 0.9 %
10 SYRINGE (ML) INJECTION AS NEEDED
Status: DISCONTINUED | OUTPATIENT
Start: 2022-12-29 | End: 2022-12-29 | Stop reason: HOSPADM

## 2022-12-29 RX ORDER — SODIUM CHLORIDE, SODIUM LACTATE, POTASSIUM CHLORIDE, CALCIUM CHLORIDE 600; 310; 30; 20 MG/100ML; MG/100ML; MG/100ML; MG/100ML
125 INJECTION, SOLUTION INTRAVENOUS ONCE
Status: COMPLETED | OUTPATIENT
Start: 2022-12-29 | End: 2022-12-29

## 2022-12-29 RX ORDER — MEPERIDINE HYDROCHLORIDE 25 MG/ML
12.5 INJECTION INTRAMUSCULAR; INTRAVENOUS; SUBCUTANEOUS
Status: DISCONTINUED | OUTPATIENT
Start: 2022-12-29 | End: 2022-12-29 | Stop reason: HOSPADM

## 2022-12-29 RX ORDER — FAMOTIDINE 10 MG/ML
INJECTION, SOLUTION INTRAVENOUS AS NEEDED
Status: DISCONTINUED | OUTPATIENT
Start: 2022-12-29 | End: 2022-12-29 | Stop reason: SURG

## 2022-12-29 RX ORDER — OXYCODONE HYDROCHLORIDE AND ACETAMINOPHEN 5; 325 MG/1; MG/1
1 TABLET ORAL ONCE AS NEEDED
Status: DISCONTINUED | OUTPATIENT
Start: 2022-12-29 | End: 2022-12-29 | Stop reason: HOSPADM

## 2022-12-29 RX ORDER — CLINDAMYCIN PHOSPHATE 900 MG/50ML
900 INJECTION INTRAVENOUS ONCE
Status: COMPLETED | OUTPATIENT
Start: 2022-12-29 | End: 2022-12-29

## 2022-12-29 RX ORDER — FENTANYL CITRATE 50 UG/ML
INJECTION, SOLUTION INTRAMUSCULAR; INTRAVENOUS AS NEEDED
Status: DISCONTINUED | OUTPATIENT
Start: 2022-12-29 | End: 2022-12-29 | Stop reason: SURG

## 2022-12-29 RX ORDER — SODIUM CHLORIDE, SODIUM LACTATE, POTASSIUM CHLORIDE, CALCIUM CHLORIDE 600; 310; 30; 20 MG/100ML; MG/100ML; MG/100ML; MG/100ML
100 INJECTION, SOLUTION INTRAVENOUS ONCE AS NEEDED
Status: DISCONTINUED | OUTPATIENT
Start: 2022-12-29 | End: 2022-12-29 | Stop reason: HOSPADM

## 2022-12-29 RX ORDER — SODIUM CHLORIDE, SODIUM LACTATE, POTASSIUM CHLORIDE, CALCIUM CHLORIDE 600; 310; 30; 20 MG/100ML; MG/100ML; MG/100ML; MG/100ML
INJECTION, SOLUTION INTRAVENOUS CONTINUOUS PRN
Status: DISCONTINUED | OUTPATIENT
Start: 2022-12-29 | End: 2022-12-29 | Stop reason: SURG

## 2022-12-29 RX ORDER — BUPIVACAINE HYDROCHLORIDE AND EPINEPHRINE 2.5; 5 MG/ML; UG/ML
INJECTION, SOLUTION EPIDURAL; INFILTRATION; INTRACAUDAL; PERINEURAL AS NEEDED
Status: DISCONTINUED | OUTPATIENT
Start: 2022-12-29 | End: 2022-12-29 | Stop reason: HOSPADM

## 2022-12-29 RX ORDER — ONDANSETRON 2 MG/ML
INJECTION INTRAMUSCULAR; INTRAVENOUS AS NEEDED
Status: DISCONTINUED | OUTPATIENT
Start: 2022-12-29 | End: 2022-12-29 | Stop reason: SURG

## 2022-12-29 RX ORDER — MIDAZOLAM HYDROCHLORIDE 1 MG/ML
0.5 INJECTION INTRAMUSCULAR; INTRAVENOUS
Status: DISCONTINUED | OUTPATIENT
Start: 2022-12-29 | End: 2022-12-29 | Stop reason: HOSPADM

## 2022-12-29 RX ADMIN — SODIUM CHLORIDE, POTASSIUM CHLORIDE, SODIUM LACTATE AND CALCIUM CHLORIDE 125 ML/HR: 600; 310; 30; 20 INJECTION, SOLUTION INTRAVENOUS at 09:46

## 2022-12-29 RX ADMIN — FENTANYL CITRATE 50 MCG: 50 INJECTION, SOLUTION INTRAMUSCULAR; INTRAVENOUS at 10:43

## 2022-12-29 RX ADMIN — FENTANYL CITRATE 50 MCG: 50 INJECTION, SOLUTION INTRAMUSCULAR; INTRAVENOUS at 10:09

## 2022-12-29 RX ADMIN — ONDANSETRON 4 MG: 2 INJECTION INTRAMUSCULAR; INTRAVENOUS at 10:09

## 2022-12-29 RX ADMIN — PROPOFOL 130 MG: 10 INJECTION, EMULSION INTRAVENOUS at 10:14

## 2022-12-29 RX ADMIN — SODIUM CHLORIDE, POTASSIUM CHLORIDE, SODIUM LACTATE AND CALCIUM CHLORIDE: 600; 310; 30; 20 INJECTION, SOLUTION INTRAVENOUS at 10:07

## 2022-12-29 RX ADMIN — CLINDAMYCIN PHOSPHATE 900 MG: 900 INJECTION, SOLUTION INTRAVENOUS at 10:16

## 2022-12-29 RX ADMIN — FAMOTIDINE 20 MG: 10 INJECTION, SOLUTION INTRAVENOUS at 10:09

## 2022-12-29 NOTE — OP NOTE
BREAST BIOPSY WITH NEEDLE LOCALIZATION  Procedure Note    Shey King  12/29/2022    Pre-op Diagnosis:   Ductal carcinoma in situ (DCIS) of left breast [D05.12]    Post-op Diagnosis:     Post-Op Diagnosis Codes:     * Ductal carcinoma in situ (DCIS) of left breast [D05.12]    Procedure(s):  BREAST BIOPSY WITH NEEDLE LOCALIZATION    Surgeon(s):  Dong Mata MD    Anesthesia: Choice    Staff:   Circulator: Wilson Tan RN  Scrub Person: Christel Hallman  Assistant: Zaheer Duke    Findings: Successful removal of biopsy proven, wire localized left breast DCIS confirmed with specimen mammogram     Operative Procedure: The patient underwent preoperative wire localization and mammography was used to aid in incision planning.  She was then taken operating suite and placed upon operating table.  Bilateral sequential compression devices were in place and LMA anesthesia was administered.  The left breast and guidewire were prepped and draped in usual sterile fashion after ultrasound visualization identify the biopsy clip and targeted area for excision.  Timeout procedure was performed and preoperative antibiotics were confirmed.  A curvilinear incision parallel to the border the nipple-areolar complex was made in the lower outer quadrant of the left breast between the guidewire entry site and border the nipple areolar complex.  Subcutaneous flaps were elevated circumferentially and the guidewire was pulled to the wound.  All tissue surrounding the guidewire was then excised inferiorly to allow removal of the specimen which was sent for specimen mammogram.  Specimen mammogram confirmed imaging abnormality, biopsy clip, and guidewire all contained within the specimen.  At this time the wound was irrigated with sterile water and all irrigant was suctioned free.  Hemostasis was achieved with cautery and the wound was closed with deep dermal Vicryl followed by Monocryl subcuticular skin approximation and skin  affix.  Patient tolerated the procedure well    Estimated Blood Loss: 5 mL    Specimens:   Wire localized excisional biopsy left           Drains: None    Grafts/Implants: None    Complications: None      Dong Mata MD     Date: 12/29/2022  Time: 10:40 EST

## 2022-12-29 NOTE — ANESTHESIA PROCEDURE NOTES
Airway  Urgency: elective    Date/Time: 12/29/2022 10:15 AM  Airway not difficult    General Information and Staff    Patient location during procedure: OR  CRNA/CAA: Nieves Barger CRNA    Indications and Patient Condition  Indications for airway management: airway protection    Preoxygenated: yes  MILS maintained throughout  Mask difficulty assessment: 0 - not attempted    Final Airway Details  Final airway type: supraglottic airway      Successful airway: unique  Size 3     Number of attempts at approach: 1  Assessment: lips, teeth, and gum same as pre-op

## 2022-12-29 NOTE — ANESTHESIA POSTPROCEDURE EVALUATION
Patient: Shey King    Procedure Summary     Date: 12/29/22 Room / Location: [facility] OR  /  [facility] OR    Anesthesia Start: 1007 Anesthesia Stop: 1048    Procedure: BREAST BIOPSY WITH NEEDLE LOCALIZATION (Left: Breast) Diagnosis:       Ductal carcinoma in situ (DCIS) of left breast      (Ductal carcinoma in situ (DCIS) of left breast [D05.12])    Surgeons: Dong Mata MD Provider: Jacob Hoffman MD    Anesthesia Type: general ASA Status: 2          Anesthesia Type: general    Vitals  Vitals Value Taken Time   /72 12/29/22 1049   Temp 97.1 °F (36.2 °C) 12/29/22 1049   Pulse 78 12/29/22 1049   Resp 12 12/29/22 1049   SpO2 98 % 12/29/22 1049           Post Anesthesia Care and Evaluation    Patient location during evaluation: PHASE II  Patient participation: complete - patient participated  Level of consciousness: awake and alert  Pain score: 1  Pain management: adequate    Airway patency: patent  Anesthetic complications: No anesthetic complications  PONV Status: controlled  Cardiovascular status: acceptable  Respiratory status: acceptable  Hydration status: acceptable

## 2022-12-29 NOTE — ANESTHESIA PREPROCEDURE EVALUATION
Anesthesia Evaluation     Patient summary reviewed and Nursing notes reviewed   no history of anesthetic complications:  NPO Solid Status: > 8 hours  NPO Liquid Status: > 8 hours           Airway   Mallampati: II  TM distance: >3 FB  Neck ROM: full  no difficulty expected  Dental - normal exam     Pulmonary - normal exam   (+) pneumonia , shortness of breath,   (-) asthma, not a smoker  Cardiovascular - normal exam  Exercise tolerance: good (4-7 METS)    NYHA Classification: II    (+) hyperlipidemia,   (-) past MI, dysrhythmias, angina, CHF      Neuro/Psych- negative ROS  (-) seizures, CVA  GI/Hepatic/Renal/Endo    (+) obesity,  hiatal hernia, GERD, GI bleeding , diabetes mellitus, thyroid problem   (-) liver disease, no renal disease    Musculoskeletal     (+) back pain,   (-) neck pain  Abdominal  - normal exam    Bowel sounds: normal.   Substance History - negative use     OB/GYN negative ob/gyn ROS         Other - negative ROS       (-) arthritis                    Anesthesia Plan    ASA 2     general     intravenous induction     Anesthetic plan, risks, benefits, and alternatives have been provided, discussed and informed consent has been obtained with: patient.    Use of blood products discussed with patient  Consented to blood products.

## 2023-01-17 ENCOUNTER — OFFICE VISIT (OUTPATIENT)
Dept: SURGERY | Facility: CLINIC | Age: 73
End: 2023-01-17
Payer: MEDICARE

## 2023-01-17 VITALS — BODY MASS INDEX: 33.86 KG/M2 | WEIGHT: 184 LBS | HEIGHT: 62 IN

## 2023-01-17 DIAGNOSIS — D05.12 DUCTAL CARCINOMA IN SITU (DCIS) OF LEFT BREAST: Primary | ICD-10-CM

## 2023-01-17 PROCEDURE — 99024 POSTOP FOLLOW-UP VISIT: CPT | Performed by: SURGERY

## 2023-01-17 NOTE — PROGRESS NOTES
Subjective   Shey King is a 72 y.o. female  is here today for follow-up.         Shey King is a 72 y.o. female here for follow up after wire localized lumpectomy of the left breast.  The patient is doing well and her incision has healed without issue.  She had a small area of skin dehiscence that is granulating nicely.  Final pathology is consistent with DCIS and margins of excision are negative.    LEFT BREAST, LUMPECTOMY: Intermediate grade DCIS, margins free            RLL     Procedure: Excision (less than total mastectomy)   Specimen Laterality: Left   Tumor Site: Not specified   Histologic Type: Ductal carcinoma in situ   Size of DCIS: 10 x 3 x 2 mm, 4 of 19 blocks   Architectural Patterns: Cribriform   Nuclear Grade: Grade II (intermediate)   Necrosis: Present, focal (small foci of necrosis)   Microcalcifications: Present in DCIS and in nonneoplastic tissue   Margin Status: All margins negative for DCIS   Distance from DCIS to Closest Margin: 1 mm from inferior margin   Distance from DCIS to Anterior Margin: 2 mm   Distance from DCIS to Posterior Margin: 4 mm   Regional Lymph Node Status: No regional lymph nodes submitted or found   PATHOLOGIC STAGE (pTNM, AJCC 8th Edition): pTis (DCIS), pNx   Biomarker Testing Performed on Previous Biopsy:  (ER) Positive 100 %   (PgR) Positive 5 %         Assessment     Diagnoses and all orders for this visit:    1. Ductal carcinoma in situ (DCIS) of left breast (Primary)      Shey King is a 72 y.o. female doing well after wire localized lumpectomy for an area of DCIS of the left breast.  Final pathology is consistent with hormone positive DCIS with no invasive disease.  Margins of excision are negative.  The patient will be referred to hematology oncology and radiation oncology to discuss adjuvant hormone and radiation therapy.

## 2023-01-20 ENCOUNTER — CONSULT (OUTPATIENT)
Dept: ONCOLOGY | Facility: CLINIC | Age: 73
End: 2023-01-20
Payer: COMMERCIAL

## 2023-01-20 VITALS
HEIGHT: 62 IN | SYSTOLIC BLOOD PRESSURE: 130 MMHG | HEART RATE: 75 BPM | DIASTOLIC BLOOD PRESSURE: 76 MMHG | OXYGEN SATURATION: 97 % | WEIGHT: 184 LBS | BODY MASS INDEX: 33.86 KG/M2 | TEMPERATURE: 97.5 F | RESPIRATION RATE: 18 BRPM

## 2023-01-20 DIAGNOSIS — M89.9 BONE DISEASE: ICD-10-CM

## 2023-01-20 DIAGNOSIS — D05.12 DUCTAL CARCINOMA IN SITU (DCIS) OF LEFT BREAST: Primary | ICD-10-CM

## 2023-01-20 PROCEDURE — 99205 OFFICE O/P NEW HI 60 MIN: CPT | Performed by: INTERNAL MEDICINE

## 2023-01-20 RX ORDER — ANASTROZOLE 1 MG/1
1 TABLET ORAL DAILY
Qty: 30 TABLET | Refills: 11 | Status: SHIPPED | OUTPATIENT
Start: 2023-01-20 | End: 2023-02-01

## 2023-01-20 NOTE — PROGRESS NOTES
Subjective     Date: 1/20/2023    Referring Provider  Dong Mata MD    Chief Complaint  Ductal carcinoma of the left breast, estrogen receptor positive, status post lumpectomy    Subjective          Shey King is a 72 y.o. female who presents today to [facility] for initial evaluation .    HPI:   72-year-old female with a history of diabetes mellitus type 2, hyperlipidemia, hypothyroidism, frequent UTIs who presents to establish care regarding ductal carcinoma in situ of the left breast, estrogen receptor positive.     Oncological history:  She notes intermittent breast pain on the left side for several months, was finally directed to get a screening mammogram  - October 26, 2022.  Screening mammogram showed calcification left breast middle posterior third lateral aspect, which are new.  Other calcifications throughout left breast appear stable.   - 12/6/22. Diagnostic mammogram showed grouped calcifications in the left 3-4 o'clock region.   - 12/14/2022: stereotactic biopsy. Pathology with intermediate to high-grade cribriform ductal carcinoma in situ with associated necrosis and calcification.  -12/29/2022: Underwent lumpectomy.  Final pathology with ductal carcinoma in situ.  10 x 3 x 2 mm, 4 of 19 blocks.  Grade 2 (intermediate).  Distance from DCIS to closest margin 1 mm from inferior margin, 2 mm from anterior margin, 4 mm to posterior margin. PTis.  Estrogen receptor 100% positive, progesterone 5% positive    She was recently seen by Dr. Mata on 1/17/2023.  Overall doing well after lumpectomy.  He presents for further therapy and discussion.    GYN History:  Menarche at age 14. P3, G3  Age of first pregnancy:   Age of menopause: 52  Breast feed: no  Birth control: yes  Hormone replacement: no    Never had DEXA bone scan in the past.  Denies ever being treated for diagnosed with osteoporosis.  Currently taking vitamin D tablets.      The following  portions of the patient's history were reviewed and updated as appropriate: allergies, current medications, past family history, past medical history, past social history, past surgical history and problem list.    Objective     Objective     Allergy:   Allergies   Allergen Reactions   • Other Unknown - High Severity     Blisters from EKG stickers   • Bactrim [Sulfamethoxazole-Trimethoprim] Hives   • Ciprofloxacin Hives   • Penicillins Hives   • Steri-Strip Compound Benzoin [Benzoin Compound] Hives   • Sulfa Antibiotics Hives        Current Medications:   Current Outpatient Medications   Medication Sig Dispense Refill   • acetaminophen (TYLENOL) 325 MG tablet Take 2 tablets by mouth Every 4 (Four) Hours As Needed for Mild Pain. 30 tablet 0   • aspirin 81 MG chewable tablet Chew 81 mg daily.     • empagliflozin (Jardiance) 10 MG tablet tablet Take 1 tablet by mouth Daily. 90 tablet 1   • fenofibrate (TRICOR) 145 MG tablet Take 1 tablet by mouth Daily. 90 tablet 1   • glucose blood test strip For daily testing  E11.65  (One Touch Ultra) 50 each 11   • glucose monitor monitoring kit 1 each as needed (DM II). 1 each 0   • ibuprofen (ADVIL,MOTRIN) 600 MG tablet Take 1 tablet by mouth Every 6 (Six) Hours As Needed for Mild Pain. 30 tablet 0   • Lancets (OneTouch Delica Plus [id number]) misc USE TO TEST BLOOD SUGAR DAILY AS DIRECTED     • Lancets Misc. misc For Daily testing  E11.65 50 each 11   • levothyroxine (SYNTHROID, LEVOTHROID) 125 MCG tablet Take 1 tablet by mouth Daily. 90 tablet 1   • traMADol (ULTRAM) 50 MG tablet Take 1 tablet by mouth Every 8 (Eight) Hours As Needed for Moderate Pain. 6 tablet 0   • trimethoprim (TRIMPEX) 100 MG tablet TAKE 1 TABLET BY MOUTH TWICE DAILY 30 tablet 5   • vitamin D (ERGOCALCIFEROL) 1.25 MG (13108 UT) capsule capsule TAKE 1 CAPSULE BY MOUTH 1 TIME EVERY WEEK 5 capsule 2   • anastrozole (ARIMIDEX) 1 MG tablet Take 1 tablet by mouth Daily. 30 tablet 11   • Calcium Citrate-Vitamin D  250-2.5 MG-MCG per tablet Take 1 tablet by mouth 2 (Two) Times a Day. 30 tablet 11     No current facility-administered medications for this visit.       Past Medical History:  Past Medical History:   Diagnosis Date   • Abdominal pain, LLQ (left lower quadrant)    • Abnormal ECG aug 2021   • Abnormal Pap smear of cervix     several years ago   • Anemia     years ago   • Breast cancer (HCC)    • Cataract early stage   • Cystitis    • Diabetes mellitus (HCC)    • Diarrhea    • Diverticulitis of colon    • Diverticulosis    • Ductal carcinoma in situ (DCIS) of left breast 12/21/2022   • Encounter for cholecystectomy 2018   • Gallstones    • GERD (gastroesophageal reflux disease)    • Hiatal hernia    • Hyperglycemia    • Hyperlipidemia    • Hypothyroidism    • Muscle spasm     FLANK PAIN   • OAB (overactive bladder)    • Pneumonia     years ago   • Rectal bleeding    • UTI (urinary tract infection)        Past Surgical History:  Past Surgical History:   Procedure Laterality Date   • BREAST BIOPSY Bilateral 2005    benign   • BREAST BIOPSY Left 12/29/2022    Procedure: BREAST BIOPSY WITH NEEDLE LOCALIZATION;  Surgeon: Dong Mata MD;  Location: [facility];  Service: General;  Laterality: Left;   • CHOLECYSTECTOMY  2018?   • ENDOSCOPY     • ENDOSCOPY N/A 1/11/2018    Procedure: ESOPHAGOGASTRODUODENOSCOPY;  Surgeon: Dong Mata MD;  Location: [facility];  Service:    • MAMMO BILATERAL  09/2015   • OVARIAN CYST DRAINAGE     • MI LAPAROSCOPY SURG CHOLECYSTECTOMY N/A 1/11/2018    Procedure: CHOLECYSTECTOMY LAPAROSCOPIC;  Surgeon: Dong Mata MD;  Location: [facility];  Service: General   • US GUIDED LYMPH NODE BIOPSY  4/11/2018       Social History:  Social History     Socioeconomic History   • Marital status:    Tobacco Use   • Smoking status: Never   • Smokeless tobacco: Never   • Tobacco comments:     never   Vaping Use   • Vaping Use: Never used   Substance and Sexual Activity   • Alcohol  "use: No   • Drug use: No   • Sexual activity: Not Currently     Partners: Male     Shey King  reports that she has never smoked. She has never used smokeless tobacco..      Family History:  Family History   Problem Relation Age of Onset   • Heart disease Mother         also had cancer kidney   • Cancer Mother         kidney   • Thyroid disease Mother    • Heart disease Father    • Diabetes Brother    • Breast cancer Neg Hx        Review of Systems:  Review of Systems   Constitutional: Negative.    HENT: Negative.    Respiratory: Negative.    Cardiovascular: Negative.    Gastrointestinal: Negative.    Genitourinary: Negative.    Musculoskeletal: Negative.    Skin: Negative.    Neurological: Negative.    Hematological: Negative.    Psychiatric/Behavioral: Negative.        Vital Signs:   /76   Pulse 75   Temp 97.5 °F (36.4 °C)   Resp 18   Ht 157.5 cm (62\")   Wt 83.5 kg (184 lb)   SpO2 97%   BMI 33.65 kg/m²      Physical Exam:  Physical Exam  Constitutional:       Appearance: Normal appearance.   HENT:      Head: Normocephalic and atraumatic.      Mouth/Throat:      Mouth: Mucous membranes are moist.      Pharynx: Oropharynx is clear.   Eyes:      Extraocular Movements: Extraocular movements intact.      Pupils: Pupils are equal, round, and reactive to light.   Cardiovascular:      Rate and Rhythm: Normal rate and regular rhythm.   Pulmonary:      Effort: Pulmonary effort is normal.      Breath sounds: Normal breath sounds.   Chest:          Comments: Status post L breast lumpectomy   Abdominal:      General: Abdomen is flat. Bowel sounds are normal.      Palpations: Abdomen is soft.   Neurological:      Mental Status: She is alert.         PHQ-9 Score  PHQ-9 Total Score: 0     Pain Score  Vitals:    01/20/23 1323   BP: 130/76   Pulse: 75   Resp: 18   Temp: 97.5 °F (36.4 °C)   SpO2: 97%   Weight: 83.5 kg (184 lb)   Height: 157.5 cm (62\")   PainSc: 0-No pain       ECOG score: 0             Lab " Review  Lab Results   Component Value Date    WBC 7.59 12/27/2022    HGB 13.5 12/27/2022    HCT 42.0 12/27/2022    MCV 89.4 12/27/2022    RDW 13.3 12/27/2022     12/27/2022    NEUTRORELPCT 65.5 12/27/2022    LYMPHORELPCT 22.7 12/27/2022    MONORELPCT 7.4 12/27/2022    EOSRELPCT 2.6 12/27/2022    BASORELPCT 1.1 12/27/2022    NEUTROABS 4.98 12/27/2022    LYMPHSABS 1.72 12/27/2022       Lab Results   Component Value Date     12/27/2022    K 4.6 12/27/2022    CO2 27.0 12/27/2022     12/27/2022    BUN 17 12/27/2022    CREATININE 1.01 (H) 12/27/2022    EGFRIFNONA 57 (L) 01/26/2022    EGFRIFAFRI 74 09/06/2016    GLUCOSE 111 (H) 12/27/2022    CALCIUM 9.6 12/27/2022    ALKPHOS 86 12/27/2022    AST 16 12/27/2022    ALT 12 12/27/2022    BILITOT 0.5 12/27/2022    ALBUMIN 4.7 12/27/2022    PROTEINTOT 7.9 12/27/2022    MG 1.7 07/13/2021    PHOS 3.5 12/01/2021       Radiology Results  Screening mammogram 10/27/22:  FINDINGS:     Breast Composition: The breasts are heterogenously dense, which may  obscure small masses.   Important Findings:    Biopsy related changes right breast with marker clip. Stable benign  calcifications right breast are noted. Calcifications left breast  middle-posterior third lateral aspect have developed. Other  calcifications throughout the left breast appear stable. Biopsy related  changes upper outer quadrant left breast with marker clip noted.   No suspicious calcifications or areas of architectural distortion.  No  dominant masses or skin thickening    Diagnostic mammogram 12/6/2022:  IMPRESSION:  Grouped calcifications in the left 3:00 to 4:00 region.      Pathology:   Procedure: Excision (less than total mastectomy)   Specimen Laterality: Left   Tumor Site: Not specified   Histologic Type: Ductal carcinoma in situ   Size of DCIS: 10 x 3 x 2 mm, 4 of 19 blocks   Architectural Patterns: Cribriform   Nuclear Grade: Grade II (intermediate)   Necrosis: Present, focal (small foci of  necrosis)   Microcalcifications: Present in DCIS and in nonneoplastic tissue   Margin Status: All margins negative for DCIS   Distance from DCIS to Closest Margin: 1 mm from inferior margin   Distance from DCIS to Anterior Margin: 2 mm   Distance from DCIS to Posterior Margin: 4 mm   Regional Lymph Node Status: No regional lymph nodes submitted or found   PATHOLOGIC STAGE (pTNM, AJCC 8th Edition): pTis (DCIS), pNx   Biomarker Testing Performed on Previous Biopsy:  (ER) Positive 100 %   (PgR) Positive 5 %     CLINICAL HISTORY:   Ductal carcinoma in situ (DCIS) of left breast     Assessment / Plan         Assessment and Plan   72-year-old female who presents today with hormone positive left breast ductal carcinoma in situ, status postlumpectomy.    #Ductal carcinoma in situ, left breast, hormone positive  -Final pathology with 10 x 3 x 2 mm ductal carcinoma in situ, grade 2, 1 mm from inferior margin, 2 mm from anterior margin, 4 mm from posterior margin  -We discussed adjuvant radiation, patient has an appointment with radiation oncology on February 1  -We also discussed adjuvant hormonal therapy which has shown to decrease recurrence by at least 33%.  We would prescribe anastrozole 1 mg daily to start after radiation therapy for at least 5 years.   -Some adverse effects such as hot flashes, myalgia, arthralgia, osteopenia were discussed with patient.  Chemo expert printout information was also given.  Patient is agreeable to the therapy as above  -Her case will also be discussed in tumor board due to close margin   -Patient to receive first mammogram in 6 months (approximately June or July 2023), every 12 months thereafter  -She will continue history and physical and exam every 6 months for the first 5 years, yearly thereafter according to NCCN    #Bone health  -DEXA scan ordered today  -Prescribed vitamin D and calcium supplement      Discussed possible differential diagnoses, testing, treatment, recommended  non-pharmacological interventions, risks, warning signs to monitor for that would indicate need for follow-up in clinic or ER. If no improvement with these regimens or you have new or worsening symptoms follow-up. Patient verbalizes understanding and agreement with plan of care. Denies further needs or concerns.     Patient was given instructions and counseling regarding her condition and for health maintenance advised.    I spent 60 minutes with Shey King today.  In the office today, more than 50% of this time was spent face-to-face with her  in counseling / coordination of care, reviewing her interim medical history and counseling on the current treatment plan.  All questions were answered to her satisfaction.      Meds ordered during this visit  New Medications Ordered This Visit   Medications   • Calcium Citrate-Vitamin D 250-2.5 MG-MCG per tablet     Sig: Take 1 tablet by mouth 2 (Two) Times a Day.     Dispense:  30 tablet     Refill:  11   • anastrozole (ARIMIDEX) 1 MG tablet     Sig: Take 1 tablet by mouth Daily.     Dispense:  30 tablet     Refill:  11       Visit Diagnoses    ICD-10-CM ICD-9-CM   1. Ductal carcinoma in situ (DCIS) of left breast  D05.12 233.0   2. Bone disease  M89.9 733.90       Follow Up   In 2 weeks after discussion with radiation oncology and tumor board      This document has been electronically signed by Maia Ford MD   January 20, 2023 14:20 EST    Dictated Utilizing Dragon Dictation: Part of this note may be an electronic transcription/translation of spoken language to printed text using the Dragon Dictation System.

## 2023-01-24 ENCOUNTER — TRANSCRIBE ORDERS (OUTPATIENT)
Dept: ADMINISTRATIVE | Facility: HOSPITAL | Age: 73
End: 2023-01-24
Payer: COMMERCIAL

## 2023-01-24 DIAGNOSIS — Z78.0 POSTMENOPAUSAL: Primary | ICD-10-CM

## 2023-01-25 ENCOUNTER — TELEPHONE (OUTPATIENT)
Dept: FAMILY MEDICINE CLINIC | Facility: CLINIC | Age: 73
End: 2023-01-25
Payer: COMMERCIAL

## 2023-01-25 NOTE — TELEPHONE ENCOUNTER
Patient called indicating she didn't know if she needed to schedule an appointment to see Tashia to follow up on thyroid levels, or could just get labs drawn.  Labs have already been ordered, patient notified to just stop by and have them drawn. No appointment necessary. Patient verbalized understanding.

## 2023-01-27 ENCOUNTER — HOSPITAL ENCOUNTER (OUTPATIENT)
Dept: BONE DENSITY | Facility: HOSPITAL | Age: 73
Discharge: HOME | End: 2023-01-27
Admitting: INTERNAL MEDICINE
Payer: COMMERCIAL

## 2023-01-27 DIAGNOSIS — M89.9 BONE DISEASE: ICD-10-CM

## 2023-01-27 PROCEDURE — 77080 DXA BONE DENSITY AXIAL: CPT

## 2023-01-27 PROCEDURE — 77080 DXA BONE DENSITY AXIAL: CPT | Performed by: RADIOLOGY

## 2023-01-30 DIAGNOSIS — E78.5 HYPERLIPIDEMIA, UNSPECIFIED HYPERLIPIDEMIA TYPE: ICD-10-CM

## 2023-01-30 RX ORDER — FENOFIBRATE 145 MG/1
145 TABLET, COATED ORAL DAILY
Qty: 90 TABLET | Refills: 1 | Status: SHIPPED | OUTPATIENT
Start: 2023-01-30 | End: 2023-03-27 | Stop reason: SDUPTHER

## 2023-01-31 ENCOUNTER — LAB (OUTPATIENT)
Dept: FAMILY MEDICINE CLINIC | Facility: CLINIC | Age: 73
End: 2023-01-31
Payer: MEDICARE

## 2023-01-31 DIAGNOSIS — E03.8 ADULT ONSET HYPOTHYROIDISM: ICD-10-CM

## 2023-01-31 PROCEDURE — 84443 ASSAY THYROID STIM HORMONE: CPT | Performed by: NURSE PRACTITIONER

## 2023-01-31 PROCEDURE — 36415 COLL VENOUS BLD VENIPUNCTURE: CPT

## 2023-02-01 ENCOUNTER — CONSULT (OUTPATIENT)
Dept: RADIATION ONCOLOGY | Facility: HOSPITAL | Age: 73
End: 2023-02-01
Payer: COMMERCIAL

## 2023-02-01 ENCOUNTER — APPOINTMENT (OUTPATIENT)
Dept: RADIATION ONCOLOGY | Facility: HOSPITAL | Age: 73
End: 2023-02-01
Payer: COMMERCIAL

## 2023-02-01 ENCOUNTER — OFFICE VISIT (OUTPATIENT)
Dept: ONCOLOGY | Facility: CLINIC | Age: 73
End: 2023-02-01
Payer: COMMERCIAL

## 2023-02-01 ENCOUNTER — LAB (OUTPATIENT)
Dept: FAMILY MEDICINE CLINIC | Facility: CLINIC | Age: 73
End: 2023-02-01
Payer: MEDICARE

## 2023-02-01 ENCOUNTER — TELEPHONE (OUTPATIENT)
Dept: FAMILY MEDICINE CLINIC | Facility: CLINIC | Age: 73
End: 2023-02-01
Payer: COMMERCIAL

## 2023-02-01 VITALS
BODY MASS INDEX: 33.68 KG/M2 | TEMPERATURE: 96.9 F | HEART RATE: 80 BPM | HEIGHT: 62 IN | DIASTOLIC BLOOD PRESSURE: 77 MMHG | SYSTOLIC BLOOD PRESSURE: 148 MMHG | OXYGEN SATURATION: 98 % | WEIGHT: 183 LBS | RESPIRATION RATE: 18 BRPM

## 2023-02-01 VITALS
SYSTOLIC BLOOD PRESSURE: 138 MMHG | BODY MASS INDEX: 33.73 KG/M2 | HEART RATE: 95 BPM | DIASTOLIC BLOOD PRESSURE: 87 MMHG | WEIGHT: 184.4 LBS | OXYGEN SATURATION: 96 % | TEMPERATURE: 97.4 F | RESPIRATION RATE: 18 BRPM

## 2023-02-01 DIAGNOSIS — D05.12 DUCTAL CARCINOMA IN SITU (DCIS) OF LEFT BREAST: Primary | ICD-10-CM

## 2023-02-01 DIAGNOSIS — R30.0 DYSURIA: ICD-10-CM

## 2023-02-01 DIAGNOSIS — Z78.0 POSTMENOPAUSAL: ICD-10-CM

## 2023-02-01 DIAGNOSIS — E03.8 ADULT ONSET HYPOTHYROIDISM: Primary | ICD-10-CM

## 2023-02-01 DIAGNOSIS — R35.0 FREQUENCY OF URINATION: Primary | ICD-10-CM

## 2023-02-01 DIAGNOSIS — E11.9 TYPE 2 DIABETES MELLITUS WITHOUT COMPLICATION: ICD-10-CM

## 2023-02-01 DIAGNOSIS — R35.0 FREQUENCY OF URINATION: ICD-10-CM

## 2023-02-01 LAB — TSH SERPL DL<=0.05 MIU/L-ACNC: 0.27 UIU/ML (ref 0.27–4.2)

## 2023-02-01 PROCEDURE — 77332 RADIATION TREATMENT AID(S): CPT | Performed by: RADIOLOGY

## 2023-02-01 PROCEDURE — 77290 THER RAD SIMULAJ FIELD CPLX: CPT | Performed by: RADIOLOGY

## 2023-02-01 PROCEDURE — G0463 HOSPITAL OUTPT CLINIC VISIT: HCPCS | Performed by: RADIOLOGY

## 2023-02-01 PROCEDURE — 87086 URINE CULTURE/COLONY COUNT: CPT | Performed by: NURSE PRACTITIONER

## 2023-02-01 PROCEDURE — 77263 THER RADIOLOGY TX PLNG CPLX: CPT | Performed by: RADIOLOGY

## 2023-02-01 PROCEDURE — 99214 OFFICE O/P EST MOD 30 MIN: CPT | Performed by: INTERNAL MEDICINE

## 2023-02-01 PROCEDURE — 99205 OFFICE O/P NEW HI 60 MIN: CPT | Performed by: RADIOLOGY

## 2023-02-01 PROCEDURE — 81001 URINALYSIS AUTO W/SCOPE: CPT | Performed by: NURSE PRACTITIONER

## 2023-02-01 RX ORDER — BLOOD SUGAR DIAGNOSTIC
STRIP MISCELLANEOUS
Qty: 50 EACH | Refills: 5 | Status: SHIPPED | OUTPATIENT
Start: 2023-02-01

## 2023-02-01 NOTE — TELEPHONE ENCOUNTER
YES ua and culture      Left a message to return call.        Patient notified & verbalized understanding..

## 2023-02-01 NOTE — PROGRESS NOTES
Subjective     Date: 2/1/2023    Referring Provider  No ref. provider found    Chief Complaint  Ductal carcinoma of the left breast, estrogen receptor positive, status post lumpectomy    Subjective          Shey King is a 72 y.o. female who presents today to [facility] for follow up.    HPI:   72-year-old female with a history of diabetes mellitus type 2, hyperlipidemia, hypothyroidism, frequent UTIs who presents to establish care regarding ductal carcinoma in situ of the left breast, estrogen receptor positive.     Oncological history:  She notes intermittent breast pain on the left side for several months, was finally directed to get a screening mammogram  - October 26, 2022.  Screening mammogram showed calcification left breast middle posterior third lateral aspect, which are new.  Other calcifications throughout left breast appear stable.   - 12/6/22. Diagnostic mammogram showed grouped calcifications in the left 3-4 o'clock region.   - 12/14/2022: stereotactic biopsy. Pathology with intermediate to high-grade cribriform ductal carcinoma in situ with associated necrosis and calcification.  -12/29/2022: Underwent lumpectomy.  Final pathology with ductal carcinoma in situ.  10 x 3 x 2 mm, 4 of 19 blocks.  Grade 2 (intermediate).  Distance from DCIS to closest margin 1 mm from inferior margin, 2 mm from anterior margin, 4 mm to posterior margin. PTis.  Estrogen receptor 100% positive, progesterone 5% positive    She was recently seen by Dr. Mata on 1/17/2023.  Overall doing well after lumpectomy.  He presents for further therapy and discussion.    GYN History:  Menarche at age 14. P3, G3  Age of first pregnancy:   Age of menopause: 52  Breast feed: no  Birth control: yes  Hormone replacement: no    Never had DEXA bone scan in the past.  Denies ever being treated for diagnosed with osteoporosis.  Currently taking vitamin D tablets.    Interval History 02/01/2023    She was seen by radiation oncology today, Dr. Yusuf, who recommends radiation to left breast due to DCIS margin less than 2 mm.  Underwent DEXA bone scan 1/27/2023 with bone mineral density of right femur neck 0.899 with T score -1.       The following portions of the patient's history were reviewed and updated as appropriate: allergies, current medications, past family history, past medical history, past social history, past surgical history and problem list.    Objective     Objective     Allergy:   Allergies   Allergen Reactions   • Other Unknown - High Severity     Blisters from EKG stickers   • Bactrim [Sulfamethoxazole-Trimethoprim] Hives   • Ciprofloxacin Hives   • Penicillins Hives   • Steri-Strip Compound Benzoin [Benzoin Compound] Hives   • Sulfa Antibiotics Hives        Current Medications:   Current Outpatient Medications   Medication Sig Dispense Refill   • acetaminophen (TYLENOL) 325 MG tablet Take 2 tablets by mouth Every 4 (Four) Hours As Needed for Mild Pain. 30 tablet 0   • aspirin 81 MG chewable tablet Chew 81 mg daily.     • AZO-CRANBERRY PO Take  by mouth.     • Calcium Citrate-Vitamin D 250-2.5 MG-MCG per tablet Take 1 tablet by mouth 2 (Two) Times a Day. 30 tablet 11   • empagliflozin (Jardiance) 10 MG tablet tablet Take 1 tablet by mouth Daily. 90 tablet 1   • fenofibrate (TRICOR) 145 MG tablet TAKE 1 TABLET BY MOUTH DAILY 90 tablet 1   • glucose blood test strip For daily testing  E11.65  (One Touch Ultra) 50 each 11   • glucose monitor monitoring kit 1 each as needed (DM II). 1 each 0   • Lancets (OneTouch Delica Plus [id number]) misc USE TO TEST BLOOD SUGAR DAILY AS DIRECTED     • Lancets [facility] misc For Daily testing  E11.65 50 each 11   • levothyroxine (SYNTHROID, LEVOTHROID) 125 MCG tablet Take 1 tablet by mouth Daily. 90 tablet 1   • Probiotic Product (PROBIOTIC DAILY PO) Take  by mouth.     • trimethoprim (TRIMPEX) 100 MG tablet TAKE 1 TABLET BY MOUTH TWICE DAILY 30 tablet 5   •  vitamin D (ERGOCALCIFEROL) 1.25 MG (37490 UT) capsule capsule TAKE 1 CAPSULE BY MOUTH 1 TIME EVERY WEEK 5 capsule 2     No current facility-administered medications for this visit.       Past Medical History:  Past Medical History:   Diagnosis Date   • Abdominal pain, LLQ (left lower quadrant)    • Abnormal ECG aug 2021   • Abnormal Pap smear of cervix     several years ago   • Anemia     years ago   • Breast cancer (HCC)    • Cataract early stage   • Cystitis    • Diabetes mellitus (HCC)    • Diarrhea    • Diverticulitis of colon    • Diverticulosis    • Ductal carcinoma in situ (DCIS) of left breast 12/21/2022   • Encounter for cholecystectomy 2018   • Gallstones    • GERD (gastroesophageal reflux disease)    • Hiatal hernia    • Hyperglycemia    • Hyperlipidemia    • Hypothyroidism    • Muscle spasm     FLANK PAIN   • OAB (overactive bladder)    • Pneumonia     years ago   • Rectal bleeding    • UTI (urinary tract infection)        Past Surgical History:  Past Surgical History:   Procedure Laterality Date   • BREAST BIOPSY Bilateral 2005    benign   • BREAST BIOPSY Left 12/29/2022    Procedure: BREAST BIOPSY WITH NEEDLE LOCALIZATION;  Surgeon: Dong Mata MD;  Location: [facility];  Service: General;  Laterality: Left;   • CHOLECYSTECTOMY  2018?   • ENDOSCOPY     • ENDOSCOPY N/A 1/11/2018    Procedure: ESOPHAGOGASTRODUODENOSCOPY;  Surgeon: Dong Mata MD;  Location: [facility];  Service:    • MAMMO BILATERAL  09/2015   • OVARIAN CYST DRAINAGE     • SC LAPAROSCOPY SURG CHOLECYSTECTOMY N/A 1/11/2018    Procedure: CHOLECYSTECTOMY LAPAROSCOPIC;  Surgeon: Dong Mata MD;  Location: [facility];  Service: General   • US GUIDED LYMPH NODE BIOPSY  4/11/2018       Social History:  Social History     Socioeconomic History   • Marital status:    Tobacco Use   • Smoking status: Never   • Smokeless tobacco: Never   • Tobacco comments:     never   Vaping Use   • Vaping Use: Never used   Substance  "and Sexual Activity   • Alcohol use: No   • Drug use: No   • Sexual activity: Not Currently     Partners: Male     Shey King  reports that she has never smoked. She has never used smokeless tobacco..      Family History:  Family History   Adopted: Yes   Problem Relation Age of Onset   • Heart disease Mother         also had cancer kidney   • Cancer Mother         kidney   • Thyroid disease Mother    • Heart disease Father    • Diabetes Brother    • Breast cancer Neg Hx        Review of Systems:  Review of Systems   Constitutional: Negative.    HENT: Negative.    Respiratory: Negative.    Cardiovascular: Negative.    Gastrointestinal: Negative.    Genitourinary: Negative.    Musculoskeletal: Negative.    Skin: Negative.    Neurological: Negative.    Hematological: Negative.    Psychiatric/Behavioral: Negative.        Vital Signs:   /77   Pulse 80   Temp 96.9 °F (36.1 °C)   Resp 18   Ht 157.5 cm (62\")   Wt 83 kg (183 lb)   SpO2 98%   BMI 33.47 kg/m²      Physical Exam:  Physical Exam  Constitutional:       Appearance: Normal appearance.   HENT:      Head: Normocephalic and atraumatic.      Mouth/Throat:      Mouth: Mucous membranes are moist.      Pharynx: Oropharynx is clear.   Eyes:      Extraocular Movements: Extraocular movements intact.      Pupils: Pupils are equal, round, and reactive to light.   Cardiovascular:      Rate and Rhythm: Normal rate and regular rhythm.   Pulmonary:      Effort: Pulmonary effort is normal.      Breath sounds: Normal breath sounds.   Chest:          Comments: Status post L breast lumpectomy   Abdominal:      General: Abdomen is flat. Bowel sounds are normal.      Palpations: Abdomen is soft.   Neurological:      Mental Status: She is alert.         PHQ-9 Score  PHQ-9 Total Score:       Pain Score  Vitals:    02/01/23 1134   BP: 148/77   Pulse: 80   Resp: 18   Temp: 96.9 °F (36.1 °C)   SpO2: 98%   Weight: 83 kg (183 lb)   Height: 157.5 cm (62\")   PainSc: 0-No pain "       ECOG score: 0             Lab Review  Lab Results   Component Value Date    WBC 7.59 12/27/2022    HGB 13.5 12/27/2022    HCT 42.0 12/27/2022    MCV 89.4 12/27/2022    RDW 13.3 12/27/2022     12/27/2022    NEUTRORELPCT 65.5 12/27/2022    LYMPHORELPCT 22.7 12/27/2022    MONORELPCT 7.4 12/27/2022    EOSRELPCT 2.6 12/27/2022    BASORELPCT 1.1 12/27/2022    NEUTROABS 4.98 12/27/2022    LYMPHSABS 1.72 12/27/2022       Lab Results   Component Value Date     12/27/2022    K 4.6 12/27/2022    CO2 27.0 12/27/2022     12/27/2022    BUN 17 12/27/2022    CREATININE 1.01 (H) 12/27/2022    EGFRIFNONA 57 (L) 01/26/2022    EGFRIFAFRI 74 09/06/2016    GLUCOSE 111 (H) 12/27/2022    CALCIUM 9.6 12/27/2022    ALKPHOS 86 12/27/2022    AST 16 12/27/2022    ALT 12 12/27/2022    BILITOT 0.5 12/27/2022    ALBUMIN 4.7 12/27/2022    PROTEINTOT 7.9 12/27/2022    MG 1.7 07/13/2021    PHOS 3.5 12/01/2021       Radiology Results  Screening mammogram 10/27/22:  FINDINGS:     Breast Composition: The breasts are heterogenously dense, which may  obscure small masses.   Important Findings:    Biopsy related changes right breast with marker clip. Stable benign  calcifications right breast are noted. Calcifications left breast  middle-posterior third lateral aspect have developed. Other  calcifications throughout the left breast appear stable. Biopsy related  changes upper outer quadrant left breast with marker clip noted.   No suspicious calcifications or areas of architectural distortion.  No  dominant masses or skin thickening    Diagnostic mammogram 12/6/2022:  IMPRESSION:  Grouped calcifications in the left 3:00 to 4:00 region.      Pathology:   Procedure: Excision (less than total mastectomy)   Specimen Laterality: Left   Tumor Site: Not specified   Histologic Type: Ductal carcinoma in situ   Size of DCIS: 10 x 3 x 2 mm, 4 of 19 blocks   Architectural Patterns: Cribriform   Nuclear Grade: Grade II (intermediate)    Necrosis: Present, focal (small foci of necrosis)   Microcalcifications: Present in DCIS and in nonneoplastic tissue   Margin Status: All margins negative for DCIS   Distance from DCIS to Closest Margin: 1 mm from inferior margin   Distance from DCIS to Anterior Margin: 2 mm   Distance from DCIS to Posterior Margin: 4 mm   Regional Lymph Node Status: No regional lymph nodes submitted or found   PATHOLOGIC STAGE (pTNM, AJCC 8th Edition): pTis (DCIS), pNx   Biomarker Testing Performed on Previous Biopsy:  (ER) Positive 100 %   (PgR) Positive 5 %     CLINICAL HISTORY:   Ductal carcinoma in situ (DCIS) of left breast     Assessment / Plan         Assessment and Plan   72-year-old female who presents today with hormone positive left breast ductal carcinoma in situ, status postlumpectomy.    #Ductal carcinoma in situ, left breast, hormone positive  -Final pathology with 10 x 3 x 2 mm ductal carcinoma in situ, grade 2, 1 mm from inferior margin, 2 mm from anterior margin, 4 mm from posterior margin  -Her case ws discussed in tumor board on 1/25; it was deemed she should receive radiation therapy given close margins <2mm inferior margin  -We discussed adjuvant radiation, she is a candidate for radiation, will start in one week.   -We also discussed adjuvant hormonal therapy which has shown to decrease recurrence by at least 33%.  We would prescribe anastrozole 1 mg daily to start after radiation therapy for at least 5 years.   -Some adverse effects such as hot flashes, myalgia, arthralgia, osteopenia were discussed with patient.  Chemo expert printout information was also given.  Patient is agreeable to the therapy as above  -Patient to receive first mammogram in 6 months (approximately June or July 2023), every 12 months thereafter  -She will continue history and physical and exam every 6 months for the first 5 years, yearly thereafter according to NCCN    #Bone health  -DEXA scan 1/27/2023: bone mineral density of right  femur neck 0.899 with T score -1.  Repeat DEXA scan yearly, next January 2024  -Continue vitamin D and calcium supplement      Discussed possible differential diagnoses, testing, treatment, recommended non-pharmacological interventions, risks, warning signs to monitor for that would indicate need for follow-up in clinic or ER. If no improvement with these regimens or you have new or worsening symptoms follow-up. Patient verbalizes understanding and agreement with plan of care. Denies further needs or concerns.     Patient was given instructions and counseling regarding her condition and for health maintenance advised.    I spent 30 minutes with Shey King today.  In the office today, more than 50% of this time was spent face-to-face with her  in counseling / coordination of care, reviewing her interim medical history and counseling on the current treatment plan.  All questions were answered to her satisfaction.      Meds ordered during this visit  No orders of the defined types were placed in this encounter.      Visit Diagnoses    ICD-10-CM ICD-9-CM   1. Ductal carcinoma in situ (DCIS) of left breast  D05.12 233.0   2. Postmenopausal  Z78.0 V49.81       Follow Up   4 weeks after radiation therapy to initiate AI       This document has been electronically signed by Maia Ford MD   February 1, 2023 11:57 EST    Dictated Utilizing Dragon Dictation: Part of this note may be an electronic transcription/translation of spoken language to printed text using the Dragon Dictation System.

## 2023-02-01 NOTE — TELEPHONE ENCOUNTER
----- Message from BERT Lucas sent at 2/1/2023  9:05 AM EST -----  Continue with current dose of synthroid.  Will need TSH repeated in 4-6 weeks to ensure stabilization        Patient notified & verbalized understanding.    Patient reports urinary symptoms,reports frequency & some dysuria,wants to know if she could just come in & leave a urine?

## 2023-02-02 ENCOUNTER — DOCUMENTATION (OUTPATIENT)
Dept: ONCOLOGY | Facility: HOSPITAL | Age: 73
End: 2023-02-02
Payer: COMMERCIAL

## 2023-02-02 LAB
BACTERIA SPEC AEROBE CULT: NORMAL
BACTERIA UR QL AUTO: NORMAL /HPF
BILIRUB UR QL STRIP: NEGATIVE
CLARITY UR: CLEAR
COLOR UR: YELLOW
GLUCOSE UR STRIP-MCNC: ABNORMAL MG/DL
HGB UR QL STRIP.AUTO: NEGATIVE
HYALINE CASTS UR QL AUTO: NORMAL /LPF
KETONES UR QL STRIP: NEGATIVE
LEUKOCYTE ESTERASE UR QL STRIP.AUTO: NEGATIVE
NITRITE UR QL STRIP: NEGATIVE
PH UR STRIP.AUTO: 5.5 [PH] (ref 5–8)
PROT UR QL STRIP: NEGATIVE
RBC # UR STRIP: NORMAL /HPF
REF LAB TEST METHOD: NORMAL
REF LAB TEST METHOD: NORMAL
SP GR UR STRIP: 1.03 (ref 1–1.03)
SQUAMOUS #/AREA URNS HPF: NORMAL /HPF
UROBILINOGEN UR QL STRIP: ABNORMAL
WBC # UR STRIP: NORMAL /HPF

## 2023-02-02 NOTE — PROGRESS NOTES
Patient is 71 Y/O white female diagnosed with Ductal carcinoma of left breast.    Patient in clinic Wednesday, February 1 for radiation for radiation consultation by Dr. Bashir Yusuf.    SS received consult for psychosocial assessment.    Oncology Distress Level 4-7  Received: Yesterday  Carri Delgado MA Taylor, Pamela F MSMARIANA  Ambulatory Referral to ONC Social Work [[id number]]    Electronically signed by: Carri Delgado MA on 02/01/23 0925 Status: Active   Ordering user: Carri Delgado MA 02/01/23 0925 Ordering provider: Bashir Yusuf MD   Authorized by: Bashir Yusuf MD   Cosigning events   Electronically cosigned by Bashir Yusuf MD 02/01/23 1530 for Ordering   Frequency:  02/01/23 -     Diagnoses   Ductal carcinoma in situ (DCIS) of left breast [D05.12]   Questionnaire    Question Answer   Follow-up needed: Yes     SS contacted patient ([phone]. Role of oncology social worker discussed with patient and services available provided.    Patient lives at home with spouse Ed.    Patient doesn't utilize any home health.    Patient doesn't utilize any durable medical equipment.    Patient and spouse have three children: Brenda, Crow, and Vlad.    Patient independent with transportation.  Patient's spouse provides transportation to doctor appointments.    Patient states that she is employed full time at [employer].     Patient completed NCCN Distress Screening and scored herself a 7 out of 10.    Distress Screening Follow-up    Diagnosis: Ductal Carcinoma of left breast    Location of Distress Screening: Radiation Oncology    Distress Level: 7 (2/1/2023  9:00 AM)    Physical Concerns:    Fatigue: Y  Pain: N  Sleep: N  Substance abuse: N    Practical Problems:    : N  Housing: N  Transportation: N  Treatment decisions: N    Emotional Concerns:     Family Concerns:    Ability to have children: N    Spiritual Concerns:       "Interventions: Time spent talking with patient, empathetic listening provided, and reassurance given.  Patient provided with information for supportive care services.  Patient declines at this time.    Advance Care Planning:  The patient and I discussed care planning \"Conversations that Matter.\" This service was offered, free of charge, for development of advance directives with a certified ACP facilitator. The patient does not have an up-to-date advance directive. The patient is not interested in an appointment with one of our facilitators to create or update their advanced directives.      Patient lives at [address] [place] [address] traveling approximately 29 miles round trip.    SS completed application for [employer] program for travel assistance program.    SS to complete applications for [place] [employer], [employer]. [employer], and [facility] Cancer Patient Fund.    SS contact information provided to patient and encouraged to contact with any questions or concerns.    SS will follow as needed.    "

## 2023-02-03 PROCEDURE — 77300 RADIATION THERAPY DOSE PLAN: CPT | Performed by: RADIOLOGY

## 2023-02-03 PROCEDURE — 77334 RADIATION TREATMENT AID(S): CPT | Performed by: RADIOLOGY

## 2023-02-03 PROCEDURE — 77295 3-D RADIOTHERAPY PLAN: CPT | Performed by: RADIOLOGY

## 2023-02-06 ENCOUNTER — TELEPHONE (OUTPATIENT)
Dept: FAMILY MEDICINE CLINIC | Facility: CLINIC | Age: 73
End: 2023-02-06
Payer: COMMERCIAL

## 2023-02-06 NOTE — TELEPHONE ENCOUNTER
----- Message from BERT Lucas sent at 2/6/2023  8:20 AM EST -----  Urine is ok except for sugar    Patient notified & verbalized understanding.

## 2023-02-07 ENCOUNTER — RADIATION ONCOLOGY WEEKLY ASSESSMENT (OUTPATIENT)
Dept: RADIATION ONCOLOGY | Facility: HOSPITAL | Age: 73
End: 2023-02-07
Payer: COMMERCIAL

## 2023-02-07 VITALS
WEIGHT: 184.6 LBS | RESPIRATION RATE: 18 BRPM | DIASTOLIC BLOOD PRESSURE: 79 MMHG | OXYGEN SATURATION: 98 % | SYSTOLIC BLOOD PRESSURE: 137 MMHG | BODY MASS INDEX: 33.76 KG/M2 | HEART RATE: 67 BPM | TEMPERATURE: 98.1 F

## 2023-02-07 DIAGNOSIS — D05.12 DUCTAL CARCINOMA IN SITU (DCIS) OF LEFT BREAST: Primary | ICD-10-CM

## 2023-02-07 PROCEDURE — 77387 GUIDANCE FOR RADJ TX DLVR: CPT | Performed by: RADIOLOGY

## 2023-02-07 PROCEDURE — 77336 RADIATION PHYSICS CONSULT: CPT | Performed by: RADIOLOGY

## 2023-02-07 PROCEDURE — G6002 STEREOSCOPIC X-RAY GUIDANCE: HCPCS | Performed by: RADIOLOGY

## 2023-02-07 PROCEDURE — 77427 RADIATION TX MANAGEMENT X5: CPT | Performed by: RADIOLOGY

## 2023-02-07 PROCEDURE — 77280 THER RAD SIMULAJ FIELD SMPL: CPT | Performed by: RADIOLOGY

## 2023-02-07 PROCEDURE — 77412 RADIATION TX DELIVERY LVL 3: CPT | Performed by: RADIOLOGY

## 2023-02-07 NOTE — PROGRESS NOTES
On Treatment Visit Note      Patient Name: Shey King  : 1950   MRN: [id number]     Diagnosis:    Chief Complaint   Patient presents with   • Breast Cancer     Ductal Carcinoma in Situ of Left Breast      Stagin Tis N0 M0     This patient was seen today for an on treatment visit.     Radiation Treatments     Active   Plans   Lt BrstZrev   Most recent treatment: Dose planned: 266 cGy (fraction 1 on 2023)   Total: Dose planned: 4,256 cGy (16 fractions)   Elapsed Days: 0      Reference Points   Breast_L   Most recent treatment: Dose given: 266 cGy (on 2023)   Total: Dose given: 266 cGy   Elapsed Days: 0         Historical   No historical radiation treatments to show.            Subjective      Review of Systems:   Review of Systems   Constitutional: Negative for fatigue.   HENT: Negative for sore throat and trouble swallowing.    Respiratory: Negative for cough and shortness of breath.    Cardiovascular: Negative for chest pain.   Genitourinary: Negative for breast pain.   Skin: Negative for color change.   All other systems reviewed and are negative.    Medications:     Current Outpatient Medications:   •  acetaminophen (TYLENOL) 325 MG tablet, Take 2 tablets by mouth Every 4 (Four) Hours As Needed for Mild Pain., Disp: 30 tablet, Rfl: 0  •  aspirin 81 MG chewable tablet, Chew 81 mg daily., Disp: , Rfl:   •  AZO-CRANBERRY PO, Take  by mouth., Disp: , Rfl:   •  Calcium Citrate-Vitamin D 250-2.5 MG-MCG per tablet, Take 1 tablet by mouth 2 (Two) Times a Day., Disp: 30 tablet, Rfl: 11  •  empagliflozin (Jardiance) 10 MG tablet tablet, Take 1 tablet by mouth Daily., Disp: 90 tablet, Rfl: 1  •  fenofibrate (TRICOR) 145 MG tablet, TAKE 1 TABLET BY MOUTH DAILY, Disp: 90 tablet, Rfl: 1  •  glucose monitor monitoring kit, 1 each as needed (DM II)., Disp: 1 each, Rfl: 0  •  Lancets (OneTouch Delica Plus Dehopt79C) misc, USE TO TEST BLOOD SUGAR DAILY AS DIRECTED, Disp: , Rfl:   •  Lancets Misc. misc,  For Daily testing  E11.65, Disp: 50 each, Rfl: 11  •  levothyroxine (SYNTHROID, LEVOTHROID) 125 MCG tablet, Take 1 tablet by mouth Daily., Disp: 90 tablet, Rfl: 1  •  OneTouch Ultra test strip, USE TO TEST BLOOD SUGAR DAILY AS DIRECTED, Disp: 50 each, Rfl: 5  •  Probiotic Product (PROBIOTIC DAILY PO), Take  by mouth., Disp: , Rfl:   •  trimethoprim (TRIMPEX) 100 MG tablet, TAKE 1 TABLET BY MOUTH TWICE DAILY, Disp: 30 tablet, Rfl: 5  •  vitamin D (ERGOCALCIFEROL) 1.25 MG (97919 UT) capsule capsule, TAKE 1 CAPSULE BY MOUTH 1 TIME EVERY WEEK, Disp: 5 capsule, Rfl: 2    Allergies:   Allergies   Allergen Reactions   • Other Unknown - High Severity     Blisters from EKG stickers   • Bactrim [Sulfamethoxazole-Trimethoprim] Hives   • Ciprofloxacin Hives   • Penicillins Hives   • Steri-Strip Compound Benzoin [Benzoin Compound] Hives   • Sulfa Antibiotics Hives     Objective     Physical Exam:  Physical Exam  Vitals reviewed.   Constitutional:       Appearance: Normal appearance.   Pulmonary:      Effort: Pulmonary effort is normal. No respiratory distress.   Skin:     General: Skin is warm and dry.   Neurological:      Mental Status: She is alert. Mental status is at baseline.   Psychiatric:         Mood and Affect: Mood normal.       Vital Signs:   Vitals:    02/07/23 0838   BP: 137/79   Pulse: 67   Resp: 18   Temp: 98.1 °F (36.7 °C)   TempSrc: Temporal   SpO2: 98%   Weight: 83.7 kg (184 lb 9.6 oz)   PainSc:   3   PainLoc: Shoulder  Comment: Left Shoulder; Been doing exercises to help.     Body mass index is 33.76 kg/m².     Current Total XRT Dose (cGY):    Radiation Treatments     Active   Plans   Lt BrstZrev   Most recent treatment: Dose planned: 266 cGy (fraction 1 on 2/7/2023)   Total: Dose planned: 4,256 cGy (16 fractions)   Elapsed Days: 0      Reference Points   Breast_L   Most recent treatment: Dose given: 266 cGy (on 2/7/2023)   Total: Dose given: 266 cGy   Elapsed Days: 0         Historical   No historical  radiation treatments to show.            Plan      Plan:   Patient was seen today for an on treatment visit. Patient is receiving radiation therapy to the left breast. Patient is stable and tolerating radiation therapy well with minimal side effects.  No new issues today, today was her first treatment.  Continue with radiation therapy.     I have reviewed treatment setup notes, checked and approved the daily guidance images. I reviewed dose delivery, treatment parameters and deemed them appropriate. We plan to continue radiation therapy as prescribed.     Digital speech recognition software was used to dictate parts of this note, some dictation errors may occur.    Scribed for Dr. Keaton Vick MD by BERT Coe 2/7/2023  08:42 EST     I, Keaton Vick MD, personally performed the services described in this documentation as scribed by the above named individual in my presence, and it is both accurate and complete.  2/7/2023  08:49 EST

## 2023-02-08 ENCOUNTER — DOCUMENTATION (OUTPATIENT)
Dept: ONCOLOGY | Facility: HOSPITAL | Age: 73
End: 2023-02-08
Payer: COMMERCIAL

## 2023-02-08 PROCEDURE — G6002 STEREOSCOPIC X-RAY GUIDANCE: HCPCS | Performed by: RADIOLOGY

## 2023-02-08 PROCEDURE — 77387 GUIDANCE FOR RADJ TX DLVR: CPT | Performed by: RADIOLOGY

## 2023-02-08 PROCEDURE — 77412 RADIATION TX DELIVERY LVL 3: CPT | Performed by: RADIOLOGY

## 2023-02-08 NOTE — PROGRESS NOTES
SS completed application for [employer] Cancer Patient Fund and mailed application from [facility].    Copy to be scanned into patient's chart.    SS will follow.

## 2023-02-09 ENCOUNTER — DOCUMENTATION (OUTPATIENT)
Dept: ONCOLOGY | Facility: HOSPITAL | Age: 73
End: 2023-02-09
Payer: COMMERCIAL

## 2023-02-09 PROCEDURE — G6002 STEREOSCOPIC X-RAY GUIDANCE: HCPCS | Performed by: RADIOLOGY

## 2023-02-09 PROCEDURE — 77412 RADIATION TX DELIVERY LVL 3: CPT | Performed by: RADIOLOGY

## 2023-02-09 PROCEDURE — 77387 GUIDANCE FOR RADJ TX DLVR: CPT | Performed by: RADIOLOGY

## 2023-02-09 NOTE — PROGRESS NOTES
SS completed [facility] for [facility] and faxed [phone].    SS completed applicatin for [employer] and wrote letter of confirming current stage of breast cancer and applicant is currently being treated for breast cancer. Both letter and application faxed [phone].    [facility] [phone] per Olga to provide mailing address [address] [place] [address].  SS listed physical address on application. Olga to change for mailing purposes.                        2023  RE: Shey King  : 1950  To Whom It May Concern:     Diagnosis: Ductal carcinoma in situ, left breast, hormone positive         Visit Diagnoses        ICD-10-CM ICD-9-CM   1. Ductal carcinoma in situ (DCIS) of left breast  D05.12 233.0   2. Postmenopausal  Z78.0 V49.81      Follow Up   In 2 weeks after discussion with radiation oncology and tumor board       She notes intermittent breast pain on the left side for several months, was finally directed to get a screening mammogram    - 2022.  Screening mammogram showed calcification left breast middle posterior third lateral aspect, which are new.  Other calcifications throughout left breast appear stable.     - 22. Diagnostic mammogram showed grouped calcifications in the left 3-4 o'clock region.     - 2022: stereotactic biopsy. Pathology with intermediate to high-grade cribriform ductal carcinoma in situ with associated necrosis and calcification.    -2022: Underwent lumpectomy.  Final pathology with ductal carcinoma in situ.  10 x 3 x 2 mm, 4 of 19 blocks.  Grade 2 (intermediate).  Distance from DCIS to closest margin 1 mm from inferior margin, 2 mm from anterior margin, 4 mm to posterior margin. PTis.  Estrogen receptor 100% positive, progesterone 5% positive  Sincerely,    Tiny Bhakta, MSW      Oncology Social Worker  [phone]

## 2023-02-10 PROCEDURE — 77412 RADIATION TX DELIVERY LVL 3: CPT | Performed by: RADIOLOGY

## 2023-02-10 PROCEDURE — G6002 STEREOSCOPIC X-RAY GUIDANCE: HCPCS | Performed by: RADIOLOGY

## 2023-02-10 PROCEDURE — 77387 GUIDANCE FOR RADJ TX DLVR: CPT | Performed by: RADIOLOGY

## 2023-02-13 PROCEDURE — 77387 GUIDANCE FOR RADJ TX DLVR: CPT | Performed by: RADIOLOGY

## 2023-02-13 PROCEDURE — G6002 STEREOSCOPIC X-RAY GUIDANCE: HCPCS | Performed by: RADIOLOGY

## 2023-02-13 PROCEDURE — 77412 RADIATION TX DELIVERY LVL 3: CPT | Performed by: RADIOLOGY

## 2023-02-14 ENCOUNTER — RADIATION ONCOLOGY WEEKLY ASSESSMENT (OUTPATIENT)
Dept: RADIATION ONCOLOGY | Facility: HOSPITAL | Age: 73
End: 2023-02-14
Payer: COMMERCIAL

## 2023-02-14 VITALS
RESPIRATION RATE: 18 BRPM | WEIGHT: 186 LBS | OXYGEN SATURATION: 96 % | DIASTOLIC BLOOD PRESSURE: 77 MMHG | BODY MASS INDEX: 34.02 KG/M2 | HEART RATE: 70 BPM | SYSTOLIC BLOOD PRESSURE: 121 MMHG | TEMPERATURE: 96.3 F

## 2023-02-14 DIAGNOSIS — D05.12 DUCTAL CARCINOMA IN SITU (DCIS) OF LEFT BREAST: Primary | ICD-10-CM

## 2023-02-14 LAB
RAD ONC ARIA COURSE ID: NORMAL
RAD ONC ARIA COURSE INTENT: NORMAL
RAD ONC ARIA COURSE LAST TREATMENT DATE: NORMAL
RAD ONC ARIA COURSE START DATE: NORMAL
RAD ONC ARIA COURSE TREATMENT ELAPSED DAYS: 7
RAD ONC ARIA FIRST TREATMENT DATE: NORMAL
RAD ONC ARIA PLAN FRACTIONS TREATED TO DATE: 6
RAD ONC ARIA PLAN ID: NORMAL
RAD ONC ARIA PLAN PRESCRIBED DOSE PER FRACTION: 2.66 GY
RAD ONC ARIA PLAN PRIMARY REFERENCE POINT: NORMAL
RAD ONC ARIA PLAN TOTAL FRACTIONS PRESCRIBED: 16
RAD ONC ARIA PLAN TOTAL PRESCRIBED DOSE: 4256 CGY
RAD ONC ARIA REFERENCE POINT DOSAGE GIVEN TO DATE: 15.96 GY
RAD ONC ARIA REFERENCE POINT ID: NORMAL
RAD ONC ARIA REFERENCE POINT SESSION DOSAGE GIVEN: 2.66 GY

## 2023-02-14 PROCEDURE — 77387 GUIDANCE FOR RADJ TX DLVR: CPT | Performed by: RADIOLOGY

## 2023-02-14 PROCEDURE — 77427 RADIATION TX MANAGEMENT X5: CPT | Performed by: RADIOLOGY

## 2023-02-14 PROCEDURE — 77412 RADIATION TX DELIVERY LVL 3: CPT | Performed by: RADIOLOGY

## 2023-02-14 PROCEDURE — G6002 STEREOSCOPIC X-RAY GUIDANCE: HCPCS | Performed by: RADIOLOGY

## 2023-02-14 PROCEDURE — 77336 RADIATION PHYSICS CONSULT: CPT | Performed by: RADIOLOGY

## 2023-02-14 NOTE — PROGRESS NOTES
On Treatment Visit Note      Patient Name: Shey King  : 1950   MRN: [id number]     Diagnosis: Ductal Carcinoma in Situ of Left Breast    Stagin Tis N0 M0     This patient was seen today for an on treatment visit.     Radiation Treatments     Active   Plans   Lt BrstZrev   Most recent treatment: Dose planned: 266 cGy (fraction 4 on 2/10/2023)   Total: Dose planned: 4,256 cGy (16 fractions)   Elapsed Days: 3      Lt BrstZrev   Most recent treatment: Dose planned: 266 cGy (fraction 6 on 2023)   Total: Dose planned: 4,256 cGy (16 fractions)   Elapsed Days: 7      Reference Points   Breast_L   Most recent treatment: Dose given: 266 cGy (on 2/10/2023)   Total: Dose given: 1,064 cGy   Elapsed Days: 3      Breast_L   Most recent treatment: Dose given: 266 cGy (on 2023)   Total: Dose given: 1,596 cGy   Elapsed Days: 7         Historical   No historical radiation treatments to show.            Subjective      Review of Systems:   Review of Systems   HENT: Negative for trouble swallowing.    Respiratory: Negative for cough and shortness of breath.    Skin: Negative for color change.   All other systems reviewed and are negative.    Medications:     Current Outpatient Medications:   •  acetaminophen (TYLENOL) 325 MG tablet, Take 2 tablets by mouth Every 4 (Four) Hours As Needed for Mild Pain., Disp: 30 tablet, Rfl: 0  •  aspirin 81 MG chewable tablet, Chew 81 mg daily., Disp: , Rfl:   •  AZO-CRANBERRY PO, Take  by mouth., Disp: , Rfl:   •  Calcium Citrate-Vitamin D 250-2.5 MG-MCG per tablet, Take 1 tablet by mouth 2 (Two) Times a Day., Disp: 30 tablet, Rfl: 11  •  empagliflozin (Jardiance) 10 MG tablet tablet, Take 1 tablet by mouth Daily., Disp: 90 tablet, Rfl: 1  •  fenofibrate (TRICOR) 145 MG tablet, TAKE 1 TABLET BY MOUTH DAILY, Disp: 90 tablet, Rfl: 1  •  glucose monitor monitoring kit, 1 each as needed (DM II)., Disp: 1 each, Rfl: 0  •  Lancets (OneTouch Delica Plus [id number]) misc, USE TO  TEST BLOOD SUGAR DAILY AS DIRECTED, Disp: , Rfl:   •  Lancets Misc. misc, For Daily testing  E11.65, Disp: 50 each, Rfl: 11  •  levothyroxine (SYNTHROID, LEVOTHROID) 125 MCG tablet, Take 1 tablet by mouth Daily., Disp: 90 tablet, Rfl: 1  •  OneTouch Ultra test strip, USE TO TEST BLOOD SUGAR DAILY AS DIRECTED, Disp: 50 each, Rfl: 5  •  Probiotic Product (PROBIOTIC DAILY PO), Take  by mouth., Disp: , Rfl:   •  trimethoprim (TRIMPEX) 100 MG tablet, TAKE 1 TABLET BY MOUTH TWICE DAILY, Disp: 30 tablet, Rfl: 5  •  vitamin D (ERGOCALCIFEROL) 1.25 MG (61934 UT) capsule capsule, TAKE 1 CAPSULE BY MOUTH 1 TIME EVERY WEEK, Disp: 5 capsule, Rfl: 2    Allergies:   Allergies   Allergen Reactions   • Other Unknown - High Severity     Blisters from EKG stickers   • Bactrim [Sulfamethoxazole-Trimethoprim] Hives   • Ciprofloxacin Hives   • Penicillins Hives   • Steri-Strip Compound Benzoin [Benzoin Compound] Hives   • Sulfa Antibiotics Hives     Objective     Physical Exam:  Physical Exam  Vitals reviewed.   Constitutional:       Appearance: Normal appearance.   Pulmonary:      Effort: Pulmonary effort is normal. No respiratory distress.   Chest:   Breasts:     Right: Normal.      Left: Normal. No skin change.   Skin:     General: Skin is warm and dry.   Neurological:      Mental Status: She is alert. Mental status is at baseline.   Psychiatric:         Mood and Affect: Mood normal.       Vital Signs:   Vitals:    02/14/23 0833   BP: 121/77   Pulse: 70   Resp: 18   Temp: 96.3 °F (35.7 °C)   TempSrc: Temporal   SpO2: 96%   Weight: 84.4 kg (186 lb)   PainSc: 0-No pain     Body mass index is 34.02 kg/m².     Current Total XRT Dose (cGY):    Radiation Treatments     Active   Plans   Lt BrstZrev   Most recent treatment: Dose planned: 266 cGy (fraction 4 on 2/10/2023)   Total: Dose planned: 4,256 cGy (16 fractions)   Elapsed Days: 3      Lt BrstZrev   Most recent treatment: Dose planned: 266 cGy (fraction 6 on 2/14/2023)   Total: Dose  planned: 4,256 cGy (16 fractions)   Elapsed Days: 7      Reference Points   Breast_L   Most recent treatment: Dose given: 266 cGy (on 2/10/2023)   Total: Dose given: 1,064 cGy   Elapsed Days: 3      Breast_L   Most recent treatment: Dose given: 266 cGy (on 2/14/2023)   Total: Dose given: 1,596 cGy   Elapsed Days: 7         Historical   No historical radiation treatments to show.            Plan      Plan:   Patient was seen today for an on treatment visit. Patient is receiving radiation therapy to the left breast. Patient is stable and tolerating radiation therapy well with minimal side effects.  No new issues today.  Skin looks great.  Continue with radiation therapy.     I have reviewed treatment setup notes, checked and approved the daily guidance images. I reviewed dose delivery, treatment parameters and deemed them appropriate. We plan to continue radiation therapy as prescribed.     Digital speech recognition software was used to dictate parts of this note, some dictation errors may occur.    Scribed for Dr. Keaton Vick MD by BERT Coe 2/14/2023  08:33 EST     I, Keaton Vick MD, personally performed the services described in this documentation as scribed by the above named individual in my presence, and it is both accurate and complete.  2/14/2023  08:52 EST       WDL

## 2023-02-15 ENCOUNTER — OFFICE VISIT (OUTPATIENT)
Dept: CARDIOLOGY | Facility: CLINIC | Age: 73
End: 2023-02-15
Payer: COMMERCIAL

## 2023-02-15 VITALS
DIASTOLIC BLOOD PRESSURE: 78 MMHG | WEIGHT: 184 LBS | BODY MASS INDEX: 33.86 KG/M2 | SYSTOLIC BLOOD PRESSURE: 120 MMHG | HEART RATE: 68 BPM | OXYGEN SATURATION: 96 % | HEIGHT: 62 IN

## 2023-02-15 DIAGNOSIS — R00.2 PALPITATIONS: Primary | ICD-10-CM

## 2023-02-15 DIAGNOSIS — I51.89 GRADE I DIASTOLIC DYSFUNCTION: ICD-10-CM

## 2023-02-15 DIAGNOSIS — E78.2 MIXED HYPERLIPIDEMIA: ICD-10-CM

## 2023-02-15 PROCEDURE — 99214 OFFICE O/P EST MOD 30 MIN: CPT | Performed by: INTERNAL MEDICINE

## 2023-02-15 PROCEDURE — 77412 RADIATION TX DELIVERY LVL 3: CPT | Performed by: RADIOLOGY

## 2023-02-15 PROCEDURE — 77387 GUIDANCE FOR RADJ TX DLVR: CPT | Performed by: RADIOLOGY

## 2023-02-15 PROCEDURE — G6002 STEREOSCOPIC X-RAY GUIDANCE: HCPCS | Performed by: RADIOLOGY

## 2023-02-15 PROCEDURE — 93000 ELECTROCARDIOGRAM COMPLETE: CPT | Performed by: INTERNAL MEDICINE

## 2023-02-15 NOTE — PROGRESS NOTES
subjective     Chief Complaint   Patient presents with   • Palpitations     Follow up     • Shortness of Breath     With exertion     History of Present Illness    Shey is 72 years old white female who is here for 1 year cardiology follow-up.  She has history of ductal carcinoma left breast and is undergoing radiation therapy.  She is doing very well from cardiac standpoint.  She was initially seen by me for cardiac evaluation because of palpitations.  No chest pain or shortness of breath.  No syncope presyncope.  No orthopnea PND or ankle edema.    She also has history of hypothyroidism on Synthroid replacement therapy.  Hyperlipidemia and diabetes mellitus.  She is following closely with her PCP BERT Lucas    Past Surgical History:   Procedure Laterality Date   • BREAST BIOPSY Bilateral 2005    benign   • BREAST BIOPSY Left 12/29/2022    Procedure: BREAST BIOPSY WITH NEEDLE LOCALIZATION;  Surgeon: Dong Mata MD;  Location: [facility];  Service: General;  Laterality: Left;   • CHOLECYSTECTOMY  2018?   • ENDOSCOPY     • ENDOSCOPY N/A 1/11/2018    Procedure: ESOPHAGOGASTRODUODENOSCOPY;  Surgeon: Dong Mata MD;  Location: [facility] OR;  Service:    • MAMMO BILATERAL  09/2015   • OVARIAN CYST DRAINAGE     • SD LAPAROSCOPY SURG CHOLECYSTECTOMY N/A 1/11/2018    Procedure: CHOLECYSTECTOMY LAPAROSCOPIC;  Surgeon: Dong Mata MD;  Location: [facility] OR;  Service: General   • US GUIDED LYMPH NODE BIOPSY  4/11/2018     Family History   Adopted: Yes   Problem Relation Age of Onset   • Heart disease Mother         also had cancer kidney   • Cancer Mother         kidney   • Thyroid disease Mother    • Heart disease Father    • Diabetes Brother    • Breast cancer Neg Hx      Past Medical History:   Diagnosis Date   • Abdominal pain, LLQ (left lower quadrant)    • Abnormal ECG aug 2021   • Abnormal Pap smear of cervix     several years ago   • Anemia     years ago   • Breast cancer (HCC)    • Cataract  early stage   • Cystitis    • Diabetes mellitus (HCC)    • Diarrhea    • Diverticulitis of colon    • Diverticulosis    • Ductal carcinoma in situ (DCIS) of left breast 12/21/2022   • Encounter for cholecystectomy 2018   • Gallstones    • GERD (gastroesophageal reflux disease)    • Hiatal hernia    • Hyperglycemia    • Hyperlipidemia    • Hypothyroidism    • Muscle spasm     FLANK PAIN   • OAB (overactive bladder)    • Pneumonia     years ago   • Rectal bleeding    • UTI (urinary tract infection)      Patient Active Problem List   Diagnosis   • Abdominal pain, LLQ (left lower quadrant)   • Cystitis   • Diarrhea   • Hiatal hernia   • Hyperglycemia   • Hyperlipidemia   • Adult onset hypothyroidism   • Muscle spasm   • OAB (overactive bladder)   • UTI (urinary tract infection)   • Gallstones   • B12 deficiency   • Pulmonary nodule   • Class 2 obesity due to excess calories without serious comorbidity with body mass index (BMI) of 35.0 to 35.9 in adult   • Diabetes mellitus (HCC)   • Lymphadenopathy   • Dupuytren's contracture of right hand   • Palpitations   • ELKINS (dyspnea on exertion)   • Ductal carcinoma in situ (DCIS) of left breast   • Grade I LV diastolic dysfunction       Social History     Tobacco Use   • Smoking status: Never   • Smokeless tobacco: Never   • Tobacco comments:     never   Vaping Use   • Vaping Use: Never used   Substance Use Topics   • Alcohol use: No   • Drug use: No       Allergies   Allergen Reactions   • Other Unknown - High Severity     Blisters from EKG stickers   • Bactrim [Sulfamethoxazole-Trimethoprim] Hives   • Ciprofloxacin Hives   • Penicillins Hives   • Steri-Strip Compound Benzoin [Benzoin Compound] Hives   • Sulfa Antibiotics Hives       Current Outpatient Medications on File Prior to Visit   Medication Sig   • acetaminophen (TYLENOL) 325 MG tablet Take 2 tablets by mouth Every 4 (Four) Hours As Needed for Mild Pain.   • aspirin 81 MG chewable tablet Chew 81 mg daily.   •  "AZO-CRANBERRY PO Take  by mouth.   • empagliflozin (Jardiance) 10 MG tablet tablet Take 1 tablet by mouth Daily.   • fenofibrate (TRICOR) 145 MG tablet TAKE 1 TABLET BY MOUTH DAILY   • glucose monitor monitoring kit 1 each as needed (DM II).   • Lancets (OneTouch Delica Plus [id number]) misc USE TO TEST BLOOD SUGAR DAILY AS DIRECTED   • [facility]ets [facility] misc For Daily testing  E11.65   • levothyroxine (SYNTHROID, LEVOTHROID) 125 MCG tablet Take 1 tablet by mouth Daily.   • OneTouch Ultra test strip USE TO TEST BLOOD SUGAR DAILY AS DIRECTED   • Probiotic Product (PROBIOTIC DAILY PO) Take  by mouth.   • trimethoprim (TRIMPEX) 100 MG tablet TAKE 1 TABLET BY MOUTH TWICE DAILY   • vitamin D (ERGOCALCIFEROL) 1.25 MG (99989 UT) capsule capsule TAKE 1 CAPSULE BY MOUTH 1 TIME EVERY WEEK   • Calcium Citrate-Vitamin D 250-2.5 MG-MCG per tablet Take 1 tablet by mouth 2 (Two) Times a Day.     No current facility-administered medications on file prior to visit.         The following portions of the patient's history were reviewed and updated as appropriate: allergies, current medications, past family history, past medical history, past social history, past surgical history and problem list.    Review of Systems   Constitutional: Negative.   HENT: Negative.  Negative for congestion.    Eyes: Negative.    Cardiovascular: Negative.  Negative for chest pain, cyanosis, dyspnea on exertion, irregular heartbeat, leg swelling, near-syncope, orthopnea, palpitations, paroxysmal nocturnal dyspnea and syncope.   Respiratory: Negative.  Negative for shortness of breath.    Hematologic/Lymphatic: Negative.    Musculoskeletal: Negative.    Gastrointestinal: Negative.    Neurological: Negative.  Negative for headaches.          Objective:     /78 (BP Location: Left arm, Patient Position: Sitting, Cuff Size: Adult)   Pulse 68   Ht 157.5 cm (62\")   Wt 83.5 kg (184 lb)   SpO2 96%   BMI 33.65 kg/m²   Pulmonary:      Effort: Pulmonary effort " is normal.      Breath sounds: Normal breath sounds. No stridor. No wheezing. No rhonchi. No rales.   Cardiovascular:      PMI at left midclavicular line. Normal rate. Regular rhythm. Normal S1. Normal S2.      Murmurs: There is no murmur.      No gallop. No click. No rub.   Pulses:     Intact distal pulses.   Edema:     Peripheral edema absent.           Lab Review  Lab Results   Component Value Date     12/27/2022    K 4.6 12/27/2022     12/27/2022    BUN 17 12/27/2022    CREATININE 1.01 (H) 12/27/2022    GLUCOSE 111 (H) 12/27/2022    CALCIUM 9.6 12/27/2022    ALT 12 12/27/2022    ALKPHOS 86 12/27/2022    LABIL2 1.2 (L) 09/06/2016     Lab Results   Component Value Date    CKTOTAL 49 12/01/2021     Lab Results   Component Value Date    WBC 7.59 12/27/2022    HGB 13.5 12/27/2022    HCT 42.0 12/27/2022     12/27/2022     No results found for: INR  Lab Results   Component Value Date    MG 1.7 07/13/2021     Lab Results   Component Value Date    TSH 0.272 01/31/2023     No results found for: BNP  Lab Results   Component Value Date    CHLPL 220 (H) 09/06/2016    CHOL 229 (H) 12/27/2022    TRIG 185 (H) 12/27/2022    HDL 50 12/27/2022    VLDL 33 12/27/2022    LDLHDL 2.84 12/27/2022     Lab Results   Component Value Date     (H) 12/27/2022    LDL 90 09/26/2022     Lab Results   Component Value Date    PROBNP 141.4 07/13/2021     CARDIAC LABS:            ECG 12 Lead    Date/Time: 2/18/2023 12:41 PM  Performed by: Garima Gallardo MD  Authorized by: Garima Gallardo MD   Comparison: compared with previous ECG from 1/26/2022  Similar to previous ECG  Rhythm: sinus rhythm  Rate: normal  BPM: 68  Conduction: conduction normal  QRS axis: normal  Other findings: non-specific ST-T wave changes    Clinical impression: non-specific ECG               I personally viewed and interpreted the patient's LAB data         Assessment:     1. Palpitations    2. Mixed hyperlipidemia    3. Grade I LV  diastolic dysfunction          Plan:     Patient is 72 years old white female who is here for cardiology follow-up.  Patient has history of grade 1 LV diastolic dysfunction and is totally asymptomatic.  Last echo a year ago showed LV ejection fraction normal, no significant valvular heart disease with grade 1 LV diastolic dysfunction.    Palpitations are also well controlled occasional PACs and PVCs were noted no significant arrhythmia detected.    Recently patient is undergoing radiation therapy for ductal carcinoma of the left breast and she is in good spirits and overall doing better.    Other problems consist of hyperlipidemia diabetes mellitus hypothyroidism she is following closely with her PCP BERT Lucas.  From cardiac standpoint she will be reevaluated in 1 year.        No follow-ups on file.

## 2023-02-16 ENCOUNTER — PATIENT OUTREACH (OUTPATIENT)
Dept: ONCOLOGY | Facility: CLINIC | Age: 73
End: 2023-02-16
Payer: COMMERCIAL

## 2023-02-16 PROCEDURE — G6002 STEREOSCOPIC X-RAY GUIDANCE: HCPCS | Performed by: RADIOLOGY

## 2023-02-16 PROCEDURE — 77387 GUIDANCE FOR RADJ TX DLVR: CPT | Performed by: RADIOLOGY

## 2023-02-16 PROCEDURE — 77412 RADIATION TX DELIVERY LVL 3: CPT | Performed by: RADIOLOGY

## 2023-02-17 PROCEDURE — 77387 GUIDANCE FOR RADJ TX DLVR: CPT | Performed by: RADIOLOGY

## 2023-02-17 PROCEDURE — G6002 STEREOSCOPIC X-RAY GUIDANCE: HCPCS | Performed by: RADIOLOGY

## 2023-02-17 PROCEDURE — 77412 RADIATION TX DELIVERY LVL 3: CPT | Performed by: RADIOLOGY

## 2023-02-18 PROBLEM — I51.89 GRADE I DIASTOLIC DYSFUNCTION: Status: ACTIVE | Noted: 2023-02-18

## 2023-02-20 ENCOUNTER — TELEPHONE (OUTPATIENT)
Dept: FAMILY MEDICINE CLINIC | Facility: CLINIC | Age: 73
End: 2023-02-20
Payer: COMMERCIAL

## 2023-02-20 PROCEDURE — 77387 GUIDANCE FOR RADJ TX DLVR: CPT | Performed by: RADIOLOGY

## 2023-02-20 PROCEDURE — G6002 STEREOSCOPIC X-RAY GUIDANCE: HCPCS | Performed by: RADIOLOGY

## 2023-02-20 PROCEDURE — 77412 RADIATION TX DELIVERY LVL 3: CPT | Performed by: RADIOLOGY

## 2023-02-20 RX ORDER — FLUCONAZOLE 150 MG/1
150 TABLET ORAL ONCE
Qty: 1 TABLET | Refills: 0 | Status: SHIPPED | OUTPATIENT
Start: 2023-02-20 | End: 2023-02-20

## 2023-02-20 NOTE — TELEPHONE ENCOUNTER
Patient called indicating she has a yeast infection and is requesting something to be called in.

## 2023-02-21 ENCOUNTER — APPOINTMENT (OUTPATIENT)
Dept: RADIATION ONCOLOGY | Facility: HOSPITAL | Age: 73
End: 2023-02-21
Payer: COMMERCIAL

## 2023-02-21 ENCOUNTER — RADIATION ONCOLOGY WEEKLY ASSESSMENT (OUTPATIENT)
Dept: RADIATION ONCOLOGY | Facility: HOSPITAL | Age: 73
End: 2023-02-21
Payer: COMMERCIAL

## 2023-02-21 VITALS
TEMPERATURE: 97.1 F | HEART RATE: 70 BPM | SYSTOLIC BLOOD PRESSURE: 141 MMHG | WEIGHT: 187 LBS | RESPIRATION RATE: 18 BRPM | DIASTOLIC BLOOD PRESSURE: 72 MMHG | OXYGEN SATURATION: 96 % | BODY MASS INDEX: 34.2 KG/M2

## 2023-02-21 DIAGNOSIS — D05.12 DUCTAL CARCINOMA IN SITU (DCIS) OF LEFT BREAST: Primary | ICD-10-CM

## 2023-02-21 LAB
RAD ONC ARIA COURSE ID: NORMAL
RAD ONC ARIA COURSE INTENT: NORMAL
RAD ONC ARIA COURSE LAST TREATMENT DATE: NORMAL
RAD ONC ARIA COURSE START DATE: NORMAL
RAD ONC ARIA COURSE TREATMENT ELAPSED DAYS: 14
RAD ONC ARIA FIRST TREATMENT DATE: NORMAL
RAD ONC ARIA PLAN FRACTIONS TREATED TO DATE: 11
RAD ONC ARIA PLAN ID: NORMAL
RAD ONC ARIA PLAN PRESCRIBED DOSE PER FRACTION: 2.66 GY
RAD ONC ARIA PLAN PRIMARY REFERENCE POINT: NORMAL
RAD ONC ARIA PLAN TOTAL FRACTIONS PRESCRIBED: 16
RAD ONC ARIA PLAN TOTAL PRESCRIBED DOSE: 4256 CGY
RAD ONC ARIA REFERENCE POINT DOSAGE GIVEN TO DATE: 29.26 GY
RAD ONC ARIA REFERENCE POINT ID: NORMAL
RAD ONC ARIA REFERENCE POINT SESSION DOSAGE GIVEN: 2.66 GY

## 2023-02-21 PROCEDURE — 77336 RADIATION PHYSICS CONSULT: CPT | Performed by: RADIOLOGY

## 2023-02-21 PROCEDURE — G6002 STEREOSCOPIC X-RAY GUIDANCE: HCPCS | Performed by: RADIOLOGY

## 2023-02-21 PROCEDURE — 77427 RADIATION TX MANAGEMENT X5: CPT | Performed by: RADIOLOGY

## 2023-02-21 PROCEDURE — 77412 RADIATION TX DELIVERY LVL 3: CPT | Performed by: RADIOLOGY

## 2023-02-21 PROCEDURE — 77387 GUIDANCE FOR RADJ TX DLVR: CPT | Performed by: RADIOLOGY

## 2023-02-21 NOTE — PROGRESS NOTES
On Treatment Visit Note      Patient Name: Shey King  : 1950   MRN: [id number]     Diagnosis:    Chief Complaint   Patient presents with   • Breast Cancer     Ductal Carcinoma in Situ of Left Breast      Stagin Tis N0 M0    This patient was seen today for an on treatment visit.     Radiation Treatments     Active   Plans   Lt BrstZrev   Most recent treatment: Dose planned: 266 cGy (fraction 4 on 2/10/2023)   Total: Dose planned: 4,256 cGy (16 fractions)   Elapsed Days: 3      Lt BrstZrev   Most recent treatment: Dose planned: 266 cGy (fraction 11 on 2023)   Total: Dose planned: 4,256 cGy (16 fractions)   Elapsed Days: 14      Reference Points   Breast_L   Most recent treatment: Dose given: 266 cGy (on 2/10/2023)   Total: Dose given: 1,064 cGy   Elapsed Days: 3      Breast_L   Most recent treatment: Dose given: 266 cGy (on 2023)   Total: Dose given: 2,926 cGy   Elapsed Days: 14         Historical   No historical radiation treatments to show.            Subjective      Review of Systems:   Review of Systems   HENT: Negative for trouble swallowing.    Respiratory: Negative for cough and shortness of breath.    Cardiovascular: Negative for chest pain.   Genitourinary: Negative for breast discharge and breast lump.   Skin: Positive for color change. Negative for wound.   All other systems reviewed and are negative.    Medications:     Current Outpatient Medications:   •  acetaminophen (TYLENOL) 325 MG tablet, Take 2 tablets by mouth Every 4 (Four) Hours As Needed for Mild Pain., Disp: 30 tablet, Rfl: 0  •  aspirin 81 MG chewable tablet, Chew 81 mg daily., Disp: , Rfl:   •  AZO-CRANBERRY PO, Take  by mouth., Disp: , Rfl:   •  Calcium Citrate-Vitamin D 250-2.5 MG-MCG per tablet, Take 1 tablet by mouth 2 (Two) Times a Day., Disp: 30 tablet, Rfl: 11  •  empagliflozin (Jardiance) 10 MG tablet tablet, Take 1 tablet by mouth Daily., Disp: 90 tablet, Rfl: 1  •  fenofibrate (TRICOR) 145 MG tablet,  TAKE 1 TABLET BY MOUTH DAILY, Disp: 90 tablet, Rfl: 1  •  glucose monitor monitoring kit, 1 each as needed (DM II)., Disp: 1 each, Rfl: 0  •  Lancets (OneTouch Delica Plus [id number]) misc, USE TO TEST BLOOD SUGAR DAILY AS DIRECTED, Disp: , Rfl:   •  Lancets Misc. misc, For Daily testing  E11.65, Disp: 50 each, Rfl: 11  •  levothyroxine (SYNTHROID, LEVOTHROID) 125 MCG tablet, Take 1 tablet by mouth Daily., Disp: 90 tablet, Rfl: 1  •  OneTouch Ultra test strip, USE TO TEST BLOOD SUGAR DAILY AS DIRECTED, Disp: 50 each, Rfl: 5  •  Probiotic Product (PROBIOTIC DAILY PO), Take  by mouth., Disp: , Rfl:   •  trimethoprim (TRIMPEX) 100 MG tablet, TAKE 1 TABLET BY MOUTH TWICE DAILY, Disp: 30 tablet, Rfl: 5  •  vitamin D (ERGOCALCIFEROL) 1.25 MG (98574 UT) capsule capsule, TAKE 1 CAPSULE BY MOUTH 1 TIME EVERY WEEK, Disp: 5 capsule, Rfl: 2    Allergies:   Allergies   Allergen Reactions   • Other Unknown - High Severity     Blisters from EKG stickers   • Bactrim [Sulfamethoxazole-Trimethoprim] Hives   • Ciprofloxacin Hives   • Penicillins Hives   • Steri-Strip Compound Benzoin [Benzoin Compound] Hives   • Sulfa Antibiotics Hives     Objective     Physical Exam:  Physical Exam  Vitals reviewed.   Constitutional:       Appearance: Normal appearance.   Pulmonary:      Effort: Pulmonary effort is normal. No respiratory distress.   Chest:   Breasts:     Right: Normal.      Left: Skin change present.      Comments: Erythema to left breast skin fold and axilla  Skin:     General: Skin is warm and dry.   Neurological:      Mental Status: She is alert. Mental status is at baseline.   Psychiatric:         Mood and Affect: Mood normal.         Vital Signs:   Vitals:    02/21/23 0819   BP: 141/72   Pulse: 70   Resp: 18   Temp: 97.1 °F (36.2 °C)   TempSrc: Temporal   SpO2: 96%   Weight: 84.8 kg (187 lb)   PainSc: 0-No pain     Body mass index is 34.2 kg/m².     Current Total XRT Dose (cGY):    Radiation Treatments     Active   Plans   Lt  BrstZrev   Most recent treatment: Dose planned: 266 cGy (fraction 4 on 2/10/2023)   Total: Dose planned: 4,256 cGy (16 fractions)   Elapsed Days: 3      Lt BrstZrev   Most recent treatment: Dose planned: 266 cGy (fraction 11 on 2/21/2023)   Total: Dose planned: 4,256 cGy (16 fractions)   Elapsed Days: 14      Reference Points   Breast_L   Most recent treatment: Dose given: 266 cGy (on 2/10/2023)   Total: Dose given: 1,064 cGy   Elapsed Days: 3      Breast_L   Most recent treatment: Dose given: 266 cGy (on 2/21/2023)   Total: Dose given: 2,926 cGy   Elapsed Days: 14         Historical   No historical radiation treatments to show.            Plan      Plan: I, Keaton Vick MD, personally performed the services described in this documentation as scribed by the above named individual in my presence, and it is both accurate and complete.  2/21/2023  08:36 EST  Patient was seen today for an on treatment visit. Patient is receiving radiation therapy to the left breast. Patient is stable and tolerating radiation therapy well with minimal side effects.  Erythema to the left axilla and left breast skin fold, no open areas or infection noted.  Continue with radiation therapy.     I have reviewed treatment setup notes, checked and approved the daily guidance images. I reviewed dose delivery, treatment parameters and deemed them appropriate. We plan to continue radiation therapy as prescribed.     Digital speech recognition software was used to dictate parts of this note, some dictation errors may occur.    Scribed for Dr. Keaton Vick MD by BERT Coe 2/21/2023  08:23 EST

## 2023-02-22 ENCOUNTER — APPOINTMENT (OUTPATIENT)
Dept: RADIATION ONCOLOGY | Facility: HOSPITAL | Age: 73
End: 2023-02-22
Payer: COMMERCIAL

## 2023-02-22 LAB
RAD ONC ARIA COURSE ID: NORMAL
RAD ONC ARIA COURSE INTENT: NORMAL
RAD ONC ARIA COURSE LAST TREATMENT DATE: NORMAL
RAD ONC ARIA COURSE START DATE: NORMAL
RAD ONC ARIA COURSE TREATMENT ELAPSED DAYS: 15
RAD ONC ARIA FIRST TREATMENT DATE: NORMAL
RAD ONC ARIA PLAN FRACTIONS TREATED TO DATE: 12
RAD ONC ARIA PLAN ID: NORMAL
RAD ONC ARIA PLAN PRESCRIBED DOSE PER FRACTION: 2.66 GY
RAD ONC ARIA PLAN PRIMARY REFERENCE POINT: NORMAL
RAD ONC ARIA PLAN TOTAL FRACTIONS PRESCRIBED: 16
RAD ONC ARIA PLAN TOTAL PRESCRIBED DOSE: 4256 CGY
RAD ONC ARIA REFERENCE POINT DOSAGE GIVEN TO DATE: 31.92 GY
RAD ONC ARIA REFERENCE POINT ID: NORMAL
RAD ONC ARIA REFERENCE POINT SESSION DOSAGE GIVEN: 2.66 GY

## 2023-02-22 PROCEDURE — G6002 STEREOSCOPIC X-RAY GUIDANCE: HCPCS | Performed by: RADIOLOGY

## 2023-02-22 PROCEDURE — 77412 RADIATION TX DELIVERY LVL 3: CPT | Performed by: RADIOLOGY

## 2023-02-22 PROCEDURE — 77387 GUIDANCE FOR RADJ TX DLVR: CPT | Performed by: RADIOLOGY

## 2023-02-23 ENCOUNTER — APPOINTMENT (OUTPATIENT)
Dept: RADIATION ONCOLOGY | Facility: HOSPITAL | Age: 73
End: 2023-02-23
Payer: COMMERCIAL

## 2023-02-23 ENCOUNTER — OFFICE VISIT (OUTPATIENT)
Dept: UROLOGY | Facility: CLINIC | Age: 73
End: 2023-02-23
Payer: COMMERCIAL

## 2023-02-23 VITALS — WEIGHT: 184 LBS | BODY MASS INDEX: 33.86 KG/M2 | HEIGHT: 62 IN

## 2023-02-23 DIAGNOSIS — N32.81 OAB (OVERACTIVE BLADDER): ICD-10-CM

## 2023-02-23 DIAGNOSIS — B37.31 YEAST VAGINITIS: ICD-10-CM

## 2023-02-23 DIAGNOSIS — N39.0 RECURRENT UTI: Primary | ICD-10-CM

## 2023-02-23 LAB
BILIRUB BLD-MCNC: NEGATIVE MG/DL
CLARITY, POC: CLEAR
COLOR UR: YELLOW
EXPIRATION DATE: ABNORMAL
GLUCOSE UR STRIP-MCNC: ABNORMAL MG/DL
KETONES UR QL: NEGATIVE
LEUKOCYTE EST, POC: NEGATIVE
Lab: ABNORMAL
NITRITE UR-MCNC: NEGATIVE MG/ML
PH UR: 6 [PH] (ref 5–8)
PROT UR STRIP-MCNC: NEGATIVE MG/DL
RAD ONC ARIA COURSE ID: NORMAL
RAD ONC ARIA COURSE INTENT: NORMAL
RAD ONC ARIA COURSE LAST TREATMENT DATE: NORMAL
RAD ONC ARIA COURSE START DATE: NORMAL
RAD ONC ARIA COURSE TREATMENT ELAPSED DAYS: 16
RAD ONC ARIA FIRST TREATMENT DATE: NORMAL
RAD ONC ARIA PLAN FRACTIONS TREATED TO DATE: 13
RAD ONC ARIA PLAN ID: NORMAL
RAD ONC ARIA PLAN PRESCRIBED DOSE PER FRACTION: 2.66 GY
RAD ONC ARIA PLAN PRIMARY REFERENCE POINT: NORMAL
RAD ONC ARIA PLAN TOTAL FRACTIONS PRESCRIBED: 16
RAD ONC ARIA PLAN TOTAL PRESCRIBED DOSE: 4256 CGY
RAD ONC ARIA REFERENCE POINT DOSAGE GIVEN TO DATE: 34.58 GY
RAD ONC ARIA REFERENCE POINT ID: NORMAL
RAD ONC ARIA REFERENCE POINT SESSION DOSAGE GIVEN: 2.66 GY
RBC # UR STRIP: NEGATIVE /UL
SP GR UR: 1.02 (ref 1–1.03)
UROBILINOGEN UR QL: NORMAL

## 2023-02-23 PROCEDURE — 99214 OFFICE O/P EST MOD 30 MIN: CPT | Performed by: NURSE PRACTITIONER

## 2023-02-23 PROCEDURE — G6002 STEREOSCOPIC X-RAY GUIDANCE: HCPCS | Performed by: RADIOLOGY

## 2023-02-23 PROCEDURE — 77387 GUIDANCE FOR RADJ TX DLVR: CPT | Performed by: RADIOLOGY

## 2023-02-23 PROCEDURE — 87086 URINE CULTURE/COLONY COUNT: CPT | Performed by: NURSE PRACTITIONER

## 2023-02-23 PROCEDURE — 77412 RADIATION TX DELIVERY LVL 3: CPT | Performed by: RADIOLOGY

## 2023-02-23 PROCEDURE — 51798 US URINE CAPACITY MEASURE: CPT | Performed by: NURSE PRACTITIONER

## 2023-02-23 PROCEDURE — 81003 URINALYSIS AUTO W/O SCOPE: CPT | Performed by: NURSE PRACTITIONER

## 2023-02-23 RX ORDER — FLUCONAZOLE 100 MG/1
100 TABLET ORAL DAILY
Qty: 3 TABLET | Refills: 0 | Status: SHIPPED | OUTPATIENT
Start: 2023-02-23 | End: 2023-02-26

## 2023-02-23 NOTE — PROGRESS NOTES
"Chief Complaint  Recurrent Uti's (6 month fu  FOR RECURRENT UTI/ACUTE CYSTITIS)    Subjective          Ren King presents to [facility] for RECURRENT UTI/YEAST VAGINITIS  History of Present Illness    Mrs. REN KING is a very pleasant 72-year-old female who returns to clinic today for evaluation. This is a 6-month follow-up with prior history of recurrent UTIs. When patient was evaluated in clinic about 6 months ago, she was doing relatively better post numerous i.v ./p.o. antibiotics for E. coli, recurrent UTIs.     Postvoid residual during that evaluation was greater than 156 mL. Patient was started on tamsulosin 0.4 mg with recheck being less than 26 cc. She has been on antibiotic suppressive therapy with trimethoprim and returns to clinic today for reevaluation. Her last urine culture on 02/01/2023 was unremarkable, showed mixed isha.     On initial evaluation in clinic today, she is in no apparent discomfort and reports doing relatively well.  She however reports issues with vaginal itching and irritation consistent with yeast vaginitis.  Nevertheless, her urine dipstick is completely negative for any infection, it is negative for gross/microscopic hematuria. Patient has 3+ glucosuria. She is currently still taking her trimethoprim. Patient is also undergoing radiation for breast cancer with 8 days left.    She states she is doing well. She states it has been a year since she had a kidney infection. She denies any burning or frequency. She states she has a yeast infection. She denies taking Diflucan. She is taking probiotics. She denies any issues with her bowels.    Objective   Vital Signs:   Ht 157.5 cm (62\")   Wt 83.5 kg (184 lb)   BMI 33.65 kg/m²       ROS:   Review of Systems   Constitutional: Positive for activity change and fatigue. Negative for appetite change, diaphoresis, fever, unexpected weight gain and unexpected weight loss.   HENT: Negative " for congestion, ear discharge, ear pain, nosebleeds, rhinorrhea, sinus pressure and sore throat.    Eyes: Negative for blurred vision, double vision, photophobia, pain, redness and visual disturbance.   Respiratory: Negative for apnea, cough, choking, chest tightness, shortness of breath, wheezing and stridor.    Cardiovascular: Negative for chest pain and palpitations.   Gastrointestinal: Positive for abdominal distention. Negative for abdominal pain, constipation, diarrhea, nausea and vomiting.   Endocrine: Negative for polydipsia, polyphagia and polyuria.   Genitourinary: Positive for dysuria and frequency. Negative for decreased urine volume, difficulty urinating, flank pain, hematuria, urgency and urinary incontinence.   Musculoskeletal: Positive for myalgias. Negative for arthralgias and joint swelling.   Skin: Negative for pallor, rash and wound.   Neurological: Positive for weakness. Negative for dizziness, tremors, syncope, light-headedness, memory problem and confusion.   Hematological: Bruises/bleeds easily.   Psychiatric/Behavioral: Positive for sleep disturbance and stress. Negative for behavioral problems.        Physical Exam  Constitutional:       General: She is in acute distress.      Appearance: She is well-developed. She is obese.   HENT:      Head: Normocephalic and atraumatic.   Eyes:      Pupils: Pupils are equal, round, and reactive to light.   Neck:      Thyroid: No thyromegaly.      Trachea: No tracheal deviation.   Cardiovascular:      Rate and Rhythm: Normal rate and regular rhythm.      Heart sounds: No murmur heard.  Pulmonary:      Effort: Pulmonary effort is normal. No respiratory distress.      Breath sounds: Normal breath sounds. No stridor. No wheezing.   Abdominal:      General: Bowel sounds are normal. There is distension.      Palpations: Abdomen is soft.      Tenderness: There is abdominal tenderness.   Genitourinary:     Labia:         Right: No tenderness.         Left: No  tenderness.       Vagina: Normal. No vaginal discharge.      Comments: DYSURIA/YEAST VAGINITIS  Musculoskeletal:         General: Tenderness present. No deformity. Normal range of motion.      Cervical back: Normal range of motion.   Skin:     General: Skin is warm and dry.      Capillary Refill: Capillary refill takes less than 2 seconds.      Coloration: Skin is not pale.      Findings: No erythema or rash.   Neurological:      Mental Status: She is alert and oriented to person, place, and time.      Cranial Nerves: No cranial nerve deficit.      Sensory: No sensory deficit.      Motor: Weakness present.      Coordination: Coordination normal.   Psychiatric:         Behavior: Behavior normal.         Thought Content: Thought content normal.         Judgment: Judgment normal.        Result Review :     UA    Urinalysis 12/27/22 2/1/23 2/1/23 2/23/23     1221 1221    Squamous Epithelial Cells, UA   0-2    Specific Gravity, UA  1.026     Ketones, UA Negative Negative  Negative   Blood, UA  Negative     Leukocytes, UA Negative Negative  Negative   Nitrite, UA  Negative     RBC, UA   0-2    WBC, UA   0-2    Bacteria, UA   None Seen            Urine Culture    Urine Culture 5/9/22 7/19/22 2/1/23   Urine Culture No growth No growth <25,000 CFU/mL Normal Urogenital Kathy                Assessment and Plan    Problem List Items Addressed This Visit        Genitourinary and Reproductive     OAB (overactive bladder)    Relevant Medications    fluconazole (Diflucan) 100 MG tablet   Other Visit Diagnoses     Recurrent UTI    -  Primary    Relevant Medications    fluconazole (Diflucan) 100 MG tablet    Other Relevant Orders    POC Urinalysis Dipstick, Automated (Completed)    Urine Culture - Urine, Urine, Random Void    Yeast vaginitis        Relevant Medications    fluconazole (Diflucan) 100 MG tablet                                                  ASSESSMENT    Recurrent urinary UTIs/Atrophic/yeast vaginitis/incomplete  bladder                           emptying:   Ms. Shey BOYLE is a pleasant 72-year-old female who returns to clinic today for evaluation of incomplete bladder emptying, recurrent UTIs, acute cystitis, and yeast vaginitis. She is in no apparent distress and reports a remarkable improvement in her overall symptoms. She is happy to be feeling better she states. She reports doing relatively better, since starting Flomax last month, She denies any issues with urinary frequency urgency or dysuria.  Today she denies any burning with urination reports vaginal itching and irritation consistent with yeast vaginitis.Her urine dipstick today is completely negative for any infection, it is negative for gross/microscopic hematuria, her PVR is 0CC today.      Again,  We discussed the types of organisms that are found in the urinary tract indicating that the vast majority are results of the patient's own gastrointestinal isha.  We discussed how many of the antibiotics that are utilized can actually exacerbate these infections by creating resistant organisms and there is only a very few antibiotics that are concentrated in the urine and do not affect the rectal reservoir nor cause recurrent yeast vaginitis.       We discussed the risk factors for recurrent infections being intercourse in younger patients and atrophic changes in older patients.  We discussed the symptoms that are found including pain, pressure, burning, frequency, urgency suprapubic pain and painful intercourse.  I discussed upper tract symptoms including fevers, chills, and indicated the workup would be much more aggressive if the patient were to present with recurrent infections in the face of upper tract symptomatology such as fever. I discussed the history of vesicoureteral reflux in young patients and finally chronic renal scarring as a result of such.                                 PLAN  AGAIN, We resent her urine for Culture. I will call her with results  if any bacteria growth resistant to her current therapy with Timethortprim     We discussed weaning patient off of trimethoprim she has not had any infections in over 1 year.      WILL ContinueTrimpex 100 mg every other day x30 days then stop-Suppressive Therapy, patient HAS requested  break off ABX ,    She will drop off a urine sample in 4 weeks in clinic for recheck,     She has been encouraged to increase her p.o. fluid intake to at least 1 to 2 L daily and avoid bladder irritants such as caffeine products, spicy foods, and citrusy foods.      I recommend  She continue probiotics with any treatment with antibiotics to protect the rectal reservoir including over-the-counter yogurt preparations to stone oral pills containing the appropriate probiotics. Patient reports the diligent use of Probiotics.     She is to also continue other medications for yeast vaginitis, atrophic vaginitis as prescribed above.     Will see her back for Follow up in clinic and also recheck her urinalysis at that time AND ALSO BLADDER RESCAN .     She may return sooner if need be.     Patient is agreeable to plan of care.     Patient reports that she is not currently experiencing any symptoms of urinary incontinence.    BMI is >= 30 and <35. (Class 1 Obesity). The following options were offered after discussion;: weight loss educational material (shared in after visit summary), exercise counseling/recommendations and nutrition counseling/recommendations      RADIOLOGY (CT AND/OR KUB):    CT Abdomen and Pelvis: No results found for this or any previous visit.     CT Stone Protocol: No results found for this or any previous visit.     KUB: No results found for this or any previous visit.       LABS (3 MONTHS):    Office Visit on 02/23/2023   Component Date Value Ref Range Status   • Color 02/23/2023 Yellow  Yellow, Straw, Dark Yellow, Ana Final   • Clarity, UA 02/23/2023 Clear  Clear Final   • Specific Gravity  02/23/2023 1.020  1.005 -  1.030 Final   • pH, Urine 02/23/2023 6.0  5.0 - 8.0 Final   • Leukocytes 02/23/2023 Negative  Negative Final   • Nitrite, UA 02/23/2023 Negative  Negative Final   • Protein, POC 02/23/2023 Negative  Negative mg/dL Final   • Glucose, UA 02/23/2023 3+ (A)  Negative mg/dL Final   • Ketones, UA 02/23/2023 Negative  Negative Final   • Urobilinogen, UA 02/23/2023 Normal  Normal, 0.2 E.U./dL Final   • Bilirubin 02/23/2023 Negative  Negative Final   • Blood, UA 02/23/2023 Negative  Negative Final   • Lot Number 02/23/2023 n   Final   • Expiration Date 02/23/2023 n   Final   Appointment on 02/23/2023   Component Date Value Ref Range Status   • Course ID 02/23/2023 C1 Lt Breast   Final   • Course Intent 02/23/2023 Curative   Final   • Course Start Date 02/23/2023 2/1/2023  3:05 PM   Final   • Course First Treatment Date 02/23/2023 2/7/2023  8:19 AM   Final   • Course Last Treatment Date 02/23/2023 2/23/2023  8:15 AM   Final   • Course Elapsed Days 02/23/2023 16   Final   • Reference Point ID 02/23/2023 Breast_L   Final   • Reference Point Dosage Given to Da* 02/23/2023 34.58  Gy Final   • Reference Point Session Dosage Giv* 02/23/2023 2.66  Gy Final   • Plan ID 02/23/2023 Lt BrstZrev   Final   • Plan Fractions Treated to Date 02/23/2023 13   Final   • Plan Total Fractions Prescribed 02/23/2023 16   Final   • Plan Prescribed Dose Per Fraction 02/23/2023 2.66  Gy Final   • Plan Total Prescribed Dose 02/23/2023 4,256  cGy Final   • Plan Primary Reference Point 02/23/2023 Breast_L   Final   Appointment on 02/22/2023   Component Date Value Ref Range Status   • Course ID 02/22/2023 C1 Lt Breast   Final   • Course Intent 02/22/2023 Curative   Final   • Course Start Date 02/22/2023 2/1/2023  3:05 PM   Final   • Course First Treatment Date 02/22/2023 2/7/2023  8:19 AM   Final   • Course Last Treatment Date 02/22/2023 2/22/2023  8:16 AM   Final   • Course Elapsed Days 02/22/2023 15   Final   • Reference Point ID 02/22/2023 Breast_L    Final   • Reference Point Dosage Given to Da* 02/22/2023 31.92  Gy Final   • Reference Point Session Dosage Giv* 02/22/2023 2.66  Gy Final   • Plan ID 02/22/2023 Lt BrstZrev   Final   • Plan Fractions Treated to Date 02/22/2023 12   Final   • Plan Total Fractions Prescribed 02/22/2023 16   Final   • Plan Prescribed Dose Per Fraction 02/22/2023 2.66  Gy Final   • Plan Total Prescribed Dose 02/22/2023 4,256  cGy Final   • Plan Primary Reference Point 02/22/2023 Breast_L   Final   Appointment on 02/21/2023   Component Date Value Ref Range Status   • Course ID 02/21/2023 C1 Lt Breast   Final   • Course Intent 02/21/2023 Curative   Final   • Course Start Date 02/21/2023 2/1/2023  3:05 PM   Final   • Course First Treatment Date 02/21/2023 2/7/2023  8:19 AM   Final   • Course Last Treatment Date 02/21/2023 2/21/2023  8:06 AM   Final   • Course Elapsed Days 02/21/2023 14   Final   • Reference Point ID 02/21/2023 Breast_L   Final   • Reference Point Dosage Given to Da* 02/21/2023 29.26  Gy Final   • Reference Point Session Dosage Giv* 02/21/2023 2.66  Gy Final   • Plan ID 02/21/2023 Lt BrstZrev   Final   • Plan Fractions Treated to Date 02/21/2023 11   Final   • Plan Total Fractions Prescribed 02/21/2023 16   Final   • Plan Prescribed Dose Per Fraction 02/21/2023 2.66  Gy Final   • Plan Total Prescribed Dose 02/21/2023 4,256  cGy Final   • Plan Primary Reference Point 02/21/2023 Breast_L   Final   Appointment on 02/20/2023   Component Date Value Ref Range Status   • Course ID 02/20/2023 C1 Lt Breast   Final   • Course Intent 02/20/2023 Curative   Final   • Course Start Date 02/20/2023 2/1/2023  3:05 PM   Final   • Course First Treatment Date 02/20/2023 2/7/2023  8:19 AM   Final   • Course Last Treatment Date 02/20/2023 2/20/2023  8:24 AM   Final   • Course Elapsed Days 02/20/2023 13   Final   • Reference Point ID 02/20/2023 Breast_L   Final   • Reference Point Dosage Given to Da* 02/20/2023 26.6  Gy Final   • Reference Point  Session Dosage Giv* 02/20/2023 2.66  Gy Final   • Plan ID 02/20/2023 Lt BrstZrev   Final   • Plan Fractions Treated to Date 02/20/2023 10   Final   • Plan Total Fractions Prescribed 02/20/2023 16   Final   • Plan Prescribed Dose Per Fraction 02/20/2023 2.66  Gy Final   • Plan Total Prescribed Dose 02/20/2023 4,256  cGy Final   • Plan Primary Reference Point 02/20/2023 Breast_L   Final   Appointment on 02/17/2023   Component Date Value Ref Range Status   • Course ID 02/17/2023 C1 Lt Breast   Final   • Course Intent 02/17/2023 Curative   Final   • Course Start Date 02/17/2023 2/1/2023  3:05 PM   Final   • Course First Treatment Date 02/17/2023 2/7/2023  8:19 AM   Final   • Course Last Treatment Date 02/17/2023 2/17/2023  8:29 AM   Final   • Course Elapsed Days 02/17/2023 10   Final   • Reference Point ID 02/17/2023 Breast_L   Final   • Reference Point Dosage Given to Da* 02/17/2023 23.94  Gy Final   • Reference Point Session Dosage Giv* 02/17/2023 2.66  Gy Final   • Plan ID 02/17/2023 Lt BrstZrev   Final   • Plan Fractions Treated to Date 02/17/2023 9   Final   • Plan Total Fractions Prescribed 02/17/2023 16   Final   • Plan Prescribed Dose Per Fraction 02/17/2023 2.66  Gy Final   • Plan Total Prescribed Dose 02/17/2023 4,256  cGy Final   • Plan Primary Reference Point 02/17/2023 Breast_L   Final   Appointment on 02/16/2023   Component Date Value Ref Range Status   • Course ID 02/16/2023 C1 Lt Breast   Final   • Course Intent 02/16/2023 Curative   Final   • Course Start Date 02/16/2023 2/1/2023  3:05 PM   Final   • Course First Treatment Date 02/16/2023 2/7/2023  8:19 AM   Final   • Course Last Treatment Date 02/16/2023 2/16/2023  8:04 AM   Final   • Course Elapsed Days 02/16/2023 9   Final   • Reference Point ID 02/16/2023 Breast_L   Final   • Reference Point Dosage Given to Da* 02/16/2023 21.28  Gy Final   • Reference Point Session Dosage Giv* 02/16/2023 2.66  Gy Final   • Plan ID 02/16/2023 Lt BrstZrev   Final   •  Plan Fractions Treated to Date 02/16/2023 8   Final   • Plan Total Fractions Prescribed 02/16/2023 16   Final   • Plan Prescribed Dose Per Fraction 02/16/2023 2.66  Gy Final   • Plan Total Prescribed Dose 02/16/2023 4,256  cGy Final   • Plan Primary Reference Point 02/16/2023 Breast_L   Final   Appointment on 02/15/2023   Component Date Value Ref Range Status   • Course ID 02/15/2023 C1 Lt Breast   Final   • Course Intent 02/15/2023 Curative   Final   • Course Start Date 02/15/2023 2/1/2023  3:05 PM   Final   • Course First Treatment Date 02/15/2023 2/7/2023  8:19 AM   Final   • Course Last Treatment Date 02/15/2023 2/15/2023  8:09 AM   Final   • Course Elapsed Days 02/15/2023 8   Final   • Reference Point ID 02/15/2023 Breast_L   Final   • Reference Point Dosage Given to Da* 02/15/2023 18.62  Gy Final   • Reference Point Session Dosage Giv* 02/15/2023 2.66  Gy Final   • Plan ID 02/15/2023 Lt BrstZrev   Final   • Plan Fractions Treated to Date 02/15/2023 7   Final   • Plan Total Fractions Prescribed 02/15/2023 16   Final   • Plan Prescribed Dose Per Fraction 02/15/2023 2.66  Gy Final   • Plan Total Prescribed Dose 02/15/2023 4,256  cGy Final   • Plan Primary Reference Point 02/15/2023 Breast_L   Final   Orders Only on 02/14/2023   Component Date Value Ref Range Status   • Course ID 02/14/2023 C1 Lt Breast   Final   • Course Intent 02/14/2023 Curative   Final   • Course Start Date 02/14/2023 2/1/2023  3:05 PM   Final   • Course First Treatment Date 02/14/2023 2/7/2023  8:19 AM   Final   • Course Last Treatment Date 02/14/2023 2/14/2023  8:12 AM   Final   • Course Elapsed Days 02/14/2023 7   Final   • Reference Point ID 02/14/2023 Breast_L   Final   • Reference Point Dosage Given to Da* 02/14/2023 15.96  Gy Final   • Reference Point Session Dosage Giv* 02/14/2023 2.66  Gy Final   • Plan ID 02/14/2023 Lt BrstZrev   Final   • Plan Fractions Treated to Date 02/14/2023 6   Final   • Plan Total Fractions Prescribed  02/14/2023 16   Final   • Plan Prescribed Dose Per Fraction 02/14/2023 2.66  Gy Final   • Plan Total Prescribed Dose 02/14/2023 4,256  cGy Final   • Plan Primary Reference Point 02/14/2023 Breast_L   Final   Appointment on 02/13/2023   Component Date Value Ref Range Status   • Course ID 02/13/2023 C1 Lt Breast   Final   • Course Intent 02/13/2023 Curative   Final   • Course Start Date 02/13/2023 2/1/2023  3:05 PM   Final   • Course First Treatment Date 02/13/2023 2/7/2023  8:19 AM   Final   • Course Last Treatment Date 02/13/2023 2/13/2023  8:13 AM   Final   • Course Elapsed Days 02/13/2023 6   Final   • Reference Point ID 02/13/2023 Breast_L   Final   • Reference Point Dosage Given to Da* 02/13/2023 13.3  Gy Final   • Reference Point Session Dosage Giv* 02/13/2023 2.66  Gy Final   • Plan ID 02/13/2023 Lt BrstZrev   Final   • Plan Fractions Treated to Date 02/13/2023 5   Final   • Plan Total Fractions Prescribed 02/13/2023 16   Final   • Plan Prescribed Dose Per Fraction 02/13/2023 2.66  Gy Final   • Plan Total Prescribed Dose 02/13/2023 4,256  cGy Final   • Plan Primary Reference Point 02/13/2023 Breast_L   Final   There may be more visits with results that are not included.    Smoking Cessation Counseling:  Never a smoker.  Patient does not currently use any tobacco products.     Follow Up   Return in about 6 months (around 8/23/2023) for Next scheduled follow up, RECURRENT UTI/DYSURIA/DETRUSSOR INSTABILITY.    Patient was given instructions and counseling regarding her condition or for health maintenance advice. Please see specific information pulled into the AVS if appropriate.          This document has been electronically signed by Griselda Cheng-Akwa, APRN   February 23, 2023 22:00 EST      Dictated Utilizing Dragon Dictation: Part of this note may be an electronic transcription/translation of spoken language to printed text using the Dragon Dictation System.     Transcribed from ambient dictation for  Griselda Cheng-Akwa, APRN by David Reese.  02/23/23   11:16 EST    Patient or patient representative verbalized consent to the visit recording.  I have personally performed the services described in this document as transcribed by the above individual, and it is both accurate and complete.  Griselda Cheng-Akwa, APRN  2/23/2023  22:00 EST

## 2023-02-24 ENCOUNTER — APPOINTMENT (OUTPATIENT)
Dept: RADIATION ONCOLOGY | Facility: HOSPITAL | Age: 73
End: 2023-02-24
Payer: COMMERCIAL

## 2023-02-24 ENCOUNTER — TELEPHONE (OUTPATIENT)
Dept: UROLOGY | Facility: CLINIC | Age: 73
End: 2023-02-24
Payer: COMMERCIAL

## 2023-02-24 LAB
BACTERIA SPEC AEROBE CULT: NORMAL
RAD ONC ARIA COURSE ID: NORMAL
RAD ONC ARIA COURSE INTENT: NORMAL
RAD ONC ARIA COURSE LAST TREATMENT DATE: NORMAL
RAD ONC ARIA COURSE START DATE: NORMAL
RAD ONC ARIA COURSE TREATMENT ELAPSED DAYS: 17
RAD ONC ARIA FIRST TREATMENT DATE: NORMAL
RAD ONC ARIA PLAN FRACTIONS TREATED TO DATE: 14
RAD ONC ARIA PLAN ID: NORMAL
RAD ONC ARIA PLAN PRESCRIBED DOSE PER FRACTION: 2.66 GY
RAD ONC ARIA PLAN PRIMARY REFERENCE POINT: NORMAL
RAD ONC ARIA PLAN TOTAL FRACTIONS PRESCRIBED: 16
RAD ONC ARIA PLAN TOTAL PRESCRIBED DOSE: 4256 CGY
RAD ONC ARIA REFERENCE POINT DOSAGE GIVEN TO DATE: 37.24 GY
RAD ONC ARIA REFERENCE POINT ID: NORMAL
RAD ONC ARIA REFERENCE POINT SESSION DOSAGE GIVEN: 2.66 GY

## 2023-02-24 PROCEDURE — 77387 GUIDANCE FOR RADJ TX DLVR: CPT | Performed by: RADIOLOGY

## 2023-02-24 PROCEDURE — G6002 STEREOSCOPIC X-RAY GUIDANCE: HCPCS | Performed by: RADIOLOGY

## 2023-02-24 PROCEDURE — 77412 RADIATION TX DELIVERY LVL 3: CPT | Performed by: RADIOLOGY

## 2023-02-24 NOTE — TELEPHONE ENCOUNTER
Called patient to go over her urine culture which was negative. Patient verbalized understanding.

## 2023-02-24 NOTE — TELEPHONE ENCOUNTER
----- Message from Griselda Cheng-Akwa, APRN sent at 2/24/2023  3:39 PM EST -----  Please kindly let patient know her urine culture results showed mixed isha at this time otherwise negative for any pathogen.  She may drop off another urine sample if she becomes symptomatic.  If not please follow-up in clinic as discussed thank you

## 2023-02-27 ENCOUNTER — APPOINTMENT (OUTPATIENT)
Dept: RADIATION ONCOLOGY | Facility: HOSPITAL | Age: 73
End: 2023-02-27
Payer: COMMERCIAL

## 2023-02-27 LAB
RAD ONC ARIA COURSE ID: NORMAL
RAD ONC ARIA COURSE INTENT: NORMAL
RAD ONC ARIA COURSE LAST TREATMENT DATE: NORMAL
RAD ONC ARIA COURSE START DATE: NORMAL
RAD ONC ARIA COURSE TREATMENT ELAPSED DAYS: 20
RAD ONC ARIA FIRST TREATMENT DATE: NORMAL
RAD ONC ARIA PLAN FRACTIONS TREATED TO DATE: 15
RAD ONC ARIA PLAN ID: NORMAL
RAD ONC ARIA PLAN PRESCRIBED DOSE PER FRACTION: 2.66 GY
RAD ONC ARIA PLAN PRIMARY REFERENCE POINT: NORMAL
RAD ONC ARIA PLAN TOTAL FRACTIONS PRESCRIBED: 16
RAD ONC ARIA PLAN TOTAL PRESCRIBED DOSE: 4256 CGY
RAD ONC ARIA REFERENCE POINT DOSAGE GIVEN TO DATE: 39.9 GY
RAD ONC ARIA REFERENCE POINT ID: NORMAL
RAD ONC ARIA REFERENCE POINT SESSION DOSAGE GIVEN: 2.66 GY

## 2023-02-27 PROCEDURE — 77427 RADIATION TX MANAGEMENT X5: CPT | Performed by: RADIOLOGY

## 2023-02-27 PROCEDURE — 77412 RADIATION TX DELIVERY LVL 3: CPT | Performed by: RADIOLOGY

## 2023-02-27 PROCEDURE — G6002 STEREOSCOPIC X-RAY GUIDANCE: HCPCS | Performed by: RADIOLOGY

## 2023-02-27 PROCEDURE — 77387 GUIDANCE FOR RADJ TX DLVR: CPT | Performed by: RADIOLOGY

## 2023-02-28 ENCOUNTER — RADIATION ONCOLOGY WEEKLY ASSESSMENT (OUTPATIENT)
Dept: RADIATION ONCOLOGY | Facility: HOSPITAL | Age: 73
End: 2023-02-28
Payer: COMMERCIAL

## 2023-02-28 ENCOUNTER — APPOINTMENT (OUTPATIENT)
Dept: RADIATION ONCOLOGY | Facility: HOSPITAL | Age: 73
End: 2023-02-28
Payer: COMMERCIAL

## 2023-02-28 VITALS
RESPIRATION RATE: 18 BRPM | BODY MASS INDEX: 34.13 KG/M2 | DIASTOLIC BLOOD PRESSURE: 75 MMHG | SYSTOLIC BLOOD PRESSURE: 127 MMHG | OXYGEN SATURATION: 98 % | HEART RATE: 76 BPM | TEMPERATURE: 96.4 F | WEIGHT: 186.6 LBS

## 2023-02-28 DIAGNOSIS — D05.12 DUCTAL CARCINOMA IN SITU (DCIS) OF LEFT BREAST: Primary | ICD-10-CM

## 2023-02-28 LAB
RAD ONC ARIA COURSE ID: NORMAL
RAD ONC ARIA COURSE INTENT: NORMAL
RAD ONC ARIA COURSE LAST TREATMENT DATE: NORMAL
RAD ONC ARIA COURSE START DATE: NORMAL
RAD ONC ARIA COURSE TREATMENT ELAPSED DAYS: 21
RAD ONC ARIA FIRST TREATMENT DATE: NORMAL
RAD ONC ARIA PLAN FRACTIONS TREATED TO DATE: 16
RAD ONC ARIA PLAN ID: NORMAL
RAD ONC ARIA PLAN PRESCRIBED DOSE PER FRACTION: 2.66 GY
RAD ONC ARIA PLAN PRIMARY REFERENCE POINT: NORMAL
RAD ONC ARIA PLAN TOTAL FRACTIONS PRESCRIBED: 16
RAD ONC ARIA PLAN TOTAL PRESCRIBED DOSE: 4256 CGY
RAD ONC ARIA REFERENCE POINT DOSAGE GIVEN TO DATE: 42.56 GY
RAD ONC ARIA REFERENCE POINT ID: NORMAL
RAD ONC ARIA REFERENCE POINT SESSION DOSAGE GIVEN: 2.66 GY

## 2023-02-28 PROCEDURE — 77412 RADIATION TX DELIVERY LVL 3: CPT | Performed by: RADIOLOGY

## 2023-02-28 PROCEDURE — G6002 STEREOSCOPIC X-RAY GUIDANCE: HCPCS | Performed by: RADIOLOGY

## 2023-02-28 PROCEDURE — 77387 GUIDANCE FOR RADJ TX DLVR: CPT | Performed by: RADIOLOGY

## 2023-02-28 PROCEDURE — 77336 RADIATION PHYSICS CONSULT: CPT | Performed by: RADIOLOGY

## 2023-02-28 NOTE — PROGRESS NOTES
On Treatment Visit Note      Patient Name: Shey King  : 1950   MRN: [id number]     Diagnosis:    Chief Complaint   Patient presents with   • Breast Cancer     Ductal Carcinoma in Situ of Left Breast      Stagin Tis N0 M0    This patient was seen today for an on treatment visit.     Radiation Treatments     Active   Plans   Lt BrstZrev   Most recent treatment: Dose planned: 266 cGy (fraction 4 on 2/10/2023)   Total: Dose planned: 4,256 cGy (16 fractions)   Elapsed Days: 3      Reference Points   Breast_L   Most recent treatment: Dose given: 266 cGy (on 2/10/2023)   Total: Dose given: 1,064 cGy   Elapsed Days: 3      Breast_L   Most recent treatment: Dose given: 266 cGy (on 2023)   Total: Dose given: 4,256 cGy   Elapsed Days: 21         Historical   Plans   Lt BrstZrev   Most recent treatment: Dose planned: 266 cGy (fraction 16 on 2023)   Total: Dose planned: 4,256 cGy (16 fractions)   Elapsed Days: 21                  Subjective      Review of Systems:   Review of Systems   HENT: Negative for trouble swallowing.    Respiratory: Negative for cough and shortness of breath.    Cardiovascular: Negative for chest pain.   Skin: Positive for color change. Negative for dry skin.   All other systems reviewed and are negative.    Medications:     Current Outpatient Medications:   •  acetaminophen (TYLENOL) 325 MG tablet, Take 2 tablets by mouth Every 4 (Four) Hours As Needed for Mild Pain., Disp: 30 tablet, Rfl: 0  •  aspirin 81 MG chewable tablet, Chew 1 tablet Daily., Disp: , Rfl:   •  AZO-CRANBERRY PO, Take  by mouth., Disp: , Rfl:   •  Calcium Citrate-Vitamin D 250-2.5 MG-MCG per tablet, Take 1 tablet by mouth 2 (Two) Times a Day., Disp: 30 tablet, Rfl: 11  •  empagliflozin (Jardiance) 10 MG tablet tablet, Take 1 tablet by mouth Daily., Disp: 90 tablet, Rfl: 1  •  fenofibrate (TRICOR) 145 MG tablet, TAKE 1 TABLET BY MOUTH DAILY, Disp: 90 tablet, Rfl: 1  •  glucose monitor monitoring kit, 1  each as needed (DM II)., Disp: 1 each, Rfl: 0  •  Lancets (OneTouch Delica Plus [id number]) misc, USE TO TEST BLOOD SUGAR DAILY AS DIRECTED, Disp: , Rfl:   •  Lancets Misc. misc, For Daily testing  E11.65, Disp: 50 each, Rfl: 11  •  levothyroxine (SYNTHROID, LEVOTHROID) 125 MCG tablet, Take 1 tablet by mouth Daily., Disp: 90 tablet, Rfl: 1  •  OneTouch Ultra test strip, USE TO TEST BLOOD SUGAR DAILY AS DIRECTED, Disp: 50 each, Rfl: 5  •  Probiotic Product (PROBIOTIC DAILY PO), Take  by mouth., Disp: , Rfl:   •  trimethoprim (TRIMPEX) 100 MG tablet, TAKE 1 TABLET BY MOUTH TWICE DAILY, Disp: 30 tablet, Rfl: 5  •  vitamin D (ERGOCALCIFEROL) 1.25 MG (77500 UT) capsule capsule, TAKE 1 CAPSULE BY MOUTH 1 TIME EVERY WEEK, Disp: 5 capsule, Rfl: 2    Allergies:   Allergies   Allergen Reactions   • Other Unknown - High Severity     Blisters from EKG stickers   • Bactrim [Sulfamethoxazole-Trimethoprim] Hives   • Ciprofloxacin Hives   • Penicillins Hives   • Steri-Strip Compound Benzoin [Benzoin Compound] Hives   • Sulfa Antibiotics Hives     Objective     Physical Exam:  Physical Exam  Vitals reviewed.   Constitutional:       Appearance: Normal appearance.   Pulmonary:      Effort: Pulmonary effort is normal. No respiratory distress.   Chest:   Breasts:     Right: Normal.      Left: Normal. No swelling, skin change or tenderness.      Comments: Small 2x3 cm area of moist desquamation noted to the left axilla   Skin:     General: Skin is warm and dry.   Neurological:      Mental Status: She is alert. Mental status is at baseline.   Psychiatric:         Mood and Affect: Mood normal.       Vital Signs:   Vitals:    02/28/23 0823   BP: 127/75   Pulse: 76   Resp: 18   Temp: 96.4 °F (35.8 °C)   TempSrc: Temporal   SpO2: 98%   Weight: 84.6 kg (186 lb 9.6 oz)   PainSc:   3   PainLoc: Generalized     Body mass index is 34.13 kg/m².     Current Total XRT Dose (cGY):    Radiation Treatments     Active   Plans   Lt BrstZrev   Most recent  treatment: Dose planned: 266 cGy (fraction 4 on 2/10/2023)   Total: Dose planned: 4,256 cGy (16 fractions)   Elapsed Days: 3      Reference Points   Breast_L   Most recent treatment: Dose given: 266 cGy (on 2/10/2023)   Total: Dose given: 1,064 cGy   Elapsed Days: 3      Breast_L   Most recent treatment: Dose given: 266 cGy (on 2/28/2023)   Total: Dose given: 4,256 cGy   Elapsed Days: 21         Historical   Plans   Lt BrstZrev   Most recent treatment: Dose planned: 266 cGy (fraction 16 on 2/28/2023)   Total: Dose planned: 4,256 cGy (16 fractions)   Elapsed Days: 21                  Plan      Plan:   Patient was seen today for an on treatment visit. Patient is receiving radiation therapy to the left breast. Patient is stable and tolerating radiation therapy well with minimal side effects.  No new XRT related issues today.  Patient continues to use Aquaphor to the treatment field.  She does have a small 2 x 3 cm area of moist desquamation that was noted to the left axilla, but no infection noted.  Continue with radiation therapy.     I have reviewed treatment setup notes, checked and approved the daily guidance images. I reviewed dose delivery, treatment parameters and deemed them appropriate. We plan to continue radiation therapy as prescribed.     Digital speech recognition software was used to dictate parts of this note, some dictation errors may occur.    Scribed for Dr. Madelin MD by BERT Coe 2/28/2023  08:58 EST     I, Bashir Yusuf MD, personally performed the services described in this documentation as scribed by the above named individual in my presence, and it is both accurate and complete.  2/28/2023  08:58 EST

## 2023-03-01 ENCOUNTER — OFFICE VISIT (OUTPATIENT)
Dept: ONCOLOGY | Facility: CLINIC | Age: 73
End: 2023-03-01
Payer: COMMERCIAL

## 2023-03-01 ENCOUNTER — OFFICE VISIT (OUTPATIENT)
Dept: RADIATION ONCOLOGY | Facility: HOSPITAL | Age: 73
End: 2023-03-01
Payer: COMMERCIAL

## 2023-03-01 DIAGNOSIS — D05.12 DUCTAL CARCINOMA IN SITU (DCIS) OF LEFT BREAST: Primary | ICD-10-CM

## 2023-03-01 LAB
RAD ONC ARIA COURSE ID: NORMAL
RAD ONC ARIA COURSE INTENT: NORMAL
RAD ONC ARIA COURSE LAST TREATMENT DATE: NORMAL
RAD ONC ARIA COURSE START DATE: NORMAL
RAD ONC ARIA COURSE TREATMENT ELAPSED DAYS: 22
RAD ONC ARIA FIRST TREATMENT DATE: NORMAL
RAD ONC ARIA PLAN FRACTIONS TREATED TO DATE: 1
RAD ONC ARIA PLAN ID: NORMAL
RAD ONC ARIA PLAN PRESCRIBED DOSE PER FRACTION: 2 GY
RAD ONC ARIA PLAN PRIMARY REFERENCE POINT: NORMAL
RAD ONC ARIA PLAN TOTAL FRACTIONS PRESCRIBED: 5
RAD ONC ARIA PLAN TOTAL PRESCRIBED DOSE: 1000 CGY
RAD ONC ARIA REFERENCE POINT DOSAGE GIVEN TO DATE: 2 GY
RAD ONC ARIA REFERENCE POINT ID: NORMAL
RAD ONC ARIA REFERENCE POINT SESSION DOSAGE GIVEN: 2 GY

## 2023-03-01 PROCEDURE — G6002 STEREOSCOPIC X-RAY GUIDANCE: HCPCS | Performed by: RADIOLOGY

## 2023-03-01 PROCEDURE — 99214 OFFICE O/P EST MOD 30 MIN: CPT | Performed by: INTERNAL MEDICINE

## 2023-03-01 PROCEDURE — 77280 THER RAD SIMULAJ FIELD SMPL: CPT | Performed by: RADIOLOGY

## 2023-03-01 PROCEDURE — 77412 RADIATION TX DELIVERY LVL 3: CPT | Performed by: RADIOLOGY

## 2023-03-01 PROCEDURE — 77387 GUIDANCE FOR RADJ TX DLVR: CPT | Performed by: RADIOLOGY

## 2023-03-01 NOTE — PROGRESS NOTES
Subjective     TELE-HEALTH APPOINTMENT     Date: 3/1/2023    Referring Provider  Dr. Mata     Chief Complaint  Ductal carcinoma of the left breast, estrogen receptor positive, status post lumpectomy    Subjective          Shey King is a 72 y.o. female who presents today to [facility] for follow up.    HPI:   72-year-old female with a history of diabetes mellitus type 2, hyperlipidemia, hypothyroidism, frequent UTIs who presents to establish care regarding ductal carcinoma in situ of the left breast, estrogen receptor positive.     Oncological history:  She notes intermittent breast pain on the left side for several months, was finally directed to get a screening mammogram  - October 26, 2022.  Screening mammogram showed calcification left breast middle posterior third lateral aspect, which are new.  Other calcifications throughout left breast appear stable.   - 12/6/22. Diagnostic mammogram showed grouped calcifications in the left 3-4 o'clock region.   - 12/14/2022: stereotactic biopsy. Pathology with intermediate to high-grade cribriform ductal carcinoma in situ with associated necrosis and calcification.  -12/29/2022: Underwent lumpectomy.  Final pathology with ductal carcinoma in situ.  10 x 3 x 2 mm, 4 of 19 blocks.  Grade 2 (intermediate).  Distance from DCIS to closest margin 1 mm from inferior margin, 2 mm from anterior margin, 4 mm to posterior margin. PTis.  Estrogen receptor 100% positive, progesterone 5% positive    She was recently seen by Dr. Mata on 1/17/2023.  Overall doing well after lumpectomy.  He presents for further therapy and discussion.    GYN History:  Menarche at age 14. P3, G3  Age of first pregnancy:    Age of menopause: 52  Breast feed: no  Birth control: yes  Hormone replacement: no    Never had DEXA bone scan in the past.  Denies ever being treated for diagnosed with osteoporosis.  Currently taking vitamin D tablets.    Interval  History 02/01/2023   She was seen by radiation oncology today, Dr. Yusuf, who recommends radiation to left breast due to DCIS margin less than 2 mm.  Underwent DEXA bone scan 1/27/2023 with bone mineral density of right femur neck 0.899 with T score -1.     Interval History 03/01/2023   Receiving radiation therapy currently, has 4 more boost therapy left. Has had pain on the breast intermittently and the axilla. Was told by Rad Onc to apply less aquaphor to the area.        The following portions of the patient's history were reviewed and updated as appropriate: allergies, current medications, past family history, past medical history, past social history, past surgical history and problem list.    Objective     Objective     Allergy:   Allergies   Allergen Reactions   • Other Unknown - High Severity     Blisters from EKG stickers   • Bactrim [Sulfamethoxazole-Trimethoprim] Hives   • Ciprofloxacin Hives   • Penicillins Hives   • Steri-Strip Compound Benzoin [Benzoin Compound] Hives   • Sulfa Antibiotics Hives        Current Medications:   Current Outpatient Medications   Medication Sig Dispense Refill   • acetaminophen (TYLENOL) 325 MG tablet Take 2 tablets by mouth Every 4 (Four) Hours As Needed for Mild Pain. 30 tablet 0   • aspirin 81 MG chewable tablet Chew 1 tablet Daily.     • AZO-CRANBERRY PO Take  by mouth.     • Calcium Citrate-Vitamin D 250-2.5 MG-MCG per tablet Take 1 tablet by mouth 2 (Two) Times a Day. 30 tablet 11   • empagliflozin (Jardiance) 10 MG tablet tablet Take 1 tablet by mouth Daily. 90 tablet 1   • fenofibrate (TRICOR) 145 MG tablet TAKE 1 TABLET BY MOUTH DAILY 90 tablet 1   • glucose monitor monitoring kit 1 each as needed (DM II). 1 each 0   • Lancets (OneTouch Delica Plus [id number]) misc USE TO TEST BLOOD SUGAR DAILY AS DIRECTED     • Lancets [facility] misc For Daily testing  E11.65 50 each 11   • levothyroxine (SYNTHROID, LEVOTHROID) 125 MCG tablet Take 1 tablet by mouth Daily. 90 tablet 1    • OneTouch Ultra test strip USE TO TEST BLOOD SUGAR DAILY AS DIRECTED 50 each 5   • Probiotic Product (PROBIOTIC DAILY PO) Take  by mouth.     • trimethoprim (TRIMPEX) 100 MG tablet TAKE 1 TABLET BY MOUTH TWICE DAILY 30 tablet 5   • vitamin D (ERGOCALCIFEROL) 1.25 MG (07887 UT) capsule capsule TAKE 1 CAPSULE BY MOUTH 1 TIME EVERY WEEK 5 capsule 2     No current facility-administered medications for this visit.       Past Medical History:  Past Medical History:   Diagnosis Date   • Abdominal pain, LLQ (left lower quadrant)    • Abnormal ECG aug 2021   • Abnormal Pap smear of cervix     several years ago   • Anemia     years ago   • Breast cancer (HCC)    • Cataract early stage   • Cystitis    • Diabetes mellitus (HCC)    • Diarrhea    • Diverticulitis of colon    • Diverticulosis    • Ductal carcinoma in situ (DCIS) of left breast 12/21/2022   • Encounter for cholecystectomy 2018   • Gallstones    • GERD (gastroesophageal reflux disease)    • Hiatal hernia    • Hyperglycemia    • Hyperlipidemia    • Hypothyroidism    • Muscle spasm     FLANK PAIN   • OAB (overactive bladder)    • Pneumonia     years ago   • Rectal bleeding    • UTI (urinary tract infection)        Past Surgical History:  Past Surgical History:   Procedure Laterality Date   • BREAST BIOPSY Bilateral 2005    benign   • BREAST BIOPSY Left 12/29/2022    Procedure: BREAST BIOPSY WITH NEEDLE LOCALIZATION;  Surgeon: Dong Mata MD;  Location: [facility];  Service: General;  Laterality: Left;   • CHOLECYSTECTOMY  2018?   • ENDOSCOPY     • ENDOSCOPY N/A 1/11/2018    Procedure: ESOPHAGOGASTRODUODENOSCOPY;  Surgeon: Dong Mata MD;  Location: [facility];  Service:    • MAMMO BILATERAL  09/2015   • OVARIAN CYST DRAINAGE     • HI LAPAROSCOPY SURG CHOLECYSTECTOMY N/A 1/11/2018    Procedure: CHOLECYSTECTOMY LAPAROSCOPIC;  Surgeon: Dong Mata MD;  Location: [facility];  Service: General   • US GUIDED LYMPH NODE BIOPSY  4/11/2018        Social History:  Social History     Socioeconomic History   • Marital status:    Tobacco Use   • Smoking status: Never   • Smokeless tobacco: Never   • Tobacco comments:     never   Vaping Use   • Vaping Use: Never used   Substance and Sexual Activity   • Alcohol use: No   • Drug use: No   • Sexual activity: Not Currently     Partners: Male     Shey King  reports that she has never smoked. She has never used smokeless tobacco..      Family History:  Family History   Adopted: Yes   Problem Relation Age of Onset   • Heart disease Mother         also had cancer kidney   • Cancer Mother         kidney   • Thyroid disease Mother    • Heart disease Father    • Diabetes Brother    • Breast cancer Neg Hx        Review of Systems:  Review of Systems   Constitutional: Negative.    HENT: Negative.    Respiratory: Negative.    Cardiovascular: Negative.    Gastrointestinal: Negative.    Genitourinary: Negative.    Musculoskeletal: Negative.    Skin: Negative.    Neurological: Negative.    Hematological: Negative.    Psychiatric/Behavioral: Negative.        Vital Signs:   There were no vitals taken for this visit. Tele health appointment     Physical Exam: Not performed due to tele appointment     PHQ-9 Score  PHQ-9 Total Score:       Pain Score  There were no vitals filed for this visit.                  Lab Review  Lab Results   Component Value Date    WBC 7.59 12/27/2022    HGB 13.5 12/27/2022    HCT 42.0 12/27/2022    MCV 89.4 12/27/2022    RDW 13.3 12/27/2022     12/27/2022    NEUTRORELPCT 65.5 12/27/2022    LYMPHORELPCT 22.7 12/27/2022    MONORELPCT 7.4 12/27/2022    EOSRELPCT 2.6 12/27/2022    BASORELPCT 1.1 12/27/2022    NEUTROABS 4.98 12/27/2022    LYMPHSABS 1.72 12/27/2022       Lab Results   Component Value Date     12/27/2022    K 4.6 12/27/2022    CO2 27.0 12/27/2022     12/27/2022    BUN 17 12/27/2022    CREATININE 1.01 (H) 12/27/2022    EGFRIFNONA 57 (L) 01/26/2022    EGFRIFAFRI  74 09/06/2016    GLUCOSE 111 (H) 12/27/2022    CALCIUM 9.6 12/27/2022    ALKPHOS 86 12/27/2022    AST 16 12/27/2022    ALT 12 12/27/2022    BILITOT 0.5 12/27/2022    ALBUMIN 4.7 12/27/2022    PROTEINTOT 7.9 12/27/2022    MG 1.7 07/13/2021    PHOS 3.5 12/01/2021       Radiology Results  Screening mammogram 10/27/22:  FINDINGS:     Breast Composition: The breasts are heterogenously dense, which may  obscure small masses.   Important Findings:    Biopsy related changes right breast with marker clip. Stable benign  calcifications right breast are noted. Calcifications left breast  middle-posterior third lateral aspect have developed. Other  calcifications throughout the left breast appear stable. Biopsy related  changes upper outer quadrant left breast with marker clip noted.   No suspicious calcifications or areas of architectural distortion.  No  dominant masses or skin thickening    Diagnostic mammogram 12/6/2022:  IMPRESSION:  Grouped calcifications in the left 3:00 to 4:00 region.      Pathology:   Procedure: Excision (less than total mastectomy)   Specimen Laterality: Left   Tumor Site: Not specified   Histologic Type: Ductal carcinoma in situ   Size of DCIS: 10 x 3 x 2 mm, 4 of 19 blocks   Architectural Patterns: Cribriform   Nuclear Grade: Grade II (intermediate)   Necrosis: Present, focal (small foci of necrosis)   Microcalcifications: Present in DCIS and in nonneoplastic tissue   Margin Status: All margins negative for DCIS   Distance from DCIS to Closest Margin: 1 mm from inferior margin   Distance from DCIS to Anterior Margin: 2 mm   Distance from DCIS to Posterior Margin: 4 mm   Regional Lymph Node Status: No regional lymph nodes submitted or found   PATHOLOGIC STAGE (pTNM, AJCC 8th Edition): pTis (DCIS), pNx   Biomarker Testing Performed on Previous Biopsy:  (ER) Positive 100 %   (PgR) Positive 5 %     CLINICAL HISTORY:   Ductal carcinoma in situ (DCIS) of left breast     Assessment / Plan          Assessment and Plan   72-year-old female who presents today with hormone positive left breast ductal carcinoma in situ, status postlumpectomy.    #Ductal carcinoma in situ, left breast, hormone positive  -Final pathology with 10 x 3 x 2 mm ductal carcinoma in situ, grade 2, 1 mm from inferior margin, 2 mm from anterior margin, 4 mm from posterior margin  -Her case ws discussed in tumor board on 1/25; it was deemed she should receive radiation therapy given close margins <2mm inferior margin  -Currently receiving adjuvant radiation therapy; Plan to complete 3/7  -We also discussed adjuvant hormonal therapy which has shown to decrease recurrence by at least 33%.  We would prescribe anastrozole 1 mg daily to start after radiation therapy for at least 5 years.   -Some adverse effects such as hot flashes, myalgia, arthralgia, osteopenia were discussed with patient.  Chemo expert printout information was also given.  Patient is agreeable to the therapy as above  -Patient to receive first mammogram in 6 months (approximately June or July 2023), every 12 months thereafter  -She will continue history and physical and exam every 6 months for the first 5 years, yearly thereafter according to NCCN     #Bone health  -DEXA scan 1/27/2023: bone mineral density of right femur neck 0.899 with T score -1.  Repeat DEXA scan yearly, next January 2024  -Continue vitamin D and calcium supplement      Discussed possible differential diagnoses, testing, treatment, recommended non-pharmacological interventions, risks, warning signs to monitor for that would indicate need for follow-up in clinic or ER. If no improvement with these regimens or you have new or worsening symptoms follow-up. Patient verbalizes understanding and agreement with plan of care. Denies further needs or concerns.     Patient was given instructions and counseling regarding her condition and for health maintenance advised.    I spent 30 minutes with Shey King  today.  In the office today, more than 50% of this time was spent face-to-face with her  in counseling / coordination of care, reviewing her interim medical history and counseling on the current treatment plan.  All questions were answered to her satisfaction.      Meds ordered during this visit  No orders of the defined types were placed in this encounter.      Visit Diagnoses    ICD-10-CM ICD-9-CM   1. Ductal carcinoma in situ (DCIS) of left breast  D05.12 233.0       Follow Up   4 weeks to follow up after completion of radiation and assessing for symptoms after initiating AI      This document has been electronically signed by Maia Ford MD   March 1, 2023 13:53 EST    Dictated Utilizing Dragon Dictation: Part of this note may be an electronic transcription/translation of spoken language to printed text using the Dragon Dictation System.

## 2023-03-02 ENCOUNTER — DOCUMENTATION (OUTPATIENT)
Dept: ONCOLOGY | Facility: HOSPITAL | Age: 73
End: 2023-03-02
Payer: COMMERCIAL

## 2023-03-02 ENCOUNTER — APPOINTMENT (OUTPATIENT)
Dept: RADIATION ONCOLOGY | Facility: HOSPITAL | Age: 73
End: 2023-03-02
Payer: COMMERCIAL

## 2023-03-02 LAB
RAD ONC ARIA COURSE ID: NORMAL
RAD ONC ARIA COURSE INTENT: NORMAL
RAD ONC ARIA COURSE LAST TREATMENT DATE: NORMAL
RAD ONC ARIA COURSE START DATE: NORMAL
RAD ONC ARIA COURSE TREATMENT ELAPSED DAYS: 23
RAD ONC ARIA FIRST TREATMENT DATE: NORMAL
RAD ONC ARIA PLAN FRACTIONS TREATED TO DATE: 2
RAD ONC ARIA PLAN ID: NORMAL
RAD ONC ARIA PLAN PRESCRIBED DOSE PER FRACTION: 2 GY
RAD ONC ARIA PLAN PRIMARY REFERENCE POINT: NORMAL
RAD ONC ARIA PLAN TOTAL FRACTIONS PRESCRIBED: 5
RAD ONC ARIA PLAN TOTAL PRESCRIBED DOSE: 1000 CGY
RAD ONC ARIA REFERENCE POINT DOSAGE GIVEN TO DATE: 4 GY
RAD ONC ARIA REFERENCE POINT ID: NORMAL
RAD ONC ARIA REFERENCE POINT SESSION DOSAGE GIVEN: 2 GY

## 2023-03-02 PROCEDURE — 77387 GUIDANCE FOR RADJ TX DLVR: CPT | Performed by: RADIOLOGY

## 2023-03-02 PROCEDURE — G6002 STEREOSCOPIC X-RAY GUIDANCE: HCPCS | Performed by: RADIOLOGY

## 2023-03-02 PROCEDURE — 77412 RADIATION TX DELIVERY LVL 3: CPT | Performed by: RADIOLOGY

## 2023-03-03 ENCOUNTER — APPOINTMENT (OUTPATIENT)
Dept: RADIATION ONCOLOGY | Facility: HOSPITAL | Age: 73
End: 2023-03-03
Payer: COMMERCIAL

## 2023-03-03 LAB
RAD ONC ARIA COURSE ID: NORMAL
RAD ONC ARIA COURSE INTENT: NORMAL
RAD ONC ARIA COURSE LAST TREATMENT DATE: NORMAL
RAD ONC ARIA COURSE START DATE: NORMAL
RAD ONC ARIA COURSE TREATMENT ELAPSED DAYS: 24
RAD ONC ARIA FIRST TREATMENT DATE: NORMAL
RAD ONC ARIA PLAN FRACTIONS TREATED TO DATE: 3
RAD ONC ARIA PLAN ID: NORMAL
RAD ONC ARIA PLAN PRESCRIBED DOSE PER FRACTION: 2 GY
RAD ONC ARIA PLAN PRIMARY REFERENCE POINT: NORMAL
RAD ONC ARIA PLAN TOTAL FRACTIONS PRESCRIBED: 5
RAD ONC ARIA PLAN TOTAL PRESCRIBED DOSE: 1000 CGY
RAD ONC ARIA REFERENCE POINT DOSAGE GIVEN TO DATE: 6 GY
RAD ONC ARIA REFERENCE POINT ID: NORMAL
RAD ONC ARIA REFERENCE POINT SESSION DOSAGE GIVEN: 2 GY

## 2023-03-03 PROCEDURE — G6002 STEREOSCOPIC X-RAY GUIDANCE: HCPCS | Performed by: RADIOLOGY

## 2023-03-03 PROCEDURE — 77412 RADIATION TX DELIVERY LVL 3: CPT | Performed by: RADIOLOGY

## 2023-03-03 PROCEDURE — 77387 GUIDANCE FOR RADJ TX DLVR: CPT | Performed by: RADIOLOGY

## 2023-03-06 ENCOUNTER — APPOINTMENT (OUTPATIENT)
Dept: RADIATION ONCOLOGY | Facility: HOSPITAL | Age: 73
End: 2023-03-06
Payer: COMMERCIAL

## 2023-03-06 ENCOUNTER — PATIENT ROUNDING (BHMG ONLY) (OUTPATIENT)
Dept: ONCOLOGY | Facility: CLINIC | Age: 73
End: 2023-03-06
Payer: COMMERCIAL

## 2023-03-06 LAB
RAD ONC ARIA COURSE ID: NORMAL
RAD ONC ARIA COURSE INTENT: NORMAL
RAD ONC ARIA COURSE LAST TREATMENT DATE: NORMAL
RAD ONC ARIA COURSE START DATE: NORMAL
RAD ONC ARIA COURSE TREATMENT ELAPSED DAYS: 27
RAD ONC ARIA FIRST TREATMENT DATE: NORMAL
RAD ONC ARIA PLAN FRACTIONS TREATED TO DATE: 4
RAD ONC ARIA PLAN ID: NORMAL
RAD ONC ARIA PLAN PRESCRIBED DOSE PER FRACTION: 2 GY
RAD ONC ARIA PLAN PRIMARY REFERENCE POINT: NORMAL
RAD ONC ARIA PLAN TOTAL FRACTIONS PRESCRIBED: 5
RAD ONC ARIA PLAN TOTAL PRESCRIBED DOSE: 1000 CGY
RAD ONC ARIA REFERENCE POINT DOSAGE GIVEN TO DATE: 8 GY
RAD ONC ARIA REFERENCE POINT ID: NORMAL
RAD ONC ARIA REFERENCE POINT SESSION DOSAGE GIVEN: 2 GY

## 2023-03-06 PROCEDURE — 77412 RADIATION TX DELIVERY LVL 3: CPT | Performed by: RADIOLOGY

## 2023-03-06 PROCEDURE — G6002 STEREOSCOPIC X-RAY GUIDANCE: HCPCS | Performed by: RADIOLOGY

## 2023-03-06 PROCEDURE — 77387 GUIDANCE FOR RADJ TX DLVR: CPT | Performed by: RADIOLOGY

## 2023-03-07 ENCOUNTER — RADIATION ONCOLOGY WEEKLY ASSESSMENT (OUTPATIENT)
Dept: RADIATION ONCOLOGY | Facility: HOSPITAL | Age: 73
End: 2023-03-07
Payer: COMMERCIAL

## 2023-03-07 ENCOUNTER — APPOINTMENT (OUTPATIENT)
Dept: RADIATION ONCOLOGY | Facility: HOSPITAL | Age: 73
End: 2023-03-07
Payer: COMMERCIAL

## 2023-03-07 VITALS
RESPIRATION RATE: 18 BRPM | WEIGHT: 186.8 LBS | TEMPERATURE: 96.9 F | OXYGEN SATURATION: 98 % | SYSTOLIC BLOOD PRESSURE: 135 MMHG | HEART RATE: 70 BPM | DIASTOLIC BLOOD PRESSURE: 74 MMHG | BODY MASS INDEX: 34.17 KG/M2

## 2023-03-07 DIAGNOSIS — D05.12 DUCTAL CARCINOMA IN SITU (DCIS) OF LEFT BREAST: Primary | ICD-10-CM

## 2023-03-07 LAB
RAD ONC ARIA COURSE END DATE: NORMAL
RAD ONC ARIA COURSE ID: NORMAL
RAD ONC ARIA COURSE ID: NORMAL
RAD ONC ARIA COURSE INTENT: NORMAL
RAD ONC ARIA COURSE INTENT: NORMAL
RAD ONC ARIA COURSE LAST TREATMENT DATE: NORMAL
RAD ONC ARIA COURSE LAST TREATMENT DATE: NORMAL
RAD ONC ARIA COURSE START DATE: NORMAL
RAD ONC ARIA COURSE START DATE: NORMAL
RAD ONC ARIA COURSE TREATMENT ELAPSED DAYS: 28
RAD ONC ARIA COURSE TREATMENT ELAPSED DAYS: 28
RAD ONC ARIA FIRST TREATMENT DATE: NORMAL
RAD ONC ARIA FIRST TREATMENT DATE: NORMAL
RAD ONC ARIA PLAN FRACTIONS TREATED TO DATE: 16
RAD ONC ARIA PLAN FRACTIONS TREATED TO DATE: 5
RAD ONC ARIA PLAN FRACTIONS TREATED TO DATE: 5
RAD ONC ARIA PLAN ID: NORMAL
RAD ONC ARIA PLAN NAME: NORMAL
RAD ONC ARIA PLAN NAME: NORMAL
RAD ONC ARIA PLAN PRESCRIBED DOSE PER FRACTION: 2 GY
RAD ONC ARIA PLAN PRESCRIBED DOSE PER FRACTION: 2 GY
RAD ONC ARIA PLAN PRESCRIBED DOSE PER FRACTION: 2.66 GY
RAD ONC ARIA PLAN PRIMARY REFERENCE POINT: NORMAL
RAD ONC ARIA PLAN TOTAL FRACTIONS PRESCRIBED: 16
RAD ONC ARIA PLAN TOTAL FRACTIONS PRESCRIBED: 5
RAD ONC ARIA PLAN TOTAL FRACTIONS PRESCRIBED: 5
RAD ONC ARIA PLAN TOTAL PRESCRIBED DOSE: 1000 CGY
RAD ONC ARIA PLAN TOTAL PRESCRIBED DOSE: 1000 CGY
RAD ONC ARIA PLAN TOTAL PRESCRIBED DOSE: 4256 CGY
RAD ONC ARIA REFERENCE POINT DOSAGE GIVEN TO DATE: 10 GY
RAD ONC ARIA REFERENCE POINT DOSAGE GIVEN TO DATE: 10 GY
RAD ONC ARIA REFERENCE POINT DOSAGE GIVEN TO DATE: 42.56 GY
RAD ONC ARIA REFERENCE POINT ID: NORMAL
RAD ONC ARIA REFERENCE POINT SESSION DOSAGE GIVEN: 2 GY

## 2023-03-07 PROCEDURE — 77387 GUIDANCE FOR RADJ TX DLVR: CPT | Performed by: RADIOLOGY

## 2023-03-07 PROCEDURE — 77412 RADIATION TX DELIVERY LVL 3: CPT | Performed by: RADIOLOGY

## 2023-03-07 PROCEDURE — G6002 STEREOSCOPIC X-RAY GUIDANCE: HCPCS | Performed by: RADIOLOGY

## 2023-03-07 NOTE — PROGRESS NOTES
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     Radiation Treatments     Active   Plans   Lt BrstZrev   Most recent treatment: Dose planned: 266 cGy (fraction 4 on 2/10/2023)   Total: Dose planned: 4,256 cGy (16 fractions)   Elapsed Days: 3      Reference Points   Breast_L   Most recent treatment: Dose given: 266 cGy (on 2/10/2023)   Total: Dose given: 1,064 cGy   Elapsed Days: 3      Boost   Most recent treatment: Dose given: 200 cGy (on 3/7/2023)   Total: Dose given: 1,000 cGy   Elapsed Days: 28      Breast_L   Most recent treatment: Dose given: 266 cGy (on 2023)   Total: Dose given: 4,256 cGy   Elapsed Days: 21         Historical   Plans   Lt BrstZrev   Most recent treatment: Dose planned: 266 cGy (fraction 16 on 2023)   Total: Dose planned: 4,256 cGy (16 fractions)   Elapsed Days: 21      LtBrst BoostZ   Most recent treatment: Dose planned: 200 cGy (fraction 5 on 3/7/2023)   Total: Dose planned: 1,000 cGy (5 fractions)   Elapsed Days: 28                  Subjective      Review of Systems:   Review of Systems   HENT: Negative for trouble swallowing.    Respiratory: Negative for cough and shortness of breath.    Cardiovascular: Negative for chest pain.   Genitourinary: Positive for breast pain (Tenderness). Negative for breast discharge and breast lump.   Skin: Positive for color change.       Medications:     Current Outpatient Medications:   •  acetaminophen (TYLENOL) 325 MG tablet, Take 2 tablets by mouth Every 4 (Four) Hours As Needed for Mild Pain., Disp: 30 tablet, Rfl: 0  •  aspirin 81 MG chewable tablet, Chew 1 tablet Daily., Disp: , Rfl:   •  AZO-CRANBERRY PO, Take  by mouth., Disp: , Rfl:   •  Calcium Citrate-Vitamin D 250-2.5 MG-MCG per tablet, Take 1 tablet by mouth 2 (Two) Times a Day., Disp: 30 tablet, Rfl: 11  •  empagliflozin  (Jardiance) 10 MG tablet tablet, Take 1 tablet by mouth Daily., Disp: 90 tablet, Rfl: 1  •  fenofibrate (TRICOR) 145 MG tablet, TAKE 1 TABLET BY MOUTH DAILY, Disp: 90 tablet, Rfl: 1  •  glucose monitor monitoring kit, 1 each as needed (DM II)., Disp: 1 each, Rfl: 0  •  Lancets (OneTouch Delica Plus Osehbv34Q) misc, USE TO TEST BLOOD SUGAR DAILY AS DIRECTED, Disp: , Rfl:   •  Lancets Misc. misc, For Daily testing  E11.65, Disp: 50 each, Rfl: 11  •  levothyroxine (SYNTHROID, LEVOTHROID) 125 MCG tablet, Take 1 tablet by mouth Daily., Disp: 90 tablet, Rfl: 1  •  OneTouch Ultra test strip, USE TO TEST BLOOD SUGAR DAILY AS DIRECTED, Disp: 50 each, Rfl: 5  •  Probiotic Product (PROBIOTIC DAILY PO), Take  by mouth., Disp: , Rfl:   •  trimethoprim (TRIMPEX) 100 MG tablet, TAKE 1 TABLET BY MOUTH TWICE DAILY, Disp: 30 tablet, Rfl: 5  •  vitamin D (ERGOCALCIFEROL) 1.25 MG (54246 UT) capsule capsule, TAKE 1 CAPSULE BY MOUTH 1 TIME EVERY WEEK, Disp: 5 capsule, Rfl: 2    Allergies:   Allergies   Allergen Reactions   • Other Unknown - High Severity     Blisters from EKG stickers   • Bactrim [Sulfamethoxazole-Trimethoprim] Hives   • Ciprofloxacin Hives   • Penicillins Hives   • Steri-Strip Compound Benzoin [Benzoin Compound] Hives   • Sulfa Antibiotics Hives     Objective     Physical Exam:  Physical Exam  Vitals reviewed.   Constitutional:       Appearance: Normal appearance.   Pulmonary:      Effort: Pulmonary effort is normal. No respiratory distress.   Chest:   Breasts:     Right: Normal.      Left: Skin change (Erythema noted to left breast) and tenderness present.      Comments: Area of moist desquamation to the left axilla; healed surgical scars noted.  Skin:     General: Skin is warm and dry.   Neurological:      Mental Status: She is alert. Mental status is at baseline.   Psychiatric:         Mood and Affect: Mood normal.     Skin: Area in the axilla reveals some epithelial buds.  No evidence of infection.  Appears to be  healing.    Vital Signs:   Vitals:    03/07/23 0849   BP: 135/74   Pulse: 70   Resp: 18   Temp: 96.9 °F (36.1 °C)   TempSrc: Temporal   SpO2: 98%   Weight: 84.7 kg (186 lb 12.8 oz)   PainSc: 0-No pain     Body mass index is 34.17 kg/m².     Current Total XRT Dose (cGY):    Radiation Treatments     Active   Plans   Lt BrstZrev   Most recent treatment: Dose planned: 266 cGy (fraction 4 on 2/10/2023)   Total: Dose planned: 4,256 cGy (16 fractions)   Elapsed Days: 3      Reference Points   Breast_L   Most recent treatment: Dose given: 266 cGy (on 2/10/2023)   Total: Dose given: 1,064 cGy   Elapsed Days: 3      Boost   Most recent treatment: Dose given: 200 cGy (on 3/7/2023)   Total: Dose given: 1,000 cGy   Elapsed Days: 28      Breast_L   Most recent treatment: Dose given: 266 cGy (on 2/28/2023)   Total: Dose given: 4,256 cGy   Elapsed Days: 21         Historical   Plans   Lt BrstZrev   Most recent treatment: Dose planned: 266 cGy (fraction 16 on 2/28/2023)   Total: Dose planned: 4,256 cGy (16 fractions)   Elapsed Days: 21      LtBrst BoostZ   Most recent treatment: Dose planned: 200 cGy (fraction 5 on 3/7/2023)   Total: Dose planned: 1,000 cGy (5 fractions)   Elapsed Days: 28                  Plan      Plan:   Patient was seen today for an on treatment visit. Patient is receiving radiation therapy to the left breast. Patient is stable and tolerating radiation therapy well with minimal side effects.  Moist desquamation noted to an area under the left axilla.  Erythema noted to the left breast.  She can continue using 100% aloe vera.  Also, the patient is allergic to sulfa meds, therefore Silvadene was not recommended.  But we did recommend that the patient can order silver med on [employer] if needed.  Today was her last treatment.  We will see her back in a month, but the patient is aware that if she notices any signs or symptoms of infection or any worsening skin irritation to call the office and let us know and we will  get her and before her 1 month follow-up.     I have reviewed treatment setup notes, checked and approved the daily guidance images. I reviewed dose delivery, treatment parameters and deemed them appropriate.     Digital speech recognition software was used to dictate parts of this note, some dictation errors may occur.    Scribed for Dr. Madelin MD by BERT Coe 3/7/2023  09:04 EST   I, Bashir Yusuf MD, personally performed the services described in this documentation as scribed by the above named individual in my presence, and it is both accurate and complete.  3/7/2023  09:27 EST

## 2023-03-13 ENCOUNTER — DOCUMENTATION (OUTPATIENT)
Dept: ONCOLOGY | Facility: HOSPITAL | Age: 73
End: 2023-03-13
Payer: COMMERCIAL

## 2023-03-13 NOTE — PROGRESS NOTES
SS received e-mail from [employer] stating that patient approved.    Application #[id number] Approved    SS contacted patient [phone] to make aware that she has been approved for $500 award and payment will be processed and mailed within 10 business days.  SS provided patient with phone number to contact if she doesn't receive in mail.

## 2023-03-15 RX ORDER — ANASTROZOLE 1 MG/1
1 TABLET ORAL DAILY
COMMUNITY
Start: 2023-03-15

## 2023-03-23 ENCOUNTER — OFFICE VISIT (OUTPATIENT)
Dept: RADIATION ONCOLOGY | Facility: HOSPITAL | Age: 73
End: 2023-03-23
Payer: COMMERCIAL

## 2023-03-23 VITALS
DIASTOLIC BLOOD PRESSURE: 79 MMHG | BODY MASS INDEX: 35 KG/M2 | RESPIRATION RATE: 16 BRPM | HEART RATE: 72 BPM | OXYGEN SATURATION: 98 % | HEIGHT: 62 IN | TEMPERATURE: 97.3 F | WEIGHT: 190.2 LBS | SYSTOLIC BLOOD PRESSURE: 132 MMHG

## 2023-03-23 DIAGNOSIS — D05.12 DUCTAL CARCINOMA IN SITU (DCIS) OF LEFT BREAST: Primary | ICD-10-CM

## 2023-03-23 PROCEDURE — 1126F AMNT PAIN NOTED NONE PRSNT: CPT

## 2023-03-23 PROCEDURE — 1160F RVW MEDS BY RX/DR IN RCRD: CPT

## 2023-03-23 PROCEDURE — 1159F MED LIST DOCD IN RCRD: CPT

## 2023-03-23 PROCEDURE — 99024 POSTOP FOLLOW-UP VISIT: CPT

## 2023-03-23 NOTE — PROGRESS NOTES
Office Follow Up Note      Patient Name: Shey King  : 1950   MRN: [id number]     Requesting Physician: Dong Mata MD    Chief Complaint:    Chief Complaint   Patient presents with   • Breast Cancer     History of Present Illness: Shey King is a pleasant 72 y.o. female who is here today for follow up after completing radiation therapy.     Patient had a screening mammogram 10/27/2022.  Biopsy related changes right breast with marker clip.  Stable benign calcifications right breast are noted.  Calcifications left breast middle posterior third lateral aspect have developed.  Other calcifications throughout the left breast appears stable.  Diagnostic mammogram on 10/27/2022 on the left demonstrated a small group of heterogeneous calcifications in the mid left 3-4 o'clock region.  Biopsy was recommended.  Mammotome stereotactic biopsy and clip placement was performed on 2022.   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    Dr. Mata performed a lumpectomy on 2022.  Pathology revealed intermediate grade DCIS, margins free.  Size of the DCIS 10 x 3 x 2 mm.  Cribriform.  Grade 2 intermediate.  Small foci of necrosis.  Inferior margin 1 mm.  Anterior and posterior margin 2 and 4 mm.  No regional nodes submitted.  % positive [place] 5% positive.  Specimen measured 6 x 6 x 3.2 cm.     Patient was seen by Dr. Ford with prescription for Arimidex given.     Interval History:  Patient is here today around 2 weeks out from her last radiation treatment.  She started her treatments on 2023 and completed them on 3/7/2023.  She received a total of 5256 cGy in 21 fractions to the left breast.  Throughout treatment she experienced some moist desquamation to the left axilla and erythema to the breast.  She has an allergy to sulfa drugs, therefore Silvadene was unable to be given.  We did recommend that  the patient get SilverMed and use this with aloe.  Today her skin is healing well and she has no new complaints.    Radiation Treatments     Active   Reference Points   Boost   Most recent treatment: Dose given: -- (on 3/7/2023)   Total: Dose given: 1,000 cGy   Elapsed Days: 28      Breast_L   Most recent treatment: Dose given: -- (on 3/7/2023)   Total: Dose given: 4,256 cGy   Elapsed Days: 28         Historical   Plans   Lt BrstZrev   Most recent treatment: Dose planned: 266 cGy (fraction 4 on 2/10/2023)   Total: Dose planned: 4,256 cGy (16 fractions)   Elapsed Days: 3      Lt BrstZrev   Most recent treatment: Dose planned: 266 cGy (fraction 16 on 3/7/2023)   Total: Dose planned: 4,256 cGy (16 fractions)   Elapsed Days: 28      LtBrst BoostZ   Most recent treatment: Dose planned: 200 cGy (fraction 5 on 3/7/2023)   Total: Dose planned: 1,000 cGy (5 fractions)   Elapsed Days: 28      Reference Points   Breast_L   Most recent treatment: Dose given: 266 cGy (on 2/10/2023)   Total: Dose given: 1,064 cGy   Elapsed Days: 3                 Subjective      Review of Systems:   Review of Systems   HENT: Negative for sore throat and trouble swallowing.    Respiratory: Negative for cough and shortness of breath.    Cardiovascular: Negative for chest pain.   Genitourinary: Positive for breast pain. Negative for breast discharge and breast lump.   Skin: Positive for color change. Negative for dry skin and wound.   All other systems reviewed and are negative.      I have reviewed and confirmed the accuracy of the ROS as documented by the MA/LPN/RN BERT Gonzalez     The following portions of the patient's history were reviewed and updated as appropriate: allergies, current medications, past family history, past medical history, past social history, past surgical history and problem list.    Past Oncology History:   Oncology/Hematology History    No history exists.      KPS: 90%     Results Review:   The following data  was reviewed by: BERT Gonzalez on 03/23/2023:  Common labs    Common Labs 6/24/22 6/24/22 6/24/22 9/26/22 9/26/22 9/26/22 9/26/22 12/27/22 12/27/22 12/27/22 12/27/22    0941 0941 0941 1043 1043 1043 1043 1109 1109 1109 1109   Glucose  93   89     111 (A)    BUN  17   12     17    Creatinine  1.00   0.96     1.01 (A)    Sodium  140   137     140    Potassium  4.8   4.5     4.6    Chloride  105   103     104    Calcium  9.6   9.6     9.6    Albumin  4.50   4.50     4.7    Total Bilirubin  0.4   0.4     0.5    Alkaline Phosphatase  74   72     86    AST (SGOT)  19   21     16    ALT (SGPT)  15   17     12    WBC       7.64 7.59      Hemoglobin       12.7 13.5      Hematocrit       37.9 42.0      Platelets       265 266      Total Cholesterol   204 (A)   164     229 (A)   Triglycerides   119   144     185 (A)   HDL Cholesterol   52   49     50   LDL Cholesterol    131 (A)   90     146 (A)   Hemoglobin A1C 5.20   5.20     6.10 (A)     (A) Abnormal value            Imaging:   DEXA Bone Density Axial    Result Date: 1/27/2023    BMD of the right femur neck is 0.899 with T score -1 with density.  This report was finalized on 1/27/2023 1:26 PM by Dr. Jasvir Doan MD.      Mammo Breast Placement Device Initial Without Biopsy    Result Date: 12/30/2022  Successful needle localization of the left lower breast.  Postsurgical specimen show wire in entirety and clip.  This report was finalized on 12/30/2022 11:20 AM by Dr. Jasvir Doan MD.       Pathology:    Objective     Physical Exam:  Physical Exam  Vitals reviewed.   Constitutional:       Appearance: Normal appearance.   Pulmonary:      Effort: Pulmonary effort is normal. No respiratory distress.   Chest:   Breasts:     Right: Normal.      Left: Skin change (mild erythema to the entire breast and skin fold of the breast; darkened skin to the left axilla) present. No swelling.   Skin:     General: Skin is warm and dry.   Neurological:      Mental Status: She is  "alert. Mental status is at baseline.   Psychiatric:         Mood and Affect: Mood normal.         Vital Signs:   Vitals:    03/23/23 0841   BP: 132/79   Pulse: 72   Resp: 16   Temp: 97.3 °F (36.3 °C)   TempSrc: Temporal   SpO2: 98%   Weight: 86.3 kg (190 lb 3.2 oz)   Height: 157.5 cm (62\")   PainSc: 0-No pain     Body mass index is 34.79 kg/m².       Assessment / Plan      Assessment/Plan:   Shey King is a very pleasant 72 y.o. 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 Status post biopsy on 12/6/2022. She is status post wire localization lumpectomy on 12/29/2022 with pathology revealing the above with closest margin representing the inferior margin 1 mm, anterior margin 2 mm, posterior margin 4 mm.     Patient is here today around 2 weeks out from her last radiation treatment.  She started her treatments on 2/7/2023 and completed them on 3/7/2023.  She received a total of 5256 cGy in 21 fractions to the left breast.  Throughout treatment she experienced some moist desquamation to the left axilla and erythema to the breast.  She has an allergy to sulfa drugs, therefore Silvadene was unable to be given.  We did recommend that the patient get SilverMed and use this with aloe.     Her skin looks good today on the affected breast, she is currently using Aquaphor to the breast.  Erythema to the left breast and left breast skin fold is noted, darkened skin to the left axilla is also noted.  No open areas, signs of infection, swelling or lymphedema noted.  I instructed her to keep the breast open to air is much as possible, wear loose unrestrictive clothing, and to go without a bra as much as possible while at home.  Range of motion in the arm is good. She has no complaints of a cough, shortness of breath, trouble swallowing, or chest pain. I instructed the " patient on what imaging studies and appointments to expect coming up. Instructed her on late toxicities from radiation including lymphedema, fibrosis, or pneumonitis. Instructed her on the importance of breast massage to reduce the risk of fibrosis and that she should be performing self breast exam at least every 6 months at home. Instructed her to continue arm exercises to maintain good range of motion.    Patient follows with Dr. Ford, her next appointment with her is on 4/6/2023.  Her mammogram is planned to be scheduled for around June/July.  The patient has started Arimidex and she is doing well on this.  The patient will also follow with Dr. Mata, her next appointment with him is on 4/11/2023.  We will see her back in 3 months, sooner if needed.  Instructed the patient that if she has any questions or any new symptoms arise do not hesitate to call us.    I spent 25 minutes with Shey King today.  In the office today, more than 50% of this time was spent face-to-face with her  in counseling / coordination of care, reviewing her interim medical history and counseling on the current treatment plan.  All questions were answered to her satisfaction. Digital speech recognition software was used to dictate parts of this note, some dictation errors may occur.    Follow Up:   Return in about 3 months (around 6/23/2023) for Office Visit.    BERT Gonzalez  03/23/23 08:53 EDT

## 2023-03-27 ENCOUNTER — OFFICE VISIT (OUTPATIENT)
Dept: FAMILY MEDICINE CLINIC | Facility: CLINIC | Age: 73
End: 2023-03-27
Payer: MEDICARE

## 2023-03-27 VITALS
TEMPERATURE: 96.8 F | SYSTOLIC BLOOD PRESSURE: 126 MMHG | BODY MASS INDEX: 35.04 KG/M2 | OXYGEN SATURATION: 96 % | HEIGHT: 62 IN | HEART RATE: 82 BPM | DIASTOLIC BLOOD PRESSURE: 68 MMHG | WEIGHT: 190.4 LBS

## 2023-03-27 DIAGNOSIS — E11.9 TYPE 2 DIABETES MELLITUS WITHOUT COMPLICATION, WITHOUT LONG-TERM CURRENT USE OF INSULIN: ICD-10-CM

## 2023-03-27 DIAGNOSIS — Z00.00 MEDICARE ANNUAL WELLNESS VISIT, SUBSEQUENT: Primary | ICD-10-CM

## 2023-03-27 DIAGNOSIS — E55.9 VITAMIN D DEFICIENCY: ICD-10-CM

## 2023-03-27 DIAGNOSIS — E78.5 HYPERLIPIDEMIA, UNSPECIFIED HYPERLIPIDEMIA TYPE: ICD-10-CM

## 2023-03-27 DIAGNOSIS — E03.8 ADULT ONSET HYPOTHYROIDISM: ICD-10-CM

## 2023-03-27 PROCEDURE — 83036 HEMOGLOBIN GLYCOSYLATED A1C: CPT | Performed by: NURSE PRACTITIONER

## 2023-03-27 PROCEDURE — 36415 COLL VENOUS BLD VENIPUNCTURE: CPT | Performed by: NURSE PRACTITIONER

## 2023-03-27 PROCEDURE — 84443 ASSAY THYROID STIM HORMONE: CPT | Performed by: NURSE PRACTITIONER

## 2023-03-27 RX ORDER — FENOFIBRATE 145 MG/1
145 TABLET, COATED ORAL DAILY
Qty: 90 TABLET | Refills: 1 | Status: SHIPPED | OUTPATIENT
Start: 2023-03-27

## 2023-03-27 RX ORDER — FLUCONAZOLE 100 MG/1
TABLET ORAL
COMMUNITY
Start: 2023-03-12 | End: 2023-03-27

## 2023-03-27 NOTE — PROGRESS NOTES
The ABCs of the Annual Wellness Visit  Subsequent Medicare Wellness Visit    Subjective    Shey King is a 72 y.o. female who presents for a Subsequent Medicare Wellness Visit.    The following portions of the patient's history were reviewed and   updated as appropriate: allergies, current medications, past family history, past medical history, past social history, past surgical history and problem list.    Compared to one year ago, the patient feels her physical   health is the same.    Compared to one year ago, the patient feels her mental   health is worse.    Recent Hospitalizations:  She was admitted within the past 365 days at [facility].       Current Medical Providers:  Patient Care Team:  Tashia Vallejo APRN as PCP - General  Tashia Vallejo APRN as PCP - [facility]  Wheeler, Dong Zavala MD as Surgeon (General Surgery)  Maia Ford MD as Consulting Physician (Hematology and Oncology)    Outpatient Medications Prior to Visit   Medication Sig Dispense Refill   • acetaminophen (TYLENOL) 325 MG tablet Take 2 tablets by mouth Every 4 (Four) Hours As Needed for Mild Pain. 30 tablet 0   • anastrozole (ARIMIDEX) 1 MG tablet Take 1 tablet by mouth Daily.     • aspirin 81 MG chewable tablet Chew 1 tablet Daily.     • AZO-CRANBERRY PO Take  by mouth.     • Calcium Citrate-Vitamin D 250-2.5 MG-MCG per tablet Take 1 tablet by mouth 2 (Two) Times a Day. 30 tablet 11   • glucose monitor monitoring kit 1 each as needed (DM II). 1 each 0   • Lancets (OneTouch Delica Plus [id number]) misc USE TO TEST BLOOD SUGAR DAILY AS DIRECTED     • Lancets Misc. misc For Daily testing  E11.65 50 each 11   • levothyroxine (SYNTHROID, LEVOTHROID) 125 MCG tablet Take 1 tablet by mouth Daily. 90 tablet 1   • OneTouch Ultra test strip USE TO TEST BLOOD SUGAR DAILY AS DIRECTED 50 each 5   • Probiotic Product (PROBIOTIC DAILY PO) Take  by mouth.     • trimethoprim (TRIMPEX) 100 MG tablet TAKE 1 TABLET BY MOUTH TWICE DAILY 30  tablet 5   • vitamin D (ERGOCALCIFEROL) 1.25 MG (21271 UT) capsule capsule TAKE 1 CAPSULE BY MOUTH 1 TIME EVERY WEEK 5 capsule 2   • empagliflozin (Jardiance) 10 MG tablet tablet Take 1 tablet by mouth Daily. 90 tablet 1   • fenofibrate (TRICOR) 145 MG tablet TAKE 1 TABLET BY MOUTH DAILY 90 tablet 1   • fluconazole (DIFLUCAN) 100 MG tablet TAKE 1 TABLET BY MOUTH DAILY FOR 3 DAYS       No facility-administered medications prior to visit.       No opioid medication identified on active medication list. I have reviewed chart for other potential  high risk medication/s and harmful drug interactions in the elderly.          Aspirin is on active medication list. Aspirin use is indicated based on review of current medical condition/s. Pros and cons of this therapy have been discussed today. Benefits of this medication outweigh potential harm.  Patient has been encouraged to continue taking this medication.  .      Patient Active Problem List   Diagnosis   • Abdominal pain, LLQ (left lower quadrant)   • Cystitis   • Diarrhea   • Hiatal hernia   • Hyperglycemia   • Hyperlipidemia   • Adult onset hypothyroidism   • Muscle spasm   • OAB (overactive bladder)   • UTI (urinary tract infection)   • Gallstones   • B12 deficiency   • Pulmonary nodule   • Class 2 obesity due to excess calories without serious comorbidity with body mass index (BMI) of 35.0 to 35.9 in adult   • Diabetes mellitus (HCC)   • Lymphadenopathy   • Dupuytren's contracture of right hand   • Palpitations   • ELKINS (dyspnea on exertion)   • Ductal carcinoma in situ (DCIS) of left breast   • Grade I LV diastolic dysfunction     Advance Care Planning  Advance Directive is not on file.  ACP discussion was declined by the patient. Patient does not have an advance directive, information provided.     Objective    Vitals:    03/27/23 1428   BP: 126/68   BP Location: Right arm   Patient Position: Sitting   Pulse: 82   Temp: 96.8 °F (36 °C)   TempSrc: Temporal   SpO2: 96%  "  Weight: 86.4 kg (190 lb 6.4 oz)   Height: 157.5 cm (62\")   PainSc: 0-No pain     Estimated body mass index is 34.82 kg/m² as calculated from the following:    Height as of this encounter: 157.5 cm (62\").    Weight as of this encounter: 86.4 kg (190 lb 6.4 oz).    BMI is >= 30 and <35. (Class 1 Obesity). The following options were offered after discussion;: exercise counseling/recommendations and nutrition counseling/recommendations      Does the patient have evidence of cognitive impairment? No          HEALTH RISK ASSESSMENT    Smoking Status:  Social History     Tobacco Use   Smoking Status Never   Smokeless Tobacco Never   Tobacco Comments    never     Alcohol Consumption:  Social History     Substance and Sexual Activity   Alcohol Use No     Fall Risk Screen:    STEADI Fall Risk Assessment was completed, and patient is at LOW risk for falls.Assessment completed on:3/27/2023    Depression Screening:  PHQ-2/PHQ-9 Depression Screening 3/27/2023   Little Interest or Pleasure in Doing Things 0-->not at all   Feeling Down, Depressed or Hopeless 1-->several days   PHQ-9: Brief Depression Severity Measure Score 1       Health Habits and Functional and Cognitive Screening:  Functional & Cognitive Status 3/27/2023   Do you have difficulty preparing food and eating? No   Do you have difficulty bathing yourself, getting dressed or grooming yourself? No   Do you have difficulty using the toilet? No   Do you have difficulty moving around from place to place? No   Do you have trouble with steps or getting out of a bed or a chair? Yes   Current Diet Unhealthy Diet   Dental Exam Up to date   Eye Exam Not up to date   Exercise (times per week) 0 times per week   Current Exercises Include -   Do you need help using the phone?  No   Are you deaf or do you have serious difficulty hearing?  No   Do you need help with transportation? No   Do you need help shopping? No   Do you need help preparing meals?  No   Do you need help with " housework?  No   Do you need help with laundry? No   Do you need help taking your medications? No   Do you need help managing money? No   Do you ever drive or ride in a car without wearing a seat belt? No   Have you felt unusual stress, anger or loneliness in the last month? -   Who do you live with? -   If you need help, do you have trouble finding someone available to you? -   Have you been bothered in the last four weeks by sexual problems? -   Do you have difficulty concentrating, remembering or making decisions? -       Age-appropriate Screening Schedule:  Refer to the list below for future screening recommendations based on patient's age, sex and/or medical conditions. Orders for these recommended tests are listed in the plan section. The patient has been provided with a written plan.    Health Maintenance   Topic Date Due   • COLORECTAL CANCER SCREENING  Never done   • COVID-19 Vaccine (1) Never done   • Pneumococcal Vaccine 65+ (1 - PCV) Never done   • TDAP/TD VACCINES (1 - Tdap) Never done   • ZOSTER VACCINE (1 of 2) Never done   • HEPATITIS C SCREENING  Never done   • DIABETIC FOOT EXAM  Never done   • PAP SMEAR  01/01/2018   • DIABETIC EYE EXAM  02/26/2021   • URINE MICROALBUMIN  07/13/2022   • INFLUENZA VACCINE  08/01/2022   • ANNUAL WELLNESS VISIT  09/21/2022   • HEMOGLOBIN A1C  06/27/2023   • MAMMOGRAM  12/06/2023   • LIPID PANEL  12/27/2023   • DXA SCAN  01/27/2025                CMS Preventative Services Quick Reference  Risk Factors Identified During Encounter  Depression/Dysphoria: Wishes to defer for now.   Dental Screening Recommended  Vision Screening Recommended  The above risks/problems have been discussed with the patient.  Pertinent information has been shared with the patient in the After Visit Summary.  An After Visit Summary and PPPS were made available to the patient.    Follow Up:   Next Medicare Wellness visit to be scheduled in 1 year.       Additional E&M Note during same encounter  "follows:  Patient has multiple medical problems which are significant and separately identifiable that require additional work above and beyond the Medicare Wellness Visit.      Chief Complaint  Medicare Wellness-subsequent    Subjective        Diabetes  She presents for her follow-up (Off Ozempic with cancer treatment and inability to get RX.  WOuld like to start back on weekly injection sto help with control) diabetic visit. She has type 2 diabetes mellitus. Her disease course has been fluctuating. There are no hypoglycemic associated symptoms. There are no diabetic associated symptoms. There are no hypoglycemic complications. Symptoms are stable. There are no diabetic complications. Risk factors for coronary artery disease include dyslipidemia, obesity and stress. She is following a generally unhealthy diet. She never participates in exercise. Her bedtime blood glucose range is generally 140-180 mg/dl.              Objective   Vital Signs:  /68 (BP Location: Right arm, Patient Position: Sitting)   Pulse 82   Temp 96.8 °F (36 °C) (Temporal)   Ht 157.5 cm (62\")   Wt 86.4 kg (190 lb 6.4 oz)   SpO2 96%   BMI 34.82 kg/m²     Physical Exam  Vitals and nursing note reviewed.   Constitutional:       General: She is not in acute distress.     Appearance: She is well-developed. She is obese. She is not ill-appearing.   Cardiovascular:      Rate and Rhythm: Normal rate and regular rhythm.      Heart sounds: Normal heart sounds. No murmur heard.  Pulmonary:      Effort: Pulmonary effort is normal.      Breath sounds: Normal breath sounds.   Musculoskeletal:      Right lower leg: No edema.      Left lower leg: No edema.   Skin:     General: Skin is warm and dry.   Neurological:      Mental Status: She is alert and oriented to person, place, and time.   Psychiatric:         Behavior: Behavior normal.                         Assessment and Plan   Diagnoses and all orders for this visit:    1. Medicare annual " wellness visit, subsequent (Primary)  -     CBC Auto Differential; Future  -     Comprehensive Metabolic Panel; Future  -     Hemoglobin A1c; Future  -     Lipid Panel; Future  -     TSH; Future  -     Vitamin B12; Future  -     Vitamin D,25-Hydroxy; Future  -     MicroAlbumin, Urine, Random - Urine, Clean Catch; Future    2. Adult onset hypothyroidism  -     TSH; Future  -     TSH    3. Type 2 diabetes mellitus without complication, without long-term current use of insulin (HCC)  -     Tirzepatide 2.5 MG/0.5ML solution pen-injector; Inject 0.5 mL under the skin into the appropriate area as directed 1 (One) Time Per Week.  Dispense: 2 mL; Refill: 3  -     Hemoglobin A1c; Future  -     Hemoglobin A1c  -     empagliflozin (Jardiance) 10 MG tablet tablet; Take 1 tablet by mouth Daily.  Dispense: 90 tablet; Refill: 1    4. Vitamin D deficiency  -     Vitamin D,25-Hydroxy; Future    5. Hyperlipidemia, unspecified hyperlipidemia type  -     fenofibrate (TRICOR) 145 MG tablet; Take 1 tablet by mouth Daily.  Dispense: 90 tablet; Refill: 1             Follow Up   Return in about 3 months (around 6/27/2023), or if symptoms worsen or fail to improve, for Recheck lasb today .  Patient was given instructions and counseling regarding her condition or for health maintenance advice. Please see specific information pulled into the AVS if appropriate.

## 2023-03-28 ENCOUNTER — TELEPHONE (OUTPATIENT)
Dept: FAMILY MEDICINE CLINIC | Facility: CLINIC | Age: 73
End: 2023-03-28
Payer: COMMERCIAL

## 2023-03-28 LAB
HBA1C MFR BLD: 6.1 % (ref 4.8–5.6)
TSH SERPL DL<=0.05 MIU/L-ACNC: 2.18 UIU/ML (ref 0.27–4.2)

## 2023-03-28 NOTE — TELEPHONE ENCOUNTER
----- Message from BERT Lucas sent at 3/28/2023  1:22 PM EDT -----  Sugar and thyroid are both stable.        Patient notified & verbalized understanding.

## 2023-03-31 NOTE — PROGRESS NOTES
Subjective     Date: 4/6/2023    Referring Provider  Dr. Mata     Chief Complaint  Ductal carcinoma of the left breast, estrogen receptor positive, status post lumpectomy    Subjective          Shey King is a 72 y.o. female who presents today to [facility] for follow up.    HPI:   72-year-old female with a history of diabetes mellitus type 2, hyperlipidemia, hypothyroidism, frequent UTIs who presents to establish care regarding ductal carcinoma in situ of the left breast, estrogen receptor positive.     Oncological history:  She notes intermittent breast pain on the left side for several months, was finally directed to get a screening mammogram  - October 26, 2022.  Screening mammogram showed calcification left breast middle posterior third lateral aspect, which are new.  Other calcifications throughout left breast appear stable.   - 12/6/22. Diagnostic mammogram showed grouped calcifications in the left 3-4 o'clock region.   - 12/14/2022: stereotactic biopsy. Pathology with intermediate to high-grade cribriform ductal carcinoma in situ with associated necrosis and calcification.  -12/29/2022: Underwent lumpectomy.  Final pathology with ductal carcinoma in situ.  10 x 3 x 2 mm, 4 of 19 blocks.  Grade 2 (intermediate).  Distance from DCIS to closest margin 1 mm from inferior margin, 2 mm from anterior margin, 4 mm to posterior margin. PTis.  Estrogen receptor 100% positive, progesterone 5% positive    She was recently seen by Dr. Mata on 1/17/2023.  Overall doing well after lumpectomy.  Presents for further therapy and discussion.    GYN History:  Menarche at age 14. P3, G3  Age of first pregnancy:    Age of menopause: 52  Breast feed: no  Birth control: yes  Hormone replacement: no    Never had DEXA bone scan in the past.  Denies ever being treated for diagnosed with osteoporosis.  Currently taking vitamin D tablets.    Interval History 02/01/2023   She was seen by  radiation oncology today, Dr. Yusuf, who recommends radiation to left breast due to DCIS margin less than 2 mm.  Underwent DEXA bone scan 1/27/2023 with bone mineral density of right femur neck 0.899 with T score -1.     Interval History 03/01/2023 - Telehealth   Receiving radiation therapy currently, has 4 more boost therapy left. Has had pain on the breast intermittently and the axilla. Was told by Rad Onc to apply less aquaphor to the area.     Interval History 04/06/2023   She presents for follow up today. Completed radiation therapy. Started anastrozole, taking it daily. Overall, still with fatigue since completion of radiation so unsure if it is due to radiation or medication. Has had two episodes of hot flashes. Denies any other AE.    Also reports mild intermittent sharp pain L breast. She reports difficulty sleeping, would like something to help temporarily.        The following portions of the patient's history were reviewed and updated as appropriate: allergies, current medications, past family history, past medical history, past social history, past surgical history and problem list.    Objective     Objective     Allergy:   Allergies   Allergen Reactions   • Other Unknown - High Severity     Blisters from EKG stickers   • Bactrim [Sulfamethoxazole-Trimethoprim] Hives   • Ciprofloxacin Hives   • Penicillins Hives   • Steri-Strip Compound Benzoin [Benzoin Compound] Hives   • Sulfa Antibiotics Hives        Current Medications:   Current Outpatient Medications   Medication Sig Dispense Refill   • acetaminophen (TYLENOL) 325 MG tablet Take 2 tablets by mouth Every 4 (Four) Hours As Needed for Mild Pain. 30 tablet 0   • anastrozole (ARIMIDEX) 1 MG tablet Take 1 tablet by mouth Daily.     • aspirin 81 MG chewable tablet Chew 1 tablet Daily.     • AZO-CRANBERRY PO Take  by mouth.     • Calcium Citrate-Vitamin D 250-2.5 MG-MCG per tablet Take 1 tablet by mouth 2 (Two) Times a Day. 30 tablet 11   • empagliflozin  (Jardiance) 10 MG tablet tablet Take 1 tablet by mouth Daily. 90 tablet 1   • fenofibrate (TRICOR) 145 MG tablet Take 1 tablet by mouth Daily. 90 tablet 1   • glucose monitor monitoring kit 1 each as needed (DM II). 1 each 0   • Lancets (OneTouch Delica Plus [id number]) misc USE TO TEST BLOOD SUGAR DAILY AS DIRECTED     • Lancets Misc. misc For Daily testing  E11.65 50 each 11   • levothyroxine (SYNTHROID, LEVOTHROID) 125 MCG tablet Take 1 tablet by mouth Daily. 90 tablet 1   • OneTouch Ultra test strip USE TO TEST BLOOD SUGAR DAILY AS DIRECTED 50 each 5   • Probiotic Product (PROBIOTIC DAILY PO) Take  by mouth.     • Tirzepatide 2.5 MG/0.5ML solution pen-injector Inject 0.5 mL under the skin into the appropriate area as directed 1 (One) Time Per Week. 2 mL 3   • trimethoprim (TRIMPEX) 100 MG tablet TAKE 1 TABLET BY MOUTH TWICE DAILY 30 tablet 5   • vitamin D (ERGOCALCIFEROL) 1.25 MG (92708 UT) capsule capsule TAKE 1 CAPSULE BY MOUTH 1 TIME EVERY WEEK 5 capsule 2   • zolpidem (AMBIEN) 5 MG tablet Take 1 tablet by mouth At Night As Needed for Sleep. 30 tablet 0     No current facility-administered medications for this visit.       Past Medical History:  Past Medical History:   Diagnosis Date   • Abdominal pain, LLQ (left lower quadrant)    • Abnormal ECG aug 2021   • Abnormal Pap smear of cervix     several years ago   • Anemia     years ago   • Breast cancer    • Cataract early stage   • Cystitis    • Diabetes mellitus    • Diarrhea    • Diverticulitis of colon    • Diverticulosis    • Ductal carcinoma in situ (DCIS) of left breast 12/21/2022   • Encounter for cholecystectomy 2018   • Gallstones    • GERD (gastroesophageal reflux disease)    • Hiatal hernia    • Hyperglycemia    • Hyperlipidemia    • Hypothyroidism    • Muscle spasm     FLANK PAIN   • OAB (overactive bladder)    • Pneumonia     years ago   • Rectal bleeding    • UTI (urinary tract infection)        Past Surgical History:  Past Surgical History:  "  Procedure Laterality Date   • BREAST BIOPSY Bilateral 2005    benign   • BREAST BIOPSY Left 12/29/2022    Procedure: BREAST BIOPSY WITH NEEDLE LOCALIZATION;  Surgeon: Dong Mata MD;  Location: [facility] OR;  Service: General;  Laterality: Left;   • CHOLECYSTECTOMY  2018?   • ENDOSCOPY     • ENDOSCOPY N/A 1/11/2018    Procedure: ESOPHAGOGASTRODUODENOSCOPY;  Surgeon: Dong Mata MD;  Location: [facility] OR;  Service:    • MAMMO BILATERAL  09/2015   • OVARIAN CYST DRAINAGE     • NH LAPAROSCOPY SURG CHOLECYSTECTOMY N/A 1/11/2018    Procedure: CHOLECYSTECTOMY LAPAROSCOPIC;  Surgeon: Dong Mata MD;  Location: [facility] OR;  Service: General   • US GUIDED LYMPH NODE BIOPSY  4/11/2018       Social History:  Social History     Socioeconomic History   • Marital status:    Tobacco Use   • Smoking status: Never   • Smokeless tobacco: Never   • Tobacco comments:     never   Vaping Use   • Vaping Use: Never used   Substance and Sexual Activity   • Alcohol use: No   • Drug use: No   • Sexual activity: Not Currently     Partners: Male     Shey King  reports that she has never smoked. She has never used smokeless tobacco..      Family History:  Family History   Adopted: Yes   Problem Relation Age of Onset   • Heart disease Mother         also had cancer kidney   • Cancer Mother         kidney   • Thyroid disease Mother    • Heart disease Father    • Diabetes Brother    • Breast cancer Neg Hx        Review of Systems:  Review of Systems   Constitutional: Negative.    HENT: Negative.    Respiratory: Negative.    Cardiovascular: Negative.    Gastrointestinal: Negative.    Genitourinary: Negative.    Musculoskeletal: Negative.    Skin: Negative.    Neurological: Negative.    Hematological: Negative.    Psychiatric/Behavioral: Negative.        Vital Signs:   /74   Pulse 83   Temp 97.3 °F (36.3 °C) (Temporal)   Resp 18   Ht 157.5 cm (62\")   Wt 86.8 kg (191 lb 6.4 oz)   SpO2 97%   BMI 35.01 " "kg/m²      Physical Exam:   Physical Exam  Vitals reviewed.   Constitutional:       General: She is not in acute distress.     Appearance: Normal appearance. She is not ill-appearing.   HENT:      Head: Normocephalic and atraumatic.      Mouth/Throat:      Mouth: Mucous membranes are moist.      Pharynx: Oropharynx is clear.   Eyes:      Conjunctiva/sclera: Conjunctivae normal.      Pupils: Pupils are equal, round, and reactive to light.   Cardiovascular:      Rate and Rhythm: Normal rate and regular rhythm.      Heart sounds: No murmur heard.  Pulmonary:      Effort: Pulmonary effort is normal. No respiratory distress.      Breath sounds: Normal breath sounds. No wheezing.   Chest:      Comments: L breast with faint erythema  Abdominal:      General: Abdomen is flat. Bowel sounds are normal. There is no distension.      Palpations: Abdomen is soft. There is no mass.      Tenderness: There is no abdominal tenderness. There is no guarding.   Musculoskeletal:         General: No swelling. Normal range of motion.      Cervical back: Normal range of motion.   Lymphadenopathy:      Cervical: No cervical adenopathy.   Skin:     General: Skin is warm and dry.   Neurological:      General: No focal deficit present.      Mental Status: She is alert and oriented to person, place, and time. Mental status is at baseline.   Psychiatric:         Mood and Affect: Mood normal.         PHQ-9 Score  PHQ-9 Total Score: 1     Pain Score  Vitals:    04/06/23 1342   BP: 115/74   Pulse: 83   Resp: 18   Temp: 97.3 °F (36.3 °C)   TempSrc: Temporal   SpO2: 97%   Weight: 86.8 kg (191 lb 6.4 oz)   Height: 157.5 cm (62\")   PainSc: 0-No pain       ECOG score: 0             Lab Review  Lab Results   Component Value Date    WBC 7.59 12/27/2022    HGB 13.5 12/27/2022    HCT 42.0 12/27/2022    MCV 89.4 12/27/2022    RDW 13.3 12/27/2022     12/27/2022    NEUTRORELPCT 65.5 12/27/2022    LYMPHORELPCT 22.7 12/27/2022    MONORELPCT 7.4 12/27/2022 "    EOSRELPCT 2.6 12/27/2022    BASORELPCT 1.1 12/27/2022    NEUTROABS 4.98 12/27/2022    LYMPHSABS 1.72 12/27/2022       Lab Results   Component Value Date     12/27/2022    K 4.6 12/27/2022    CO2 27.0 12/27/2022     12/27/2022    BUN 17 12/27/2022    CREATININE 1.01 (H) 12/27/2022    EGFRIFNONA 57 (L) 01/26/2022    EGFRIFAFRI 74 09/06/2016    GLUCOSE 111 (H) 12/27/2022    CALCIUM 9.6 12/27/2022    ALKPHOS 86 12/27/2022    AST 16 12/27/2022    ALT 12 12/27/2022    BILITOT 0.5 12/27/2022    ALBUMIN 4.7 12/27/2022    PROTEINTOT 7.9 12/27/2022    MG 1.7 07/13/2021    PHOS 3.5 12/01/2021       Radiology Results  DEXA Bone Density Axial (01/27/2023 13:22)  FINDINGS:    RIGHT FEMORAL NECK BONE MINERAL DENSITY:  BMD of the right femur neck  is 0.899 with T score -1 with density.      UNITS OF MEASURE:    Bone mineral density is measured in g/cm2.    Z-score is the number of standard deviations above age-matched  controls.    T-score is the number of standard deviations above healthy young  adults.      WORLD HEALTH ORGANIZATION GUIDELINES:    T-score at or above -1 is normal bone mineral density.    T-score between -1 and -2.5 is osteopenia.    T-score at or below -2.5 is osteoporosis.     IMPRESSION:    BMD of the right femur neck is 0.899 with T score -1 with density.    Mammo Diagnostic Left With CAD (12/06/2022 09:21)  FINDINGS: Magnification imaging of the left breast demonstrates a small  group of heterogeneous calcifications in the mid left 3:00 to 4:00  region. Recommend stereotactic guided core biopsy.     IMPRESSION:  Grouped calcifications in the left 3:00 to 4:00 region.     BI-RADS CATEGORY:  4, SUSPICIOUS     RECOMMENDATION:  Recommend stereotactic guided core biopsy of the left  breast.    Mammo Screening Digital Tomosynthesis Bilateral With CAD (10/27/2022 07:29)  FINDINGS:     Breast Composition: The breasts are heterogenously dense, which may  obscure small masses.   Important Findings:     Biopsy related changes right breast with marker clip. Stable benign  calcifications right breast are noted. Calcifications left breast  middle-posterior third lateral aspect have developed. Other  calcifications throughout the left breast appear stable. Biopsy related  changes upper outer quadrant left breast with marker clip noted.   No suspicious calcifications or areas of architectural distortion.  No  dominant masses or skin thickening      Pathology:   Procedure: Excision (less than total mastectomy)   Specimen Laterality: Left   Tumor Site: Not specified   Histologic Type: Ductal carcinoma in situ   Size of DCIS: 10 x 3 x 2 mm, 4 of 19 blocks   Architectural Patterns: Cribriform   Nuclear Grade: Grade II (intermediate)   Necrosis: Present, focal (small foci of necrosis)   Microcalcifications: Present in DCIS and in nonneoplastic tissue   Margin Status: All margins negative for DCIS   Distance from DCIS to Closest Margin: 1 mm from inferior margin   Distance from DCIS to Anterior Margin: 2 mm   Distance from DCIS to Posterior Margin: 4 mm   Regional Lymph Node Status: No regional lymph nodes submitted or found   PATHOLOGIC STAGE (pTNM, AJCC 8th Edition): pTis (DCIS), pNx   Biomarker Testing Performed on Previous Biopsy:  (ER) Positive 100 %   (PgR) Positive 5 %     CLINICAL HISTORY:   Ductal carcinoma in situ (DCIS) of left breast     Assessment / Plan         Assessment and Plan   72-year-old female who presents today with hormone positive left breast ductal carcinoma in situ, status postlumpectomy.    #Ductal carcinoma in situ, left breast, hormone positive  -Final pathology with 10 x 3 x 2 mm ductal carcinoma in situ, grade 2, 1 mm from inferior margin, 2 mm from anterior margin, 4 mm from posterior margin  -Her case ws discussed in tumor board on 1/25; it was deemed she should receive radiation therapy given close margins <2mm inferior margin  -Completed adjuvant radiation therapy: 3/7  -Tolerating adjuvant  hormonal therapy which has shown to decrease recurrence by at least 33%. Taking anastrozole 1 mg daily to start after radiation therapy for at least 5 years.   -Patient to receive first mammogram in 6 months (May 2023), every 12 months thereafter  -She will continue history and physical and exam every 6 months for the first 5 years, yearly thereafter according to NCCN     #Bone health  -DEXA scan 1/27/2023: bone mineral density of right femur neck 0.899 with T score -1.  Repeat DEXA scan yearly, next January 2024  -Continue vitamin D and calcium supplement    #Adjustment Insomnia  -Prescribed Ambien 5 mg QHS #30 tabs  -Refused referral to psychiatry or therapy      Discussed possible differential diagnoses, testing, treatment, recommended non-pharmacological interventions, risks, warning signs to monitor for that would indicate need for follow-up in clinic or ER. If no improvement with these regimens or you have new or worsening symptoms follow-up. Patient verbalizes understanding and agreement with plan of care. Denies further needs or concerns.     Patient was given instructions and counseling regarding her condition and for health maintenance advised.    I spent 30 minutes with Shey King today.  In the office today, more than 50% of this time was spent face-to-face with her  in counseling / coordination of care, reviewing her interim medical history and counseling on the current treatment plan.  All questions were answered to her satisfaction.      Meds ordered during this visit  New Medications Ordered This Visit   Medications   • zolpidem (AMBIEN) 5 MG tablet     Sig: Take 1 tablet by mouth At Night As Needed for Sleep.     Dispense:  30 tablet     Refill:  0       Visit Diagnoses    ICD-10-CM ICD-9-CM   1. Adjustment insomnia  F51.02 307.41       Follow Up   In 3 weeks for history, physical exam, and f/u on mammogram      This document has been electronically signed by Maia Ford MD   April 6, 2023  14:47 EDT    Dictated Utilizing Dragon Dictation: Part of this note may be an electronic transcription/translation of spoken language to printed text using the Dragon Dictation System.

## 2023-04-06 ENCOUNTER — OFFICE VISIT (OUTPATIENT)
Dept: ONCOLOGY | Facility: CLINIC | Age: 73
End: 2023-04-06
Payer: COMMERCIAL

## 2023-04-06 VITALS
RESPIRATION RATE: 18 BRPM | DIASTOLIC BLOOD PRESSURE: 74 MMHG | WEIGHT: 191.4 LBS | BODY MASS INDEX: 35.22 KG/M2 | TEMPERATURE: 97.3 F | HEART RATE: 83 BPM | SYSTOLIC BLOOD PRESSURE: 115 MMHG | OXYGEN SATURATION: 97 % | HEIGHT: 62 IN

## 2023-04-06 DIAGNOSIS — F51.02 ADJUSTMENT INSOMNIA: Primary | ICD-10-CM

## 2023-04-06 PROCEDURE — 1126F AMNT PAIN NOTED NONE PRSNT: CPT | Performed by: INTERNAL MEDICINE

## 2023-04-06 PROCEDURE — 99214 OFFICE O/P EST MOD 30 MIN: CPT | Performed by: INTERNAL MEDICINE

## 2023-04-06 RX ORDER — ZOLPIDEM TARTRATE 5 MG/1
5 TABLET ORAL NIGHTLY PRN
Qty: 30 TABLET | Refills: 0 | Status: SHIPPED | OUTPATIENT
Start: 2023-04-06

## 2023-04-10 ENCOUNTER — TELEPHONE (OUTPATIENT)
Dept: FAMILY MEDICINE CLINIC | Facility: CLINIC | Age: 73
End: 2023-04-10
Payer: COMMERCIAL

## 2023-04-10 NOTE — TELEPHONE ENCOUNTER
Spoke with patient in regards to overdue lab orders.  She indicated she plans to have them done this week.

## 2023-04-11 ENCOUNTER — OFFICE VISIT (OUTPATIENT)
Dept: SURGERY | Facility: CLINIC | Age: 73
End: 2023-04-11
Payer: COMMERCIAL

## 2023-04-11 ENCOUNTER — LAB (OUTPATIENT)
Dept: FAMILY MEDICINE CLINIC | Facility: CLINIC | Age: 73
End: 2023-04-11
Payer: MEDICARE

## 2023-04-11 VITALS — WEIGHT: 191 LBS | HEIGHT: 62 IN | BODY MASS INDEX: 35.15 KG/M2

## 2023-04-11 DIAGNOSIS — D05.12 DUCTAL CARCINOMA IN SITU (DCIS) OF LEFT BREAST: Primary | ICD-10-CM

## 2023-04-11 DIAGNOSIS — Z00.00 MEDICARE ANNUAL WELLNESS VISIT, SUBSEQUENT: ICD-10-CM

## 2023-04-11 DIAGNOSIS — E55.9 VITAMIN D DEFICIENCY: ICD-10-CM

## 2023-04-11 LAB
25(OH)D3 SERPL-MCNC: 36.1 NG/ML (ref 30–100)
ALBUMIN SERPL-MCNC: 4.3 G/DL (ref 3.5–5.2)
ALBUMIN/GLOB SERPL: 1.4 G/DL
ALP SERPL-CCNC: 68 U/L (ref 39–117)
ALT SERPL W P-5'-P-CCNC: 14 U/L (ref 1–33)
ANION GAP SERPL CALCULATED.3IONS-SCNC: 11 MMOL/L (ref 5–15)
AST SERPL-CCNC: 20 U/L (ref 1–32)
BASOPHILS # BLD AUTO: 0.05 10*3/MM3 (ref 0–0.2)
BASOPHILS NFR BLD AUTO: 0.8 % (ref 0–1.5)
BILIRUB SERPL-MCNC: 0.4 MG/DL (ref 0–1.2)
BUN SERPL-MCNC: 16 MG/DL (ref 8–23)
BUN/CREAT SERPL: 15.2 (ref 7–25)
CALCIUM SPEC-SCNC: 9.1 MG/DL (ref 8.6–10.5)
CHLORIDE SERPL-SCNC: 106 MMOL/L (ref 98–107)
CHOLEST SERPL-MCNC: 184 MG/DL (ref 0–200)
CO2 SERPL-SCNC: 23 MMOL/L (ref 22–29)
CREAT SERPL-MCNC: 1.05 MG/DL (ref 0.57–1)
DEPRECATED RDW RBC AUTO: 44.6 FL (ref 37–54)
EGFRCR SERPLBLD CKD-EPI 2021: 57.3 ML/MIN/1.73
EOSINOPHIL # BLD AUTO: 0.16 10*3/MM3 (ref 0–0.4)
EOSINOPHIL NFR BLD AUTO: 2.6 % (ref 0.3–6.2)
ERYTHROCYTE [DISTWIDTH] IN BLOOD BY AUTOMATED COUNT: 13.7 % (ref 12.3–15.4)
GLOBULIN UR ELPH-MCNC: 3.1 GM/DL
GLUCOSE SERPL-MCNC: 136 MG/DL (ref 65–99)
HBA1C MFR BLD: 6 % (ref 4.8–5.6)
HCT VFR BLD AUTO: 35.9 % (ref 34–46.6)
HDLC SERPL-MCNC: 44 MG/DL (ref 40–60)
HGB BLD-MCNC: 11.9 G/DL (ref 12–15.9)
IMM GRANULOCYTES # BLD AUTO: 0.03 10*3/MM3 (ref 0–0.05)
IMM GRANULOCYTES NFR BLD AUTO: 0.5 % (ref 0–0.5)
LDLC SERPL CALC-MCNC: 116 MG/DL (ref 0–100)
LDLC/HDLC SERPL: 2.57 {RATIO}
LYMPHOCYTES # BLD AUTO: 0.8 10*3/MM3 (ref 0.7–3.1)
LYMPHOCYTES NFR BLD AUTO: 13.2 % (ref 19.6–45.3)
MCH RBC QN AUTO: 29.8 PG (ref 26.6–33)
MCHC RBC AUTO-ENTMCNC: 33.1 G/DL (ref 31.5–35.7)
MCV RBC AUTO: 89.8 FL (ref 79–97)
MONOCYTES # BLD AUTO: 0.51 10*3/MM3 (ref 0.1–0.9)
MONOCYTES NFR BLD AUTO: 8.4 % (ref 5–12)
NEUTROPHILS NFR BLD AUTO: 4.5 10*3/MM3 (ref 1.7–7)
NEUTROPHILS NFR BLD AUTO: 74.5 % (ref 42.7–76)
NRBC BLD AUTO-RTO: 0 /100 WBC (ref 0–0.2)
PLATELET # BLD AUTO: 239 10*3/MM3 (ref 140–450)
PMV BLD AUTO: 10.6 FL (ref 6–12)
POTASSIUM SERPL-SCNC: 4.5 MMOL/L (ref 3.5–5.2)
PROT SERPL-MCNC: 7.4 G/DL (ref 6–8.5)
RBC # BLD AUTO: 4 10*6/MM3 (ref 3.77–5.28)
SODIUM SERPL-SCNC: 140 MMOL/L (ref 136–145)
TRIGL SERPL-MCNC: 134 MG/DL (ref 0–150)
TSH SERPL DL<=0.05 MIU/L-ACNC: 1.35 UIU/ML (ref 0.27–4.2)
VIT B12 BLD-MCNC: 205 PG/ML (ref 211–946)
VLDLC SERPL-MCNC: 24 MG/DL (ref 5–40)
WBC NRBC COR # BLD: 6.05 10*3/MM3 (ref 3.4–10.8)

## 2023-04-11 PROCEDURE — 85025 COMPLETE CBC W/AUTO DIFF WBC: CPT | Performed by: NURSE PRACTITIONER

## 2023-04-11 PROCEDURE — 1159F MED LIST DOCD IN RCRD: CPT | Performed by: SURGERY

## 2023-04-11 PROCEDURE — 82043 UR ALBUMIN QUANTITATIVE: CPT | Performed by: NURSE PRACTITIONER

## 2023-04-11 PROCEDURE — 82607 VITAMIN B-12: CPT | Performed by: NURSE PRACTITIONER

## 2023-04-11 PROCEDURE — 83036 HEMOGLOBIN GLYCOSYLATED A1C: CPT | Performed by: NURSE PRACTITIONER

## 2023-04-11 PROCEDURE — 84443 ASSAY THYROID STIM HORMONE: CPT | Performed by: NURSE PRACTITIONER

## 2023-04-11 PROCEDURE — 99213 OFFICE O/P EST LOW 20 MIN: CPT | Performed by: SURGERY

## 2023-04-11 PROCEDURE — 80053 COMPREHEN METABOLIC PANEL: CPT | Performed by: NURSE PRACTITIONER

## 2023-04-11 PROCEDURE — 36415 COLL VENOUS BLD VENIPUNCTURE: CPT

## 2023-04-11 PROCEDURE — 82306 VITAMIN D 25 HYDROXY: CPT | Performed by: NURSE PRACTITIONER

## 2023-04-11 PROCEDURE — 1160F RVW MEDS BY RX/DR IN RCRD: CPT | Performed by: SURGERY

## 2023-04-11 PROCEDURE — 80061 LIPID PANEL: CPT | Performed by: NURSE PRACTITIONER

## 2023-04-11 NOTE — PROGRESS NOTES
Subjective   Shey King is a 72 y.o. female  is here today for follow-up.         Shey King is a 72 y.o. female here for follow up after wire localized lumpectomy of the left breast.  The patient is doing well and her incision has healed without issue.  She had a small area of skin dehiscence that is granulating nicely.  Final pathology is consistent with DCIS and margins of excision are negative.    LEFT BREAST, LUMPECTOMY: Intermediate grade DCIS, margins free            RLL     Procedure: Excision (less than total mastectomy)   Specimen Laterality: Left   Tumor Site: Not specified   Histologic Type: Ductal carcinoma in situ   Size of DCIS: 10 x 3 x 2 mm, 4 of 19 blocks   Architectural Patterns: Cribriform   Nuclear Grade: Grade II (intermediate)   Necrosis: Present, focal (small foci of necrosis)   Microcalcifications: Present in DCIS and in nonneoplastic tissue   Margin Status: All margins negative for DCIS   Distance from DCIS to Closest Margin: 1 mm from inferior margin   Distance from DCIS to Anterior Margin: 2 mm   Distance from DCIS to Posterior Margin: 4 mm   Regional Lymph Node Status: No regional lymph nodes submitted or found   PATHOLOGIC STAGE (pTNM, AJCC 8th Edition): pTis (DCIS), pNx   Biomarker Testing Performed on Previous Biopsy:  (ER) Positive 100 %   (PgR) Positive 5 %         Assessment     Diagnoses and all orders for this visit:    1. Ductal carcinoma in situ (DCIS) of left breast (Primary)      Shey King is a 72 y.o. female doing well after wire localized lumpectomy for an area of DCIS of the left breast.  Final pathology is consistent with hormone positive DCIS with no invasive disease.  Margins of excision are negative.  The patient is doing well and completing adjuvant therapy.  Radiation therapy has been completed and her radiation changes are resolving.  6-month mammogram to be performed and follow-up in 3 months in the office.

## 2023-04-12 LAB — ALBUMIN UR-MCNC: <1.2 MG/DL

## 2023-04-17 ENCOUNTER — TELEPHONE (OUTPATIENT)
Dept: FAMILY MEDICINE CLINIC | Facility: CLINIC | Age: 73
End: 2023-04-17
Payer: COMMERCIAL

## 2023-04-17 NOTE — TELEPHONE ENCOUNTER
----- Message from BERT Lucas sent at 4/17/2023 12:59 PM EDT -----  Sugar and thyroid is normal labs are ok with the exception of B12 which is very low needs to be taking daily supplements        Pt notified and expressed understanding.

## 2023-04-20 ENCOUNTER — TELEPHONE (OUTPATIENT)
Dept: UROLOGY | Facility: CLINIC | Age: 73
End: 2023-04-20
Payer: COMMERCIAL

## 2023-04-20 NOTE — TELEPHONE ENCOUNTER
Patient said that she gave a urine sample at her primary care and wanted to know if you would check her lab and see if that was good enough or does she need to bring us a sample to culture.

## 2023-04-20 NOTE — TELEPHONE ENCOUNTER
I looked in her Chart and the most recent urine culture was one we did in February and I called the pt and asked her if she was symptomatic and thought she had a UTI to please come give a urine sample to send for culture. She will come in the morning.

## 2023-04-21 ENCOUNTER — LAB (OUTPATIENT)
Dept: UROLOGY | Facility: CLINIC | Age: 73
End: 2023-04-21
Payer: MEDICARE

## 2023-04-21 DIAGNOSIS — N39.0 RECURRENT UTI: Primary | ICD-10-CM

## 2023-04-21 PROCEDURE — 87086 URINE CULTURE/COLONY COUNT: CPT | Performed by: NURSE PRACTITIONER

## 2023-04-22 LAB — BACTERIA SPEC AEROBE CULT: NORMAL

## 2023-04-24 ENCOUNTER — TELEPHONE (OUTPATIENT)
Dept: UROLOGY | Facility: CLINIC | Age: 73
End: 2023-04-24
Payer: COMMERCIAL

## 2023-04-24 ENCOUNTER — HOSPITAL ENCOUNTER (OUTPATIENT)
Dept: GENERAL RADIOLOGY | Facility: HOSPITAL | Age: 73
Discharge: HOME | End: 2023-04-24
Admitting: NURSE PRACTITIONER
Payer: COMMERCIAL

## 2023-04-24 DIAGNOSIS — N20.0 KIDNEY STONE: Primary | ICD-10-CM

## 2023-04-24 DIAGNOSIS — N20.0 KIDNEY STONE: ICD-10-CM

## 2023-04-24 PROCEDURE — 74018 RADEX ABDOMEN 1 VIEW: CPT | Performed by: RADIOLOGY

## 2023-04-24 PROCEDURE — 74018 RADEX ABDOMEN 1 VIEW: CPT

## 2023-04-24 NOTE — TELEPHONE ENCOUNTER
Called patient to check on her due to complaints of dysuria, generalized malaise, urine frequency and burning with urination.  Her recent urine culture on Friday 421 showed mixed isha otherwise negative for any bacterial growth.  She is currently not taking any prophylaxis secondary to chemotherapy.  We discussed getting the KUB due to complaints of flank pain and a positive history of kidney stones.  Reviewed KUB which is unremarkable besides moderate to large volume stool consistent with severe constipation.  I have discussed the impact of constipation on recurrent UTIs, and the flank pain, abdominal pain, pelvic pressure and suprapubic discomfort she is experiencing.      Patient will drop by in clinic tomorrow for sterile urine specimen/in and out cath for definitive plan of care.  In the meantime I discussed bowel regiment with daily MiraLAX/stool softeners for cleanout.    Patient agreeable plan of care.    Called patient to check on her post clinic visit    COMPARISON: None available     FINDINGS:  2 views of the abdomen     Large stool burden     No evidence of bowel obstruction     No suspicious calcifications over the kidneys, but easily obscured due  to the large stool burden.

## 2023-04-24 NOTE — TELEPHONE ENCOUNTER
I called pt left voicemail of negative urine culture results.          ----- Message from Griselda Cheng-Akwa, APRN sent at 4/22/2023 11:34 PM EDT -----  Please kindly let patient know her urine culture results showed mixed isha, otherwise are negative  for any bacteria growth/infection at this time. She may Drop off another sample if she has any symptoms.If not, Follow up in clinic as discussed.     Thank you

## 2023-04-25 ENCOUNTER — LAB (OUTPATIENT)
Dept: UROLOGY | Facility: CLINIC | Age: 73
End: 2023-04-25
Payer: MEDICARE

## 2023-04-25 DIAGNOSIS — N39.0 RECURRENT UTI: Primary | ICD-10-CM

## 2023-04-25 PROCEDURE — 87086 URINE CULTURE/COLONY COUNT: CPT | Performed by: NURSE PRACTITIONER

## 2023-04-25 PROCEDURE — 96372 THER/PROPH/DIAG INJ SC/IM: CPT | Performed by: NURSE PRACTITIONER

## 2023-04-25 RX ORDER — GENTAMICIN SULFATE 40 MG/ML
80 INJECTION, SOLUTION INTRAMUSCULAR; INTRAVENOUS ONCE
Status: COMPLETED | OUTPATIENT
Start: 2023-04-25 | End: 2023-04-25

## 2023-04-25 RX ADMIN — GENTAMICIN SULFATE 80 MG: 40 INJECTION, SOLUTION INTRAMUSCULAR; INTRAVENOUS at 08:58

## 2023-04-26 ENCOUNTER — TELEPHONE (OUTPATIENT)
Dept: FAMILY MEDICINE CLINIC | Facility: CLINIC | Age: 73
End: 2023-04-26
Payer: COMMERCIAL

## 2023-04-26 LAB — BACTERIA SPEC AEROBE CULT: NO GROWTH

## 2023-04-26 NOTE — TELEPHONE ENCOUNTER
Take miralax twice daily      Patient notified & verbalized understanding.    She is questioning if have heard anything about Mounjaro PA?

## 2023-04-26 NOTE — TELEPHONE ENCOUNTER
Caller: Shey King    Relationship to patient: Self    Best call back number: [phone]    Patient is needing: PATIENT STATED THAT SHE WOULD LIKE TO SPEAK WITH CLINICAL STAFF SHE HAS SOME QUESTIONS TO ASK THE NURSE    PLEASE ADVISE

## 2023-04-26 NOTE — TELEPHONE ENCOUNTER
Caller: Shey King    Relationship to patient: Self    Best call back number: [phone]    Patient is needing: PATIENT STATED THAT SHE WOULD LIKE TO SPEAK WITH CLINICAL STAFF SHE HAS SOME QUESTIONS TO ASK THE NURSE    PLEASE ADVISE        Spoke with patient she reports she had an XR per Dr. Conte that returned with a large amt. Of stool,she got some Miralax & started it yesterday,having small BM's but dos not feel like this is going to be sufficient,please advise.

## 2023-04-27 ENCOUNTER — TELEPHONE (OUTPATIENT)
Dept: UROLOGY | Facility: CLINIC | Age: 73
End: 2023-04-27
Payer: COMMERCIAL

## 2023-04-27 NOTE — TELEPHONE ENCOUNTER
I called pt with negative urine culture results.      ----- Message from Griselda Cheng-Akwa, APRN sent at 4/26/2023 10:42 PM EDT -----  Please kindly let patient know her urine culture results are NEGATIVE at this time. Continue suppressive therapy only one time nightly. She may drop off another urine sample if symptomatic, if not follow up in clinic as discussed. Continue bowel regiment seriously. Follow up as discussed.  Thank you

## 2023-05-03 ENCOUNTER — TELEPHONE (OUTPATIENT)
Dept: FAMILY MEDICINE CLINIC | Facility: CLINIC | Age: 73
End: 2023-05-03
Payer: COMMERCIAL

## 2023-05-03 NOTE — TELEPHONE ENCOUNTER
05/03/2023 An PA was submitted on 04/06/2023 for mounjaro 2.5/0.5, Pa was denied. Patient's insurance stated: Patient has to try the required number of preferred formulary alternatives on formulary first before they will approve mounjaro. Patient has to try 3 Formulary alternatives out of the following: Ozempic, Trulicity, Victoza, Rybelsus, Patient has only tried Ozempic.    Please advise

## 2023-05-08 NOTE — TELEPHONE ENCOUNTER
With non coverage of Mounjaro ask patient if she would like to restart Ozempic as this was covered before        Left a message to return call.    Spoke with patient she is willing to try it again.

## 2023-05-08 NOTE — TELEPHONE ENCOUNTER
With non coverage of Mounjaro ask patient if she would like to restart Ozempic as this was covered before

## 2023-05-28 DIAGNOSIS — E55.9 VITAMIN D DEFICIENCY: ICD-10-CM

## 2023-05-30 RX ORDER — ERGOCALCIFEROL 1.25 MG/1
CAPSULE ORAL
Qty: 5 CAPSULE | Refills: 2 | Status: SHIPPED | OUTPATIENT
Start: 2023-05-30

## 2023-06-15 ENCOUNTER — HOSPITAL ENCOUNTER (OUTPATIENT)
Dept: MAMMOGRAPHY | Facility: HOSPITAL | Age: 73
Discharge: HOME | End: 2023-06-15
Admitting: RADIOLOGY
Payer: COMMERCIAL

## 2023-06-15 DIAGNOSIS — D05.12 DUCTAL CARCINOMA IN SITU (DCIS) OF LEFT BREAST: ICD-10-CM

## 2023-06-15 PROCEDURE — 77065 DX MAMMO INCL CAD UNI: CPT

## 2023-06-15 PROCEDURE — G0279 TOMOSYNTHESIS, MAMMO: HCPCS

## 2023-06-15 PROCEDURE — G0279 TOMOSYNTHESIS, MAMMO: HCPCS | Performed by: RADIOLOGY

## 2023-06-15 PROCEDURE — 77065 DX MAMMO INCL CAD UNI: CPT | Performed by: RADIOLOGY

## 2023-06-16 ENCOUNTER — OFFICE VISIT (OUTPATIENT)
Dept: RADIATION ONCOLOGY | Facility: HOSPITAL | Age: 73
End: 2023-06-16
Payer: MEDICARE

## 2023-06-16 VITALS
TEMPERATURE: 97.8 F | OXYGEN SATURATION: 97 % | SYSTOLIC BLOOD PRESSURE: 130 MMHG | BODY MASS INDEX: 35.48 KG/M2 | HEART RATE: 90 BPM | WEIGHT: 194 LBS | RESPIRATION RATE: 18 BRPM | DIASTOLIC BLOOD PRESSURE: 82 MMHG

## 2023-06-16 DIAGNOSIS — D05.12 DUCTAL CARCINOMA IN SITU (DCIS) OF LEFT BREAST: Primary | ICD-10-CM

## 2023-06-16 PROCEDURE — G0463 HOSPITAL OUTPT CLINIC VISIT: HCPCS | Performed by: RADIOLOGY

## 2023-06-16 NOTE — PROGRESS NOTES
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 Casper history of breast cancer dates back to December of last year.  As a result of a screening mammogram on December 6 she underwent a needle guided biopsy which revealed ductal carcinoma in situ.  She underwent a lumpectomy and sentinel lymph node evaluation on 12/29/2022.  Ductal carcinoma in situ was a moderately differentiated tumor.  It measured only 10 mm in greatest dimensions.  Surgical margins were negative.  The tumor was ER and NM positive and HER2 negative.    The patient underwent a course of breast conserving radiation therapy between 2/7/2023 and 3/7/2023.  Left breast received a total of 4256 cGy in 16 fractions.  The operative tumor bed received an additional 1000 cGy in 5 fractions.  The patient tolerated her treatment quite well.  Since her radiation therapy she has been taking Arimidex.    Today Mrs. King states she is doing quite well.  She has noticed no adenopathy visceromegaly or skeletal tenderness.  She has noticed no palpable lesions within the breast.   She occasionally has a sharp pain in the breast.  She has had no nipple discharge erythema or tenderness.  She does state that she is somewhat more fatigued on a regular basis since she has been taking Arimidex.  The remainder of her review of systems are negative.    (Old medical records were requested, reviewed, and summarized in the HPI)    Review of Systems       All other systems reviewed and are negative.      Past Medical History:   Diagnosis Date    Abdominal pain, LLQ (left lower quadrant)     Abnormal ECG aug 2021    Abnormal Pap smear of cervix     several years ago    Anemia     years ago     Breast cancer     Cataract early stage    Cystitis     Diabetes mellitus     Diarrhea     Diverticulitis of colon     Diverticulosis     Ductal carcinoma in situ (DCIS) of left breast 12/21/2022    Encounter for cholecystectomy 2018    Gallstones     GERD (gastroesophageal reflux disease)     Hiatal hernia     Hyperglycemia     Hyperlipidemia     Hypothyroidism     Muscle spasm     FLANK PAIN    OAB (overactive bladder)     Pneumonia     years ago    Rectal bleeding     UTI (urinary tract infection)         Past Surgical History:   Procedure Laterality Date    BREAST BIOPSY Bilateral 2005    benign    BREAST BIOPSY Left 12/29/2022    Procedure: BREAST BIOPSY WITH NEEDLE LOCALIZATION;  Surgeon: Dong Mata MD;  Location: [facility];  Service: General;  Laterality: Left;    CHOLECYSTECTOMY  2018?    ENDOSCOPY      ENDOSCOPY N/A 1/11/2018    Procedure: ESOPHAGOGASTRODUODENOSCOPY;  Surgeon: Dong Mata MD;  Location: [facility] OR;  Service:     MAMMO BILATERAL  09/2015    OVARIAN CYST DRAINAGE      ME LAPAROSCOPY SURG CHOLECYSTECTOMY N/A 1/11/2018    Procedure: CHOLECYSTECTOMY LAPAROSCOPIC;  Surgeon: Dong Mata MD;  Location: [facility];  Service: General    US GUIDED LYMPH NODE BIOPSY  4/11/2018      Family History   Adopted: Yes   Problem Relation Age of Onset    Heart disease Mother         also had cancer kidney    Cancer Mother         kidney    Thyroid disease Mother     Heart disease Father     Diabetes Brother     Breast cancer Neg Hx         Social History     Socioeconomic History    Marital status:    Tobacco Use    Smoking status: Never    Smokeless tobacco: Never    Tobacco comments:     never   Vaping Use    Vaping Use: Never used   Substance and Sexual Activity    Alcohol use: No    Drug use: No    Sexual activity: Not Currently     Partners: Male       Allergies:  Other, Bactrim [sulfamethoxazole-trimethoprim], Ciprofloxacin, Penicillins, Steri-strip compound benzoin  [benzoin compound], and Sulfa antibiotics   Prior to Admission medications    Medication Sig Start Date End Date Taking? Authorizing Provider   acetaminophen (TYLENOL) 325 MG tablet Take 2 tablets by mouth Every 4 (Four) Hours As Needed for Mild Pain. 12/29/22  Yes Dong Mata MD   anastrozole (ARIMIDEX) 1 MG tablet Take 1 tablet by mouth Daily. 3/15/23  Yes Alethea Lott MD   aspirin 81 MG chewable tablet Chew 1 tablet Daily. 12/3/15  Yes Alethea Lott MD   AZO-CRANBERRY PO Take  by mouth.   Yes Alethea Lott MD   Calcium Citrate-Vitamin D 250-2.5 MG-MCG per tablet Take 1 tablet by mouth 2 (Two) Times a Day. 1/20/23  Yes Maia Ford MD   empagliflozin (Jardiance) 10 MG tablet tablet Take 1 tablet by mouth Daily. 3/27/23  Yes Tashia Vallejo APRN   fenofibrate (TRICOR) 145 MG tablet Take 1 tablet by mouth Daily. 3/27/23  Yes Tashia Vallejo APRN   glucose monitor monitoring kit 1 each as needed (DM II). 9/7/16  Yes Maral Garrido APRN   Lancets (OneTouch Delica Plus Arcfxe56S) misc USE TO TEST BLOOD SUGAR DAILY AS DIRECTED 6/2/22  Yes Alethea Lott MD   Lancets [facility] [place] Daily testing  E11.65 9/3/21  Yes Tashia Vallejo APRN   levothyroxine (SYNTHROID, LEVOTHROID) 125 MCG tablet Take 1 tablet by mouth Daily. 12/28/22  Yes Tashia Vallejo APRN   OneTouch Ultra test strip USE TO TEST BLOOD SUGAR DAILY AS DIRECTED 2/1/23  Yes Tashia Vallejo APRN   Probiotic Product (PROBIOTIC DAILY PO) Take  by mouth.   Yes Alethea Lott MD   Semaglutide,0.25 or 0.5MG/DOS, (OZEMPIC) 2 MG/1.5ML solution pen-injector Inject 0.25 mg under the skin into the appropriate area as directed 1 (One) Time Per Week. 5/9/23  Yes Tashia Vallejo APRN   trimethoprim (TRIMPEX) 100 MG tablet TAKE 1 TABLET BY MOUTH TWICE DAILY 11/14/22  Yes Cheng-Akwa, Griselda, APRN   vitamin D (ERGOCALCIFEROL) 1.25 MG (62896 UT) capsule capsule TAKE 1 CAPSULE BY MOUTH 1 TIME EVERY WEEK 5/30/23  Yes Tashia Vallejo APRN   zolpidem  (AMBIEN) 5 MG tablet Take 1 tablet by mouth At Night As Needed for Sleep. 4/6/23  Yes Maia Ford MD      Pain:(on a scale of 0-10)    Pain Score    06/16/23 0902   PainSc: 0-No pain        Shey King reports a pain score of 0.  Given her pain assessment as noted, treatment options were discussed and the following options were decided upon as a follow-up plan to address the patient's pain: continuation of current treatment plan for pain.       Quality of Life:   KPS: 100 - Full Activity  ECOG: (0) Fully active, able to carry on all predisease performance without restriction        Physical Examination:  Vitals:  VITALS@    Height:    Weight: Weight: 88 kg (194 lb) Body mass index is 35.48 kg/m².    Physical Exam  Constitutional: The patient is a well-developed, well-nourished 72-year-old female in no acute distress.  Alert and oriented ×3.  HEENT: Atraumatic. Normocephalic. No abnormalities noted.  Lymphatics: No cervical, supraclavicular, or axillary, or inguinal lymphadenopathy is palpated.  CV: Regular rate and rhythm.  No murmurs, rubs, or gallops are appreciated.  Respiratory: Lungs clear to auscultation.  Breath sounds equal bilaterally.  Breasts: Surgical scars in the left breast breast and axilla are well healed, and there are no suspicious lesions or nodules palpated.  The cosmetic results of treatment are excellent.  There is no fibrosis telangiectasia or breast edema.  The right breast is within normal limits, with no suspicious lesions or nodules palpated.  GI: Abdomen soft, nontender, nondistended, with no hepatosplenomegaly or masses palpated.  Extremities: No clubbing, cyanosis, or edema.  Neurologic: Cranial nerves II through XII are grossly intact, with no focal neurological deficits noted on exam.  Psychiatric: Alert and oriented x3. Normal affect, with no anxiety or depression noted.    Radiographs : No new  Pathology: DCIS  Labs:   Lab Results   Component Value Date    GLUCOSE 136 (H)  04/11/2023    BUN 16 04/11/2023    CREATININE 1.05 (H) 04/11/2023    EGFR 57.3 (L) 04/11/2023    BCR 15.2 04/11/2023    K 4.5 04/11/2023    CO2 23.0 04/11/2023    CALCIUM 9.1 04/11/2023    PROTENTOTREF 8.3 (H) 09/06/2016    ALBUMIN 4.3 04/11/2023    BILITOT 0.4 04/11/2023    AST 20 04/11/2023    ALT 14 04/11/2023      WBC   Date Value Ref Range Status   04/11/2023 6.05 3.40 - 10.80 10*3/mm3 Final     Hemoglobin   Date Value Ref Range Status   04/11/2023 11.9 (L) 12.0 - 15.9 g/dL Final     Hematocrit   Date Value Ref Range Status   04/11/2023 35.9 34.0 - 46.6 % Final     Platelets   Date Value Ref Range Status   04/11/2023 239 140 - 450 10*3/mm3 Final    No results found for: PSA No results found for: CEA           ASSESSMENT/PLAN: Ductal carcinoma in situ of the left breast.  Stage Tis N0 M0.  The patient is doing very well today.  There is no evidence of local regional or systemic disease.  The patient continues on Arimidex.    The patient should return to see us in 3 months.    Sincerely,      Electronically signed by Jagjit Rojas MD 6/16/2023  09:15 EDT

## 2023-07-21 PROCEDURE — 85025 COMPLETE CBC W/AUTO DIFF WBC: CPT | Performed by: INTERNAL MEDICINE

## 2023-07-21 PROCEDURE — 80053 COMPREHEN METABOLIC PANEL: CPT | Performed by: INTERNAL MEDICINE

## 2023-07-24 ENCOUNTER — TELEPHONE (OUTPATIENT)
Dept: ONCOLOGY | Facility: CLINIC | Age: 73
End: 2023-07-24

## 2023-07-24 NOTE — TELEPHONE ENCOUNTER
Caller: Shey King    Relationship: Self    Best call back number: [phone]    What is the best time to reach you: ASAP    Who are you requesting to speak with (clinical staff, provider,  specific staff member): CLINICAL    What was the call regarding: REQUESTING A C/B TO GO OVER LAB RESULTS    Is it okay if the provider responds through MyChart: YES

## 2023-07-26 ENCOUNTER — OFFICE VISIT (OUTPATIENT)
Dept: FAMILY MEDICINE CLINIC | Facility: CLINIC | Age: 73
End: 2023-07-26
Payer: COMMERCIAL

## 2023-07-26 VITALS
OXYGEN SATURATION: 95 % | DIASTOLIC BLOOD PRESSURE: 58 MMHG | HEART RATE: 92 BPM | BODY MASS INDEX: 35.37 KG/M2 | TEMPERATURE: 97.7 F | WEIGHT: 192.2 LBS | HEIGHT: 62 IN | SYSTOLIC BLOOD PRESSURE: 112 MMHG

## 2023-07-26 DIAGNOSIS — E78.5 HYPERLIPIDEMIA, UNSPECIFIED HYPERLIPIDEMIA TYPE: ICD-10-CM

## 2023-07-26 DIAGNOSIS — M79.10 MUSCLE PAIN: Primary | ICD-10-CM

## 2023-07-26 DIAGNOSIS — E55.9 VITAMIN D DEFICIENCY: ICD-10-CM

## 2023-07-26 DIAGNOSIS — E03.8 ADULT ONSET HYPOTHYROIDISM: ICD-10-CM

## 2023-07-26 PROCEDURE — 99213 OFFICE O/P EST LOW 20 MIN: CPT | Performed by: NURSE PRACTITIONER

## 2023-07-26 PROCEDURE — 36415 COLL VENOUS BLD VENIPUNCTURE: CPT | Performed by: NURSE PRACTITIONER

## 2023-07-26 PROCEDURE — 84443 ASSAY THYROID STIM HORMONE: CPT | Performed by: NURSE PRACTITIONER

## 2023-07-26 PROCEDURE — 1160F RVW MEDS BY RX/DR IN RCRD: CPT | Performed by: NURSE PRACTITIONER

## 2023-07-26 PROCEDURE — 82550 ASSAY OF CK (CPK): CPT | Performed by: NURSE PRACTITIONER

## 2023-07-26 PROCEDURE — 80048 BASIC METABOLIC PNL TOTAL CA: CPT | Performed by: NURSE PRACTITIONER

## 2023-07-26 PROCEDURE — 83735 ASSAY OF MAGNESIUM: CPT | Performed by: NURSE PRACTITIONER

## 2023-07-26 PROCEDURE — 3044F HG A1C LEVEL LT 7.0%: CPT | Performed by: NURSE PRACTITIONER

## 2023-07-26 PROCEDURE — 1159F MED LIST DOCD IN RCRD: CPT | Performed by: NURSE PRACTITIONER

## 2023-07-26 RX ORDER — LEVOTHYROXINE SODIUM 0.12 MG/1
125 TABLET ORAL DAILY
Qty: 90 TABLET | Refills: 1 | Status: SHIPPED | OUTPATIENT
Start: 2023-07-26

## 2023-07-26 RX ORDER — ERGOCALCIFEROL 1.25 MG/1
50000 CAPSULE ORAL WEEKLY
Qty: 5 CAPSULE | Refills: 2 | Status: SHIPPED | OUTPATIENT
Start: 2023-07-26

## 2023-07-26 NOTE — PROGRESS NOTES
"Chief Complaint  Leg Pain (Pt reports cramps in both legs )    Subjective        Shey King presents to [facility]  Leg Pain   There was no injury mechanism. Pain location: bilateral lower extremity muscle cramping.  Constant ache.  Current treatmetn for UTI with renal fucntion decline. The pain is at a severity of 5/10. The pain is mild. Associated symptoms include muscle weakness. Pertinent negatives include no inability to bear weight, loss of motion, loss of sensation, numbness or tingling. She reports no foreign bodies present. Nothing aggravates the symptoms. She has tried NSAIDs and rest for the symptoms. The treatment provided mild relief.   Dysuria   This is a recurrent problem. The current episode started more than 1 month ago. The problem occurs every urination. The problem has been unchanged. The quality of the pain is described as aching and burning. The pain is at a severity of 6/10. There has been no fever. She is Not sexually active. There is A history of pyelonephritis. Associated symptoms include frequency, hesitancy and urgency.     Objective   Vital Signs:  /58 (BP Location: Right arm, Patient Position: Sitting)   Pulse 92   Temp 97.7 °F (36.5 °C) (Temporal)   Ht 157.5 cm (62\")   Wt 87.2 kg (192 lb 3.2 oz)   SpO2 95%   BMI 35.15 kg/m²   Estimated body mass index is 35.15 kg/m² as calculated from the following:    Height as of this encounter: 157.5 cm (62\").    Weight as of this encounter: 87.2 kg (192 lb 3.2 oz).               Physical Exam  Vitals and nursing note reviewed.   Constitutional:       Appearance: She is well-developed.   Cardiovascular:      Rate and Rhythm: Normal rate and regular rhythm.      Heart sounds: Normal heart sounds. No murmur heard.  Pulmonary:      Effort: Pulmonary effort is normal.      Breath sounds: Normal breath sounds.   Skin:     General: Skin is warm and dry.   Neurological:      Mental Status: She is alert and " oriented to person, place, and time.   Psychiatric:         Behavior: Behavior normal.      Result Review :  During this visit the following were done:  Labs Reviewed [x]    Labs Ordered [x]    Radiology Reports Reviewed []    Radiology Ordered []    PCP Records Reviewed []    Referring Provider Records Reviewed []    ER Records Reviewed []    Hospital Records Reviewed []    History Obtained From Family []    Radiology Images Reviewed []    Other Reviewed []    Records Requested []             Assessment and Plan   Diagnoses and all orders for this visit:    1. Muscle pain (Primary)  -     Basic Metabolic Panel; Future  -     Magnesium; Future  -     TSH; Future  -     CK; Future  -     Basic Metabolic Panel  -     Magnesium  -     TSH  -     CK    2. Vitamin D deficiency  -     vitamin D (ERGOCALCIFEROL) 1.25 MG (53919 UT) capsule capsule; Take 1 capsule by mouth 1 (One) Time Per Week.  Dispense: 5 capsule; Refill: 2    3. Adult onset hypothyroidism  -     levothyroxine (SYNTHROID, LEVOTHROID) 125 MCG tablet; Take 1 tablet by mouth Daily.  Dispense: 90 tablet; Refill: 1    4. Hyperlipidemia, unspecified hyperlipidemia type  -     CK; Future  -     CK             Follow Up   Return for lab today .  Patient was given instructions and counseling regarding her condition or for health maintenance advice. Please see specific information pulled into the AVS if appropriate.

## 2023-07-27 LAB
ANION GAP SERPL CALCULATED.3IONS-SCNC: 11 MMOL/L (ref 5–15)
BUN SERPL-MCNC: 16 MG/DL (ref 8–23)
BUN/CREAT SERPL: 17.6 (ref 7–25)
CALCIUM SPEC-SCNC: 9.3 MG/DL (ref 8.6–10.5)
CHLORIDE SERPL-SCNC: 103 MMOL/L (ref 98–107)
CK SERPL-CCNC: 89 U/L (ref 20–180)
CO2 SERPL-SCNC: 23 MMOL/L (ref 22–29)
CREAT SERPL-MCNC: 0.91 MG/DL (ref 0.57–1)
EGFRCR SERPLBLD CKD-EPI 2021: 68 ML/MIN/1.73
GLUCOSE SERPL-MCNC: 123 MG/DL (ref 65–99)
MAGNESIUM SERPL-MCNC: 2 MG/DL (ref 1.6–2.4)
POTASSIUM SERPL-SCNC: 4.1 MMOL/L (ref 3.5–5.2)
SODIUM SERPL-SCNC: 137 MMOL/L (ref 136–145)
TSH SERPL DL<=0.05 MIU/L-ACNC: 0.75 UIU/ML (ref 0.27–4.2)

## 2023-07-31 ENCOUNTER — LAB (OUTPATIENT)
Dept: UROLOGY | Facility: CLINIC | Age: 73
End: 2023-07-31
Payer: MEDICARE

## 2023-07-31 ENCOUNTER — TELEPHONE (OUTPATIENT)
Dept: FAMILY MEDICINE CLINIC | Facility: CLINIC | Age: 73
End: 2023-07-31
Payer: COMMERCIAL

## 2023-07-31 DIAGNOSIS — N81.10 BLADDER PROLAPSE, FEMALE, ACQUIRED: Primary | ICD-10-CM

## 2023-07-31 DIAGNOSIS — N39.0 RECURRENT UTI: Primary | ICD-10-CM

## 2023-07-31 PROCEDURE — 87077 CULTURE AEROBIC IDENTIFY: CPT | Performed by: NURSE PRACTITIONER

## 2023-07-31 PROCEDURE — 87086 URINE CULTURE/COLONY COUNT: CPT | Performed by: NURSE PRACTITIONER

## 2023-07-31 PROCEDURE — 87186 SC STD MICRODIL/AGAR DIL: CPT | Performed by: NURSE PRACTITIONER

## 2023-08-03 ENCOUNTER — TELEPHONE (OUTPATIENT)
Dept: UROLOGY | Facility: CLINIC | Age: 73
End: 2023-08-03
Payer: COMMERCIAL

## 2023-08-04 ENCOUNTER — LAB (OUTPATIENT)
Dept: UROLOGY | Facility: CLINIC | Age: 73
End: 2023-08-04
Payer: COMMERCIAL

## 2023-08-04 DIAGNOSIS — N39.0 RECURRENT UTI: Primary | ICD-10-CM

## 2023-08-04 LAB — BACTERIA SPEC AEROBE CULT: ABNORMAL

## 2023-08-04 PROCEDURE — 96372 THER/PROPH/DIAG INJ SC/IM: CPT | Performed by: NURSE PRACTITIONER

## 2023-08-04 RX ORDER — GENTAMICIN SULFATE 40 MG/ML
80 INJECTION, SOLUTION INTRAMUSCULAR; INTRAVENOUS ONCE
Status: COMPLETED | OUTPATIENT
Start: 2023-08-04 | End: 2023-08-04

## 2023-08-04 RX ADMIN — GENTAMICIN SULFATE 80 MG: 40 INJECTION, SOLUTION INTRAMUSCULAR; INTRAVENOUS at 11:15

## 2023-08-07 ENCOUNTER — LAB (OUTPATIENT)
Dept: UROLOGY | Facility: CLINIC | Age: 73
End: 2023-08-07
Payer: COMMERCIAL

## 2023-08-07 DIAGNOSIS — N39.0 RECURRENT UTI: Primary | ICD-10-CM

## 2023-08-07 RX ORDER — GENTAMICIN SULFATE 40 MG/ML
80 INJECTION, SOLUTION INTRAMUSCULAR; INTRAVENOUS ONCE
Status: COMPLETED | OUTPATIENT
Start: 2023-08-07 | End: 2023-08-07

## 2023-08-07 RX ADMIN — GENTAMICIN SULFATE 80 MG: 40 INJECTION, SOLUTION INTRAMUSCULAR; INTRAVENOUS at 08:23

## 2023-08-08 ENCOUNTER — LAB (OUTPATIENT)
Dept: UROLOGY | Facility: CLINIC | Age: 73
End: 2023-08-08
Payer: COMMERCIAL

## 2023-08-08 DIAGNOSIS — N39.0 RECURRENT UTI: Primary | ICD-10-CM

## 2023-08-08 RX ORDER — GENTAMICIN SULFATE 40 MG/ML
80 INJECTION, SOLUTION INTRAMUSCULAR; INTRAVENOUS ONCE
Status: COMPLETED | OUTPATIENT
Start: 2023-08-08 | End: 2023-08-08

## 2023-08-08 RX ADMIN — GENTAMICIN SULFATE 80 MG: 40 INJECTION, SOLUTION INTRAMUSCULAR; INTRAVENOUS at 08:17

## 2023-08-09 ENCOUNTER — LAB (OUTPATIENT)
Dept: UROLOGY | Facility: CLINIC | Age: 73
End: 2023-08-09
Payer: COMMERCIAL

## 2023-08-09 DIAGNOSIS — N39.0 RECURRENT UTI: Primary | ICD-10-CM

## 2023-08-09 RX ORDER — GENTAMICIN SULFATE 40 MG/ML
80 INJECTION, SOLUTION INTRAMUSCULAR; INTRAVENOUS ONCE
Status: COMPLETED | OUTPATIENT
Start: 2023-08-09 | End: 2023-08-09

## 2023-08-09 RX ADMIN — GENTAMICIN SULFATE 80 MG: 40 INJECTION, SOLUTION INTRAMUSCULAR; INTRAVENOUS at 08:13

## 2023-08-18 ENCOUNTER — LAB (OUTPATIENT)
Dept: UROLOGY | Facility: CLINIC | Age: 73
End: 2023-08-18
Payer: MEDICARE

## 2023-08-18 DIAGNOSIS — R35.0 FREQUENCY OF MICTURITION: Primary | ICD-10-CM

## 2023-08-18 PROCEDURE — 87186 SC STD MICRODIL/AGAR DIL: CPT | Performed by: NURSE PRACTITIONER

## 2023-08-18 PROCEDURE — 87077 CULTURE AEROBIC IDENTIFY: CPT | Performed by: NURSE PRACTITIONER

## 2023-08-18 PROCEDURE — 87086 URINE CULTURE/COLONY COUNT: CPT | Performed by: NURSE PRACTITIONER

## 2023-08-21 LAB — BACTERIA SPEC AEROBE CULT: ABNORMAL

## 2023-08-22 ENCOUNTER — TELEPHONE (OUTPATIENT)
Dept: UROLOGY | Facility: CLINIC | Age: 73
End: 2023-08-22
Payer: COMMERCIAL

## 2023-08-22 DIAGNOSIS — N39.0 RECURRENT UTI: ICD-10-CM

## 2023-08-22 DIAGNOSIS — Z22.8 PSEUDOMONAS AERUGINOSA RESISTANT CARRIER: Primary | ICD-10-CM

## 2023-08-22 NOTE — TELEPHONE ENCOUNTER
Called patient to check on her post clinic visit and discuss urine culture results again positive for Pseudomonas MDRO at this time.  Patient is post multiple p.o. antibiotics for UTIs in the last 6 weeks.        Dysuria, frequency urgency, chills without any fevers.discussed iv antibiotics.  Did speak with pharmacist  ms ballard, will schedule gentamicin daily for 10 days.  Patient's recent creatinine is 0.91, BUN of 16, with EGFR of 60.      She will follow-up in clinic post infusions for urine recheck.    Urine Culture >100,000 CFU/mL Pseudomonas aeruginosa MDRO Abnormal      Multi drug resistant Pseudomonas, patient may be an isolation risk.  Multi drug resistant Pseudomonas, patient may be an isolation risk.      Colonization of the urinary tract without infection is common. Treatment is discouraged unless the patient is symptomatic, pregnant, or undergoing an invasive urologic procedure.        Resulting Agency: [facility]     Susceptibility     Pseudomonas aeruginosa MDRO     RACHELE     Cefepime Resistant     Ceftazidime Resistant     Ciprofloxacin Susceptible     Gentamicin Susceptible     Levofloxacin Intermediate     Piperacillin + Tazobactam Intermediate

## 2023-08-23 ENCOUNTER — HOSPITAL ENCOUNTER (OUTPATIENT)
Dept: INFUSION THERAPY | Facility: HOSPITAL | Age: 73
Discharge: HOME | End: 2023-08-23
Admitting: NURSE PRACTITIONER
Payer: COMMERCIAL

## 2023-08-23 VITALS
OXYGEN SATURATION: 96 % | HEART RATE: 77 BPM | SYSTOLIC BLOOD PRESSURE: 124 MMHG | DIASTOLIC BLOOD PRESSURE: 72 MMHG | RESPIRATION RATE: 18 BRPM

## 2023-08-23 DIAGNOSIS — N30.00 ACUTE CYSTITIS WITHOUT HEMATURIA: Primary | ICD-10-CM

## 2023-08-23 LAB
ANION GAP SERPL CALCULATED.3IONS-SCNC: 13.5 MMOL/L (ref 5–15)
BUN SERPL-MCNC: 17 MG/DL (ref 8–23)
BUN/CREAT SERPL: 15.7 (ref 7–25)
CALCIUM SPEC-SCNC: 9.2 MG/DL (ref 8.6–10.5)
CHLORIDE SERPL-SCNC: 103 MMOL/L (ref 98–107)
CO2 SERPL-SCNC: 22.5 MMOL/L (ref 22–29)
CREAT SERPL-MCNC: 1.08 MG/DL (ref 0.57–1)
EGFRCR SERPLBLD CKD-EPI 2021: 55.4 ML/MIN/1.73
GLUCOSE SERPL-MCNC: 118 MG/DL (ref 65–99)
POTASSIUM SERPL-SCNC: 4.2 MMOL/L (ref 3.5–5.2)
SODIUM SERPL-SCNC: 139 MMOL/L (ref 136–145)

## 2023-08-23 PROCEDURE — 80048 BASIC METABOLIC PNL TOTAL CA: CPT | Performed by: UROLOGY

## 2023-08-23 PROCEDURE — C1751 CATH, INF, PER/CENT/MIDLINE: HCPCS

## 2023-08-23 PROCEDURE — 25010000002 GENTAMICIN PER 80 MG: Performed by: NURSE PRACTITIONER

## 2023-08-23 PROCEDURE — 36410 VNPNXR 3YR/> PHY/QHP DX/THER: CPT

## 2023-08-23 PROCEDURE — 96365 THER/PROPH/DIAG IV INF INIT: CPT

## 2023-08-23 RX ADMIN — GENTAMICIN SULFATE 320 MG: 40 INJECTION, SOLUTION INTRAMUSCULAR; INTRAVENOUS at 10:20

## 2023-08-23 NOTE — CONSULTS
Assessment:  Diagnosis: iv abx therapy    Allergies   Allergen Reactions    Other Unknown - High Severity     Blisters from EKG stickers    Bactrim [Sulfamethoxazole-Trimethoprim] Hives    Ciprofloxacin Hives    Penicillins Hives    Steri-Strip Compound Benzoin [Benzoin Compound] Hives    Sulfa Antibiotics Hives       Order Date/Time:8/23/2023   Indications: 10 days iv abx  LABS:  No results found for: INR, PROTIME  No results found for: PTT  Lab Results   Component Value Date    WBC 6.71 07/21/2023    HGB 11.3 (L) 07/21/2023    HCT 36.0 07/21/2023    MCV 92.8 07/21/2023     07/21/2023     Lab Results   Component Value Date    BUN 16 07/26/2023     Lab Results   Component Value Date    CREATININE 0.91 07/26/2023     Lab Results   Component Value Date    EGFRIFNONA 57 (L) 01/26/2022    EGFRIFAFRI 74 09/06/2016     Labs Reviewed: all labs reviewed    Contraindications for PICC/Midline:  No contraindications noted    Recommendations:  PowerGlide Pro Midline Catheter; 18 g; 10 cm  Upper Left Basilic    Procedure Time Out:  Time out Time: 0945  Correct Patient Identity: Yes  Correct Surgical Side and Site Are Marked: Yes  Agreement on Procedure to be done: Yes  Antibiotic Given: N/A  RN: IVORY Burkett RN    PowerGlide Pro Midline Catheter placed with ultrasound guidance and verified by blood return. Minimal blood loss noted. Catheter flushes easily. Blood return noted. Patient nurse, Livia SALAZAR, made aware that midline is in upper L arm and ready for use.      Alma Rosa Burkett RN

## 2023-08-24 ENCOUNTER — HOSPITAL ENCOUNTER (OUTPATIENT)
Dept: INFUSION THERAPY | Facility: HOSPITAL | Age: 73
Discharge: HOME | End: 2023-08-24
Admitting: NURSE PRACTITIONER
Payer: COMMERCIAL

## 2023-08-24 VITALS
SYSTOLIC BLOOD PRESSURE: 119 MMHG | HEART RATE: 74 BPM | RESPIRATION RATE: 18 BRPM | OXYGEN SATURATION: 95 % | DIASTOLIC BLOOD PRESSURE: 75 MMHG

## 2023-08-24 DIAGNOSIS — N30.00 ACUTE CYSTITIS WITHOUT HEMATURIA: Primary | ICD-10-CM

## 2023-08-24 PROCEDURE — 96365 THER/PROPH/DIAG IV INF INIT: CPT

## 2023-08-24 PROCEDURE — 25010000002 GENTAMICIN PER 80 MG: Performed by: NURSE PRACTITIONER

## 2023-08-24 RX ADMIN — GENTAMICIN SULFATE 320 MG: 40 INJECTION, SOLUTION INTRAMUSCULAR; INTRAVENOUS at 10:39

## 2023-08-25 ENCOUNTER — HOSPITAL ENCOUNTER (OUTPATIENT)
Dept: INFUSION THERAPY | Facility: HOSPITAL | Age: 73
Discharge: HOME | End: 2023-08-25
Payer: COMMERCIAL

## 2023-08-25 VITALS
HEART RATE: 75 BPM | RESPIRATION RATE: 18 BRPM | DIASTOLIC BLOOD PRESSURE: 75 MMHG | SYSTOLIC BLOOD PRESSURE: 123 MMHG | OXYGEN SATURATION: 96 %

## 2023-08-25 DIAGNOSIS — N30.00 ACUTE CYSTITIS WITHOUT HEMATURIA: Primary | ICD-10-CM

## 2023-08-25 DIAGNOSIS — N39.0 URINARY TRACT INFECTION WITHOUT HEMATURIA, SITE UNSPECIFIED: ICD-10-CM

## 2023-08-25 LAB — GENTAMICIN TROUGH SERPL-MCNC: 0.5 MCG/ML (ref 0.5–2)

## 2023-08-25 PROCEDURE — 80170 ASSAY OF GENTAMICIN: CPT | Performed by: NURSE PRACTITIONER

## 2023-08-25 PROCEDURE — 25010000002 GENTAMICIN PER 80 MG: Performed by: NURSE PRACTITIONER

## 2023-08-25 PROCEDURE — 96365 THER/PROPH/DIAG IV INF INIT: CPT

## 2023-08-25 RX ADMIN — GENTAMICIN SULFATE 320 MG: 40 INJECTION, SOLUTION INTRAMUSCULAR; INTRAVENOUS at 11:25

## 2023-08-26 ENCOUNTER — HOSPITAL ENCOUNTER (OUTPATIENT)
Dept: INFUSION THERAPY | Facility: HOSPITAL | Age: 73
Discharge: HOME | End: 2023-08-26
Payer: COMMERCIAL

## 2023-08-26 VITALS — HEIGHT: 62 IN | WEIGHT: 190 LBS | BODY MASS INDEX: 34.96 KG/M2

## 2023-08-26 DIAGNOSIS — N30.00 ACUTE CYSTITIS WITHOUT HEMATURIA: Primary | ICD-10-CM

## 2023-08-26 PROCEDURE — 96365 THER/PROPH/DIAG IV INF INIT: CPT

## 2023-08-26 PROCEDURE — 25010000002 GENTAMICIN PER 80 MG: Performed by: NURSE PRACTITIONER

## 2023-08-26 RX ADMIN — GENTAMICIN SULFATE 320 MG: 40 INJECTION, SOLUTION INTRAMUSCULAR; INTRAVENOUS at 09:12

## 2023-08-27 ENCOUNTER — HOSPITAL ENCOUNTER (OUTPATIENT)
Dept: INFUSION THERAPY | Facility: HOSPITAL | Age: 73
Discharge: HOME | End: 2023-08-27
Admitting: NURSE PRACTITIONER
Payer: COMMERCIAL

## 2023-08-27 DIAGNOSIS — N30.00 ACUTE CYSTITIS WITHOUT HEMATURIA: Primary | ICD-10-CM

## 2023-08-27 PROCEDURE — 25010000002 GENTAMICIN PER 80 MG: Performed by: NURSE PRACTITIONER

## 2023-08-27 PROCEDURE — 96365 THER/PROPH/DIAG IV INF INIT: CPT

## 2023-08-27 RX ADMIN — GENTAMICIN SULFATE 320 MG: 40 INJECTION, SOLUTION INTRAMUSCULAR; INTRAVENOUS at 09:00

## 2023-08-28 ENCOUNTER — HOSPITAL ENCOUNTER (OUTPATIENT)
Dept: INFUSION THERAPY | Facility: HOSPITAL | Age: 73
Discharge: HOME | End: 2023-08-28
Admitting: UROLOGY
Payer: COMMERCIAL

## 2023-08-28 VITALS
RESPIRATION RATE: 18 BRPM | SYSTOLIC BLOOD PRESSURE: 127 MMHG | OXYGEN SATURATION: 95 % | HEART RATE: 75 BPM | DIASTOLIC BLOOD PRESSURE: 75 MMHG

## 2023-08-28 DIAGNOSIS — N39.0 URINARY TRACT INFECTION WITHOUT HEMATURIA, SITE UNSPECIFIED: Primary | ICD-10-CM

## 2023-08-28 DIAGNOSIS — N30.00 ACUTE CYSTITIS WITHOUT HEMATURIA: ICD-10-CM

## 2023-08-28 LAB
ANION GAP SERPL CALCULATED.3IONS-SCNC: 11.5 MMOL/L (ref 5–15)
BUN SERPL-MCNC: 12 MG/DL (ref 8–23)
BUN/CREAT SERPL: 11.9 (ref 7–25)
CALCIUM SPEC-SCNC: 8.8 MG/DL (ref 8.6–10.5)
CHLORIDE SERPL-SCNC: 107 MMOL/L (ref 98–107)
CO2 SERPL-SCNC: 20.5 MMOL/L (ref 22–29)
CREAT SERPL-MCNC: 1.01 MG/DL (ref 0.57–1)
EGFRCR SERPLBLD CKD-EPI 2021: 60 ML/MIN/1.73
GENTAMICIN TROUGH SERPL-MCNC: 0.7 MCG/ML (ref 0.5–2)
GLUCOSE SERPL-MCNC: 128 MG/DL (ref 65–99)
POTASSIUM SERPL-SCNC: 4.4 MMOL/L (ref 3.5–5.2)
SODIUM SERPL-SCNC: 139 MMOL/L (ref 136–145)

## 2023-08-28 PROCEDURE — 80170 ASSAY OF GENTAMICIN: CPT | Performed by: UROLOGY

## 2023-08-28 PROCEDURE — 96365 THER/PROPH/DIAG IV INF INIT: CPT

## 2023-08-28 PROCEDURE — 80048 BASIC METABOLIC PNL TOTAL CA: CPT | Performed by: UROLOGY

## 2023-08-28 PROCEDURE — 25010000002 GENTAMICIN PER 80 MG: Performed by: NURSE PRACTITIONER

## 2023-08-28 RX ADMIN — GENTAMICIN SULFATE 320 MG: 40 INJECTION, SOLUTION INTRAMUSCULAR; INTRAVENOUS at 11:04

## 2023-08-29 ENCOUNTER — HOSPITAL ENCOUNTER (OUTPATIENT)
Dept: INFUSION THERAPY | Facility: HOSPITAL | Age: 73
Discharge: HOME | End: 2023-08-29
Admitting: NURSE PRACTITIONER
Payer: COMMERCIAL

## 2023-08-29 VITALS
OXYGEN SATURATION: 96 % | DIASTOLIC BLOOD PRESSURE: 72 MMHG | HEART RATE: 76 BPM | RESPIRATION RATE: 18 BRPM | SYSTOLIC BLOOD PRESSURE: 139 MMHG

## 2023-08-29 DIAGNOSIS — N30.00 ACUTE CYSTITIS WITHOUT HEMATURIA: Primary | ICD-10-CM

## 2023-08-29 PROCEDURE — 96365 THER/PROPH/DIAG IV INF INIT: CPT

## 2023-08-29 PROCEDURE — 25010000002 GENTAMICIN PER 80 MG: Performed by: NURSE PRACTITIONER

## 2023-08-29 RX ADMIN — GENTAMICIN SULFATE 320 MG: 40 INJECTION, SOLUTION INTRAMUSCULAR; INTRAVENOUS at 09:50

## 2023-08-30 ENCOUNTER — HOSPITAL ENCOUNTER (OUTPATIENT)
Dept: INFUSION THERAPY | Facility: HOSPITAL | Age: 73
Discharge: HOME | End: 2023-08-30
Admitting: NURSE PRACTITIONER
Payer: COMMERCIAL

## 2023-08-30 DIAGNOSIS — N39.0 URINARY TRACT INFECTION WITHOUT HEMATURIA, SITE UNSPECIFIED: ICD-10-CM

## 2023-08-30 DIAGNOSIS — N30.00 ACUTE CYSTITIS WITHOUT HEMATURIA: Primary | ICD-10-CM

## 2023-08-30 LAB
ANION GAP SERPL CALCULATED.3IONS-SCNC: 13.2 MMOL/L (ref 5–15)
BUN SERPL-MCNC: 17 MG/DL (ref 8–23)
BUN/CREAT SERPL: 15.5 (ref 7–25)
CALCIUM SPEC-SCNC: 9.3 MG/DL (ref 8.6–10.5)
CHLORIDE SERPL-SCNC: 104 MMOL/L (ref 98–107)
CO2 SERPL-SCNC: 19.8 MMOL/L (ref 22–29)
CREAT SERPL-MCNC: 1.1 MG/DL (ref 0.57–1)
EGFRCR SERPLBLD CKD-EPI 2021: 54.2 ML/MIN/1.73
GENTAMICIN TROUGH SERPL-MCNC: 1.3 MCG/ML (ref 0.5–2)
GLUCOSE SERPL-MCNC: 177 MG/DL (ref 65–99)
POTASSIUM SERPL-SCNC: 4.1 MMOL/L (ref 3.5–5.2)
SODIUM SERPL-SCNC: 137 MMOL/L (ref 136–145)

## 2023-08-30 PROCEDURE — 80048 BASIC METABOLIC PNL TOTAL CA: CPT | Performed by: UROLOGY

## 2023-08-30 PROCEDURE — 80170 ASSAY OF GENTAMICIN: CPT | Performed by: UROLOGY

## 2023-08-30 PROCEDURE — 96365 THER/PROPH/DIAG IV INF INIT: CPT

## 2023-08-30 PROCEDURE — G0463 HOSPITAL OUTPT CLINIC VISIT: HCPCS

## 2023-08-30 NOTE — CODE DOCUMENTATION
Pt stated she felt like her tongue was tingling and her throat was tight after treatment on 08/29/2023. She stated that she has had a new hive develop on 08/30/2023. Dr. Mcdonough contacted and informed. Dr. Mcdonough stated to continue treatment if pt is comfortable with it and to take a benadryl this evening when she gets home. Verbal readback done. Pt informed and states understanding. Pt consents to continue treatment.

## 2023-08-30 NOTE — CODE DOCUMENTATION
Patient's gentamicin trough resulted as 1.30 today. Pharmacy called and spoke with Griselda Cheng-Akwa who states not to administer gentamicin today and to discontinue gentamicin treatment.

## 2023-09-06 ENCOUNTER — OFFICE VISIT (OUTPATIENT)
Dept: UROLOGY | Facility: CLINIC | Age: 73
End: 2023-09-06
Payer: COMMERCIAL

## 2023-09-06 VITALS
SYSTOLIC BLOOD PRESSURE: 143 MMHG | BODY MASS INDEX: 34.93 KG/M2 | WEIGHT: 189.8 LBS | DIASTOLIC BLOOD PRESSURE: 85 MMHG | HEIGHT: 62 IN | HEART RATE: 118 BPM

## 2023-09-06 DIAGNOSIS — B37.0 THRUSH OF MOUTH AND ESOPHAGUS: ICD-10-CM

## 2023-09-06 DIAGNOSIS — N32.81 OAB (OVERACTIVE BLADDER): ICD-10-CM

## 2023-09-06 DIAGNOSIS — N30.00 ACUTE CYSTITIS WITHOUT HEMATURIA: ICD-10-CM

## 2023-09-06 DIAGNOSIS — B37.81 THRUSH OF MOUTH AND ESOPHAGUS: ICD-10-CM

## 2023-09-06 DIAGNOSIS — N30.90 CYSTITIS: ICD-10-CM

## 2023-09-06 DIAGNOSIS — N39.0 RECURRENT UTI: Primary | ICD-10-CM

## 2023-09-06 LAB
BILIRUB BLD-MCNC: NEGATIVE MG/DL
CLARITY, POC: CLEAR
COLOR UR: YELLOW
EXPIRATION DATE: ABNORMAL
GLUCOSE UR STRIP-MCNC: ABNORMAL MG/DL
KETONES UR QL: NEGATIVE
LEUKOCYTE EST, POC: ABNORMAL
Lab: ABNORMAL
NITRITE UR-MCNC: NEGATIVE MG/ML
PH UR: 5 [PH] (ref 5–8)
PROT UR STRIP-MCNC: ABNORMAL MG/DL
RBC # UR STRIP: ABNORMAL /UL
SP GR UR: 1.01 (ref 1–1.03)
UROBILINOGEN UR QL: NORMAL

## 2023-09-06 PROCEDURE — 87086 URINE CULTURE/COLONY COUNT: CPT | Performed by: NURSE PRACTITIONER

## 2023-09-06 PROCEDURE — 87186 SC STD MICRODIL/AGAR DIL: CPT | Performed by: NURSE PRACTITIONER

## 2023-09-06 PROCEDURE — 87077 CULTURE AEROBIC IDENTIFY: CPT | Performed by: NURSE PRACTITIONER

## 2023-09-06 NOTE — PROGRESS NOTES
Chief Complaint  RECURRENT UTI/DYSURIA/DETRUSSOR INSTABILITY. (6 MONTH FOLLOW UP UTIS-PSEUDOMONAS A.)    Subjective          Shey King presents to [facility] for RECURRENT UTIS  History of Present Illness      Mrs. Shey King is a pleasant 72-year-old female with a significant history of recurrent UTIs who returns to clinic today for evaluation. This is actually a 6-month follow-up, however, patient has dropped off urine samples intermittently in the clinic for follow-up and reevaluation which have been positive, requiring treatment since patient has been symptomatic.     She started out with urine culture on 07/17/2023 that was positive for pseudomonas aeruginosa because of her bothersome urinary symptoms and also being immunocompromised,  Patient was treated with clindamycin 100 mg p.o. twice daily for about 10 days.     Her repeat urine culture 1 month later on 08/18/2023 was still positive for pseudomonas -MDRO for which we had discussed gentamicin IM injections.  Urine Culture >100,000 CFU/mL Pseudomonas aeruginosa MDRO Abnormal          Patient did have 3 rounds of IM injections in the clinic with minimal improvement in her symptoms. At that point, patient was scheduled for IV injection secondary to pseudomonas that was MDRO. She has since completed day 8 of IV antibiotics with gentamicin and presents to clinic today for reevaluation.     TODAY 09/06/23, She is in no apparent discomfort and reports tolerating antibiotic therapy well. Prior to starting IV antibiotics, patient had been on suppressive therapy intermittently with trimethoprim in the past which we had stopped 3 months prior.    OVERALL, The patient states she is doing better. She has occasional urinary frequency and urgency. She denies any dysuria. She denies any bothersome UTI symptoms. She states the nurse at the hospital told her she had a lot of yeast on her tonsils. She feels the yeast in her  "throat today. She was not put on nystatin. She takes probiotics. Her throat feels sore. The last time she took gentamicin, her tongue felt tingly.  Her urine analysis today still showed trace leukocyte esterase, it is negative for nitrite, it is negative for gross but showed 3+ microscopic hematuria, and 3+ glucosuria.  Her PVR is 0 mL.    Active Ambulatory Problems     Diagnosis Date Noted    Abdominal pain, LLQ (left lower quadrant)     Cystitis     Diarrhea     Hiatal hernia     Hyperglycemia     Hyperlipidemia     Adult onset hypothyroidism     Muscle spasm     OAB (overactive bladder)     Recurrent UTI     Gallstones     B12 deficiency 08/17/2017    Pulmonary nodule 01/26/2018    Class 2 obesity due to excess calories without serious comorbidity with body mass index (BMI) of 35.0 to 35.9 in adult 01/27/2018    Diabetes mellitus 03/05/2018    Lymphadenopathy 03/20/2018    Dupuytren's contracture of right hand 11/23/2020    Palpitations 10/11/2021    ELKINS (dyspnea on exertion) 10/11/2021    Ductal carcinoma in situ (DCIS) of left breast 12/21/2022    Grade I LV diastolic dysfunction 02/18/2023     Resolved Ambulatory Problems     Diagnosis Date Noted    No Resolved Ambulatory Problems     Past Medical History:   Diagnosis Date    Abnormal ECG aug 2021    Abnormal Pap smear of cervix     Anemia     Breast cancer     Cataract early stage    Diverticulitis of colon     Diverticulosis     Encounter for cholecystectomy 2018    GERD (gastroesophageal reflux disease)     Hypothyroidism     Pneumonia     Rectal bleeding     UTI (urinary tract infection)       Objective   Vital Signs:   /85   Pulse 118   Ht 157.5 cm (62.01\")   Wt 86.1 kg (189 lb 12.8 oz)   BMI 34.71 kg/m²       ROS:   Review of Systems   Constitutional:  Positive for activity change and fatigue. Negative for appetite change, diaphoresis, fever, unexpected weight gain and unexpected weight loss.   HENT:  Negative for congestion, ear discharge, ear " pain, nosebleeds, rhinorrhea, sinus pressure and sore throat.    Eyes:  Negative for blurred vision, double vision, photophobia, pain, redness and visual disturbance.   Respiratory:  Negative for apnea, cough, choking, chest tightness, shortness of breath, wheezing and stridor.    Cardiovascular:  Negative for chest pain and palpitations.   Gastrointestinal:  Positive for abdominal distention. Negative for abdominal pain, constipation, diarrhea, nausea and vomiting.   Endocrine: Negative for polydipsia, polyphagia and polyuria.   Genitourinary:  Positive for frequency. Negative for decreased urine volume, difficulty urinating, dysuria, flank pain, hematuria, urgency and urinary incontinence.   Musculoskeletal:  Positive for myalgias. Negative for arthralgias and joint swelling.   Skin:  Negative for pallor, rash and wound.   Neurological:  Positive for weakness. Negative for dizziness, tremors, syncope, light-headedness, memory problem and confusion.   Hematological:  Bruises/bleeds easily.   Psychiatric/Behavioral:  Positive for sleep disturbance and stress. Negative for behavioral problems.       Physical Exam  Constitutional:       General: She is in acute distress.      Appearance: She is well-developed. She is obese. She is ill-appearing.   HENT:      Head: Normocephalic and atraumatic.   Eyes:      Pupils: Pupils are equal, round, and reactive to light.   Neck:      Thyroid: No thyromegaly.      Trachea: No tracheal deviation.   Cardiovascular:      Rate and Rhythm: Normal rate and regular rhythm.      Heart sounds: No murmur heard.  Pulmonary:      Effort: Pulmonary effort is normal. No respiratory distress.      Breath sounds: Normal breath sounds. No stridor. No wheezing.   Abdominal:      General: Bowel sounds are normal. There is distension.      Palpations: Abdomen is soft.      Tenderness: There is abdominal tenderness.   Genitourinary:     Labia:         Right: No tenderness.         Left: No  tenderness.       Vagina: Normal. No vaginal discharge.   Musculoskeletal:         General: Tenderness present. No deformity. Normal range of motion.      Cervical back: Normal range of motion.   Skin:     General: Skin is warm and dry.      Capillary Refill: Capillary refill takes less than 2 seconds.      Coloration: Skin is not pale.      Findings: No erythema or rash.   Neurological:      Mental Status: She is alert and oriented to person, place, and time.      Cranial Nerves: No cranial nerve deficit.      Sensory: No sensory deficit.      Motor: Weakness present.      Coordination: Coordination normal.   Psychiatric:         Behavior: Behavior normal.         Thought Content: Thought content normal.         Judgment: Judgment normal.      Result Review :     UA          2/1/2023    12:21 2/23/2023    09:52 9/6/2023    14:55   Urinalysis   Squamous Epithelial Cells, UA 0-2      Specific Gravity, UA 1.026      Ketones, UA Negative  Negative  Negative    Blood, UA Negative      Leukocytes, UA Negative  Negative  Trace    Nitrite, UA Negative      RBC, UA 0-2      WBC, UA 0-2      Bacteria, UA None Seen        Urine Culture          7/31/2023    08:35 8/18/2023    13:06 9/6/2023    14:55   Urine Culture   Urine Culture 50,000 CFU/mL Pseudomonas aeruginosa  >100,000 CFU/mL Pseudomonas aeruginosa MDRO  25,000 CFU/mL Gram Positive Cocci  P      Details         P Preliminary result                    Assessment and Plan    Problem List Items Addressed This Visit          Genitourinary and Reproductive     Cystitis    OAB (overactive bladder)    Recurrent UTI - Primary    Relevant Medications    nystatin (MYCOSTATIN) 100,000 unit/mL suspension    Other Relevant Orders    POC Urinalysis Dipstick, Automated (Completed)    Urine Culture - Urine, Urine, Clean Catch (Completed)     Other Visit Diagnoses       Thrush of mouth and esophagus        Relevant Medications    nystatin (MYCOSTATIN) 100,000 unit/mL suspension                                                         ASSESSMENT  PSEUDOMONA MDRP Recurrent urinary UTIs/Atrophic/yeast VAGINITIS  Ms. Shey BOYLE is a pleasant 72-year-old female who returns to clinic today for evaluation of prior concerns with incomplete bladder emptying, recurrent UTIs, acute cystitis, and yeast vaginitis.  She is post IM/IV gentamicin x10 days for most recent infection and returns to clinic today for reevaluation.      She is in no apparent distress and reports a remarkable improvement in her overall symptoms. She is happy to be feeling better she states. She reports doing relatively better, post therapy besides her throat discomfort consistent with thrush.  She denies any issues with urinary frequency urgency or dysuria.  Today she denies any burning with urination reports vaginal itching and irritation consistent with yeast vaginitis which has also resolved..Her urine dipstick today is completely negative for any infection, it is negative for gross/still showed 3+ microscopic hematuria, her PVR is 0CC today.      Again,  We discussed the types of organisms that are found in the urinary tract indicating that the vast majority are results of the patient's own gastrointestinal isha.  We discussed how many of the antibiotics that are utilized can actually exacerbate these infections by creating resistant organisms and there is only a very few antibiotics that are concentrated in the urine and do not affect the rectal reservoir nor cause recurrent yeast vaginitis.       We discussed the risk factors for recurrent infections being intercourse in younger patients and atrophic changes in older patients.  We discussed the symptoms that are found including pain, pressure, burning, frequency, urgency suprapubic pain and painful intercourse.  I discussed upper tract symptoms including fevers, chills, and indicated the workup would be much more aggressive if the patient were to present with recurrent  infections in the face of upper tract symptomatology such as fever. I discussed the history of vesicoureteral reflux in young patients and finally chronic renal scarring as a result of such.                                 PLAN  AGAIN, We resent her urine for Culture. I will call her with results if any bacteria growth .     We discussed RESTARTING PT ON Trimethoprim -ABX SUPPRESSIVE THERAPY IF NEGATIVE URINE CULTURE.       NYSTATIN 5ML BIS SWISH N SWALLOW FOR THRUSH POST IV/IM GENT X 10DAYS     She will drop off a urine sample in 4 weeks in clinic for Recheck,     She has been encouraged to increase her p.o. fluid intake to at least 1 to 2 L daily and avoid bladder irritants such as caffeine products, spicy foods, and citrusy foods.      I recommend  She continue probiotics with any treatment with antibiotics to protect the rectal reservoir including over-the-counter yogurt preparations to stone oral pills containing the appropriate probiotics. Patient reports the diligent use of Probiotics.     She is to also continue other medications for yeast vaginitis, atrophic vaginitis as prescribed above.     Will see her back for Follow up in clinic and also recheck her urinalysis at that time     She may return sooner if need be.     Patient is agreeable to plan of care.     Patient reports that she is not currently experiencing any symptoms of urinary incontinence.    RADIOLOGY (CT AND/OR KUB):    CT Abdomen and Pelvis: No results found for this or any previous visit.     CT Stone Protocol: No results found for this or any previous visit.     KUB: Results for orders placed during the hospital encounter of 04/24/23    XR Abdomen KUB    Narrative  EXAMINATION: XR ABDOMEN KUB-    CLINICAL INDICATION: kidney stone; N20.0-Calculus of kidney      COMPARISON: None available    FINDINGS:  2 views of the abdomen    Large stool burden    No evidence of bowel obstruction    No suspicious calcifications over the kidneys, but easily  obscured due  to the large stool burden.    This report was finalized on 4/24/2023 4:06 PM by Dr. Bnejamin Posada MD.       LABS (3 MONTHS):    Office Visit on 09/06/2023   Component Date Value Ref Range Status    Color 09/06/2023 Yellow  Yellow, Straw, Dark Yellow, Ana Final    Clarity, UA 09/06/2023 Clear  Clear Final    Specific Gravity  09/06/2023 1.015  1.005 - 1.030 Final    pH, Urine 09/06/2023 5.0  5.0 - 8.0 Final    Leukocytes 09/06/2023 Trace (A)  Negative Final    Nitrite, UA 09/06/2023 Negative  Negative Final    Protein, POC 09/06/2023 Trace (A)  Negative mg/dL Final    Glucose, UA 09/06/2023 3+ (A)  Negative mg/dL Final    Ketones, UA 09/06/2023 Negative  Negative Final    Urobilinogen, UA 09/06/2023 Normal  Normal, 0.2 E.U./dL Final    Bilirubin 09/06/2023 Negative  Negative Final    Blood, UA 09/06/2023 3+ (A)  Negative Final    Lot Number 09/06/2023 [id number]   Final    Expiration Date 09/06/2023 10/25/2024   Final    Urine Culture 09/06/2023 25,000 CFU/mL Gram Positive Cocci (A)   Preliminary   Hospital Outpatient Visit on 08/30/2023   Component Date Value Ref Range Status    Gentamicin Trough 08/30/2023 1.30  0.50 - 2.00 mcg/mL Final    Glucose 08/30/2023 177 (H)  65 - 99 mg/dL Final    BUN 08/30/2023 17  8 - 23 mg/dL Final    Creatinine 08/30/2023 1.10 (H)  0.57 - 1.00 mg/dL Final    Sodium 08/30/2023 137  136 - 145 mmol/L Final    Potassium 08/30/2023 4.1  3.5 - 5.2 mmol/L Final    Chloride 08/30/2023 104  98 - 107 mmol/L Final    CO2 08/30/2023 19.8 (L)  22.0 - 29.0 mmol/L Final    Calcium 08/30/2023 9.3  8.6 - 10.5 mg/dL Final    BUN/Creatinine Ratio 08/30/2023 15.5  7.0 - 25.0 Final    Anion Gap 08/30/2023 13.2  5.0 - 15.0 mmol/L Final    eGFR 08/30/2023 54.2 (L)  >60.0 mL/min/1.73 Final   Hospital Outpatient Visit on 08/28/2023   Component Date Value Ref Range Status    Gentamicin Trough 08/28/2023 0.70  0.50 - 2.00 mcg/mL Final    Glucose 08/28/2023 128 (H)  65 - 99 mg/dL Final    BUN  08/28/2023 12  8 - 23 mg/dL Final    Creatinine 08/28/2023 1.01 (H)  0.57 - 1.00 mg/dL Final    Sodium 08/28/2023 139  136 - 145 mmol/L Final    Potassium 08/28/2023 4.4  3.5 - 5.2 mmol/L Final    Chloride 08/28/2023 107  98 - 107 mmol/L Final    CO2 08/28/2023 20.5 (L)  22.0 - 29.0 mmol/L Final    Calcium 08/28/2023 8.8  8.6 - 10.5 mg/dL Final    BUN/Creatinine Ratio 08/28/2023 11.9  7.0 - 25.0 Final    Anion Gap 08/28/2023 11.5  5.0 - 15.0 mmol/L Final    eGFR 08/28/2023 60.0 (L)  >60.0 mL/min/1.73 Final   Hospital Outpatient Visit on 08/25/2023   Component Date Value Ref Range Status    Gentamicin Trough 08/25/2023 0.50  0.50 - 2.00 mcg/mL Final   Hospital Outpatient Visit on 08/23/2023   Component Date Value Ref Range Status    Glucose 08/23/2023 118 (H)  65 - 99 mg/dL Final    BUN 08/23/2023 17  8 - 23 mg/dL Final    Creatinine 08/23/2023 1.08 (H)  0.57 - 1.00 mg/dL Final    Sodium 08/23/2023 139  136 - 145 mmol/L Final    Potassium 08/23/2023 4.2  3.5 - 5.2 mmol/L Final    Chloride 08/23/2023 103  98 - 107 mmol/L Final    CO2 08/23/2023 22.5  22.0 - 29.0 mmol/L Final    Calcium 08/23/2023 9.2  8.6 - 10.5 mg/dL Final    BUN/Creatinine Ratio 08/23/2023 15.7  7.0 - 25.0 Final    Anion Gap 08/23/2023 13.5  5.0 - 15.0 mmol/L Final    eGFR 08/23/2023 55.4 (L)  >60.0 mL/min/1.73 Final   Lab on 08/18/2023   Component Date Value Ref Range Status    Urine Culture 08/18/2023 >100,000 CFU/mL Pseudomonas aeruginosa MDRO (A)   Final    Comment:   Multi drug resistant Pseudomonas, patient may be an isolation risk.                             Multi drug resistant Pseudomonas, patient may be an isolation risk.   Lab on 07/31/2023   Component Date Value Ref Range Status    Urine Culture 07/31/2023 50,000 CFU/mL Pseudomonas aeruginosa (A)   Final   Office Visit on 07/26/2023   Component Date Value Ref Range Status    Glucose 07/26/2023 123 (H)  65 - 99 mg/dL Final    BUN 07/26/2023 16  8 - 23 mg/dL Final    Creatinine 07/26/2023  0.91  0.57 - 1.00 mg/dL Final    Sodium 07/26/2023 137  136 - 145 mmol/L Final    Potassium 07/26/2023 4.1  3.5 - 5.2 mmol/L Final    Chloride 07/26/2023 103  98 - 107 mmol/L Final    CO2 07/26/2023 23.0  22.0 - 29.0 mmol/L Final    Calcium 07/26/2023 9.3  8.6 - 10.5 mg/dL Final    BUN/Creatinine Ratio 07/26/2023 17.6  7.0 - 25.0 Final    Anion Gap 07/26/2023 11.0  5.0 - 15.0 mmol/L Final    eGFR 07/26/2023 68.0  >60.0 mL/min/1.73 Final    Magnesium 07/26/2023 2.0  1.6 - 2.4 mg/dL Final    TSH 07/26/2023 0.747  0.270 - 4.200 uIU/mL Final    Creatine Kinase 07/26/2023 89  20 - 180 U/L Final   Office Visit on 07/21/2023   Component Date Value Ref Range Status    Glucose 07/21/2023 159 (H)  65 - 99 mg/dL Final    BUN 07/21/2023 13  8 - 23 mg/dL Final    Creatinine 07/21/2023 1.43 (H)  0.57 - 1.00 mg/dL Final    Sodium 07/21/2023 140  136 - 145 mmol/L Final    Potassium 07/21/2023 4.3  3.5 - 5.2 mmol/L Final    Chloride 07/21/2023 107  98 - 107 mmol/L Final    CO2 07/21/2023 22.0  22.0 - 29.0 mmol/L Final    Calcium 07/21/2023 9.3  8.6 - 10.5 mg/dL Final    Total Protein 07/21/2023 7.6  6.0 - 8.5 g/dL Final    Albumin 07/21/2023 4.5  3.5 - 5.2 g/dL Final    ALT (SGPT) 07/21/2023 10  1 - 33 U/L Final    AST (SGOT) 07/21/2023 30  1 - 32 U/L Final    Alkaline Phosphatase 07/21/2023 77  39 - 117 U/L Final    Total Bilirubin 07/21/2023 0.3  0.0 - 1.2 mg/dL Final    Globulin 07/21/2023 3.1  gm/dL Final    A/G Ratio 07/21/2023 1.5  g/dL Final    BUN/Creatinine Ratio 07/21/2023 9.1  7.0 - 25.0 Final    Anion Gap 07/21/2023 11.0  5.0 - 15.0 mmol/L Final    eGFR 07/21/2023 39.5 (L)  >60.0 mL/min/1.73 Final    WBC 07/21/2023 6.71  3.40 - 10.80 10*3/mm3 Final    RBC 07/21/2023 3.88  3.77 - 5.28 10*6/mm3 Final    Hemoglobin 07/21/2023 11.3 (L)  12.0 - 15.9 g/dL Final    Hematocrit 07/21/2023 36.0  34.0 - 46.6 % Final    MCV 07/21/2023 92.8  79.0 - 97.0 fL Final    MCH 07/21/2023 29.1  26.6 - 33.0 pg Final    MCHC 07/21/2023 31.4 (L)   31.5 - 35.7 g/dL Final    RDW 07/21/2023 14.9  12.3 - 15.4 % Final    RDW-SD 07/21/2023 51.1  37.0 - 54.0 fl Final    MPV 07/21/2023 10.4  6.0 - 12.0 fL Final    Platelets 07/21/2023 267  140 - 450 10*3/mm3 Final    Neutrophil % 07/21/2023 77.0 (H)  42.7 - 76.0 % Final    Lymphocyte % 07/21/2023 11.6 (L)  19.6 - 45.3 % Final    Monocyte % 07/21/2023 7.3  5.0 - 12.0 % Final    Eosinophil % 07/21/2023 2.8  0.3 - 6.2 % Final    Basophil % 07/21/2023 0.7  0.0 - 1.5 % Final    Immature Grans % 07/21/2023 0.6 (H)  0.0 - 0.5 % Final    Neutrophils, Absolute 07/21/2023 5.16  1.70 - 7.00 10*3/mm3 Final    Lymphocytes, Absolute 07/21/2023 0.78  0.70 - 3.10 10*3/mm3 Final    Monocytes, Absolute 07/21/2023 0.49  0.10 - 0.90 10*3/mm3 Final    Eosinophils, Absolute 07/21/2023 0.19  0.00 - 0.40 10*3/mm3 Final    Basophils, Absolute 07/21/2023 0.05  0.00 - 0.20 10*3/mm3 Final    Immature Grans, Absolute 07/21/2023 0.04  0.00 - 0.05 10*3/mm3 Final    nRBC 07/21/2023 0.0  0.0 - 0.2 /100 WBC Final   Lab on 07/17/2023   Component Date Value Ref Range Status    Urine Culture 07/17/2023 >100,000 CFU/mL Pseudomonas aeruginosa (A)   Final   There may be more visits with results that are not included.        Smoking Cessation Counseling:  Never a smoker.  Patient does not currently use any tobacco products.     Assessment:  1. Recurrent UTIs  2. Thrush    Plan:  - We will send her urine out for culture and I will call patient with results.  - Stop suppressive antibiotic therapy.  - Encourage her bowel regimen as tolerated and then depending on urine culture results, we will treat accordingly.  - We will follow up again in 3 months or sooner if need be.      Follow Up   Return in about 3 months (around 12/6/2023) for Next scheduled follow up, RECURRENT UTI/DYSURIA/DETRUSSOR INSTABILITY.    Patient was given instructions and counseling regarding her condition or for health maintenance advice. Please see specific information pulled into the  AVS if appropriate.          This document has been electronically signed by Griselda Cheng-Akwa, APRN   September 7, 2023 20:36 EDT      Dictated Utilizing Dragon Dictation: Part of this note may be an electronic transcription/translation of spoken language to printed text using the Dragon Dictation System.     Transcribed from ambient dictation for Griselda Cheng-Akwa, APRN by Cheyenne Bains.  09/06/23   15:49 EDT    Patient or patient representative verbalized consent to the visit recording.  I have personally performed the services described in this document as transcribed by the above individual, and it is both accurate and complete.

## 2023-09-08 ENCOUNTER — TELEPHONE (OUTPATIENT)
Dept: UROLOGY | Facility: CLINIC | Age: 73
End: 2023-09-08
Payer: COMMERCIAL

## 2023-09-08 DIAGNOSIS — N39.0 RECURRENT UTI: Primary | ICD-10-CM

## 2023-09-08 DIAGNOSIS — B95.2 ENTEROCOCCUS FAECALIS INFECTION: ICD-10-CM

## 2023-09-08 LAB — BACTERIA SPEC AEROBE CULT: ABNORMAL

## 2023-09-08 RX ORDER — NITROFURANTOIN 25; 75 MG/1; MG/1
CAPSULE ORAL
Qty: 56 CAPSULE | Refills: 3 | Status: SHIPPED | OUTPATIENT
Start: 2023-09-08

## 2023-09-08 RX ORDER — FLUCONAZOLE 150 MG/1
150 TABLET ORAL DAILY
Qty: 3 TABLET | Refills: 1 | Status: SHIPPED | OUTPATIENT
Start: 2023-09-08

## 2023-09-08 NOTE — TELEPHONE ENCOUNTER
I called the pt and let her know that that she does have a uti and meds have been called in for her. And that to bring  aother urine sample in, in 2 weeks to recheck and keep taking the macrobid qd for suppressive therapy after

## 2023-09-08 NOTE — TELEPHONE ENCOUNTER
----- Message from Griselda Cheng-Akwa, APRN sent at 9/8/2023  8:28 AM EDT -----  Please let patient know her urine culture results are positive for Enterococcus faecalis at this time.  I have sent some Macrobid to her pharmacy to take twice daily for 10 days.  She should drop off another urine sample in 2 weeks.  I will need her to maintain suppressive therapy on Macrobid.    Thank you    Urine Culture   25,000 CFU/mL Enterococcus faecalis Abnormal       Colonization of the urinary tract without infection is common. Treatment is discouraged unless the patient is symptomatic, pregnant, or undergoing an invasive urologic procedure.      Resulting Agency: [facility]    Susceptibility       Enterococcus faecalis    RACHELE    Ampicillin Susceptible    Levofloxacin Susceptible    Nitrofurantoin Susceptible    Tetracycline Resistant    Vancomycin Susceptible

## 2023-09-19 ENCOUNTER — LAB (OUTPATIENT)
Dept: UROLOGY | Facility: CLINIC | Age: 73
End: 2023-09-19
Payer: MEDICARE

## 2023-09-19 DIAGNOSIS — N39.0 RECURRENT UTI: Primary | ICD-10-CM

## 2023-09-19 PROCEDURE — 87086 URINE CULTURE/COLONY COUNT: CPT | Performed by: NURSE PRACTITIONER

## 2023-09-20 LAB — BACTERIA SPEC AEROBE CULT: NO GROWTH

## 2023-09-21 ENCOUNTER — TELEPHONE (OUTPATIENT)
Dept: UROLOGY | Facility: CLINIC | Age: 73
End: 2023-09-21
Payer: COMMERCIAL

## 2023-09-21 NOTE — TELEPHONE ENCOUNTER
I called the pt to let her know her urine culture was negative and that if she gets to feeling symptomatic to drop off a urine at anytime      ----- Message from Griselda Cheng-Akwa, APRN sent at 9/20/2023  4:27 PM EDT -----  Please let patient know his urine culture results were negative for any bacterial growth.  He may drop off another urine sample anytime he feels symptomatic.  If not follow-up in clinic as discussed.    Thank you

## 2023-10-24 ENCOUNTER — OFFICE VISIT (OUTPATIENT)
Dept: ONCOLOGY | Facility: CLINIC | Age: 73
End: 2023-10-24
Payer: MEDICARE

## 2023-10-24 ENCOUNTER — LAB (OUTPATIENT)
Dept: ONCOLOGY | Facility: CLINIC | Age: 73
End: 2023-10-24
Payer: MEDICARE

## 2023-10-24 VITALS
HEIGHT: 62 IN | WEIGHT: 184.6 LBS | HEART RATE: 81 BPM | RESPIRATION RATE: 18 BRPM | SYSTOLIC BLOOD PRESSURE: 127 MMHG | TEMPERATURE: 97.7 F | OXYGEN SATURATION: 97 % | DIASTOLIC BLOOD PRESSURE: 77 MMHG | BODY MASS INDEX: 33.97 KG/M2

## 2023-10-24 DIAGNOSIS — D64.9 ANEMIA, UNSPECIFIED TYPE: ICD-10-CM

## 2023-10-24 DIAGNOSIS — D05.12 DUCTAL CARCINOMA IN SITU (DCIS) OF LEFT BREAST: ICD-10-CM

## 2023-10-24 DIAGNOSIS — D64.9 ANEMIA, UNSPECIFIED TYPE: Primary | ICD-10-CM

## 2023-10-24 LAB
ALBUMIN SERPL-MCNC: 4.6 G/DL (ref 3.5–5.2)
ALBUMIN/GLOB SERPL: 1.4 G/DL
ALP SERPL-CCNC: 80 U/L (ref 39–117)
ALT SERPL W P-5'-P-CCNC: 22 U/L (ref 1–33)
ANION GAP SERPL CALCULATED.3IONS-SCNC: 11.3 MMOL/L (ref 5–15)
AST SERPL-CCNC: 26 U/L (ref 1–32)
BASOPHILS # BLD AUTO: 0.07 10*3/MM3 (ref 0–0.2)
BASOPHILS NFR BLD AUTO: 1.1 % (ref 0–1.5)
BILIRUB SERPL-MCNC: 0.6 MG/DL (ref 0–1.2)
BUN SERPL-MCNC: 16 MG/DL (ref 8–23)
BUN/CREAT SERPL: 16.5 (ref 7–25)
CALCIUM SPEC-SCNC: 9.7 MG/DL (ref 8.6–10.5)
CHLORIDE SERPL-SCNC: 104 MMOL/L (ref 98–107)
CO2 SERPL-SCNC: 23.7 MMOL/L (ref 22–29)
CREAT SERPL-MCNC: 0.97 MG/DL (ref 0.57–1)
DEPRECATED RDW RBC AUTO: 47.8 FL (ref 37–54)
EGFRCR SERPLBLD CKD-EPI 2021: 63 ML/MIN/1.73
EOSINOPHIL # BLD AUTO: 0.14 10*3/MM3 (ref 0–0.4)
EOSINOPHIL NFR BLD AUTO: 2.1 % (ref 0.3–6.2)
ERYTHROCYTE [DISTWIDTH] IN BLOOD BY AUTOMATED COUNT: 14.4 % (ref 12.3–15.4)
FERRITIN SERPL-MCNC: 88.08 NG/ML (ref 13–150)
GLOBULIN UR ELPH-MCNC: 3.4 GM/DL
GLUCOSE SERPL-MCNC: 115 MG/DL (ref 65–99)
HCT VFR BLD AUTO: 38.3 % (ref 34–46.6)
HGB BLD-MCNC: 12.3 G/DL (ref 12–15.9)
IMM GRANULOCYTES # BLD AUTO: 0.03 10*3/MM3 (ref 0–0.05)
IMM GRANULOCYTES NFR BLD AUTO: 0.5 % (ref 0–0.5)
IRON 24H UR-MRATE: 60 MCG/DL (ref 37–145)
IRON SATN MFR SERPL: 10 % (ref 20–50)
LDH SERPL-CCNC: 220 U/L (ref 135–214)
LYMPHOCYTES # BLD AUTO: 0.9 10*3/MM3 (ref 0.7–3.1)
LYMPHOCYTES NFR BLD AUTO: 13.7 % (ref 19.6–45.3)
MCH RBC QN AUTO: 29.4 PG (ref 26.6–33)
MCHC RBC AUTO-ENTMCNC: 32.1 G/DL (ref 31.5–35.7)
MCV RBC AUTO: 91.4 FL (ref 79–97)
MONOCYTES # BLD AUTO: 0.49 10*3/MM3 (ref 0.1–0.9)
MONOCYTES NFR BLD AUTO: 7.4 % (ref 5–12)
NEUTROPHILS NFR BLD AUTO: 4.95 10*3/MM3 (ref 1.7–7)
NEUTROPHILS NFR BLD AUTO: 75.2 % (ref 42.7–76)
NRBC BLD AUTO-RTO: 0 /100 WBC (ref 0–0.2)
PLATELET # BLD AUTO: 248 10*3/MM3 (ref 140–450)
PMV BLD AUTO: 10.3 FL (ref 6–12)
POTASSIUM SERPL-SCNC: 4.4 MMOL/L (ref 3.5–5.2)
PROT SERPL-MCNC: 8 G/DL (ref 6–8.5)
RBC # BLD AUTO: 4.19 10*6/MM3 (ref 3.77–5.28)
RETICS # AUTO: 0.1 10*6/MM3 (ref 0.02–0.13)
RETICS/RBC NFR AUTO: 2.47 % (ref 0.7–1.9)
SODIUM SERPL-SCNC: 139 MMOL/L (ref 136–145)
TIBC SERPL-MCNC: 595 MCG/DL (ref 298–536)
TRANSFERRIN SERPL-MCNC: 399 MG/DL (ref 200–360)
TSH SERPL DL<=0.05 MIU/L-ACNC: 0.12 UIU/ML (ref 0.27–4.2)
WBC NRBC COR # BLD: 6.58 10*3/MM3 (ref 3.4–10.8)

## 2023-10-24 PROCEDURE — 83921 ORGANIC ACID SINGLE QUANT: CPT | Performed by: INTERNAL MEDICINE

## 2023-10-24 PROCEDURE — 83521 IG LIGHT CHAINS FREE EACH: CPT | Performed by: INTERNAL MEDICINE

## 2023-10-24 PROCEDURE — 82784 ASSAY IGA/IGD/IGG/IGM EACH: CPT | Performed by: INTERNAL MEDICINE

## 2023-10-24 PROCEDURE — 83540 ASSAY OF IRON: CPT | Performed by: INTERNAL MEDICINE

## 2023-10-24 PROCEDURE — 83615 LACTATE (LD) (LDH) ENZYME: CPT | Performed by: INTERNAL MEDICINE

## 2023-10-24 PROCEDURE — 82728 ASSAY OF FERRITIN: CPT | Performed by: INTERNAL MEDICINE

## 2023-10-24 PROCEDURE — 83010 ASSAY OF HAPTOGLOBIN QUANT: CPT | Performed by: INTERNAL MEDICINE

## 2023-10-24 PROCEDURE — 80053 COMPREHEN METABOLIC PANEL: CPT | Performed by: INTERNAL MEDICINE

## 2023-10-24 PROCEDURE — 85045 AUTOMATED RETICULOCYTE COUNT: CPT | Performed by: INTERNAL MEDICINE

## 2023-10-24 PROCEDURE — 84466 ASSAY OF TRANSFERRIN: CPT | Performed by: INTERNAL MEDICINE

## 2023-10-24 PROCEDURE — 84165 PROTEIN E-PHORESIS SERUM: CPT | Performed by: INTERNAL MEDICINE

## 2023-10-24 PROCEDURE — 85025 COMPLETE CBC W/AUTO DIFF WBC: CPT | Performed by: INTERNAL MEDICINE

## 2023-10-24 PROCEDURE — 82746 ASSAY OF FOLIC ACID SERUM: CPT | Performed by: INTERNAL MEDICINE

## 2023-10-24 PROCEDURE — 82607 VITAMIN B-12: CPT | Performed by: INTERNAL MEDICINE

## 2023-10-24 PROCEDURE — 84238 ASSAY NONENDOCRINE RECEPTOR: CPT | Performed by: INTERNAL MEDICINE

## 2023-10-24 PROCEDURE — 86334 IMMUNOFIX E-PHORESIS SERUM: CPT | Performed by: INTERNAL MEDICINE

## 2023-10-24 PROCEDURE — 84443 ASSAY THYROID STIM HORMONE: CPT | Performed by: INTERNAL MEDICINE

## 2023-10-24 NOTE — PROGRESS NOTES
Venipuncture Blood Specimen Collection  Venipuncture performed in right arm by Casie Garcia MA with good hemostasis. Patient tolerated the procedure well without complications.   10/24/23   Casie Garcia MA

## 2023-10-24 NOTE — PROGRESS NOTES
Subjective     Date: 10/24/2023    Referring Provider  Dr. Mata     Chief Complaint  Ductal carcinoma of the left breast, estrogen receptor positive, status post lumpectomy    Subjective          Shey King is a 72 y.o. female who presents today to [facility] for follow up.    HPI:   72-year-old female with a history of diabetes mellitus type 2, hyperlipidemia, hypothyroidism, frequent UTIs who presents to establish care regarding ductal carcinoma in situ of the left breast, estrogen receptor positive.     Oncological history:  She notes intermittent breast pain on the left side for several months, was finally directed to get a screening mammogram  - 2022.  Screening mammogram showed calcification left breast middle posterior third lateral aspect, which are new.  Other calcifications throughout left breast appear stable.   - 22. Diagnostic mammogram showed grouped calcifications in the left 3-4 o'clock region.   - 2022: stereotactic biopsy. Pathology with intermediate to high-grade cribriform ductal carcinoma in situ with associated necrosis and calcification.  -2022: Underwent lumpectomy.  Final pathology with ductal carcinoma in situ.  10 x 3 x 2 mm, 4 of 19 blocks.  Grade 2 (intermediate).  Distance from DCIS to closest margin 1 mm from inferior margin, 2 mm from anterior margin, 4 mm to posterior margin. PTis.  Estrogen receptor 100% positive, progesterone 5% positive  2023-3/7/2023: Underwent radiation with 4256 cGy in 16 fraction with 1000 cGy in 5 fraction boost  2023: Started anastrozole     She was recently seen by Dr. Mata on 2023.  Overall doing well after lumpectomy.  Presents for further therapy and discussion.    GYN History:  Menarche at age 14.   Age of first pregnancy:    Age of menopause: 52  Breast feed: no  Birth control: yes  Hormone replacement: no    Never had DEXA bone scan in the past.  Denies  "ever being treated for diagnosed with osteoporosis.  Currently taking vitamin D tablets.    Interval History 02/01/2023   She was seen by radiation oncology today, Dr. Yusuf, who recommends radiation to left breast due to DCIS margin less than 2 mm.  Underwent DEXA bone scan 1/27/2023 with bone mineral density of right femur neck 0.899 with T score -1.     Interval History 03/01/2023 - Telehealth   Receiving radiation therapy currently, has 4 more boost therapy left. Has had pain on the breast intermittently and the axilla. Was told by Rad Onc to apply less aquaphor to the area.     Interval History 04/06/2023   She presents for follow up today. Completed radiation therapy. Started anastrozole, taking it daily. Overall, still with fatigue since completion of radiation so unsure if it is due to radiation or medication. Has had two episodes of hot flashes. Denies any other AE.    Also reports mild intermittent sharp pain L breast. She reports difficulty sleeping, would like something to help temporarily.     Interval History 07/21/2023   Ms. King presents for follow up. Reports increased fatigue with anastrozole, has been compliant. Has also developed few UTI's, most recently with pseudomonas, currently being treated with clindamycin. Had a repeat mammogram 6/15 which showed left breast there trabecular thickening and skin thickening, likely due to treatment, stable calcification.     Interval History 10/24/2023   Ms. King presents for follow up today. Denies any palpable lumps/bumps of the breast, but has noted waking up with discharge \"caking\" on the left nipple. Also with intermittent warmth and pain in the left breast with radiation to axilla. Compliant with anastrozole without missed doses. Does report intermittent aches in joints.   Has had intentional weight loss due to GLP-1 inhibitor.         The following portions of the patient's history were reviewed and updated as appropriate: allergies, current " medications, past family history, past medical history, past social history, past surgical history and problem list.    Objective     Objective     Allergy:   Allergies   Allergen Reactions    Other Unknown - High Severity     Blisters from EKG stickers    Bactrim [Sulfamethoxazole-Trimethoprim] Hives    Ciprofloxacin Hives    Penicillins Hives    Steri-Strip Compound Benzoin [Benzoin Compound] Hives    Sulfa Antibiotics Hives        Current Medications:   Current Outpatient Medications   Medication Sig Dispense Refill    acetaminophen (TYLENOL) 325 MG tablet Take 2 tablets by mouth Every 4 (Four) Hours As Needed for Mild Pain. 30 tablet 0    anastrozole (ARIMIDEX) 1 MG tablet Take 1 tablet by mouth Daily.      aspirin 81 MG chewable tablet Chew 1 tablet Daily.      AZO-CRANBERRY PO Take 2 tablets by mouth Daily.      empagliflozin (Jardiance) 10 MG tablet tablet Take 1 tablet by mouth Daily. 90 tablet 1    fenofibrate (TRICOR) 145 MG tablet Take 1 tablet by mouth Daily. 90 tablet 1    glucose monitor monitoring kit 1 each as needed (DM II). 1 each 0    Lancets (OneTouch Delica Plus [id number]) misc USE TO TEST BLOOD SUGAR DAILY AS DIRECTED      Lancets [facility]. misc For Daily testing  E11.65 50 each 11    levothyroxine (SYNTHROID, LEVOTHROID) 125 MCG tablet Take 1 tablet by mouth Daily. 90 tablet 1    nitrofurantoin, macrocrystal-monohydrate, (Macrobid) 100 MG capsule TAKE 1 CAPSULE BY MOUTH TWICE DAILY X 10 DAYS, THEN TAKE 1 CAPSULE NIGHTLY FOR SUPPRESSIVE THERAPY E'COLI UTIs 56 capsule 3    OneTouch Ultra test strip USE TO TEST BLOOD SUGAR DAILY AS DIRECTED 50 each 5    Probiotic Product (PROBIOTIC DAILY PO) Take  by mouth.      Semaglutide,0.25 or 0.5MG/DOS, (OZEMPIC) 2 MG/1.5ML solution pen-injector Inject 0.25 mg under the skin into the appropriate area as directed 1 (One) Time Per Week. 1.5 mL 1    vitamin D (ERGOCALCIFEROL) 1.25 MG (09775 UT) capsule capsule Take 1 capsule by mouth 1 (One) Time Per Week. 5  capsule 2    Calcium Citrate-Vitamin D 250-2.5 MG-MCG per tablet Take 1 tablet by mouth 2 (Two) Times a Day for 30 days. 60 tablet 5    fluconazole (Diflucan) 150 MG tablet Take 1 tablet by mouth Daily. (Patient not taking: Reported on 10/24/2023) 3 tablet 1     No current facility-administered medications for this visit.       Past Medical History:  Past Medical History:   Diagnosis Date    Abdominal pain, LLQ (left lower quadrant)     Abnormal ECG aug 2021    Abnormal Pap smear of cervix     several years ago    Anemia     years ago    Breast cancer     Cataract early stage    Cystitis     Diabetes mellitus     Diarrhea     Diverticulitis of colon     Diverticulosis     Ductal carcinoma in situ (DCIS) of left breast 12/21/2022    Encounter for cholecystectomy 2018    Gallstones     GERD (gastroesophageal reflux disease)     Hiatal hernia     Hyperglycemia     Hyperlipidemia     Hypothyroidism     Muscle spasm     FLANK PAIN    OAB (overactive bladder)     Pneumonia     years ago    Rectal bleeding     UTI (urinary tract infection)        Past Surgical History:  Past Surgical History:   Procedure Laterality Date    BREAST BIOPSY Bilateral 2005    benign    BREAST BIOPSY Left 12/29/2022    Procedure: BREAST BIOPSY WITH NEEDLE LOCALIZATION;  Surgeon: Dong Mata MD;  Location: [facility];  Service: General;  Laterality: Left;    CHOLECYSTECTOMY  2018?    ENDOSCOPY      ENDOSCOPY N/A 1/11/2018    Procedure: ESOPHAGOGASTRODUODENOSCOPY;  Surgeon: Dong Mata MD;  Location: [facility] OR;  Service:     MAMMO BILATERAL  09/2015    OVARIAN CYST DRAINAGE      AR LAPAROSCOPY SURG CHOLECYSTECTOMY N/A 1/11/2018    Procedure: CHOLECYSTECTOMY LAPAROSCOPIC;  Surgeon: Dong Mata MD;  Location: [facility];  Service: General    US GUIDED LYMPH NODE BIOPSY  4/11/2018       Social History:  Social History     Socioeconomic History    Marital status:    Tobacco Use    Smoking status: Never    Smokeless  "tobacco: Never    Tobacco comments:     never   Vaping Use    Vaping Use: Never used   Substance and Sexual Activity    Alcohol use: No    Drug use: No    Sexual activity: Not Currently     Partners: Male     Shey King  reports that she has never smoked. She has never used smokeless tobacco..      Family History:  Family History   Adopted: Yes   Problem Relation Age of Onset    Heart disease Mother         also had cancer kidney    Cancer Mother         kidney    Thyroid disease Mother     Heart disease Father     Diabetes Brother     Breast cancer Neg Hx        Review of Systems:  Review of Systems   Constitutional: Negative.    HENT: Negative.     Respiratory: Negative.     Cardiovascular: Negative.    Gastrointestinal: Negative.    Genitourinary: Negative.    Musculoskeletal: Negative.    Skin: Negative.    Neurological: Negative.    Hematological: Negative.    Psychiatric/Behavioral: Negative.         Vital Signs:   /77   Pulse 81   Temp 97.7 °F (36.5 °C) (Temporal)   Resp 18   Ht 157.5 cm (62\")   Wt 83.7 kg (184 lb 9.6 oz)   SpO2 97%   BMI 33.76 kg/m²      Physical Exam:   Physical Exam  Vitals reviewed.   Constitutional:       General: She is not in acute distress.     Appearance: Normal appearance. She is not ill-appearing.   HENT:      Head: Normocephalic and atraumatic.      Mouth/Throat:      Mouth: Mucous membranes are moist.      Pharynx: Oropharynx is clear.   Eyes:      Conjunctiva/sclera: Conjunctivae normal.      Pupils: Pupils are equal, round, and reactive to light.   Cardiovascular:      Rate and Rhythm: Normal rate and regular rhythm.      Heart sounds: No murmur heard.  Pulmonary:      Effort: Pulmonary effort is normal. No respiratory distress.      Breath sounds: Normal breath sounds. No wheezing.   Chest:   Breasts:     Right: Normal. No mass or skin change.      Left: Normal. No swelling, bleeding, inverted nipple, mass, nipple discharge, skin change or tenderness.      " "Comments: L breast with lumpectomy scar without discharge from areola   Abdominal:      General: Abdomen is flat. Bowel sounds are normal. There is no distension.      Palpations: Abdomen is soft. There is no mass.      Tenderness: There is no abdominal tenderness. There is no guarding.   Musculoskeletal:         General: No swelling. Normal range of motion.      Cervical back: Normal range of motion.   Lymphadenopathy:      Cervical: No cervical adenopathy.   Skin:     General: Skin is warm and dry.   Neurological:      General: No focal deficit present.      Mental Status: She is alert and oriented to person, place, and time. Mental status is at baseline.   Psychiatric:         Mood and Affect: Mood normal.         PHQ-9 Score  PHQ-9 Total Score:       Pain Score  Vitals:    10/24/23 1330   BP: 127/77   Pulse: 81   Resp: 18   Temp: 97.7 °F (36.5 °C)   TempSrc: Temporal   SpO2: 97%   Weight: 83.7 kg (184 lb 9.6 oz)   Height: 157.5 cm (62\")   PainSc: 0-No pain         ECOG score: 0             Lab Review  Lab Results   Component Value Date    WBC 6.58 10/24/2023    HGB 12.3 10/24/2023    HCT 38.3 10/24/2023    MCV 91.4 10/24/2023    RDW 14.4 10/24/2023     10/24/2023    NEUTRORELPCT 75.2 10/24/2023    LYMPHORELPCT 13.7 (L) 10/24/2023    MONORELPCT 7.4 10/24/2023    EOSRELPCT 2.1 10/24/2023    BASORELPCT 1.1 10/24/2023    NEUTROABS 4.95 10/24/2023    LYMPHSABS 0.90 10/24/2023       Lab Results   Component Value Date     08/30/2023    K 4.1 08/30/2023    CO2 19.8 (L) 08/30/2023     08/30/2023    BUN 17 08/30/2023    CREATININE 1.10 (H) 08/30/2023    EGFRIFNONA 57 (L) 01/26/2022    EGFRIFAFRI 74 09/06/2016    GLUCOSE 177 (H) 08/30/2023    CALCIUM 9.3 08/30/2023    ALKPHOS 77 07/21/2023    AST 30 07/21/2023    ALT 10 07/21/2023    BILITOT 0.3 07/21/2023    ALBUMIN 4.5 07/21/2023    PROTEINTOT 7.6 07/21/2023    MG 2.0 07/26/2023    PHOS 3.5 12/01/2021       Radiology Results    Mammo Diagnostic Digital " Tomosynthesis Left With CAD (06/15/2023 12:07)      COMPARISON: 12/29/2022, 12/14/2022, 12/06/2022, 10/27/2022, 06/19/2020,  02/07/2019, 11/11/2017     FINDINGS: The left breast is heterogeneously dense, which may obscure  small masses.     Presumed postoperative changes are now noted in the lateral aspect of  the left breast from the patient's interval conservation surgery. There  is mild trabecular thickening and skin thickening which is likely  treatment related. There are stable scattered calcifications.     IMPRESSION:  Probably benign left mammographic findings. Recommend  continued follow-up.    DEXA Bone Density Axial (01/27/2023 13:22)  FINDINGS:    RIGHT FEMORAL NECK BONE MINERAL DENSITY:  BMD of the right femur neck  is 0.899 with T score -1 with density.      UNITS OF MEASURE:    Bone mineral density is measured in g/cm2.    Z-score is the number of standard deviations above age-matched  controls.    T-score is the number of standard deviations above healthy young  adults.      WORLD HEALTH ORGANIZATION GUIDELINES:    T-score at or above -1 is normal bone mineral density.    T-score between -1 and -2.5 is osteopenia.    T-score at or below -2.5 is osteoporosis.     IMPRESSION:    BMD of the right femur neck is 0.899 with T score -1 with density.    Mammo Diagnostic Left With CAD (12/06/2022 09:21)  FINDINGS: Magnification imaging of the left breast demonstrates a small  group of heterogeneous calcifications in the mid left 3:00 to 4:00  region. Recommend stereotactic guided core biopsy.     IMPRESSION:  Grouped calcifications in the left 3:00 to 4:00 region.     BI-RADS CATEGORY:  4, SUSPICIOUS     RECOMMENDATION:  Recommend stereotactic guided core biopsy of the left  breast.    Mammo Screening Digital Tomosynthesis Bilateral With CAD (10/27/2022 07:29)  FINDINGS:     Breast Composition: The breasts are heterogenously dense, which may  obscure small masses.   Important Findings:    Biopsy related  changes right breast with marker clip. Stable benign  calcifications right breast are noted. Calcifications left breast  middle-posterior third lateral aspect have developed. Other  calcifications throughout the left breast appear stable. Biopsy related  changes upper outer quadrant left breast with marker clip noted.   No suspicious calcifications or areas of architectural distortion.  No  dominant masses or skin thickening      Pathology:   Procedure: Excision (less than total mastectomy)   Specimen Laterality: Left   Tumor Site: Not specified   Histologic Type: Ductal carcinoma in situ   Size of DCIS: 10 x 3 x 2 mm, 4 of 19 blocks   Architectural Patterns: Cribriform   Nuclear Grade: Grade II (intermediate)   Necrosis: Present, focal (small foci of necrosis)   Microcalcifications: Present in DCIS and in nonneoplastic tissue   Margin Status: All margins negative for DCIS   Distance from DCIS to Closest Margin: 1 mm from inferior margin   Distance from DCIS to Anterior Margin: 2 mm   Distance from DCIS to Posterior Margin: 4 mm   Regional Lymph Node Status: No regional lymph nodes submitted or found   PATHOLOGIC STAGE (pTNM, AJCC 8th Edition): pTis (DCIS), pNx   Biomarker Testing Performed on Previous Biopsy:  (ER) Positive 100 %   (PgR) Positive 5 %     CLINICAL HISTORY:   Ductal carcinoma in situ (DCIS) of left breast     Assessment / Plan         Assessment and Plan   72-year-old female who presents today with hormone positive left breast ductal carcinoma in situ, status postlumpectomy.    Ductal carcinoma in situ, left breast, hormone positive  -Final pathology with 10 x 3 x 2 mm ductal carcinoma in situ, grade 2, 1 mm from inferior margin, 2 mm from anterior margin, 4 mm from posterior margin  -Her case ws discussed in tumor board on 1/25; it was deemed she should receive radiation therapy given close margins <2mm inferior margin  -Completed adjuvant radiation therapy: 3/7/23  -Tolerating adjuvant hormonal  therapy which has shown to decrease recurrence by at least 33%. Taking anastrozole 1 mg daily to start after radiation therapy for at least 5 years. (To be completed 3/2028)  -Next mammogram scheduled for 10/2023; can be yearly thereafter  -She will continue history and physical and exam every 3-6 months for the first 5 years, yearly thereafter according to NCCN     2. Bone health  -DEXA scan 1/27/2023: bone mineral density of right femur neck 0.899 with T score -1.  Repeat DEXA scan yearly, next January 2024  -Continue vitamin D and calcium supplement    3. Fatigue   - Anemia work up including Cbc, iron studies, ferritin, B12, folate, SPEP/AINSLEY, FLC        Discussed possible differential diagnoses, testing, treatment, recommended non-pharmacological interventions, risks, warning signs to monitor for that would indicate need for follow-up in clinic or ER. If no improvement with these regimens or you have new or worsening symptoms follow-up. Patient verbalizes understanding and agreement with plan of care. Denies further needs or concerns.     Patient was given instructions and counseling regarding her condition and for health maintenance advised.    I spent 30 minutes with Shey King today.  In the office today, more than 50% of this time was spent face-to-face with her  in counseling / coordination of care, reviewing her interim medical history and counseling on the current treatment plan.  All questions were answered to her satisfaction.      Meds ordered during this visit  New Medications Ordered This Visit   Medications    Calcium Citrate-Vitamin D 250-2.5 MG-MCG per tablet     Sig: Take 1 tablet by mouth 2 (Two) Times a Day for 30 days.     Dispense:  60 tablet     Refill:  5       Visit Diagnoses    ICD-10-CM ICD-9-CM   1. Anemia, unspecified type  D64.9 285.9   2. Ductal carcinoma in situ (DCIS) of left breast  D05.12 233.0         Follow Up   In 3 months for physical exam   Follow up on mammogram      This  document has been electronically signed by Maia Ford MD   October 24, 2023 14:41 EDT    Dictated Utilizing Dragon Dictation: Part of this note may be an electronic transcription/translation of spoken language to printed text using the Dragon Dictation System.

## 2023-10-25 LAB
ALBUMIN SERPL ELPH-MCNC: 4.1 G/DL (ref 2.9–4.4)
ALBUMIN/GLOB SERPL: 1.2 {RATIO} (ref 0.7–1.7)
ALPHA1 GLOB SERPL ELPH-MCNC: 0.3 G/DL (ref 0–0.4)
ALPHA2 GLOB SERPL ELPH-MCNC: 0.6 G/DL (ref 0.4–1)
B-GLOBULIN SERPL ELPH-MCNC: 1.3 G/DL (ref 0.7–1.3)
FOLATE SERPL-MCNC: 10.5 NG/ML (ref 4.78–24.2)
GAMMA GLOB SERPL ELPH-MCNC: 1.3 G/DL (ref 0.4–1.8)
GLOBULIN SER-MCNC: 3.5 G/DL (ref 2.2–3.9)
HAPTOGLOB SERPL-MCNC: 88 MG/DL (ref 30–200)
IGA SERPL-MCNC: 309 MG/DL (ref 87–352)
IGG SERPL-MCNC: 1286 MG/DL (ref 586–1602)
IGM SERPL-MCNC: 69 MG/DL (ref 26–217)
INTERPRETATION SERPL IEP-IMP: NORMAL
KAPPA LC FREE SER-MCNC: 39.8 MG/L (ref 3.3–19.4)
KAPPA LC FREE/LAMBDA FREE SER: 1.81 {RATIO} (ref 0.26–1.65)
LABORATORY COMMENT REPORT: NORMAL
LAMBDA LC FREE SERPL-MCNC: 22 MG/L (ref 5.7–26.3)
M PROTEIN SERPL ELPH-MCNC: NORMAL G/DL
PROT SERPL-MCNC: 7.6 G/DL (ref 6–8.5)
VIT B12 BLD-MCNC: 338 PG/ML (ref 211–946)

## 2023-10-26 LAB — STFR SERPL-SCNC: 22 NMOL/L (ref 12.2–27.3)

## 2023-10-27 LAB — METHYLMALONATE SERPL-SCNC: 262 NMOL/L (ref 0–378)

## 2023-10-30 ENCOUNTER — OFFICE VISIT (OUTPATIENT)
Dept: FAMILY MEDICINE CLINIC | Facility: CLINIC | Age: 73
End: 2023-10-30
Payer: MEDICARE

## 2023-10-30 ENCOUNTER — HOSPITAL ENCOUNTER (OUTPATIENT)
Dept: MAMMOGRAPHY | Facility: HOSPITAL | Age: 73
Discharge: HOME | End: 2023-10-30
Admitting: SURGERY
Payer: MEDICARE

## 2023-10-30 VITALS
SYSTOLIC BLOOD PRESSURE: 120 MMHG | DIASTOLIC BLOOD PRESSURE: 68 MMHG | WEIGHT: 185.8 LBS | OXYGEN SATURATION: 99 % | HEART RATE: 95 BPM | TEMPERATURE: 98.9 F | HEIGHT: 62 IN | BODY MASS INDEX: 34.19 KG/M2

## 2023-10-30 DIAGNOSIS — E03.8 ADULT ONSET HYPOTHYROIDISM: ICD-10-CM

## 2023-10-30 DIAGNOSIS — E11.9 TYPE 2 DIABETES MELLITUS WITHOUT COMPLICATION, WITHOUT LONG-TERM CURRENT USE OF INSULIN: Primary | ICD-10-CM

## 2023-10-30 DIAGNOSIS — D05.12 DUCTAL CARCINOMA IN SITU (DCIS) OF LEFT BREAST: ICD-10-CM

## 2023-10-30 PROCEDURE — 80053 COMPREHEN METABOLIC PANEL: CPT | Performed by: NURSE PRACTITIONER

## 2023-10-30 PROCEDURE — 77066 DX MAMMO INCL CAD BI: CPT

## 2023-10-30 PROCEDURE — 80061 LIPID PANEL: CPT | Performed by: NURSE PRACTITIONER

## 2023-10-30 PROCEDURE — 83036 HEMOGLOBIN GLYCOSYLATED A1C: CPT | Performed by: NURSE PRACTITIONER

## 2023-10-30 PROCEDURE — 77066 DX MAMMO INCL CAD BI: CPT | Performed by: RADIOLOGY

## 2023-10-30 PROCEDURE — G0279 TOMOSYNTHESIS, MAMMO: HCPCS

## 2023-10-30 PROCEDURE — 77062 BREAST TOMOSYNTHESIS BI: CPT | Performed by: RADIOLOGY

## 2023-10-30 RX ORDER — LEVOTHYROXINE SODIUM 0.1 MG/1
100 TABLET ORAL DAILY
Qty: 90 TABLET | Refills: 1 | Status: SHIPPED | OUTPATIENT
Start: 2023-10-30

## 2023-10-30 NOTE — PROGRESS NOTES
"Chief Complaint  thyroid issues (Cancer Dr concerned with thyroid levels/)    Subjective        Shey King presents to [facility]  Thyroid Problem  Presents for follow-up (Hypothyroidism) visit. Symptoms include fatigue. The symptoms have been worsening.   Diabetes  She presents for her follow-up diabetic visit. She has type 2 diabetes mellitus. Her disease course has been fluctuating. There are no hypoglycemic associated symptoms. There are no diabetic associated symptoms. Associated symptoms include fatigue. There are no hypoglycemic complications. There are no diabetic complications. Current diabetic treatment includes diet, insulin injections and oral agent (dual therapy). She is compliant with treatment all of the time. Her weight is stable. She is following a generally healthy diet. Meal planning includes avoidance of concentrated sweets. She never participates in exercise. Her home blood glucose trend is fluctuating minimally.       Objective   Vital Signs:  /68 (BP Location: Right arm, Patient Position: Sitting, Cuff Size: Adult)   Pulse 95   Temp 98.9 °F (37.2 °C) (Temporal)   Ht 157.5 cm (62\")   Wt 84.3 kg (185 lb 12.8 oz)   SpO2 99%   BMI 33.98 kg/m²   Estimated body mass index is 33.98 kg/m² as calculated from the following:    Height as of this encounter: 157.5 cm (62\").    Weight as of this encounter: 84.3 kg (185 lb 12.8 oz).               Physical Exam  Vitals and nursing note reviewed.   Constitutional:       General: She is not in acute distress.     Appearance: She is well-developed. She is obese. She is not ill-appearing.   HENT:      Head: Normocephalic.   Cardiovascular:      Rate and Rhythm: Normal rate and regular rhythm.      Heart sounds: Normal heart sounds. No murmur heard.  Pulmonary:      Effort: Pulmonary effort is normal.      Breath sounds: Normal breath sounds.   Skin:     General: Skin is warm and dry.   Neurological:      Mental " Status: She is alert and oriented to person, place, and time.   Psychiatric:         Behavior: Behavior normal.        Result Review :      During this visit the following were done:  Labs Reviewed [x]    Labs Ordered [x]    Radiology Reports Reviewed []    Radiology Ordered []    PCP Records Reviewed []    Referring Provider Records Reviewed []    ER Records Reviewed []    Hospital Records Reviewed []    History Obtained From Family []    Radiology Images Reviewed []    Other Reviewed []    Records Requested []       Assessment and Plan   Diagnoses and all orders for this visit:    1. Type 2 diabetes mellitus without complication, without long-term current use of insulin (Primary)  -     Semaglutide, 1 MG/DOSE, (OZEMPIC) 2 MG/1.5ML solution pen-injector; Inject 1 mg under the skin into the appropriate area as directed 1 (One) Time Per Week.  Dispense: 1.5 mL; Refill: 3  -     Comprehensive Metabolic Panel; Future  -     Hemoglobin A1c; Future  -     Lipid Panel; Future  -     Comprehensive Metabolic Panel  -     Hemoglobin A1c  -     Lipid Panel    2. Adult onset hypothyroidism  -     levothyroxine (Synthroid) 100 MCG tablet; Take 1 tablet by mouth Daily.  Dispense: 90 tablet; Refill: 1             Follow Up   Return in 3 months (on 1/30/2024), or if symptoms worsen or fail to improve, for Recheck labs today .  Patient was given instructions and counseling regarding her condition or for health maintenance advice. Please see specific information pulled into the AVS if appropriate.

## 2023-10-31 ENCOUNTER — OFFICE VISIT (OUTPATIENT)
Dept: SURGERY | Facility: CLINIC | Age: 73
End: 2023-10-31
Payer: MEDICARE

## 2023-10-31 ENCOUNTER — TELEPHONE (OUTPATIENT)
Dept: FAMILY MEDICINE CLINIC | Facility: CLINIC | Age: 73
End: 2023-10-31
Payer: COMMERCIAL

## 2023-10-31 VITALS — WEIGHT: 185 LBS | BODY MASS INDEX: 34.04 KG/M2 | HEIGHT: 62 IN

## 2023-10-31 DIAGNOSIS — D05.12 DUCTAL CARCINOMA IN SITU (DCIS) OF LEFT BREAST: Primary | ICD-10-CM

## 2023-10-31 LAB
ALBUMIN SERPL-MCNC: 4.4 G/DL (ref 3.5–5.2)
ALBUMIN/GLOB SERPL: 1.5 G/DL
ALP SERPL-CCNC: 76 U/L (ref 39–117)
ALT SERPL W P-5'-P-CCNC: 17 U/L (ref 1–33)
ANION GAP SERPL CALCULATED.3IONS-SCNC: 10 MMOL/L (ref 5–15)
AST SERPL-CCNC: 16 U/L (ref 1–32)
BILIRUB SERPL-MCNC: 0.3 MG/DL (ref 0–1.2)
BUN SERPL-MCNC: 19 MG/DL (ref 8–23)
BUN/CREAT SERPL: 20 (ref 7–25)
CALCIUM SPEC-SCNC: 9.6 MG/DL (ref 8.6–10.5)
CHLORIDE SERPL-SCNC: 106 MMOL/L (ref 98–107)
CHOLEST SERPL-MCNC: 165 MG/DL (ref 0–200)
CO2 SERPL-SCNC: 22 MMOL/L (ref 22–29)
CREAT SERPL-MCNC: 0.95 MG/DL (ref 0.57–1)
EGFRCR SERPLBLD CKD-EPI 2021: 64.6 ML/MIN/1.73
GLOBULIN UR ELPH-MCNC: 2.9 GM/DL
GLUCOSE SERPL-MCNC: 118 MG/DL (ref 65–99)
HBA1C MFR BLD: 5.4 % (ref 4.8–5.6)
HDLC SERPL-MCNC: 37 MG/DL (ref 40–60)
LDLC SERPL CALC-MCNC: 103 MG/DL (ref 0–100)
LDLC/HDLC SERPL: 2.72 {RATIO}
POTASSIUM SERPL-SCNC: 4.2 MMOL/L (ref 3.5–5.2)
PROT SERPL-MCNC: 7.3 G/DL (ref 6–8.5)
SODIUM SERPL-SCNC: 138 MMOL/L (ref 136–145)
TRIGL SERPL-MCNC: 137 MG/DL (ref 0–150)
VLDLC SERPL-MCNC: 25 MG/DL (ref 5–40)

## 2023-10-31 PROCEDURE — 99213 OFFICE O/P EST LOW 20 MIN: CPT | Performed by: SURGERY

## 2023-10-31 PROCEDURE — 1160F RVW MEDS BY RX/DR IN RCRD: CPT | Performed by: SURGERY

## 2023-10-31 PROCEDURE — 1159F MED LIST DOCD IN RCRD: CPT | Performed by: SURGERY

## 2023-10-31 NOTE — TELEPHONE ENCOUNTER
----- Message from BERT Lucas sent at 10/31/2023 10:16 AM EDT -----  Let patient know glucose is good A1c is down to 5.5.  Cholesterol overall could improve a little with improved exercise and diet        Patient notified & verbalized understanding.

## 2023-10-31 NOTE — TELEPHONE ENCOUNTER
----- Message from BERT Lucas sent at 10/31/2023 10:16 AM EDT -----  Let patient know glucose is good A1c is down to 5.5.  Cholesterol overall could improve a little with improved exercise and diet

## 2023-11-22 ENCOUNTER — LAB (OUTPATIENT)
Dept: UROLOGY | Facility: CLINIC | Age: 73
End: 2023-11-22
Payer: MEDICARE

## 2023-11-22 DIAGNOSIS — N39.0 RECURRENT UTI: Primary | ICD-10-CM

## 2023-11-22 PROCEDURE — 87086 URINE CULTURE/COLONY COUNT: CPT | Performed by: NURSE PRACTITIONER

## 2023-11-23 LAB — BACTERIA SPEC AEROBE CULT: NO GROWTH

## 2023-11-27 ENCOUNTER — TELEPHONE (OUTPATIENT)
Dept: UROLOGY | Facility: CLINIC | Age: 73
End: 2023-11-27
Payer: COMMERCIAL

## 2023-11-27 NOTE — TELEPHONE ENCOUNTER
I called pt with negative urine culture pt verbalized understanding.    ----- Message from Griselda Cheng-Akwa, APRN sent at 11/24/2023  4:03 AM EST -----  Please let patient know urine culture is negative for infectionat this time, she may drop off any sample if symptomatic. If not follow up in clinic as discussed.    Urine Culture   No growth    Thank you  and tell her I am so proud of her

## 2023-11-29 ENCOUNTER — DOCUMENTATION (OUTPATIENT)
Dept: ONCOLOGY | Facility: HOSPITAL | Age: 73
End: 2023-11-29
Payer: COMMERCIAL

## 2023-11-29 RX ORDER — SEMAGLUTIDE 0.68 MG/ML
INJECTION, SOLUTION SUBCUTANEOUS
Qty: 3 ML | OUTPATIENT
Start: 2023-11-29

## 2023-11-29 NOTE — PROGRESS NOTES
SS contacted patient [phone] this date to invite to support next week, Thursday, December 7, 2023, @ 12:30.     Patient verbalizes working full-time at [employer] and will make every effort to attend.    Support group flyer to be mailed to patient.     SS will follow.

## 2023-12-04 RX ORDER — SEMAGLUTIDE 0.68 MG/ML
0.5 INJECTION, SOLUTION SUBCUTANEOUS WEEKLY
Qty: 3 ML | Refills: 3 | Status: SHIPPED | OUTPATIENT
Start: 2023-12-04

## 2023-12-06 ENCOUNTER — OFFICE VISIT (OUTPATIENT)
Dept: UROLOGY | Facility: CLINIC | Age: 73
End: 2023-12-06
Payer: MEDICARE

## 2023-12-06 VITALS — BODY MASS INDEX: 34.01 KG/M2 | HEIGHT: 62 IN | WEIGHT: 184.8 LBS

## 2023-12-06 DIAGNOSIS — N30.90 CYSTITIS: ICD-10-CM

## 2023-12-06 DIAGNOSIS — N32.81 OAB (OVERACTIVE BLADDER): ICD-10-CM

## 2023-12-06 DIAGNOSIS — N39.0 RECURRENT UTI: Primary | ICD-10-CM

## 2023-12-06 LAB
BILIRUB BLD-MCNC: NEGATIVE MG/DL
CLARITY, POC: CLEAR
COLOR UR: YELLOW
EXPIRATION DATE: ABNORMAL
GLUCOSE UR STRIP-MCNC: ABNORMAL MG/DL
KETONES UR QL: NEGATIVE
LEUKOCYTE EST, POC: NEGATIVE
Lab: ABNORMAL
NITRITE UR-MCNC: NEGATIVE MG/ML
PH UR: 5 [PH] (ref 5–8)
PROT UR STRIP-MCNC: NEGATIVE MG/DL
RBC # UR STRIP: NEGATIVE /UL
SP GR UR: 1.01 (ref 1–1.03)
UROBILINOGEN UR QL: NORMAL

## 2023-12-06 PROCEDURE — 87086 URINE CULTURE/COLONY COUNT: CPT | Performed by: NURSE PRACTITIONER

## 2023-12-06 NOTE — PROGRESS NOTES
Chief Complaint  Recurrent Uti's (3 MONTH FOLLOW UP FOR RECURRENT UTIs/ACUTE CYSTITIS WITH HEMATURIA)    Subjective            Shey King presents to [facility] for RECURRENT UTI/CYSTITIS  History of Present Illness    Mrs. Shey King is a very pleasant 72-year-old female who returns to the clinic today for evaluation. This is a 3-month follow-up with significant history of recurrent UTIs . THE patient IS post multiple antibiotics therapy in the last 6 months for resistant pseudomonas aeruginosa, MDRO/E. coli/Klebsiella, pneumonia, UTIs.     Her last antibiotic therapy did include IM gentamicin which was later transitioned to intravenous antibiotics with Invanz.  Patient did complete over 10-day IV antibiotic therapy.  She did tolerate medications well and later transitioned to antibiotic-suppressive therapy with Macrobid.     We have continued to check her urine cultures intermittently, with a negative urine culture noted on 11/19/2023 and a negative urine culture noted on 11/22/2023. She is present today for reevaluation with no apparent discomfort. Urinalysis is completely negative for leukocyte esterase. It is negative for nitrite. It is negative for gross/microscopic hematuria. Her PVR is 0 mL.    The patient states that she is doing better. She is experiencing fatigue, which could be secondary to her breast cancer medication. She is currently taking Macrobid 100 mg nightly, 2 AZO pills, and thyroid medication. She also takes Jardiance, which may be discontinued secondary to candidiasis. She has started taking Ozempic.    She states that her bowels are doing better.    Active Ambulatory Problems     Diagnosis Date Noted    Abdominal pain, LLQ (left lower quadrant)     Cystitis     Diarrhea     Hiatal hernia     Hyperglycemia     Hyperlipidemia     Adult onset hypothyroidism     Muscle spasm     OAB (overactive bladder)     Recurrent UTI     Gallstones     B12  "deficiency 08/17/2017    Pulmonary nodule 01/26/2018    Class 2 obesity due to excess calories without serious comorbidity with body mass index (BMI) of 35.0 to 35.9 in adult 01/27/2018    Diabetes mellitus 03/05/2018    Lymphadenopathy 03/20/2018    Dupuytren's contracture of right hand 11/23/2020    Palpitations 10/11/2021    ELKINS (dyspnea on exertion) 10/11/2021    Ductal carcinoma in situ (DCIS) of left breast 12/21/2022    Grade I LV diastolic dysfunction 02/18/2023     Resolved Ambulatory Problems     Diagnosis Date Noted    No Resolved Ambulatory Problems     Past Medical History:   Diagnosis Date    Abnormal ECG aug 2021    Abnormal Pap smear of cervix     Anemia     Breast cancer     Cataract early stage    Diverticulitis of colon     Diverticulosis     Encounter for cholecystectomy 2018    GERD (gastroesophageal reflux disease)     Hypothyroidism     Pneumonia     Rectal bleeding     UTI (urinary tract infection)       Objective   Vital Signs:   Ht 157.5 cm (62\")   Wt 83.8 kg (184 lb 12.8 oz)   BMI 33.80 kg/m²       ROS:   Review of Systems   Constitutional:  Positive for activity change, appetite change and fatigue. Negative for chills, diaphoresis, fever, unexpected weight gain and unexpected weight loss.   HENT:  Negative for congestion, ear discharge, ear pain, nosebleeds, rhinorrhea, sinus pressure and sore throat.    Eyes:  Negative for blurred vision, double vision, photophobia, pain, redness and visual disturbance.   Respiratory:  Negative for apnea, cough, chest tightness, shortness of breath, wheezing and stridor.    Cardiovascular:  Negative for chest pain and palpitations.   Gastrointestinal:  Positive for abdominal distention. Negative for abdominal pain, constipation, diarrhea, nausea and vomiting.   Endocrine: Negative for polydipsia, polyphagia and polyuria.   Genitourinary:  Positive for decreased urine volume, frequency, pelvic pain, pelvic pressure, urgency and urinary incontinence. " Negative for difficulty urinating, dyspareunia, dysuria, flank pain, genital sores, hematuria and vaginal discharge.   Musculoskeletal:  Positive for back pain and myalgias. Negative for arthralgias and joint swelling.   Skin:  Negative for pallor, rash and wound.   Neurological:  Positive for headache. Negative for dizziness, tremors, syncope, weakness, light-headedness, memory problem and confusion.   Hematological:  Does not bruise/bleed easily.   Psychiatric/Behavioral:  Positive for sleep disturbance and stress. Negative for behavioral problems and decreased concentration. The patient is not nervous/anxious.         Physical Exam  Constitutional:       General: She is in acute distress.      Appearance: She is well-developed.   HENT:      Head: Normocephalic and atraumatic.   Eyes:      Pupils: Pupils are equal, round, and reactive to light.   Neck:      Thyroid: No thyromegaly.      Trachea: No tracheal deviation.   Cardiovascular:      Rate and Rhythm: Normal rate and regular rhythm.      Heart sounds: No murmur heard.  Pulmonary:      Effort: Pulmonary effort is normal. No respiratory distress.      Breath sounds: Normal breath sounds. No stridor. No wheezing.   Abdominal:      General: Bowel sounds are normal. There is distension.      Palpations: Abdomen is soft.      Tenderness: There is abdominal tenderness.   Genitourinary:     Labia:         Right: No tenderness.         Left: No tenderness.       Vagina: Normal. No vaginal discharge.      Comments: URINE FREQUENCY/DYSURIA  Musculoskeletal:         General: Tenderness present. No deformity. Normal range of motion.      Cervical back: Normal range of motion.   Skin:     General: Skin is warm and dry.      Capillary Refill: Capillary refill takes less than 2 seconds.      Coloration: Skin is pale.      Findings: No erythema or rash.   Neurological:      Mental Status: She is alert and oriented to person, place, and time.      Cranial Nerves: No cranial  nerve deficit.      Sensory: No sensory deficit.      Motor: Weakness present.      Coordination: Coordination normal.   Psychiatric:         Behavior: Behavior normal.         Thought Content: Thought content normal.         Judgment: Judgment normal.        Result Review :     UA          2/23/2023    09:52 9/6/2023    14:55 12/6/2023    14:46   Urinalysis   Ketones, UA Negative  Negative  Negative    Leukocytes, UA Negative  Trace  Negative      Urine Culture          9/6/2023    14:55 9/19/2023    08:09 11/22/2023    09:08   Urine Culture   Urine Culture 25,000 CFU/mL Enterococcus faecalis  No growth  No growth             Assessment and Plan    Problem List Items Addressed This Visit          Genitourinary and Reproductive     Recurrent UTI - Primary    Relevant Orders    POC Urinalysis Dipstick, Automated (Completed)    Urine Culture - Urine, Urine, Random Void                                             ASSESSMENT  PSEUDOMONAS MDR0 Recurrent urinary UTIs/Atrophic/yeast VAGINITIS  Ms. Shey BOYLE is a pleasant 72-year-old female who returns to clinic today for evaluation of prior concerns with OAB/DI/ AND FOR Incomplete bladder emptying, recurrent UTIs, acute cystitis, and yeast vaginitis.  She is post IM/IV gentamicin x10 days for last infection in September.  She has been on antibiotic suppressive therapy with Macrobid, reports doing relatively well.  Patient has been infection free for 3 months.  We discussed weaning her off medications at this time.     On clinic evaluation today, she is in no apparent distress and reports a remarkable improvement in her overall symptoms.  Besides being fatigue from her chemotherapy, she is happy to be feeling better she states.  She denies any issues with urinary frequency, urgency or dysuria.  Today she denies any burning with urination, she denies any vaginal itching or irritation consistent with yeast vaginitis. Her urine dipstick today is completely negative for  any infection, it is negative for gross/Microscopic hematuria, her PVR is 0CC today.      Again,  We discussed the types of organisms that are found in the urinary tract indicating that the vast majority are results of the patient's own gastrointestinal isha.  We discussed how many of the antibiotics that are utilized can actually exacerbate these infections by creating resistant organisms and there is only a very few antibiotics that are concentrated in the urine and do not affect the rectal reservoir nor cause recurrent yeast vaginitis.       We discussed the risk factors for recurrent infections being intercourse in younger patients and atrophic changes in older patients.  We discussed the symptoms that are found including pain, pressure, burning, frequency, urgency suprapubic pain and painful intercourse.  I discussed upper tract symptoms including fevers, chills, and indicated the workup would be much more aggressive if the patient were to present with recurrent infections in the face of upper tract symptomatology such as fever. I discussed the history of vesicoureteral reflux in young patients and finally chronic renal scarring as a result of such.                                 PLAN  AGAIN, We resent her urine for Culture. I will call her with results if any bacteria growth .    WE discussed treating her father infections only if positive bacterial growth    Patient has been encouraged to wean herself off antibiotics taking 1 every other day and eventually stop antibiotic suppressive therapy at this time infection free for 3 months.    She will drop off another urine sample in clinic for recheck if she may become symptomatic at any point.      She has been encouraged to increase her p.o. fluid intake to at least 1 to 2 L daily and avoid bladder irritants such as caffeine products, spicy foods, and citrusy foods.      I recommend  She continue probiotics with any treatment with antibiotics to protect the  rectal reservoir including over-the-counter yogurt preparations to stone oral pills containing the appropriate probiotics. Patient reports the diligent use of Probiotics.     She is to also continue other medications for yeast vaginitis, atrophic vaginitis as prescribed above.     Will see her back for Follow up in clinic and also recheck her urinalysis at that time     She may return sooner if need be.     Patient is agreeable to plan of care.     Patient reports that she is not currently experiencing any symptoms of urinary incontinence.    RADIOLOGY (CT AND/OR KUB):    CT Abdomen and Pelvis: No results found for this or any previous visit.     CT Stone Protocol: No results found for this or any previous visit.     KUB: Results for orders placed during the hospital encounter of 04/24/23    XR Abdomen KUB    Narrative  EXAMINATION: XR ABDOMEN KUB-    CLINICAL INDICATION: kidney stone; N20.0-Calculus of kidney    COMPARISON: None available    FINDINGS:  2 views of the abdomen    Large stool burden    No evidence of bowel obstruction    No suspicious calcifications over the kidneys, but easily obscured due  to the large stool burden.    This report was finalized on 4/24/2023 4:06 PM by Dr. Benjamin Posada MD.       LABS (3 MONTHS):    Office Visit on 12/06/2023   Component Date Value Ref Range Status    Color 12/06/2023 Yellow  Yellow, Straw, Dark Yellow, Ana Final    Clarity, UA 12/06/2023 Clear  Clear Final    Specific Gravity  12/06/2023 1.015  1.005 - 1.030 Final    pH, Urine 12/06/2023 5.0  5.0 - 8.0 Final    Leukocytes 12/06/2023 Negative  Negative Final    Nitrite, UA 12/06/2023 Negative  Negative Final    Protein, POC 12/06/2023 Negative  Negative mg/dL Final    Glucose, UA 12/06/2023 3+ (A)  Negative mg/dL Final    Ketones, UA 12/06/2023 Negative  Negative Final    Urobilinogen, UA 12/06/2023 Normal  Normal, 0.2 E.U./dL Final    Bilirubin 12/06/2023 Negative  Negative Final    Blood, UA 12/06/2023 Negative   Negative Final    Lot Number 12/06/2023 n   Final    Expiration Date 12/06/2023 n   Final   Lab on 11/22/2023   Component Date Value Ref Range Status    Urine Culture 11/22/2023 No growth   Final   Office Visit on 10/30/2023   Component Date Value Ref Range Status    Glucose 10/30/2023 118 (H)  65 - 99 mg/dL Final    BUN 10/30/2023 19  8 - 23 mg/dL Final    Creatinine 10/30/2023 0.95  0.57 - 1.00 mg/dL Final    Sodium 10/30/2023 138  136 - 145 mmol/L Final    Potassium 10/30/2023 4.2  3.5 - 5.2 mmol/L Final    Chloride 10/30/2023 106  98 - 107 mmol/L Final    CO2 10/30/2023 22.0  22.0 - 29.0 mmol/L Final    Calcium 10/30/2023 9.6  8.6 - 10.5 mg/dL Final    Total Protein 10/30/2023 7.3  6.0 - 8.5 g/dL Final    Albumin 10/30/2023 4.4  3.5 - 5.2 g/dL Final    ALT (SGPT) 10/30/2023 17  1 - 33 U/L Final    AST (SGOT) 10/30/2023 16  1 - 32 U/L Final    Alkaline Phosphatase 10/30/2023 76  39 - 117 U/L Final    Total Bilirubin 10/30/2023 0.3  0.0 - 1.2 mg/dL Final    Globulin 10/30/2023 2.9  gm/dL Final    A/G Ratio 10/30/2023 1.5  g/dL Final    BUN/Creatinine Ratio 10/30/2023 20.0  7.0 - 25.0 Final    Anion Gap 10/30/2023 10.0  5.0 - 15.0 mmol/L Final    eGFR 10/30/2023 64.6  >60.0 mL/min/1.73 Final    Hemoglobin A1C 10/30/2023 5.40  4.80 - 5.60 % Final    Total Cholesterol 10/30/2023 165  0 - 200 mg/dL Final    Triglycerides 10/30/2023 137  0 - 150 mg/dL Final    HDL Cholesterol 10/30/2023 37 (L)  40 - 60 mg/dL Final    LDL Cholesterol  10/30/2023 103 (H)  0 - 100 mg/dL Final    VLDL Cholesterol 10/30/2023 25  5 - 40 mg/dL Final    LDL/HDL Ratio 10/30/2023 2.72   Final   Office Visit on 10/24/2023   Component Date Value Ref Range Status    Glucose 10/24/2023 115 (H)  65 - 99 mg/dL Final    BUN 10/24/2023 16  8 - 23 mg/dL Final    Creatinine 10/24/2023 0.97  0.57 - 1.00 mg/dL Final    Sodium 10/24/2023 139  136 - 145 mmol/L Final    Potassium 10/24/2023 4.4  3.5 - 5.2 mmol/L Final    Chloride 10/24/2023 104  98 - 107  mmol/L Final    CO2 10/24/2023 23.7  22.0 - 29.0 mmol/L Final    Calcium 10/24/2023 9.7  8.6 - 10.5 mg/dL Final    Total Protein 10/24/2023 8.0  6.0 - 8.5 g/dL Final    Albumin 10/24/2023 4.6  3.5 - 5.2 g/dL Final    ALT (SGPT) 10/24/2023 22  1 - 33 U/L Final    AST (SGOT) 10/24/2023 26  1 - 32 U/L Final    Alkaline Phosphatase 10/24/2023 80  39 - 117 U/L Final    Total Bilirubin 10/24/2023 0.6  0.0 - 1.2 mg/dL Final    Globulin 10/24/2023 3.4  gm/dL Final    A/G Ratio 10/24/2023 1.4  g/dL Final    BUN/Creatinine Ratio 10/24/2023 16.5  7.0 - 25.0 Final    Anion Gap 10/24/2023 11.3  5.0 - 15.0 mmol/L Final    eGFR 10/24/2023 63.0  >60.0 mL/min/1.73 Final    Iron 10/24/2023 60  37 - 145 mcg/dL Final    Iron Saturation (TSAT) 10/24/2023 10 (L)  20 - 50 % Final    Transferrin 10/24/2023 399 (H)  200 - 360 mg/dL Final    TIBC 10/24/2023 595 (H)  298 - 536 mcg/dL Final    Ferritin 10/24/2023 88.08  13.00 - 150.00 ng/mL Final    Vitamin B-12 10/24/2023 338  211 - 946 pg/mL Final    Folate 10/24/2023 10.50  4.78 - 24.20 ng/mL Final    Reticulocyte % 10/24/2023 2.47 (H)  0.70 - 1.90 % Final    Reticulocyte Absolute 10/24/2023 0.1035  0.0200 - 0.1300 10*6/mm3 Final    LDH 10/24/2023 220 (H)  135 - 214 U/L Final    IgG 10/24/2023 1286  586 - 1602 mg/dL Final    IgA 10/24/2023 309  87 - 352 mg/dL Final    IgM 10/24/2023 69  26 - 217 mg/dL Final    Total Protein 10/24/2023 7.6  6.0 - 8.5 g/dL Final    Albumin 10/24/2023 4.1  2.9 - 4.4 g/dL Final    Alpha-1-Globulin 10/24/2023 0.3  0.0 - 0.4 g/dL Final    Alpha-2-Globulin 10/24/2023 0.6  0.4 - 1.0 g/dL Final    Beta Globulin 10/24/2023 1.3  0.7 - 1.3 g/dL Final    Gamma Globulin 10/24/2023 1.3  0.4 - 1.8 g/dL Final    M-Moises 10/24/2023 Not Observed  Not Observed g/dL Final    Globulin 10/24/2023 3.5  2.2 - 3.9 g/dL Final    A/G Ratio 10/24/2023 1.2  0.7 - 1.7 Final    Immunofixation Reflex, Serum 10/24/2023 Comment   Final    No monoclonality detected.    Please note 10/24/2023  Comment   Final    Protein electrophoresis scan will follow via computer, mail, or   delivery.    Free Light Chain, Kappa 10/24/2023 39.8 (H)  3.3 - 19.4 mg/L Final    Free Lambda Light Chains 10/24/2023 22.0  5.7 - 26.3 mg/L Final    Kappa/Lambda Ratio 10/24/2023 1.81 (H)  0.26 - 1.65 Final    Methylmalonic Acid 10/24/2023 262  0 - 378 nmol/L Final    TSH 10/24/2023 0.118 (L)  0.270 - 4.200 uIU/mL Final    Transferrin Receptor 10/24/2023 22.0  12.2 - 27.3 nmol/L Final    Haptoglobin 10/24/2023 88  30 - 200 mg/dL Final   Lab on 10/24/2023   Component Date Value Ref Range Status    WBC 10/24/2023 6.58  3.40 - 10.80 10*3/mm3 Final    RBC 10/24/2023 4.19  3.77 - 5.28 10*6/mm3 Final    Hemoglobin 10/24/2023 12.3  12.0 - 15.9 g/dL Final    Hematocrit 10/24/2023 38.3  34.0 - 46.6 % Final    MCV 10/24/2023 91.4  79.0 - 97.0 fL Final    MCH 10/24/2023 29.4  26.6 - 33.0 pg Final    MCHC 10/24/2023 32.1  31.5 - 35.7 g/dL Final    RDW 10/24/2023 14.4  12.3 - 15.4 % Final    RDW-SD 10/24/2023 47.8  37.0 - 54.0 fl Final    MPV 10/24/2023 10.3  6.0 - 12.0 fL Final    Platelets 10/24/2023 248  140 - 450 10*3/mm3 Final    Neutrophil % 10/24/2023 75.2  42.7 - 76.0 % Final    Lymphocyte % 10/24/2023 13.7 (L)  19.6 - 45.3 % Final    Monocyte % 10/24/2023 7.4  5.0 - 12.0 % Final    Eosinophil % 10/24/2023 2.1  0.3 - 6.2 % Final    Basophil % 10/24/2023 1.1  0.0 - 1.5 % Final    Immature Grans % 10/24/2023 0.5  0.0 - 0.5 % Final    Neutrophils, Absolute 10/24/2023 4.95  1.70 - 7.00 10*3/mm3 Final    Lymphocytes, Absolute 10/24/2023 0.90  0.70 - 3.10 10*3/mm3 Final    Monocytes, Absolute 10/24/2023 0.49  0.10 - 0.90 10*3/mm3 Final    Eosinophils, Absolute 10/24/2023 0.14  0.00 - 0.40 10*3/mm3 Final    Basophils, Absolute 10/24/2023 0.07  0.00 - 0.20 10*3/mm3 Final    Immature Grans, Absolute 10/24/2023 0.03  0.00 - 0.05 10*3/mm3 Final    nRBC 10/24/2023 0.0  0.0 - 0.2 /100 WBC Final   Lab on 09/19/2023   Component Date Value Ref  Range Status    Urine Culture 09/19/2023 No growth   Final          Smoking Cessation Counseling:  Never a smoker.  Patient does not currently use any tobacco products.     Follow Up   Return in about 6 months (around 6/6/2024), or if symptoms worsen or fail to improve, for Next scheduled follow up, RECURRENT UTI/DYSURIA/DETRUSSOR INSTABILITY.    She states that her bowels are doing better.    Patient was given instructions and counseling regarding her condition or for health maintenance advice. Please see specific information pulled into the AVS if appropriate.          This document has been electronically signed by Griselda Cheng-Akwa, APRN   December 6, 2023 15:12 EST      Dictated Utilizing Dragon Dictation: Part of this note may be an electronic transcription/translation of spoken language to printed text using the Dragon Dictation System.     Transcribed from ambient dictation for Griselda Cheng-Akwa, APRN by Esther Treviño.  12/06/23   15:36 EST    Patient or patient representative verbalized consent to the visit recording.  I have personally performed the services described in this document as transcribed by the above individual, and it is both accurate and complete.

## 2023-12-07 ENCOUNTER — TELEPHONE (OUTPATIENT)
Dept: UROLOGY | Facility: CLINIC | Age: 73
End: 2023-12-07
Payer: COMMERCIAL

## 2023-12-07 LAB — BACTERIA SPEC AEROBE CULT: NO GROWTH

## 2023-12-07 NOTE — TELEPHONE ENCOUNTER
----- Message from Griselda Cheng-Akwa, APRN sent at 12/7/2023  3:32 PM EST -----  Please can you let patient know her urine culture results are negative for any bacterial infection at this time.  However she may drop off another urine sample each time she feels symptomatic.      If not follow-up in clinic as discussed.  She has been encouraged to increase her p.o. fluid intake, and avoid any bladder irritants.    Urine Culture -No growth    Thank you

## 2023-12-07 NOTE — TELEPHONE ENCOUNTER
Called patient to go over her urine culture that showed no infection at this time. Patient verbalized understanding.

## 2024-01-23 DIAGNOSIS — E11.9 TYPE 2 DIABETES MELLITUS WITHOUT COMPLICATION, WITHOUT LONG-TERM CURRENT USE OF INSULIN: ICD-10-CM

## 2024-01-24 ENCOUNTER — LAB (OUTPATIENT)
Dept: ONCOLOGY | Facility: CLINIC | Age: 74
End: 2024-01-24
Payer: COMMERCIAL

## 2024-01-24 ENCOUNTER — OFFICE VISIT (OUTPATIENT)
Dept: ONCOLOGY | Facility: CLINIC | Age: 74
End: 2024-01-24
Payer: COMMERCIAL

## 2024-01-24 VITALS
BODY MASS INDEX: 34.02 KG/M2 | DIASTOLIC BLOOD PRESSURE: 75 MMHG | WEIGHT: 186 LBS | OXYGEN SATURATION: 96 % | RESPIRATION RATE: 18 BRPM | TEMPERATURE: 97.2 F | HEART RATE: 72 BPM | SYSTOLIC BLOOD PRESSURE: 154 MMHG

## 2024-01-24 DIAGNOSIS — Z78.0 POSTMENOPAUSAL: ICD-10-CM

## 2024-01-24 DIAGNOSIS — D05.12 DUCTAL CARCINOMA IN SITU (DCIS) OF LEFT BREAST: Primary | ICD-10-CM

## 2024-01-24 DIAGNOSIS — M89.9 BONE DISEASE: ICD-10-CM

## 2024-01-24 RX ORDER — FERROUS SULFATE 325(65) MG
325 TABLET ORAL EVERY OTHER DAY
Qty: 30 TABLET | Refills: 3 | Status: SHIPPED | OUTPATIENT
Start: 2024-01-24

## 2024-01-24 RX ORDER — EMPAGLIFLOZIN 10 MG/1
10 TABLET, FILM COATED ORAL DAILY
Qty: 90 TABLET | Refills: 0 | Status: SHIPPED | OUTPATIENT
Start: 2024-01-24

## 2024-01-24 RX ORDER — CYANOCOBALAMIN 1000 UG/ML
1000 INJECTION, SOLUTION INTRAMUSCULAR; SUBCUTANEOUS
Qty: 1 ML | Refills: 6 | Status: SHIPPED | OUTPATIENT
Start: 2024-01-24

## 2024-01-24 RX ORDER — ASCORBIC ACID 500 MG
500 TABLET ORAL EVERY OTHER DAY
Qty: 30 TABLET | Refills: 3 | Status: SHIPPED | OUTPATIENT
Start: 2024-01-24

## 2024-01-24 NOTE — PROGRESS NOTES
Subjective     Date: 2024    Referring Provider  Dr. Mata     Chief Complaint  Ductal carcinoma in situ of the left breast, estrogen receptor positive, status post lumpectomy  Iron deficiency anemia     Subjective          Shey King is a 73 y.o. female who presents today to [facility] for follow up.    HPI:   73 y.o.female with a history of diabetes mellitus type 2, hyperlipidemia, hypothyroidism, frequent UTIs who presents to establish care regarding ductal carcinoma in situ of the left breast, estrogen receptor positive.     Oncological history:  She notes intermittent breast pain on the left side for several months, was finally directed to get a screening mammogram  - 2022.  Screening mammogram showed calcification left breast middle posterior third lateral aspect, which are new.  Other calcifications throughout left breast appear stable.   - 22. Diagnostic mammogram showed grouped calcifications in the left 3-4 o'clock region.   - 2022: stereotactic biopsy. Pathology with intermediate to high-grade cribriform ductal carcinoma in situ with associated necrosis and calcification.  -2022: Underwent lumpectomy.  Final pathology with ductal carcinoma in situ.  10 x 3 x 2 mm, 4 of 19 blocks.  Grade 2 (intermediate).  Distance from DCIS to closest margin 1 mm from inferior margin, 2 mm from anterior margin, 4 mm to posterior margin. PTis.  Estrogen receptor 100% positive, progesterone 5% positive  2023-3/7/2023: Underwent radiation with 4256 cGy in 16 fraction with 1000 cGy in 5 fraction boost  2023: Started anastrozole     She was recently seen by Dr. Mata on 2023.  Overall doing well after lumpectomy.  Presents for further therapy and discussion.    GYN History:  Menarche at age 14.   Age of first pregnancy:    Age of menopause: 52  Breast feed: no  Birth control: yes  Hormone replacement: no    Never had DEXA bone  "scan in the past.  Denies ever being treated for diagnosed with osteoporosis.  Currently taking vitamin D tablets.    Interval History 02/01/2023   She was seen by radiation oncology today, Dr. Yusuf, who recommends radiation to left breast due to DCIS margin less than 2 mm.  Underwent DEXA bone scan 1/27/2023 with bone mineral density of right femur neck 0.899 with T score -1.     Interval History 03/01/2023 - Telehealth   Receiving radiation therapy currently, has 4 more boost therapy left. Has had pain on the breast intermittently and the axilla. Was told by Rad Onc to apply less aquaphor to the area.     Interval History 04/06/2023   She presents for follow up today. Completed radiation therapy. Started anastrozole, taking it daily. Overall, still with fatigue since completion of radiation so unsure if it is due to radiation or medication. Has had two episodes of hot flashes. Denies any other AE.    Also reports mild intermittent sharp pain L breast. She reports difficulty sleeping, would like something to help temporarily.     Interval History 07/21/2023   Ms. King presents for follow up. Reports increased fatigue with anastrozole, has been compliant. Has also developed few UTI's, most recently with pseudomonas, currently being treated with clindamycin. Had a repeat mammogram 6/15 which showed left breast there trabecular thickening and skin thickening, likely due to treatment, stable calcification.     Interval History 10/24/2023   Ms. King presents for follow up today. Denies any palpable lumps/bumps of the breast, but has noted waking up with discharge \"caking\" on the left nipple. Also with intermittent warmth and pain in the left breast with radiation to axilla. Compliant with anastrozole without missed doses. Does report intermittent aches in joints.   Has had intentional weight loss due to GLP-1 inhibitor.    Interval History 01/24/2024   Ms. King presents today for follow up. Her last mammogram " from 10/30/23 did not show evidence of malignancy. She reports increased fatigue, which has not improved. Reports significant b/l knee pain, unsure if it is due to anastrozole. Continues to be compliant with AI. We looked at her last XR of the R knee from 3/2022 which shows osteoarthritis of the R knee, recommend follow up with PCP (seeing Tashia Vallejo 2/6)  Cataract surgery Monday.        Objective     Objective     Allergy:   Allergies   Allergen Reactions    Other Unknown - High Severity     Blisters from EKG stickers    Bactrim [Sulfamethoxazole-Trimethoprim] Hives    Ciprofloxacin Hives    Penicillins Hives    Steri-Strip Compound Benzoin [Benzoin Compound] Hives    Sulfa Antibiotics Hives        Current Medications:   Current Outpatient Medications   Medication Sig Dispense Refill    anastrozole (ARIMIDEX) 1 MG tablet Take 1 tablet by mouth Daily.      aspirin 81 MG chewable tablet Chew 1 tablet Daily.      AZO-CRANBERRY PO Take 2 tablets by mouth Daily.      empagliflozin (Jardiance) 10 MG tablet tablet TAKE 1 TABLET BY MOUTH DAILY 90 tablet 0    fenofibrate (TRICOR) 145 MG tablet Take 1 tablet by mouth Daily. 90 tablet 1    glucose monitor monitoring kit 1 each as needed (DM II). 1 each 0    Lancets (OneTouch Delica Plus Wimbia42L) misc USE TO TEST BLOOD SUGAR DAILY AS DIRECTED      Lancets Misc. misc For Daily testing  E11.65 50 each 11    levothyroxine (Synthroid) 100 MCG tablet Take 1 tablet by mouth Daily. 90 tablet 1    nitrofurantoin, macrocrystal-monohydrate, (Macrobid) 100 MG capsule TAKE 1 CAPSULE BY MOUTH TWICE DAILY X 10 DAYS, THEN TAKE 1 CAPSULE NIGHTLY FOR SUPPRESSIVE THERAPY E'COLI UTIs 56 capsule 3    OneTouch Ultra test strip USE TO TEST BLOOD SUGAR DAILY AS DIRECTED 50 each 5    Probiotic Product (PROBIOTIC DAILY PO) Take  by mouth.      Semaglutide,0.25 or 0.5MG/DOS, (Ozempic, 0.25 or 0.5 MG/DOSE,) 2 MG/3ML solution pen-injector Inject 0.5 mg under the skin into the appropriate area as directed  1 (One) Time Per Week. 3 mL 3    vitamin D (ERGOCALCIFEROL) 1.25 MG (63315 UT) capsule capsule Take 1 capsule by mouth 1 (One) Time Per Week. 5 capsule 2    acetaminophen (TYLENOL) 325 MG tablet Take 2 tablets by mouth Every 4 (Four) Hours As Needed for Mild Pain. (Patient not taking: Reported on 1/24/2024) 30 tablet 0    cyanocobalamin 1000 MCG/ML injection Inject 1 mL into the appropriate muscle as directed by prescriber Every 28 (Twenty-Eight) Days. 1 mL 6    ferrous sulfate 325 (65 FE) MG tablet Take 1 tablet by mouth Every Other Day. 30 tablet 3    vitamin C (ASCORBIC ACID) 500 MG tablet Take 1 tablet by mouth Every Other Day. 30 tablet 3     No current facility-administered medications for this visit.       Past Medical History:  Past Medical History:   Diagnosis Date    Abdominal pain, LLQ (left lower quadrant)     Abnormal ECG aug 2021    Abnormal Pap smear of cervix     several years ago    Anemia     years ago    Breast cancer     Cataract early stage    Cystitis     Diabetes mellitus     Diarrhea     Diverticulitis of colon     Diverticulosis     Ductal carcinoma in situ (DCIS) of left breast 12/21/2022    Encounter for cholecystectomy 2018    Gallstones     GERD (gastroesophageal reflux disease)     Hiatal hernia     Hyperglycemia     Hyperlipidemia     Hyperlipidemia     Hypothyroidism     Muscle spasm     FLANK PAIN    OAB (overactive bladder)     Pneumonia     years ago    Rectal bleeding     UTI (urinary tract infection)        Past Surgical History:  Past Surgical History:   Procedure Laterality Date    BREAST BIOPSY Bilateral 2005    benign    BREAST BIOPSY Left 12/29/2022    Procedure: BREAST BIOPSY WITH NEEDLE LOCALIZATION;  Surgeon: Dong Mata MD;  Location: [facility];  Service: General;  Laterality: Left;    CHOLECYSTECTOMY  2018?    ENDOSCOPY      ENDOSCOPY N/A 1/11/2018    Procedure: ESOPHAGOGASTRODUODENOSCOPY;  Surgeon: Dong Mata MD;  Location: [facility];  Service:      MAMMO BILATERAL  09/2015    OVARIAN CYST DRAINAGE      [place] LAPAROSCOPY SURG CHOLECYSTECTOMY N/A 1/11/2018    Procedure: CHOLECYSTECTOMY LAPAROSCOPIC;  Surgeon: Dong Mata MD;  Location: [facility];  Service: General    US GUIDED LYMPH NODE BIOPSY  4/11/2018       Social History:  Social History     Socioeconomic History    Marital status:    Tobacco Use    Smoking status: Never    Smokeless tobacco: Never    Tobacco comments:     never   Vaping Use    Vaping Use: Never used   Substance and Sexual Activity    Alcohol use: No    Drug use: No    Sexual activity: Not Currently     Partners: Male     Shey King  reports that she has never smoked. She has never used smokeless tobacco..      Family History:  Family History   Adopted: Yes   Problem Relation Age of Onset    Heart disease Mother         also had cancer kidney    Cancer Mother         kidney    Thyroid disease Mother     Heart disease Father     Diabetes Brother     Breast cancer Neg Hx        Review of Systems:  Review of Systems   Constitutional: Negative.    HENT: Negative.     Respiratory: Negative.     Cardiovascular: Negative.    Gastrointestinal: Negative.    Genitourinary: Negative.    Musculoskeletal: Negative.    Skin: Negative.    Neurological: Negative.    Hematological: Negative.    Psychiatric/Behavioral: Negative.         Vital Signs:   /75   Pulse 72   Temp 97.2 °F (36.2 °C) (Temporal)   Resp 18   Wt 84.4 kg (186 lb)   SpO2 96%   BMI 34.02 kg/m²      Physical Exam:   Physical Exam  Vitals reviewed.   Constitutional:       General: She is not in acute distress.     Appearance: Normal appearance. She is not ill-appearing.   HENT:      Head: Normocephalic and atraumatic.      Mouth/Throat:      Mouth: Mucous membranes are moist.      Pharynx: Oropharynx is clear.   Eyes:      Conjunctiva/sclera: Conjunctivae normal.      Pupils: Pupils are equal, round, and reactive to light.   Cardiovascular:      Rate and Rhythm:  Normal rate and regular rhythm.      Heart sounds: No murmur heard.  Pulmonary:      Effort: Pulmonary effort is normal. No respiratory distress.      Breath sounds: Normal breath sounds. No wheezing.   Chest:   Breasts:     Right: Normal. No mass or skin change.      Left: Normal. No swelling, bleeding, inverted nipple, mass, nipple discharge, skin change or tenderness.      Comments: L breast with lumpectomy scar without discharge from areola   Abdominal:      General: Abdomen is flat. Bowel sounds are normal. There is no distension.      Palpations: Abdomen is soft. There is no mass.      Tenderness: There is no abdominal tenderness. There is no guarding.   Musculoskeletal:         General: No swelling. Normal range of motion.      Cervical back: Normal range of motion.   Lymphadenopathy:      Cervical: No cervical adenopathy.   Skin:     General: Skin is warm and dry.   Neurological:      General: No focal deficit present.      Mental Status: She is alert and oriented to person, place, and time. Mental status is at baseline.   Psychiatric:         Mood and Affect: Mood normal.         PHQ-9 Score  PHQ-9 Total Score: 0     Pain Score  Vitals:    01/24/24 1031   BP: 154/75   Pulse: 72   Resp: 18   Temp: 97.2 °F (36.2 °C)   TempSrc: Temporal   SpO2: 96%   Weight: 84.4 kg (186 lb)   PainSc:   4           ECOG score: 0             Lab Review  Lab Results   Component Value Date    WBC 6.58 10/24/2023    HGB 12.3 10/24/2023    HCT 38.3 10/24/2023    MCV 91.4 10/24/2023    RDW 14.4 10/24/2023     10/24/2023    NEUTRORELPCT 75.2 10/24/2023    LYMPHORELPCT 13.7 (L) 10/24/2023    MONORELPCT 7.4 10/24/2023    EOSRELPCT 2.1 10/24/2023    BASORELPCT 1.1 10/24/2023    NEUTROABS 4.95 10/24/2023    LYMPHSABS 0.90 10/24/2023       Lab Results   Component Value Date     10/30/2023    K 4.2 10/30/2023    CO2 22.0 10/30/2023     10/30/2023    BUN 19 10/30/2023    CREATININE 0.95 10/30/2023    EGFRIFNONA 57 (L)  01/26/2022    EGFRIFAFRI 74 09/06/2016    GLUCOSE 118 (H) 10/30/2023    CALCIUM 9.6 10/30/2023    ALKPHOS 76 10/30/2023    AST 16 10/30/2023    ALT 17 10/30/2023    BILITOT 0.3 10/30/2023    ALBUMIN 4.4 10/30/2023    PROTEINTOT 7.3 10/30/2023    MG 2.0 07/26/2023    PHOS 3.5 12/01/2021       Radiology Results  Mammo Diagnostic Digital Tomosynthesis Bilateral With CAD (10/30/2023 09:49)   IMPRESSION:     1. Benign right mammographic findings.     2. Probably benign left mammographic findings. Recommend continued  follow-up.       Mammo Diagnostic Digital Tomosynthesis Left With CAD (06/15/2023 12:07)      COMPARISON: 12/29/2022, 12/14/2022, 12/06/2022, 10/27/2022, 06/19/2020,  02/07/2019, 11/11/2017     FINDINGS: The left breast is heterogeneously dense, which may obscure  small masses.     Presumed postoperative changes are now noted in the lateral aspect of  the left breast from the patient's interval conservation surgery. There  is mild trabecular thickening and skin thickening which is likely  treatment related. There are stable scattered calcifications.     IMPRESSION:  Probably benign left mammographic findings. Recommend  continued follow-up.    DEXA Bone Density Axial (01/27/2023 13:22)  FINDINGS:    RIGHT FEMORAL NECK BONE MINERAL DENSITY:  BMD of the right femur neck  is 0.899 with T score -1 with density.      UNITS OF MEASURE:    Bone mineral density is measured in g/cm2.    Z-score is the number of standard deviations above age-matched  controls.    T-score is the number of standard deviations above healthy young  adults.      WORLD HEALTH ORGANIZATION GUIDELINES:    T-score at or above -1 is normal bone mineral density.    T-score between -1 and -2.5 is osteopenia.    T-score at or below -2.5 is osteoporosis.     IMPRESSION:    BMD of the right femur neck is 0.899 with T score -1 with density.    Mammo Diagnostic Left With CAD (12/06/2022 09:21)  FINDINGS: Magnification imaging of the left breast  demonstrates a small  group of heterogeneous calcifications in the mid left 3:00 to 4:00  region. Recommend stereotactic guided core biopsy.     IMPRESSION:  Grouped calcifications in the left 3:00 to 4:00 region.     BI-RADS CATEGORY:  4, SUSPICIOUS     RECOMMENDATION:  Recommend stereotactic guided core biopsy of the left  breast.    Mammo Screening Digital Tomosynthesis Bilateral With CAD (10/27/2022 07:29)  FINDINGS:     Breast Composition: The breasts are heterogenously dense, which may  obscure small masses.   Important Findings:    Biopsy related changes right breast with marker clip. Stable benign  calcifications right breast are noted. Calcifications left breast  middle-posterior third lateral aspect have developed. Other  calcifications throughout the left breast appear stable. Biopsy related  changes upper outer quadrant left breast with marker clip noted.   No suspicious calcifications or areas of architectural distortion.  No  dominant masses or skin thickening      Pathology:   Procedure: Excision (less than total mastectomy)   Specimen Laterality: Left   Tumor Site: Not specified   Histologic Type: Ductal carcinoma in situ   Size of DCIS: 10 x 3 x 2 mm, 4 of 19 blocks   Architectural Patterns: Cribriform   Nuclear Grade: Grade II (intermediate)   Necrosis: Present, focal (small foci of necrosis)   Microcalcifications: Present in DCIS and in nonneoplastic tissue   Margin Status: All margins negative for DCIS   Distance from DCIS to Closest Margin: 1 mm from inferior margin   Distance from DCIS to Anterior Margin: 2 mm   Distance from DCIS to Posterior Margin: 4 mm   Regional Lymph Node Status: No regional lymph nodes submitted or found   PATHOLOGIC STAGE (pTNM, AJCC 8th Edition): pTis (DCIS), pNx   Biomarker Testing Performed on Previous Biopsy:  (ER) Positive 100 %   (PgR) Positive 5 %     CLINICAL HISTORY:   Ductal carcinoma in situ (DCIS) of left breast     Assessment / Plan         Assessment and  Plan   72-year-old female who presents today with hormone positive left breast ductal carcinoma in situ, status postlumpectomy.    Ductal carcinoma in situ, left breast, hormone positive  -Final pathology with 10 x 3 x 2 mm ductal carcinoma in situ, grade 2, 1 mm from inferior margin, 2 mm from anterior margin, 4 mm from posterior margin  -Her case ws discussed in tumor board on 1/25; it was deemed she should receive radiation therapy given close margins <2mm inferior margin  -Completed adjuvant radiation therapy: 3/7/23  -Tolerating adjuvant hormonal therapy which has shown to decrease recurrence by at least 33%. Taking anastrozole 1 mg daily for 5 years. (To be completed 3/2028)  -Last mammogram with benign findings 10/2023; Next mammogram scheduled for 10/2024   -She will continue history and physical and exam every 3-6 months for the first 5 years, yearly thereafter according to NCCN     2. Bone health  -DEXA scan 1/27/2023: bone mineral density of right femur neck 0.899 with T score -1.  Repeat DEXA scan yearly, next January 2024  -Continue vitamin D and calcium supplement    3. Fatigue   - Anemia work up showed iron deficiency and marginal B12 level  -Started patient on oral ferrous sulfate 325  and ascorbic acid 500 mg to be taken every other day; if she has worsened baseline constipation, she is to contact us for parenteral iron   -Started on B12 injections to be given monthly         Discussed possible differential diagnoses, testing, treatment, recommended non-pharmacological interventions, risks, warning signs to monitor for that would indicate need for follow-up in clinic or ER. If no improvement with these regimens or you have new or worsening symptoms follow-up. Patient verbalizes understanding and agreement with plan of care. Denies further needs or concerns.     Patient was given instructions and counseling regarding her condition and for health maintenance advised.    I spent 30 minutes with Shey VALDES  Fernando today.  In the office today, more than 50% of this time was spent face-to-face with her  in counseling / coordination of care, reviewing her interim medical history and counseling on the current treatment plan.  All questions were answered to her satisfaction.      Meds ordered during this visit  New Medications Ordered This Visit   Medications    ferrous sulfate 325 (65 FE) MG tablet     Sig: Take 1 tablet by mouth Every Other Day.     Dispense:  30 tablet     Refill:  3    vitamin C (ASCORBIC ACID) 500 MG tablet     Sig: Take 1 tablet by mouth Every Other Day.     Dispense:  30 tablet     Refill:  3    cyanocobalamin 1000 MCG/ML injection     Sig: Inject 1 mL into the appropriate muscle as directed by prescriber Every 28 (Twenty-Eight) Days.     Dispense:  1 mL     Refill:  6       Visit Diagnoses    ICD-10-CM ICD-9-CM   1. Ductal carcinoma in situ (DCIS) of left breast  D05.12 233.0   2. Postmenopausal  Z78.0 V49.81   3. Bone disease  M89.9 733.90           Follow Up   In 3 months for H&P  Repeat CBC, iron studies, ferritin, B12, folate  F/U DEXA scan      This document has been electronically signed by Maia Ford MD   January 24, 2024 11:02 EST    Dictated Utilizing Dragon Dictation: Part of this note may be an electronic transcription/translation of spoken language to printed text using the Dragon Dictation System.

## 2024-01-26 DIAGNOSIS — D05.12 DUCTAL CARCINOMA IN SITU (DCIS) OF LEFT BREAST: Primary | ICD-10-CM

## 2024-02-06 ENCOUNTER — OFFICE VISIT (OUTPATIENT)
Dept: FAMILY MEDICINE CLINIC | Facility: CLINIC | Age: 74
End: 2024-02-06
Payer: MEDICARE

## 2024-02-06 VITALS
RESPIRATION RATE: 16 BRPM | HEART RATE: 92 BPM | HEIGHT: 62 IN | TEMPERATURE: 97.5 F | BODY MASS INDEX: 34.23 KG/M2 | OXYGEN SATURATION: 97 % | DIASTOLIC BLOOD PRESSURE: 62 MMHG | WEIGHT: 186 LBS | SYSTOLIC BLOOD PRESSURE: 126 MMHG

## 2024-02-06 DIAGNOSIS — M54.9 BACK PAIN, UNSPECIFIED BACK LOCATION, UNSPECIFIED BACK PAIN LATERALITY, UNSPECIFIED CHRONICITY: Primary | ICD-10-CM

## 2024-02-06 DIAGNOSIS — M25.562 ARTHRALGIA OF LEFT KNEE: ICD-10-CM

## 2024-02-06 DIAGNOSIS — N39.0 RECURRENT UTI: ICD-10-CM

## 2024-02-06 DIAGNOSIS — E55.9 VITAMIN D DEFICIENCY: ICD-10-CM

## 2024-02-06 DIAGNOSIS — E11.9 TYPE 2 DIABETES MELLITUS WITHOUT COMPLICATION, WITHOUT LONG-TERM CURRENT USE OF INSULIN: ICD-10-CM

## 2024-02-06 DIAGNOSIS — E78.5 HYPERLIPIDEMIA, UNSPECIFIED HYPERLIPIDEMIA TYPE: ICD-10-CM

## 2024-02-06 DIAGNOSIS — E03.8 ADULT ONSET HYPOTHYROIDISM: ICD-10-CM

## 2024-02-06 LAB
ALBUMIN SERPL-MCNC: 4.7 G/DL (ref 3.5–5.2)
ALBUMIN/GLOB SERPL: 1.4 G/DL
ALP SERPL-CCNC: 81 U/L (ref 39–117)
ALT SERPL W P-5'-P-CCNC: 13 U/L (ref 1–33)
ANION GAP SERPL CALCULATED.3IONS-SCNC: 12.1 MMOL/L (ref 5–15)
AST SERPL-CCNC: 17 U/L (ref 1–32)
BASOPHILS # BLD AUTO: 0.06 10*3/MM3 (ref 0–0.2)
BASOPHILS NFR BLD AUTO: 1.1 % (ref 0–1.5)
BILIRUB BLD-MCNC: NEGATIVE MG/DL
BILIRUB SERPL-MCNC: 0.5 MG/DL (ref 0–1.2)
BUN SERPL-MCNC: 18 MG/DL (ref 8–23)
BUN/CREAT SERPL: 17.3 (ref 7–25)
CALCIUM SPEC-SCNC: 9.6 MG/DL (ref 8.6–10.5)
CHLORIDE SERPL-SCNC: 105 MMOL/L (ref 98–107)
CHOLEST SERPL-MCNC: 189 MG/DL (ref 0–200)
CLARITY, POC: CLEAR
CO2 SERPL-SCNC: 23.9 MMOL/L (ref 22–29)
COLOR UR: YELLOW
CREAT SERPL-MCNC: 1.04 MG/DL (ref 0.57–1)
DEPRECATED RDW RBC AUTO: 45 FL (ref 37–54)
EGFRCR SERPLBLD CKD-EPI 2021: 57.6 ML/MIN/1.73
EOSINOPHIL # BLD AUTO: 0.2 10*3/MM3 (ref 0–0.4)
EOSINOPHIL NFR BLD AUTO: 3.6 % (ref 0.3–6.2)
ERYTHROCYTE [DISTWIDTH] IN BLOOD BY AUTOMATED COUNT: 13.8 % (ref 12.3–15.4)
EXPIRATION DATE: NORMAL
GLOBULIN UR ELPH-MCNC: 3.3 GM/DL
GLUCOSE SERPL-MCNC: 126 MG/DL (ref 65–99)
GLUCOSE UR STRIP-MCNC: ABNORMAL MG/DL
HBA1C MFR BLD: 5.6 % (ref 4.5–5.7)
HCT VFR BLD AUTO: 37.2 % (ref 34–46.6)
HDLC SERPL-MCNC: 43 MG/DL (ref 40–60)
HGB BLD-MCNC: 12.3 G/DL (ref 12–15.9)
IMM GRANULOCYTES # BLD AUTO: 0.02 10*3/MM3 (ref 0–0.05)
IMM GRANULOCYTES NFR BLD AUTO: 0.4 % (ref 0–0.5)
KETONES UR QL: NEGATIVE
LDLC SERPL CALC-MCNC: 126 MG/DL (ref 0–100)
LDLC/HDLC SERPL: 2.88 {RATIO}
LEUKOCYTE EST, POC: NEGATIVE
LYMPHOCYTES # BLD AUTO: 0.96 10*3/MM3 (ref 0.7–3.1)
LYMPHOCYTES NFR BLD AUTO: 17.1 % (ref 19.6–45.3)
Lab: NORMAL
MCH RBC QN AUTO: 29.4 PG (ref 26.6–33)
MCHC RBC AUTO-ENTMCNC: 33.1 G/DL (ref 31.5–35.7)
MCV RBC AUTO: 89 FL (ref 79–97)
MONOCYTES # BLD AUTO: 0.49 10*3/MM3 (ref 0.1–0.9)
MONOCYTES NFR BLD AUTO: 8.7 % (ref 5–12)
NEUTROPHILS NFR BLD AUTO: 3.89 10*3/MM3 (ref 1.7–7)
NEUTROPHILS NFR BLD AUTO: 69.1 % (ref 42.7–76)
NITRITE UR-MCNC: NEGATIVE MG/ML
NRBC BLD AUTO-RTO: 0 /100 WBC (ref 0–0.2)
PH UR: 6 [PH] (ref 5–8)
PLATELET # BLD AUTO: 256 10*3/MM3 (ref 140–450)
PMV BLD AUTO: 11.8 FL (ref 6–12)
POTASSIUM SERPL-SCNC: 4.5 MMOL/L (ref 3.5–5.2)
PROT SERPL-MCNC: 8 G/DL (ref 6–8.5)
PROT UR STRIP-MCNC: NEGATIVE MG/DL
RBC # BLD AUTO: 4.18 10*6/MM3 (ref 3.77–5.28)
RBC # UR STRIP: NEGATIVE /UL
SODIUM SERPL-SCNC: 141 MMOL/L (ref 136–145)
SP GR UR: 1.02 (ref 1–1.03)
TRIGL SERPL-MCNC: 111 MG/DL (ref 0–150)
TSH SERPL DL<=0.05 MIU/L-ACNC: 1.04 UIU/ML (ref 0.27–4.2)
UROBILINOGEN UR QL: NORMAL
VLDLC SERPL-MCNC: 20 MG/DL (ref 5–40)
WBC NRBC COR # BLD AUTO: 5.62 10*3/MM3 (ref 3.4–10.8)

## 2024-02-06 PROCEDURE — 36415 COLL VENOUS BLD VENIPUNCTURE: CPT | Performed by: NURSE PRACTITIONER

## 2024-02-06 PROCEDURE — 84443 ASSAY THYROID STIM HORMONE: CPT | Performed by: NURSE PRACTITIONER

## 2024-02-06 PROCEDURE — 87086 URINE CULTURE/COLONY COUNT: CPT | Performed by: NURSE PRACTITIONER

## 2024-02-06 PROCEDURE — 80061 LIPID PANEL: CPT | Performed by: NURSE PRACTITIONER

## 2024-02-06 PROCEDURE — 80053 COMPREHEN METABOLIC PANEL: CPT | Performed by: NURSE PRACTITIONER

## 2024-02-06 PROCEDURE — 85025 COMPLETE CBC W/AUTO DIFF WBC: CPT | Performed by: NURSE PRACTITIONER

## 2024-02-06 RX ORDER — TOBRAMYCIN 3 MG/ML
1 SOLUTION/ DROPS OPHTHALMIC EVERY 6 HOURS SCHEDULED
COMMUNITY
Start: 2024-01-26

## 2024-02-06 RX ORDER — FENOFIBRATE 145 MG/1
145 TABLET, COATED ORAL DAILY
Qty: 90 TABLET | Refills: 1 | Status: SHIPPED | OUTPATIENT
Start: 2024-02-06

## 2024-02-06 RX ORDER — ERGOCALCIFEROL 1.25 MG/1
50000 CAPSULE ORAL WEEKLY
Qty: 5 CAPSULE | Refills: 2 | Status: SHIPPED | OUTPATIENT
Start: 2024-02-06

## 2024-02-06 RX ORDER — PREDNISOLONE ACETATE 10 MG/ML
1 SUSPENSION/ DROPS OPHTHALMIC 4 TIMES DAILY
COMMUNITY
Start: 2024-01-23

## 2024-02-06 RX ORDER — LEVOTHYROXINE SODIUM 0.1 MG/1
100 TABLET ORAL DAILY
Qty: 90 TABLET | Refills: 1 | Status: SHIPPED | OUTPATIENT
Start: 2024-02-06

## 2024-02-07 ENCOUNTER — TELEPHONE (OUTPATIENT)
Dept: FAMILY MEDICINE CLINIC | Facility: CLINIC | Age: 74
End: 2024-02-07
Payer: COMMERCIAL

## 2024-02-07 ENCOUNTER — TELEPHONE (OUTPATIENT)
Dept: CARDIOLOGY | Facility: CLINIC | Age: 74
End: 2024-02-07

## 2024-02-07 LAB — BACTERIA SPEC AEROBE CULT: NO GROWTH

## 2024-02-07 RX ORDER — FLUCONAZOLE 150 MG/1
150 TABLET ORAL ONCE
Qty: 1 TABLET | Refills: 0 | Status: SHIPPED | OUTPATIENT
Start: 2024-02-07 | End: 2024-02-07

## 2024-02-07 NOTE — TELEPHONE ENCOUNTER
----- Message from BERT Lucas sent at 2/7/2024 12:52 PM EST -----  Let patient know Urine culture is no Growth

## 2024-02-07 NOTE — TELEPHONE ENCOUNTER
Patient notified & verbalized understanding,is requesting a Diflucan,also the oncologist wrote her an RX for B 12 injections but she cannot give them to herself,it is ok for her to bring the medication in & us give them isn't it?

## 2024-02-07 NOTE — TELEPHONE ENCOUNTER
Caller: Shey King    Relationship to patient: Self    Best call back number:  [phone]    Chief complaint:     Type of visit: FOLLOW UP     Requested date: PATIENT NEEDS TIME OF APPOINTMENT TO BE BEFORE 11AM OR A DIFFERENT DAY BEFORE 11 AM    If rescheduling, when is the original appointment: 2-15-24     Additional notes:PATIENT CALLED IN ASKING FOR APPOINTMENT TIME TO BE CHANGED TO EARLIER IN THE DAY OR WILL NEED TO BE DIFFERENT DAY BEFORE 11AM. PLEASE REACH OUT TO PATIENT

## 2024-02-09 ENCOUNTER — HOSPITAL ENCOUNTER (OUTPATIENT)
Dept: BONE DENSITY | Facility: HOSPITAL | Age: 74
Discharge: HOME | End: 2024-02-09
Payer: COMMERCIAL

## 2024-02-09 DIAGNOSIS — M89.9 BONE DISEASE: ICD-10-CM

## 2024-02-09 DIAGNOSIS — Z78.0 POSTMENOPAUSAL: ICD-10-CM

## 2024-02-13 ENCOUNTER — OFFICE VISIT (OUTPATIENT)
Dept: SURGERY | Facility: CLINIC | Age: 74
End: 2024-02-13
Payer: MEDICARE

## 2024-02-13 VITALS — WEIGHT: 186 LBS | BODY MASS INDEX: 34.23 KG/M2 | HEIGHT: 62 IN

## 2024-02-13 DIAGNOSIS — D05.12 DUCTAL CARCINOMA IN SITU (DCIS) OF LEFT BREAST: Primary | ICD-10-CM

## 2024-02-13 PROCEDURE — 1160F RVW MEDS BY RX/DR IN RCRD: CPT | Performed by: SURGERY

## 2024-02-13 PROCEDURE — 1159F MED LIST DOCD IN RCRD: CPT | Performed by: SURGERY

## 2024-02-13 PROCEDURE — 99213 OFFICE O/P EST LOW 20 MIN: CPT | Performed by: SURGERY

## 2024-02-16 ENCOUNTER — OFFICE VISIT (OUTPATIENT)
Dept: CARDIOLOGY | Facility: CLINIC | Age: 74
End: 2024-02-16
Payer: MEDICARE

## 2024-02-16 VITALS
DIASTOLIC BLOOD PRESSURE: 66 MMHG | HEIGHT: 62 IN | HEART RATE: 70 BPM | BODY MASS INDEX: 34.6 KG/M2 | OXYGEN SATURATION: 98 % | SYSTOLIC BLOOD PRESSURE: 128 MMHG | WEIGHT: 188 LBS

## 2024-02-16 DIAGNOSIS — R00.2 PALPITATIONS: Primary | ICD-10-CM

## 2024-02-16 DIAGNOSIS — E66.09 CLASS 2 OBESITY DUE TO EXCESS CALORIES WITHOUT SERIOUS COMORBIDITY WITH BODY MASS INDEX (BMI) OF 35.0 TO 35.9 IN ADULT: ICD-10-CM

## 2024-02-16 DIAGNOSIS — I51.89 GRADE I DIASTOLIC DYSFUNCTION: ICD-10-CM

## 2024-02-16 DIAGNOSIS — E78.2 MIXED HYPERLIPIDEMIA: ICD-10-CM

## 2024-02-23 ENCOUNTER — OFFICE VISIT (OUTPATIENT)
Dept: FAMILY MEDICINE CLINIC | Facility: CLINIC | Age: 74
End: 2024-02-23
Payer: MEDICARE

## 2024-02-23 VITALS
OXYGEN SATURATION: 97 % | HEART RATE: 103 BPM | TEMPERATURE: 98 F | BODY MASS INDEX: 33.97 KG/M2 | DIASTOLIC BLOOD PRESSURE: 68 MMHG | SYSTOLIC BLOOD PRESSURE: 130 MMHG | WEIGHT: 184.6 LBS | HEIGHT: 62 IN

## 2024-02-23 DIAGNOSIS — B34.9 VIRAL ILLNESS: Primary | ICD-10-CM

## 2024-02-23 DIAGNOSIS — R19.7 DIARRHEA, UNSPECIFIED TYPE: ICD-10-CM

## 2024-02-23 LAB
EXPIRATION DATE: NORMAL
FLUAV AG UPPER RESP QL IA.RAPID: NOT DETECTED
FLUBV AG UPPER RESP QL IA.RAPID: NOT DETECTED
INTERNAL CONTROL: NORMAL
Lab: NORMAL
SARS-COV-2 AG UPPER RESP QL IA.RAPID: NOT DETECTED

## 2024-02-23 PROCEDURE — 85025 COMPLETE CBC W/AUTO DIFF WBC: CPT | Performed by: NURSE PRACTITIONER

## 2024-02-23 PROCEDURE — 36415 COLL VENOUS BLD VENIPUNCTURE: CPT | Performed by: NURSE PRACTITIONER

## 2024-02-23 PROCEDURE — 80053 COMPREHEN METABOLIC PANEL: CPT | Performed by: NURSE PRACTITIONER

## 2024-02-23 RX ORDER — LACTOBACILLUS RHAMNOSUS GG 10B CELL
1 CAPSULE ORAL DAILY
Qty: 30 CAPSULE | Refills: 0 | Status: SHIPPED | OUTPATIENT
Start: 2024-02-23 | End: 2024-03-24

## 2024-02-23 RX ORDER — ONDANSETRON 4 MG/1
4 TABLET, ORALLY DISINTEGRATING ORAL EVERY 8 HOURS PRN
Qty: 15 TABLET | Refills: 0 | Status: SHIPPED | OUTPATIENT
Start: 2024-02-23 | End: 2024-02-28

## 2024-02-23 NOTE — PROGRESS NOTES
[facility]     Chief Complaint  Diarrhea and Fever    Shey King is a 73 y.o. female who presents today to [facility] for Diarrhea and Fever.    HPI:   Diarrhea   Associated symptoms include a fever.   Fever   Associated symptoms include diarrhea.      Patient presents today with complaints of having a viral illness earlier in the week (Sunday) that has since turned to diarrhea.  Reports earlier in the week she had fever, body aches, headaches, runny nose, chest congestion, cough etc. and then a few days later it progressed to diarrhea.  She is having some nausea with it at times.  Has had decreased appetite but has been able to maintain stable intake, however feels like when she eats it goes straight through her.  Diarrhea is described as yellow and watery in color. She has not noticed any blood in it, not had any recent antibiotics that she can recall.  Reports does have past history of diverticulitis but states she is not having the abdominal pain that she had with that, and she has not seen the slime or flem in her stool like she did when she had diverticulitis before.  Reports the fever and respiratory symptoms have completely resolved throughout the week but does feel slightly fatigued today.  Previous History:   Past Medical History:   Diagnosis Date    Abdominal pain, LLQ (left lower quadrant)     Abnormal ECG aug 2021    Abnormal Pap smear of cervix     several years ago    Anemia     years ago    Breast cancer     Cataract early stage    Cystitis     Diabetes mellitus     Diarrhea     Diverticulitis of colon     Diverticulosis     Ductal carcinoma in situ (DCIS) of left breast 12/21/2022    Encounter for cholecystectomy 2018    Gallstones     GERD (gastroesophageal reflux disease)     Hiatal hernia     Hyperglycemia     Hyperlipidemia     Hyperlipidemia     Hypothyroidism     Muscle spasm     FLANK PAIN    OAB (overactive bladder)     Pneumonia     years ago     Rectal bleeding     UTI (urinary tract infection)       Past Surgical History:   Procedure Laterality Date    BREAST BIOPSY Bilateral 2005    benign    BREAST BIOPSY Left 12/29/2022    Procedure: BREAST BIOPSY WITH NEEDLE LOCALIZATION;  Surgeon: Dong Mata MD;  Location: [facility] OR;  Service: General;  Laterality: Left;    CATARACT EXTRACTION WITH INTRAOCULAR LENS IMPLANT Right 01/29/2024    CHOLECYSTECTOMY  2018?    ENDOSCOPY      ENDOSCOPY N/A 01/11/2018    Procedure: ESOPHAGOGASTRODUODENOSCOPY;  Surgeon: Dong Mata MD;  Location: [facility] OR;  Service:     MAMMO BILATERAL  09/2015    OVARIAN CYST DRAINAGE      [place] LAPAROSCOPY SURG CHOLECYSTECTOMY N/A 01/11/2018    Procedure: CHOLECYSTECTOMY LAPAROSCOPIC;  Surgeon: Dong Mata MD;  Location: [facility] OR;  Service: General    US GUIDED LYMPH NODE BIOPSY  04/11/2018      Social History     Socioeconomic History    Marital status:    Tobacco Use    Smoking status: Never    Smokeless tobacco: Never    Tobacco comments:     never   Vaping Use    Vaping Use: Never used   Substance and Sexual Activity    Alcohol use: No    Drug use: No    Sexual activity: Not Currently     Partners: Male      Health Maintenance Due   Topic Date Due    COLORECTAL CANCER SCREENING  Never done    COVID-19 Vaccine (1) Never done    TDAP/TD VACCINES (1 - Tdap) Never done    ZOSTER VACCINE (1 of 2) Never done    RSV Vaccine - Adults (1 - 1-dose 60+ series) Never done    HEPATITIS C SCREENING  Never done    DIABETIC FOOT EXAM  Never done    PAP SMEAR  01/01/2018    DIABETIC EYE EXAM  02/26/2021        Current Medications:  Current Outpatient Medications   Medication Sig Dispense Refill    acetaminophen (TYLENOL) 325 MG tablet Take 2 tablets by mouth Every 4 (Four) Hours As Needed for Mild Pain. 30 tablet 0    anastrozole (ARIMIDEX) 1 MG tablet Take 1 tablet by mouth Daily.      aspirin 81 MG chewable tablet Chew 1 tablet Daily.      AZO-CRANBERRY PO Take 1  tablet by mouth Daily.      cyanocobalamin 1000 MCG/ML injection Inject 1 mL into the appropriate muscle as directed by prescriber Every 28 (Twenty-Eight) Days. 1 mL 6    empagliflozin (Jardiance) 10 MG tablet tablet TAKE 1 TABLET BY MOUTH DAILY 90 tablet 0    fenofibrate (TRICOR) 145 MG tablet Take 1 tablet by mouth Daily. 90 tablet 1    ferrous sulfate 325 (65 FE) MG tablet Take 1 tablet by mouth Every Other Day. 30 tablet 3    glucose monitor monitoring kit 1 each as needed (DM II). 1 each 0    Lancets (OneTouch Delica Plus [id number]) misc USE TO TEST BLOOD SUGAR DAILY AS DIRECTED      Lancets Misc. misc For Daily testing  E11.65 50 each 11    levothyroxine (Synthroid) 100 MCG tablet Take 1 tablet by mouth Daily. 90 tablet 1    OneTouch Ultra test strip USE TO TEST BLOOD SUGAR DAILY AS DIRECTED 50 each 5    prednisoLONE acetate (PRED FORTE) 1 % ophthalmic suspension Administer 1 drop to the right eye 4 (Four) Times a Day.      Probiotic Product (PROBIOTIC DAILY PO) Take  by mouth.      tobramycin 0.3 % solution ophthalmic solution Administer 1 drop to the right eye Every 6 (Six) Hours.      vitamin C (ASCORBIC ACID) 500 MG tablet Take 1 tablet by mouth Every Other Day. 30 tablet 3    vitamin D (ERGOCALCIFEROL) 1.25 MG (27698 UT) capsule capsule Take 1 capsule by mouth 1 (One) Time Per Week. 5 capsule 2    Lactobacillus-Inulin (Culturelle Digestive Daily) capsule Take 1 capsule by mouth Daily for 30 days. 30 capsule 0    ondansetron ODT (ZOFRAN-ODT) 4 MG disintegrating tablet Place 1 tablet on the tongue Every 8 (Eight) Hours As Needed for Nausea or Vomiting for up to 5 days. 15 tablet 0     No current facility-administered medications for this visit.       Allergies:   Allergies   Allergen Reactions    Other Unknown - High Severity     Blisters from EKG stickers    Bactrim [Sulfamethoxazole-Trimethoprim] Hives    Ciprofloxacin Hives    Penicillins Hives    Steri-Strip Compound Benzoin [Benzoin Compound] Hives     "Sulfa Antibiotics Hives       Vitals:   /68 (BP Location: Right arm, Patient Position: Sitting)   Pulse 103   Temp 98 °F (36.7 °C) (Temporal)   Ht 157.5 cm (62.01\")   Wt 83.7 kg (184 lb 9.6 oz)   SpO2 97%   BMI 33.76 kg/m²   Estimated body mass index is 33.76 kg/m² as calculated from the following:    Height as of this encounter: 157.5 cm (62.01\").    Weight as of this encounter: 83.7 kg (184 lb 9.6 oz).             Physical Exam:   Physical Exam  Vitals reviewed.   Constitutional:       Appearance: Normal appearance.   HENT:      Head: Normocephalic.   Eyes:      Extraocular Movements: Extraocular movements intact.      Conjunctiva/sclera: Conjunctivae normal.   Cardiovascular:      Rate and Rhythm: Normal rate.      Heart sounds: Normal heart sounds.   Pulmonary:      Effort: Pulmonary effort is normal.      Breath sounds: Normal breath sounds.   Abdominal:      General: Bowel sounds are normal. There is no distension.      Palpations: Abdomen is soft.      Tenderness: There is no abdominal tenderness.   Skin:     General: Skin is warm and dry.   Neurological:      Mental Status: She is alert. Mental status is at baseline.      Comments: Normal gait    Psychiatric:         Mood and Affect: Mood normal.          Lab Results:   Office Visit on 02/23/2024   Component Date Value Ref Range Status    SARS Antigen 02/23/2024 Not Detected  Not Detected, Presumptive Negative Final    Influenza A Antigen SHERRON 02/23/2024 Not Detected  Not Detected Final    Influenza B Antigen SHERRON 02/23/2024 Not Detected  Not Detected Final    Internal Control 02/23/2024 Passed  Passed Final    Lot Number 02/23/2024 [id number]   Final    Expiration Date 02/23/2024 01/08/2025   Final   Office Visit on 02/06/2024   Component Date Value Ref Range Status    Color 02/06/2024 Yellow  Yellow, Straw, Dark Yellow, Ana Final    Clarity, UA 02/06/2024 Clear  Clear Final    Glucose, UA 02/06/2024 3+ (A)  Negative mg/dL Final    Bilirubin " 02/06/2024 Negative  Negative Final    Ketones, UA 02/06/2024 Negative  Negative Final    Specific Gravity  02/06/2024 1.025  1.005 - 1.030 Final    Blood, UA 02/06/2024 Negative  Negative Final    pH, Urine 02/06/2024 6.0  5.0 - 8.0 Final    Protein, POC 02/06/2024 Negative  Negative mg/dL Final    Urobilinogen, UA 02/06/2024 Normal  Normal, 0.2 E.U./dL Final    Leukocytes 02/06/2024 Negative  Negative Final    Nitrite, UA 02/06/2024 Negative  Negative Final    Hemoglobin A1C 02/06/2024 5.6  4.5 - 5.7 % Final    Lot Number 02/06/2024 [id number]   Final    Expiration Date 02/06/2024 09/28/2025   Final    Urine Culture 02/06/2024 No growth   Final    WBC 02/06/2024 5.62  3.40 - 10.80 10*3/mm3 Final    RBC 02/06/2024 4.18  3.77 - 5.28 10*6/mm3 Final    Hemoglobin 02/06/2024 12.3  12.0 - 15.9 g/dL Final    Hematocrit 02/06/2024 37.2  34.0 - 46.6 % Final    MCV 02/06/2024 89.0  79.0 - 97.0 fL Final    MCH 02/06/2024 29.4  26.6 - 33.0 pg Final    MCHC 02/06/2024 33.1  31.5 - 35.7 g/dL Final    RDW 02/06/2024 13.8  12.3 - 15.4 % Final    RDW-SD 02/06/2024 45.0  37.0 - 54.0 fl Final    MPV 02/06/2024 11.8  6.0 - 12.0 fL Final    Platelets 02/06/2024 256  140 - 450 10*3/mm3 Final    Neutrophil % 02/06/2024 69.1  42.7 - 76.0 % Final    Lymphocyte % 02/06/2024 17.1 (L)  19.6 - 45.3 % Final    Monocyte % 02/06/2024 8.7  5.0 - 12.0 % Final    Eosinophil % 02/06/2024 3.6  0.3 - 6.2 % Final    Basophil % 02/06/2024 1.1  0.0 - 1.5 % Final    Immature Grans % 02/06/2024 0.4  0.0 - 0.5 % Final    Neutrophils, Absolute 02/06/2024 3.89  1.70 - 7.00 10*3/mm3 Final    Lymphocytes, Absolute 02/06/2024 0.96  0.70 - 3.10 10*3/mm3 Final    Monocytes, Absolute 02/06/2024 0.49  0.10 - 0.90 10*3/mm3 Final    Eosinophils, Absolute 02/06/2024 0.20  0.00 - 0.40 10*3/mm3 Final    Basophils, Absolute 02/06/2024 0.06  0.00 - 0.20 10*3/mm3 Final    Immature Grans, Absolute 02/06/2024 0.02  0.00 - 0.05 10*3/mm3 Final    nRBC 02/06/2024 0.0  0.0 - 0.2  /100 WBC Final    Glucose 02/06/2024 126 (H)  65 - 99 mg/dL Final    BUN 02/06/2024 18  8 - 23 mg/dL Final    Creatinine 02/06/2024 1.04 (H)  0.57 - 1.00 mg/dL Final    Sodium 02/06/2024 141  136 - 145 mmol/L Final    Potassium 02/06/2024 4.5  3.5 - 5.2 mmol/L Final    Chloride 02/06/2024 105  98 - 107 mmol/L Final    CO2 02/06/2024 23.9  22.0 - 29.0 mmol/L Final    Calcium 02/06/2024 9.6  8.6 - 10.5 mg/dL Final    Total Protein 02/06/2024 8.0  6.0 - 8.5 g/dL Final    Albumin 02/06/2024 4.7  3.5 - 5.2 g/dL Final    ALT (SGPT) 02/06/2024 13  1 - 33 U/L Final    AST (SGOT) 02/06/2024 17  1 - 32 U/L Final    Alkaline Phosphatase 02/06/2024 81  39 - 117 U/L Final    Total Bilirubin 02/06/2024 0.5  0.0 - 1.2 mg/dL Final    Globulin 02/06/2024 3.3  gm/dL Final    A/G Ratio 02/06/2024 1.4  g/dL Final    BUN/Creatinine Ratio 02/06/2024 17.3  7.0 - 25.0 Final    Anion Gap 02/06/2024 12.1  5.0 - 15.0 mmol/L Final    eGFR 02/06/2024 57.6 (L)  >60.0 mL/min/1.73 Final    TSH 02/06/2024 1.040  0.270 - 4.200 uIU/mL Final    Total Cholesterol 02/06/2024 189  0 - 200 mg/dL Final    Triglycerides 02/06/2024 111  0 - 150 mg/dL Final    HDL Cholesterol 02/06/2024 43  40 - 60 mg/dL Final    LDL Cholesterol  02/06/2024 126 (H)  0 - 100 mg/dL Final    VLDL Cholesterol 02/06/2024 20  5 - 40 mg/dL Final    LDL/HDL Ratio 02/06/2024 2.88   Final   Office Visit on 12/06/2023   Component Date Value Ref Range Status    Color 12/06/2023 Yellow  Yellow, Straw, Dark Yellow, Ana Final    Clarity, UA 12/06/2023 Clear  Clear Final    Specific Gravity  12/06/2023 1.015  1.005 - 1.030 Final    pH, Urine 12/06/2023 5.0  5.0 - 8.0 Final    Leukocytes 12/06/2023 Negative  Negative Final    Nitrite, UA 12/06/2023 Negative  Negative Final    Protein, POC 12/06/2023 Negative  Negative mg/dL Final    Glucose, UA 12/06/2023 3+ (A)  Negative mg/dL Final    Ketones, UA 12/06/2023 Negative  Negative Final    Urobilinogen, UA 12/06/2023 Normal  Normal, 0.2  E.U./dL Final    Bilirubin 12/06/2023 Negative  Negative Final    Blood, UA 12/06/2023 Negative  Negative Final    Lot Number 12/06/2023 n   Final    Expiration Date 12/06/2023 n   Final    Urine Culture 12/06/2023 No growth   Final       Results review: During today's encounter, all relevant clinical data has been reviewed.      Assessment and Plan  Diagnoses and all orders for this visit:    1. Viral illness (Primary)    2. Diarrhea, unspecified type  -     POCT SARS-CoV-2 Antigen SHERRON + Flu  -     CBC Auto Differential; Future  -     Comprehensive Metabolic Panel; Future  -     CBC Auto Differential  -     Comprehensive Metabolic Panel    Other orders  -     Lactobacillus-Inulin (Culturelle Digestive Daily) capsule; Take 1 capsule by mouth Daily for 30 days.  Dispense: 30 capsule; Refill: 0  -     ondansetron ODT (ZOFRAN-ODT) 4 MG disintegrating tablet; Place 1 tablet on the tongue Every 8 (Eight) Hours As Needed for Nausea or Vomiting for up to 5 days.  Dispense: 15 tablet; Refill: 0      #1 I suspect patient had a viral illness earlier in the week and diarrhea is symptomatic of that.  We are going to do lab work today, and we discussed differential diagnosis at today's visit.  She is going to follow-up early next week with PCP in case symptoms do not improve.  We discussed using a probiotic, and brat diet and the importance of staying hydrated.  She verbalized understanding.  If her symptoms worsen she knows to go to ER over the weekend.  We discussed virus education and typical course of symptoms.  She reports that she is going to try Imodium otc as needed for the next 2 days as the diarrhea is somewhat affecting her daily life due to frequency, and if symptoms persist longer than that she is going to notify clinic.           New Medications:   New Medications Ordered This Visit   Medications    Lactobacillus-Inulin (Culturelle Digestive Daily) capsule     Sig: Take 1 capsule by mouth Daily for 30 days.      Dispense:  30 capsule     Refill:  0    ondansetron ODT (ZOFRAN-ODT) 4 MG disintegrating tablet     Sig: Place 1 tablet on the tongue Every 8 (Eight) Hours As Needed for Nausea or Vomiting for up to 5 days.     Dispense:  15 tablet     Refill:  0       Discontinued Medications:   There are no discontinued medications.           Visit Diagnoses:    ICD-10-CM ICD-9-CM   1. Viral illness  B34.9 079.99   2. Diarrhea, unspecified type  R19.7 787.91            Follow Up:   No follow-ups on file.    Patient was given instructions and counseling regarding her condition or for health maintenance advice. Please see specific information pulled into the AVS if appropriate.       This document has been electronically signed by BERT Costa   February 23, 2024 12:50 EST    Dictated Utilizing Dragon Dictation: Part of this note may be an electronic transcription/translation of spoken language to printed text using the Dragon Dictation System.

## 2024-02-24 LAB
ALBUMIN SERPL-MCNC: 4.6 G/DL (ref 3.5–5.2)
ALBUMIN/GLOB SERPL: 1.4 G/DL
ALP SERPL-CCNC: 80 U/L (ref 39–117)
ALT SERPL W P-5'-P-CCNC: 17 U/L (ref 1–33)
ANION GAP SERPL CALCULATED.3IONS-SCNC: 12.5 MMOL/L (ref 5–15)
AST SERPL-CCNC: 15 U/L (ref 1–32)
BASOPHILS # BLD AUTO: 0.02 10*3/MM3 (ref 0–0.2)
BASOPHILS NFR BLD AUTO: 0.3 % (ref 0–1.5)
BILIRUB SERPL-MCNC: 0.8 MG/DL (ref 0–1.2)
BUN SERPL-MCNC: 14 MG/DL (ref 8–23)
BUN/CREAT SERPL: 14.4 (ref 7–25)
CALCIUM SPEC-SCNC: 9.4 MG/DL (ref 8.6–10.5)
CHLORIDE SERPL-SCNC: 103 MMOL/L (ref 98–107)
CO2 SERPL-SCNC: 22.5 MMOL/L (ref 22–29)
CREAT SERPL-MCNC: 0.97 MG/DL (ref 0.57–1)
DEPRECATED RDW RBC AUTO: 42.9 FL (ref 37–54)
EGFRCR SERPLBLD CKD-EPI 2021: 62.6 ML/MIN/1.73
EOSINOPHIL # BLD AUTO: 0.15 10*3/MM3 (ref 0–0.4)
EOSINOPHIL NFR BLD AUTO: 2.2 % (ref 0.3–6.2)
ERYTHROCYTE [DISTWIDTH] IN BLOOD BY AUTOMATED COUNT: 13.5 % (ref 12.3–15.4)
GLOBULIN UR ELPH-MCNC: 3.3 GM/DL
GLUCOSE SERPL-MCNC: 112 MG/DL (ref 65–99)
HCT VFR BLD AUTO: 39.9 % (ref 34–46.6)
HGB BLD-MCNC: 13.2 G/DL (ref 12–15.9)
IMM GRANULOCYTES # BLD AUTO: 0.03 10*3/MM3 (ref 0–0.05)
IMM GRANULOCYTES NFR BLD AUTO: 0.4 % (ref 0–0.5)
LYMPHOCYTES # BLD AUTO: 0.85 10*3/MM3 (ref 0.7–3.1)
LYMPHOCYTES NFR BLD AUTO: 12.7 % (ref 19.6–45.3)
MCH RBC QN AUTO: 28.9 PG (ref 26.6–33)
MCHC RBC AUTO-ENTMCNC: 33.1 G/DL (ref 31.5–35.7)
MCV RBC AUTO: 87.5 FL (ref 79–97)
MONOCYTES # BLD AUTO: 0.41 10*3/MM3 (ref 0.1–0.9)
MONOCYTES NFR BLD AUTO: 6.1 % (ref 5–12)
NEUTROPHILS NFR BLD AUTO: 5.25 10*3/MM3 (ref 1.7–7)
NEUTROPHILS NFR BLD AUTO: 78.3 % (ref 42.7–76)
NRBC BLD AUTO-RTO: 0 /100 WBC (ref 0–0.2)
PLATELET # BLD AUTO: 286 10*3/MM3 (ref 140–450)
PMV BLD AUTO: 10.9 FL (ref 6–12)
POTASSIUM SERPL-SCNC: 4.6 MMOL/L (ref 3.5–5.2)
PROT SERPL-MCNC: 7.9 G/DL (ref 6–8.5)
RBC # BLD AUTO: 4.56 10*6/MM3 (ref 3.77–5.28)
SODIUM SERPL-SCNC: 138 MMOL/L (ref 136–145)
WBC NRBC COR # BLD AUTO: 6.71 10*3/MM3 (ref 3.4–10.8)

## 2024-02-26 ENCOUNTER — OFFICE VISIT (OUTPATIENT)
Dept: FAMILY MEDICINE CLINIC | Facility: CLINIC | Age: 74
End: 2024-02-26
Payer: MEDICARE

## 2024-02-26 VITALS
WEIGHT: 184.4 LBS | HEART RATE: 95 BPM | OXYGEN SATURATION: 97 % | BODY MASS INDEX: 33.93 KG/M2 | TEMPERATURE: 98 F | SYSTOLIC BLOOD PRESSURE: 120 MMHG | DIASTOLIC BLOOD PRESSURE: 60 MMHG | HEIGHT: 62 IN

## 2024-02-26 DIAGNOSIS — N39.0 RECURRENT UTI: Primary | ICD-10-CM

## 2024-02-26 LAB
BILIRUB BLD-MCNC: ABNORMAL MG/DL
CLARITY, POC: CLEAR
COLOR UR: YELLOW
EXPIRATION DATE: ABNORMAL
GLUCOSE UR STRIP-MCNC: ABNORMAL MG/DL
KETONES UR QL: NEGATIVE
LEUKOCYTE EST, POC: ABNORMAL
Lab: ABNORMAL
NITRITE UR-MCNC: NEGATIVE MG/ML
PH UR: 6 [PH] (ref 5–8)
PROT UR STRIP-MCNC: ABNORMAL MG/DL
RBC # UR STRIP: ABNORMAL /UL
SP GR UR: 1.01 (ref 1–1.03)
UROBILINOGEN UR QL: ABNORMAL

## 2024-02-26 PROCEDURE — 87086 URINE CULTURE/COLONY COUNT: CPT | Performed by: NURSE PRACTITIONER

## 2024-02-26 RX ORDER — CEFTRIAXONE 1 G/1
1 INJECTION, POWDER, FOR SOLUTION INTRAMUSCULAR; INTRAVENOUS ONCE
Status: COMPLETED | OUTPATIENT
Start: 2024-02-26 | End: 2024-02-26

## 2024-02-26 RX ADMIN — CEFTRIAXONE 1 G: 1 INJECTION, POWDER, FOR SOLUTION INTRAMUSCULAR; INTRAVENOUS at 10:32

## 2024-02-26 NOTE — PROGRESS NOTES
"Chief Complaint  Urinary Tract Infection    Subjective        Shey King presents to [facility]  Urinary Tract Infection   Chronicity: HX Recurrent UTI on oral antibiotic daily from urology with 2 days increased symptoms following VIral illness with diarrhea. The problem occurs constantly. The problem has been worse. The quality of the pain is described as aching and burning. The pain is at a severity of 5/10. The pain is moderate. There has been no fever. Associated symptoms include frequency. Pertinent negatives include no urgency. She has tried antibiotics and increased fluids for the symptoms. The treatment provided mild relief. Her past medical history is significant for recurrent UTIs.       Objective   Vital Signs:  /60 (BP Location: Right arm, Patient Position: Sitting)   Pulse 95   Temp 98 °F (36.7 °C)   Ht 157.5 cm (62.01\")   Wt 83.6 kg (184 lb 6.4 oz)   SpO2 97%   BMI 33.72 kg/m²   Estimated body mass index is 33.72 kg/m² as calculated from the following:    Height as of this encounter: 157.5 cm (62.01\").    Weight as of this encounter: 83.6 kg (184 lb 6.4 oz).               Physical Exam  Vitals and nursing note reviewed.   Constitutional:       General: She is not in acute distress.     Appearance: She is well-developed. She is not ill-appearing.   Cardiovascular:      Rate and Rhythm: Normal rate and regular rhythm.      Heart sounds: Normal heart sounds. No murmur heard.  Pulmonary:      Effort: Pulmonary effort is normal.      Breath sounds: Normal breath sounds.   Abdominal:      Palpations: Abdomen is soft.      Tenderness: There is no right CVA tenderness or left CVA tenderness.   Skin:     General: Skin is warm and dry.   Neurological:      Mental Status: She is alert and oriented to person, place, and time.   Psychiatric:         Behavior: Behavior normal.        Result Review :    During this visit the following were done:  Labs Reviewed [x]  "   Labs Ordered [x]    Radiology Reports Reviewed []    Radiology Ordered []    PCP Records Reviewed []    Referring Provider Records Reviewed []    ER Records Reviewed []    Hospital Records Reviewed []    History Obtained From Family []    Radiology Images Reviewed []    Other Reviewed []    Records Requested []                 Assessment and Plan   Diagnoses and all orders for this visit:    1. Recurrent UTI (Primary)  -     Urine Culture - Urine, Urine, Clean Catch; Future  -     POCT urinalysis dipstick, automated  -     cefTRIAXone (ROCEPHIN) injection 1 g  -     Urine Culture - Urine, Urine, Clean Catch    Increase Macrobid to Twice daily.  Will follow with culture           Follow Up     Return for 10 days .    Patient was given instructions and counseling regarding her condition or for health maintenance advice. Please see specific information pulled into the AVS if appropriate.       This document has been electronically signed by BERT Lucas   February 26, 2024 15:16 EST

## 2024-02-27 ENCOUNTER — TELEPHONE (OUTPATIENT)
Dept: FAMILY MEDICINE CLINIC | Facility: CLINIC | Age: 74
End: 2024-02-27
Payer: COMMERCIAL

## 2024-02-27 NOTE — TELEPHONE ENCOUNTER
Shey called today to notify you that she is feeling better after getting the rocephin shot yesterday, and she wanted to know, if her urine culture is not back by tomorrow, if she could come in for another rocephin shot? Please advise.

## 2024-02-27 NOTE — TELEPHONE ENCOUNTER
So far Urine culture is no growth   Lets wait until morning and see if any change with culture report

## 2024-02-28 ENCOUNTER — TELEPHONE (OUTPATIENT)
Dept: FAMILY MEDICINE CLINIC | Facility: CLINIC | Age: 74
End: 2024-02-28
Payer: COMMERCIAL

## 2024-02-28 ENCOUNTER — CLINICAL SUPPORT (OUTPATIENT)
Dept: FAMILY MEDICINE CLINIC | Facility: CLINIC | Age: 74
End: 2024-02-28
Payer: MEDICARE

## 2024-02-28 DIAGNOSIS — J44.0 COPD WITH LOWER RESPIRATORY INFECTION: Primary | ICD-10-CM

## 2024-02-28 DIAGNOSIS — N39.0 RECURRENT UTI: Primary | ICD-10-CM

## 2024-02-28 LAB — BACTERIA SPEC AEROBE CULT: NO GROWTH

## 2024-02-28 PROCEDURE — 96372 THER/PROPH/DIAG INJ SC/IM: CPT | Performed by: NURSE PRACTITIONER

## 2024-02-28 RX ORDER — CEFTRIAXONE 1 G/1
1 INJECTION, POWDER, FOR SOLUTION INTRAMUSCULAR; INTRAVENOUS EVERY 24 HOURS
Status: SHIPPED | OUTPATIENT
Start: 2024-02-28 | End: 2024-03-01

## 2024-02-28 RX ADMIN — CEFTRIAXONE 1 G: 1 INJECTION, POWDER, FOR SOLUTION INTRAMUSCULAR; INTRAVENOUS at 15:59

## 2024-02-28 NOTE — TELEPHONE ENCOUNTER
----- Message from BERT Costa sent at 2/27/2024  8:40 PM EST -----  Can we let patient know nothing concerning on labwork. Thank you.

## 2024-02-28 NOTE — TELEPHONE ENCOUNTER
----- Message from BERT Lucas sent at 2/28/2024 12:52 PM EST -----  Let patient know Urine culture is still no growth

## 2024-03-07 ENCOUNTER — OFFICE VISIT (OUTPATIENT)
Dept: FAMILY MEDICINE CLINIC | Facility: CLINIC | Age: 74
End: 2024-03-07
Payer: MEDICARE

## 2024-03-07 VITALS
BODY MASS INDEX: 34.78 KG/M2 | DIASTOLIC BLOOD PRESSURE: 90 MMHG | OXYGEN SATURATION: 99 % | HEIGHT: 62 IN | TEMPERATURE: 97.7 F | HEART RATE: 70 BPM | WEIGHT: 189 LBS | SYSTOLIC BLOOD PRESSURE: 118 MMHG

## 2024-03-07 DIAGNOSIS — N39.0 RECURRENT UTI: Primary | ICD-10-CM

## 2024-03-07 LAB
BILIRUB BLD-MCNC: NEGATIVE MG/DL
CLARITY, POC: CLEAR
COLOR UR: YELLOW
EXPIRATION DATE: ABNORMAL
GLUCOSE UR STRIP-MCNC: ABNORMAL MG/DL
KETONES UR QL: NEGATIVE
LEUKOCYTE EST, POC: NEGATIVE
Lab: ABNORMAL
NITRITE UR-MCNC: NEGATIVE MG/ML
PH UR: 6 [PH] (ref 5–8)
PROT UR STRIP-MCNC: NEGATIVE MG/DL
RBC # UR STRIP: NEGATIVE /UL
SP GR UR: 1.01 (ref 1–1.03)
UROBILINOGEN UR QL: ABNORMAL

## 2024-03-07 PROCEDURE — 3044F HG A1C LEVEL LT 7.0%: CPT | Performed by: FAMILY MEDICINE

## 2024-03-07 PROCEDURE — 87086 URINE CULTURE/COLONY COUNT: CPT | Performed by: FAMILY MEDICINE

## 2024-03-07 PROCEDURE — 81003 URINALYSIS AUTO W/O SCOPE: CPT | Performed by: FAMILY MEDICINE

## 2024-03-07 PROCEDURE — 99213 OFFICE O/P EST LOW 20 MIN: CPT | Performed by: FAMILY MEDICINE

## 2024-03-07 NOTE — PROGRESS NOTES
"Chief Complaint  Urinary Tract Infection    Subjective          Shey King presents to [facility]  History of Present Illness    Patient is here to follow up on UTI. States her symptoms have resolved at this time.     Review of Systems      Objective   Vital Signs:   /90 (BP Location: Right arm, Patient Position: Sitting)   Pulse 70   Temp 97.7 °F (36.5 °C)   Ht 157.5 cm (62.01\")   Wt 85.7 kg (189 lb)   SpO2 99%   BMI 34.56 kg/m²     Physical Exam  Constitutional:       General: She is not in acute distress.     Appearance: Normal appearance. She is well-developed and well-groomed. She is not ill-appearing, toxic-appearing or diaphoretic.   HENT:      Head: Normocephalic.      Nose: Nose normal. No congestion or rhinorrhea.      Mouth/Throat:      Mouth: Mucous membranes are moist.      Pharynx: Oropharynx is clear. No oropharyngeal exudate or posterior oropharyngeal erythema.   Eyes:      General: Lids are normal.         Right eye: No discharge.         Left eye: No discharge.      Extraocular Movements: Extraocular movements intact.      Pupils: Pupils are equal, round, and reactive to light.   Neck:      Vascular: No carotid bruit.   Cardiovascular:      Rate and Rhythm: Normal rate and regular rhythm.      Pulses: Normal pulses.      Heart sounds: Normal heart sounds. No murmur heard.     No friction rub. No gallop.   Pulmonary:      Effort: Pulmonary effort is normal. No respiratory distress.      Breath sounds: Normal breath sounds. No stridor. No wheezing, rhonchi or rales.   Chest:      Chest wall: No tenderness.   Abdominal:      General: Bowel sounds are normal. There is no distension.      Palpations: Abdomen is soft. There is no mass.      Tenderness: There is no abdominal tenderness. There is no right CVA tenderness, left CVA tenderness, guarding or rebound.      Hernia: No hernia is present.   Musculoskeletal:         General: No swelling or tenderness. " Normal range of motion.      Cervical back: Normal range of motion and neck supple. No rigidity or tenderness.      Right lower leg: No edema.      Left lower leg: No edema.   Lymphadenopathy:      Cervical: No cervical adenopathy.   Skin:     General: Skin is warm.      Capillary Refill: Capillary refill takes less than 2 seconds.      Coloration: Skin is not jaundiced.      Findings: No bruising, erythema or rash.   Neurological:      General: No focal deficit present.      Mental Status: She is alert and oriented to person, place, and time.      Motor: Motor function is intact. No weakness.      Coordination: Coordination is intact.      Gait: Gait is intact. Gait normal.   Psychiatric:         Attention and Perception: Attention normal.         Mood and Affect: Mood normal.         Speech: Speech normal.         Behavior: Behavior normal.         Cognition and Memory: Cognition normal.         Judgment: Judgment normal.        Result Review :                 Assessment and Plan    Diagnoses and all orders for this visit:    1. Recurrent UTI (Primary)  -     POCT urinalysis dipstick, automated  -     Urine Culture - Urine, Urine, Clean Catch; Future      Patient's Body mass index is 34.56 kg/m². indicating that she is obese (BMI >30). Obesity-related health conditions include the following: diabetes mellitus and dyslipidemias. Obesity is unchanged. BMI is is above average; BMI management plan is completed. We discussed low calorie, low carb based diet program, portion control, and increasing exercise..    Follow Up   No follow-ups on file.  Patient was given instructions and counseling regarding her condition or for health maintenance advice. Please see specific information pulled into the AVS if appropriate.     This document has been electronically signed by BERT Marie  March 7, 2024 10:08 EST

## 2024-03-08 LAB — BACTERIA SPEC AEROBE CULT: NO GROWTH

## 2024-03-11 ENCOUNTER — TELEPHONE (OUTPATIENT)
Dept: FAMILY MEDICINE CLINIC | Facility: CLINIC | Age: 74
End: 2024-03-11
Payer: COMMERCIAL

## 2024-03-11 NOTE — TELEPHONE ENCOUNTER
----- Message from BERT Marie sent at 3/11/2024  8:43 AM EDT -----  Please let patient know results are normal. Thank you.

## 2024-03-18 RX ORDER — ANASTROZOLE 1 MG/1
1 TABLET ORAL DAILY
Qty: 30 TABLET | Refills: 11 | Status: SHIPPED | OUTPATIENT
Start: 2024-03-18

## 2024-03-18 RX ORDER — ANASTROZOLE 1 MG/1
1 TABLET ORAL DAILY
OUTPATIENT
Start: 2024-03-18

## 2024-03-18 NOTE — TELEPHONE ENCOUNTER
Caller: Fernando Shey LUCIO    Relationship: Self    Best call back number: [phone]    Requested Prescriptions:   Requested Prescriptions     Pending Prescriptions Disp Refills    anastrozole (ARIMIDEX) 1 MG tablet       Sig: Take 1 tablet by mouth Daily.        Pharmacy where request should be sent: [id number] [address] - [phone] PH - [phone] FX     Last office visit with prescribing clinician: 1/24/2024   Last telemedicine visit with prescribing clinician: Visit date not found   Next office visit with prescribing clinician: 4/25/2024     Additional details provided by patient: COMPLETLEY OUT     Does the patient have less than a 3 day supply:  [x] Yes  [] No    Would you like a call back once the refill request has been completed: [] Yes [x] No    If the office needs to give you a call back, can they leave a voicemail: [] Yes [x] No

## 2024-04-16 ENCOUNTER — OFFICE VISIT (OUTPATIENT)
Dept: FAMILY MEDICINE CLINIC | Facility: CLINIC | Age: 74
End: 2024-04-16
Payer: MEDICARE

## 2024-04-16 VITALS
DIASTOLIC BLOOD PRESSURE: 60 MMHG | TEMPERATURE: 97.3 F | WEIGHT: 187.6 LBS | BODY MASS INDEX: 34.52 KG/M2 | OXYGEN SATURATION: 98 % | HEIGHT: 62 IN | HEART RATE: 73 BPM | SYSTOLIC BLOOD PRESSURE: 104 MMHG

## 2024-04-16 DIAGNOSIS — R05.9 COUGH, UNSPECIFIED TYPE: Primary | ICD-10-CM

## 2024-04-16 DIAGNOSIS — J11.1 INFLUENZA: ICD-10-CM

## 2024-04-16 PROCEDURE — 3044F HG A1C LEVEL LT 7.0%: CPT | Performed by: NURSE PRACTITIONER

## 2024-04-16 PROCEDURE — 1160F RVW MEDS BY RX/DR IN RCRD: CPT | Performed by: NURSE PRACTITIONER

## 2024-04-16 PROCEDURE — 99213 OFFICE O/P EST LOW 20 MIN: CPT | Performed by: NURSE PRACTITIONER

## 2024-04-16 PROCEDURE — 1159F MED LIST DOCD IN RCRD: CPT | Performed by: NURSE PRACTITIONER

## 2024-04-16 NOTE — PROGRESS NOTES
"Chief Complaint  Cough    Subjective        hSey King presents to [facility]  Cough  This is a new (DX flu 9 days ago continues to cough) problem. The problem has been unchanged. The problem occurs every few minutes. The cough is Dry. Pertinent negatives include no chills, fever, shortness of breath or wheezing. Nothing aggravates the symptoms. She has tried nothing for the symptoms.       Objective   Vital Signs:  /60 (BP Location: Right arm, Patient Position: Sitting)   Pulse 73   Temp 97.3 °F (36.3 °C) (Temporal)   Ht 157.5 cm (62.01\")   Wt 85.1 kg (187 lb 9.6 oz)   SpO2 98%   BMI 34.30 kg/m²   Estimated body mass index is 34.3 kg/m² as calculated from the following:    Height as of this encounter: 157.5 cm (62.01\").    Weight as of this encounter: 85.1 kg (187 lb 9.6 oz).               Physical Exam  Vitals and nursing note reviewed.   Constitutional:       General: She is not in acute distress.     Appearance: She is well-developed. She is not ill-appearing.   HENT:      Head: Normocephalic.      Right Ear: Tympanic membrane, ear canal and external ear normal. There is no impacted cerumen.      Left Ear: Tympanic membrane, ear canal and external ear normal. There is no impacted cerumen.      Nose: No congestion or rhinorrhea.      Mouth/Throat:      Pharynx: No oropharyngeal exudate or posterior oropharyngeal erythema.   Eyes:      General:         Right eye: No discharge.         Left eye: No discharge.      Conjunctiva/sclera: Conjunctivae normal.   Cardiovascular:      Rate and Rhythm: Normal rate and regular rhythm.      Heart sounds: Normal heart sounds. No murmur heard.  Pulmonary:      Effort: Pulmonary effort is normal.      Breath sounds: Normal breath sounds. No wheezing.   Skin:     General: Skin is warm and dry.   Neurological:      Mental Status: She is alert and oriented to person, place, and time.   Psychiatric:         Behavior: Behavior normal. "        Result Review :             Assessment and Plan   Diagnoses and all orders for this visit:    1. Cough, unspecified type (Primary)    2. Influenza    Encouraged continued rest increase fluids and add mucinex for cough  RTC if any new or worsening concerns           Follow Up     Return if symptoms worsen or fail to improve.    Patient was given instructions and counseling regarding her condition or for health maintenance advice. Please see specific information pulled into the AVS if appropriate.       This document has been electronically signed by BERT Lucas   April 16, 2024 12:33 EDT

## 2024-04-24 DIAGNOSIS — Z78.0 POSTMENOPAUSAL: ICD-10-CM

## 2024-04-24 DIAGNOSIS — E53.8 FOLATE DEFICIENCY: ICD-10-CM

## 2024-04-24 DIAGNOSIS — E61.1 IRON DEFICIENCY: ICD-10-CM

## 2024-04-24 DIAGNOSIS — D05.12 DUCTAL CARCINOMA IN SITU (DCIS) OF LEFT BREAST: Primary | ICD-10-CM

## 2024-04-24 DIAGNOSIS — M89.9 BONE DISEASE: ICD-10-CM

## 2024-04-25 ENCOUNTER — HOSPITAL ENCOUNTER (OUTPATIENT)
Dept: MAMMOGRAPHY | Facility: HOSPITAL | Age: 74
Discharge: HOME | End: 2024-04-25
Payer: COMMERCIAL

## 2024-04-25 ENCOUNTER — LAB (OUTPATIENT)
Dept: ONCOLOGY | Facility: CLINIC | Age: 74
End: 2024-04-25
Payer: MEDICARE

## 2024-04-25 ENCOUNTER — HOSPITAL ENCOUNTER (OUTPATIENT)
Dept: ULTRASOUND IMAGING | Facility: HOSPITAL | Age: 74
Discharge: HOME | End: 2024-04-25
Payer: COMMERCIAL

## 2024-04-25 ENCOUNTER — OFFICE VISIT (OUTPATIENT)
Dept: ONCOLOGY | Facility: CLINIC | Age: 74
End: 2024-04-25
Payer: MEDICARE

## 2024-04-25 VITALS
HEART RATE: 74 BPM | DIASTOLIC BLOOD PRESSURE: 72 MMHG | OXYGEN SATURATION: 98 % | HEIGHT: 62 IN | SYSTOLIC BLOOD PRESSURE: 149 MMHG | TEMPERATURE: 97.1 F | WEIGHT: 190.2 LBS | BODY MASS INDEX: 35 KG/M2 | RESPIRATION RATE: 18 BRPM

## 2024-04-25 DIAGNOSIS — E61.1 IRON DEFICIENCY: ICD-10-CM

## 2024-04-25 DIAGNOSIS — D05.12 DUCTAL CARCINOMA IN SITU (DCIS) OF LEFT BREAST: Primary | ICD-10-CM

## 2024-04-25 DIAGNOSIS — D05.12 DUCTAL CARCINOMA IN SITU (DCIS) OF LEFT BREAST: ICD-10-CM

## 2024-04-25 DIAGNOSIS — E53.8 FOLATE DEFICIENCY: ICD-10-CM

## 2024-04-25 DIAGNOSIS — N64.4 BREAST PAIN, LEFT: ICD-10-CM

## 2024-04-25 DIAGNOSIS — M89.9 BONE DISEASE: ICD-10-CM

## 2024-04-25 DIAGNOSIS — Z78.0 POSTMENOPAUSAL: ICD-10-CM

## 2024-04-25 LAB
BASOPHILS # BLD AUTO: 0.07 10*3/MM3 (ref 0–0.2)
BASOPHILS NFR BLD AUTO: 1.2 % (ref 0–1.5)
DEPRECATED RDW RBC AUTO: 48.7 FL (ref 37–54)
EOSINOPHIL # BLD AUTO: 0.2 10*3/MM3 (ref 0–0.4)
EOSINOPHIL NFR BLD AUTO: 3.4 % (ref 0.3–6.2)
ERYTHROCYTE [DISTWIDTH] IN BLOOD BY AUTOMATED COUNT: 14.4 % (ref 12.3–15.4)
FERRITIN SERPL-MCNC: 109.7 NG/ML (ref 13–150)
FOLATE SERPL-MCNC: 10.5 NG/ML (ref 4.78–24.2)
HCT VFR BLD AUTO: 38.7 % (ref 34–46.6)
HGB BLD-MCNC: 12.6 G/DL (ref 12–15.9)
IMM GRANULOCYTES # BLD AUTO: 0.03 10*3/MM3 (ref 0–0.05)
IMM GRANULOCYTES NFR BLD AUTO: 0.5 % (ref 0–0.5)
IRON 24H UR-MRATE: 77 MCG/DL (ref 37–145)
IRON SATN MFR SERPL: 15 % (ref 20–50)
LYMPHOCYTES # BLD AUTO: 1.06 10*3/MM3 (ref 0.7–3.1)
LYMPHOCYTES NFR BLD AUTO: 17.9 % (ref 19.6–45.3)
MCH RBC QN AUTO: 30.4 PG (ref 26.6–33)
MCHC RBC AUTO-ENTMCNC: 32.6 G/DL (ref 31.5–35.7)
MCV RBC AUTO: 93.3 FL (ref 79–97)
MONOCYTES # BLD AUTO: 0.48 10*3/MM3 (ref 0.1–0.9)
MONOCYTES NFR BLD AUTO: 8.1 % (ref 5–12)
NEUTROPHILS NFR BLD AUTO: 4.09 10*3/MM3 (ref 1.7–7)
NEUTROPHILS NFR BLD AUTO: 68.9 % (ref 42.7–76)
NRBC BLD AUTO-RTO: 0 /100 WBC (ref 0–0.2)
PLATELET # BLD AUTO: 248 10*3/MM3 (ref 140–450)
PMV BLD AUTO: 10.1 FL (ref 6–12)
RBC # BLD AUTO: 4.15 10*6/MM3 (ref 3.77–5.28)
TIBC SERPL-MCNC: 520 MCG/DL (ref 298–536)
TRANSFERRIN SERPL-MCNC: 349 MG/DL (ref 200–360)
VIT B12 BLD-MCNC: 395 PG/ML (ref 211–946)
WBC NRBC COR # BLD AUTO: 5.93 10*3/MM3 (ref 3.4–10.8)

## 2024-04-25 PROCEDURE — 82728 ASSAY OF FERRITIN: CPT | Performed by: INTERNAL MEDICINE

## 2024-04-25 PROCEDURE — 76642 ULTRASOUND BREAST LIMITED: CPT

## 2024-04-25 PROCEDURE — 77065 DX MAMMO INCL CAD UNI: CPT

## 2024-04-25 PROCEDURE — 82607 VITAMIN B-12: CPT | Performed by: INTERNAL MEDICINE

## 2024-04-25 PROCEDURE — 82746 ASSAY OF FOLIC ACID SERUM: CPT | Performed by: INTERNAL MEDICINE

## 2024-04-25 PROCEDURE — 85025 COMPLETE CBC W/AUTO DIFF WBC: CPT | Performed by: INTERNAL MEDICINE

## 2024-04-25 PROCEDURE — 77065 DX MAMMO INCL CAD UNI: CPT | Performed by: RADIOLOGY

## 2024-04-25 PROCEDURE — 77061 BREAST TOMOSYNTHESIS UNI: CPT | Performed by: RADIOLOGY

## 2024-04-25 PROCEDURE — 83540 ASSAY OF IRON: CPT | Performed by: INTERNAL MEDICINE

## 2024-04-25 PROCEDURE — 76642 ULTRASOUND BREAST LIMITED: CPT | Performed by: RADIOLOGY

## 2024-04-25 PROCEDURE — 84466 ASSAY OF TRANSFERRIN: CPT | Performed by: INTERNAL MEDICINE

## 2024-04-25 PROCEDURE — G0279 TOMOSYNTHESIS, MAMMO: HCPCS

## 2024-04-25 RX ORDER — AMOXICILLIN 250 MG
1 CAPSULE ORAL DAILY
Qty: 30 TABLET | Refills: 3 | Status: SHIPPED | OUTPATIENT
Start: 2024-04-25

## 2024-04-25 NOTE — PROGRESS NOTES
Venipuncture Blood Specimen Collection  Venipuncture performed in right arm by Casie Garcia MA with good hemostasis. Patient tolerated the procedure well without complications.   04/25/24   Casie Garcia MA

## 2024-04-25 NOTE — PROGRESS NOTES
Subjective     Date: 2024    Referring Provider  Dr. Mata     Chief Complaint  Ductal carcinoma in situ of the left breast, estrogen receptor positive, status post lumpectomy  Iron deficiency anemia     Subjective          hSey King is a 73 y.o. female who presents today to [facility] for follow up.    HPI:   73 y.o.female with a history of diabetes mellitus type 2, hyperlipidemia, hypothyroidism, frequent UTIs who presents to establish care regarding ductal carcinoma in situ of the left breast, estrogen receptor positive.     Oncological history:  She notes intermittent breast pain on the left side for several months, was finally directed to get a screening mammogram  - 2022.  Screening mammogram showed calcification left breast middle posterior third lateral aspect, which are new.  Other calcifications throughout left breast appear stable.   - 22. Diagnostic mammogram showed grouped calcifications in the left 3-4 o'clock region.   - 2022: stereotactic biopsy. Pathology with intermediate to high-grade cribriform ductal carcinoma in situ with associated necrosis and calcification.  -2022: Underwent lumpectomy.  Final pathology with ductal carcinoma in situ.  10 x 3 x 2 mm, 4 of 19 blocks.  Grade 2 (intermediate).  Distance from DCIS to closest margin 1 mm from inferior margin, 2 mm from anterior margin, 4 mm to posterior margin. PTis.  Estrogen receptor 100% positive, progesterone 5% positive  2023-3/7/2023: Underwent radiation with 4256 cGy in 16 fraction with 1000 cGy in 5 fraction boost  2023: Started anastrozole     She was recently seen by Dr. Mata on 2023.  Overall doing well after lumpectomy.  Presents for further therapy and discussion.    GYN History:  Menarche at age 14.   Age of first pregnancy:    Age of menopause: 52  Breast feed: no  Birth control: yes  Hormone replacement: no    Never had DEXA bone  "scan in the past.  Denies ever being treated for diagnosed with osteoporosis.  Currently taking vitamin D tablets.    Interval History 02/01/2023   She was seen by radiation oncology today, Dr. Yusuf, who recommends radiation to left breast due to DCIS margin less than 2 mm.  Underwent DEXA bone scan 1/27/2023 with bone mineral density of right femur neck 0.899 with T score -1.     Interval History 03/01/2023 - Telehealth   Receiving radiation therapy currently, has 4 more boost therapy left. Has had pain on the breast intermittently and the axilla. Was told by Rad Onc to apply less aquaphor to the area.     Interval History 04/06/2023   She presents for follow up today. Completed radiation therapy. Started anastrozole, taking it daily. Overall, still with fatigue since completion of radiation so unsure if it is due to radiation or medication. Has had two episodes of hot flashes. Denies any other AE.    Also reports mild intermittent sharp pain L breast. She reports difficulty sleeping, would like something to help temporarily.     Interval History 07/21/2023   Ms. King presents for follow up. Reports increased fatigue with anastrozole, has been compliant. Has also developed few UTI's, most recently with pseudomonas, currently being treated with clindamycin. Had a repeat mammogram 6/15 which showed left breast there trabecular thickening and skin thickening, likely due to treatment, stable calcification.     Interval History 10/24/2023   Ms. King presents for follow up today. Denies any palpable lumps/bumps of the breast, but has noted waking up with discharge \"caking\" on the left nipple. Also with intermittent warmth and pain in the left breast with radiation to axilla. Compliant with anastrozole without missed doses. Does report intermittent aches in joints.   Has had intentional weight loss due to GLP-1 inhibitor.    Interval History 01/24/2024   Ms. King presents today for follow up. Her last mammogram " "from 10/30/23 did not show evidence of malignancy. She reports increased fatigue, which has not improved. Reports significant b/l knee pain, unsure if it is due to anastrozole. Continues to be compliant with AI. We looked at her last XR of the R knee from 3/2022 which shows osteoarthritis of the R knee, recommend follow up with PCP (seeing Tashia Vallejo 2/6)  Cataract surgery Monday.    Interval History 04/25/2024   Ms. King presents for follow up. She reports a \"knot\" on posterior lateral part of L breast that she's felt for ~2 weeks, which is painful. Denies lifting heavy objects or trauma to breast. No falls. Recently had flu and URI, still recovering, denies any vaccinations.    Taking oral iron with mild constipation, noted significant improvement in her energy until recent infections. Taking miralax, would like additional support for constipation.     Compliant with anastrozole.        Objective     Objective     Allergy:   Allergies   Allergen Reactions    Other Unknown - High Severity     Blisters from EKG stickers    Bactrim [Sulfamethoxazole-Trimethoprim] Hives    Ciprofloxacin Hives    Penicillins Hives    Steri-Strip Compound Benzoin [Benzoin Compound] Hives    Sulfa Antibiotics Hives        Current Medications:   Current Outpatient Medications   Medication Sig Dispense Refill    acetaminophen (TYLENOL) 325 MG tablet Take 2 tablets by mouth Every 4 (Four) Hours As Needed for Mild Pain. 30 tablet 0    anastrozole (ARIMIDEX) 1 MG tablet Take 1 tablet by mouth Daily. 30 tablet 11    aspirin 81 MG chewable tablet Chew 1 tablet Daily.      AZO-CRANBERRY PO Take 1 tablet by mouth Daily.      cyanocobalamin 1000 MCG/ML injection Inject 1 mL into the appropriate muscle as directed by prescriber Every 28 (Twenty-Eight) Days. 1 mL 6    empagliflozin (Jardiance) 10 MG tablet tablet TAKE 1 TABLET BY MOUTH DAILY 90 tablet 0    fenofibrate (TRICOR) 145 MG tablet Take 1 tablet by mouth Daily. 90 tablet 1    ferrous " sulfate 325 (65 FE) MG tablet Take 1 tablet by mouth Every Other Day. 30 tablet 3    glucose monitor monitoring kit 1 each as needed (DM II). 1 each 0    Lancets (OneTouch Delica Plus [id number]) misc USE TO TEST BLOOD SUGAR DAILY AS DIRECTED      Lancets [facility]. misc For Daily testing  E11.65 50 each 11    levothyroxine (Synthroid) 100 MCG tablet Take 1 tablet by mouth Daily. 90 tablet 1    OneTouch Ultra test strip USE TO TEST BLOOD SUGAR DAILY AS DIRECTED 50 each 5    Probiotic Product (PROBIOTIC DAILY PO) Take  by mouth.      vitamin C (ASCORBIC ACID) 500 MG tablet Take 1 tablet by mouth Every Other Day. 30 tablet 3    vitamin D (ERGOCALCIFEROL) 1.25 MG (20911 UT) capsule capsule Take 1 capsule by mouth 1 (One) Time Per Week. 5 capsule 2    prednisoLONE acetate (PRED FORTE) 1 % ophthalmic suspension Administer 1 drop to the right eye 4 (Four) Times a Day.      sennosides-docusate (PERICOLACE) 8.6-50 MG per tablet Take 1 tablet by mouth Daily. 30 tablet 3    tobramycin 0.3 % solution ophthalmic solution Administer 1 drop to the right eye Every 6 (Six) Hours.       No current facility-administered medications for this visit.       Past Medical History:  Past Medical History:   Diagnosis Date    Abdominal pain, LLQ (left lower quadrant)     Abnormal ECG aug 2021    Abnormal Pap smear of cervix     several years ago    Anemia     years ago    Breast cancer     Cataract early stage    Cystitis     Diabetes mellitus     Diarrhea     Diverticulitis of colon     Diverticulosis     Ductal carcinoma in situ (DCIS) of left breast 12/21/2022    Encounter for cholecystectomy 2018    Gallstones     GERD (gastroesophageal reflux disease)     Hiatal hernia     Hyperglycemia     Hyperlipidemia     Hyperlipidemia     Hypothyroidism     Muscle spasm     FLANK PAIN    OAB (overactive bladder)     Pneumonia     years ago    Rectal bleeding     UTI (urinary tract infection)        Past Surgical History:  Past Surgical History:  "  Procedure Laterality Date    BREAST BIOPSY Bilateral 2005    benign    BREAST BIOPSY Left 12/29/2022    Procedure: BREAST BIOPSY WITH NEEDLE LOCALIZATION;  Surgeon: Dong Mata MD;  Location: [facility] OR;  Service: General;  Laterality: Left;    CATARACT EXTRACTION WITH INTRAOCULAR LENS IMPLANT Right 01/29/2024    CHOLECYSTECTOMY  2018?    ENDOSCOPY      ENDOSCOPY N/A 01/11/2018    Procedure: ESOPHAGOGASTRODUODENOSCOPY;  Surgeon: Dong Mata MD;  Location: [facility] OR;  Service:     MAMMO BILATERAL  09/2015    OVARIAN CYST DRAINAGE      AK LAPAROSCOPY SURG CHOLECYSTECTOMY N/A 01/11/2018    Procedure: CHOLECYSTECTOMY LAPAROSCOPIC;  Surgeon: Dong Mata MD;  Location: [facility] OR;  Service: General    US GUIDED LYMPH NODE BIOPSY  04/11/2018       Social History:  Social History     Socioeconomic History    Marital status:    Tobacco Use    Smoking status: Never    Smokeless tobacco: Never    Tobacco comments:     never   Vaping Use    Vaping status: Never Used   Substance and Sexual Activity    Alcohol use: No    Drug use: No    Sexual activity: Not Currently     Partners: Male     Shey King  reports that she has never smoked. She has never used smokeless tobacco..      Family History:  Family History   Adopted: Yes   Problem Relation Age of Onset    Heart disease Mother         also had cancer kidney    Cancer Mother         kidney    Thyroid disease Mother     Heart disease Father     Diabetes Brother     Breast cancer Neg Hx        Review of Systems:  Review of Systems   Constitutional: Negative.    HENT: Negative.     Respiratory: Negative.     Cardiovascular: Negative.    Gastrointestinal: Negative.    Genitourinary: Negative.    Musculoskeletal: Negative.    Skin: Negative.    Neurological: Negative.    Hematological: Negative.    Psychiatric/Behavioral: Negative.         Vital Signs:   /72   Pulse 74   Temp 97.1 °F (36.2 °C) (Temporal)   Resp 18   Ht 156.2 cm (61.5\") " "  Wt 86.3 kg (190 lb 3.2 oz)   SpO2 98%   BMI 35.36 kg/m²      Physical Exam:   Physical Exam  Vitals reviewed.   Constitutional:       General: She is not in acute distress.     Appearance: Normal appearance. She is not ill-appearing.   HENT:      Head: Normocephalic and atraumatic.      Mouth/Throat:      Mouth: Mucous membranes are moist.      Pharynx: Oropharynx is clear.   Eyes:      Conjunctiva/sclera: Conjunctivae normal.      Pupils: Pupils are equal, round, and reactive to light.   Cardiovascular:      Rate and Rhythm: Normal rate and regular rhythm.      Heart sounds: No murmur heard.  Pulmonary:      Effort: Pulmonary effort is normal. No respiratory distress.      Breath sounds: Normal breath sounds. No wheezing.   Chest:   Breasts:     Right: Normal. No mass or skin change.      Left: Normal. No swelling, bleeding, inverted nipple, mass, nipple discharge, skin change or tenderness.      Comments: L breast with lumpectomy scar without discharge from areola    Tenderness 1-2 o clock lateral L breast   Abdominal:      General: Abdomen is flat. Bowel sounds are normal. There is no distension.      Palpations: Abdomen is soft. There is no mass.      Tenderness: There is no abdominal tenderness. There is no guarding.   Musculoskeletal:         General: No swelling. Normal range of motion.      Cervical back: Normal range of motion.   Lymphadenopathy:      Cervical: No cervical adenopathy.   Skin:     General: Skin is warm and dry.   Neurological:      General: No focal deficit present.      Mental Status: She is alert and oriented to person, place, and time. Mental status is at baseline.   Psychiatric:         Mood and Affect: Mood normal.         PHQ-9 Score  PHQ-9 Total Score:       Pain Score  Vitals:    04/25/24 0945   BP: 149/72   Pulse: 74   Resp: 18   Temp: 97.1 °F (36.2 °C)   TempSrc: Temporal   SpO2: 98%   Weight: 86.3 kg (190 lb 3.2 oz)   Height: 156.2 cm (61.5\")   PainSc:   4             ECOG " score: 0             Lab Review  Lab Results   Component Value Date    WBC 5.93 04/25/2024    HGB 12.6 04/25/2024    HCT 38.7 04/25/2024    MCV 93.3 04/25/2024    RDW 14.4 04/25/2024     04/25/2024    NEUTRORELPCT 68.9 04/25/2024    LYMPHORELPCT 17.9 (L) 04/25/2024    MONORELPCT 8.1 04/25/2024    EOSRELPCT 3.4 04/25/2024    BASORELPCT 1.2 04/25/2024    NEUTROABS 4.09 04/25/2024    LYMPHSABS 1.06 04/25/2024       Lab Results   Component Value Date     02/23/2024    K 4.6 02/23/2024    CO2 22.5 02/23/2024     02/23/2024    BUN 14 02/23/2024    CREATININE 0.97 02/23/2024    EGFRIFNONA 57 (L) 01/26/2022    EGFRIFAFRI 74 09/06/2016    GLUCOSE 112 (H) 02/23/2024    CALCIUM 9.4 02/23/2024    ALKPHOS 80 02/23/2024    AST 15 02/23/2024    ALT 17 02/23/2024    BILITOT 0.8 02/23/2024    ALBUMIN 4.6 02/23/2024    PROTEINTOT 7.9 02/23/2024    MG 2.0 07/26/2023    PHOS 3.5 12/01/2021       Radiology Results  Mammo Diagnostic Digital Tomosynthesis Bilateral With CAD (10/30/2023 09:49)   IMPRESSION:     1. Benign right mammographic findings.     2. Probably benign left mammographic findings. Recommend continued  follow-up.       Mammo Diagnostic Digital Tomosynthesis Left With CAD (06/15/2023 12:07)      COMPARISON: 12/29/2022, 12/14/2022, 12/06/2022, 10/27/2022, 06/19/2020,  02/07/2019, 11/11/2017     FINDINGS: The left breast is heterogeneously dense, which may obscure  small masses.     Presumed postoperative changes are now noted in the lateral aspect of  the left breast from the patient's interval conservation surgery. There  is mild trabecular thickening and skin thickening which is likely  treatment related. There are stable scattered calcifications.     IMPRESSION:  Probably benign left mammographic findings. Recommend  continued follow-up.    DEXA Bone Density Axial (01/27/2023 13:22)  FINDINGS:    RIGHT FEMORAL NECK BONE MINERAL DENSITY:  BMD of the right femur neck  is 0.899 with T score -1 with  density.      UNITS OF MEASURE:    Bone mineral density is measured in g/cm2.    Z-score is the number of standard deviations above age-matched  controls.    T-score is the number of standard deviations above healthy young  adults.      WORLD HEALTH ORGANIZATION GUIDELINES:    T-score at or above -1 is normal bone mineral density.    T-score between -1 and -2.5 is osteopenia.    T-score at or below -2.5 is osteoporosis.     IMPRESSION:    BMD of the right femur neck is 0.899 with T score -1 with density.    Mammo Diagnostic Left With CAD (12/06/2022 09:21)  FINDINGS: Magnification imaging of the left breast demonstrates a small  group of heterogeneous calcifications in the mid left 3:00 to 4:00  region. Recommend stereotactic guided core biopsy.     IMPRESSION:  Grouped calcifications in the left 3:00 to 4:00 region.     BI-RADS CATEGORY:  4, SUSPICIOUS     RECOMMENDATION:  Recommend stereotactic guided core biopsy of the left  breast.    Mammo Screening Digital Tomosynthesis Bilateral With CAD (10/27/2022 07:29)  FINDINGS:     Breast Composition: The breasts are heterogenously dense, which may  obscure small masses.   Important Findings:    Biopsy related changes right breast with marker clip. Stable benign  calcifications right breast are noted. Calcifications left breast  middle-posterior third lateral aspect have developed. Other  calcifications throughout the left breast appear stable. Biopsy related  changes upper outer quadrant left breast with marker clip noted.   No suspicious calcifications or areas of architectural distortion.  No  dominant masses or skin thickening      Pathology:   Procedure: Excision (less than total mastectomy)   Specimen Laterality: Left   Tumor Site: Not specified   Histologic Type: Ductal carcinoma in situ   Size of DCIS: 10 x 3 x 2 mm, 4 of 19 blocks   Architectural Patterns: Cribriform   Nuclear Grade: Grade II (intermediate)   Necrosis: Present, focal (small foci of necrosis)    Microcalcifications: Present in DCIS and in nonneoplastic tissue   Margin Status: All margins negative for DCIS   Distance from DCIS to Closest Margin: 1 mm from inferior margin   Distance from DCIS to Anterior Margin: 2 mm   Distance from DCIS to Posterior Margin: 4 mm   Regional Lymph Node Status: No regional lymph nodes submitted or found   PATHOLOGIC STAGE (pTNM, AJCC 8th Edition): pTis (DCIS), pNx   Biomarker Testing Performed on Previous Biopsy:  (ER) Positive 100 %   (PgR) Positive 5 %     CLINICAL HISTORY:   Ductal carcinoma in situ (DCIS) of left breast     Assessment / Plan         Assessment and Plan   72-year-old female who presents today with hormone positive left breast ductal carcinoma in situ, status postlumpectomy.    Ductal carcinoma in situ, left breast, hormone positive  -Final pathology with 10 x 3 x 2 mm ductal carcinoma in situ, grade 2, 1 mm from inferior margin, 2 mm from anterior margin, 4 mm from posterior margin  -Her case ws discussed in tumor board on 1/25; it was deemed she should receive radiation therapy given close margins <2mm inferior margin  -Completed adjuvant radiation therapy: 3/7/23  -Tolerating adjuvant hormonal therapy which has shown to decrease recurrence by at least 33%. Taking anastrozole 1 mg daily for 5 years. (To be completed 3/2028)  -Last mammogram with benign findings 10/2023; Next mammogram in 6 months for L breast 5/1/2024; rescheduled for today after our appt with addition of U/S L breast  -She will continue history and physical and exam every 3-6 months for the first 5 years, yearly thereafter according to NCCN     2. Bone health  -DEXA scan 1/27/2023: bone mineral density of right femur neck 0.899 with T score -1.  Repeat DEXA scan 1/2025  -Continue vitamin D and calcium supplement    3. Fatigue   - Anemia work up showed iron deficiency and marginal B12 level  -Started patient on oral ferrous sulfate 325  and ascorbic acid 500 mg to be taken every other  day; if she has worsened baseline constipation, she is to contact us for parenteral iron   -Started on B12 injections to be given monthly         Discussed possible differential diagnoses, testing, treatment, recommended non-pharmacological interventions, risks, warning signs to monitor for that would indicate need for follow-up in clinic or ER. If no improvement with these regimens or you have new or worsening symptoms follow-up. Patient verbalizes understanding and agreement with plan of care. Denies further needs or concerns.     Patient was given instructions and counseling regarding her condition and for health maintenance advised.    I spent 30 minutes with Shey King today.  In the office today, more than 50% of this time was spent face-to-face with her  in counseling / coordination of care, reviewing her interim medical history and counseling on the current treatment plan.  All questions were answered to her satisfaction.      Meds ordered during this visit  New Medications Ordered This Visit   Medications    sennosides-docusate (PERICOLACE) 8.6-50 MG per tablet     Sig: Take 1 tablet by mouth Daily.     Dispense:  30 tablet     Refill:  3       Visit Diagnoses    ICD-10-CM ICD-9-CM   1. Ductal carcinoma in situ (DCIS) of left breast  D05.12 233.0   2. Folate deficiency  E53.8 266.2   3. Iron deficiency  E61.1 280.9             Follow Up   In 3 months for H&P  Repeat CBC, iron studies, ferritin, B12, folate  FU mammogram and US breast      This document has been electronically signed by Maia Ford MD   April 25, 2024 10:19 EDT    Dictated Utilizing Dragon Dictation: Part of this note may be an electronic transcription/translation of spoken language to printed text using the Dragon Dictation System.

## 2024-05-01 DIAGNOSIS — E11.9 TYPE 2 DIABETES MELLITUS WITHOUT COMPLICATION, WITHOUT LONG-TERM CURRENT USE OF INSULIN: ICD-10-CM

## 2024-05-01 RX ORDER — EMPAGLIFLOZIN 10 MG/1
10 TABLET, FILM COATED ORAL DAILY
Qty: 90 TABLET | Refills: 0 | Status: SHIPPED | OUTPATIENT
Start: 2024-05-01

## 2024-05-07 ENCOUNTER — OFFICE VISIT (OUTPATIENT)
Dept: FAMILY MEDICINE CLINIC | Facility: CLINIC | Age: 74
End: 2024-05-07
Payer: MEDICARE

## 2024-05-07 ENCOUNTER — LAB (OUTPATIENT)
Dept: FAMILY MEDICINE CLINIC | Facility: CLINIC | Age: 74
End: 2024-05-07
Payer: MEDICARE

## 2024-05-07 VITALS
DIASTOLIC BLOOD PRESSURE: 62 MMHG | WEIGHT: 191.2 LBS | OXYGEN SATURATION: 96 % | BODY MASS INDEX: 35.19 KG/M2 | HEIGHT: 62 IN | HEART RATE: 70 BPM | TEMPERATURE: 97.5 F | SYSTOLIC BLOOD PRESSURE: 112 MMHG

## 2024-05-07 DIAGNOSIS — Z00.00 MEDICARE ANNUAL WELLNESS VISIT, SUBSEQUENT: ICD-10-CM

## 2024-05-07 DIAGNOSIS — Z12.11 SCREEN FOR COLON CANCER: ICD-10-CM

## 2024-05-07 DIAGNOSIS — M67.431 GANGLION CYST OF DORSUM OF RIGHT WRIST: ICD-10-CM

## 2024-05-07 DIAGNOSIS — Z00.00 MEDICARE ANNUAL WELLNESS VISIT, SUBSEQUENT: Primary | ICD-10-CM

## 2024-05-07 LAB
ALBUMIN SERPL-MCNC: 4.5 G/DL (ref 3.5–5.2)
ALBUMIN/GLOB SERPL: 1.5 G/DL
ALP SERPL-CCNC: 77 U/L (ref 39–117)
ALT SERPL W P-5'-P-CCNC: 17 U/L (ref 1–33)
ANION GAP SERPL CALCULATED.3IONS-SCNC: 12 MMOL/L (ref 5–15)
AST SERPL-CCNC: 14 U/L (ref 1–32)
BASOPHILS # BLD AUTO: 0.07 10*3/MM3 (ref 0–0.2)
BASOPHILS NFR BLD AUTO: 1.1 % (ref 0–1.5)
BILIRUB SERPL-MCNC: 0.4 MG/DL (ref 0–1.2)
BUN SERPL-MCNC: 20 MG/DL (ref 8–23)
BUN/CREAT SERPL: 19.2 (ref 7–25)
CALCIUM SPEC-SCNC: 9.1 MG/DL (ref 8.6–10.5)
CHLORIDE SERPL-SCNC: 104 MMOL/L (ref 98–107)
CHOLEST SERPL-MCNC: 200 MG/DL (ref 0–200)
CO2 SERPL-SCNC: 23 MMOL/L (ref 22–29)
CREAT SERPL-MCNC: 1.04 MG/DL (ref 0.57–1)
DEPRECATED RDW RBC AUTO: 46.1 FL (ref 37–54)
EGFRCR SERPLBLD CKD-EPI 2021: 57.6 ML/MIN/1.73
EOSINOPHIL # BLD AUTO: 0.21 10*3/MM3 (ref 0–0.4)
EOSINOPHIL NFR BLD AUTO: 3.4 % (ref 0.3–6.2)
ERYTHROCYTE [DISTWIDTH] IN BLOOD BY AUTOMATED COUNT: 13.7 % (ref 12.3–15.4)
GLOBULIN UR ELPH-MCNC: 3 GM/DL
GLUCOSE SERPL-MCNC: 137 MG/DL (ref 65–99)
HBA1C MFR BLD: 6 % (ref 4.8–5.6)
HCT VFR BLD AUTO: 37.4 % (ref 34–46.6)
HDLC SERPL-MCNC: 40 MG/DL (ref 40–60)
HGB BLD-MCNC: 12.3 G/DL (ref 12–15.9)
IMM GRANULOCYTES # BLD AUTO: 0.05 10*3/MM3 (ref 0–0.05)
IMM GRANULOCYTES NFR BLD AUTO: 0.8 % (ref 0–0.5)
LDLC SERPL CALC-MCNC: 124 MG/DL (ref 0–100)
LDLC/HDLC SERPL: 3 {RATIO}
LYMPHOCYTES # BLD AUTO: 0.9 10*3/MM3 (ref 0.7–3.1)
LYMPHOCYTES NFR BLD AUTO: 14.7 % (ref 19.6–45.3)
MAGNESIUM SERPL-MCNC: 1.9 MG/DL (ref 1.6–2.4)
MCH RBC QN AUTO: 30 PG (ref 26.6–33)
MCHC RBC AUTO-ENTMCNC: 32.9 G/DL (ref 31.5–35.7)
MCV RBC AUTO: 91.2 FL (ref 79–97)
MONOCYTES # BLD AUTO: 0.5 10*3/MM3 (ref 0.1–0.9)
MONOCYTES NFR BLD AUTO: 8.2 % (ref 5–12)
NEUTROPHILS NFR BLD AUTO: 4.4 10*3/MM3 (ref 1.7–7)
NEUTROPHILS NFR BLD AUTO: 71.8 % (ref 42.7–76)
NRBC BLD AUTO-RTO: 0 /100 WBC (ref 0–0.2)
PLATELET # BLD AUTO: 239 10*3/MM3 (ref 140–450)
PMV BLD AUTO: 11.2 FL (ref 6–12)
POTASSIUM SERPL-SCNC: 4.6 MMOL/L (ref 3.5–5.2)
PROT SERPL-MCNC: 7.5 G/DL (ref 6–8.5)
RBC # BLD AUTO: 4.1 10*6/MM3 (ref 3.77–5.28)
SODIUM SERPL-SCNC: 139 MMOL/L (ref 136–145)
TRIGL SERPL-MCNC: 201 MG/DL (ref 0–150)
TSH SERPL DL<=0.05 MIU/L-ACNC: 5.9 UIU/ML (ref 0.27–4.2)
VIT B12 BLD-MCNC: 273 PG/ML (ref 211–946)
VLDLC SERPL-MCNC: 36 MG/DL (ref 5–40)
WBC NRBC COR # BLD AUTO: 6.13 10*3/MM3 (ref 3.4–10.8)

## 2024-05-07 PROCEDURE — G0439 PPPS, SUBSEQ VISIT: HCPCS | Performed by: NURSE PRACTITIONER

## 2024-05-07 PROCEDURE — 84443 ASSAY THYROID STIM HORMONE: CPT | Performed by: NURSE PRACTITIONER

## 2024-05-07 PROCEDURE — 83036 HEMOGLOBIN GLYCOSYLATED A1C: CPT | Performed by: NURSE PRACTITIONER

## 2024-05-07 PROCEDURE — 81001 URINALYSIS AUTO W/SCOPE: CPT | Performed by: NURSE PRACTITIONER

## 2024-05-07 PROCEDURE — 3044F HG A1C LEVEL LT 7.0%: CPT | Performed by: NURSE PRACTITIONER

## 2024-05-07 PROCEDURE — 83735 ASSAY OF MAGNESIUM: CPT | Performed by: NURSE PRACTITIONER

## 2024-05-07 PROCEDURE — 82607 VITAMIN B-12: CPT | Performed by: NURSE PRACTITIONER

## 2024-05-07 PROCEDURE — 36415 COLL VENOUS BLD VENIPUNCTURE: CPT

## 2024-05-07 PROCEDURE — 1170F FXNL STATUS ASSESSED: CPT | Performed by: NURSE PRACTITIONER

## 2024-05-07 PROCEDURE — 80053 COMPREHEN METABOLIC PANEL: CPT | Performed by: NURSE PRACTITIONER

## 2024-05-07 PROCEDURE — 85025 COMPLETE CBC W/AUTO DIFF WBC: CPT | Performed by: NURSE PRACTITIONER

## 2024-05-07 PROCEDURE — 81003 URINALYSIS AUTO W/O SCOPE: CPT | Performed by: NURSE PRACTITIONER

## 2024-05-07 PROCEDURE — 1126F AMNT PAIN NOTED NONE PRSNT: CPT | Performed by: NURSE PRACTITIONER

## 2024-05-07 PROCEDURE — 80061 LIPID PANEL: CPT | Performed by: NURSE PRACTITIONER

## 2024-05-08 ENCOUNTER — TELEPHONE (OUTPATIENT)
Dept: FAMILY MEDICINE CLINIC | Facility: CLINIC | Age: 74
End: 2024-05-08
Payer: COMMERCIAL

## 2024-05-08 DIAGNOSIS — E03.8 ADULT ONSET HYPOTHYROIDISM: ICD-10-CM

## 2024-05-08 LAB
BACTERIA UR QL AUTO: ABNORMAL /HPF
BILIRUB UR QL STRIP: NEGATIVE
BILIRUB UR QL STRIP: NEGATIVE
CLARITY UR: CLEAR
CLARITY UR: CLEAR
COLOR UR: YELLOW
COLOR UR: YELLOW
GLUCOSE UR STRIP-MCNC: ABNORMAL MG/DL
GLUCOSE UR STRIP-MCNC: ABNORMAL MG/DL
HGB UR QL STRIP.AUTO: NEGATIVE
HGB UR QL STRIP.AUTO: NEGATIVE
HOLD SPECIMEN: NORMAL
HYALINE CASTS UR QL AUTO: ABNORMAL /LPF
KETONES UR QL STRIP: NEGATIVE
KETONES UR QL STRIP: NEGATIVE
LEUKOCYTE ESTERASE UR QL STRIP.AUTO: ABNORMAL
LEUKOCYTE ESTERASE UR QL STRIP.AUTO: ABNORMAL
NITRITE UR QL STRIP: NEGATIVE
NITRITE UR QL STRIP: NEGATIVE
PH UR STRIP.AUTO: 5.5 [PH] (ref 5–8)
PH UR STRIP.AUTO: 6 [PH] (ref 5–8)
PROT UR QL STRIP: NEGATIVE
PROT UR QL STRIP: NEGATIVE
RBC # UR STRIP: ABNORMAL /HPF
REF LAB TEST METHOD: ABNORMAL
SP GR UR STRIP: 1.02 (ref 1–1.03)
SP GR UR STRIP: 1.02 (ref 1–1.03)
SQUAMOUS #/AREA URNS HPF: ABNORMAL /HPF
UROBILINOGEN UR QL STRIP: ABNORMAL
UROBILINOGEN UR QL STRIP: ABNORMAL
WBC # UR STRIP: ABNORMAL /HPF

## 2024-05-08 RX ORDER — LEVOTHYROXINE SODIUM 0.12 MG/1
125 TABLET ORAL DAILY
Qty: 90 TABLET | Refills: 1 | Status: SHIPPED | OUTPATIENT
Start: 2024-05-08

## 2024-05-14 ENCOUNTER — OFFICE VISIT (OUTPATIENT)
Dept: SURGERY | Facility: CLINIC | Age: 74
End: 2024-05-14
Payer: COMMERCIAL

## 2024-05-14 VITALS — HEIGHT: 61 IN | WEIGHT: 192.6 LBS | BODY MASS INDEX: 36.36 KG/M2

## 2024-05-14 DIAGNOSIS — D05.12 DUCTAL CARCINOMA IN SITU (DCIS) OF LEFT BREAST: Primary | ICD-10-CM

## 2024-05-14 PROCEDURE — 1159F MED LIST DOCD IN RCRD: CPT | Performed by: SURGERY

## 2024-05-14 PROCEDURE — 1160F RVW MEDS BY RX/DR IN RCRD: CPT | Performed by: SURGERY

## 2024-05-14 PROCEDURE — 99213 OFFICE O/P EST LOW 20 MIN: CPT | Performed by: SURGERY

## 2024-05-14 NOTE — PROGRESS NOTES
Subjective   Shey King is a 73 y.o. female  is here today for follow-up.         Shey King is a 73 y.o. female here for follow up after wire localized lumpectomy of the left breast.  The patient is doing well and her incision has healed without issue.  She had a small area of skin dehiscence that is granulating nicely.  Final pathology is consistent with DCIS and margins of excision are negative.    LEFT BREAST, LUMPECTOMY: Intermediate grade DCIS, margins free            RLL     Procedure: Excision (less than total mastectomy)   Specimen Laterality: Left   Tumor Site: Not specified   Histologic Type: Ductal carcinoma in situ   Size of DCIS: 10 x 3 x 2 mm, 4 of 19 blocks   Architectural Patterns: Cribriform   Nuclear Grade: Grade II (intermediate)   Necrosis: Present, focal (small foci of necrosis)   Microcalcifications: Present in DCIS and in nonneoplastic tissue   Margin Status: All margins negative for DCIS   Distance from DCIS to Closest Margin: 1 mm from inferior margin   Distance from DCIS to Anterior Margin: 2 mm   Distance from DCIS to Posterior Margin: 4 mm   Regional Lymph Node Status: No regional lymph nodes submitted or found   PATHOLOGIC STAGE (pTNM, AJCC 8th Edition): pTis (DCIS), pNx   Biomarker Testing Performed on Previous Biopsy:  (ER) Positive 100 %   (PgR) Positive 5 %         Assessment     Diagnoses and all orders for this visit:    1. 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 female doing well after wire localized lumpectomy for an area of DCIS of the left breast.  Final pathology is consistent with hormone positive DCIS with no invasive disease.  Margins of excision are negative.  The patient is doing well and completing adjuvant therapy.  Radiation therapy has been completed and her radiation changes are resolving.  6-month mammogram to be performed and follow-up in 3 months in the office.

## 2024-05-29 ENCOUNTER — TELEPHONE (OUTPATIENT)
Dept: FAMILY MEDICINE CLINIC | Facility: CLINIC | Age: 74
End: 2024-05-29
Payer: COMMERCIAL

## 2024-06-12 DIAGNOSIS — E55.9 VITAMIN D DEFICIENCY: ICD-10-CM

## 2024-06-12 RX ORDER — ERGOCALCIFEROL 1.25 MG/1
50000 CAPSULE ORAL WEEKLY
Qty: 5 CAPSULE | Refills: 2 | Status: SHIPPED | OUTPATIENT
Start: 2024-06-12

## 2024-06-17 ENCOUNTER — LAB (OUTPATIENT)
Dept: UROLOGY | Facility: CLINIC | Age: 74
End: 2024-06-17
Payer: MEDICARE

## 2024-06-17 DIAGNOSIS — N39.0 RECURRENT UTI: Primary | ICD-10-CM

## 2024-06-17 LAB
BILIRUB BLD-MCNC: NEGATIVE MG/DL
CLARITY, POC: CLEAR
COLOR UR: YELLOW
EXPIRATION DATE: ABNORMAL
GLUCOSE UR STRIP-MCNC: ABNORMAL MG/DL
KETONES UR QL: NEGATIVE
LEUKOCYTE EST, POC: ABNORMAL
Lab: ABNORMAL
NITRITE UR-MCNC: NEGATIVE MG/ML
PH UR: 6 [PH] (ref 5–8)
PROT UR STRIP-MCNC: NEGATIVE MG/DL
RBC # UR STRIP: NEGATIVE /UL
SP GR UR: 1.02 (ref 1–1.03)
UROBILINOGEN UR QL: NORMAL

## 2024-06-17 PROCEDURE — 87086 URINE CULTURE/COLONY COUNT: CPT | Performed by: NURSE PRACTITIONER

## 2024-06-17 PROCEDURE — 81003 URINALYSIS AUTO W/O SCOPE: CPT | Performed by: NURSE PRACTITIONER

## 2024-06-18 DIAGNOSIS — M25.531 WRIST PAIN, RIGHT: Primary | ICD-10-CM

## 2024-06-18 LAB — BACTERIA SPEC AEROBE CULT: NO GROWTH

## 2024-06-19 ENCOUNTER — TELEPHONE (OUTPATIENT)
Dept: UROLOGY | Facility: CLINIC | Age: 74
End: 2024-06-19
Payer: COMMERCIAL

## 2024-06-19 NOTE — TELEPHONE ENCOUNTER
----- Message from Griselda Cheng-Akwa sent at 6/18/2024  5:50 PM EDT -----  Please let patient know his urine culture results are negative for any bacterial infection at this time.    Urine Culture - No growtH    However She may drop off another urine sample if She feels symptomatic, if not increase p.o. fluid intake, follow-up in clinic as discussed.    Also continue antibiotic suppressive therapy if recommended.    Thank you

## 2024-06-24 ENCOUNTER — OFFICE VISIT (OUTPATIENT)
Dept: ORTHOPEDIC SURGERY | Facility: CLINIC | Age: 74
End: 2024-06-24
Payer: COMMERCIAL

## 2024-06-24 ENCOUNTER — HOSPITAL ENCOUNTER (OUTPATIENT)
Dept: GENERAL RADIOLOGY | Facility: HOSPITAL | Age: 74
Discharge: HOME | End: 2024-06-24
Admitting: ORTHOPAEDIC SURGERY
Payer: COMMERCIAL

## 2024-06-24 VITALS
DIASTOLIC BLOOD PRESSURE: 73 MMHG | WEIGHT: 196.6 LBS | OXYGEN SATURATION: 98 % | HEART RATE: 84 BPM | TEMPERATURE: 96.9 F | HEIGHT: 61 IN | BODY MASS INDEX: 37.12 KG/M2 | SYSTOLIC BLOOD PRESSURE: 148 MMHG

## 2024-06-24 DIAGNOSIS — R20.0 BILATERAL HAND NUMBNESS: Primary | ICD-10-CM

## 2024-06-24 DIAGNOSIS — M25.531 WRIST PAIN, RIGHT: ICD-10-CM

## 2024-06-24 DIAGNOSIS — M25.531 RIGHT WRIST PAIN: ICD-10-CM

## 2024-06-24 PROCEDURE — 99213 OFFICE O/P EST LOW 20 MIN: CPT | Performed by: ORTHOPAEDIC SURGERY

## 2024-06-24 PROCEDURE — 73130 X-RAY EXAM OF HAND: CPT | Performed by: RADIOLOGY

## 2024-06-24 PROCEDURE — 73130 X-RAY EXAM OF HAND: CPT

## 2024-06-24 RX ORDER — SENNOSIDES 8.6 MG
1 CAPSULE ORAL DAILY
COMMUNITY
Start: 2024-05-01

## 2024-06-24 NOTE — PROGRESS NOTES
Established New Problem         Patient: Shey King  YOB: 1950  Date of Encounter: 06/24/2024        Chief  Complaint: No chief complaint on file.          HPI:  Shey King, 73 y.o. female presents in follow-up        Medical History:  Patient Active Problem List   Diagnosis    Abdominal pain, LLQ (left lower quadrant)    Cystitis    Diarrhea    Hiatal hernia    Hyperglycemia    Hyperlipidemia    Adult onset hypothyroidism    Muscle spasm    OAB (overactive bladder)    Recurrent UTI    Gallstones    B12 deficiency    Pulmonary nodule    Class 2 obesity due to excess calories without serious comorbidity with body mass index (BMI) of 35.0 to 35.9 in adult    Diabetes mellitus    Lymphadenopathy    Dupuytren's contracture of right hand    Palpitations    ELKINS (dyspnea on exertion)    Ductal carcinoma in situ (DCIS) of left breast    Grade I LV diastolic dysfunction     Past Medical History:   Diagnosis Date    Abdominal pain, LLQ (left lower quadrant)     Abnormal ECG aug 2021    Abnormal Pap smear of cervix     several years ago    Anemia     years ago    Breast cancer     Cataract early stage    Cystitis     Diabetes mellitus     Diarrhea     Diverticulitis of colon     Diverticulosis     Ductal carcinoma in situ (DCIS) of left breast 12/21/2022    Encounter for cholecystectomy 2018    Gallstones     GERD (gastroesophageal reflux disease)     Hiatal hernia     Hyperglycemia     Hyperlipidemia     Hyperlipidemia     Hypothyroidism     Muscle spasm     FLANK PAIN    OAB (overactive bladder)     Pneumonia     years ago    Rectal bleeding     UTI (urinary tract infection)            Social History:  Social History     Socioeconomic History    Marital status:    Tobacco Use    Smoking status: Never    Smokeless tobacco: Never    Tobacco comments:     never   Vaping Use    Vaping status: Never Used   Substance and Sexual Activity    Alcohol use: No    Drug use: No    Sexual activity: Not  Currently     Partners: Male           Current Medications:    Current Outpatient Medications:     acetaminophen (TYLENOL) 325 MG tablet, Take 2 tablets by mouth Every 4 (Four) Hours As Needed for Mild Pain., Disp: 30 tablet, Rfl: 0    anastrozole (ARIMIDEX) 1 MG tablet, Take 1 tablet by mouth Daily., Disp: 30 tablet, Rfl: 11    aspirin 81 MG chewable tablet, Chew 1 tablet Daily., Disp: , Rfl:     AZO-CRANBERRY PO, Take 1 tablet by mouth Daily., Disp: , Rfl:     cyanocobalamin 1000 MCG/ML injection, Inject 1 mL into the appropriate muscle as directed by prescriber Every 28 (Twenty-Eight) Days., Disp: 1 mL, Rfl: 6    fenofibrate (TRICOR) 145 MG tablet, Take 1 tablet by mouth Daily., Disp: 90 tablet, Rfl: 1    ferrous sulfate 325 (65 FE) MG tablet, Take 1 tablet by mouth Every Other Day., Disp: 30 tablet, Rfl: 3    glucose monitor monitoring kit, 1 each as needed (DM II)., Disp: 1 each, Rfl: 0    Jardiance 10 MG tablet tablet, TAKE 1 TABLET BY MOUTH DAILY, Disp: 90 tablet, Rfl: 0    Lancets (OneTouch Delica Plus [id number]) misc, USE TO TEST BLOOD SUGAR DAILY AS DIRECTED, Disp: , Rfl:     Lancets Misc. misc, For Daily testing  E11.65, Disp: 50 each, Rfl: 11    levothyroxine (Synthroid) 125 MCG tablet, Take 1 tablet by mouth Daily., Disp: 90 tablet, Rfl: 1    OneTouch Ultra test strip, USE TO TEST BLOOD SUGAR DAILY AS DIRECTED, Disp: 50 each, Rfl: 5    Probiotic Product (PROBIOTIC DAILY PO), Take  by mouth., Disp: , Rfl:     sennosides-docusate (PERICOLACE) 8.6-50 MG per tablet, Take 1 tablet by mouth Daily., Disp: 30 tablet, Rfl: 3    vitamin C (ASCORBIC ACID) 500 MG tablet, Take 1 tablet by mouth Every Other Day., Disp: 30 tablet, Rfl: 3    vitamin D (ERGOCALCIFEROL) 1.25 MG (39734 UT) capsule capsule, TAKE 1 CAPSULE BY MOUTH 1 TIME EVERY WEEK, Disp: 5 capsule, Rfl: 2        Allergies:  Allergies   Allergen Reactions    Other Unknown - High Severity     Blisters from EKG stickers    Bactrim  [Sulfamethoxazole-Trimethoprim] Hives    Ciprofloxacin Hives    Penicillins Hives    Steri-Strip Compound Benzoin [Benzoin Compound] Hives    Sulfa Antibiotics Hives           Family History:  Family History   Adopted: Yes   Problem Relation Age of Onset    Heart disease Mother         also had cancer kidney    Cancer Mother         kidney    Thyroid disease Mother     Heart disease Father     Diabetes Brother     Breast cancer Neg Hx          Surgical History:  Past Surgical History:   Procedure Laterality Date    BREAST BIOPSY Bilateral 2005    benign    BREAST BIOPSY Left 12/29/2022    Procedure: BREAST BIOPSY WITH NEEDLE LOCALIZATION;  Surgeon: Dong Mata MD;  Location: [facility] OR;  Service: General;  Laterality: Left;    CATARACT EXTRACTION WITH INTRAOCULAR LENS IMPLANT Right 01/29/2024    CHOLECYSTECTOMY  2018?    ENDOSCOPY      ENDOSCOPY N/A 01/11/2018    Procedure: ESOPHAGOGASTRODUODENOSCOPY;  Surgeon: Dong Mata MD;  Location: [facility] OR;  Service:     MAMMO BILATERAL  09/2015    OVARIAN CYST DRAINAGE      AK LAPAROSCOPY SURG CHOLECYSTECTOMY N/A 01/11/2018    Procedure: CHOLECYSTECTOMY LAPAROSCOPIC;  Surgeon: Dong Mata MD;  Location: [facility] OR;  Service: General    US GUIDED LYMPH NODE BIOPSY  04/11/2018           Radiology:   No radiology results for the last 30 days.      Radiographs     Vitals:  There were no vitals filed for this visit.   There is no height or weight on file to calculate BMI.      Examination:   GENERAL: 73 y.o. female, alert and oriented X 3 in no acute distress.   There were no vitals taken for this visit.        Orthopedic Examination:          Assessment & Plan:   73 y.o. female presents         No diagnosis found.      Procedures        Cc:  Vallejo, Tashia, APRN

## 2024-06-24 NOTE — PROGRESS NOTES
New Patient Visit      Patient: Shey King  YOB: 1950  Date of Encounter: 06/24/2024        Chief Complaint:   Chief Complaint   Patient presents with    Right Hand - Pain    Right Wrist - Pain           HPI:   Shey King, 73 y.o. female, referred by Tashia Vallejo APRN presents for evaluation of pain volar aspect right wrist with numbness in her hand.  She describes the above symptoms for several years with tingling in her fingers often at night prevents her from sleeping.  She has minimal involvement of left hand compared to the right.  She describes pain palmar aspect right hand.  She reports no significant neck pain.  He is known to have Dupuytren's contracture of right hand.  Past medical history includes diabetes.        Active Problem List:  Patient Active Problem List   Diagnosis    Abdominal pain, LLQ (left lower quadrant)    Cystitis    Diarrhea    Hiatal hernia    Hyperglycemia    Hyperlipidemia    Adult onset hypothyroidism    Muscle spasm    OAB (overactive bladder)    Recurrent UTI    Gallstones    B12 deficiency    Pulmonary nodule    Class 2 obesity due to excess calories without serious comorbidity with body mass index (BMI) of 35.0 to 35.9 in adult    Diabetes mellitus    Lymphadenopathy    Dupuytren's contracture of right hand    Palpitations    ELKINS (dyspnea on exertion)    Ductal carcinoma in situ (DCIS) of left breast    Grade I LV diastolic dysfunction           Past Medical History:  Past Medical History:   Diagnosis Date    Abdominal pain, LLQ (left lower quadrant)     Abnormal ECG aug 2021    Abnormal Pap smear of cervix     several years ago    Anemia     years ago    Arthritis of back 2024    Breast cancer     Cataract early stage    Cystitis     Diabetes mellitus     Diarrhea     Diverticulitis of colon     Diverticulosis     Ductal carcinoma in situ (DCIS) of left breast 12/21/2022    Encounter for cholecystectomy 2018    Gallstones     GERD (gastroesophageal reflux  disease)     Hiatal hernia     Hyperglycemia     Hyperlipidemia     Hyperlipidemia     Hypothyroidism     Knee swelling     Muscle spasm     FLANK PAIN    OAB (overactive bladder)     Pneumonia     years ago    Rectal bleeding     UTI (urinary tract infection)            Past Surgical History:  Past Surgical History:   Procedure Laterality Date    BREAST BIOPSY Bilateral 2005    benign    BREAST BIOPSY Left 12/29/2022    Procedure: BREAST BIOPSY WITH NEEDLE LOCALIZATION;  Surgeon: Dong Mata MD;  Location: [facility];  Service: General;  Laterality: Left;    CATARACT EXTRACTION WITH INTRAOCULAR LENS IMPLANT Right 01/29/2024    CHOLECYSTECTOMY  2018?    ENDOSCOPY      ENDOSCOPY N/A 01/11/2018    Procedure: ESOPHAGOGASTRODUODENOSCOPY;  Surgeon: Dong Mata MD;  Location: [facility];  Service:     MAMMO BILATERAL  09/2015    OVARIAN CYST DRAINAGE      PA LAPAROSCOPY SURG CHOLECYSTECTOMY N/A 01/11/2018    Procedure: CHOLECYSTECTOMY LAPAROSCOPIC;  Surgeon: Dong Mata MD;  Location: [facility];  Service: General    US GUIDED LYMPH NODE BIOPSY  04/11/2018           Family History:  Family History   Adopted: Yes   Problem Relation Age of Onset    Heart disease Mother         also had cancer kidney    Cancer Mother         kidney    Thyroid disease Mother     Heart disease Father     Diabetes Brother     Breast cancer Neg Hx          Social History:  Social History     Socioeconomic History    Marital status:    Tobacco Use    Smoking status: Never    Smokeless tobacco: Never    Tobacco comments:     never   Vaping Use    Vaping status: Never Used   Substance and Sexual Activity    Alcohol use: No    Drug use: No    Sexual activity: Not Currently     Partners: Male     Body mass index is 37.15 kg/m².      Medications:  Current Outpatient Medications   Medication Sig Dispense Refill    acetaminophen (TYLENOL) 325 MG tablet Take 2 tablets by mouth Every 4 (Four) Hours As Needed for Mild Pain.  30 tablet 0    anastrozole (ARIMIDEX) 1 MG tablet Take 1 tablet by mouth Daily. 30 tablet 11    aspirin 81 MG chewable tablet Chew 1 tablet Daily.      AZO-CRANBERRY PO Take 1 tablet by mouth Daily.      fenofibrate (TRICOR) 145 MG tablet Take 1 tablet by mouth Daily. 90 tablet 1    ferrous sulfate 325 (65 FE) MG tablet Take 1 tablet by mouth Every Other Day. 30 tablet 3    glucose monitor monitoring kit 1 each as needed (DM II). 1 each 0    Jardiance 10 MG tablet tablet TAKE 1 TABLET BY MOUTH DAILY 90 tablet 0    Lancets (OneTouch Delica Plus [id number]) misc USE TO TEST BLOOD SUGAR DAILY AS DIRECTED      Lancets Misc. misc For Daily testing  E11.65 50 each 11    levothyroxine (Synthroid) 125 MCG tablet Take 1 tablet by mouth Daily. 90 tablet 1    OneTouch Ultra test strip USE TO TEST BLOOD SUGAR DAILY AS DIRECTED 50 each 5    Probiotic Product (PROBIOTIC DAILY PO) Take  by mouth.      Sennosides (Senna) 8.6 MG capsule Take 1 tablet by mouth Daily.      sennosides-docusate (PERICOLACE) 8.6-50 MG per tablet Take 1 tablet by mouth Daily. 30 tablet 3    vitamin C (ASCORBIC ACID) 500 MG tablet Take 1 tablet by mouth Every Other Day. 30 tablet 3    vitamin D (ERGOCALCIFEROL) 1.25 MG (60331 UT) capsule capsule TAKE 1 CAPSULE BY MOUTH 1 TIME EVERY WEEK 5 capsule 2    cyanocobalamin 1000 MCG/ML injection Inject 1 mL into the appropriate muscle as directed by prescriber Every 28 (Twenty-Eight) Days. (Patient not taking: Reported on 6/24/2024) 1 mL 6     No current facility-administered medications for this visit.         Allergies:  Allergies   Allergen Reactions    Other Unknown - High Severity     Blisters from EKG stickers    Bactrim [Sulfamethoxazole-Trimethoprim] Hives    Ciprofloxacin Hives    Penicillins Hives    Steri-Strip Compound Benzoin [Benzoin Compound] Hives    Sulfa Antibiotics Hives         Review of Systems:   Review of Systems   Constitutional: Negative.  Negative for chills, fatigue and fever.   HENT:  "Negative.  Negative for congestion, ear pain, facial swelling, sore throat, trouble swallowing and voice change.    Eyes:  Negative for pain, discharge, redness and visual disturbance.   Respiratory: Negative.  Negative for apnea, cough, choking, chest tightness, shortness of breath, wheezing and stridor.    Cardiovascular: Negative.  Negative for chest pain, palpitations and leg swelling.   Gastrointestinal: Negative.  Negative for abdominal distention, abdominal pain, blood in stool, nausea and vomiting.   Endocrine: Negative.  Negative for cold intolerance, heat intolerance, polydipsia and polyphagia.   Genitourinary: Negative.  Negative for difficulty urinating, dysuria, flank pain, frequency and hematuria.   Musculoskeletal:  Positive for arthralgias.   Skin: Negative.  Negative for color change, pallor, rash and wound.   Allergic/Immunologic: Negative.  Negative for environmental allergies, food allergies and immunocompromised state.   Neurological: Negative.  Negative for dizziness, tremors, seizures, syncope, speech difficulty, weakness, light-headedness, numbness and headaches.   Hematological: Negative.  Negative for adenopathy. Does not bruise/bleed easily.   Psychiatric/Behavioral: Negative.  Negative for behavioral problems, confusion, dysphoric mood, self-injury, sleep disturbance and suicidal ideas. The patient is not nervous/anxious.          Physical Exam:   Physical Exam  GENERAL: 73 y.o. female, alert and oriented X 3 in no acute distress.   Visit Vitals  /73   Pulse 84   Temp 96.9 °F (36.1 °C)   Ht 154.9 cm (61\")   Wt 89.2 kg (196 lb 9.6 oz)   SpO2 98%   BMI 37.15 kg/m²       GENERAL APPEARANCE: Awake, alert & oriented, in no acute distress and well developed, well nourished.   PSYCH: Normal mood and affect  LUNGS: Breathing nonlabored, no wheezing  EYES: Sclera anicteric, pupils equal  CARDIOVASCULAR: Palpable pulses. Capillary refill less than 2 seconds  INTEGUMENTARY: Skin intact, co " clubbing, cyanosis  NEUROLOGIC: Normal Sensation  MUSCULOSKELETAL:  Orthopedic Examination: Right hand compared to left demonstrates normal thenar tone slightly decreased sensation to the thumb index and middle fingers with mild tenderness in her palm palpable nodules.  Sign is moderately positive right compared to the left.  Neurovascular examination is intact.  Dupuytren's contracture involving palm with mild flexion contracture.          Radiology/Labs:     XR Hand 3+ View Right    Result Date: 6/24/2024  Findings consistent with osteoarthritis.      This report was finalized on 6/24/2024 9:16 AM by Kiera Hernandez M.D..           Radiographs right hand radiographs reviewed demonstrate mild to moderate osteoarthritis of DIP joints and first CMC joint.  No degenerative changes within the carpus.          Assessment & Plan:   73 y.o. female presents several year history of pain in right hand numbness thumb index and middle fingers.  She is provided cock-up wrist brace for suspected carpal tunnel syndrome. We will request EMG nerve conduction studies and see her back once completed.        ICD-10-CM ICD-9-CM   1. Right wrist pain  M25.531 719.43             Cc:   Tashia Vallejo APRN                This document has been electronically signed by Christopher Peter MD   June 24, 2024 11:25 EDT

## 2024-07-25 ENCOUNTER — OFFICE VISIT (OUTPATIENT)
Dept: ONCOLOGY | Facility: CLINIC | Age: 74
End: 2024-07-25
Payer: COMMERCIAL

## 2024-07-25 ENCOUNTER — LAB (OUTPATIENT)
Dept: ONCOLOGY | Facility: CLINIC | Age: 74
End: 2024-07-25
Payer: COMMERCIAL

## 2024-07-25 VITALS
HEART RATE: 84 BPM | BODY MASS INDEX: 35.7 KG/M2 | WEIGHT: 194 LBS | TEMPERATURE: 97.3 F | SYSTOLIC BLOOD PRESSURE: 146 MMHG | DIASTOLIC BLOOD PRESSURE: 80 MMHG | HEIGHT: 62 IN | OXYGEN SATURATION: 94 % | RESPIRATION RATE: 18 BRPM

## 2024-07-25 DIAGNOSIS — E61.1 IRON DEFICIENCY: ICD-10-CM

## 2024-07-25 DIAGNOSIS — D05.12 DUCTAL CARCINOMA IN SITU (DCIS) OF LEFT BREAST: Primary | ICD-10-CM

## 2024-07-25 DIAGNOSIS — D05.12 DUCTAL CARCINOMA IN SITU (DCIS) OF LEFT BREAST: ICD-10-CM

## 2024-07-25 DIAGNOSIS — E53.8 FOLATE DEFICIENCY: ICD-10-CM

## 2024-07-25 LAB
BASOPHILS # BLD AUTO: 0.08 10*3/MM3 (ref 0–0.2)
BASOPHILS NFR BLD AUTO: 1.3 % (ref 0–1.5)
DEPRECATED RDW RBC AUTO: 45.7 FL (ref 37–54)
EOSINOPHIL # BLD AUTO: 0.19 10*3/MM3 (ref 0–0.4)
EOSINOPHIL NFR BLD AUTO: 3.2 % (ref 0.3–6.2)
ERYTHROCYTE [DISTWIDTH] IN BLOOD BY AUTOMATED COUNT: 13.4 % (ref 12.3–15.4)
FERRITIN SERPL-MCNC: 128.8 NG/ML (ref 13–150)
FOLATE SERPL-MCNC: 12.2 NG/ML (ref 4.78–24.2)
HCT VFR BLD AUTO: 37.2 % (ref 34–46.6)
HGB BLD-MCNC: 12.1 G/DL (ref 12–15.9)
IMM GRANULOCYTES # BLD AUTO: 0.04 10*3/MM3 (ref 0–0.05)
IMM GRANULOCYTES NFR BLD AUTO: 0.7 % (ref 0–0.5)
IRON 24H UR-MRATE: 66 MCG/DL (ref 37–145)
IRON SATN MFR SERPL: 12 % (ref 20–50)
LYMPHOCYTES # BLD AUTO: 1.13 10*3/MM3 (ref 0.7–3.1)
LYMPHOCYTES NFR BLD AUTO: 18.8 % (ref 19.6–45.3)
MCH RBC QN AUTO: 29.9 PG (ref 26.6–33)
MCHC RBC AUTO-ENTMCNC: 32.5 G/DL (ref 31.5–35.7)
MCV RBC AUTO: 91.9 FL (ref 79–97)
MONOCYTES # BLD AUTO: 0.5 10*3/MM3 (ref 0.1–0.9)
MONOCYTES NFR BLD AUTO: 8.3 % (ref 5–12)
NEUTROPHILS NFR BLD AUTO: 4.06 10*3/MM3 (ref 1.7–7)
NEUTROPHILS NFR BLD AUTO: 67.7 % (ref 42.7–76)
NRBC BLD AUTO-RTO: 0 /100 WBC (ref 0–0.2)
PLATELET # BLD AUTO: 254 10*3/MM3 (ref 140–450)
PMV BLD AUTO: 10.6 FL (ref 6–12)
RBC # BLD AUTO: 4.05 10*6/MM3 (ref 3.77–5.28)
TIBC SERPL-MCNC: 533 MCG/DL (ref 298–536)
TRANSFERRIN SERPL-MCNC: 358 MG/DL (ref 200–360)
VIT B12 BLD-MCNC: 251 PG/ML (ref 211–946)
WBC NRBC COR # BLD AUTO: 6 10*3/MM3 (ref 3.4–10.8)

## 2024-07-25 PROCEDURE — 99214 OFFICE O/P EST MOD 30 MIN: CPT | Performed by: INTERNAL MEDICINE

## 2024-07-25 PROCEDURE — 82728 ASSAY OF FERRITIN: CPT | Performed by: INTERNAL MEDICINE

## 2024-07-25 PROCEDURE — 83540 ASSAY OF IRON: CPT | Performed by: INTERNAL MEDICINE

## 2024-07-25 PROCEDURE — 82607 VITAMIN B-12: CPT | Performed by: INTERNAL MEDICINE

## 2024-07-25 PROCEDURE — 82746 ASSAY OF FOLIC ACID SERUM: CPT | Performed by: INTERNAL MEDICINE

## 2024-07-25 PROCEDURE — 85025 COMPLETE CBC W/AUTO DIFF WBC: CPT | Performed by: INTERNAL MEDICINE

## 2024-07-25 PROCEDURE — 84466 ASSAY OF TRANSFERRIN: CPT | Performed by: INTERNAL MEDICINE

## 2024-07-25 NOTE — PROGRESS NOTES
Venipuncture Blood Specimen Collection  Venipuncture performed in right arm by Tashia Ch MA with good hemostasis. Patient tolerated the procedure well without complications.   07/25/24   Tashia Ch MA

## 2024-07-25 NOTE — PROGRESS NOTES
Subjective     Date: 2024    Referring Provider  Dr. Mata     Chief Complaint  Ductal carcinoma in situ of the left breast, estrogen receptor positive, status post lumpectomy  Iron deficiency anemia     Subjective          Shey King is a 73 y.o. female who presents today to [facility] for follow up.    HPI:   73 y.o.female with a history of diabetes mellitus type 2, hyperlipidemia, hypothyroidism, frequent UTIs who presents to establish care regarding ductal carcinoma in situ of the left breast, estrogen receptor positive.     Oncological history:  She notes intermittent breast pain on the left side for several months, was finally directed to get a screening mammogram  - 2022.  Screening mammogram showed calcification left breast middle posterior third lateral aspect, which are new.  Other calcifications throughout left breast appear stable.   - 22. Diagnostic mammogram showed grouped calcifications in the left 3-4 o'clock region.   - 2022: stereotactic biopsy. Pathology with intermediate to high-grade cribriform ductal carcinoma in situ with associated necrosis and calcification.  -2022: Underwent lumpectomy.  Final pathology with ductal carcinoma in situ.  10 x 3 x 2 mm, 4 of 19 blocks.  Grade 2 (intermediate).  Distance from DCIS to closest margin 1 mm from inferior margin, 2 mm from anterior margin, 4 mm to posterior margin. PTis.  Estrogen receptor 100% positive, progesterone 5% positive  2023-3/7/2023: Underwent radiation with 4256 cGy in 16 fraction with 1000 cGy in 5 fraction boost  2023: Started anastrozole     She was recently seen by Dr. Mata on 2023.  Overall doing well after lumpectomy.  Presents for further therapy and discussion.    GYN History:  Menarche at age 14.   Age of first pregnancy:    Age of menopause: 52  Breast feed: no  Birth control: yes  Hormone replacement: no    Never had DEXA bone  "scan in the past.  Denies ever being treated for diagnosed with osteoporosis.  Currently taking vitamin D tablets.    Interval History 02/01/2023   She was seen by radiation oncology today, Dr. Yusuf, who recommends radiation to left breast due to DCIS margin less than 2 mm.  Underwent DEXA bone scan 1/27/2023 with bone mineral density of right femur neck 0.899 with T score -1.     Interval History 03/01/2023 - Telehealth   Receiving radiation therapy currently, has 4 more boost therapy left. Has had pain on the breast intermittently and the axilla. Was told by Rad Onc to apply less aquaphor to the area.     Interval History 04/06/2023   She presents for follow up today. Completed radiation therapy. Started anastrozole, taking it daily. Overall, still with fatigue since completion of radiation so unsure if it is due to radiation or medication. Has had two episodes of hot flashes. Denies any other AE.    Also reports mild intermittent sharp pain L breast. She reports difficulty sleeping, would like something to help temporarily.     Interval History 07/21/2023   Ms. King presents for follow up. Reports increased fatigue with anastrozole, has been compliant. Has also developed few UTI's, most recently with pseudomonas, currently being treated with clindamycin. Had a repeat mammogram 6/15 which showed left breast there trabecular thickening and skin thickening, likely due to treatment, stable calcification.     Interval History 10/24/2023   Ms. King presents for follow up today. Denies any palpable lumps/bumps of the breast, but has noted waking up with discharge \"caking\" on the left nipple. Also with intermittent warmth and pain in the left breast with radiation to axilla. Compliant with anastrozole without missed doses. Does report intermittent aches in joints.   Has had intentional weight loss due to GLP-1 inhibitor.    Interval History 01/24/2024   Ms. King presents today for follow up. Her last mammogram " "from 10/30/23 did not show evidence of malignancy. She reports increased fatigue, which has not improved. Reports significant b/l knee pain, unsure if it is due to anastrozole. Continues to be compliant with AI. We looked at her last XR of the R knee from 3/2022 which shows osteoarthritis of the R knee, recommend follow up with PCP (seeing Tashia Vallejo 2/6)  Cataract surgery Monday.    Interval History 04/25/2024   Ms. King presents for follow up. She reports a \"knot\" on posterior lateral part of L breast that she's felt for ~2 weeks, which is painful. Denies lifting heavy objects or trauma to breast. No falls. Recently had flu and URI, still recovering, denies any vaccinations.    Taking oral iron with mild constipation, noted significant improvement in her energy until recent infections. Taking miralax, would like additional support for constipation.     Compliant with anastrozole.    Interval History 07/25/2024   Ms. King presents for follow up. She reports another \"knot\" L axillary region, painful. She was unable to give herself B12 injection, she may be able to ask her niece to help with administering. Energy improved when she was taking iron/vitamin C, but has slowed down due to other medication.   Has chronic arthritis and carpal tunnel syndrome that bothers her, but doing well on anastrozole otherwise.    Objective     Objective     Allergy:   Allergies   Allergen Reactions    Other Unknown - High Severity     Blisters from EKG stickers    Bactrim [Sulfamethoxazole-Trimethoprim] Hives    Ciprofloxacin Hives    Penicillins Hives    Steri-Strip Compound Benzoin [Benzoin Compound] Hives    Sulfa Antibiotics Hives        Current Medications:   Current Outpatient Medications   Medication Sig Dispense Refill    acetaminophen (TYLENOL) 325 MG tablet Take 2 tablets by mouth Every 4 (Four) Hours As Needed for Mild Pain. 30 tablet 0    anastrozole (ARIMIDEX) 1 MG tablet Take 1 tablet by mouth Daily. 30 tablet 11    " aspirin 81 MG chewable tablet Chew 1 tablet Daily.      AZO-CRANBERRY PO Take 1 tablet by mouth Daily.      cyanocobalamin 1000 MCG/ML injection Inject 1 mL into the appropriate muscle as directed by prescriber Every 28 (Twenty-Eight) Days. (Patient not taking: Reported on 6/24/2024) 1 mL 6    fenofibrate (TRICOR) 145 MG tablet Take 1 tablet by mouth Daily. 90 tablet 1    ferrous sulfate 325 (65 FE) MG tablet Take 1 tablet by mouth Every Other Day. 30 tablet 3    glucose monitor monitoring kit 1 each as needed (DM II). 1 each 0    Jardiance 10 MG tablet tablet TAKE 1 TABLET BY MOUTH DAILY 90 tablet 0    Lancets (OneTouch Delica Plus Cssusi87Q) misc USE TO TEST BLOOD SUGAR DAILY AS DIRECTED      Lancets Misc. misc For Daily testing  E11.65 50 each 11    levothyroxine (Synthroid) 125 MCG tablet Take 1 tablet by mouth Daily. 90 tablet 1    OneTouch Ultra test strip USE TO TEST BLOOD SUGAR DAILY AS DIRECTED 50 each 5    Probiotic Product (PROBIOTIC DAILY PO) Take  by mouth.      Sennosides (Senna) 8.6 MG capsule Take 1 tablet by mouth Daily.      sennosides-docusate (PERICOLACE) 8.6-50 MG per tablet Take 1 tablet by mouth Daily. 30 tablet 3    vitamin C (ASCORBIC ACID) 500 MG tablet Take 1 tablet by mouth Every Other Day. 30 tablet 3    vitamin D (ERGOCALCIFEROL) 1.25 MG (50281 UT) capsule capsule TAKE 1 CAPSULE BY MOUTH 1 TIME EVERY WEEK 5 capsule 2     No current facility-administered medications for this visit.       Past Medical History:  Past Medical History:   Diagnosis Date    Abdominal pain, LLQ (left lower quadrant)     Abnormal ECG aug 2021    Abnormal Pap smear of cervix     several years ago    Anemia     years ago    Arthritis of back 2024    Breast cancer     Cataract early stage    Cystitis     Diabetes mellitus     Diarrhea     Diverticulitis of colon     Diverticulosis     Ductal carcinoma in situ (DCIS) of left breast 12/21/2022    Encounter for cholecystectomy 2018    Gallstones     GERD  (gastroesophageal reflux disease)     Hiatal hernia     Hyperglycemia     Hyperlipidemia     Hyperlipidemia     Hypothyroidism     Knee swelling     Muscle spasm     FLANK PAIN    OAB (overactive bladder)     Pneumonia     years ago    Rectal bleeding     UTI (urinary tract infection)        Past Surgical History:  Past Surgical History:   Procedure Laterality Date    BREAST BIOPSY Bilateral 2005    benign    BREAST BIOPSY Left 12/29/2022    Procedure: BREAST BIOPSY WITH NEEDLE LOCALIZATION;  Surgeon: Dong Mata MD;  Location: [facility];  Service: General;  Laterality: Left;    CATARACT EXTRACTION WITH INTRAOCULAR LENS IMPLANT Right 01/29/2024    CHOLECYSTECTOMY  2018?    ENDOSCOPY      ENDOSCOPY N/A 01/11/2018    Procedure: ESOPHAGOGASTRODUODENOSCOPY;  Surgeon: Dong Mata MD;  Location: [facility];  Service:     MAMMO BILATERAL  09/2015    OVARIAN CYST DRAINAGE      OH LAPAROSCOPY SURG CHOLECYSTECTOMY N/A 01/11/2018    Procedure: CHOLECYSTECTOMY LAPAROSCOPIC;  Surgeon: Dong Mata MD;  Location: [facility];  Service: General    US GUIDED LYMPH NODE BIOPSY  04/11/2018       Social History:  Social History     Socioeconomic History    Marital status:    Tobacco Use    Smoking status: Never    Smokeless tobacco: Never    Tobacco comments:     never   Vaping Use    Vaping status: Never Used   Substance and Sexual Activity    Alcohol use: No    Drug use: No    Sexual activity: Not Currently     Partners: Male     Shey King  reports that she has never smoked. She has never used smokeless tobacco..      Family History:  Family History   Adopted: Yes   Problem Relation Age of Onset    Heart disease Mother         also had cancer kidney    Cancer Mother         kidney    Thyroid disease Mother     Heart disease Father     Diabetes Brother     Breast cancer Neg Hx        Review of Systems:  Review of Systems   Constitutional: Negative.    HENT: Negative.     Respiratory: Negative.    "  Cardiovascular: Negative.    Gastrointestinal: Negative.    Genitourinary: Negative.    Musculoskeletal: Negative.    Skin: Negative.    Neurological: Negative.    Hematological: Negative.    Psychiatric/Behavioral: Negative.         Vital Signs:   /80   Pulse 84   Temp 97.3 °F (36.3 °C) (Temporal)   Resp 18   Ht 156.2 cm (61.5\")   Wt 88 kg (194 lb)   SpO2 94%   BMI 36.06 kg/m²      Physical Exam:   Physical Exam  Vitals reviewed.   Constitutional:       General: She is not in acute distress.     Appearance: Normal appearance. She is not ill-appearing.   HENT:      Head: Normocephalic and atraumatic.      Mouth/Throat:      Mouth: Mucous membranes are moist.      Pharynx: Oropharynx is clear.   Eyes:      Conjunctiva/sclera: Conjunctivae normal.      Pupils: Pupils are equal, round, and reactive to light.   Cardiovascular:      Rate and Rhythm: Normal rate and regular rhythm.      Heart sounds: No murmur heard.  Pulmonary:      Effort: Pulmonary effort is normal. No respiratory distress.      Breath sounds: Normal breath sounds. No wheezing.   Chest:   Breasts:     Right: Normal. No mass or skin change.      Left: Normal. No swelling, bleeding, inverted nipple, mass, nipple discharge, skin change or tenderness.      Comments: L breast with lumpectomy scar without discharge from areola    Tenderness upper L axillary region, no mass appreciated  Abdominal:      General: Abdomen is flat. Bowel sounds are normal. There is no distension.      Palpations: Abdomen is soft. There is no mass.      Tenderness: There is no abdominal tenderness. There is no guarding.   Musculoskeletal:         General: No swelling. Normal range of motion.      Cervical back: Normal range of motion.   Lymphadenopathy:      Cervical: No cervical adenopathy.   Skin:     General: Skin is warm and dry.   Neurological:      General: No focal deficit present.      Mental Status: She is alert and oriented to person, place, and time. " "Mental status is at baseline.   Psychiatric:         Mood and Affect: Mood normal.         PHQ-9 Score  PHQ-9 Total Score:       Pain Score  Vitals:    07/25/24 1001   BP: 146/80   Pulse: 84   Resp: 18   Temp: 97.3 °F (36.3 °C)   TempSrc: Temporal   SpO2: 94%   Weight: 88 kg (194 lb)   Height: 156.2 cm (61.5\")   PainSc: 0-No pain               ECOG score: 0             Lab Review  Lab Results   Component Value Date    WBC 6.00 07/25/2024    HGB 12.1 07/25/2024    HCT 37.2 07/25/2024    MCV 91.9 07/25/2024    RDW 13.4 07/25/2024     07/25/2024    NEUTRORELPCT 67.7 07/25/2024    LYMPHORELPCT 18.8 (L) 07/25/2024    MONORELPCT 8.3 07/25/2024    EOSRELPCT 3.2 07/25/2024    BASORELPCT 1.3 07/25/2024    NEUTROABS 4.06 07/25/2024    LYMPHSABS 1.13 07/25/2024       Lab Results   Component Value Date     05/07/2024    K 4.6 05/07/2024    CO2 23.0 05/07/2024     05/07/2024    BUN 20 05/07/2024    CREATININE 1.04 (H) 05/07/2024    EGFRIFNONA 57 (L) 01/26/2022    EGFRIFAFRI 74 09/06/2016    GLUCOSE 137 (H) 05/07/2024    CALCIUM 9.1 05/07/2024    ALKPHOS 77 05/07/2024    AST 14 05/07/2024    ALT 17 05/07/2024    BILITOT 0.4 05/07/2024    ALBUMIN 4.5 05/07/2024    PROTEINTOT 7.5 05/07/2024    MG 1.9 05/07/2024    PHOS 3.5 12/01/2021       Radiology Results      Mammo Diagnostic Digital Tomosynthesis Left With CAD (04/25/2024 10:42)     FINDINGS: The left breast is heterogeneously dense, which may obscure  small masses.     The fibroglandular pattern is stable in appearance including  postoperative changes being followed in the lateral aspect of the left  breast. There are stable scattered calcifications. Mild skin thickening  is again noted and likely treatment related. No new or suspicious  findings are identified.     Left breast ultrasound: Focused sonographic evaluation of the patient's  area of pain extending from her back into the left 3:00 region was  performed. This demonstrates no underlying sonographic " abnormality. No  solid or cystic mass is seen nor is there an abnormal area of shadowing  in the imaged portion of the left breast.     IMPRESSION:  Probably benign left mammographic findings. Recommend  continued follow-up.    Mammo Diagnostic Digital Tomosynthesis Bilateral With CAD (10/30/2023 09:49)   IMPRESSION:     1. Benign right mammographic findings.     2. Probably benign left mammographic findings. Recommend continued  follow-up.       Mammo Diagnostic Digital Tomosynthesis Left With CAD (06/15/2023 12:07)      COMPARISON: 12/29/2022, 12/14/2022, 12/06/2022, 10/27/2022, 06/19/2020,  02/07/2019, 11/11/2017     FINDINGS: The left breast is heterogeneously dense, which may obscure  small masses.     Presumed postoperative changes are now noted in the lateral aspect of  the left breast from the patient's interval conservation surgery. There  is mild trabecular thickening and skin thickening which is likely  treatment related. There are stable scattered calcifications.     IMPRESSION:  Probably benign left mammographic findings. Recommend  continued follow-up.    DEXA Bone Density Axial (01/27/2023 13:22)  FINDINGS:    RIGHT FEMORAL NECK BONE MINERAL DENSITY:  BMD of the right femur neck  is 0.899 with T score -1 with density.      UNITS OF MEASURE:    Bone mineral density is measured in g/cm2.    Z-score is the number of standard deviations above age-matched  controls.    T-score is the number of standard deviations above healthy young  adults.      WORLD HEALTH ORGANIZATION GUIDELINES:    T-score at or above -1 is normal bone mineral density.    T-score between -1 and -2.5 is osteopenia.    T-score at or below -2.5 is osteoporosis.     IMPRESSION:    BMD of the right femur neck is 0.899 with T score -1 with density.    Mammo Diagnostic Left With CAD (12/06/2022 09:21)  FINDINGS: Magnification imaging of the left breast demonstrates a small  group of heterogeneous calcifications in the mid left 3:00 to  4:00  region. Recommend stereotactic guided core biopsy.     IMPRESSION:  Grouped calcifications in the left 3:00 to 4:00 region.     BI-RADS CATEGORY:  4, SUSPICIOUS     RECOMMENDATION:  Recommend stereotactic guided core biopsy of the left  breast.    Mammo Screening Digital Tomosynthesis Bilateral With CAD (10/27/2022 07:29)  FINDINGS:     Breast Composition: The breasts are heterogenously dense, which may  obscure small masses.   Important Findings:    Biopsy related changes right breast with marker clip. Stable benign  calcifications right breast are noted. Calcifications left breast  middle-posterior third lateral aspect have developed. Other  calcifications throughout the left breast appear stable. Biopsy related  changes upper outer quadrant left breast with marker clip noted.   No suspicious calcifications or areas of architectural distortion.  No  dominant masses or skin thickening      Pathology:   Procedure: Excision (less than total mastectomy)   Specimen Laterality: Left   Tumor Site: Not specified   Histologic Type: Ductal carcinoma in situ   Size of DCIS: 10 x 3 x 2 mm, 4 of 19 blocks   Architectural Patterns: Cribriform   Nuclear Grade: Grade II (intermediate)   Necrosis: Present, focal (small foci of necrosis)   Microcalcifications: Present in DCIS and in nonneoplastic tissue   Margin Status: All margins negative for DCIS   Distance from DCIS to Closest Margin: 1 mm from inferior margin   Distance from DCIS to Anterior Margin: 2 mm   Distance from DCIS to Posterior Margin: 4 mm   Regional Lymph Node Status: No regional lymph nodes submitted or found   PATHOLOGIC STAGE (pTNM, AJCC 8th Edition): pTis (DCIS), pNx   Biomarker Testing Performed on Previous Biopsy:  (ER) Positive 100 %   (PgR) Positive 5 %     CLINICAL HISTORY:   Ductal carcinoma in situ (DCIS) of left breast     Assessment / Plan         Assessment and Plan   72-year-old female who presents today with hormone positive left breast ductal  carcinoma in situ, status postlumpectomy.    Ductal carcinoma in situ, left breast, hormone positive  -Final pathology with 10 x 3 x 2 mm ductal carcinoma in situ, grade 2, 1 mm from inferior margin, 2 mm from anterior margin, 4 mm from posterior margin  -Her case ws discussed in tumor board on 1/25; it was deemed she should receive radiation therapy given close margins <2mm inferior margin  -Completed adjuvant radiation therapy: 3/7/23  -Tolerating adjuvant hormonal therapy which has shown to decrease recurrence by at least 33%. Taking anastrozole 1 mg daily for 5 years. (To be completed 3/2028)  -Last mammogram with benign findings 10/2023; Next mammogram in 6 months 10/2024   -She will continue history and physical and exam every 3-6 months for the first 5 years, yearly thereafter according to NCCN     2. Bone health  -DEXA scan 1/27/2023: bone mineral density of right femur neck 0.899 with T score -1.  Repeat DEXA scan 1/2025  -Continue vitamin D and calcium supplement    3. Fatigue   - Anemia work up showed iron deficiency and marginal B12 level  -Started patient on oral ferrous sulfate 325  and ascorbic acid 500 mg to be taken every other day; if she has worsened baseline constipation, she is to contact us for parenteral iron   -Started on B12 injections to be given monthly; patient has not started to administer this yet, will ask family member, if not she is to contact our clinical RN to help with this        Discussed possible differential diagnoses, testing, treatment, recommended non-pharmacological interventions, risks, warning signs to monitor for that would indicate need for follow-up in clinic or ER. If no improvement with these regimens or you have new or worsening symptoms follow-up. Patient verbalizes understanding and agreement with plan of care. Denies further needs or concerns.     Patient was given instructions and counseling regarding her condition and for health maintenance advised.    I spent  30 minutes with Shey King today.  In the office today, more than 50% of this time was spent face-to-face with her  in counseling / coordination of care, reviewing her interim medical history and counseling on the current treatment plan.  All questions were answered to her satisfaction.      Meds ordered during this visit  No orders of the defined types were placed in this encounter.      Visit Diagnoses    ICD-10-CM ICD-9-CM   1. Ductal carcinoma in situ (DCIS) of left breast  D05.12 233.0               Follow Up   In 3 months for H&P  Repeat CBC, iron studies, ferritin, B12, folate    FU mammogram       This document has been electronically signed by Maia Ford MD   July 25, 2024 10:55 EDT    Dictated Utilizing Dragon Dictation: Part of this note may be an electronic transcription/translation of spoken language to printed text using the Dragon Dictation System.

## 2024-07-29 DIAGNOSIS — E11.9 TYPE 2 DIABETES MELLITUS WITHOUT COMPLICATION, WITHOUT LONG-TERM CURRENT USE OF INSULIN: ICD-10-CM

## 2024-07-29 RX ORDER — EMPAGLIFLOZIN 10 MG/1
10 TABLET, FILM COATED ORAL DAILY
Qty: 90 TABLET | Refills: 0 | Status: SHIPPED | OUTPATIENT
Start: 2024-07-29

## 2024-08-07 ENCOUNTER — OFFICE VISIT (OUTPATIENT)
Dept: FAMILY MEDICINE CLINIC | Facility: CLINIC | Age: 74
End: 2024-08-07
Payer: MEDICARE

## 2024-08-07 ENCOUNTER — LAB (OUTPATIENT)
Dept: FAMILY MEDICINE CLINIC | Facility: CLINIC | Age: 74
End: 2024-08-07
Payer: MEDICARE

## 2024-08-07 ENCOUNTER — TELEPHONE (OUTPATIENT)
Dept: FAMILY MEDICINE CLINIC | Facility: CLINIC | Age: 74
End: 2024-08-07

## 2024-08-07 VITALS
TEMPERATURE: 97.3 F | BODY MASS INDEX: 35.07 KG/M2 | OXYGEN SATURATION: 96 % | WEIGHT: 190.6 LBS | HEIGHT: 62 IN | HEART RATE: 81 BPM | DIASTOLIC BLOOD PRESSURE: 62 MMHG | SYSTOLIC BLOOD PRESSURE: 126 MMHG

## 2024-08-07 DIAGNOSIS — E53.8 B12 DEFICIENCY: ICD-10-CM

## 2024-08-07 DIAGNOSIS — E03.8 ADULT ONSET HYPOTHYROIDISM: ICD-10-CM

## 2024-08-07 DIAGNOSIS — E11.9 TYPE 2 DIABETES MELLITUS WITHOUT COMPLICATION, WITHOUT LONG-TERM CURRENT USE OF INSULIN: ICD-10-CM

## 2024-08-07 DIAGNOSIS — E55.9 VITAMIN D DEFICIENCY: ICD-10-CM

## 2024-08-07 DIAGNOSIS — R53.81 MALAISE: ICD-10-CM

## 2024-08-07 DIAGNOSIS — E78.5 HYPERLIPIDEMIA, UNSPECIFIED HYPERLIPIDEMIA TYPE: ICD-10-CM

## 2024-08-07 DIAGNOSIS — E11.9 TYPE 2 DIABETES MELLITUS WITHOUT COMPLICATION, WITHOUT LONG-TERM CURRENT USE OF INSULIN: Primary | ICD-10-CM

## 2024-08-07 PROCEDURE — 83036 HEMOGLOBIN GLYCOSYLATED A1C: CPT | Performed by: NURSE PRACTITIONER

## 2024-08-07 PROCEDURE — 1159F MED LIST DOCD IN RCRD: CPT | Performed by: NURSE PRACTITIONER

## 2024-08-07 PROCEDURE — 3044F HG A1C LEVEL LT 7.0%: CPT | Performed by: NURSE PRACTITIONER

## 2024-08-07 PROCEDURE — 80061 LIPID PANEL: CPT | Performed by: NURSE PRACTITIONER

## 2024-08-07 PROCEDURE — 81003 URINALYSIS AUTO W/O SCOPE: CPT | Performed by: NURSE PRACTITIONER

## 2024-08-07 PROCEDURE — 99214 OFFICE O/P EST MOD 30 MIN: CPT | Performed by: NURSE PRACTITIONER

## 2024-08-07 PROCEDURE — 1126F AMNT PAIN NOTED NONE PRSNT: CPT | Performed by: NURSE PRACTITIONER

## 2024-08-07 PROCEDURE — 80053 COMPREHEN METABOLIC PANEL: CPT | Performed by: NURSE PRACTITIONER

## 2024-08-07 PROCEDURE — 1160F RVW MEDS BY RX/DR IN RCRD: CPT | Performed by: NURSE PRACTITIONER

## 2024-08-07 PROCEDURE — 84443 ASSAY THYROID STIM HORMONE: CPT | Performed by: NURSE PRACTITIONER

## 2024-08-07 PROCEDURE — 96372 THER/PROPH/DIAG INJ SC/IM: CPT | Performed by: NURSE PRACTITIONER

## 2024-08-07 PROCEDURE — 82306 VITAMIN D 25 HYDROXY: CPT | Performed by: NURSE PRACTITIONER

## 2024-08-07 RX ORDER — CYANOCOBALAMIN 1000 UG/ML
1000 INJECTION, SOLUTION INTRAMUSCULAR; SUBCUTANEOUS ONCE
Status: COMPLETED | OUTPATIENT
Start: 2024-08-07 | End: 2024-08-07

## 2024-08-07 RX ORDER — FENOFIBRATE 145 MG/1
145 TABLET, COATED ORAL DAILY
Qty: 90 TABLET | Refills: 1 | Status: SHIPPED | OUTPATIENT
Start: 2024-08-07

## 2024-08-07 RX ADMIN — CYANOCOBALAMIN 1000 MCG: 1000 INJECTION, SOLUTION INTRAMUSCULAR; SUBCUTANEOUS at 09:27

## 2024-08-07 NOTE — PROGRESS NOTES
"Chief Complaint  Fatigue    Subjective        Shey King presents to [facility]  Thyroid Problem  Presents for follow-up (Hypothyroidism with recent medication changes.) visit. Symptoms include fatigue. The symptoms have been worsening.   Diabetes  She presents for her follow-up diabetic visit. She has type 2 diabetes mellitus. Her disease course has been stable. There are no hypoglycemic associated symptoms. Associated symptoms include fatigue. There are no hypoglycemic complications. There are no diabetic complications. Current diabetic treatment includes diet, insulin injections and oral agent (dual therapy). She is compliant with treatment all of the time. Her weight is stable. She is following a generally healthy diet. Meal planning includes avoidance of concentrated sweets. She never participates in exercise. Her home blood glucose trend is fluctuating minimally.   Fatigue  This is a recurrent (iron def anemia, B12 def) problem. The problem occurs daily. The problem has been unchanged. Associated symptoms include fatigue.       Objective   Vital Signs:  /62 (BP Location: Right arm, Patient Position: Sitting)   Pulse 81   Temp 97.3 °F (36.3 °C) (Temporal)   Ht 156.2 cm (61.5\")   Wt 86.5 kg (190 lb 9.6 oz)   SpO2 96%   BMI 35.43 kg/m²   Estimated body mass index is 35.43 kg/m² as calculated from the following:    Height as of this encounter: 156.2 cm (61.5\").    Weight as of this encounter: 86.5 kg (190 lb 9.6 oz).               Physical Exam  Vitals and nursing note reviewed.   Constitutional:       General: She is not in acute distress.     Appearance: She is well-developed. She is obese. She is not ill-appearing.   HENT:      Head: Normocephalic.   Cardiovascular:      Rate and Rhythm: Normal rate and regular rhythm.      Heart sounds: Normal heart sounds. No murmur heard.  Pulmonary:      Effort: Pulmonary effort is normal.      Breath sounds: Normal breath " sounds.   Skin:     General: Skin is warm and dry.   Neurological:      Mental Status: She is alert and oriented to person, place, and time.   Psychiatric:         Behavior: Behavior normal.        Result Review :      During this visit the following were done:  Labs Reviewed [x]    Labs Ordered [x]    Radiology Reports Reviewed []    Radiology Ordered []    PCP Records Reviewed []    Referring Provider Records Reviewed []    ER Records Reviewed []    Hospital Records Reviewed []    History Obtained From Family []    Radiology Images Reviewed []    Other Reviewed []    Records Requested []       Assessment and Plan   Diagnoses and all orders for this visit:    1. Type 2 diabetes mellitus without complication, without long-term current use of insulin (Primary)  -     Comprehensive Metabolic Panel; Future  -     Lipid Panel; Future  -     TSH; Future  -     Hemoglobin A1c; Future  -     Vitamin D 25 hydroxy; Future    2. Malaise  -     POCT urinalysis dipstick, automated  -     Comprehensive Metabolic Panel; Future  -     Lipid Panel; Future  -     TSH; Future  -     Hemoglobin A1c; Future  -     Vitamin D 25 hydroxy; Future    3. Vitamin D deficiency  -     Vitamin D 25 hydroxy; Future    4. Adult onset hypothyroidism  -     TSH; Future    5. B12 deficiency  -     cyanocobalamin injection 1,000 mcg    6. Hyperlipidemia, unspecified hyperlipidemia type  -     fenofibrate (TRICOR) 145 MG tablet; Take 1 tablet by mouth Daily.  Dispense: 90 tablet; Refill: 1             Follow Up   Return in about 3 months (around 11/7/2024), or if symptoms worsen or fail to improve, for Recheck labs today .  Patient was given instructions and counseling regarding her condition or for health maintenance advice. Please see specific information pulled into the AVS if appropriate.

## 2024-08-07 NOTE — TELEPHONE ENCOUNTER
----- Message from Tashia Vallejo sent at 8/7/2024 11:24 AM EDT -----  Let patient know Urine shows glucose only

## 2024-08-08 LAB
25(OH)D3 SERPL-MCNC: 35.6 NG/ML (ref 30–100)
ALBUMIN SERPL-MCNC: 4.6 G/DL (ref 3.5–5.2)
ALBUMIN/GLOB SERPL: 1.3 G/DL
ALP SERPL-CCNC: 93 U/L (ref 39–117)
ALT SERPL W P-5'-P-CCNC: 23 U/L (ref 1–33)
ANION GAP SERPL CALCULATED.3IONS-SCNC: 15 MMOL/L (ref 5–15)
AST SERPL-CCNC: 25 U/L (ref 1–32)
BILIRUB SERPL-MCNC: 0.7 MG/DL (ref 0–1.2)
BUN SERPL-MCNC: 17 MG/DL (ref 8–23)
BUN/CREAT SERPL: 15.7 (ref 7–25)
CALCIUM SPEC-SCNC: 9.9 MG/DL (ref 8.6–10.5)
CHLORIDE SERPL-SCNC: 103 MMOL/L (ref 98–107)
CHOLEST SERPL-MCNC: 214 MG/DL (ref 0–200)
CO2 SERPL-SCNC: 21 MMOL/L (ref 22–29)
CREAT SERPL-MCNC: 1.08 MG/DL (ref 0.57–1)
EGFRCR SERPLBLD CKD-EPI 2021: 55 ML/MIN/1.73
GLOBULIN UR ELPH-MCNC: 3.5 GM/DL
GLUCOSE SERPL-MCNC: 142 MG/DL (ref 65–99)
HBA1C MFR BLD: 6.2 % (ref 4.8–5.6)
HDLC SERPL-MCNC: 36 MG/DL (ref 40–60)
LDLC SERPL CALC-MCNC: 144 MG/DL (ref 0–100)
LDLC/HDLC SERPL: 3.91 {RATIO}
POTASSIUM SERPL-SCNC: 4.5 MMOL/L (ref 3.5–5.2)
PROT SERPL-MCNC: 8.1 G/DL (ref 6–8.5)
SODIUM SERPL-SCNC: 139 MMOL/L (ref 136–145)
TRIGL SERPL-MCNC: 186 MG/DL (ref 0–150)
TSH SERPL DL<=0.05 MIU/L-ACNC: 0.24 UIU/ML (ref 0.27–4.2)
VLDLC SERPL-MCNC: 34 MG/DL (ref 5–40)

## 2024-08-12 ENCOUNTER — TELEPHONE (OUTPATIENT)
Dept: FAMILY MEDICINE CLINIC | Facility: CLINIC | Age: 74
End: 2024-08-12
Payer: COMMERCIAL

## 2024-08-12 RX ORDER — LEVOTHYROXINE SODIUM 112 UG/1
112 TABLET ORAL DAILY
Qty: 90 TABLET | Refills: 0 | Status: SHIPPED | OUTPATIENT
Start: 2024-08-12

## 2024-08-12 NOTE — TELEPHONE ENCOUNTER
----- Message from Tashia Vallejo sent at 8/12/2024  3:11 PM EDT -----  Sugar is still controlled cholesterol is up significantly.  Thyroid needs adjusted.  Sent decreased dose 112 mcg of synthroid to pharmacy

## 2024-08-14 ENCOUNTER — OFFICE VISIT (OUTPATIENT)
Dept: ORTHOPEDIC SURGERY | Facility: CLINIC | Age: 74
End: 2024-08-14
Payer: MEDICARE

## 2024-08-14 ENCOUNTER — OFFICE VISIT (OUTPATIENT)
Dept: SURGERY | Facility: CLINIC | Age: 74
End: 2024-08-14
Payer: MEDICARE

## 2024-08-14 VITALS
DIASTOLIC BLOOD PRESSURE: 72 MMHG | SYSTOLIC BLOOD PRESSURE: 122 MMHG | HEART RATE: 75 BPM | BODY MASS INDEX: 35.87 KG/M2 | OXYGEN SATURATION: 95 % | WEIGHT: 190 LBS | HEIGHT: 61 IN

## 2024-08-14 VITALS — WEIGHT: 190 LBS | BODY MASS INDEX: 35.87 KG/M2 | HEIGHT: 61 IN

## 2024-08-14 DIAGNOSIS — D05.12 DUCTAL CARCINOMA IN SITU (DCIS) OF LEFT BREAST: Primary | ICD-10-CM

## 2024-08-14 DIAGNOSIS — G56.01 SEVERE CARPAL TUNNEL SYNDROME, RIGHT: Primary | ICD-10-CM

## 2024-08-14 DIAGNOSIS — Z01.818 PREOPERATIVE TESTING: ICD-10-CM

## 2024-08-14 DIAGNOSIS — M72.0 DUPUYTREN'S CONTRACTURE OF RIGHT HAND: ICD-10-CM

## 2024-08-14 PROCEDURE — 1159F MED LIST DOCD IN RCRD: CPT | Performed by: SURGERY

## 2024-08-14 PROCEDURE — 1160F RVW MEDS BY RX/DR IN RCRD: CPT | Performed by: SURGERY

## 2024-08-14 PROCEDURE — 99213 OFFICE O/P EST LOW 20 MIN: CPT | Performed by: SURGERY

## 2024-08-14 RX ORDER — SEMAGLUTIDE 0.68 MG/ML
INJECTION, SOLUTION SUBCUTANEOUS
COMMUNITY
Start: 2024-08-10

## 2024-08-14 NOTE — H&P (VIEW-ONLY)
History and Physical      Patient: Shey King  YOB: 1950  Date of Encounter: 08/14/2024      Chief Complaint:   Chief Complaint   Patient presents with    Right Hand - Pain    Right Wrist - Pain           HPI:   Shey King, 73 y.o. female, presents for follow-up of right hand pain and numbness she has now had nerve conduction study EMG is completed.  Her second complaint today is nodule and pain within the palm of her right hand.  He has had symptoms of numbness and pain for several years which prevents her from sleeping.  She reports progression in her right hand pain and numbness.  She does not have history of neck pain.  History of diabetes.        Active Problem List:  Patient Active Problem List   Diagnosis    Abdominal pain, LLQ (left lower quadrant)    Cystitis    Diarrhea    Hiatal hernia    Hyperglycemia    Hyperlipidemia    Adult onset hypothyroidism    Muscle spasm    OAB (overactive bladder)    Recurrent UTI    Gallstones    B12 deficiency    Pulmonary nodule    Class 2 obesity due to excess calories without serious comorbidity with body mass index (BMI) of 35.0 to 35.9 in adult    Diabetes mellitus    Lymphadenopathy    Dupuytren's contracture of right hand    Palpitations    ELKINS (dyspnea on exertion)    Ductal carcinoma in situ (DCIS) of left breast    Grade I LV diastolic dysfunction    Severe carpal tunnel syndrome, right           Past Medical History:  Past Medical History:   Diagnosis Date    Abdominal pain, LLQ (left lower quadrant)     Abnormal ECG aug 2021    Abnormal Pap smear of cervix     several years ago    Anemia     years ago    Arthritis of back 2024    Breast cancer     Cataract early stage    Cystitis     Diabetes mellitus     Diarrhea     Diverticulitis of colon     Diverticulosis     Ductal carcinoma in situ (DCIS) of left breast 12/21/2022    Encounter for cholecystectomy 2018    Gallstones     GERD (gastroesophageal reflux disease)     Hiatal hernia      Hyperglycemia     Hyperlipidemia     Hyperlipidemia     Hypothyroidism     Knee swelling     Muscle spasm     FLANK PAIN    OAB (overactive bladder)     Pneumonia     years ago    Rectal bleeding     UTI (urinary tract infection)            Past Surgical History:  Past Surgical History:   Procedure Laterality Date    BREAST BIOPSY Bilateral 2005    benign    BREAST BIOPSY Left 12/29/2022    Procedure: BREAST BIOPSY WITH NEEDLE LOCALIZATION;  Surgeon: Dong Mata MD;  Location: [facility];  Service: General;  Laterality: Left;    CATARACT EXTRACTION WITH INTRAOCULAR LENS IMPLANT Right 01/29/2024    CHOLECYSTECTOMY  2018?    ENDOSCOPY      ENDOSCOPY N/A 01/11/2018    Procedure: ESOPHAGOGASTRODUODENOSCOPY;  Surgeon: Dong Mata MD;  Location: [facility];  Service:     EYE SURGERY      cataracks    MAMMO BILATERAL  09/2015    OVARIAN CYST DRAINAGE      NE LAPAROSCOPY SURG CHOLECYSTECTOMY N/A 01/11/2018    Procedure: CHOLECYSTECTOMY LAPAROSCOPIC;  Surgeon: Dong Mata MD;  Location: [facility];  Service: General    US GUIDED LYMPH NODE BIOPSY  04/11/2018           Family History:  Family History   Adopted: Yes   Problem Relation Age of Onset    Heart disease Mother         also had cancer kidney    Cancer Mother         kidney    Thyroid disease Mother     Heart disease Father     Diabetes Brother     Arthritis Maternal Aunt         [place]    Asthma Son     Breast cancer Neg Hx            Social History:  Social History     Socioeconomic History    Marital status:    Tobacco Use    Smoking status: Never    Smokeless tobacco: Never    Tobacco comments:     never   Vaping Use    Vaping status: Never Used   Substance and Sexual Activity    Alcohol use: No    Drug use: No    Sexual activity: Not Currently     Partners: Male     Body mass index is 35.92 kg/m².        Medications:  Current Outpatient Medications   Medication Sig Dispense Refill    acetaminophen (TYLENOL) 325 MG tablet Take 2  tablets by mouth Every 4 (Four) Hours As Needed for Mild Pain. 30 tablet 0    anastrozole (ARIMIDEX) 1 MG tablet Take 1 tablet by mouth Daily. 30 tablet 11    aspirin 81 MG chewable tablet Chew 1 tablet Daily.      AZO-CRANBERRY PO Take 1 tablet by mouth Daily As Needed.      cyanocobalamin 1000 MCG/ML injection Inject 1 mL into the appropriate muscle as directed by prescriber Every 28 (Twenty-Eight) Days. 1 mL 6    fenofibrate (TRICOR) 145 MG tablet Take 1 tablet by mouth Daily. 90 tablet 1    ferrous sulfate 325 (65 FE) MG tablet Take 1 tablet by mouth Every Other Day. 30 tablet 3    glucose monitor monitoring kit 1 each as needed (DM II). 1 each 0    Jardiance 10 MG tablet tablet TAKE 1 TABLET BY MOUTH DAILY 90 tablet 0    Lancets (OneTouch Delica Plus [id number]) misc USE TO TEST BLOOD SUGAR DAILY AS DIRECTED      Lancets Misc. misc For Daily testing  E11.65 50 each 11    levothyroxine (Synthroid) 112 MCG tablet Take 1 tablet by mouth Daily. 90 tablet 0    OneTouch Ultra test strip USE TO TEST BLOOD SUGAR DAILY AS DIRECTED 50 each 5    Ozempic, 0.25 or 0.5 MG/DOSE, 2 MG/3ML solution pen-injector       Probiotic Product (PROBIOTIC DAILY PO) Take  by mouth.      Sennosides (Senna) 8.6 MG capsule Take 1 tablet by mouth Daily.      sennosides-docusate (PERICOLACE) 8.6-50 MG per tablet Take 1 tablet by mouth Daily. 30 tablet 3    vitamin C (ASCORBIC ACID) 500 MG tablet Take 1 tablet by mouth Every Other Day. 30 tablet 3    vitamin D (ERGOCALCIFEROL) 1.25 MG (07168 UT) capsule capsule TAKE 1 CAPSULE BY MOUTH 1 TIME EVERY WEEK 5 capsule 2     No current facility-administered medications for this visit.           Allergies:  Allergies   Allergen Reactions    Other Unknown - High Severity     Blisters from EKG stickers    Bactrim [Sulfamethoxazole-Trimethoprim] Hives    Ciprofloxacin Hives    Penicillins Hives    Steri-Strip Compound Benzoin [Benzoin Compound] Hives    Sulfa Antibiotics Hives           Review of Systems:    Review of Systems   Constitutional: Negative.  Negative for chills, fatigue and fever.   HENT: Negative.  Negative for congestion, ear pain, facial swelling, sore throat, trouble swallowing and voice change.    Eyes:  Negative for pain, discharge, redness and visual disturbance.   Respiratory: Negative.  Negative for apnea, cough, choking, chest tightness, shortness of breath, wheezing and stridor.    Cardiovascular: Negative.  Negative for chest pain, palpitations and leg swelling.   Gastrointestinal: Negative.  Negative for abdominal distention, abdominal pain, blood in stool, nausea and vomiting.   Endocrine: Negative.  Negative for cold intolerance, heat intolerance, polydipsia and polyphagia.   Genitourinary: Negative.  Negative for difficulty urinating, dysuria, flank pain, frequency and hematuria.   Musculoskeletal:  Positive for arthralgias.   Skin: Negative.  Negative for color change, pallor, rash and wound.   Allergic/Immunologic: Negative.  Negative for environmental allergies, food allergies and immunocompromised state.   Neurological: Negative.  Negative for dizziness, tremors, seizures, syncope, speech difficulty, weakness, light-headedness, numbness and headaches.   Hematological: Negative.  Negative for adenopathy. Does not bruise/bleed easily.   Psychiatric/Behavioral: Negative.  Negative for behavioral problems, confusion, dysphoric mood, self-injury, sleep disturbance and suicidal ideas. The patient is not nervous/anxious.            Physical Exam:   Physical Exam  Vitals and nursing note reviewed.   Constitutional:       General: She is not in acute distress.     Appearance: She is not diaphoretic.   HENT:      Head: Normocephalic and atraumatic.      Right Ear: External ear normal.      Left Ear: External ear normal.   Eyes:      General:         Right eye: No discharge.         Left eye: No discharge.      Conjunctiva/sclera: Conjunctivae normal.   Cardiovascular:      Rate and Rhythm:  "Normal rate and regular rhythm.      Heart sounds: Normal heart sounds. No murmur heard.  Pulmonary:      Effort: Pulmonary effort is normal. No respiratory distress.      Breath sounds: Normal breath sounds. No wheezing.   Abdominal:      General: There is no distension.      Palpations: Abdomen is soft.      Tenderness: There is no guarding.   Musculoskeletal:      Cervical back: Normal range of motion and neck supple.   Skin:     General: Skin is warm and dry.      Capillary Refill: Capillary refill takes less than 2 seconds.   Neurological:      Mental Status: She is alert and oriented to person, place, and time.   Psychiatric:         Behavior: Behavior normal.         Thought Content: Thought content normal.         Judgment: Judgment normal.     GENERAL: 73 y.o. female, alert and oriented X 3 in no acute distress.   Visit Vitals  /72 (BP Location: Right arm, Patient Position: Sitting, Cuff Size: Adult)   Pulse 75   Ht 154.9 cm (60.98\")   Wt 86.2 kg (190 lb)   SpO2 95%   BMI 35.92 kg/m²       EMG/NCS:      Musculoskeletal Examination: Hand reveals mild thenar atrophy compared to the left.  She demonstrates diminished sensation thumb through fourth fingers with moderately positive Phalen sign right hand compared to left.  She has palpable cord within her palm palpable nodules with mild contracture of her third finger right hand compared to her opposite hand.          Assessment & Plan:   73 y.o. female presents follow-up with nerve conduction studies identifying severe right median neuropathy.  Very mild ulnar neuropathy which does not appear to be symptomatic.  She wishes to proceed with proposed carpal tunnel release right hand and palmar fasciectomy for Dupuytren's contracture she is scheduled [facility] for the above procedure August 22, 2024 we reviewed risk and benefits.      ICD-10-CM ICD-9-CM   1. Severe carpal tunnel syndrome, right  G56.01 354.0   2. Dupuytren's contracture of right hand  " M72.0 728.6   3. Preoperative testing  Z01.818 V72.84           Cc:   Tashia Vallejo, BERT              This document has been electronically signed by Christopher Peter MD   August 15, 2024 17:52 EDT

## 2024-08-14 NOTE — PROGRESS NOTES
History and Physical      Patient: Shey King  YOB: 1950  Date of Encounter: 08/14/2024      Chief Complaint:   Chief Complaint   Patient presents with    Right Hand - Pain    Right Wrist - Pain           HPI:   Shey King, 73 y.o. female, presents for follow-up of right hand pain and numbness she has now had nerve conduction study EMG is completed.  Her second complaint today is nodule and pain within the palm of her right hand.  He has had symptoms of numbness and pain for several years which prevents her from sleeping.  She reports progression in her right hand pain and numbness.  She does not have history of neck pain.  History of diabetes.        Active Problem List:  Patient Active Problem List   Diagnosis    Abdominal pain, LLQ (left lower quadrant)    Cystitis    Diarrhea    Hiatal hernia    Hyperglycemia    Hyperlipidemia    Adult onset hypothyroidism    Muscle spasm    OAB (overactive bladder)    Recurrent UTI    Gallstones    B12 deficiency    Pulmonary nodule    Class 2 obesity due to excess calories without serious comorbidity with body mass index (BMI) of 35.0 to 35.9 in adult    Diabetes mellitus    Lymphadenopathy    Dupuytren's contracture of right hand    Palpitations    ELKINS (dyspnea on exertion)    Ductal carcinoma in situ (DCIS) of left breast    Grade I LV diastolic dysfunction    Severe carpal tunnel syndrome, right           Past Medical History:  Past Medical History:   Diagnosis Date    Abdominal pain, LLQ (left lower quadrant)     Abnormal ECG aug 2021    Abnormal Pap smear of cervix     several years ago    Anemia     years ago    Arthritis of back 2024    Breast cancer     Cataract early stage    Cystitis     Diabetes mellitus     Diarrhea     Diverticulitis of colon     Diverticulosis     Ductal carcinoma in situ (DCIS) of left breast 12/21/2022    Encounter for cholecystectomy 2018    Gallstones     GERD (gastroesophageal reflux disease)     Hiatal hernia      Hyperglycemia     Hyperlipidemia     Hyperlipidemia     Hypothyroidism     Knee swelling     Muscle spasm     FLANK PAIN    OAB (overactive bladder)     Pneumonia     years ago    Rectal bleeding     UTI (urinary tract infection)            Past Surgical History:  Past Surgical History:   Procedure Laterality Date    BREAST BIOPSY Bilateral 2005    benign    BREAST BIOPSY Left 12/29/2022    Procedure: BREAST BIOPSY WITH NEEDLE LOCALIZATION;  Surgeon: Dnog Mata MD;  Location: [facility];  Service: General;  Laterality: Left;    CATARACT EXTRACTION WITH INTRAOCULAR LENS IMPLANT Right 01/29/2024    CHOLECYSTECTOMY  2018?    ENDOSCOPY      ENDOSCOPY N/A 01/11/2018    Procedure: ESOPHAGOGASTRODUODENOSCOPY;  Surgeon: Dong Mata MD;  Location: [facility];  Service:     EYE SURGERY      cataracks    MAMMO BILATERAL  09/2015    OVARIAN CYST DRAINAGE      ND LAPAROSCOPY SURG CHOLECYSTECTOMY N/A 01/11/2018    Procedure: CHOLECYSTECTOMY LAPAROSCOPIC;  Surgeon: Dong Mata MD;  Location: [facility];  Service: General    US GUIDED LYMPH NODE BIOPSY  04/11/2018           Family History:  Family History   Adopted: Yes   Problem Relation Age of Onset    Heart disease Mother         also had cancer kidney    Cancer Mother         kidney    Thyroid disease Mother     Heart disease Father     Diabetes Brother     Arthritis Maternal Aunt         [place]    Asthma Son     Breast cancer Neg Hx            Social History:  Social History     Socioeconomic History    Marital status:    Tobacco Use    Smoking status: Never    Smokeless tobacco: Never    Tobacco comments:     never   Vaping Use    Vaping status: Never Used   Substance and Sexual Activity    Alcohol use: No    Drug use: No    Sexual activity: Not Currently     Partners: Male     Body mass index is 35.92 kg/m².        Medications:  Current Outpatient Medications   Medication Sig Dispense Refill    acetaminophen (TYLENOL) 325 MG tablet Take 2  tablets by mouth Every 4 (Four) Hours As Needed for Mild Pain. 30 tablet 0    anastrozole (ARIMIDEX) 1 MG tablet Take 1 tablet by mouth Daily. 30 tablet 11    aspirin 81 MG chewable tablet Chew 1 tablet Daily.      AZO-CRANBERRY PO Take 1 tablet by mouth Daily As Needed.      cyanocobalamin 1000 MCG/ML injection Inject 1 mL into the appropriate muscle as directed by prescriber Every 28 (Twenty-Eight) Days. 1 mL 6    fenofibrate (TRICOR) 145 MG tablet Take 1 tablet by mouth Daily. 90 tablet 1    ferrous sulfate 325 (65 FE) MG tablet Take 1 tablet by mouth Every Other Day. 30 tablet 3    glucose monitor monitoring kit 1 each as needed (DM II). 1 each 0    Jardiance 10 MG tablet tablet TAKE 1 TABLET BY MOUTH DAILY 90 tablet 0    Lancets (OneTouch Delica Plus [id number]) misc USE TO TEST BLOOD SUGAR DAILY AS DIRECTED      Lancets Misc. misc For Daily testing  E11.65 50 each 11    levothyroxine (Synthroid) 112 MCG tablet Take 1 tablet by mouth Daily. 90 tablet 0    OneTouch Ultra test strip USE TO TEST BLOOD SUGAR DAILY AS DIRECTED 50 each 5    Ozempic, 0.25 or 0.5 MG/DOSE, 2 MG/3ML solution pen-injector       Probiotic Product (PROBIOTIC DAILY PO) Take  by mouth.      Sennosides (Senna) 8.6 MG capsule Take 1 tablet by mouth Daily.      sennosides-docusate (PERICOLACE) 8.6-50 MG per tablet Take 1 tablet by mouth Daily. 30 tablet 3    vitamin C (ASCORBIC ACID) 500 MG tablet Take 1 tablet by mouth Every Other Day. 30 tablet 3    vitamin D (ERGOCALCIFEROL) 1.25 MG (73900 UT) capsule capsule TAKE 1 CAPSULE BY MOUTH 1 TIME EVERY WEEK 5 capsule 2     No current facility-administered medications for this visit.           Allergies:  Allergies   Allergen Reactions    Other Unknown - High Severity     Blisters from EKG stickers    Bactrim [Sulfamethoxazole-Trimethoprim] Hives    Ciprofloxacin Hives    Penicillins Hives    Steri-Strip Compound Benzoin [Benzoin Compound] Hives    Sulfa Antibiotics Hives           Review of Systems:    Review of Systems   Constitutional: Negative.  Negative for chills, fatigue and fever.   HENT: Negative.  Negative for congestion, ear pain, facial swelling, sore throat, trouble swallowing and voice change.    Eyes:  Negative for pain, discharge, redness and visual disturbance.   Respiratory: Negative.  Negative for apnea, cough, choking, chest tightness, shortness of breath, wheezing and stridor.    Cardiovascular: Negative.  Negative for chest pain, palpitations and leg swelling.   Gastrointestinal: Negative.  Negative for abdominal distention, abdominal pain, blood in stool, nausea and vomiting.   Endocrine: Negative.  Negative for cold intolerance, heat intolerance, polydipsia and polyphagia.   Genitourinary: Negative.  Negative for difficulty urinating, dysuria, flank pain, frequency and hematuria.   Musculoskeletal:  Positive for arthralgias.   Skin: Negative.  Negative for color change, pallor, rash and wound.   Allergic/Immunologic: Negative.  Negative for environmental allergies, food allergies and immunocompromised state.   Neurological: Negative.  Negative for dizziness, tremors, seizures, syncope, speech difficulty, weakness, light-headedness, numbness and headaches.   Hematological: Negative.  Negative for adenopathy. Does not bruise/bleed easily.   Psychiatric/Behavioral: Negative.  Negative for behavioral problems, confusion, dysphoric mood, self-injury, sleep disturbance and suicidal ideas. The patient is not nervous/anxious.            Physical Exam:   Physical Exam  Vitals and nursing note reviewed.   Constitutional:       General: She is not in acute distress.     Appearance: She is not diaphoretic.   HENT:      Head: Normocephalic and atraumatic.      Right Ear: External ear normal.      Left Ear: External ear normal.   Eyes:      General:         Right eye: No discharge.         Left eye: No discharge.      Conjunctiva/sclera: Conjunctivae normal.   Cardiovascular:      Rate and Rhythm:  "Normal rate and regular rhythm.      Heart sounds: Normal heart sounds. No murmur heard.  Pulmonary:      Effort: Pulmonary effort is normal. No respiratory distress.      Breath sounds: Normal breath sounds. No wheezing.   Abdominal:      General: There is no distension.      Palpations: Abdomen is soft.      Tenderness: There is no guarding.   Musculoskeletal:      Cervical back: Normal range of motion and neck supple.   Skin:     General: Skin is warm and dry.      Capillary Refill: Capillary refill takes less than 2 seconds.   Neurological:      Mental Status: She is alert and oriented to person, place, and time.   Psychiatric:         Behavior: Behavior normal.         Thought Content: Thought content normal.         Judgment: Judgment normal.     GENERAL: 73 y.o. female, alert and oriented X 3 in no acute distress.   Visit Vitals  /72 (BP Location: Right arm, Patient Position: Sitting, Cuff Size: Adult)   Pulse 75   Ht 154.9 cm (60.98\")   Wt 86.2 kg (190 lb)   SpO2 95%   BMI 35.92 kg/m²       EMG/NCS:      Musculoskeletal Examination: Hand reveals mild thenar atrophy compared to the left.  She demonstrates diminished sensation thumb through fourth fingers with moderately positive Phalen sign right hand compared to left.  She has palpable cord within her palm palpable nodules with mild contracture of her third finger right hand compared to her opposite hand.          Assessment & Plan:   73 y.o. female presents follow-up with nerve conduction studies identifying severe right median neuropathy.  Very mild ulnar neuropathy which does not appear to be symptomatic.  She wishes to proceed with proposed carpal tunnel release right hand and palmar fasciectomy for Dupuytren's contracture she is scheduled [facility] for the above procedure August 22, 2024 we reviewed risk and benefits.      ICD-10-CM ICD-9-CM   1. Severe carpal tunnel syndrome, right  G56.01 354.0   2. Dupuytren's contracture of right hand  " M72.0 728.6   3. Preoperative testing  Z01.818 V72.84           Cc:   Tashia Vallejo, BERT              This document has been electronically signed by Christopher Peter MD   August 15, 2024 17:52 EDT

## 2024-08-20 ENCOUNTER — PRE-ADMISSION TESTING (OUTPATIENT)
Dept: PREADMISSION TESTING | Facility: HOSPITAL | Age: 74
End: 2024-08-20
Payer: MEDICARE

## 2024-08-20 DIAGNOSIS — Z01.818 PREOPERATIVE TESTING: ICD-10-CM

## 2024-08-20 DIAGNOSIS — M72.0 DUPUYTREN'S CONTRACTURE OF RIGHT HAND: ICD-10-CM

## 2024-08-20 DIAGNOSIS — G56.01 SEVERE CARPAL TUNNEL SYNDROME, RIGHT: ICD-10-CM

## 2024-08-20 LAB
ANION GAP SERPL CALCULATED.3IONS-SCNC: 13.2 MMOL/L (ref 5–15)
BUN SERPL-MCNC: 14 MG/DL (ref 8–23)
BUN/CREAT SERPL: 13.7 (ref 7–25)
CALCIUM SPEC-SCNC: 9.1 MG/DL (ref 8.6–10.5)
CHLORIDE SERPL-SCNC: 104 MMOL/L (ref 98–107)
CO2 SERPL-SCNC: 20.8 MMOL/L (ref 22–29)
CREAT SERPL-MCNC: 1.02 MG/DL (ref 0.57–1)
DEPRECATED RDW RBC AUTO: 46 FL (ref 37–54)
EGFRCR SERPLBLD CKD-EPI 2021: 58.9 ML/MIN/1.73
ERYTHROCYTE [DISTWIDTH] IN BLOOD BY AUTOMATED COUNT: 13.5 % (ref 12.3–15.4)
GLUCOSE SERPL-MCNC: 194 MG/DL (ref 65–99)
HCT VFR BLD AUTO: 38.4 % (ref 34–46.6)
HGB BLD-MCNC: 12.5 G/DL (ref 12–15.9)
MCH RBC QN AUTO: 29.9 PG (ref 26.6–33)
MCHC RBC AUTO-ENTMCNC: 32.6 G/DL (ref 31.5–35.7)
MCV RBC AUTO: 91.9 FL (ref 79–97)
PLATELET # BLD AUTO: 248 10*3/MM3 (ref 140–450)
PMV BLD AUTO: 11.3 FL (ref 6–12)
POTASSIUM SERPL-SCNC: 3.8 MMOL/L (ref 3.5–5.2)
RBC # BLD AUTO: 4.18 10*6/MM3 (ref 3.77–5.28)
SODIUM SERPL-SCNC: 138 MMOL/L (ref 136–145)
WBC NRBC COR # BLD AUTO: 6.42 10*3/MM3 (ref 3.4–10.8)

## 2024-08-20 PROCEDURE — 80048 BASIC METABOLIC PNL TOTAL CA: CPT

## 2024-08-20 PROCEDURE — 85027 COMPLETE CBC AUTOMATED: CPT

## 2024-08-20 PROCEDURE — 36415 COLL VENOUS BLD VENIPUNCTURE: CPT

## 2024-08-20 NOTE — DISCHARGE INSTRUCTIONS
Please discontinue all blood thinners and anticoagulants (except aspirin) prior to surgery as per your surgeon and cardiologist instructions.  Aspirin may be continued up to the day prior to surgery.    HOLD all diabetic medications the morning of surgery as order by physician.    Please follow instructions on use of prep cloths provided by nurse. Return instruction sheet to pre-op nurse on day of surgery.    Arrival time for surgery on 8/22/24  will be given to you by Dr. Peter's  Office.    A RESPONSIBLE PERSON MUST REMAIN IN THE WAITING ROOM DURING YOUR PROCEDURE AND A RESPONSIBLE  MUST BE AVAILABLE UPON YOUR DISCHARGE.    General Instructions:  Do NOT eat or drink after midnight 8/21/24 which includes water, mints, or gum.  You may brush your teeth. Dental appliances that are removable must be taken out day of surgery.  Do NOT smoke, chew tobacco, or drink alcohol within 24 hours prior to surgery.  Bring medications in original bottles, any inhalers and if applicable your C-PAP/BI-PAP machine  Bring any papers given to you in the doctor’s office  Wear clean, comfortable clothes and socks  Do NOT wear contact lenses or make-up or dark nail polish.  Bring a case for your glasses if applicable.  Bring crutches or walker if applicable  Leave all other valuables and jewelry at home  If you were given a blood bank armband, continue to wear it until discharged.    Preventing a Surgical Site Infection:  Shower the night before surgery (unless instructed otherwise) using a fresh bar of anti-bacterial soap (such as Dial) and clean washcloth.  Dry with a clean towel and dress in clean clothing.  For 2 to 3 days before surgery, avoid shaving with a razor near where you will have surgery because the razor can irritate skin and make it easier to develop an infection.  Ask your surgeon if you will be receiving antibiotics prior to surgery.  Make sure you, your family, and all healthcare providers clean their hands  with soap and water or an alcohol-based hand  before caring for you or your wound.  If at all possible, quit smoking as many days before surgery as you can.    Day of Surgery:  Upon arrival, a pre-op nurse and anesthesiologist will review your health history, obtain vital signs, and answer questions you may have.  The only belongings needed at this time will be your home medications and if applicable you C-PAP/BI-PAP machine.  If you are staying overnight, your family can leave the rest of your belongings in the car and bring them to your room later.  A pre-op nurse will start an IV and you may receive medication in preparation for surgery.  Due to patient privacy and limited space, only one member of your family will be able to accompany you in the pre-op area.  While you are in surgery your family should notify the waiting room  if they leave the waiting room area and provide a contact number.  Please be aware that surgery does come with discomfort.  We want to make every effort to control your discomfort so please discuss any uncontrolled symptoms with your nurse.  Your doctor will most likely have prescribed pain medications.  If you are going home after surgery you will receive individualized written care instructions before being discharged.  A responsible adult must drive you to and from the hospital on the day of surgery and stay with you for 24 hours.  If you are staying overnight following surgery, you will be transported to your hospital room following the recovery period.

## 2024-08-21 ENCOUNTER — TELEPHONE (OUTPATIENT)
Dept: ORTHOPEDIC SURGERY | Facility: CLINIC | Age: 74
End: 2024-08-21
Payer: COMMERCIAL

## 2024-08-21 NOTE — TELEPHONE ENCOUNTER
Notified patient of arrival time for surgery Thursday 08/22/2024 at 06/30 am, nothing to eat or drink after midnight.  patient stated understanding.

## 2024-08-22 ENCOUNTER — ANESTHESIA EVENT (OUTPATIENT)
Dept: PERIOP | Facility: HOSPITAL | Age: 74
End: 2024-08-22
Payer: COMMERCIAL

## 2024-08-22 ENCOUNTER — HOSPITAL ENCOUNTER (OUTPATIENT)
Facility: HOSPITAL | Age: 74
Setting detail: HOSPITAL OUTPATIENT SURGERY
Discharge: HOME | End: 2024-08-22
Attending: ORTHOPAEDIC SURGERY | Admitting: ORTHOPAEDIC SURGERY
Payer: COMMERCIAL

## 2024-08-22 ENCOUNTER — ANESTHESIA (OUTPATIENT)
Dept: PERIOP | Facility: HOSPITAL | Age: 74
End: 2024-08-22
Payer: COMMERCIAL

## 2024-08-22 VITALS
TEMPERATURE: 97.9 F | HEIGHT: 62 IN | SYSTOLIC BLOOD PRESSURE: 125 MMHG | HEART RATE: 67 BPM | DIASTOLIC BLOOD PRESSURE: 70 MMHG | BODY MASS INDEX: 35.66 KG/M2 | WEIGHT: 193.8 LBS | OXYGEN SATURATION: 97 % | RESPIRATION RATE: 18 BRPM

## 2024-08-22 DIAGNOSIS — M72.0 DUPUYTREN'S CONTRACTURE OF RIGHT HAND: ICD-10-CM

## 2024-08-22 DIAGNOSIS — G56.01 SEVERE CARPAL TUNNEL SYNDROME, RIGHT: ICD-10-CM

## 2024-08-22 DIAGNOSIS — Z01.818 PREOPERATIVE TESTING: ICD-10-CM

## 2024-08-22 LAB — GLUCOSE BLDC GLUCOMTR-MCNC: 136 MG/DL (ref 70–130)

## 2024-08-22 PROCEDURE — 25810000003 LACTATED RINGERS PER 1000 ML: Performed by: ANESTHESIOLOGY

## 2024-08-22 PROCEDURE — 25010000002 BUPIVACAINE (PF) 0.5 % SOLUTION 10 ML VIAL: Performed by: ORTHOPAEDIC SURGERY

## 2024-08-22 PROCEDURE — 82948 REAGENT STRIP/BLOOD GLUCOSE: CPT

## 2024-08-22 PROCEDURE — 26121 RELEASE PALM CONTRACTURE: CPT | Performed by: ORTHOPAEDIC SURGERY

## 2024-08-22 PROCEDURE — 25010000002 LIDOCAINE 1 % SOLUTION 2 ML VIAL: Performed by: ORTHOPAEDIC SURGERY

## 2024-08-22 PROCEDURE — 25010000002 FENTANYL CITRATE (PF) 50 MCG/ML SOLUTION: Performed by: NURSE ANESTHETIST, CERTIFIED REGISTERED

## 2024-08-22 PROCEDURE — 25010000002 ONDANSETRON PER 1 MG: Performed by: NURSE ANESTHETIST, CERTIFIED REGISTERED

## 2024-08-22 PROCEDURE — 64721 CARPAL TUNNEL SURGERY: CPT | Performed by: ORTHOPAEDIC SURGERY

## 2024-08-22 PROCEDURE — 25810000003 LACTATED RINGERS PER 1000 ML: Performed by: NURSE ANESTHETIST, CERTIFIED REGISTERED

## 2024-08-22 PROCEDURE — 25010000002 PROPOFOL 200 MG/20ML EMULSION: Performed by: NURSE ANESTHETIST, CERTIFIED REGISTERED

## 2024-08-22 RX ORDER — SODIUM CHLORIDE 9 MG/ML
40 INJECTION, SOLUTION INTRAVENOUS AS NEEDED
Status: DISCONTINUED | OUTPATIENT
Start: 2024-08-22 | End: 2024-08-22 | Stop reason: HOSPADM

## 2024-08-22 RX ORDER — ONDANSETRON 2 MG/ML
4 INJECTION INTRAMUSCULAR; INTRAVENOUS AS NEEDED
Status: DISCONTINUED | OUTPATIENT
Start: 2024-08-22 | End: 2024-08-22 | Stop reason: HOSPADM

## 2024-08-22 RX ORDER — FENTANYL CITRATE 50 UG/ML
50 INJECTION, SOLUTION INTRAMUSCULAR; INTRAVENOUS
Status: DISCONTINUED | OUTPATIENT
Start: 2024-08-22 | End: 2024-08-22 | Stop reason: HOSPADM

## 2024-08-22 RX ORDER — KETOROLAC TROMETHAMINE 30 MG/ML
15 INJECTION, SOLUTION INTRAMUSCULAR; INTRAVENOUS EVERY 6 HOURS PRN
Status: DISCONTINUED | OUTPATIENT
Start: 2024-08-22 | End: 2024-08-22 | Stop reason: HOSPADM

## 2024-08-22 RX ORDER — SODIUM CHLORIDE, SODIUM LACTATE, POTASSIUM CHLORIDE, CALCIUM CHLORIDE 600; 310; 30; 20 MG/100ML; MG/100ML; MG/100ML; MG/100ML
100 INJECTION, SOLUTION INTRAVENOUS ONCE AS NEEDED
Status: DISCONTINUED | OUTPATIENT
Start: 2024-08-22 | End: 2024-08-22 | Stop reason: HOSPADM

## 2024-08-22 RX ORDER — SODIUM CHLORIDE 0.9 % (FLUSH) 0.9 %
10 SYRINGE (ML) INJECTION EVERY 12 HOURS SCHEDULED
Status: DISCONTINUED | OUTPATIENT
Start: 2024-08-22 | End: 2024-08-22 | Stop reason: HOSPADM

## 2024-08-22 RX ORDER — SODIUM CHLORIDE, SODIUM LACTATE, POTASSIUM CHLORIDE, CALCIUM CHLORIDE 600; 310; 30; 20 MG/100ML; MG/100ML; MG/100ML; MG/100ML
INJECTION, SOLUTION INTRAVENOUS CONTINUOUS PRN
Status: DISCONTINUED | OUTPATIENT
Start: 2024-08-22 | End: 2024-08-22 | Stop reason: SURG

## 2024-08-22 RX ORDER — SODIUM CHLORIDE 0.9 % (FLUSH) 0.9 %
10 SYRINGE (ML) INJECTION AS NEEDED
Status: DISCONTINUED | OUTPATIENT
Start: 2024-08-22 | End: 2024-08-22 | Stop reason: HOSPADM

## 2024-08-22 RX ORDER — FAMOTIDINE 10 MG/ML
INJECTION, SOLUTION INTRAVENOUS AS NEEDED
Status: DISCONTINUED | OUTPATIENT
Start: 2024-08-22 | End: 2024-08-22 | Stop reason: SURG

## 2024-08-22 RX ORDER — LIDOCAINE HYDROCHLORIDE 20 MG/ML
INJECTION, SOLUTION EPIDURAL; INFILTRATION; INTRACAUDAL; PERINEURAL AS NEEDED
Status: DISCONTINUED | OUTPATIENT
Start: 2024-08-22 | End: 2024-08-22 | Stop reason: SURG

## 2024-08-22 RX ORDER — HYDROCODONE BITARTRATE AND ACETAMINOPHEN 5; 325 MG/1; MG/1
1 TABLET ORAL EVERY 4 HOURS PRN
Qty: 10 TABLET | Refills: 0 | Status: SHIPPED | OUTPATIENT
Start: 2024-08-22

## 2024-08-22 RX ORDER — MAGNESIUM HYDROXIDE 1200 MG/15ML
LIQUID ORAL AS NEEDED
Status: DISCONTINUED | OUTPATIENT
Start: 2024-08-22 | End: 2024-08-22 | Stop reason: HOSPADM

## 2024-08-22 RX ORDER — FENTANYL CITRATE 50 UG/ML
INJECTION, SOLUTION INTRAMUSCULAR; INTRAVENOUS AS NEEDED
Status: DISCONTINUED | OUTPATIENT
Start: 2024-08-22 | End: 2024-08-22 | Stop reason: SURG

## 2024-08-22 RX ORDER — ONDANSETRON 2 MG/ML
INJECTION INTRAMUSCULAR; INTRAVENOUS AS NEEDED
Status: DISCONTINUED | OUTPATIENT
Start: 2024-08-22 | End: 2024-08-22 | Stop reason: SURG

## 2024-08-22 RX ORDER — SODIUM CHLORIDE, SODIUM LACTATE, POTASSIUM CHLORIDE, CALCIUM CHLORIDE 600; 310; 30; 20 MG/100ML; MG/100ML; MG/100ML; MG/100ML
125 INJECTION, SOLUTION INTRAVENOUS ONCE
Status: COMPLETED | OUTPATIENT
Start: 2024-08-22 | End: 2024-08-22

## 2024-08-22 RX ORDER — MIDAZOLAM HYDROCHLORIDE 1 MG/ML
0.5 INJECTION INTRAMUSCULAR; INTRAVENOUS
Status: DISCONTINUED | OUTPATIENT
Start: 2024-08-22 | End: 2024-08-22 | Stop reason: HOSPADM

## 2024-08-22 RX ORDER — IPRATROPIUM BROMIDE AND ALBUTEROL SULFATE 2.5; .5 MG/3ML; MG/3ML
3 SOLUTION RESPIRATORY (INHALATION) ONCE AS NEEDED
Status: DISCONTINUED | OUTPATIENT
Start: 2024-08-22 | End: 2024-08-22 | Stop reason: HOSPADM

## 2024-08-22 RX ORDER — PROPOFOL 10 MG/ML
INJECTION, EMULSION INTRAVENOUS CONTINUOUS PRN
Status: DISCONTINUED | OUTPATIENT
Start: 2024-08-22 | End: 2024-08-22 | Stop reason: SURG

## 2024-08-22 RX ORDER — OXYCODONE AND ACETAMINOPHEN 5; 325 MG/1; MG/1
1 TABLET ORAL ONCE AS NEEDED
Status: DISCONTINUED | OUTPATIENT
Start: 2024-08-22 | End: 2024-08-22 | Stop reason: HOSPADM

## 2024-08-22 RX ADMIN — ONDANSETRON 4 MG: 2 INJECTION INTRAMUSCULAR; INTRAVENOUS at 07:58

## 2024-08-22 RX ADMIN — FAMOTIDINE 20 MG: 10 INJECTION, SOLUTION INTRAVENOUS at 07:31

## 2024-08-22 RX ADMIN — SODIUM CHLORIDE, POTASSIUM CHLORIDE, SODIUM LACTATE AND CALCIUM CHLORIDE 125 ML/HR: 600; 310; 30; 20 INJECTION, SOLUTION INTRAVENOUS at 06:59

## 2024-08-22 RX ADMIN — LIDOCAINE HYDROCHLORIDE 60 MG: 20 INJECTION, SOLUTION EPIDURAL; INFILTRATION; INTRACAUDAL; PERINEURAL at 07:34

## 2024-08-22 RX ADMIN — FENTANYL CITRATE 50 MCG: 50 INJECTION INTRAMUSCULAR; INTRAVENOUS at 07:42

## 2024-08-22 RX ADMIN — FENTANYL CITRATE 50 MCG: 50 INJECTION INTRAMUSCULAR; INTRAVENOUS at 07:34

## 2024-08-22 RX ADMIN — SODIUM CHLORIDE, POTASSIUM CHLORIDE, SODIUM LACTATE AND CALCIUM CHLORIDE: 600; 310; 30; 20 INJECTION, SOLUTION INTRAVENOUS at 07:31

## 2024-08-22 RX ADMIN — PROPOFOL 100 MCG/KG/MIN: 10 INJECTION, EMULSION INTRAVENOUS at 07:34

## 2024-08-22 NOTE — OP NOTE
CARPAL TUNNEL RELEASE, DUPUYTREN CONTRACTURE RELEASE  Procedure Note    Shey King  8/22/2024    Pre-op Diagnosis:   Dupuytren's contracture of right hand [M72.0]  Severe carpal tunnel syndrome, right [G56.01]    Post-op Diagnosis:     Post-Op Diagnosis Codes:     * Dupuytren's contracture of right hand [M72.0]     * Severe carpal tunnel syndrome, right [G56.01]           Procedure(s):  CARPAL TUNNEL RELEASE RIGHT  PALMAR FASCIECTOMY RIGHT HAND    Surgeon(s):  Christopher Peter MD    Anesthesia: General/local    Operative technique: With patient in the operating theatre under general anesthesia the right hand and arm sterilely prepped and draped in usual manner proximal palm was injected with 5 cc of 0.5 Marcaine.  Palm was then injected in the distal thenar crease.  The extremity was exsanguinated tourniquet inflated to 200 mmHg.  Longitudinal incision was then made beginning at the distal wrist crease extending toward the third webspace with skin divided sharply for fascia was divided longitudinally exposing underlying transverse carpal ligament.  With the distal margin identified transverse carpal ligament was released from distal to proximal protecting underlying median nerve.  Wound was packed with saline soaked gauze.  Second incision was made transversely following the distal thenar crease flaps were raised proximally and distally underlying diseased palmar fascia was traced proximally into the palm and distally toward the third finger.  Disease palmar fascia was then excised sharply.  Third and fourth fingers demonstrated hyperextension.  It was then deflated hemostasis obtained to both wounds both wounds were then closed with 3-0 nylon vertical mattress suture with sterile dressing applied she was taken to recovery room in stable condition.    Staff:   Circulator: Wilson Tan RN  Scrub Person: Indira White LPN  Assistant: Mecca Vargas    Estimated Blood Loss: none    Specimens:   none                Implants/Grafts: none      Drains: None    Complications: none    Tourniquet time: 27   min                      Christopher Peter MD     Date: 8/22/2024  Time: 08:50 EDT    Cc: Tashia Vallejo APRN

## 2024-08-22 NOTE — DISCHARGE INSTRUCTIONS
Keep dressing clean and dry. No dressing change needed until follow up with Dr. Peter on Monday 08/26/24. Dr. Peter will instruct on any wound care or dressing changes needed after that.

## 2024-08-22 NOTE — ANESTHESIA PREPROCEDURE EVALUATION
Anesthesia Evaluation     Patient summary reviewed and Nursing notes reviewed   no history of anesthetic complications:   NPO Solid Status: > 8 hours  NPO Liquid Status: > 8 hours           Airway   Mallampati: II  TM distance: >3 FB  Neck ROM: full  no difficulty expected  Dental    (+) poor dentition        Pulmonary - normal exam    breath sounds clear to auscultation  (+) pneumonia resolved ,shortness of breath  (-) asthma, not a smoker  Cardiovascular - normal exam  Exercise tolerance: good (4-7 METS)    NYHA Classification: II  Rhythm: regular  Rate: normal    (+) ELKINS, hyperlipidemia  (-) past MI, dysrhythmias, angina, CHF      Neuro/Psych  (+) numbness (CARPAL TUNNEL)  (-) seizures, CVA  GI/Hepatic/Renal/Endo    (+) obesity, hiatal hernia, GERD, GI bleeding resolved, renal disease- CRI, diabetes mellitus, thyroid problem hypothyroidism  (-) morbid obesity, liver disease    Musculoskeletal     (+) back pain  (-) neck pain  Abdominal  - normal exam    Abdomen: soft.   Substance History - negative use     OB/GYN negative ob/gyn ROS         Other      history of cancer (BREAST) remission    (-) arthritis                Anesthesia Plan    ASA 3     MAC     intravenous induction     Anesthetic plan, risks, benefits, and alternatives have been provided, discussed and informed consent has been obtained with: patient.  Pre-procedure education provided  Use of blood products discussed with  Consented to blood products.    Plan discussed with CRNA.

## 2024-08-22 NOTE — ANESTHESIA POSTPROCEDURE EVALUATION
Patient: Shey King    Procedure Summary       Date: 08/22/24 Room / Location: [facility] OR  /  [facility] OR    Anesthesia Start: 0729 Anesthesia Stop: 0851    Procedures:       CARPAL TUNNEL RELEASE RIGHT (Right: Wrist)      PALMAR FASCIECTOMY RIGHT HAND (Right: Hand) Diagnosis:       Dupuytren's contracture of right hand      Severe carpal tunnel syndrome, right      (Dupuytren's contracture of right hand [M72.0])      (Severe carpal tunnel syndrome, right [G56.01])    Surgeons: Christopher Peter MD Provider: Jonny Holder MD    Anesthesia Type: MAC ASA Status: 3            Anesthesia Type: MAC    Vitals  Vitals Value Taken Time   /57 08/22/24 0908   Temp 97.2 °F (36.2 °C) 08/22/24 0853   Pulse 64 08/22/24 0908   Resp 16 08/22/24 0908   SpO2 98 % 08/22/24 0908           Post Anesthesia Care and Evaluation    Patient location during evaluation: PACU  Patient participation: complete - patient participated  Level of consciousness: awake  Pain score: 0  Pain management: satisfactory to patient    Airway patency: patent  Anesthetic complications: No anesthetic complications  PONV Status: none  Cardiovascular status: hemodynamically stable  Respiratory status: nasal cannula  Hydration status: acceptable

## 2024-08-26 ENCOUNTER — OFFICE VISIT (OUTPATIENT)
Dept: ORTHOPEDIC SURGERY | Facility: CLINIC | Age: 74
End: 2024-08-26
Payer: MEDICARE

## 2024-08-26 VITALS — HEIGHT: 62 IN | BODY MASS INDEX: 35.51 KG/M2 | WEIGHT: 193 LBS

## 2024-08-26 DIAGNOSIS — Z09 POSTOP CHECK: Primary | ICD-10-CM

## 2024-08-26 NOTE — PROGRESS NOTES
Postoperative Follow-up          Patient: Shey King  YOB: 1950  Date of Encounter: 08/26/2024      Chief Complaint:   Chief Complaint   Patient presents with    Right Hand - Post-op     08/22/24 (4d) Christopher Peter MD   Carpal Tunnel Release Right - Right   Palmar Fasciectomy Right Hand - Right                 HPI:  Shey King, 73 y.o. female returns in postoperative follow-up carpal tunnel release right hand and palmar fasciectomy right hand she is 4 days postop and doing well.  Some of her numbness has improved.        Medical History:  Patient Active Problem List   Diagnosis    Abdominal pain, LLQ (left lower quadrant)    Cystitis    Diarrhea    Hiatal hernia    Hyperglycemia    Hyperlipidemia    Adult onset hypothyroidism    Muscle spasm    OAB (overactive bladder)    Recurrent UTI    Gallstones    B12 deficiency    Pulmonary nodule    Class 2 obesity due to excess calories without serious comorbidity with body mass index (BMI) of 35.0 to 35.9 in adult    Diabetes mellitus    Lymphadenopathy    Palpitations    ELKINS (dyspnea on exertion)    Ductal carcinoma in situ (DCIS) of left breast    Grade I LV diastolic dysfunction     Past Medical History:   Diagnosis Date    Abdominal pain, LLQ (left lower quadrant)     Abnormal ECG aug 2021    Abnormal Pap smear of cervix     several years ago    Anemia     years ago    Arthritis of back 2024    Cataract early stage    Cystitis     Diabetes mellitus     Diarrhea     Diverticulitis of colon     Diverticulosis     Ductal carcinoma in situ (DCIS) of left breast 12/21/2022    Elevated cholesterol     Encounter for cholecystectomy 2018    Gallstones     GERD (gastroesophageal reflux disease)     Hiatal hernia     Hyperglycemia     Hyperlipidemia     Hyperlipidemia     Hypothyroidism     Knee swelling     Leaky heart valve     Muscle spasm     FLANK PAIN    OAB (overactive bladder)     Pneumonia     years ago    Rectal bleeding     UTI  (urinary tract infection)            Surgical History:  Past Surgical History:   Procedure Laterality Date    BREAST BIOPSY Bilateral 2005    benign    BREAST BIOPSY Left 12/29/2022    Procedure: BREAST BIOPSY WITH NEEDLE LOCALIZATION;  Surgeon: Dong Mata MD;  Location: [facility] OR;  Service: General;  Laterality: Left;    BREAST LUMPECTOMY Left     CARPAL TUNNEL RELEASE Right 8/22/2024    Procedure: CARPAL TUNNEL RELEASE RIGHT;  Surgeon: Christopher Peter MD;  Location: [facility] OR;  Service: Orthopedics;  Laterality: Right;    CATARACT EXTRACTION WITH INTRAOCULAR LENS IMPLANT Right 01/29/2024    D & C HYSTEROSCOPY      DUPUYTREN CONTRACTURE RELEASE Right 8/22/2024    Procedure: PALMAR FASCIECTOMY RIGHT HAND;  Surgeon: Christopher Peter MD;  Location: [facility] OR;  Service: Orthopedics;  Laterality: Right;    ENDOSCOPY      ENDOSCOPY N/A 01/11/2018    Procedure: ESOPHAGOGASTRODUODENOSCOPY;  Surgeon: Dong Mata MD;  Location: [facility] OR;  Service:     EYE SURGERY      MAMMO BILATERAL  09/2015    OVARIAN CYST DRAINAGE      CA LAPAROSCOPY SURG CHOLECYSTECTOMY N/A 01/11/2018    Procedure: CHOLECYSTECTOMY LAPAROSCOPIC;  Surgeon: Dong Mata MD;  Location: [facility] OR;  Service: General    SKIN BIOPSY      US GUIDED LYMPH NODE BIOPSY  04/11/2018         Examination:  Examination reveals palmar incision and mid palm transverse incision intact without erythema she has active motion of all fingers.        Assessment & Plan:  73 y.o. female presents follow-up carpal tunnel release palmar fasciectomy right hand doing well she is instructed on dressing changes we will see her back in 9 days for suture removal.       Diagnosis Plan   1. Postop check                  Cc:  Tashia Vallejo, BERT              This document has been electronically signed by Christopher Peter MD   August 28, 2024 16:38 EDT

## 2024-09-04 ENCOUNTER — OFFICE VISIT (OUTPATIENT)
Dept: ORTHOPEDIC SURGERY | Facility: CLINIC | Age: 74
End: 2024-09-04
Payer: MEDICARE

## 2024-09-04 ENCOUNTER — PATIENT OUTREACH (OUTPATIENT)
Dept: FAMILY MEDICINE CLINIC | Facility: CLINIC | Age: 74
End: 2024-09-04
Payer: COMMERCIAL

## 2024-09-04 VITALS — WEIGHT: 192.9 LBS | HEIGHT: 62 IN | BODY MASS INDEX: 35.5 KG/M2

## 2024-09-04 DIAGNOSIS — Z09 POSTOP CHECK: ICD-10-CM

## 2024-09-04 DIAGNOSIS — M65.312 TRIGGER THUMB, LEFT THUMB: Primary | ICD-10-CM

## 2024-09-04 RX ADMIN — LIDOCAINE HYDROCHLORIDE 5 ML: 10 INJECTION, SOLUTION EPIDURAL; INFILTRATION; INTRACAUDAL; PERINEURAL at 17:32

## 2024-09-04 RX ADMIN — METHYLPREDNISOLONE ACETATE 40 MG: 40 INJECTION, SUSPENSION INTRA-ARTICULAR; INTRALESIONAL; INTRAMUSCULAR; SOFT TISSUE at 17:32

## 2024-09-04 NOTE — PROGRESS NOTES
Established New Problem          Patient: Shey King  YOB: 1950  Date of Encounter: 09/04/2024      Chief Complaint:   Chief Complaint   Patient presents with    Right Hand - Post-op     Procedure(s):08/22/2024  CARPAL TUNNEL RELEASE RIGHT  PALMAR FASCIECTOMY RIGHT HAND     Surgeon(s):  Christopher Peter MD              HPI:  Shey King, 73 y.o. female returns in postoperative follow-up carpal tunnel release right hand and palmar fasciectomy right hand she is doing well with both 13 days postop reports improvement in the numbness of her hand.  She is presents today with new complaint of pain base of her thumb with locking sensation.  Denies trauma she has had the above for the last 3 to 4 months.  She denies numbness to her left hand.        Medical History:  Patient Active Problem List   Diagnosis    Abdominal pain, LLQ (left lower quadrant)    Cystitis    Diarrhea    Hiatal hernia    Hyperglycemia    Hyperlipidemia    Adult onset hypothyroidism    Muscle spasm    OAB (overactive bladder)    Recurrent UTI    Gallstones    B12 deficiency    Pulmonary nodule    Class 2 obesity due to excess calories without serious comorbidity with body mass index (BMI) of 35.0 to 35.9 in adult    Diabetes mellitus    Lymphadenopathy    Palpitations    ELKINS (dyspnea on exertion)    Ductal carcinoma in situ (DCIS) of left breast    Grade I LV diastolic dysfunction     Past Medical History:   Diagnosis Date    Abdominal pain, LLQ (left lower quadrant)     Abnormal ECG aug 2021    Abnormal Pap smear of cervix     several years ago    Anemia     years ago    Arthritis of back 2024    Cataract early stage    Cystitis     Diabetes mellitus     Diarrhea     Diverticulitis of colon     Diverticulosis     Ductal carcinoma in situ (DCIS) of left breast 12/21/2022    Elevated cholesterol     Encounter for cholecystectomy 2018    Gallstones     GERD (gastroesophageal reflux disease)     Hiatal hernia      Hyperglycemia     Hyperlipidemia     Hyperlipidemia     Hypothyroidism     Knee swelling     Leaky heart valve     Muscle spasm     FLANK PAIN    OAB (overactive bladder)     Pneumonia     years ago    Rectal bleeding     UTI (urinary tract infection)            Surgical History:  Past Surgical History:   Procedure Laterality Date    BREAST BIOPSY Bilateral 2005    benign    BREAST BIOPSY Left 12/29/2022    Procedure: BREAST BIOPSY WITH NEEDLE LOCALIZATION;  Surgeon: Dong Mata MD;  Location: [facility];  Service: General;  Laterality: Left;    BREAST LUMPECTOMY Left     CARPAL TUNNEL RELEASE Right 8/22/2024    Procedure: CARPAL TUNNEL RELEASE RIGHT;  Surgeon: Christopher Peter MD;  Location: [facility] OR;  Service: Orthopedics;  Laterality: Right;    CATARACT EXTRACTION WITH INTRAOCULAR LENS IMPLANT Right 01/29/2024    D & C HYSTEROSCOPY      DUPUYTREN CONTRACTURE RELEASE Right 8/22/2024    Procedure: PALMAR FASCIECTOMY RIGHT HAND;  Surgeon: Christopher Peter MD;  Location: [facility];  Service: Orthopedics;  Laterality: Right;    ENDOSCOPY      ENDOSCOPY N/A 01/11/2018    Procedure: ESOPHAGOGASTRODUODENOSCOPY;  Surgeon: Dong Mata MD;  Location: [facility] OR;  Service:     EYE SURGERY      MAMMO BILATERAL  09/2015    OVARIAN CYST DRAINAGE      AL LAPAROSCOPY SURG CHOLECYSTECTOMY N/A 01/11/2018    Procedure: CHOLECYSTECTOMY LAPAROSCOPIC;  Surgeon: Dong Mata MD;  Location: [facility];  Service: General    SKIN BIOPSY      US GUIDED LYMPH NODE BIOPSY  04/11/2018         Examination:  Examination right hand reveals palmar incision intact transverse incision distal palm intact.  Normal sensation full extension of all fingers.  Hand evaluation reveals palpable nodule base of her thumb region of A1 pulley thumb flexor with triggering sensation with flexion and extension and associated pain.      Assessment & Plan:  73 y.o. female presents follow-up carpal tunnel release right hand  palmar fasciectomy right hand doing well 13 days postop sutures are removed she will begin active motion.      For her left hand she is identified to have left trigger thumb we discussed options and she elected to receive and was given steroid injection Depo-Medrol 40 mg lidocaine block flexor tendon sheath left thumb she is provided a thumb spica brace she will return in the future as needed for the above problems.       Diagnosis Plan   1. Postop check        2. Trigger thumb, left thumb            - Hand/Upper Extremity Injection: L thumb A1 for trigger finger on 9/4/2024 5:32 PM  Indications: pain  Details: 25 G needle, volar approach  Medications: 5 mL lidocaine PF 1% 1 %; 40 mg methylPREDNISolone acetate 40 MG/ML  Outcome: tolerated well, no immediate complications  Procedure, treatment alternatives, risks and benefits explained, specific risks discussed. Consent was given by the patient. Immediately prior to procedure a time out was called to verify the correct patient, procedure, equipment, support staff and site/side marked as required. Patient was prepped and draped in the usual sterile fashion.         Cc:  Tashia Vallejo, BERT              This document has been electronically signed by Christopher Peter MD   September 4, 2024 17:31 EDT

## 2024-09-06 RX ORDER — LIDOCAINE HYDROCHLORIDE 10 MG/ML
5 INJECTION, SOLUTION EPIDURAL; INFILTRATION; INTRACAUDAL; PERINEURAL
Status: COMPLETED | OUTPATIENT
Start: 2024-09-04 | End: 2024-09-04

## 2024-09-06 RX ORDER — METHYLPREDNISOLONE ACETATE 40 MG/ML
40 INJECTION, SUSPENSION INTRA-ARTICULAR; INTRALESIONAL; INTRAMUSCULAR; SOFT TISSUE
Status: COMPLETED | OUTPATIENT
Start: 2024-09-04 | End: 2024-09-04

## 2024-10-04 RX ORDER — FLUCONAZOLE 150 MG/1
150 TABLET ORAL
Qty: 1 TABLET | Refills: 0 | OUTPATIENT
Start: 2024-10-04

## 2024-10-07 ENCOUNTER — TELEPHONE (OUTPATIENT)
Dept: FAMILY MEDICINE CLINIC | Facility: CLINIC | Age: 74
End: 2024-10-07
Payer: COMMERCIAL

## 2024-10-07 RX ORDER — FLUCONAZOLE 150 MG/1
150 TABLET ORAL ONCE
Qty: 1 TABLET | Refills: 0 | Status: SHIPPED | OUTPATIENT
Start: 2024-10-07 | End: 2024-10-07

## 2024-10-18 ENCOUNTER — LAB (OUTPATIENT)
Dept: UROLOGY | Facility: CLINIC | Age: 74
End: 2024-10-18
Payer: MEDICARE

## 2024-10-18 DIAGNOSIS — N39.0 RECURRENT UTI: Primary | ICD-10-CM

## 2024-10-18 PROCEDURE — 87086 URINE CULTURE/COLONY COUNT: CPT | Performed by: NURSE PRACTITIONER

## 2024-10-18 PROCEDURE — 87186 SC STD MICRODIL/AGAR DIL: CPT | Performed by: NURSE PRACTITIONER

## 2024-10-18 PROCEDURE — 87077 CULTURE AEROBIC IDENTIFY: CPT | Performed by: NURSE PRACTITIONER

## 2024-10-20 LAB — BACTERIA SPEC AEROBE CULT: ABNORMAL

## 2024-10-21 ENCOUNTER — TELEPHONE (OUTPATIENT)
Dept: UROLOGY | Facility: CLINIC | Age: 74
End: 2024-10-21
Payer: COMMERCIAL

## 2024-10-21 NOTE — TELEPHONE ENCOUNTER
Patient called wanting to know if she needed medication for her urine culture results, she would like a call back please.

## 2024-10-22 ENCOUNTER — LAB (OUTPATIENT)
Dept: UROLOGY | Facility: CLINIC | Age: 74
End: 2024-10-22
Payer: MEDICARE

## 2024-10-22 DIAGNOSIS — N39.0 RECURRENT UTI: Primary | ICD-10-CM

## 2024-10-22 RX ORDER — GENTAMICIN 40 MG/ML
80 INJECTION, SOLUTION INTRAMUSCULAR; INTRAVENOUS ONCE
Status: COMPLETED | OUTPATIENT
Start: 2024-10-22 | End: 2024-10-22

## 2024-10-22 RX ADMIN — GENTAMICIN 80 MG: 40 INJECTION, SOLUTION INTRAMUSCULAR; INTRAVENOUS at 12:38

## 2024-10-23 ENCOUNTER — LAB (OUTPATIENT)
Dept: UROLOGY | Facility: CLINIC | Age: 74
End: 2024-10-23
Payer: COMMERCIAL

## 2024-10-23 ENCOUNTER — HOSPITAL ENCOUNTER (OUTPATIENT)
Dept: MAMMOGRAPHY | Facility: HOSPITAL | Age: 74
Discharge: HOME | End: 2024-10-23
Admitting: INTERNAL MEDICINE
Payer: COMMERCIAL

## 2024-10-23 DIAGNOSIS — D05.12 DUCTAL CARCINOMA IN SITU (DCIS) OF LEFT BREAST: Primary | ICD-10-CM

## 2024-10-23 DIAGNOSIS — N39.0 RECURRENT UTI: Primary | ICD-10-CM

## 2024-10-23 DIAGNOSIS — D05.12 DUCTAL CARCINOMA IN SITU (DCIS) OF LEFT BREAST: ICD-10-CM

## 2024-10-23 PROCEDURE — 77066 DX MAMMO INCL CAD BI: CPT

## 2024-10-23 PROCEDURE — 77066 DX MAMMO INCL CAD BI: CPT | Performed by: RADIOLOGY

## 2024-10-23 PROCEDURE — 77062 BREAST TOMOSYNTHESIS BI: CPT | Performed by: RADIOLOGY

## 2024-10-23 PROCEDURE — G0279 TOMOSYNTHESIS, MAMMO: HCPCS

## 2024-10-23 RX ORDER — GENTAMICIN 40 MG/ML
80 INJECTION, SOLUTION INTRAMUSCULAR; INTRAVENOUS ONCE
Status: COMPLETED | OUTPATIENT
Start: 2024-10-23 | End: 2024-10-23

## 2024-10-23 RX ADMIN — GENTAMICIN 80 MG: 40 INJECTION, SOLUTION INTRAMUSCULAR; INTRAVENOUS at 09:36

## 2024-10-24 ENCOUNTER — LAB (OUTPATIENT)
Dept: UROLOGY | Facility: CLINIC | Age: 74
End: 2024-10-24
Payer: MEDICARE

## 2024-10-24 DIAGNOSIS — N39.0 RECURRENT UTI: Primary | ICD-10-CM

## 2024-10-24 PROCEDURE — 96372 THER/PROPH/DIAG INJ SC/IM: CPT | Performed by: NURSE PRACTITIONER

## 2024-10-24 RX ORDER — GENTAMICIN 40 MG/ML
80 INJECTION, SOLUTION INTRAMUSCULAR; INTRAVENOUS ONCE
Status: COMPLETED | OUTPATIENT
Start: 2024-10-24 | End: 2024-10-24

## 2024-10-24 RX ADMIN — GENTAMICIN 80 MG: 40 INJECTION, SOLUTION INTRAMUSCULAR; INTRAVENOUS at 08:19

## 2024-10-24 NOTE — PROGRESS NOTES
Subjective     Date: 10/25/2024    Referring Provider  Dr. Mata     Chief Complaint  Ductal carcinoma in situ of the left breast, estrogen receptor positive, status post lumpectomy  Iron deficiency anemia     Subjective          Shey King is a 73 y.o. female who presents today to [facility] for follow up.    HPI:   73 y.o.female with a history of diabetes mellitus type 2, hyperlipidemia, hypothyroidism, frequent UTIs who presents to establish care regarding ductal carcinoma in situ of the left breast, estrogen receptor positive.     Oncological history:  She notes intermittent breast pain on the left side for several months, was finally directed to get a screening mammogram  - 2022.  Screening mammogram showed calcification left breast middle posterior third lateral aspect, which are new.  Other calcifications throughout left breast appear stable.   - 22. Diagnostic mammogram showed grouped calcifications in the left 3-4 o'clock region.   - 2022: stereotactic biopsy. Pathology with intermediate to high-grade cribriform ductal carcinoma in situ with associated necrosis and calcification.  -2022: Underwent lumpectomy.  Final pathology with ductal carcinoma in situ.  10 x 3 x 2 mm, 4 of 19 blocks.  Grade 2 (intermediate).  Distance from DCIS to closest margin 1 mm from inferior margin, 2 mm from anterior margin, 4 mm to posterior margin. PTis.  Estrogen receptor 100% positive, progesterone 5% positive  2023-3/7/2023: Underwent radiation with 4256 cGy in 16 fraction with 1000 cGy in 5 fraction boost  2023: Started anastrozole     She was recently seen by Dr. Mata on 2023.  Overall doing well after lumpectomy.  Presents for further therapy and discussion.    GYN History:  Menarche at age 14.   Age of first pregnancy:    Age of menopause: 52  Breast feed: no  Birth control: yes  Hormone replacement: no    Never had DEXA bone  "scan in the past.  Denies ever being treated for diagnosed with osteoporosis.  Currently taking vitamin D tablets.    Interval History 02/01/2023   She was seen by radiation oncology today, Dr. Yusuf, who recommends radiation to left breast due to DCIS margin less than 2 mm.  Underwent DEXA bone scan 1/27/2023 with bone mineral density of right femur neck 0.899 with T score -1.     Interval History 03/01/2023 - Telehealth   Receiving radiation therapy currently, has 4 more boost therapy left. Has had pain on the breast intermittently and the axilla. Was told by Rad Onc to apply less aquaphor to the area.     Interval History 04/06/2023   She presents for follow up today. Completed radiation therapy. Started anastrozole, taking it daily. Overall, still with fatigue since completion of radiation so unsure if it is due to radiation or medication. Has had two episodes of hot flashes. Denies any other AE.    Also reports mild intermittent sharp pain L breast. She reports difficulty sleeping, would like something to help temporarily.     Interval History 07/21/2023   Ms. King presents for follow up. Reports increased fatigue with anastrozole, has been compliant. Has also developed few UTI's, most recently with pseudomonas, currently being treated with clindamycin. Had a repeat mammogram 6/15 which showed left breast there trabecular thickening and skin thickening, likely due to treatment, stable calcification.     Interval History 10/24/2023   Ms. King presents for follow up today. Denies any palpable lumps/bumps of the breast, but has noted waking up with discharge \"caking\" on the left nipple. Also with intermittent warmth and pain in the left breast with radiation to axilla. Compliant with anastrozole without missed doses. Does report intermittent aches in joints.   Has had intentional weight loss due to GLP-1 inhibitor.    Interval History 01/24/2024   Ms. King presents today for follow up. Her last mammogram " "from 10/30/23 did not show evidence of malignancy. She reports increased fatigue, which has not improved. Reports significant b/l knee pain, unsure if it is due to anastrozole. Continues to be compliant with AI. We looked at her last XR of the R knee from 3/2022 which shows osteoarthritis of the R knee, recommend follow up with PCP (seeing Tashia Vallejo 2/6)  Cataract surgery Monday.    Interval History 04/25/2024   Ms. King presents for follow up. She reports a \"knot\" on posterior lateral part of L breast that she's felt for ~2 weeks, which is painful. Denies lifting heavy objects or trauma to breast. No falls. Recently had flu and URI, still recovering, denies any vaccinations.    Taking oral iron with mild constipation, noted significant improvement in her energy until recent infections. Taking miralax, would like additional support for constipation.     Compliant with anastrozole.    Interval History 07/25/2024   Ms. King presents for follow up. She reports another \"knot\" L axillary region, painful. She was unable to give herself B12 injection, she may be able to ask her niece to help with administering. Energy improved when she was taking iron/vitamin C, but has slowed down due to other medication.   Has chronic arthritis and carpal tunnel syndrome that bothers her, but doing well on anastrozole otherwise.    Interval History 10/25/2024   Ms. King presents for follow up. She reports recent cystitis which has caused fatigue. PCP's office is giving her B12 injection.  Recent mammogram 10/23 with probably benign left mammographic findings, benign right mammographic findings.   Doing well with anastrozole, iron/vitamin C.  Did not have a good experience with DEXA scan tech last time, open to re trying.         Objective     Objective     Allergy:   Allergies   Allergen Reactions    Other Unknown - High Severity     Blisters from EKG stickers    Bactrim [Sulfamethoxazole-Trimethoprim] Hives    Ciprofloxacin Hives "    Penicillins Hives    Steri-Strip Compound Benzoin [Benzoin] Hives    Sulfa Antibiotics Hives        Current Medications:   Current Outpatient Medications   Medication Sig Dispense Refill    acetaminophen (TYLENOL) 325 MG tablet Take 2 tablets by mouth Every 4 (Four) Hours As Needed for Mild Pain. 30 tablet 0    anastrozole (ARIMIDEX) 1 MG tablet Take 1 tablet by mouth Daily. 30 tablet 11    aspirin 81 MG chewable tablet Chew 1 tablet Daily.      AZO-CRANBERRY PO Take 1 tablet by mouth Daily As Needed.      cyanocobalamin 1000 MCG/ML injection Inject 1 mL into the appropriate muscle as directed by prescriber Every 28 (Twenty-Eight) Days. 1 mL 6    fenofibrate (TRICOR) 145 MG tablet Take 1 tablet by mouth Daily. 90 tablet 1    ferrous sulfate 325 (65 FE) MG tablet Take 1 tablet by mouth Every Other Day. 30 tablet 3    glucose monitor monitoring kit 1 each as needed (DM II). 1 each 0    Jardiance 10 MG tablet tablet TAKE 1 TABLET BY MOUTH DAILY 90 tablet 0    Lancets (OneTouch Delica Plus [id number]) misc USE TO TEST BLOOD SUGAR DAILY AS DIRECTED      Lancets Misc. misc For Daily testing  E11.65 50 each 11    levothyroxine (Synthroid) 112 MCG tablet Take 1 tablet by mouth Daily. 90 tablet 0    OneTouch Ultra test strip USE TO TEST BLOOD SUGAR DAILY AS DIRECTED 50 each 5    Probiotic Product (PROBIOTIC DAILY PO) Take  by mouth.      sennosides-docusate (PERICOLACE) 8.6-50 MG per tablet Take 1 tablet by mouth Daily. 30 tablet 3    vitamin C (ASCORBIC ACID) 500 MG tablet Take 1 tablet by mouth Every Other Day. 30 tablet 3    vitamin D (ERGOCALCIFEROL) 1.25 MG (23457 UT) capsule capsule Take 1 capsule by mouth 1 (One) Time Per Week. 5 capsule 2    HYDROcodone-acetaminophen (NORCO) 5-325 MG per tablet Take 1 tablet by mouth Every 4 (Four) Hours As Needed (Pain). 10 tablet 0    Ozempic, 0.25 or 0.5 MG/DOSE, 2 MG/3ML solution pen-injector        No current facility-administered medications for this visit.       Past Medical  History:  Past Medical History:   Diagnosis Date    Abdominal pain, LLQ (left lower quadrant)     Abnormal ECG aug 2021    Abnormal Pap smear of cervix     several years ago    Anemia     years ago    Arthritis of back 2024    Cataract early stage    Cystitis     Diabetes mellitus     Diarrhea     Diverticulitis of colon     Diverticulosis     Ductal carcinoma in situ (DCIS) of left breast 12/21/2022    Elevated cholesterol     Encounter for cholecystectomy 2018    Gallstones     GERD (gastroesophageal reflux disease)     Hiatal hernia     Hyperglycemia     Hyperlipidemia     Hyperlipidemia     Hypothyroidism     Knee swelling     Leaky heart valve     Muscle spasm     FLANK PAIN    OAB (overactive bladder)     Pneumonia     years ago    Rectal bleeding     UTI (urinary tract infection)        Past Surgical History:  Past Surgical History:   Procedure Laterality Date    BREAST BIOPSY Bilateral 2005    benign    BREAST BIOPSY Left 12/29/2022    Procedure: BREAST BIOPSY WITH NEEDLE LOCALIZATION;  Surgeon: Dong Mata MD;  Location: [facility];  Service: General;  Laterality: Left;    BREAST LUMPECTOMY Left     CARPAL TUNNEL RELEASE Right 8/22/2024    Procedure: CARPAL TUNNEL RELEASE RIGHT;  Surgeon: Christopher Peter MD;  Location: [facility];  Service: Orthopedics;  Laterality: Right;    CATARACT EXTRACTION WITH INTRAOCULAR LENS IMPLANT Right 01/29/2024    D & C HYSTEROSCOPY      DUPUYTREN CONTRACTURE RELEASE Right 8/22/2024    Procedure: PALMAR FASCIECTOMY RIGHT HAND;  Surgeon: Christopher Peter MD;  Location: [facility] OR;  Service: Orthopedics;  Laterality: Right;    ENDOSCOPY      ENDOSCOPY N/A 01/11/2018    Procedure: ESOPHAGOGASTRODUODENOSCOPY;  Surgeon: Dong Mata MD;  Location: [facility];  Service:     EYE SURGERY      MAMMO BILATERAL  09/2015    OVARIAN CYST DRAINAGE      OH LAPAROSCOPY SURG CHOLECYSTECTOMY N/A 01/11/2018    Procedure: CHOLECYSTECTOMY LAPAROSCOPIC;  Surgeon: Dong  "Lucas Mata MD;  Location: [facility] OR;  Service: General    SKIN BIOPSY      US GUIDED LYMPH NODE BIOPSY  04/11/2018       Social History:  Social History     Socioeconomic History    Marital status:    Tobacco Use    Smoking status: Never    Smokeless tobacco: Never    Tobacco comments:     never   Vaping Use    Vaping status: Never Used   Substance and Sexual Activity    Alcohol use: No    Drug use: No    Sexual activity: Defer     Partners: Male     Shey King  reports that she has never smoked. She has never used smokeless tobacco..      Family History:  Family History   Adopted: Yes   Problem Relation Age of Onset    Heart disease Mother         also had cancer kidney    Cancer Mother         kidney    Thyroid disease Mother     Heart disease Father     Diabetes Brother     Arthritis Maternal Aunt         [place]    Asthma Son     Breast cancer Neg Hx        Review of Systems:  Review of Systems   Constitutional: Negative.    HENT: Negative.     Respiratory: Negative.     Cardiovascular: Negative.    Gastrointestinal: Negative.    Genitourinary: Negative.    Musculoskeletal: Negative.    Skin: Negative.    Neurological: Negative.    Hematological: Negative.    Psychiatric/Behavioral: Negative.         Vital Signs:   /80   Pulse 112   Temp 98 °F (36.7 °C) (Temporal)   Resp 20   Ht 157.5 cm (62\")   Wt 87.7 kg (193 lb 6.4 oz)   SpO2 95%   BMI 35.37 kg/m²      Physical Exam:   Physical Exam  Vitals reviewed.   Constitutional:       General: She is not in acute distress.     Appearance: Normal appearance. She is not ill-appearing.   HENT:      Head: Normocephalic and atraumatic.      Mouth/Throat:      Mouth: Mucous membranes are moist.      Pharynx: Oropharynx is clear.   Eyes:      Conjunctiva/sclera: Conjunctivae normal.      Pupils: Pupils are equal, round, and reactive to light.   Cardiovascular:      Rate and Rhythm: Normal rate and regular rhythm.      Heart sounds: No murmur " "heard.  Pulmonary:      Effort: Pulmonary effort is normal. No respiratory distress.      Breath sounds: Normal breath sounds. No wheezing.   Chest:   Breasts:     Right: Normal. No mass or skin change.      Left: Normal. No swelling, bleeding, inverted nipple, mass, nipple discharge, skin change or tenderness.      Comments: L breast with lumpectomy scar without discharge from areola    Abdominal:      General: Abdomen is flat. Bowel sounds are normal. There is no distension.      Palpations: Abdomen is soft. There is no mass.      Tenderness: There is no abdominal tenderness. There is no guarding.   Musculoskeletal:         General: No swelling. Normal range of motion.      Cervical back: Normal range of motion.   Lymphadenopathy:      Cervical: No cervical adenopathy.   Skin:     General: Skin is warm and dry.   Neurological:      General: No focal deficit present.      Mental Status: She is alert and oriented to person, place, and time. Mental status is at baseline.   Psychiatric:         Mood and Affect: Mood normal.         PHQ-9 Score  PHQ-9 Total Score:       Pain Score  Vitals:    10/25/24 0931   BP: 140/80   Pulse: 112   Resp: 20   Temp: 98 °F (36.7 °C)   TempSrc: Temporal   SpO2: 95%   Weight: 87.7 kg (193 lb 6.4 oz)   Height: 157.5 cm (62\")   PainSc: 0-No pain                 ECOG score: 0             Lab Review  Lab Results   Component Value Date    WBC 7.30 10/25/2024    HGB 12.8 10/25/2024    HCT 39.8 10/25/2024    MCV 92.1 10/25/2024    RDW 13.8 10/25/2024     10/25/2024    NEUTRORELPCT 75.0 10/25/2024    LYMPHORELPCT 14.9 (L) 10/25/2024    MONORELPCT 6.6 10/25/2024    EOSRELPCT 2.2 10/25/2024    BASORELPCT 0.8 10/25/2024    NEUTROABS 5.47 10/25/2024    LYMPHSABS 1.09 10/25/2024       Lab Results   Component Value Date     08/20/2024    K 3.8 08/20/2024    CO2 20.8 (L) 08/20/2024     08/20/2024    BUN 14 08/20/2024    CREATININE 1.02 (H) 08/20/2024    EGFRIFNONA 57 (L) 01/26/2022 "    EGFRIFAFRI 74 09/06/2016    GLUCOSE 194 (H) 08/20/2024    CALCIUM 9.1 08/20/2024    ALKPHOS 93 08/07/2024    AST 25 08/07/2024    ALT 23 08/07/2024    BILITOT 0.7 08/07/2024    ALBUMIN 4.6 08/07/2024    PROTEINTOT 8.1 08/07/2024    MG 1.9 05/07/2024    PHOS 3.5 12/01/2021       Radiology Results  Mammo Diagnostic Digital Tomosynthesis Bilateral With CAD (10/23/2024 08:38)     FINDINGS: The breasts are heterogeneously dense, which may obscure small  masses.     The bilateral fibroglandular pattern is stable in appearance including  postoperative changes being followed in the lateral aspect of the left  breast. There are stable bilateral calcifications. No new or suspicious  findings are identified in either breast.     IMPRESSION:     1. Benign right mammographic findings.     2. Probably benign left mammographic findings. Recommend continued  follow-up.    Mammo Diagnostic Digital Tomosynthesis Left With CAD (04/25/2024 10:42)     FINDINGS: The left breast is heterogeneously dense, which may obscure  small masses.     The fibroglandular pattern is stable in appearance including  postoperative changes being followed in the lateral aspect of the left  breast. There are stable scattered calcifications. Mild skin thickening  is again noted and likely treatment related. No new or suspicious  findings are identified.     Left breast ultrasound: Focused sonographic evaluation of the patient's  area of pain extending from her back into the left 3:00 region was  performed. This demonstrates no underlying sonographic abnormality. No  solid or cystic mass is seen nor is there an abnormal area of shadowing  in the imaged portion of the left breast.     IMPRESSION:  Probably benign left mammographic findings. Recommend  continued follow-up.    Mammo Diagnostic Digital Tomosynthesis Bilateral With CAD (10/30/2023 09:49)   IMPRESSION:     1. Benign right mammographic findings.     2. Probably benign left mammographic findings.  Recommend continued  follow-up.       Mammo Diagnostic Digital Tomosynthesis Left With CAD (06/15/2023 12:07)      COMPARISON: 12/29/2022, 12/14/2022, 12/06/2022, 10/27/2022, 06/19/2020,  02/07/2019, 11/11/2017     FINDINGS: The left breast is heterogeneously dense, which may obscure  small masses.     Presumed postoperative changes are now noted in the lateral aspect of  the left breast from the patient's interval conservation surgery. There  is mild trabecular thickening and skin thickening which is likely  treatment related. There are stable scattered calcifications.     IMPRESSION:  Probably benign left mammographic findings. Recommend  continued follow-up.    DEXA Bone Density Axial (01/27/2023 13:22)  FINDINGS:    RIGHT FEMORAL NECK BONE MINERAL DENSITY:  BMD of the right femur neck  is 0.899 with T score -1 with density.      UNITS OF MEASURE:    Bone mineral density is measured in g/cm2.    Z-score is the number of standard deviations above age-matched  controls.    T-score is the number of standard deviations above healthy young  adults.      WORLD HEALTH ORGANIZATION GUIDELINES:    T-score at or above -1 is normal bone mineral density.    T-score between -1 and -2.5 is osteopenia.    T-score at or below -2.5 is osteoporosis.     IMPRESSION:    BMD of the right femur neck is 0.899 with T score -1 with density.    Mammo Diagnostic Left With CAD (12/06/2022 09:21)  FINDINGS: Magnification imaging of the left breast demonstrates a small  group of heterogeneous calcifications in the mid left 3:00 to 4:00  region. Recommend stereotactic guided core biopsy.     IMPRESSION:  Grouped calcifications in the left 3:00 to 4:00 region.     BI-RADS CATEGORY:  4, SUSPICIOUS     RECOMMENDATION:  Recommend stereotactic guided core biopsy of the left  breast.    Mammo Screening Digital Tomosynthesis Bilateral With CAD (10/27/2022 07:29)  FINDINGS:     Breast Composition: The breasts are heterogenously dense, which  may  obscure small masses.   Important Findings:    Biopsy related changes right breast with marker clip. Stable benign  calcifications right breast are noted. Calcifications left breast  middle-posterior third lateral aspect have developed. Other  calcifications throughout the left breast appear stable. Biopsy related  changes upper outer quadrant left breast with marker clip noted.   No suspicious calcifications or areas of architectural distortion.  No  dominant masses or skin thickening      Pathology:   Procedure: Excision (less than total mastectomy)   Specimen Laterality: Left   Tumor Site: Not specified   Histologic Type: Ductal carcinoma in situ   Size of DCIS: 10 x 3 x 2 mm, 4 of 19 blocks   Architectural Patterns: Cribriform   Nuclear Grade: Grade II (intermediate)   Necrosis: Present, focal (small foci of necrosis)   Microcalcifications: Present in DCIS and in nonneoplastic tissue   Margin Status: All margins negative for DCIS   Distance from DCIS to Closest Margin: 1 mm from inferior margin   Distance from DCIS to Anterior Margin: 2 mm   Distance from DCIS to Posterior Margin: 4 mm   Regional Lymph Node Status: No regional lymph nodes submitted or found   PATHOLOGIC STAGE (pTNM, AJCC 8th Edition): pTis (DCIS), pNx   Biomarker Testing Performed on Previous Biopsy:  (ER) Positive 100 %   (PgR) Positive 5 %     CLINICAL HISTORY:   Ductal carcinoma in situ (DCIS) of left breast     Assessment / Plan         Assessment and Plan   73 y.o. female who presents today with hormone positive left breast ductal carcinoma in situ, status postlumpectomy.    Ductal carcinoma in situ, left breast, hormone positive  -Final pathology with 10 x 3 x 2 mm ductal carcinoma in situ, grade 2, 1 mm from inferior margin, 2 mm from anterior margin, 4 mm from posterior margin  -Her case ws discussed in tumor board on 1/25; it was deemed she should receive radiation therapy given close margins <2mm inferior margin  -Completed  adjuvant radiation therapy: 3/7/23  -Tolerating adjuvant hormonal therapy which has shown to decrease recurrence by at least 33%. Taking anastrozole 1 mg daily for 5 years. (To be completed 3/2028)  -Last mammogram with benign findings 10/2024; Next mammogram in 6 months 4/2025  -She will continue history and physical and exam every 3-6 months for the first 5 years, yearly thereafter according to NCCN     2. Bone health  -DEXA scan 1/27/2023: bone mineral density of right femur neck 0.899 with T score -1.  Repeat DEXA scan 1/2025  -Continue vitamin D and calcium supplement    3. Fatigue   - Anemia work up showed iron deficiency and marginal B12 level  -Started patient on oral ferrous sulfate 325  and ascorbic acid 500 mg to be taken every other day; if she has worsened baseline constipation, she is to contact us for parenteral iron   -Started on B12 injections to be given monthly; receiving with PCP        Discussed possible differential diagnoses, testing, treatment, recommended non-pharmacological interventions, risks, warning signs to monitor for that would indicate need for follow-up in clinic or ER. If no improvement with these regimens or you have new or worsening symptoms follow-up. Patient verbalizes understanding and agreement with plan of care. Denies further needs or concerns.     Patient was given instructions and counseling regarding her condition and for health maintenance advised.    I spent 30 minutes with Shey King today.  In the office today, more than 50% of this time was spent face-to-face with her  in counseling / coordination of care, reviewing her interim medical history and counseling on the current treatment plan.  All questions were answered to her satisfaction.      Meds ordered during this visit  New Medications Ordered This Visit   Medications    sennosides-docusate (PERICOLACE) 8.6-50 MG per tablet     Sig: Take 1 tablet by mouth Daily.     Dispense:  30 tablet     Refill:  3     vitamin C (ASCORBIC ACID) 500 MG tablet     Sig: Take 1 tablet by mouth Every Other Day.     Dispense:  30 tablet     Refill:  3    vitamin D (ERGOCALCIFEROL) 1.25 MG (29359 UT) capsule capsule     Sig: Take 1 capsule by mouth 1 (One) Time Per Week.     Dispense:  5 capsule     Refill:  2       Visit Diagnoses    ICD-10-CM ICD-9-CM   1. Ductal carcinoma in situ (DCIS) of left breast  D05.12 233.0   2. Osteoporosis, post-menopausal  M81.0 733.01   3. Vitamin D deficiency  E55.9 268.9                 Follow Up   In 3 months for H&P  Repeat CBC, iron studies, ferritin, B12, folate          This document has been electronically signed by Maia Ford MD   October 25, 2024 10:50 EDT    Dictated Utilizing Dragon Dictation: Part of this note may be an electronic transcription/translation of spoken language to printed text using the Dragon Dictation System.

## 2024-10-25 ENCOUNTER — OFFICE VISIT (OUTPATIENT)
Dept: ONCOLOGY | Facility: CLINIC | Age: 74
End: 2024-10-25
Payer: COMMERCIAL

## 2024-10-25 ENCOUNTER — LAB (OUTPATIENT)
Dept: UROLOGY | Facility: CLINIC | Age: 74
End: 2024-10-25
Payer: COMMERCIAL

## 2024-10-25 ENCOUNTER — LAB (OUTPATIENT)
Dept: ONCOLOGY | Facility: CLINIC | Age: 74
End: 2024-10-25
Payer: COMMERCIAL

## 2024-10-25 VITALS
RESPIRATION RATE: 20 BRPM | OXYGEN SATURATION: 95 % | TEMPERATURE: 98 F | HEIGHT: 62 IN | WEIGHT: 193.4 LBS | SYSTOLIC BLOOD PRESSURE: 140 MMHG | BODY MASS INDEX: 35.59 KG/M2 | DIASTOLIC BLOOD PRESSURE: 80 MMHG | HEART RATE: 112 BPM

## 2024-10-25 DIAGNOSIS — D05.12 DUCTAL CARCINOMA IN SITU (DCIS) OF LEFT BREAST: ICD-10-CM

## 2024-10-25 DIAGNOSIS — D64.9 ANEMIA, UNSPECIFIED TYPE: ICD-10-CM

## 2024-10-25 DIAGNOSIS — E53.8 FOLATE DEFICIENCY: ICD-10-CM

## 2024-10-25 DIAGNOSIS — E55.9 VITAMIN D DEFICIENCY: ICD-10-CM

## 2024-10-25 DIAGNOSIS — D05.12 DUCTAL CARCINOMA IN SITU (DCIS) OF LEFT BREAST: Primary | ICD-10-CM

## 2024-10-25 DIAGNOSIS — N39.0 RECURRENT UTI: Primary | ICD-10-CM

## 2024-10-25 DIAGNOSIS — M81.0 OSTEOPOROSIS, POST-MENOPAUSAL: ICD-10-CM

## 2024-10-25 DIAGNOSIS — E61.1 IRON DEFICIENCY: ICD-10-CM

## 2024-10-25 LAB
BASOPHILS # BLD AUTO: 0.06 10*3/MM3 (ref 0–0.2)
BASOPHILS NFR BLD AUTO: 0.8 % (ref 0–1.5)
DEPRECATED RDW RBC AUTO: 46.6 FL (ref 37–54)
EOSINOPHIL # BLD AUTO: 0.16 10*3/MM3 (ref 0–0.4)
EOSINOPHIL NFR BLD AUTO: 2.2 % (ref 0.3–6.2)
ERYTHROCYTE [DISTWIDTH] IN BLOOD BY AUTOMATED COUNT: 13.8 % (ref 12.3–15.4)
FERRITIN SERPL-MCNC: 123.9 NG/ML (ref 13–150)
FOLATE SERPL-MCNC: 10.7 NG/ML (ref 4.78–24.2)
HCT VFR BLD AUTO: 39.8 % (ref 34–46.6)
HGB BLD-MCNC: 12.8 G/DL (ref 12–15.9)
IMM GRANULOCYTES # BLD AUTO: 0.04 10*3/MM3 (ref 0–0.05)
IMM GRANULOCYTES NFR BLD AUTO: 0.5 % (ref 0–0.5)
IRON 24H UR-MRATE: 68 MCG/DL (ref 37–145)
IRON SATN MFR SERPL: 12 % (ref 20–50)
LYMPHOCYTES # BLD AUTO: 1.09 10*3/MM3 (ref 0.7–3.1)
LYMPHOCYTES NFR BLD AUTO: 14.9 % (ref 19.6–45.3)
MCH RBC QN AUTO: 29.6 PG (ref 26.6–33)
MCHC RBC AUTO-ENTMCNC: 32.2 G/DL (ref 31.5–35.7)
MCV RBC AUTO: 92.1 FL (ref 79–97)
MONOCYTES # BLD AUTO: 0.48 10*3/MM3 (ref 0.1–0.9)
MONOCYTES NFR BLD AUTO: 6.6 % (ref 5–12)
NEUTROPHILS NFR BLD AUTO: 5.47 10*3/MM3 (ref 1.7–7)
NEUTROPHILS NFR BLD AUTO: 75 % (ref 42.7–76)
NRBC BLD AUTO-RTO: 0 /100 WBC (ref 0–0.2)
PLATELET # BLD AUTO: 256 10*3/MM3 (ref 140–450)
PMV BLD AUTO: 10.4 FL (ref 6–12)
RBC # BLD AUTO: 4.32 10*6/MM3 (ref 3.77–5.28)
TIBC SERPL-MCNC: 554 MCG/DL (ref 298–536)
TRANSFERRIN SERPL-MCNC: 372 MG/DL (ref 200–360)
VIT B12 BLD-MCNC: 310 PG/ML (ref 211–946)
WBC NRBC COR # BLD AUTO: 7.3 10*3/MM3 (ref 3.4–10.8)

## 2024-10-25 PROCEDURE — 83540 ASSAY OF IRON: CPT | Performed by: INTERNAL MEDICINE

## 2024-10-25 PROCEDURE — 84466 ASSAY OF TRANSFERRIN: CPT | Performed by: INTERNAL MEDICINE

## 2024-10-25 PROCEDURE — 96372 THER/PROPH/DIAG INJ SC/IM: CPT | Performed by: NURSE PRACTITIONER

## 2024-10-25 PROCEDURE — 82728 ASSAY OF FERRITIN: CPT | Performed by: INTERNAL MEDICINE

## 2024-10-25 PROCEDURE — 85025 COMPLETE CBC W/AUTO DIFF WBC: CPT | Performed by: INTERNAL MEDICINE

## 2024-10-25 PROCEDURE — 82746 ASSAY OF FOLIC ACID SERUM: CPT | Performed by: INTERNAL MEDICINE

## 2024-10-25 PROCEDURE — 82607 VITAMIN B-12: CPT | Performed by: INTERNAL MEDICINE

## 2024-10-25 RX ORDER — GENTAMICIN 40 MG/ML
80 INJECTION, SOLUTION INTRAMUSCULAR; INTRAVENOUS ONCE
Status: COMPLETED | OUTPATIENT
Start: 2024-10-25 | End: 2024-10-25

## 2024-10-25 RX ORDER — AMOXICILLIN 250 MG
1 CAPSULE ORAL DAILY
Qty: 30 TABLET | Refills: 3 | Status: SHIPPED | OUTPATIENT
Start: 2024-10-25

## 2024-10-25 RX ORDER — ASCORBIC ACID 500 MG
500 TABLET ORAL EVERY OTHER DAY
Qty: 30 TABLET | Refills: 3 | Status: SHIPPED | OUTPATIENT
Start: 2024-10-25

## 2024-10-25 RX ORDER — ERGOCALCIFEROL 1.25 MG/1
50000 CAPSULE, LIQUID FILLED ORAL WEEKLY
Qty: 5 CAPSULE | Refills: 2 | Status: SHIPPED | OUTPATIENT
Start: 2024-10-25

## 2024-10-25 RX ADMIN — GENTAMICIN 80 MG: 40 INJECTION, SOLUTION INTRAMUSCULAR; INTRAVENOUS at 10:15

## 2024-10-25 NOTE — PROGRESS NOTES
Venipuncture Blood Specimen Collection  Venipuncture performed in [facility] arm by Oanh Vega MA with good hemostasis. Patient tolerated the procedure well without complications.   10/25/24   Oanh Vega MA

## 2024-10-26 DIAGNOSIS — E11.9 TYPE 2 DIABETES MELLITUS WITHOUT COMPLICATION, WITHOUT LONG-TERM CURRENT USE OF INSULIN: ICD-10-CM

## 2024-10-28 RX ORDER — EMPAGLIFLOZIN 10 MG/1
10 TABLET, FILM COATED ORAL DAILY
Qty: 90 TABLET | Refills: 0 | Status: SHIPPED | OUTPATIENT
Start: 2024-10-28

## 2024-10-29 ENCOUNTER — LAB (OUTPATIENT)
Dept: UROLOGY | Facility: CLINIC | Age: 74
End: 2024-10-29
Payer: MEDICARE

## 2024-10-29 DIAGNOSIS — N39.0 RECURRENT UTI: Primary | ICD-10-CM

## 2024-10-29 PROCEDURE — 87086 URINE CULTURE/COLONY COUNT: CPT | Performed by: NURSE PRACTITIONER

## 2024-10-30 LAB — BACTERIA SPEC AEROBE CULT: NO GROWTH

## 2024-11-01 ENCOUNTER — TELEPHONE (OUTPATIENT)
Dept: FAMILY MEDICINE CLINIC | Facility: CLINIC | Age: 74
End: 2024-11-01
Payer: COMMERCIAL

## 2024-11-01 NOTE — TELEPHONE ENCOUNTER
Caller: Shey King    Relationship: Self    Best call back number: [phone]    Which medication are you concerned about: JARDIANCE    Who prescribed you this medication: VICKY FERGUSON    What are your concerns: PHARMACY DID NOT RECEIVE THE REFILL ON 10/28/2024. PATIENT IS OUT OF MEDICATION AND WOULD LIKE A CALL BACK ONCE IT HAS BEEN SENT AND RECEIVED BY PHARMACY. PATIENT VERY UPSET.

## 2024-11-03 DIAGNOSIS — E03.8 ADULT ONSET HYPOTHYROIDISM: ICD-10-CM

## 2024-11-04 RX ORDER — LEVOTHYROXINE SODIUM 125 UG/1
125 TABLET ORAL DAILY
Qty: 90 TABLET | Refills: 1 | OUTPATIENT
Start: 2024-11-04

## 2024-11-07 ENCOUNTER — TELEPHONE (OUTPATIENT)
Dept: UROLOGY | Facility: CLINIC | Age: 74
End: 2024-11-07
Payer: COMMERCIAL

## 2024-11-07 NOTE — TELEPHONE ENCOUNTER
I called to lt the pt know her urine culture results      ----- Message from Griselda Cheng-Akwa sent at 11/6/2024 11:31 PM EST -----  Please let patient know her urine culture is Negative    Urine Culture  No growth    However she may drop off another urine dip if she feels symptomatic.     If not, follow up in clinic as discussed    Thank you

## 2024-11-11 ENCOUNTER — LAB (OUTPATIENT)
Dept: FAMILY MEDICINE CLINIC | Facility: CLINIC | Age: 74
End: 2024-11-11
Payer: MEDICARE

## 2024-11-11 ENCOUNTER — OFFICE VISIT (OUTPATIENT)
Dept: FAMILY MEDICINE CLINIC | Facility: CLINIC | Age: 74
End: 2024-11-11
Payer: MEDICARE

## 2024-11-11 VITALS
HEART RATE: 77 BPM | TEMPERATURE: 97.4 F | DIASTOLIC BLOOD PRESSURE: 68 MMHG | BODY MASS INDEX: 35.55 KG/M2 | OXYGEN SATURATION: 96 % | WEIGHT: 193.2 LBS | HEIGHT: 62 IN | SYSTOLIC BLOOD PRESSURE: 110 MMHG

## 2024-11-11 DIAGNOSIS — E53.8 B12 DEFICIENCY: ICD-10-CM

## 2024-11-11 DIAGNOSIS — E78.5 HYPERLIPIDEMIA, UNSPECIFIED HYPERLIPIDEMIA TYPE: ICD-10-CM

## 2024-11-11 DIAGNOSIS — E55.9 VITAMIN D DEFICIENCY: ICD-10-CM

## 2024-11-11 DIAGNOSIS — E11.9 TYPE 2 DIABETES MELLITUS WITHOUT COMPLICATION, WITHOUT LONG-TERM CURRENT USE OF INSULIN: Primary | ICD-10-CM

## 2024-11-11 DIAGNOSIS — R05.9 COUGH, UNSPECIFIED TYPE: ICD-10-CM

## 2024-11-11 DIAGNOSIS — E11.22 TYPE 2 DIABETES MELLITUS WITH CHRONIC KIDNEY DISEASE, WITHOUT LONG-TERM CURRENT USE OF INSULIN, UNSPECIFIED CKD STAGE: ICD-10-CM

## 2024-11-11 DIAGNOSIS — E03.8 ADULT ONSET HYPOTHYROIDISM: ICD-10-CM

## 2024-11-11 LAB
25(OH)D3 SERPL-MCNC: 42.4 NG/ML (ref 30–100)
ALBUMIN SERPL-MCNC: 4.6 G/DL (ref 3.5–5.2)
ALBUMIN/GLOB SERPL: 1.4 G/DL
ALP SERPL-CCNC: 88 U/L (ref 39–117)
ALT SERPL W P-5'-P-CCNC: 21 U/L (ref 1–33)
ANION GAP SERPL CALCULATED.3IONS-SCNC: 12 MMOL/L (ref 5–15)
AST SERPL-CCNC: 19 U/L (ref 1–32)
BASOPHILS # BLD AUTO: 0.08 10*3/MM3 (ref 0–0.2)
BASOPHILS NFR BLD AUTO: 1.2 % (ref 0–1.5)
BILIRUB SERPL-MCNC: 0.6 MG/DL (ref 0–1.2)
BUN SERPL-MCNC: 10 MG/DL (ref 8–23)
BUN/CREAT SERPL: 9.4 (ref 7–25)
CALCIUM SPEC-SCNC: 9.6 MG/DL (ref 8.6–10.5)
CHLORIDE SERPL-SCNC: 106 MMOL/L (ref 98–107)
CHOLEST SERPL-MCNC: 200 MG/DL (ref 0–200)
CO2 SERPL-SCNC: 22 MMOL/L (ref 22–29)
CREAT SERPL-MCNC: 1.06 MG/DL (ref 0.57–1)
DEPRECATED RDW RBC AUTO: 42 FL (ref 37–54)
EGFRCR SERPLBLD CKD-EPI 2021: 56.3 ML/MIN/1.73
EOSINOPHIL # BLD AUTO: 0.19 10*3/MM3 (ref 0–0.4)
EOSINOPHIL NFR BLD AUTO: 2.8 % (ref 0.3–6.2)
ERYTHROCYTE [DISTWIDTH] IN BLOOD BY AUTOMATED COUNT: 13.2 % (ref 12.3–15.4)
GLOBULIN UR ELPH-MCNC: 3.4 GM/DL
GLUCOSE SERPL-MCNC: 154 MG/DL (ref 65–99)
HBA1C MFR BLD: 6.8 % (ref 4.8–5.6)
HCT VFR BLD AUTO: 39.6 % (ref 34–46.6)
HDLC SERPL-MCNC: 37 MG/DL (ref 40–60)
HGB BLD-MCNC: 13.3 G/DL (ref 12–15.9)
IMM GRANULOCYTES # BLD AUTO: 0.03 10*3/MM3 (ref 0–0.05)
IMM GRANULOCYTES NFR BLD AUTO: 0.4 % (ref 0–0.5)
LDLC SERPL CALC-MCNC: 139 MG/DL (ref 0–100)
LDLC/HDLC SERPL: 3.69 {RATIO}
LYMPHOCYTES # BLD AUTO: 1.11 10*3/MM3 (ref 0.7–3.1)
LYMPHOCYTES NFR BLD AUTO: 16.3 % (ref 19.6–45.3)
MCH RBC QN AUTO: 29.5 PG (ref 26.6–33)
MCHC RBC AUTO-ENTMCNC: 33.6 G/DL (ref 31.5–35.7)
MCV RBC AUTO: 87.8 FL (ref 79–97)
MONOCYTES # BLD AUTO: 0.44 10*3/MM3 (ref 0.1–0.9)
MONOCYTES NFR BLD AUTO: 6.5 % (ref 5–12)
NEUTROPHILS NFR BLD AUTO: 4.95 10*3/MM3 (ref 1.7–7)
NEUTROPHILS NFR BLD AUTO: 72.8 % (ref 42.7–76)
NRBC BLD AUTO-RTO: 0 /100 WBC (ref 0–0.2)
PLATELET # BLD AUTO: 289 10*3/MM3 (ref 140–450)
PMV BLD AUTO: 11.2 FL (ref 6–12)
POTASSIUM SERPL-SCNC: 4.6 MMOL/L (ref 3.5–5.2)
PROT SERPL-MCNC: 8 G/DL (ref 6–8.5)
RBC # BLD AUTO: 4.51 10*6/MM3 (ref 3.77–5.28)
SODIUM SERPL-SCNC: 140 MMOL/L (ref 136–145)
TRIGL SERPL-MCNC: 133 MG/DL (ref 0–150)
TSH SERPL DL<=0.05 MIU/L-ACNC: 2.68 UIU/ML (ref 0.27–4.2)
VIT B12 BLD-MCNC: 288 PG/ML (ref 211–946)
VLDLC SERPL-MCNC: 24 MG/DL (ref 5–40)
WBC NRBC COR # BLD AUTO: 6.8 10*3/MM3 (ref 3.4–10.8)

## 2024-11-11 PROCEDURE — 80053 COMPREHEN METABOLIC PANEL: CPT | Performed by: NURSE PRACTITIONER

## 2024-11-11 PROCEDURE — 85025 COMPLETE CBC W/AUTO DIFF WBC: CPT | Performed by: NURSE PRACTITIONER

## 2024-11-11 PROCEDURE — 36415 COLL VENOUS BLD VENIPUNCTURE: CPT

## 2024-11-11 PROCEDURE — 82043 UR ALBUMIN QUANTITATIVE: CPT | Performed by: NURSE PRACTITIONER

## 2024-11-11 PROCEDURE — 82306 VITAMIN D 25 HYDROXY: CPT | Performed by: NURSE PRACTITIONER

## 2024-11-11 PROCEDURE — 1126F AMNT PAIN NOTED NONE PRSNT: CPT | Performed by: NURSE PRACTITIONER

## 2024-11-11 PROCEDURE — 84443 ASSAY THYROID STIM HORMONE: CPT | Performed by: NURSE PRACTITIONER

## 2024-11-11 PROCEDURE — 82570 ASSAY OF URINE CREATININE: CPT | Performed by: NURSE PRACTITIONER

## 2024-11-11 PROCEDURE — G2211 COMPLEX E/M VISIT ADD ON: HCPCS | Performed by: NURSE PRACTITIONER

## 2024-11-11 PROCEDURE — 80061 LIPID PANEL: CPT | Performed by: NURSE PRACTITIONER

## 2024-11-11 PROCEDURE — 83036 HEMOGLOBIN GLYCOSYLATED A1C: CPT | Performed by: NURSE PRACTITIONER

## 2024-11-11 PROCEDURE — 99214 OFFICE O/P EST MOD 30 MIN: CPT | Performed by: NURSE PRACTITIONER

## 2024-11-11 PROCEDURE — 3044F HG A1C LEVEL LT 7.0%: CPT | Performed by: NURSE PRACTITIONER

## 2024-11-11 PROCEDURE — 1159F MED LIST DOCD IN RCRD: CPT | Performed by: NURSE PRACTITIONER

## 2024-11-11 PROCEDURE — 1160F RVW MEDS BY RX/DR IN RCRD: CPT | Performed by: NURSE PRACTITIONER

## 2024-11-11 PROCEDURE — 82607 VITAMIN B-12: CPT | Performed by: NURSE PRACTITIONER

## 2024-11-11 RX ORDER — LEVOTHYROXINE SODIUM 112 UG/1
112 TABLET ORAL DAILY
Qty: 90 TABLET | Refills: 1 | Status: SHIPPED | OUTPATIENT
Start: 2024-11-11

## 2024-11-11 RX ORDER — FENOFIBRATE 145 MG/1
145 TABLET, COATED ORAL DAILY
Qty: 90 TABLET | Refills: 1 | Status: SHIPPED | OUTPATIENT
Start: 2024-11-11

## 2024-11-11 RX ORDER — FLUCONAZOLE 150 MG/1
150 TABLET ORAL ONCE
Qty: 1 TABLET | Refills: 0 | Status: SHIPPED | OUTPATIENT
Start: 2024-11-11 | End: 2024-11-11

## 2024-11-11 NOTE — PROGRESS NOTES
"Chief Complaint  Cough (Coughing for a couple of weeks with excess mucus )    Subjective        Shey King presents to [facility]  Thyroid Problem  Presents for follow-up (Hypothyroidism) visit. Symptoms include fatigue. The symptoms have been stable.   Diabetes  She presents for her follow-up diabetic visit. She has type 2 diabetes mellitus. Her disease course has been stable. There are no hypoglycemic associated symptoms. Associated symptoms include fatigue. There are no hypoglycemic complications. There are no diabetic complications. Current diabetic treatment includes diet, insulin injections and oral agent (dual therapy). She is compliant with treatment all of the time. Her weight is stable. She is following a generally healthy diet. Meal planning includes avoidance of concentrated sweets. She never participates in exercise. Her home blood glucose trend is fluctuating minimally.   Fatigue  This is a recurrent (iron def anemia, B12 def) problem. The problem occurs daily. The problem has been unchanged. Associated symptoms include coughing and fatigue. Pertinent negatives include no fever or sore throat.   Cough  This is a new problem. The current episode started more than 1 month ago. The problem has been comes and goes. The cough is Productive of sputum (early morning). Pertinent negatives include no ear pain, fever, rhinorrhea, sore throat, shortness of breath or wheezing.       Objective   Vital Signs:  /68 (BP Location: Right arm, Patient Position: Sitting)   Pulse 77   Temp 97.4 °F (36.3 °C) (Temporal)   Ht 157.5 cm (62\")   Wt 87.6 kg (193 lb 3.2 oz)   SpO2 96%   BMI 35.34 kg/m²   Estimated body mass index is 35.34 kg/m² as calculated from the following:    Height as of this encounter: 157.5 cm (62\").    Weight as of this encounter: 87.6 kg (193 lb 3.2 oz).               Physical Exam  Vitals and nursing note reviewed.   Constitutional:       General: She is " not in acute distress.     Appearance: She is well-developed. She is obese. She is not ill-appearing.   HENT:      Head: Normocephalic.   Cardiovascular:      Rate and Rhythm: Normal rate and regular rhythm.      Heart sounds: Normal heart sounds. No murmur heard.  Pulmonary:      Effort: Pulmonary effort is normal.      Breath sounds: Normal breath sounds.   Skin:     General: Skin is warm and dry.   Neurological:      Mental Status: She is alert and oriented to person, place, and time.   Psychiatric:         Behavior: Behavior normal.        Result Review :      During this visit the following were done:  Labs Reviewed [x]    Labs Ordered [x]    Radiology Reports Reviewed []    Radiology Ordered []    PCP Records Reviewed []    Referring Provider Records Reviewed []    ER Records Reviewed []    Hospital Records Reviewed []    History Obtained From Family []    Radiology Images Reviewed []    Other Reviewed []    Records Requested []       Assessment and Plan   Diagnoses and all orders for this visit:    1. Type 2 diabetes mellitus without complication, without long-term current use of insulin (Primary)  -     Microalbumin / Creatinine Urine Ratio - Urine, Clean Catch; Future  -     CBC Auto Differential; Future  -     Comprehensive Metabolic Panel; Future  -     Hemoglobin A1c; Future  -     Lipid Panel; Future  -     TSH; Future  -     Vitamin B12; Future  -     Vitamin D,25-Hydroxy; Future  -     empagliflozin (Jardiance) 10 MG tablet tablet; Take 1 tablet by mouth Daily.  Dispense: 90 tablet; Refill: 1    2. Vitamin D deficiency  -     Vitamin D,25-Hydroxy; Future    3. Hyperlipidemia, unspecified hyperlipidemia type  -     fenofibrate (TRICOR) 145 MG tablet; Take 1 tablet by mouth Daily.  Dispense: 90 tablet; Refill: 1    4. Cough, unspecified type    5. B12 deficiency    6. Adult onset hypothyroidism  -     levothyroxine (Synthroid) 112 MCG tablet; Take 1 tablet by mouth Daily.  Dispense: 90 tablet; Refill:  1    Other orders  -     fluconazole (Diflucan) 150 MG tablet; Take 1 tablet by mouth 1 (One) Time for 1 dose.  Dispense: 1 tablet; Refill: 0             Follow Up   Return in about 3 months (around 2/11/2025), or if symptoms worsen or fail to improve, for Recheck  labs today.  Patient was given instructions and counseling regarding her condition or for health maintenance advice. Please see specific information pulled into the AVS if appropriate.

## 2024-11-12 ENCOUNTER — TELEPHONE (OUTPATIENT)
Dept: FAMILY MEDICINE CLINIC | Facility: CLINIC | Age: 74
End: 2024-11-12
Payer: COMMERCIAL

## 2024-11-12 LAB
ALBUMIN UR-MCNC: <1.2 MG/DL
CREAT UR-MCNC: 87.8 MG/DL
MICROALBUMIN/CREAT UR: NORMAL MG/G{CREAT}

## 2024-11-12 NOTE — TELEPHONE ENCOUNTER
----- Message from Sandrine Iqbal MD sent at 12/10/2019  5:34 PM EST -----  Hasmukh Overton,    Can you please make him an appointment to see one of the [facility] Oncologists.      Thanks,     Frieda Churchill ----- Message from Tashia Vallejo sent at 11/12/2024 11:51 AM EST -----  Labs are stable.  Sugar up just a little

## 2024-11-13 ENCOUNTER — OFFICE VISIT (OUTPATIENT)
Dept: SURGERY | Facility: CLINIC | Age: 74
End: 2024-11-13
Payer: MEDICARE

## 2024-11-13 VITALS — BODY MASS INDEX: 35.51 KG/M2 | WEIGHT: 193 LBS | HEIGHT: 62 IN

## 2024-11-13 DIAGNOSIS — D05.12 DUCTAL CARCINOMA IN SITU (DCIS) OF LEFT BREAST: Primary | ICD-10-CM

## 2024-11-13 NOTE — PROGRESS NOTES
Subjective   Shey King is a 73 y.o. female  is here today for follow-up.         Shey King is a 73 y.o. female here for follow up after wire localized lumpectomy of the left breast.  The patient is doing well and her incision has healed without issue.  She had a small area of skin dehiscence that is granulating nicely.  Final pathology is consistent with DCIS and margins of excision are negative.    LEFT BREAST, LUMPECTOMY: Intermediate grade DCIS, margins free            RLL     Procedure: Excision (less than total mastectomy)   Specimen Laterality: Left   Tumor Site: Not specified   Histologic Type: Ductal carcinoma in situ   Size of DCIS: 10 x 3 x 2 mm, 4 of 19 blocks   Architectural Patterns: Cribriform   Nuclear Grade: Grade II (intermediate)   Necrosis: Present, focal (small foci of necrosis)   Microcalcifications: Present in DCIS and in nonneoplastic tissue   Margin Status: All margins negative for DCIS   Distance from DCIS to Closest Margin: 1 mm from inferior margin   Distance from DCIS to Anterior Margin: 2 mm   Distance from DCIS to Posterior Margin: 4 mm   Regional Lymph Node Status: No regional lymph nodes submitted or found   PATHOLOGIC STAGE (pTNM, AJCC 8th Edition): pTis (DCIS), pNx   Biomarker Testing Performed on Previous Biopsy:  (ER) Positive 100 %   (PgR) Positive 5 %         Assessment     Diagnoses and all orders for this visit:    1. 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 female doing well after wire localized lumpectomy for an area of DCIS of the left breast.  Final pathology is consistent with hormone positive DCIS with no invasive disease.  Margins of excision are negative.  The patient is doing well and completing adjuvant therapy.  Radiation therapy has been completed and her radiation changes are resolving.  6-month mammogram stable.  Repeat mammogram scheduled and she will follow-up in 3 months in the office.  The copied  portions of this note have been reviewed and are accurate as of 11/13/2024

## 2024-12-18 ENCOUNTER — LAB (OUTPATIENT)
Dept: UROLOGY | Facility: CLINIC | Age: 74
End: 2024-12-18
Payer: MEDICARE

## 2024-12-18 ENCOUNTER — TELEPHONE (OUTPATIENT)
Dept: FAMILY MEDICINE CLINIC | Facility: CLINIC | Age: 74
End: 2024-12-18
Payer: COMMERCIAL

## 2024-12-18 DIAGNOSIS — N39.0 RECURRENT UTI: Primary | ICD-10-CM

## 2024-12-18 PROCEDURE — 87088 URINE BACTERIA CULTURE: CPT | Performed by: NURSE PRACTITIONER

## 2024-12-18 PROCEDURE — 87186 SC STD MICRODIL/AGAR DIL: CPT | Performed by: NURSE PRACTITIONER

## 2024-12-18 PROCEDURE — 87086 URINE CULTURE/COLONY COUNT: CPT | Performed by: NURSE PRACTITIONER

## 2024-12-18 RX ORDER — PROCHLORPERAZINE 25 MG/1
1 SUPPOSITORY RECTAL 3 TIMES DAILY
Qty: 1 EACH | Refills: 0 | Status: SHIPPED | OUTPATIENT
Start: 2024-12-18

## 2024-12-18 RX ORDER — PROCHLORPERAZINE 25 MG/1
SUPPOSITORY RECTAL
Qty: 10 EACH | Refills: 3 | Status: SHIPPED | OUTPATIENT
Start: 2024-12-18

## 2024-12-18 RX ORDER — FLUCONAZOLE 150 MG/1
150 TABLET ORAL ONCE
Qty: 1 TABLET | Refills: 0 | Status: SHIPPED | OUTPATIENT
Start: 2024-12-18 | End: 2024-12-18

## 2024-12-18 RX ORDER — PROCHLORPERAZINE 25 MG/1
1 SUPPOSITORY RECTAL CONTINUOUS
Qty: 1 EACH | Refills: 0 | Status: SHIPPED | OUTPATIENT
Start: 2024-12-18

## 2024-12-18 NOTE — TELEPHONE ENCOUNTER
Spoke with patient she is agreeable to going back on the Ozempic,is requesting Diflucan,informed that you have to be on Insulin to qualify for the Dexcom,verbalized understanding.

## 2024-12-18 NOTE — TELEPHONE ENCOUNTER
"Patient called indicating she feels she may need something in addition to Jardiance for elevated glucose.  He sugar has been running around 200 and \"that's just too high\".      She also would like a Dexcom  and transmitter as well as another diflucan as one just wasn't enough.    Please advise..    "

## 2024-12-20 ENCOUNTER — TELEPHONE (OUTPATIENT)
Dept: UROLOGY | Facility: CLINIC | Age: 74
End: 2024-12-20
Payer: COMMERCIAL

## 2024-12-20 ENCOUNTER — LAB (OUTPATIENT)
Dept: UROLOGY | Facility: CLINIC | Age: 74
End: 2024-12-20
Payer: MEDICARE

## 2024-12-20 DIAGNOSIS — Z16.12 URINARY TRACT INFECTION DUE TO EXTENDED-SPECTRUM BETA LACTAMASE (ESBL) PRODUCING ESCHERICHIA COLI: ICD-10-CM

## 2024-12-20 DIAGNOSIS — N39.0 RECURRENT UTI: Primary | ICD-10-CM

## 2024-12-20 DIAGNOSIS — B96.29 URINARY TRACT INFECTION DUE TO EXTENDED-SPECTRUM BETA LACTAMASE (ESBL) PRODUCING ESCHERICHIA COLI: Primary | ICD-10-CM

## 2024-12-20 DIAGNOSIS — N39.0 URINARY TRACT INFECTION DUE TO EXTENDED-SPECTRUM BETA LACTAMASE (ESBL) PRODUCING ESCHERICHIA COLI: ICD-10-CM

## 2024-12-20 DIAGNOSIS — B96.29 URINARY TRACT INFECTION DUE TO EXTENDED-SPECTRUM BETA LACTAMASE (ESBL) PRODUCING ESCHERICHIA COLI: ICD-10-CM

## 2024-12-20 DIAGNOSIS — Z16.12 URINARY TRACT INFECTION DUE TO EXTENDED-SPECTRUM BETA LACTAMASE (ESBL) PRODUCING ESCHERICHIA COLI: Primary | ICD-10-CM

## 2024-12-20 DIAGNOSIS — N39.0 URINARY TRACT INFECTION DUE TO EXTENDED-SPECTRUM BETA LACTAMASE (ESBL) PRODUCING ESCHERICHIA COLI: Primary | ICD-10-CM

## 2024-12-20 LAB — BACTERIA SPEC AEROBE CULT: ABNORMAL

## 2024-12-20 RX ORDER — NITROFURANTOIN 25; 75 MG/1; MG/1
CAPSULE ORAL
Qty: 30 CAPSULE | Refills: 0 | Status: SHIPPED | OUTPATIENT
Start: 2024-12-20

## 2024-12-20 RX ORDER — GENTAMICIN 40 MG/ML
80 INJECTION, SOLUTION INTRAMUSCULAR; INTRAVENOUS ONCE
Status: COMPLETED | OUTPATIENT
Start: 2024-12-20 | End: 2024-12-20

## 2024-12-20 RX ADMIN — GENTAMICIN 80 MG: 40 INJECTION, SOLUTION INTRAMUSCULAR; INTRAVENOUS at 16:03

## 2024-12-20 NOTE — TELEPHONE ENCOUNTER
Called the patient to check on her post clinic visit and to discuss urine culture results positive.     Patient is very symptomatic with dysuria, chills without any fevers, and constant burning with urination.      Patient will have gentamicin IM x 1 dose today in clinic    She will resume therapy on Macrobid over the weekend, and finish up 3 more doses of gentamicin next week on Monday Tuesday and Wednesday.  Then Macrobid for 7 days.    Patient is agreeable plan of care    Urine Culture >100,000 CFU/mL Escherichia coli ESBL Abnormal       Colonization of the urinary tract without infection is common. Treatment is discouraged unless the patient is symptomatic, pregnant, or undergoing an invasive urologic procedure.  Recent outcomes data supports the use of pip/tazo in the treatment of susceptible ESBL infections for uncomplicated UTI. Consider use of pip/tazo as a carbapenem-sparing regimen in applicable patients.        Resulting Agency:  [facility]     Susceptibility     Escherichia coli ESBL     RACHELE     Ertapenem Susceptible     Gentamicin Susceptible     Levofloxacin Intermediate     Meropenem Susceptible     Nitrofurantoin Susceptible     Piperacillin + Tazobactam Susceptible     Trimethoprim + Sulfamethoxazole Susceptible

## 2024-12-23 ENCOUNTER — LAB (OUTPATIENT)
Dept: UROLOGY | Facility: CLINIC | Age: 74
End: 2024-12-23
Payer: MEDICARE

## 2024-12-23 DIAGNOSIS — B96.29 URINARY TRACT INFECTION DUE TO EXTENDED-SPECTRUM BETA LACTAMASE (ESBL) PRODUCING ESCHERICHIA COLI: Primary | ICD-10-CM

## 2024-12-23 DIAGNOSIS — N39.0 URINARY TRACT INFECTION DUE TO EXTENDED-SPECTRUM BETA LACTAMASE (ESBL) PRODUCING ESCHERICHIA COLI: Primary | ICD-10-CM

## 2024-12-23 DIAGNOSIS — Z16.12 URINARY TRACT INFECTION DUE TO EXTENDED-SPECTRUM BETA LACTAMASE (ESBL) PRODUCING ESCHERICHIA COLI: Primary | ICD-10-CM

## 2024-12-23 PROCEDURE — 96372 THER/PROPH/DIAG INJ SC/IM: CPT | Performed by: NURSE PRACTITIONER

## 2024-12-23 RX ORDER — GENTAMICIN 40 MG/ML
80 INJECTION, SOLUTION INTRAMUSCULAR; INTRAVENOUS ONCE
Status: COMPLETED | OUTPATIENT
Start: 2024-12-23 | End: 2024-12-23

## 2024-12-23 RX ADMIN — GENTAMICIN 80 MG: 40 INJECTION, SOLUTION INTRAMUSCULAR; INTRAVENOUS at 08:02

## 2024-12-26 ENCOUNTER — LAB (OUTPATIENT)
Dept: UROLOGY | Facility: CLINIC | Age: 74
End: 2024-12-26
Payer: MEDICARE

## 2024-12-26 DIAGNOSIS — N39.0 URINARY TRACT INFECTION DUE TO EXTENDED-SPECTRUM BETA LACTAMASE (ESBL) PRODUCING ESCHERICHIA COLI: Primary | ICD-10-CM

## 2024-12-26 DIAGNOSIS — Z16.12 URINARY TRACT INFECTION DUE TO EXTENDED-SPECTRUM BETA LACTAMASE (ESBL) PRODUCING ESCHERICHIA COLI: Primary | ICD-10-CM

## 2024-12-26 DIAGNOSIS — B96.29 URINARY TRACT INFECTION DUE TO EXTENDED-SPECTRUM BETA LACTAMASE (ESBL) PRODUCING ESCHERICHIA COLI: Primary | ICD-10-CM

## 2024-12-26 RX ORDER — GENTAMICIN 40 MG/ML
80 INJECTION, SOLUTION INTRAMUSCULAR; INTRAVENOUS ONCE
Status: COMPLETED | OUTPATIENT
Start: 2024-12-26 | End: 2024-12-26

## 2024-12-26 RX ADMIN — GENTAMICIN 80 MG: 40 INJECTION, SOLUTION INTRAMUSCULAR; INTRAVENOUS at 09:35

## 2024-12-27 ENCOUNTER — LAB (OUTPATIENT)
Dept: UROLOGY | Facility: CLINIC | Age: 74
End: 2024-12-27
Payer: MEDICARE

## 2024-12-27 DIAGNOSIS — Z16.12 URINARY TRACT INFECTION DUE TO EXTENDED-SPECTRUM BETA LACTAMASE (ESBL) PRODUCING ESCHERICHIA COLI: Primary | ICD-10-CM

## 2024-12-27 DIAGNOSIS — B96.29 URINARY TRACT INFECTION DUE TO EXTENDED-SPECTRUM BETA LACTAMASE (ESBL) PRODUCING ESCHERICHIA COLI: Primary | ICD-10-CM

## 2024-12-27 DIAGNOSIS — N39.0 URINARY TRACT INFECTION DUE TO EXTENDED-SPECTRUM BETA LACTAMASE (ESBL) PRODUCING ESCHERICHIA COLI: Primary | ICD-10-CM

## 2024-12-27 RX ORDER — GENTAMICIN 40 MG/ML
80 INJECTION, SOLUTION INTRAMUSCULAR; INTRAVENOUS ONCE
Status: COMPLETED | OUTPATIENT
Start: 2024-12-27 | End: 2024-12-27

## 2024-12-27 RX ADMIN — GENTAMICIN 80 MG: 40 INJECTION, SOLUTION INTRAMUSCULAR; INTRAVENOUS at 09:26

## 2025-01-02 ENCOUNTER — LAB (OUTPATIENT)
Dept: UROLOGY | Facility: CLINIC | Age: 75
End: 2025-01-02
Payer: MEDICARE

## 2025-01-02 DIAGNOSIS — N39.0 RECURRENT UTI: Primary | ICD-10-CM

## 2025-01-02 PROCEDURE — 87086 URINE CULTURE/COLONY COUNT: CPT | Performed by: UROLOGY

## 2025-01-03 LAB — BACTERIA SPEC AEROBE CULT: NORMAL

## 2025-01-08 ENCOUNTER — OFFICE VISIT (OUTPATIENT)
Dept: FAMILY MEDICINE CLINIC | Facility: CLINIC | Age: 75
End: 2025-01-08
Payer: MEDICARE

## 2025-01-08 VITALS
HEIGHT: 62 IN | DIASTOLIC BLOOD PRESSURE: 72 MMHG | SYSTOLIC BLOOD PRESSURE: 130 MMHG | HEART RATE: 98 BPM | WEIGHT: 194.2 LBS | OXYGEN SATURATION: 99 % | TEMPERATURE: 97.6 F | BODY MASS INDEX: 35.74 KG/M2

## 2025-01-08 DIAGNOSIS — N18.1 TYPE 2 DIABETES MELLITUS WITH STAGE 1 CHRONIC KIDNEY DISEASE, WITHOUT LONG-TERM CURRENT USE OF INSULIN: Primary | ICD-10-CM

## 2025-01-08 DIAGNOSIS — E11.22 TYPE 2 DIABETES MELLITUS WITH STAGE 1 CHRONIC KIDNEY DISEASE, WITHOUT LONG-TERM CURRENT USE OF INSULIN: Primary | ICD-10-CM

## 2025-01-08 DIAGNOSIS — M54.32 LEFT SCIATIC NOTCH PAIN: ICD-10-CM

## 2025-01-08 DIAGNOSIS — N39.0 RECURRENT UTI: ICD-10-CM

## 2025-01-08 PROCEDURE — G2211 COMPLEX E/M VISIT ADD ON: HCPCS | Performed by: NURSE PRACTITIONER

## 2025-01-08 PROCEDURE — 1125F AMNT PAIN NOTED PAIN PRSNT: CPT | Performed by: NURSE PRACTITIONER

## 2025-01-08 PROCEDURE — 99214 OFFICE O/P EST MOD 30 MIN: CPT | Performed by: NURSE PRACTITIONER

## 2025-01-08 NOTE — PROGRESS NOTES
"Chief Complaint   Hip Pain (Left hip pain )     Subjective          Shey King presents to [facility]   History of Present Illness   History of Present Illness  The patient is a 74-year-old female who presents for follow-up of hip pain, UTI, and diabetes.    She reports experiencing hip pain, which she attributes to her last injection received during a kidney infection treatment. The onset of the pain was noted a few days post-injection, with an increase in intensity observed on Saturday. The pain radiates into her buttocks and groin area. She does not recall any specific incident that could have triggered this pain, such as a fall or injury. She has no history of sciatica or back pain. There have been no recent changes in her sleeping environment, such as a new mattress or bed. She reports no numbness or tingling sensations. She reports no difficulty in bending. She has not attempted to alleviate the pain with ice or heat. She reports no issues with hip rotation. She has not taken any ibuprofen for the pain. The pain appears to be slightly improved today, but she notes that increased activity tends to exacerbate the discomfort.    She has been under the care of a urologist for a urinary tract infection (UTI).She is currently on a daily regimen of Macrobid. She has temporarily discontinued her Ozempic medication due to the severity of her UTI symptoms.        MEDICATIONS  Current: Macrobid, Ozempic       Review of Systems       Objective    Vital Signs:   /72 (BP Location: Right arm, Patient Position: Sitting)   Pulse 98   Temp 97.6 °F (36.4 °C) (Temporal)   Ht 157.5 cm (62\")   Wt 88.1 kg (194 lb 3.2 oz)   SpO2 99%   BMI 35.52 kg/m²     Physical Exam           Physical Exam   Result Review :           Results  Laboratory Studies  Blood sugar was 6.8 in November.             Assessment and Plan    Diagnoses and all orders for this visit:    1. Type 2 diabetes mellitus " with stage 1 chronic kidney disease, without long-term current use of insulin (Primary)    2. Left sciatic notch pain    3. Recurrent UTI       Assessment & Plan  1. Sciatica.  The patient's symptoms, including pain radiating into the groin and inner thigh, are consistent with sciatica. There is no evidence of bruising from previous injections. She is advised to perform leg stretches before getting out of bed and apply ice to the affected area 2 to 3 times daily to reduce inflammation. Motrin is recommended for pain management.    2. Urinary Tract Infection (UTI).  The patient has been on Macrobid daily for her UTI. The last culture showed 25,000 bacteria, indicating some improvement. She is advised to continue her current antibiotic regimen. Keep urology follow up     3. Diabetes Mellitus.  Her blood sugar levels were not optimal during the last evaluation in November 2024, with an A1c of 6.8. She is advised to resume her Ozempic injections, starting with the initial dose. If she tolerates it well and it controls her blood sugar, the dose will not need to be increased.       Patient's Body mass index is 35.52 kg/m². indicating that she is obese (BMI >30). Obesity-related health conditions include the following: hypertension, diabetes mellitus, and dyslipidemias. Obesity is unchanged. BMI is is above average; BMI management plan is completed. We discussed portion control and increasing exercise..      Follow Up    No follow-ups on file.   Patient was given instructions and counseling regarding her condition or for health maintenance advice. Please see specific information pulled into the AVS if appropriate.           This document has been electronically signed by BERT Lucas  January 8, 2025 12:35 EST    Patient or patient representative verbalized consent for the use of Ambient Listening during the visit with  BERT Lucas for chart documentation. 1/8/2025  12:35 EST

## 2025-01-26 DIAGNOSIS — Z16.12 URINARY TRACT INFECTION DUE TO EXTENDED-SPECTRUM BETA LACTAMASE (ESBL) PRODUCING ESCHERICHIA COLI: ICD-10-CM

## 2025-01-26 DIAGNOSIS — N39.0 URINARY TRACT INFECTION DUE TO EXTENDED-SPECTRUM BETA LACTAMASE (ESBL) PRODUCING ESCHERICHIA COLI: ICD-10-CM

## 2025-01-26 DIAGNOSIS — B96.29 URINARY TRACT INFECTION DUE TO EXTENDED-SPECTRUM BETA LACTAMASE (ESBL) PRODUCING ESCHERICHIA COLI: ICD-10-CM

## 2025-01-26 DIAGNOSIS — N39.0 RECURRENT UTI: ICD-10-CM

## 2025-01-27 RX ORDER — NITROFURANTOIN 25; 75 MG/1; MG/1
CAPSULE ORAL
Qty: 30 CAPSULE | Refills: 0 | Status: SHIPPED | OUTPATIENT
Start: 2025-01-27

## 2025-02-03 ENCOUNTER — HOSPITAL ENCOUNTER (OUTPATIENT)
Facility: HOSPITAL | Age: 75
Discharge: HOME | End: 2025-02-03
Admitting: INTERNAL MEDICINE
Payer: COMMERCIAL

## 2025-02-03 DIAGNOSIS — M81.0 OSTEOPOROSIS, POST-MENOPAUSAL: ICD-10-CM

## 2025-02-03 DIAGNOSIS — D05.12 DUCTAL CARCINOMA IN SITU (DCIS) OF LEFT BREAST: ICD-10-CM

## 2025-02-03 PROCEDURE — 77080 DXA BONE DENSITY AXIAL: CPT | Performed by: RADIOLOGY

## 2025-02-03 PROCEDURE — 77080 DXA BONE DENSITY AXIAL: CPT

## 2025-02-05 ENCOUNTER — OFFICE VISIT (OUTPATIENT)
Dept: ONCOLOGY | Facility: CLINIC | Age: 75
End: 2025-02-05
Payer: COMMERCIAL

## 2025-02-05 ENCOUNTER — LAB (OUTPATIENT)
Dept: ONCOLOGY | Facility: CLINIC | Age: 75
End: 2025-02-05
Payer: COMMERCIAL

## 2025-02-05 VITALS
HEIGHT: 62 IN | HEART RATE: 104 BPM | OXYGEN SATURATION: 96 % | TEMPERATURE: 98 F | DIASTOLIC BLOOD PRESSURE: 80 MMHG | WEIGHT: 190.2 LBS | RESPIRATION RATE: 20 BRPM | SYSTOLIC BLOOD PRESSURE: 141 MMHG | BODY MASS INDEX: 35 KG/M2

## 2025-02-05 DIAGNOSIS — D64.9 ANEMIA, UNSPECIFIED TYPE: ICD-10-CM

## 2025-02-05 DIAGNOSIS — E61.1 IRON DEFICIENCY: ICD-10-CM

## 2025-02-05 DIAGNOSIS — E53.8 B12 DEFICIENCY: ICD-10-CM

## 2025-02-05 DIAGNOSIS — E53.8 FOLATE DEFICIENCY: ICD-10-CM

## 2025-02-05 DIAGNOSIS — D05.12 DUCTAL CARCINOMA IN SITU (DCIS) OF LEFT BREAST: ICD-10-CM

## 2025-02-05 DIAGNOSIS — D05.12 DUCTAL CARCINOMA IN SITU (DCIS) OF LEFT BREAST: Primary | ICD-10-CM

## 2025-02-05 DIAGNOSIS — E55.9 VITAMIN D DEFICIENCY: ICD-10-CM

## 2025-02-05 LAB
BASOPHILS # BLD AUTO: 0.07 10*3/MM3 (ref 0–0.2)
BASOPHILS NFR BLD AUTO: 1 % (ref 0–1.5)
DEPRECATED RDW RBC AUTO: 45.8 FL (ref 37–54)
EOSINOPHIL # BLD AUTO: 0.23 10*3/MM3 (ref 0–0.4)
EOSINOPHIL NFR BLD AUTO: 3.3 % (ref 0.3–6.2)
ERYTHROCYTE [DISTWIDTH] IN BLOOD BY AUTOMATED COUNT: 13.4 % (ref 12.3–15.4)
FERRITIN SERPL-MCNC: 158.1 NG/ML (ref 13–150)
FOLATE SERPL-MCNC: 9.34 NG/ML (ref 4.78–24.2)
HCT VFR BLD AUTO: 38.9 % (ref 34–46.6)
HGB BLD-MCNC: 12.5 G/DL (ref 12–15.9)
IMM GRANULOCYTES # BLD AUTO: 0.04 10*3/MM3 (ref 0–0.05)
IMM GRANULOCYTES NFR BLD AUTO: 0.6 % (ref 0–0.5)
IRON 24H UR-MRATE: 83 MCG/DL (ref 37–145)
IRON SATN MFR SERPL: 16 % (ref 20–50)
LYMPHOCYTES # BLD AUTO: 1.15 10*3/MM3 (ref 0.7–3.1)
LYMPHOCYTES NFR BLD AUTO: 16.5 % (ref 19.6–45.3)
MCH RBC QN AUTO: 30 PG (ref 26.6–33)
MCHC RBC AUTO-ENTMCNC: 32.1 G/DL (ref 31.5–35.7)
MCV RBC AUTO: 93.3 FL (ref 79–97)
MONOCYTES # BLD AUTO: 0.46 10*3/MM3 (ref 0.1–0.9)
MONOCYTES NFR BLD AUTO: 6.6 % (ref 5–12)
NEUTROPHILS NFR BLD AUTO: 5.04 10*3/MM3 (ref 1.7–7)
NEUTROPHILS NFR BLD AUTO: 72 % (ref 42.7–76)
NRBC BLD AUTO-RTO: 0 /100 WBC (ref 0–0.2)
PLATELET # BLD AUTO: 270 10*3/MM3 (ref 140–450)
PMV BLD AUTO: 10.4 FL (ref 6–12)
RBC # BLD AUTO: 4.17 10*6/MM3 (ref 3.77–5.28)
TIBC SERPL-MCNC: 510 MCG/DL (ref 298–536)
TRANSFERRIN SERPL-MCNC: 342 MG/DL (ref 200–360)
VIT B12 BLD-MCNC: 354 PG/ML (ref 211–946)
WBC NRBC COR # BLD AUTO: 6.99 10*3/MM3 (ref 3.4–10.8)

## 2025-02-05 PROCEDURE — 82728 ASSAY OF FERRITIN: CPT | Performed by: INTERNAL MEDICINE

## 2025-02-05 PROCEDURE — 84466 ASSAY OF TRANSFERRIN: CPT | Performed by: INTERNAL MEDICINE

## 2025-02-05 PROCEDURE — 82607 VITAMIN B-12: CPT | Performed by: INTERNAL MEDICINE

## 2025-02-05 PROCEDURE — 82746 ASSAY OF FOLIC ACID SERUM: CPT | Performed by: INTERNAL MEDICINE

## 2025-02-05 PROCEDURE — 83540 ASSAY OF IRON: CPT | Performed by: INTERNAL MEDICINE

## 2025-02-05 PROCEDURE — 85025 COMPLETE CBC W/AUTO DIFF WBC: CPT | Performed by: INTERNAL MEDICINE

## 2025-02-05 RX ORDER — ANASTROZOLE 1 MG/1
1 TABLET ORAL DAILY
Qty: 30 TABLET | Refills: 11 | Status: SHIPPED | OUTPATIENT
Start: 2025-02-05

## 2025-02-05 RX ORDER — AMOXICILLIN 250 MG
1 CAPSULE ORAL DAILY
Qty: 30 TABLET | Refills: 11 | Status: SHIPPED | OUTPATIENT
Start: 2025-02-05

## 2025-02-05 NOTE — PROGRESS NOTES
Venipuncture Blood Specimen Collection  Venipuncture performed in right arm by Oanh Vega MA with good hemostasis. Patient tolerated the procedure well without complications.   02/05/25   Oanh Vega MA

## 2025-02-05 NOTE — PROGRESS NOTES
Subjective     Date: 2025    Referring Provider  Dr. Mata     Chief Complaint  Ductal carcinoma in situ of the left breast, estrogen receptor positive, status post lumpectomy  Iron deficiency anemia     Subjective          Shey King is a 74 y.o. female who presents today to [facility] for follow up.    HPI:   74 y.o.female with a history of diabetes mellitus type 2, hyperlipidemia, hypothyroidism, frequent UTIs who presents to establish care regarding ductal carcinoma in situ of the left breast, estrogen receptor positive.     Oncological history:  She notes intermittent breast pain on the left side for several months, was finally directed to get a screening mammogram  - 2022.  Screening mammogram showed calcification left breast middle posterior third lateral aspect, which are new.  Other calcifications throughout left breast appear stable.   - 22. Diagnostic mammogram showed grouped calcifications in the left 3-4 o'clock region.   - 2022: stereotactic biopsy. Pathology with intermediate to high-grade cribriform ductal carcinoma in situ with associated necrosis and calcification.  -2022: Underwent lumpectomy.  Final pathology with ductal carcinoma in situ.  10 x 3 x 2 mm, 4 of 19 blocks.  Grade 2 (intermediate).  Distance from DCIS to closest margin 1 mm from inferior margin, 2 mm from anterior margin, 4 mm to posterior margin. PTis.  Estrogen receptor 100% positive, progesterone 5% positive  2023-3/7/2023: Underwent radiation with 4256 cGy in 16 fraction with 1000 cGy in 5 fraction boost  2023: Started anastrozole     She was recently seen by Dr. Mata on 2023.  Overall doing well after lumpectomy.  Presents for further therapy and discussion.    GYN History:  Menarche at age 14.   Age of first pregnancy:    Age of menopause: 52  Breast feed: no  Birth control: yes  Hormone replacement: no    Never had DEXA bone  "scan in the past.  Denies ever being treated for diagnosed with osteoporosis.  Currently taking vitamin D tablets.    Interval History 02/01/2023   She was seen by radiation oncology today, Dr. Yusuf, who recommends radiation to left breast due to DCIS margin less than 2 mm.  Underwent DEXA bone scan 1/27/2023 with bone mineral density of right femur neck 0.899 with T score -1.     Interval History 03/01/2023 - Telehealth   Receiving radiation therapy currently, has 4 more boost therapy left. Has had pain on the breast intermittently and the axilla. Was told by Rad Onc to apply less aquaphor to the area.     Interval History 04/06/2023   She presents for follow up today. Completed radiation therapy. Started anastrozole, taking it daily. Overall, still with fatigue since completion of radiation so unsure if it is due to radiation or medication. Has had two episodes of hot flashes. Denies any other AE.    Also reports mild intermittent sharp pain L breast. She reports difficulty sleeping, would like something to help temporarily.     Interval History 07/21/2023   Ms. King presents for follow up. Reports increased fatigue with anastrozole, has been compliant. Has also developed few UTI's, most recently with pseudomonas, currently being treated with clindamycin. Had a repeat mammogram 6/15 which showed left breast there trabecular thickening and skin thickening, likely due to treatment, stable calcification.     Interval History 10/24/2023   Ms. King presents for follow up today. Denies any palpable lumps/bumps of the breast, but has noted waking up with discharge \"caking\" on the left nipple. Also with intermittent warmth and pain in the left breast with radiation to axilla. Compliant with anastrozole without missed doses. Does report intermittent aches in joints.   Has had intentional weight loss due to GLP-1 inhibitor.    Interval History 01/24/2024   Ms. King presents today for follow up. Her last mammogram " "from 10/30/23 did not show evidence of malignancy. She reports increased fatigue, which has not improved. Reports significant b/l knee pain, unsure if it is due to anastrozole. Continues to be compliant with AI. We looked at her last XR of the R knee from 3/2022 which shows osteoarthritis of the R knee, recommend follow up with PCP (seeing Tashia Vallejo 2/6)  Cataract surgery Monday.    Interval History 04/25/2024   Ms. King presents for follow up. She reports a \"knot\" on posterior lateral part of L breast that she's felt for ~2 weeks, which is painful. Denies lifting heavy objects or trauma to breast. No falls. Recently had flu and URI, still recovering, denies any vaccinations.    Taking oral iron with mild constipation, noted significant improvement in her energy until recent infections. Taking miralax, would like additional support for constipation.     Compliant with anastrozole.    Interval History 07/25/2024   Ms. King presents for follow up. She reports another \"knot\" L axillary region, painful. She was unable to give herself B12 injection, she may be able to ask her niece to help with administering. Energy improved when she was taking iron/vitamin C, but has slowed down due to other medication.   Has chronic arthritis and carpal tunnel syndrome that bothers her, but doing well on anastrozole otherwise.    Interval History 10/25/2024   Ms. King presents for follow up. She reports recent cystitis which has caused fatigue. PCP's office is giving her B12 injection.  Recent mammogram 10/23 with probably benign left mammographic findings, benign right mammographic findings.   Doing well with anastrozole, iron/vitamin C.  Did not have a good experience with DEXA scan tech last time, open to re trying.     Interval History 02/05/2025   Ms. King presents for follow up. DEXA scan 2/3/25 with normal results, low risk of fracture. She seems to be doing well with anastrozole.   Doing well with iron/Vitamin C with " senna-colace. No complaints today.          Objective     Objective     Allergy:   Allergies   Allergen Reactions    Other Unknown - High Severity     Blisters from EKG stickers    Bactrim [Sulfamethoxazole-Trimethoprim] Hives    Ciprofloxacin Hives    Penicillins Hives    Steri-Strip Compound Benzoin [Benzoin] Hives    Sulfa Antibiotics Hives        Current Medications:   Current Outpatient Medications   Medication Sig Dispense Refill    acetaminophen (TYLENOL) 325 MG tablet Take 2 tablets by mouth Every 4 (Four) Hours As Needed for Mild Pain. 30 tablet 0    aspirin 81 MG chewable tablet Chew 1 tablet Daily.      AZO-CRANBERRY PO Take 1 tablet by mouth Daily As Needed.      Continuous Glucose  (Dexcom G6 ) device Use 1 each Continuous. 1 each 0    Continuous Glucose Sensor (Dexcom G6 Sensor) Use Every 10 (Ten) Days. 10 each 3    Continuous Glucose Transmitter (Dexcom G6 Transmitter) misc Use 1 each 3 (Three) Times a Day. 1 each 0    empagliflozin (Jardiance) 10 MG tablet tablet Take 1 tablet by mouth Daily. 90 tablet 1    fenofibrate (TRICOR) 145 MG tablet Take 1 tablet by mouth Daily. 90 tablet 1    ferrous sulfate 325 (65 FE) MG tablet Take 1 tablet by mouth Every Other Day. 30 tablet 3    glucose monitor monitoring kit 1 each as needed (DM II). 1 each 0    Lancets (OneTouch Delica Plus Oqeixl32H) misc USE TO TEST BLOOD SUGAR DAILY AS DIRECTED      [facility]ancets [facility] misc For Daily testing  E11.65 50 each 11    levothyroxine (Synthroid) 112 MCG tablet Take 1 tablet by mouth Daily. 90 tablet 1    nitrofurantoin, macrocrystal-monohydrate, (MACROBID) 100 MG capsule TAKE 1 CAPSULE BY MOUTH TWICE DAILY FOR 7 DAYS, THEN TAKE 1 CAPSULE EVERY NIGHT AT BEDTIME AS DIRECTED 30 capsule 0    OneTouch Ultra test strip USE TO TEST BLOOD SUGAR DAILY AS DIRECTED 50 each 5    Probiotic Product (PROBIOTIC DAILY PO) Take  by mouth.      Semaglutide,0.25 or 0.5MG/DOS, (OZEMPIC) 2 MG/1.5ML solution pen-injector Inject 0.25  mg under the skin into the appropriate area as directed 1 (One) Time Per Week. 1.5 mL 2    vitamin C (ASCORBIC ACID) 500 MG tablet Take 1 tablet by mouth Every Other Day. 30 tablet 3    vitamin D (ERGOCALCIFEROL) 1.25 MG (39713 UT) capsule capsule Take 1 capsule by mouth 1 (One) Time Per Week. 5 capsule 2    anastrozole (ARIMIDEX) 1 MG tablet Take 1 tablet by mouth Daily. 30 tablet 11    cyanocobalamin 1000 MCG/ML injection Inject 1 mL into the appropriate muscle as directed by prescriber Every 28 (Twenty-Eight) Days. (Patient not taking: Reported on 2/5/2025) 1 mL 6    sennosides-docusate (PERICOLACE) 8.6-50 MG per tablet Take 1 tablet by mouth Daily. 30 tablet 11     No current facility-administered medications for this visit.       Past Medical History:  Past Medical History:   Diagnosis Date    Abdominal pain, LLQ (left lower quadrant)     Abnormal ECG aug 2021    Abnormal Pap smear of cervix     several years ago    Anemia     years ago    Arthritis of back 2024    Cataract early stage    Cystitis     Diabetes mellitus     Diarrhea     Diverticulitis of colon     Diverticulosis     Ductal carcinoma in situ (DCIS) of left breast 12/21/2022    Elevated cholesterol     Encounter for cholecystectomy 2018    Gallstones     GERD (gastroesophageal reflux disease)     Hiatal hernia     Hyperglycemia     Hyperlipidemia     Hyperlipidemia     Hypothyroidism     Knee swelling     Leaky heart valve     Muscle spasm     FLANK PAIN    OAB (overactive bladder)     Pneumonia     years ago    Rectal bleeding     UTI (urinary tract infection)        Past Surgical History:  Past Surgical History:   Procedure Laterality Date    BREAST BIOPSY Bilateral 2005    benign    BREAST BIOPSY Left 12/29/2022    Procedure: BREAST BIOPSY WITH NEEDLE LOCALIZATION;  Surgeon: Dong Mata MD;  Location: [facility];  Service: General;  Laterality: Left;    BREAST LUMPECTOMY Left     CARPAL TUNNEL RELEASE Right 8/22/2024    Procedure:  CARPAL TUNNEL RELEASE RIGHT;  Surgeon: Christopher Peter MD;  Location: [facility] OR;  Service: Orthopedics;  Laterality: Right;    CATARACT EXTRACTION WITH INTRAOCULAR LENS IMPLANT Right 01/29/2024    D & C HYSTEROSCOPY      DUPUYTREN CONTRACTURE RELEASE Right 8/22/2024    Procedure: PALMAR FASCIECTOMY RIGHT HAND;  Surgeon: Christopher Peter MD;  Location: [facility] OR;  Service: Orthopedics;  Laterality: Right;    ENDOSCOPY      ENDOSCOPY N/A 01/11/2018    Procedure: ESOPHAGOGASTRODUODENOSCOPY;  Surgeon: Dong Mata MD;  Location: [facility] OR;  Service:     EYE SURGERY      MAMMO BILATERAL  09/2015    OVARIAN CYST DRAINAGE      MA LAPAROSCOPY SURG CHOLECYSTECTOMY N/A 01/11/2018    Procedure: CHOLECYSTECTOMY LAPAROSCOPIC;  Surgeon: Dong Mata MD;  Location: [facility];  Service: General    SKIN BIOPSY      US GUIDED LYMPH NODE BIOPSY  04/11/2018       Social History:  Social History     Socioeconomic History    Marital status:    Tobacco Use    Smoking status: Never    Smokeless tobacco: Never    Tobacco comments:     never   Vaping Use    Vaping status: Never Used   Substance and Sexual Activity    Alcohol use: No    Drug use: No    Sexual activity: Defer     Partners: Male     Shey King  reports that she has never smoked. She has never used smokeless tobacco..      Family History:  Family History   Adopted: Yes   Problem Relation Age of Onset    Heart disease Mother         also had cancer kidney    Cancer Mother         kidney    Thyroid disease Mother     Heart disease Father     Diabetes Brother     Arthritis Maternal Aunt         [place]    Asthma Son     Breast cancer Neg Hx        Review of Systems:  Review of Systems   Constitutional: Negative.    HENT: Negative.     Respiratory: Negative.     Cardiovascular: Negative.    Gastrointestinal: Negative.    Genitourinary: Negative.    Musculoskeletal: Negative.    Skin: Negative.    Neurological: Negative.    Hematological:  "Negative.    Psychiatric/Behavioral: Negative.         Vital Signs:   /80   Pulse 104   Temp 98 °F (36.7 °C) (Temporal)   Resp 20   Ht 157.5 cm (62\")   Wt 86.3 kg (190 lb 3.2 oz)   SpO2 96%   BMI 34.79 kg/m²      Physical Exam:   Physical Exam  Vitals reviewed.   Constitutional:       General: She is not in acute distress.     Appearance: Normal appearance. She is not ill-appearing.   HENT:      Head: Normocephalic and atraumatic.      Mouth/Throat:      Mouth: Mucous membranes are moist.      Pharynx: Oropharynx is clear.   Eyes:      Conjunctiva/sclera: Conjunctivae normal.      Pupils: Pupils are equal, round, and reactive to light.   Cardiovascular:      Rate and Rhythm: Normal rate and regular rhythm.      Heart sounds: No murmur heard.  Pulmonary:      Effort: Pulmonary effort is normal. No respiratory distress.      Breath sounds: Normal breath sounds. No wheezing.   Chest:   Breasts:     Right: Normal. No mass or skin change.      Left: Normal. No swelling, bleeding, inverted nipple, mass, nipple discharge, skin change or tenderness.      Comments: L breast with lumpectomy- no nodules appreciated     Abdominal:      General: Abdomen is flat. Bowel sounds are normal. There is no distension.      Palpations: Abdomen is soft. There is no mass.      Tenderness: There is no abdominal tenderness. There is no guarding.   Musculoskeletal:         General: No swelling. Normal range of motion.      Cervical back: Normal range of motion.   Lymphadenopathy:      Cervical: No cervical adenopathy.   Skin:     General: Skin is warm and dry.   Neurological:      General: No focal deficit present.      Mental Status: She is alert and oriented to person, place, and time. Mental status is at baseline.   Psychiatric:         Mood and Affect: Mood normal.         PHQ-9 Score  PHQ-9 Total Score:       Pain Score  Vitals:    02/05/25 1134   BP: 141/80   Pulse: 104   Resp: 20   Temp: 98 °F (36.7 °C)   TempSrc: Temporal " "  SpO2: 96%   Weight: 86.3 kg (190 lb 3.2 oz)   Height: 157.5 cm (62\")   PainSc: 0-No pain                   ECOG score: 0             Lab Review  Lab Results   Component Value Date    WBC 6.99 02/05/2025    HGB 12.5 02/05/2025    HCT 38.9 02/05/2025    MCV 93.3 02/05/2025    RDW 13.4 02/05/2025     02/05/2025    NEUTRORELPCT 72.0 02/05/2025    LYMPHORELPCT 16.5 (L) 02/05/2025    MONORELPCT 6.6 02/05/2025    EOSRELPCT 3.3 02/05/2025    BASORELPCT 1.0 02/05/2025    NEUTROABS 5.04 02/05/2025    LYMPHSABS 1.15 02/05/2025       Lab Results   Component Value Date     11/11/2024    K 4.6 11/11/2024    CO2 22.0 11/11/2024     11/11/2024    BUN 10 11/11/2024    CREATININE 1.06 (H) 11/11/2024    EGFRIFNONA 57 (L) 01/26/2022    EGFRIFAFRI 74 09/06/2016    GLUCOSE 154 (H) 11/11/2024    CALCIUM 9.6 11/11/2024    ALKPHOS 88 11/11/2024    AST 19 11/11/2024    ALT 21 11/11/2024    BILITOT 0.6 11/11/2024    ALBUMIN 4.6 11/11/2024    PROTEINTOT 8.0 11/11/2024    MG 1.9 05/07/2024    PHOS 3.5 12/01/2021       Radiology Results    DEXA Bone Density Axial (02/03/2025 08:25)     The BMD measured at the Left Femur Neck is 0.814 gm/cm2 with a T-score  of -0.3.  Previous T score right femur neck: -1.0.     IMPRESSION:  The patient is considered to be normal according to the World Health  Organization criteria. Fracture risk is low.    Mammo Diagnostic Digital Tomosynthesis Bilateral With CAD (10/23/2024 08:38)     FINDINGS: The breasts are heterogeneously dense, which may obscure small  masses.     The bilateral fibroglandular pattern is stable in appearance including  postoperative changes being followed in the lateral aspect of the left  breast. There are stable bilateral calcifications. No new or suspicious  findings are identified in either breast.     IMPRESSION:     1. Benign right mammographic findings.     2. Probably benign left mammographic findings. Recommend continued  follow-up.    Mammo Diagnostic Digital " Tomosynthesis Left With CAD (04/25/2024 10:42)     FINDINGS: The left breast is heterogeneously dense, which may obscure  small masses.     The fibroglandular pattern is stable in appearance including  postoperative changes being followed in the lateral aspect of the left  breast. There are stable scattered calcifications. Mild skin thickening  is again noted and likely treatment related. No new or suspicious  findings are identified.     Left breast ultrasound: Focused sonographic evaluation of the patient's  area of pain extending from her back into the left 3:00 region was  performed. This demonstrates no underlying sonographic abnormality. No  solid or cystic mass is seen nor is there an abnormal area of shadowing  in the imaged portion of the left breast.     IMPRESSION:  Probably benign left mammographic findings. Recommend  continued follow-up.    Mammo Diagnostic Digital Tomosynthesis Bilateral With CAD (10/30/2023 09:49)   IMPRESSION:     1. Benign right mammographic findings.     2. Probably benign left mammographic findings. Recommend continued  follow-up.       Mammo Diagnostic Digital Tomosynthesis Left With CAD (06/15/2023 12:07)      COMPARISON: 12/29/2022, 12/14/2022, 12/06/2022, 10/27/2022, 06/19/2020,  02/07/2019, 11/11/2017     FINDINGS: The left breast is heterogeneously dense, which may obscure  small masses.     Presumed postoperative changes are now noted in the lateral aspect of  the left breast from the patient's interval conservation surgery. There  is mild trabecular thickening and skin thickening which is likely  treatment related. There are stable scattered calcifications.     IMPRESSION:  Probably benign left mammographic findings. Recommend  continued follow-up.    DEXA Bone Density Axial (01/27/2023 13:22)  FINDINGS:    RIGHT FEMORAL NECK BONE MINERAL DENSITY:  BMD of the right femur neck  is 0.899 with T score -1 with density.      UNITS OF MEASURE:    Bone mineral density is  measured in g/cm2.    Z-score is the number of standard deviations above age-matched  controls.    T-score is the number of standard deviations above healthy young  adults.      WORLD HEALTH ORGANIZATION GUIDELINES:    T-score at or above -1 is normal bone mineral density.    T-score between -1 and -2.5 is osteopenia.    T-score at or below -2.5 is osteoporosis.     IMPRESSION:    BMD of the right femur neck is 0.899 with T score -1 with density.    Mammo Diagnostic Left With CAD (12/06/2022 09:21)  FINDINGS: Magnification imaging of the left breast demonstrates a small  group of heterogeneous calcifications in the mid left 3:00 to 4:00  region. Recommend stereotactic guided core biopsy.     IMPRESSION:  Grouped calcifications in the left 3:00 to 4:00 region.     BI-RADS CATEGORY:  4, SUSPICIOUS     RECOMMENDATION:  Recommend stereotactic guided core biopsy of the left  breast.    Mammo Screening Digital Tomosynthesis Bilateral With CAD (10/27/2022 07:29)  FINDINGS:     Breast Composition: The breasts are heterogenously dense, which may  obscure small masses.   Important Findings:    Biopsy related changes right breast with marker clip. Stable benign  calcifications right breast are noted. Calcifications left breast  middle-posterior third lateral aspect have developed. Other  calcifications throughout the left breast appear stable. Biopsy related  changes upper outer quadrant left breast with marker clip noted.   No suspicious calcifications or areas of architectural distortion.  No  dominant masses or skin thickening      Pathology:   Procedure: Excision (less than total mastectomy)   Specimen Laterality: Left   Tumor Site: Not specified   Histologic Type: Ductal carcinoma in situ   Size of DCIS: 10 x 3 x 2 mm, 4 of 19 blocks   Architectural Patterns: Cribriform   Nuclear Grade: Grade II (intermediate)   Necrosis: Present, focal (small foci of necrosis)   Microcalcifications: Present in DCIS and in nonneoplastic  tissue   Margin Status: All margins negative for DCIS   Distance from DCIS to Closest Margin: 1 mm from inferior margin   Distance from DCIS to Anterior Margin: 2 mm   Distance from DCIS to Posterior Margin: 4 mm   Regional Lymph Node Status: No regional lymph nodes submitted or found   PATHOLOGIC STAGE (pTNM, AJCC 8th Edition): pTis (DCIS), pNx   Biomarker Testing Performed on Previous Biopsy:  (ER) Positive 100 %   (PgR) Positive 5 %     CLINICAL HISTORY:   Ductal carcinoma in situ (DCIS) of left breast     Assessment / Plan         Assessment and Plan   74 y.o. female who presents today with hormone positive left breast ductal carcinoma in situ, status post lumpectomy.    Ductal carcinoma in situ, left breast, hormone positive  -Final pathology with 10 x 3 x 2 mm ductal carcinoma in situ, grade 2, 1 mm from inferior margin, 2 mm from anterior margin, 4 mm from posterior margin  -Her case ws discussed in tumor board on 1/25; it was deemed she should receive radiation therapy given close margins <2mm inferior margin  -Completed adjuvant radiation therapy: 3/7/23  -Tolerating adjuvant hormonal therapy which has shown to decrease recurrence by at least 33%. Taking anastrozole 1 mg daily for 5 years. (To be completed 3/2028)  -Last mammogram with benign findings 10/2024; Next mammogram in 6 months 4/2025  -She will continue history and physical and exam every 3-6 months for the first 5 years, yearly thereafter according to NCCN     2. Bone health  -DEXA scan 2/3/2025: Left femur T score -0.3; normal with low fracture risk. Next DEXA 2/2027   -Continue vitamin D and calcium supplement    3. Fatigue   - Anemia work up showed iron deficiency and marginal B12 level  -Started patient on oral ferrous sulfate 325  and ascorbic acid 500 mg to be taken every other day; if she has worsened baseline constipation, she is to contact us for parenteral iron   -Started on B12 injections to be given monthly; receiving with  PCP        Discussed possible differential diagnoses, testing, treatment, recommended non-pharmacological interventions, risks, warning signs to monitor for that would indicate need for follow-up in clinic or ER. If no improvement with these regimens or you have new or worsening symptoms follow-up. Patient verbalizes understanding and agreement with plan of care. Denies further needs or concerns.     Patient was given instructions and counseling regarding her condition and for health maintenance advised.    I spent 30 minutes with Shey King today.  In the office today, more than 50% of this time was spent face-to-face with her  in counseling / coordination of care, reviewing her interim medical history and counseling on the current treatment plan.  All questions were answered to her satisfaction.      Meds ordered during this visit  No orders of the defined types were placed in this encounter.      Visit Diagnoses    ICD-10-CM ICD-9-CM   1. Ductal carcinoma in situ (DCIS) of left breast  D05.12 233.0   2. Iron deficiency  E61.1 280.9   3. B12 deficiency  E53.8 266.2                   Follow Up   In 4 months for H&P  Repeat CBC, iron studies, ferritin, B12, folate          This document has been electronically signed by Maia Ford MD   February 5, 2025 12:06 EST    Dictated Utilizing Dragon Dictation: Part of this note may be an electronic transcription/translation of spoken language to printed text using the Dragon Dictation System.

## 2025-02-11 ENCOUNTER — LAB (OUTPATIENT)
Dept: FAMILY MEDICINE CLINIC | Facility: CLINIC | Age: 75
End: 2025-02-11
Payer: MEDICARE

## 2025-02-11 ENCOUNTER — OFFICE VISIT (OUTPATIENT)
Dept: FAMILY MEDICINE CLINIC | Facility: CLINIC | Age: 75
End: 2025-02-11
Payer: MEDICARE

## 2025-02-11 VITALS
WEIGHT: 190 LBS | HEART RATE: 94 BPM | TEMPERATURE: 98.4 F | SYSTOLIC BLOOD PRESSURE: 118 MMHG | DIASTOLIC BLOOD PRESSURE: 74 MMHG | HEIGHT: 62 IN | BODY MASS INDEX: 34.96 KG/M2 | OXYGEN SATURATION: 96 %

## 2025-02-11 DIAGNOSIS — E11.9 TYPE 2 DIABETES MELLITUS WITHOUT COMPLICATION, WITHOUT LONG-TERM CURRENT USE OF INSULIN: ICD-10-CM

## 2025-02-11 DIAGNOSIS — E03.8 ADULT ONSET HYPOTHYROIDISM: ICD-10-CM

## 2025-02-11 DIAGNOSIS — E11.22 TYPE 2 DIABETES MELLITUS WITH STAGE 1 CHRONIC KIDNEY DISEASE, WITHOUT LONG-TERM CURRENT USE OF INSULIN: ICD-10-CM

## 2025-02-11 DIAGNOSIS — E11.22 TYPE 2 DIABETES MELLITUS WITH STAGE 1 CHRONIC KIDNEY DISEASE, WITHOUT LONG-TERM CURRENT USE OF INSULIN: Primary | ICD-10-CM

## 2025-02-11 DIAGNOSIS — M54.32 LEFT SCIATIC NOTCH PAIN: ICD-10-CM

## 2025-02-11 DIAGNOSIS — N18.1 TYPE 2 DIABETES MELLITUS WITH STAGE 1 CHRONIC KIDNEY DISEASE, WITHOUT LONG-TERM CURRENT USE OF INSULIN: Primary | ICD-10-CM

## 2025-02-11 DIAGNOSIS — N18.1 TYPE 2 DIABETES MELLITUS WITH STAGE 1 CHRONIC KIDNEY DISEASE, WITHOUT LONG-TERM CURRENT USE OF INSULIN: ICD-10-CM

## 2025-02-11 DIAGNOSIS — N39.0 RECURRENT UTI: ICD-10-CM

## 2025-02-11 DIAGNOSIS — E78.5 HYPERLIPIDEMIA, UNSPECIFIED HYPERLIPIDEMIA TYPE: ICD-10-CM

## 2025-02-11 LAB
ALBUMIN SERPL-MCNC: 4.2 G/DL (ref 3.5–5.2)
ALBUMIN/GLOB SERPL: 1.2 G/DL
ALP SERPL-CCNC: 89 U/L (ref 39–117)
ALT SERPL W P-5'-P-CCNC: 20 U/L (ref 1–33)
ANION GAP SERPL CALCULATED.3IONS-SCNC: 11.4 MMOL/L (ref 5–15)
AST SERPL-CCNC: 20 U/L (ref 1–32)
BILIRUB SERPL-MCNC: 0.6 MG/DL (ref 0–1.2)
BUN SERPL-MCNC: 9 MG/DL (ref 8–23)
BUN/CREAT SERPL: 8.3 (ref 7–25)
CALCIUM SPEC-SCNC: 9.6 MG/DL (ref 8.6–10.5)
CHLORIDE SERPL-SCNC: 103 MMOL/L (ref 98–107)
CHOLEST SERPL-MCNC: 205 MG/DL (ref 0–200)
CO2 SERPL-SCNC: 23.6 MMOL/L (ref 22–29)
CREAT SERPL-MCNC: 1.08 MG/DL (ref 0.57–1)
EGFRCR SERPLBLD CKD-EPI 2021: 54.7 ML/MIN/1.73
GLOBULIN UR ELPH-MCNC: 3.6 GM/DL
GLUCOSE SERPL-MCNC: 169 MG/DL (ref 65–99)
HBA1C MFR BLD: 6.7 % (ref 4.8–5.6)
HDLC SERPL-MCNC: 36 MG/DL (ref 40–60)
LDLC SERPL CALC-MCNC: 139 MG/DL (ref 0–100)
LDLC/HDLC SERPL: 3.76 {RATIO}
POTASSIUM SERPL-SCNC: 4.5 MMOL/L (ref 3.5–5.2)
PROT SERPL-MCNC: 7.8 G/DL (ref 6–8.5)
SODIUM SERPL-SCNC: 138 MMOL/L (ref 136–145)
TRIGL SERPL-MCNC: 168 MG/DL (ref 0–150)
TSH SERPL DL<=0.05 MIU/L-ACNC: 1.24 UIU/ML (ref 0.27–4.2)
VLDLC SERPL-MCNC: 30 MG/DL (ref 5–40)

## 2025-02-11 PROCEDURE — 81001 URINALYSIS AUTO W/SCOPE: CPT | Performed by: NURSE PRACTITIONER

## 2025-02-11 PROCEDURE — 1126F AMNT PAIN NOTED NONE PRSNT: CPT | Performed by: NURSE PRACTITIONER

## 2025-02-11 PROCEDURE — 99214 OFFICE O/P EST MOD 30 MIN: CPT | Performed by: NURSE PRACTITIONER

## 2025-02-11 PROCEDURE — 87086 URINE CULTURE/COLONY COUNT: CPT | Performed by: NURSE PRACTITIONER

## 2025-02-11 PROCEDURE — 84443 ASSAY THYROID STIM HORMONE: CPT | Performed by: NURSE PRACTITIONER

## 2025-02-11 PROCEDURE — 80061 LIPID PANEL: CPT | Performed by: NURSE PRACTITIONER

## 2025-02-11 PROCEDURE — 80053 COMPREHEN METABOLIC PANEL: CPT | Performed by: NURSE PRACTITIONER

## 2025-02-11 PROCEDURE — G2211 COMPLEX E/M VISIT ADD ON: HCPCS | Performed by: NURSE PRACTITIONER

## 2025-02-11 PROCEDURE — 36415 COLL VENOUS BLD VENIPUNCTURE: CPT

## 2025-02-11 PROCEDURE — 83036 HEMOGLOBIN GLYCOSYLATED A1C: CPT | Performed by: NURSE PRACTITIONER

## 2025-02-11 RX ORDER — FENOFIBRATE 145 MG/1
145 TABLET, COATED ORAL DAILY
Qty: 90 TABLET | Refills: 1 | Status: SHIPPED | OUTPATIENT
Start: 2025-02-11

## 2025-02-11 NOTE — PROGRESS NOTES
"Chief Complaint   Diabetes     Subjective          Shey King presents to [facility]   History of Present Illness   History of Present Illness  The patient is a 74-year-old female who presents for a follow-up of diabetes.    She has not been monitoring her blood glucose levels during her recent illness. She reports a weight loss of 3 pounds, which she attributes to her recent illness and prolonged bed rest. She expresses a desire to lose additional weight.    She reports an improvement in her sciatica symptoms, although she continues to experience mild pain. She anticipates complete resolution within the next 1 to 2 weeks.    Her blood pressure readings have been within normal limits, with occasional elevations to approximately 138/40. Her current reading is 118/74.    She has been under the care of Dr. Mistry, an oncologist.   She reports persistent urinary frequency but is uncertain if this is indicative of a kidney infection. She has been seeking intermittent evaluations from Dr. Sutton for this issue. She does not endorse any other symptoms.    Supplemental Information  She experienced a recent bout of influenza or COVID-19, which necessitated a 10-day period of bed rest. Despite testing negative for COVID-19, she experienced a fever that lasted for 4 days.     She has not required any adjustments to her thyroid medication.       Review of Systems       Objective    Vital Signs:   /74 (BP Location: Right arm, Patient Position: Sitting)   Pulse 94   Temp 98.4 °F (36.9 °C)   Ht 157.5 cm (62\")   Wt 86.2 kg (190 lb)   SpO2 96%   BMI 34.75 kg/m²     Physical Exam  Vital Signs  Blood pressure is 118/74.         Physical Exam  Vitals and nursing note reviewed.   Constitutional:       General: She is not in acute distress.     Appearance: She is well-developed. She is not ill-appearing.   HENT:      Head: Normocephalic.   Eyes:      Conjunctiva/sclera: Conjunctivae " normal.   Cardiovascular:      Rate and Rhythm: Normal rate and regular rhythm.      Heart sounds: Normal heart sounds. No murmur heard.  Pulmonary:      Effort: Pulmonary effort is normal.      Breath sounds: Normal breath sounds.   Musculoskeletal:      Right lower leg: No edema.      Left lower leg: No edema.   Skin:     General: Skin is warm and dry.   Neurological:      General: No focal deficit present.      Mental Status: She is alert and oriented to person, place, and time.   Psychiatric:         Mood and Affect: Mood normal.         Behavior: Behavior normal.         Thought Content: Thought content normal.         Judgment: Judgment normal.        Result Review :           Results               Assessment and Plan    Diagnoses and all orders for this visit:    1. Type 2 diabetes mellitus with stage 1 chronic kidney disease, without long-term current use of insulin (Primary)  -     Comprehensive Metabolic Panel; Future  -     Hemoglobin A1c; Future  -     Lipid Panel; Future  -     TSH; Future  -     empagliflozin (Jardiance) 10 MG tablet tablet; Take 1 tablet by mouth Daily.  Dispense: 90 tablet; Refill: 1  -     Semaglutide,0.25 or 0.5MG/DOS, (OZEMPIC) 2 MG/1.5ML solution pen-injector; Inject 0.25 mg under the skin into the appropriate area as directed 1 (One) Time Per Week.  Dispense: 1.5 mL; Refill: 2    2. Recurrent UTI  -     Urinalysis With Culture If Indicated -; Future    3. Left sciatic notch pain    4. Hyperlipidemia, unspecified hyperlipidemia type  -     fenofibrate (TRICOR) 145 MG tablet; Take 1 tablet by mouth Daily.  Dispense: 90 tablet; Refill: 1    5. Adult onset hypothyroidism  -     TSH; Future    6. Type 2 diabetes mellitus without complication, without long-term current use of insulin       Assessment & Plan  1. Diabetes Mellitus.  Her blood sugar levels were not monitored during her recent illness. Blood work will be conducted to assess her current status.    2. Sciatica.  Her  sciatica has shown improvement, but she still experiences some pain. It is expected that the pain will resolve within a week or two.    3. Hypertension.  Her blood pressure is generally well-controlled, with occasional spikes to 138/40. Today's reading was 118/74.    4. Urinary Frequency.  She reports frequent urination and concerns about a possible kidney infection. A urine sample will be collected for analysis.       Patient's Body mass index is 34.75 kg/m². indicating that she is obese (BMI >30). Obesity-related health conditions include the following: diabetes mellitus. Obesity is unchanged. BMI is is above average; BMI management plan is completed. We discussed portion control and increasing exercise..      Follow Up    Return in about 3 months (around 5/11/2025), or if symptoms worsen or fail to improve, for Recheck  labs todya .   Patient was given instructions and counseling regarding her condition or for health maintenance advice. Please see specific information pulled into the AVS if appropriate.           This document has been electronically signed by BERT Lucas  February 11, 2025 12:01 EST    Patient or patient representative verbalized consent for the use of Ambient Listening during the visit with  BERT Lucas for chart documentation. 2/11/2025  11:59 EST

## 2025-02-12 ENCOUNTER — TELEPHONE (OUTPATIENT)
Dept: FAMILY MEDICINE CLINIC | Facility: CLINIC | Age: 75
End: 2025-02-12
Payer: COMMERCIAL

## 2025-02-12 LAB
BACTERIA UR QL AUTO: ABNORMAL /HPF
BILIRUB UR QL STRIP: NEGATIVE
CLARITY UR: CLEAR
COLOR UR: YELLOW
GLUCOSE UR STRIP-MCNC: ABNORMAL MG/DL
HGB UR QL STRIP.AUTO: NEGATIVE
HOLD SPECIMEN: NORMAL
HYALINE CASTS UR QL AUTO: ABNORMAL /LPF
KETONES UR QL STRIP: NEGATIVE
LEUKOCYTE ESTERASE UR QL STRIP.AUTO: ABNORMAL
NITRITE UR QL STRIP: NEGATIVE
PH UR STRIP.AUTO: 5.5 [PH] (ref 5–8)
PROT UR QL STRIP: NEGATIVE
RBC # UR STRIP: ABNORMAL /HPF
REF LAB TEST METHOD: ABNORMAL
SP GR UR STRIP: 1.03 (ref 1–1.03)
SQUAMOUS #/AREA URNS HPF: ABNORMAL /HPF
UROBILINOGEN UR QL STRIP: ABNORMAL
WBC # UR STRIP: ABNORMAL /HPF

## 2025-02-12 RX ORDER — ROSUVASTATIN CALCIUM 5 MG/1
5 TABLET, COATED ORAL DAILY
Qty: 90 TABLET | Refills: 1 | Status: SHIPPED | OUTPATIENT
Start: 2025-02-12

## 2025-02-12 NOTE — TELEPHONE ENCOUNTER
----- Message from Tashia Vallejo sent at 2/12/2025  2:16 PM EST -----  Labs are ok except worsening cholesterol.  I would like her to start a low dose statin.  Sent crestor 5 mg to her pharmacy.  Urine culture is pending

## 2025-02-13 LAB — BACTERIA SPEC AEROBE CULT: NO GROWTH

## 2025-02-18 ENCOUNTER — TELEPHONE (OUTPATIENT)
Dept: FAMILY MEDICINE CLINIC | Facility: CLINIC | Age: 75
End: 2025-02-18
Payer: COMMERCIAL

## 2025-02-18 ENCOUNTER — OFFICE VISIT (OUTPATIENT)
Dept: SURGERY | Facility: CLINIC | Age: 75
End: 2025-02-18
Payer: MEDICARE

## 2025-02-18 VITALS — BODY MASS INDEX: 34.96 KG/M2 | HEIGHT: 62 IN | WEIGHT: 190 LBS

## 2025-02-18 DIAGNOSIS — D05.12 DUCTAL CARCINOMA IN SITU (DCIS) OF LEFT BREAST: Primary | ICD-10-CM

## 2025-02-18 PROCEDURE — 1160F RVW MEDS BY RX/DR IN RCRD: CPT | Performed by: SURGERY

## 2025-02-18 PROCEDURE — 99213 OFFICE O/P EST LOW 20 MIN: CPT | Performed by: SURGERY

## 2025-02-18 PROCEDURE — 1159F MED LIST DOCD IN RCRD: CPT | Performed by: SURGERY

## 2025-02-18 NOTE — PROGRESS NOTES
Subjective   Shey King is a 74 y.o. female  is here today for follow-up.         Shey King is a 74 y.o. female here for follow up after wire localized lumpectomy of the left breast.  The patient is doing well and her incision has healed without issue.  She had a small area of skin dehiscence that is granulating nicely.  Final pathology is consistent with DCIS and margins of excision are negative.    LEFT BREAST, LUMPECTOMY: Intermediate grade DCIS, margins free            RLL     Procedure: Excision (less than total mastectomy)   Specimen Laterality: Left   Tumor Site: Not specified   Histologic Type: Ductal carcinoma in situ   Size of DCIS: 10 x 3 x 2 mm, 4 of 19 blocks   Architectural Patterns: Cribriform   Nuclear Grade: Grade II (intermediate)   Necrosis: Present, focal (small foci of necrosis)   Microcalcifications: Present in DCIS and in nonneoplastic tissue   Margin Status: All margins negative for DCIS   Distance from DCIS to Closest Margin: 1 mm from inferior margin   Distance from DCIS to Anterior Margin: 2 mm   Distance from DCIS to Posterior Margin: 4 mm   Regional Lymph Node Status: No regional lymph nodes submitted or found   PATHOLOGIC STAGE (pTNM, AJCC 8th Edition): pTis (DCIS), pNx   Biomarker Testing Performed on Previous Biopsy:  (ER) Positive 100 %   (PgR) Positive 5 %         Assessment     Diagnoses and all orders for this visit:    1. 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 female doing well after wire localized lumpectomy for an area of DCIS of the left breast.  Final pathology is consistent with hormone positive DCIS with no invasive disease.  Margins of excision are negative.  The patient is doing well and completing adjuvant therapy.  Radiation therapy has been completed and her radiation changes are resolving.  6-month mammogram stable.  Repeat mammogram scheduled and she will follow-up in 3 months in the office.  The copied  portions of this note have been reviewed and are accurate as of 2/18/2025

## 2025-03-19 DIAGNOSIS — E55.9 VITAMIN D DEFICIENCY: ICD-10-CM

## 2025-03-19 RX ORDER — ERGOCALCIFEROL 1.25 MG/1
50000 CAPSULE, LIQUID FILLED ORAL WEEKLY
Qty: 5 CAPSULE | Refills: 2 | Status: SHIPPED | OUTPATIENT
Start: 2025-03-19

## 2025-03-28 RX ORDER — PROCHLORPERAZINE 25 MG/1
SUPPOSITORY RECTAL 3 TIMES DAILY
Qty: 1 EACH | Refills: 0 | Status: SHIPPED | OUTPATIENT
Start: 2025-03-28

## 2025-04-02 ENCOUNTER — OFFICE VISIT (OUTPATIENT)
Dept: FAMILY MEDICINE CLINIC | Facility: CLINIC | Age: 75
End: 2025-04-02
Payer: MEDICARE

## 2025-04-02 VITALS
HEIGHT: 62 IN | OXYGEN SATURATION: 98 % | HEART RATE: 90 BPM | WEIGHT: 192.2 LBS | SYSTOLIC BLOOD PRESSURE: 124 MMHG | BODY MASS INDEX: 35.37 KG/M2 | DIASTOLIC BLOOD PRESSURE: 82 MMHG | TEMPERATURE: 98.4 F

## 2025-04-02 DIAGNOSIS — E11.22 TYPE 2 DIABETES MELLITUS WITH STAGE 1 CHRONIC KIDNEY DISEASE, WITHOUT LONG-TERM CURRENT USE OF INSULIN: ICD-10-CM

## 2025-04-02 DIAGNOSIS — N18.1 TYPE 2 DIABETES MELLITUS WITH STAGE 1 CHRONIC KIDNEY DISEASE, WITHOUT LONG-TERM CURRENT USE OF INSULIN: ICD-10-CM

## 2025-04-02 DIAGNOSIS — N30.20 CHRONIC CYSTITIS: ICD-10-CM

## 2025-04-02 DIAGNOSIS — R30.0 DYSURIA: Primary | ICD-10-CM

## 2025-04-02 DIAGNOSIS — R42 EPISODE OF DIZZINESS: ICD-10-CM

## 2025-04-02 LAB
BILIRUB BLD-MCNC: NEGATIVE MG/DL
CLARITY, POC: CLEAR
COLOR UR: YELLOW
EXPIRATION DATE: ABNORMAL
GLUCOSE UR STRIP-MCNC: ABNORMAL MG/DL
KETONES UR QL: NEGATIVE
LEUKOCYTE EST, POC: NEGATIVE
Lab: ABNORMAL
NITRITE UR-MCNC: NEGATIVE MG/ML
PH UR: 6 [PH] (ref 5–8)
PROT UR STRIP-MCNC: NEGATIVE MG/DL
RBC # UR STRIP: NEGATIVE /UL
SP GR UR: 1.01 (ref 1–1.03)
UROBILINOGEN UR QL: NORMAL

## 2025-04-02 NOTE — PROGRESS NOTES
"Chief Complaint   Dizziness     Subjective          Shey King presents to [facility]   Dizziness     History of Present Illness  The patient is a 74-year-old female who presents with dizziness.    She experienced a brief episode of vertigo yesterday, which lasted for approximately 1 to 2 seconds. She reports no associated palpitations or tachycardia. Her sleep pattern remains unaffected. She also reports nasal congestion and an unusual sensation in her ear, although she does not believe it to be indicative of an infection. She has a history of experiencing mild dizziness during episodes of infection.    She is not currently on Ozempic. Her blood sugar levels are fairly good when she adheres to her diet.       Review of Systems   Neurological:  Positive for dizziness.          Objective    Vital Signs:   /82 (BP Location: Right arm, Patient Position: Sitting)   Pulse 90   Temp 98.4 °F (36.9 °C)   Ht 157.5 cm (62\")   Wt 87.2 kg (192 lb 3.2 oz)   SpO2 98%   BMI 35.15 kg/m²     Physical Exam  There is wax in the ears.  Lungs were auscultated.         Physical Exam  Vitals and nursing note reviewed.   Constitutional:       General: She is not in acute distress.     Appearance: She is well-developed. She is obese. She is not ill-appearing.   HENT:      Head: Normocephalic.      Right Ear: Tympanic membrane, ear canal and external ear normal. There is no impacted cerumen.      Left Ear: Tympanic membrane, ear canal and external ear normal. There is no impacted cerumen.      Nose: Nose normal. No congestion or rhinorrhea.      Mouth/Throat:      Pharynx: No oropharyngeal exudate or posterior oropharyngeal erythema.   Eyes:      General:         Right eye: No discharge.         Left eye: No discharge.      Conjunctiva/sclera: Conjunctivae normal.   Cardiovascular:      Rate and Rhythm: Normal rate and regular rhythm.      Heart sounds: Normal heart sounds. No murmur " heard.  Pulmonary:      Effort: Pulmonary effort is normal.      Breath sounds: Normal breath sounds. No wheezing.   Musculoskeletal:         General: Normal range of motion.   Skin:     General: Skin is warm and dry.   Neurological:      General: No focal deficit present.      Mental Status: She is alert and oriented to person, place, and time. Mental status is at baseline.      Cranial Nerves: No cranial nerve deficit.      Sensory: No sensory deficit.      Motor: No weakness.      Coordination: Coordination normal.      Gait: Gait normal.      Deep Tendon Reflexes: Reflexes normal.   Psychiatric:         Mood and Affect: Mood normal.         Behavior: Behavior normal.         Thought Content: Thought content normal.         Judgment: Judgment normal.        Result Review :  During this visit the following were done:  Labs Reviewed [x]    Labs Ordered [x]    Radiology Reports Reviewed []    Radiology Ordered []    PCP Records Reviewed []    Referring Provider Records Reviewed []    ER Records Reviewed []    Hospital Records Reviewed []    History Obtained From Family []    Radiology Images Reviewed []    Other Reviewed [x]    Records Requested []          Results               Assessment and Plan    Diagnoses and all orders for this visit:    1. Dysuria (Primary)  -     POCT urinalysis dipstick, automated    2. Type 2 diabetes mellitus with stage 1 chronic kidney disease, without long-term current use of insulin    3. Episode of dizziness    4. Chronic cystitis       Assessment & Plan  1. Dizziness.  She reported experiencing dizziness that lasted for a second or two, which started last night. She also mentioned feeling nasally and having an itchy ear but does not believe she has an ear infection. There were no symptoms of heart fluttering or racing. Stress was discussed as a potential contributing factor. A urine test will be conducted to rule out any underlying kidney infection.    2. Diabetes Mellitus.  She is  not currently on Ozempic due to concerns about potential side effects, including breast cancer and thyroid issues, as mentioned by her daughter. Her blood sugar levels are generally good when she adheres to her diet. She is advised to continue monitoring her blood sugar levels and maintain a balanced diet. The potential benefits and risks of Ozempic were discussed, and she will consider discussing it further with her endocrinologist.       Patient's Body mass index is 35.15 kg/m². indicating that she is obese (BMI >30). Obesity-related health conditions include the following: hypertension and diabetes mellitus. Obesity is worsening. BMI is is above average; BMI management plan is completed. We discussed portion control and increasing exercise..      Follow Up    No follow-ups on file.   Patient was given instructions and counseling regarding her condition or for health maintenance advice. Please see specific information pulled into the AVS if appropriate.           This document has been electronically signed by BERT Lucas  April 2, 2025 12:49 EDT    Patient or patient representative verbalized consent for the use of Ambient Listening during the visit with  BERT Lucas for chart documentation. 4/2/2025  12:48 EDT

## 2025-04-29 ENCOUNTER — TELEPHONE (OUTPATIENT)
Dept: FAMILY MEDICINE CLINIC | Facility: CLINIC | Age: 75
End: 2025-04-29
Payer: COMMERCIAL

## 2025-04-29 DIAGNOSIS — E11.22 TYPE 2 DIABETES MELLITUS WITH STAGE 1 CHRONIC KIDNEY DISEASE, WITHOUT LONG-TERM CURRENT USE OF INSULIN: ICD-10-CM

## 2025-04-29 DIAGNOSIS — N18.1 TYPE 2 DIABETES MELLITUS WITH STAGE 1 CHRONIC KIDNEY DISEASE, WITHOUT LONG-TERM CURRENT USE OF INSULIN: ICD-10-CM

## 2025-04-29 NOTE — TELEPHONE ENCOUNTER
04/29/2025 PA for Jardiance 10mg submitted and approved from 04/29/2025 to 04/29/2026. [facility] #[id number] [facility] notified.

## 2025-04-29 NOTE — TELEPHONE ENCOUNTER
Caller: Shey King    Relationship: Self    Best call back number: [phone]     Requested Prescriptions:   Requested Prescriptions     Pending Prescriptions Disp Refills    empagliflozin (Jardiance) 10 MG tablet tablet 90 tablet 1     Sig: Take 1 tablet by mouth Daily.        Pharmacy where request should be sent: [facility] #[id number] [address] - [phone] PH - [phone] FX     Last office visit with prescribing clinician: 4/2/2025   Last telemedicine visit with prescribing clinician: Visit date not found   Next office visit with prescribing clinician: 5/12/2025     Additional details provided by patient:     Does the patient have less than a 3 day supply:  [x] Yes  [] No    Would you like a call back once the refill request has been completed: [] Yes [x] No    If the office needs to give you a call back, can they leave a voicemail: [x] Yes [] No    Maddy Sims Rep   04/29/25 12:32 EDT

## 2025-04-29 NOTE — TELEPHONE ENCOUNTER
Patient called what she needs is a PA,there is a refill on file & she is out of medication,a sample box was provided til PA can be submitted.

## 2025-04-29 NOTE — TELEPHONE ENCOUNTER
04/29/2025 PA for Jardiance 10mg submitted and approved from 04/29/2025 to 04/29/2026. [facility] #[id number] [facility] notified.

## 2025-04-30 ENCOUNTER — HOSPITAL ENCOUNTER (OUTPATIENT)
Dept: MAMMOGRAPHY | Facility: HOSPITAL | Age: 75
Discharge: HOME | End: 2025-04-30
Admitting: INTERNAL MEDICINE
Payer: COMMERCIAL

## 2025-04-30 DIAGNOSIS — D05.12 DUCTAL CARCINOMA IN SITU (DCIS) OF LEFT BREAST: ICD-10-CM

## 2025-04-30 DIAGNOSIS — D05.12 DUCTAL CARCINOMA IN SITU (DCIS) OF LEFT BREAST: Primary | ICD-10-CM

## 2025-04-30 PROCEDURE — 77065 DX MAMMO INCL CAD UNI: CPT

## 2025-04-30 PROCEDURE — 77061 BREAST TOMOSYNTHESIS UNI: CPT | Performed by: RADIOLOGY

## 2025-04-30 PROCEDURE — G0279 TOMOSYNTHESIS, MAMMO: HCPCS

## 2025-04-30 PROCEDURE — 77065 DX MAMMO INCL CAD UNI: CPT | Performed by: RADIOLOGY

## 2025-05-07 RX ORDER — ASCORBIC ACID 500 MG
500 TABLET ORAL
Qty: 30 TABLET | Refills: 3 | Status: SHIPPED | OUTPATIENT
Start: 2025-05-07

## 2025-05-12 ENCOUNTER — LAB (OUTPATIENT)
Dept: FAMILY MEDICINE CLINIC | Facility: CLINIC | Age: 75
End: 2025-05-12
Payer: MEDICARE

## 2025-05-12 ENCOUNTER — OFFICE VISIT (OUTPATIENT)
Dept: FAMILY MEDICINE CLINIC | Facility: CLINIC | Age: 75
End: 2025-05-12
Payer: MEDICARE

## 2025-05-12 VITALS
OXYGEN SATURATION: 96 % | WEIGHT: 187 LBS | BODY MASS INDEX: 34.41 KG/M2 | HEART RATE: 90 BPM | HEIGHT: 62 IN | TEMPERATURE: 98 F | DIASTOLIC BLOOD PRESSURE: 72 MMHG | SYSTOLIC BLOOD PRESSURE: 116 MMHG

## 2025-05-12 DIAGNOSIS — Z00.00 MEDICARE ANNUAL WELLNESS VISIT, SUBSEQUENT: ICD-10-CM

## 2025-05-12 DIAGNOSIS — N18.1 TYPE 2 DIABETES MELLITUS WITH STAGE 1 CHRONIC KIDNEY DISEASE, WITHOUT LONG-TERM CURRENT USE OF INSULIN: ICD-10-CM

## 2025-05-12 DIAGNOSIS — E78.5 HYPERLIPIDEMIA, UNSPECIFIED HYPERLIPIDEMIA TYPE: ICD-10-CM

## 2025-05-12 DIAGNOSIS — E11.22 TYPE 2 DIABETES MELLITUS WITH STAGE 1 CHRONIC KIDNEY DISEASE, WITHOUT LONG-TERM CURRENT USE OF INSULIN: ICD-10-CM

## 2025-05-12 DIAGNOSIS — E03.8 ADULT ONSET HYPOTHYROIDISM: ICD-10-CM

## 2025-05-12 DIAGNOSIS — Z00.00 MEDICARE ANNUAL WELLNESS VISIT, SUBSEQUENT: Primary | ICD-10-CM

## 2025-05-12 LAB
ALBUMIN SERPL-MCNC: 4.4 G/DL (ref 3.5–5.2)
ALBUMIN/GLOB SERPL: 1.1 G/DL
ALP SERPL-CCNC: 122 U/L (ref 39–117)
ALT SERPL W P-5'-P-CCNC: 16 U/L (ref 1–33)
ANION GAP SERPL CALCULATED.3IONS-SCNC: 14.3 MMOL/L (ref 5–15)
AST SERPL-CCNC: 19 U/L (ref 1–32)
BASOPHILS # BLD AUTO: 0.07 10*3/MM3 (ref 0–0.2)
BASOPHILS NFR BLD AUTO: 0.9 % (ref 0–1.5)
BILIRUB SERPL-MCNC: 0.7 MG/DL (ref 0–1.2)
BUN SERPL-MCNC: 13 MG/DL (ref 8–23)
BUN/CREAT SERPL: 13.1 (ref 7–25)
CALCIUM SPEC-SCNC: 9.7 MG/DL (ref 8.6–10.5)
CHLORIDE SERPL-SCNC: 105 MMOL/L (ref 98–107)
CHOLEST SERPL-MCNC: 153 MG/DL (ref 0–200)
CO2 SERPL-SCNC: 20.7 MMOL/L (ref 22–29)
CREAT SERPL-MCNC: 0.99 MG/DL (ref 0.57–1)
DEPRECATED RDW RBC AUTO: 42.5 FL (ref 37–54)
EGFRCR SERPLBLD CKD-EPI 2021: 60.7 ML/MIN/1.73
EOSINOPHIL # BLD AUTO: 0.23 10*3/MM3 (ref 0–0.4)
EOSINOPHIL NFR BLD AUTO: 2.9 % (ref 0.3–6.2)
ERYTHROCYTE [DISTWIDTH] IN BLOOD BY AUTOMATED COUNT: 13.1 % (ref 12.3–15.4)
FERRITIN SERPL-MCNC: 145 NG/ML (ref 13–150)
GLOBULIN UR ELPH-MCNC: 4 GM/DL
GLUCOSE SERPL-MCNC: 144 MG/DL (ref 65–99)
HBA1C MFR BLD: 6.8 % (ref 4.8–5.6)
HCT VFR BLD AUTO: 38.5 % (ref 34–46.6)
HDLC SERPL-MCNC: 37 MG/DL (ref 40–60)
HGB BLD-MCNC: 13 G/DL (ref 12–15.9)
IMM GRANULOCYTES # BLD AUTO: 0.05 10*3/MM3 (ref 0–0.05)
IMM GRANULOCYTES NFR BLD AUTO: 0.6 % (ref 0–0.5)
IRON 24H UR-MRATE: 63 MCG/DL (ref 37–145)
IRON SATN MFR SERPL: 15 % (ref 20–50)
LDLC SERPL CALC-MCNC: 88 MG/DL (ref 0–100)
LDLC/HDLC SERPL: 2.26 {RATIO}
LYMPHOCYTES # BLD AUTO: 1.18 10*3/MM3 (ref 0.7–3.1)
LYMPHOCYTES NFR BLD AUTO: 14.8 % (ref 19.6–45.3)
MCH RBC QN AUTO: 30 PG (ref 26.6–33)
MCHC RBC AUTO-ENTMCNC: 33.8 G/DL (ref 31.5–35.7)
MCV RBC AUTO: 88.9 FL (ref 79–97)
MONOCYTES # BLD AUTO: 0.53 10*3/MM3 (ref 0.1–0.9)
MONOCYTES NFR BLD AUTO: 6.7 % (ref 5–12)
NEUTROPHILS NFR BLD AUTO: 5.89 10*3/MM3 (ref 1.7–7)
NEUTROPHILS NFR BLD AUTO: 74.1 % (ref 42.7–76)
NRBC BLD AUTO-RTO: 0 /100 WBC (ref 0–0.2)
PLATELET # BLD AUTO: 266 10*3/MM3 (ref 140–450)
PMV BLD AUTO: 11.3 FL (ref 6–12)
POTASSIUM SERPL-SCNC: 4.3 MMOL/L (ref 3.5–5.2)
PROT SERPL-MCNC: 8.4 G/DL (ref 6–8.5)
RBC # BLD AUTO: 4.33 10*6/MM3 (ref 3.77–5.28)
SODIUM SERPL-SCNC: 140 MMOL/L (ref 136–145)
TIBC SERPL-MCNC: 428 MCG/DL (ref 298–536)
TRANSFERRIN SERPL-MCNC: 287 MG/DL (ref 200–360)
TRIGL SERPL-MCNC: 161 MG/DL (ref 0–150)
TSH SERPL DL<=0.05 MIU/L-ACNC: 0.76 UIU/ML (ref 0.27–4.2)
VLDLC SERPL-MCNC: 28 MG/DL (ref 5–40)
WBC NRBC COR # BLD AUTO: 7.95 10*3/MM3 (ref 3.4–10.8)

## 2025-05-12 PROCEDURE — 85025 COMPLETE CBC W/AUTO DIFF WBC: CPT | Performed by: NURSE PRACTITIONER

## 2025-05-12 PROCEDURE — 80053 COMPREHEN METABOLIC PANEL: CPT | Performed by: NURSE PRACTITIONER

## 2025-05-12 PROCEDURE — 36415 COLL VENOUS BLD VENIPUNCTURE: CPT

## 2025-05-12 PROCEDURE — 82728 ASSAY OF FERRITIN: CPT | Performed by: NURSE PRACTITIONER

## 2025-05-12 PROCEDURE — 1159F MED LIST DOCD IN RCRD: CPT | Performed by: NURSE PRACTITIONER

## 2025-05-12 PROCEDURE — 1170F FXNL STATUS ASSESSED: CPT | Performed by: NURSE PRACTITIONER

## 2025-05-12 PROCEDURE — 83540 ASSAY OF IRON: CPT | Performed by: NURSE PRACTITIONER

## 2025-05-12 PROCEDURE — 84443 ASSAY THYROID STIM HORMONE: CPT | Performed by: NURSE PRACTITIONER

## 2025-05-12 PROCEDURE — 84466 ASSAY OF TRANSFERRIN: CPT | Performed by: NURSE PRACTITIONER

## 2025-05-12 PROCEDURE — 3044F HG A1C LEVEL LT 7.0%: CPT | Performed by: NURSE PRACTITIONER

## 2025-05-12 PROCEDURE — 83036 HEMOGLOBIN GLYCOSYLATED A1C: CPT | Performed by: NURSE PRACTITIONER

## 2025-05-12 PROCEDURE — 1126F AMNT PAIN NOTED NONE PRSNT: CPT | Performed by: NURSE PRACTITIONER

## 2025-05-12 PROCEDURE — G0439 PPPS, SUBSEQ VISIT: HCPCS | Performed by: NURSE PRACTITIONER

## 2025-05-12 PROCEDURE — 1160F RVW MEDS BY RX/DR IN RCRD: CPT | Performed by: NURSE PRACTITIONER

## 2025-05-12 PROCEDURE — 80061 LIPID PANEL: CPT | Performed by: NURSE PRACTITIONER

## 2025-05-12 RX ORDER — FENOFIBRATE 145 MG/1
145 TABLET, FILM COATED ORAL DAILY
Qty: 90 TABLET | Refills: 1 | Status: SHIPPED | OUTPATIENT
Start: 2025-05-12

## 2025-05-12 RX ORDER — ROSUVASTATIN CALCIUM 5 MG/1
5 TABLET, COATED ORAL DAILY
Qty: 90 TABLET | Refills: 1 | Status: SHIPPED | OUTPATIENT
Start: 2025-05-12

## 2025-05-12 RX ORDER — LEVOTHYROXINE SODIUM 112 UG/1
112 TABLET ORAL DAILY
Qty: 90 TABLET | Refills: 1 | Status: SHIPPED | OUTPATIENT
Start: 2025-05-12

## 2025-05-12 NOTE — PROGRESS NOTES
Subjective   The ABCs of the Annual Wellness Visit  Medicare Wellness Visit      Shey King is a 74 y.o. patient who presents for a Medicare Wellness Visit.    The following portions of the patient's history were reviewed and   updated as appropriate: allergies, current medications, past family history, past medical history, past social history, past surgical history, and problem list.    Compared to one year ago, the patient's physical   health is better.  Compared to one year ago, the patient's mental   health is better.    Recent Hospitalizations:  She was not admitted to the hospital during the last year.     Current Medical Providers:  Patient Care Team:  Tashia Vallejo APRN as PCP - General  Tashia Vallejo APRN as PCP - [facility], Dong Zavala MD as Surgeon (General Surgery)  Maia Ford MD as Consulting Physician (Hematology and Oncology)  Christopher Peter MD as Surgeon (Orthopedic Surgery)    Outpatient Medications Prior to Visit   Medication Sig Dispense Refill    anastrozole (ARIMIDEX) 1 MG tablet Take 1 tablet by mouth Daily. 30 tablet 11    aspirin 81 MG chewable tablet Chew 1 tablet Daily.      AZO-CRANBERRY PO Take 1 tablet by mouth Daily As Needed.      ferrous sulfate 325 (65 FE) MG tablet Take 1 tablet by mouth Every Other Day. 30 tablet 3    glucose monitor monitoring kit 1 each as needed (DM II). 1 each 0    Lancets (OneTouch Delica Plus [id number]) misc USE TO TEST BLOOD SUGAR DAILY AS DIRECTED      Lancets [facility]. misc For Daily testing  E11.65 50 each 11    OneTouch Ultra test strip USE TO TEST BLOOD SUGAR DAILY AS DIRECTED 50 each 5    Probiotic Product (PROBIOTIC DAILY PO) Take  by mouth Daily.      sennosides-docusate (PERICOLACE) 8.6-50 MG per tablet Take 1 tablet by mouth Daily. 30 tablet 11    vitamin C (ASCORBIC ACID) 500 MG tablet TAKE 1 TABLET BY MOUTH EVERY OTHER DAY 30 tablet 3    vitamin D (ERGOCALCIFEROL) 1.25 MG (29030 UT) capsule capsule TAKE 1 CAPSULE BY  MOUTH 1 TIME EVERY WEEK 5 capsule 2    empagliflozin (Jardiance) 10 MG tablet tablet Take 1 tablet by mouth Daily. 7 tablet 0    fenofibrate (TRICOR) 145 MG tablet Take 1 tablet by mouth Daily. 90 tablet 1    levothyroxine (Synthroid) 112 MCG tablet Take 1 tablet by mouth Daily. 90 tablet 1    rosuvastatin (Crestor) 5 MG tablet Take 1 tablet by mouth Daily. 90 tablet 1    acetaminophen (TYLENOL) 325 MG tablet Take 2 tablets by mouth Every 4 (Four) Hours As Needed for Mild Pain. 30 tablet 0    Continuous Glucose  (Dexcom G6 ) device Use 1 each Continuous. (Patient not taking: Reported on 5/12/2025) 1 each 0    Continuous Glucose Sensor (Dexcom G6 Sensor) Use Every 10 (Ten) Days. (Patient not taking: Reported on 5/12/2025) 10 each 3    Continuous Glucose Transmitter (Dexcom G6 Transmitter) misc USE AS DIRECTED THREE TIMES DAILY (Patient not taking: Reported on 5/12/2025) 1 each 0    cyanocobalamin 1000 MCG/ML injection Inject 1 mL into the appropriate muscle as directed by prescriber Every 28 (Twenty-Eight) Days. 1 mL 6    nitrofurantoin, macrocrystal-monohydrate, (MACROBID) 100 MG capsule TAKE 1 CAPSULE BY MOUTH TWICE DAILY FOR 7 DAYS, THEN TAKE 1 CAPSULE EVERY NIGHT AT BEDTIME AS DIRECTED 30 capsule 0    Semaglutide,0.25 or 0.5MG/DOS, (OZEMPIC) 2 MG/1.5ML solution pen-injector Inject 0.25 mg under the skin into the appropriate area as directed 1 (One) Time Per Week. (Patient not taking: Reported on 5/12/2025) 1.5 mL 2     No facility-administered medications prior to visit.     No opioid medication identified on active medication list. I have reviewed chart for other potential  high risk medication/s and harmful drug interactions in the elderly.      Aspirin is on active medication list. Aspirin use is indicated based on review of current medical condition/s. Pros and cons of this therapy have been discussed today. Benefits of this medication outweigh potential harm.  Patient has been encouraged to  "continue taking this medication.  .      Patient Active Problem List   Diagnosis    Abdominal pain, LLQ (left lower quadrant)    Cystitis    Diarrhea    Hiatal hernia    Hyperglycemia    Hyperlipidemia    Adult onset hypothyroidism    Muscle spasm    OAB (overactive bladder)    Recurrent UTI    Gallstones    B12 deficiency    Pulmonary nodule    Class 2 obesity due to excess calories without serious comorbidity with body mass index (BMI) of 35.0 to 35.9 in adult    Diabetes mellitus    Lymphadenopathy    Palpitations    ELKINS (dyspnea on exertion)    Ductal carcinoma in situ (DCIS) of left breast    Grade I LV diastolic dysfunction     Advance Care Planning Advance Directive is not on file.  ACP discussion was declined by the patient. Patient does not have an advance directive, information provided.            Objective   Vitals:    05/12/25 0837   BP: 116/72   BP Location: Right arm   Patient Position: Sitting   Pulse: 90   Temp: 98 °F (36.7 °C)   TempSrc: Temporal   SpO2: 96%   Weight: 84.8 kg (187 lb)   Height: 157.5 cm (62\")   PainSc: 0-No pain       Estimated body mass index is 34.2 kg/m² as calculated from the following:    Height as of this encounter: 157.5 cm (62\").    Weight as of this encounter: 84.8 kg (187 lb).                Does the patient have evidence of cognitive impairment? No                                                                                                Health  Risk Assessment    Smoking Status:  Social History     Tobacco Use   Smoking Status Never   Smokeless Tobacco Never   Tobacco Comments    never     Alcohol Consumption:  Social History     Substance and Sexual Activity   Alcohol Use No       Fall Risk Screen  STEADI Fall Risk Assessment was completed, and patient is at LOW risk for falls.Assessment completed on:5/12/2025    Depression Screening   Little interest or pleasure in doing things? Not at all   Feeling down, depressed, or hopeless? Not at all   PHQ-2 Total Score 0    "   Health Habits and Functional and Cognitive Screenin/12/2025     8:35 AM   Functional & Cognitive Status   Do you have difficulty preparing food and eating? No   Do you have difficulty bathing yourself, getting dressed or grooming yourself? No   Do you have difficulty using the toilet? No   Do you have difficulty moving around from place to place? No   Do you have trouble with steps or getting out of a bed or a chair? Yes   Current Diet Diabetic Diet   Dental Exam Up to date   Eye Exam Up to date   Exercise (times per week) 0 times per week   Current Exercises Include No Regular Exercise   Do you need help using the phone?  No   Are you deaf or do you have serious difficulty hearing?  No   Do you need help to go to places out of walking distance? No   Do you need help shopping? No   Do you need help preparing meals?  No   Do you need help with housework?  No   Do you need help with laundry? No   Do you need help taking your medications? No   Do you need help managing money? No   Do you ever drive or ride in a car without wearing a seat belt? No   Have you felt unusual stress, anger or loneliness in the last month? No   Who do you live with? Spouse   If you need help, do you have trouble finding someone available to you? No   Have you been bothered in the last four weeks by sexual problems? No   Do you have difficulty concentrating, remembering or making decisions? No           Age-appropriate Screening Schedule:  Refer to the list below for future screening recommendations based on patient's age, sex and/or medical conditions. Orders for these recommended tests are listed in the plan section. The patient has been provided with a written plan.    Health Maintenance List  Health Maintenance   Topic Date Due    DIABETIC FOOT EXAM  Never done    TDAP/TD VACCINES (1 - Tdap) Never done    HEPATITIS C SCREENING  Never done    DIABETIC EYE EXAM  2025    HEMOGLOBIN A1C  2025    Pneumococcal Vaccine 50+  (1 of 2 - PCV) 11/08/2025 (Originally 12/10/1969)    ZOSTER VACCINE (1 of 2) 11/08/2025 (Originally 12/10/1969)    COVID-19 Vaccine (1) 11/12/2025 (Originally 12/10/1955)    INFLUENZA VACCINE  07/01/2025    URINE MICROALBUMIN-CREATININE RATIO (uACR)  11/12/2025    LIPID PANEL  02/11/2026    ANNUAL WELLNESS VISIT  05/12/2026    DXA SCAN  02/03/2027    MAMMOGRAM  04/30/2027    COLORECTAL CANCER SCREENING  05/21/2027                                                                                                                                                CMS Preventative Services Quick Reference  Risk Factors Identified During Encounter  Immunizations Discussed/Encouraged:  Discussed importance  will consider  Pneumonia   Dental Screening Recommended  Vision Screening Recommended    The above risks/problems have been discussed with the patient.  Pertinent information has been shared with the patient in the After Visit Summary.  An After Visit Summary and PPPS were made available to the patient.    Follow Up:   Next Medicare Wellness visit to be scheduled in 1 year.         Additional E&M Note during same encounter follows:  Patient has additional, significant, and separately identifiable condition(s)/problem(s) that require work above and beyond the Medicare Wellness Visit     Chief Complaint  Medicare Wellness-subsequent    Subjective    HPI         The patient is a 74-year-old female who presents for her follow-up Medicare wellness visit.    She has been making conscious efforts to improve her dietary habits, particularly by reducing her sugar intake. She reports feeling unwell on days when she consumes sugar, describing the sensation as being akin to being hit by a truck. She also experiences a general sense of fatigue. Despite not monitoring her blood glucose levels as frequently as recommended, she notes that her fasting blood glucose level is typically around 140. She has successfully lost 5 pounds through  "these lifestyle modifications.    She expresses reluctance towards receiving the pneumonia vaccine due to concerns about potential side effects. However, she is considering the possibility of getting the shingles vaccine.          Objective   Vital Signs:  /72 (BP Location: Right arm, Patient Position: Sitting)   Pulse 90   Temp 98 °F (36.7 °C) (Temporal)   Ht 157.5 cm (62\")   Wt 84.8 kg (187 lb)   SpO2 96%   BMI 34.20 kg/m²   Physical Exam  Vitals and nursing note reviewed.   Constitutional:       General: She is not in acute distress.     Appearance: She is well-developed. She is obese. She is not ill-appearing.   HENT:      Head: Normocephalic.   Eyes:      Conjunctiva/sclera: Conjunctivae normal.   Cardiovascular:      Rate and Rhythm: Normal rate and regular rhythm.      Heart sounds: Normal heart sounds. No murmur heard.  Pulmonary:      Effort: Pulmonary effort is normal.      Breath sounds: Normal breath sounds.   Musculoskeletal:      Right lower leg: No edema.      Left lower leg: No edema.   Skin:     General: Skin is warm and dry.   Neurological:      General: No focal deficit present.      Mental Status: She is alert and oriented to person, place, and time. Mental status is at baseline.      Cranial Nerves: No cranial nerve deficit.      Sensory: No sensory deficit.      Motor: No weakness.      Coordination: Coordination normal.      Gait: Gait normal.      Deep Tendon Reflexes: Reflexes normal.   Psychiatric:         Mood and Affect: Mood normal.         Behavior: Behavior normal.         Thought Content: Thought content normal.         Judgment: Judgment normal.           Respiratory: Clear to auscultation, no wheezing, rales or rhonchi    During this visit the following were done:  Labs Reviewed [x]    Labs Ordered [x]    Radiology Reports Reviewed []    Radiology Ordered []    PCP Records Reviewed []    Referring Provider Records Reviewed []    ER Records Reviewed []    Hospital Records " Reviewed []    History Obtained From Family []    Radiology Images Reviewed []    Other Reviewed []    Records Requested []            Results  Labs   - Fasting blood sugar: 140 mg/dL           Assessment and Plan        1. Medicare wellness follow-up.  - Blood pressure readings are within the normal range today.  - Experienced a weight loss of 5 pounds.  - Fasting blood glucose levels have been consistently below 150.  - Advised to maintain fasting blood glucose levels under 150 and to continue limiting sugar intake.    2. Encounter for immunization.  - Informed about the availability of the pneumonia vaccine at our facility and the shingles vaccine at her local pharmacy.  - Potential side effects of these vaccines were discussed.  - Encouraged to consider receiving the pneumonia and shingles vaccines.         Follow Up   Return in about 3 months (around 8/12/2025), or if symptoms worsen or fail to improve, for Recheck  labs today .  Patient was given instructions and counseling regarding her condition or for health maintenance advice. Please see specific information pulled into the AVS if appropriate.  Patient or patient representative verbalized consent for the use of Ambient Listening during the visit with  BERT Lucas for chart documentation. 5/12/2025  12:55 EDT

## 2025-05-13 ENCOUNTER — RESULTS FOLLOW-UP (OUTPATIENT)
Dept: FAMILY MEDICINE CLINIC | Facility: CLINIC | Age: 75
End: 2025-05-13
Payer: COMMERCIAL

## 2025-05-13 ENCOUNTER — OFFICE VISIT (OUTPATIENT)
Dept: SURGERY | Facility: CLINIC | Age: 75
End: 2025-05-13
Payer: MEDICARE

## 2025-05-13 VITALS — BODY MASS INDEX: 34.41 KG/M2 | HEIGHT: 62 IN | WEIGHT: 187 LBS

## 2025-05-13 DIAGNOSIS — D05.12 DUCTAL CARCINOMA IN SITU (DCIS) OF LEFT BREAST: Primary | ICD-10-CM

## 2025-05-13 PROCEDURE — 1160F RVW MEDS BY RX/DR IN RCRD: CPT | Performed by: SURGERY

## 2025-05-13 PROCEDURE — 1159F MED LIST DOCD IN RCRD: CPT | Performed by: SURGERY

## 2025-05-13 PROCEDURE — 99213 OFFICE O/P EST LOW 20 MIN: CPT | Performed by: SURGERY

## 2025-05-13 NOTE — PROGRESS NOTES
Subjective   Shey King is a 74 y.o. female  is here today for follow-up.         Shey King is a 74 y.o. female here for follow up after wire localized lumpectomy of the left breast.  The patient is doing well and her incision has healed without issue.  She had a small area of skin dehiscence that is granulating nicely.  Final pathology is consistent with DCIS and margins of excision are negative.    LEFT BREAST, LUMPECTOMY: Intermediate grade DCIS, margins free            RLL     Procedure: Excision (less than total mastectomy)   Specimen Laterality: Left   Tumor Site: Not specified   Histologic Type: Ductal carcinoma in situ   Size of DCIS: 10 x 3 x 2 mm, 4 of 19 blocks   Architectural Patterns: Cribriform   Nuclear Grade: Grade II (intermediate)   Necrosis: Present, focal (small foci of necrosis)   Microcalcifications: Present in DCIS and in nonneoplastic tissue   Margin Status: All margins negative for DCIS   Distance from DCIS to Closest Margin: 1 mm from inferior margin   Distance from DCIS to Anterior Margin: 2 mm   Distance from DCIS to Posterior Margin: 4 mm   Regional Lymph Node Status: No regional lymph nodes submitted or found   PATHOLOGIC STAGE (pTNM, AJCC 8th Edition): pTis (DCIS), pNx   Biomarker Testing Performed on Previous Biopsy:  (ER) Positive 100 %   (PgR) Positive 5 %         Assessment     Diagnoses and all orders for this visit:    1. 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 female doing well after wire localized lumpectomy for an area of DCIS of the left breast.  Final pathology is consistent with hormone positive DCIS with no invasive disease.  Margins of excision are negative.  The patient is doing well and completing adjuvant therapy.  Radiation therapy has been completed and her radiation changes are resolving.  April 30 mammogram stable and she will resume annual mammography..  Follow-up in 3 months     the copied portions of this  note have been reviewed and are accurate as of 5/13/2025

## 2025-06-05 ENCOUNTER — LAB (OUTPATIENT)
Dept: ONCOLOGY | Facility: CLINIC | Age: 75
End: 2025-06-05
Payer: COMMERCIAL

## 2025-06-05 ENCOUNTER — OFFICE VISIT (OUTPATIENT)
Dept: ONCOLOGY | Facility: CLINIC | Age: 75
End: 2025-06-05
Payer: COMMERCIAL

## 2025-06-05 VITALS
SYSTOLIC BLOOD PRESSURE: 142 MMHG | TEMPERATURE: 98 F | HEIGHT: 62 IN | WEIGHT: 189.8 LBS | DIASTOLIC BLOOD PRESSURE: 80 MMHG | HEART RATE: 99 BPM | OXYGEN SATURATION: 96 % | BODY MASS INDEX: 34.93 KG/M2 | RESPIRATION RATE: 20 BRPM

## 2025-06-05 DIAGNOSIS — E53.8 FOLATE DEFICIENCY: ICD-10-CM

## 2025-06-05 DIAGNOSIS — E53.8 B12 DEFICIENCY: ICD-10-CM

## 2025-06-05 DIAGNOSIS — D64.9 ANEMIA, UNSPECIFIED TYPE: ICD-10-CM

## 2025-06-05 DIAGNOSIS — D05.12 DUCTAL CARCINOMA IN SITU (DCIS) OF LEFT BREAST: ICD-10-CM

## 2025-06-05 DIAGNOSIS — D05.12 DUCTAL CARCINOMA IN SITU (DCIS) OF LEFT BREAST: Primary | ICD-10-CM

## 2025-06-05 DIAGNOSIS — E61.1 IRON DEFICIENCY: ICD-10-CM

## 2025-06-05 LAB
BASOPHILS # BLD AUTO: 0.05 10*3/MM3 (ref 0–0.2)
BASOPHILS NFR BLD AUTO: 0.6 % (ref 0–1.5)
DEPRECATED RDW RBC AUTO: 47.4 FL (ref 37–54)
EOSINOPHIL # BLD AUTO: 0.23 10*3/MM3 (ref 0–0.4)
EOSINOPHIL NFR BLD AUTO: 2.9 % (ref 0.3–6.2)
ERYTHROCYTE [DISTWIDTH] IN BLOOD BY AUTOMATED COUNT: 14.2 % (ref 12.3–15.4)
FERRITIN SERPL-MCNC: 114 NG/ML (ref 13–150)
FOLATE SERPL-MCNC: 10.9 NG/ML (ref 4.78–24.2)
HCT VFR BLD AUTO: 38.9 % (ref 34–46.6)
HGB BLD-MCNC: 12.8 G/DL (ref 12–15.9)
IMM GRANULOCYTES # BLD AUTO: 0.05 10*3/MM3 (ref 0–0.05)
IMM GRANULOCYTES NFR BLD AUTO: 0.6 % (ref 0–0.5)
IRON 24H UR-MRATE: 55 MCG/DL (ref 37–145)
IRON SATN MFR SERPL: 14 % (ref 20–50)
LYMPHOCYTES # BLD AUTO: 1.19 10*3/MM3 (ref 0.7–3.1)
LYMPHOCYTES NFR BLD AUTO: 14.9 % (ref 19.6–45.3)
MCH RBC QN AUTO: 30.1 PG (ref 26.6–33)
MCHC RBC AUTO-ENTMCNC: 32.9 G/DL (ref 31.5–35.7)
MCV RBC AUTO: 91.5 FL (ref 79–97)
MONOCYTES # BLD AUTO: 0.46 10*3/MM3 (ref 0.1–0.9)
MONOCYTES NFR BLD AUTO: 5.8 % (ref 5–12)
NEUTROPHILS NFR BLD AUTO: 5.99 10*3/MM3 (ref 1.7–7)
NEUTROPHILS NFR BLD AUTO: 75.2 % (ref 42.7–76)
NRBC BLD AUTO-RTO: 0 /100 WBC (ref 0–0.2)
PLATELET # BLD AUTO: 254 10*3/MM3 (ref 140–450)
PMV BLD AUTO: 10.9 FL (ref 6–12)
RBC # BLD AUTO: 4.25 10*6/MM3 (ref 3.77–5.28)
TIBC SERPL-MCNC: 393 MCG/DL (ref 298–536)
TRANSFERRIN SERPL-MCNC: 264 MG/DL (ref 200–360)
VIT B12 BLD-MCNC: 301 PG/ML (ref 211–946)
WBC NRBC COR # BLD AUTO: 7.97 10*3/MM3 (ref 3.4–10.8)

## 2025-06-05 PROCEDURE — 85025 COMPLETE CBC W/AUTO DIFF WBC: CPT | Performed by: INTERNAL MEDICINE

## 2025-06-05 PROCEDURE — 82728 ASSAY OF FERRITIN: CPT | Performed by: INTERNAL MEDICINE

## 2025-06-05 PROCEDURE — 83540 ASSAY OF IRON: CPT | Performed by: INTERNAL MEDICINE

## 2025-06-05 PROCEDURE — 84466 ASSAY OF TRANSFERRIN: CPT | Performed by: INTERNAL MEDICINE

## 2025-06-05 PROCEDURE — 82746 ASSAY OF FOLIC ACID SERUM: CPT | Performed by: INTERNAL MEDICINE

## 2025-06-05 PROCEDURE — 82607 VITAMIN B-12: CPT | Performed by: INTERNAL MEDICINE

## 2025-06-05 RX ORDER — ANASTROZOLE 1 MG/1
1 TABLET ORAL DAILY
Qty: 30 TABLET | Refills: 11 | Status: SHIPPED | OUTPATIENT
Start: 2025-06-05

## 2025-06-05 NOTE — PROGRESS NOTES
Venipuncture Blood Specimen Collection  Venipuncture performed in right arm by Oanh Vega MA with good hemostasis. Patient tolerated the procedure well without complications.   06/05/25   Oanh Vega MA

## 2025-06-05 NOTE — PROGRESS NOTES
Subjective     Date: 2025    Referring Provider  Dr. Mata     Chief Complaint  Ductal carcinoma in situ of the left breast, estrogen receptor positive, status post lumpectomy  Iron deficiency anemia     Subjective          Shey King is a 74 y.o. female who presents today to [facility] for follow up.    HPI:   74 y.o.female with a history of diabetes mellitus type 2, hyperlipidemia, hypothyroidism, frequent UTIs who presents to establish care regarding ductal carcinoma in situ of the left breast, estrogen receptor positive.     Oncological history:  She notes intermittent breast pain on the left side for several months, was finally directed to get a screening mammogram  - 2022.  Screening mammogram showed calcification left breast middle posterior third lateral aspect, which are new.  Other calcifications throughout left breast appear stable.   - 22. Diagnostic mammogram showed grouped calcifications in the left 3-4 o'clock region.   - 2022: stereotactic biopsy. Pathology with intermediate to high-grade cribriform ductal carcinoma in situ with associated necrosis and calcification.  -2022: Underwent lumpectomy.  Final pathology with ductal carcinoma in situ.  10 x 3 x 2 mm, 4 of 19 blocks.  Grade 2 (intermediate).  Distance from DCIS to closest margin 1 mm from inferior margin, 2 mm from anterior margin, 4 mm to posterior margin. PTis.  Estrogen receptor 100% positive, progesterone 5% positive  2023-3/7/2023: Underwent radiation with 4256 cGy in 16 fraction with 1000 cGy in 5 fraction boost  2023: Started anastrozole     She was recently seen by Dr. Mata on 2023.  Overall doing well after lumpectomy.  Presents for further therapy and discussion.    GYN History:  Menarche at age 14.   Age of first pregnancy:    Age of menopause: 52  Breast feed: no  Birth control: yes  Hormone replacement: no    Never had DEXA bone  "scan in the past.  Denies ever being treated for diagnosed with osteoporosis.  Currently taking vitamin D tablets.    Interval History 02/01/2023   She was seen by radiation oncology today, Dr. Yusuf, who recommends radiation to left breast due to DCIS margin less than 2 mm.  Underwent DEXA bone scan 1/27/2023 with bone mineral density of right femur neck 0.899 with T score -1.     Interval History 03/01/2023 - Telehealth   Receiving radiation therapy currently, has 4 more boost therapy left. Has had pain on the breast intermittently and the axilla. Was told by Rad Onc to apply less aquaphor to the area.     Interval History 04/06/2023   She presents for follow up today. Completed radiation therapy. Started anastrozole, taking it daily. Overall, still with fatigue since completion of radiation so unsure if it is due to radiation or medication. Has had two episodes of hot flashes. Denies any other AE.    Also reports mild intermittent sharp pain L breast. She reports difficulty sleeping, would like something to help temporarily.     Interval History 07/21/2023   Ms. King presents for follow up. Reports increased fatigue with anastrozole, has been compliant. Has also developed few UTI's, most recently with pseudomonas, currently being treated with clindamycin. Had a repeat mammogram 6/15 which showed left breast there trabecular thickening and skin thickening, likely due to treatment, stable calcification.     Interval History 10/24/2023   Ms. King presents for follow up today. Denies any palpable lumps/bumps of the breast, but has noted waking up with discharge \"caking\" on the left nipple. Also with intermittent warmth and pain in the left breast with radiation to axilla. Compliant with anastrozole without missed doses. Does report intermittent aches in joints.   Has had intentional weight loss due to GLP-1 inhibitor.    Interval History 01/24/2024   Ms. King presents today for follow up. Her last mammogram " "from 10/30/23 did not show evidence of malignancy. She reports increased fatigue, which has not improved. Reports significant b/l knee pain, unsure if it is due to anastrozole. Continues to be compliant with AI. We looked at her last XR of the R knee from 3/2022 which shows osteoarthritis of the R knee, recommend follow up with PCP (seeing Tashia Vallejo 2/6)  Cataract surgery Monday.    Interval History 04/25/2024   Ms. King presents for follow up. She reports a \"knot\" on posterior lateral part of L breast that she's felt for ~2 weeks, which is painful. Denies lifting heavy objects or trauma to breast. No falls. Recently had flu and URI, still recovering, denies any vaccinations.    Taking oral iron with mild constipation, noted significant improvement in her energy until recent infections. Taking miralax, would like additional support for constipation.     Compliant with anastrozole.    Interval History 07/25/2024   Ms. King presents for follow up. She reports another \"knot\" L axillary region, painful. She was unable to give herself B12 injection, she may be able to ask her niece to help with administering. Energy improved when she was taking iron/vitamin C, but has slowed down due to other medication.   Has chronic arthritis and carpal tunnel syndrome that bothers her, but doing well on anastrozole otherwise.    Interval History 10/25/2024   Ms. King presents for follow up. She reports recent cystitis which has caused fatigue. PCP's office is giving her B12 injection.  Recent mammogram 10/23 with probably benign left mammographic findings, benign right mammographic findings.   Doing well with anastrozole, iron/vitamin C.  Did not have a good experience with DEXA scan tech last time, open to re trying.     Interval History 02/05/2025   Ms. King presents for follow up. DEXA scan 2/3/25 with normal results, low risk of fracture. She seems to be doing well with anastrozole.   Doing well with iron/Vitamin C with " senna-colace. No complaints today.    Interval History 06/05/2025   L mammogram 4/30 with benign left mammographic findings, recommend yearly b/l mammograms. She reports doing well on anastrozole. Does have myalgia, which are tolerable. Continues to take oral iron/vitamin C, has not noted any changes in energy. Denies any bleeding.   CBC: 7.97/12.8/38.9/254.         Objective     Objective     Allergy:   Allergies   Allergen Reactions    Other Unknown - High Severity     Blisters from EKG stickers    Bactrim [Sulfamethoxazole-Trimethoprim] Hives    Ciprofloxacin Hives    Penicillins Hives    Steri-Strip Compound Benzoin [Benzoin] Hives    Sulfa Antibiotics Hives        Current Medications:   Current Outpatient Medications   Medication Sig Dispense Refill    acetaminophen (TYLENOL) 325 MG tablet Take 2 tablets by mouth Every 4 (Four) Hours As Needed for Mild Pain. 30 tablet 0    anastrozole (ARIMIDEX) 1 MG tablet Take 1 tablet by mouth Daily. 30 tablet 11    aspirin 81 MG chewable tablet Chew 1 tablet Daily.      AZO-CRANBERRY PO Take 1 tablet by mouth Daily As Needed.      Continuous Glucose  (Dexcom G6 ) device Use 1 each Continuous. 1 each 0    Continuous Glucose Sensor (Dexcom G6 Sensor) Use Every 10 (Ten) Days. 10 each 3    Continuous Glucose Transmitter (Dexcom G6 Transmitter) misc USE AS DIRECTED THREE TIMES DAILY 1 each 0    cyanocobalamin 1000 MCG/ML injection Inject 1 mL into the appropriate muscle as directed by prescriber Every 28 (Twenty-Eight) Days. 1 mL 6    empagliflozin (Jardiance) 10 MG tablet tablet Take 1 tablet by mouth Daily. 90 tablet 1    fenofibrate (TRICOR) 145 MG tablet Take 1 tablet by mouth Daily. 90 tablet 1    ferrous sulfate 325 (65 FE) MG tablet Take 1 tablet by mouth Every Other Day. 30 tablet 3    glucose monitor monitoring kit 1 each as needed (DM II). 1 each 0    Lancets (OneTouch Delica Plus Acazyf93L) misc USE TO TEST BLOOD SUGAR DAILY AS DIRECTED      Lancets  Misc. misc For Daily testing  E11.65 50 each 11    levothyroxine (Synthroid) 112 MCG tablet Take 1 tablet by mouth Daily. 90 tablet 1    nitrofurantoin, macrocrystal-monohydrate, (MACROBID) 100 MG capsule TAKE 1 CAPSULE BY MOUTH TWICE DAILY FOR 7 DAYS, THEN TAKE 1 CAPSULE EVERY NIGHT AT BEDTIME AS DIRECTED 30 capsule 0    OneTouch Ultra test strip USE TO TEST BLOOD SUGAR DAILY AS DIRECTED 50 each 5    Probiotic Product (PROBIOTIC DAILY PO) Take  by mouth Daily.      rosuvastatin (Crestor) 5 MG tablet Take 1 tablet by mouth Daily. 90 tablet 1    Semaglutide,0.25 or 0.5MG/DOS, (OZEMPIC) 2 MG/1.5ML solution pen-injector Inject 0.25 mg under the skin into the appropriate area as directed 1 (One) Time Per Week. 1.5 mL 2    sennosides-docusate (PERICOLACE) 8.6-50 MG per tablet Take 1 tablet by mouth Daily. 30 tablet 11    vitamin C (ASCORBIC ACID) 500 MG tablet TAKE 1 TABLET BY MOUTH EVERY OTHER DAY 30 tablet 3    vitamin D (ERGOCALCIFEROL) 1.25 MG (79583 UT) capsule capsule TAKE 1 CAPSULE BY MOUTH 1 TIME EVERY WEEK 5 capsule 2     No current facility-administered medications for this visit.       Past Medical History:  Past Medical History:   Diagnosis Date    Abdominal pain, LLQ (left lower quadrant)     Abnormal ECG aug 2021    Abnormal Pap smear of cervix     several years ago    Anemia     years ago    Arthritis of back 2024    Cataract early stage    Chronic kidney disease     Cystitis     Diabetes mellitus     Diarrhea     Diverticulitis of colon     Diverticulosis     Ductal carcinoma in situ (DCIS) of left breast 12/21/2022    Elevated cholesterol     Encounter for cholecystectomy 2018    Gallstones     GERD (gastroesophageal reflux disease)     Hiatal hernia     Hyperglycemia     Hyperlipidemia     Hyperlipidemia     Hypothyroidism     Knee swelling     Leaky heart valve     Lung nodule     Muscle spasm     FLANK PAIN    OAB (overactive bladder)     Pneumonia     years ago    Rectal bleeding     UTI (urinary  tract infection)        Past Surgical History:  Past Surgical History:   Procedure Laterality Date    BREAST BIOPSY Bilateral 2005    benign    BREAST BIOPSY Left 12/29/2022    Procedure: BREAST BIOPSY WITH NEEDLE LOCALIZATION;  Surgeon: Dong Mata MD;  Location: [facility] OR;  Service: General;  Laterality: Left;    BREAST LUMPECTOMY Left     CARPAL TUNNEL RELEASE Right 08/22/2024    Procedure: CARPAL TUNNEL RELEASE RIGHT;  Surgeon: Christopher Peter MD;  Location: [facility] OR;  Service: Orthopedics;  Laterality: Right;    CATARACT EXTRACTION WITH INTRAOCULAR LENS IMPLANT Right 01/29/2024    CHOLECYSTECTOMY  2018?    D & C HYSTEROSCOPY      DUPUYTREN CONTRACTURE RELEASE Right 08/22/2024    Procedure: PALMAR FASCIECTOMY RIGHT HAND;  Surgeon: Christopher Peter MD;  Location: [facility] OR;  Service: Orthopedics;  Laterality: Right;    ENDOSCOPY      ENDOSCOPY N/A 01/11/2018    Procedure: ESOPHAGOGASTRODUODENOSCOPY;  Surgeon: Dong Mata MD;  Location: [facility] OR;  Service:     EYE SURGERY      MAMMO BILATERAL  09/2015    OVARIAN CYST DRAINAGE      KS LAPAROSCOPY SURG CHOLECYSTECTOMY N/A 01/11/2018    Procedure: CHOLECYSTECTOMY LAPAROSCOPIC;  Surgeon: Dong Mata MD;  Location: [facility] OR;  Service: General    SKIN BIOPSY      US GUIDED LYMPH NODE BIOPSY  04/11/2018       Social History:  Social History     Socioeconomic History    Marital status:    Tobacco Use    Smoking status: Never    Smokeless tobacco: Never    Tobacco comments:     never   Vaping Use    Vaping status: Never Used   Substance and Sexual Activity    Alcohol use: No    Drug use: No    Sexual activity: Defer     Partners: Male     Shey King  reports that she has never smoked. She has never used smokeless tobacco..      Family History:  Family History   Adopted: Yes   Problem Relation Age of Onset    Heart disease Mother         also had cancer kidney    Cancer Mother         kidney    Thyroid disease Mother   "   Heart disease Father     Diabetes Brother     Arthritis Maternal Aunt         [place]    Asthma Son     Diabetes Brother     Breast cancer Neg Hx        Review of Systems:  Review of Systems   Constitutional: Negative.    HENT: Negative.     Respiratory: Negative.     Cardiovascular: Negative.    Gastrointestinal: Negative.    Genitourinary: Negative.    Musculoskeletal: Negative.    Skin: Negative.    Neurological: Negative.    Hematological: Negative.    Psychiatric/Behavioral: Negative.         Vital Signs:   /80   Pulse 99   Temp 98 °F (36.7 °C) (Temporal)   Resp 20   Ht 157.5 cm (62\")   Wt 86.1 kg (189 lb 12.8 oz)   SpO2 96%   BMI 34.71 kg/m²      Physical Exam:   Physical Exam  Vitals reviewed.   Constitutional:       General: She is not in acute distress.     Appearance: Normal appearance. She is not ill-appearing.   HENT:      Head: Normocephalic and atraumatic.      Mouth/Throat:      Mouth: Mucous membranes are moist.      Pharynx: Oropharynx is clear.   Eyes:      Conjunctiva/sclera: Conjunctivae normal.      Pupils: Pupils are equal, round, and reactive to light.   Cardiovascular:      Rate and Rhythm: Normal rate and regular rhythm.      Heart sounds: No murmur heard.  Pulmonary:      Effort: Pulmonary effort is normal. No respiratory distress.      Breath sounds: Normal breath sounds. No wheezing.   Chest:   Breasts:     Right: Normal. No mass or skin change.      Left: Normal. No swelling, bleeding, inverted nipple, mass, nipple discharge, skin change or tenderness.      Comments: L breast with lumpectomy- no nodules appreciated     Abdominal:      General: Abdomen is flat. Bowel sounds are normal. There is no distension.      Palpations: Abdomen is soft. There is no mass.      Tenderness: There is no abdominal tenderness. There is no guarding.   Musculoskeletal:         General: No swelling. Normal range of motion.      Cervical back: Normal range of motion.   Lymphadenopathy:      " "Cervical: No cervical adenopathy.   Skin:     General: Skin is warm and dry.   Neurological:      General: No focal deficit present.      Mental Status: She is alert and oriented to person, place, and time. Mental status is at baseline.   Psychiatric:         Mood and Affect: Mood normal.         PHQ-9 Score  PHQ-9 Total Score:       Pain Score  Vitals:    06/05/25 0932   BP: 142/80   Pulse: 99   Resp: 20   Temp: 98 °F (36.7 °C)   TempSrc: Temporal   SpO2: 96%   Weight: 86.1 kg (189 lb 12.8 oz)   Height: 157.5 cm (62\")   PainSc: 0-No pain                     ECOG score: 0             Lab Review  Lab Results   Component Value Date    WBC 7.95 05/12/2025    HGB 13.0 05/12/2025    HCT 38.5 05/12/2025    MCV 88.9 05/12/2025    RDW 13.1 05/12/2025     05/12/2025    NEUTRORELPCT 74.1 05/12/2025    LYMPHORELPCT 14.8 (L) 05/12/2025    MONORELPCT 6.7 05/12/2025    EOSRELPCT 2.9 05/12/2025    BASORELPCT 0.9 05/12/2025    NEUTROABS 5.89 05/12/2025    LYMPHSABS 1.18 05/12/2025       Lab Results   Component Value Date     05/12/2025    K 4.3 05/12/2025    CO2 20.7 (L) 05/12/2025     05/12/2025    BUN 13 05/12/2025    CREATININE 0.99 05/12/2025    EGFRIFNONA 57 (L) 01/26/2022    EGFRIFAFRI 74 09/06/2016    GLUCOSE 144 (H) 05/12/2025    CALCIUM 9.7 05/12/2025    ALKPHOS 122 (H) 05/12/2025    AST 19 05/12/2025    ALT 16 05/12/2025    BILITOT 0.7 05/12/2025    ALBUMIN 4.4 05/12/2025    PROTEINTOT 8.4 05/12/2025    MG 1.9 05/07/2024    PHOS 3.5 12/01/2021       Radiology Results    Mammo Diagnostic Digital Tomosynthesis Left With CAD (04/30/2025 08:11)   The fibroglandular pattern is stable in appearance including  postoperative changes in the lateral aspect of the left breast. There  are stable scattered and grouped calcifications. No new or suspicious  findings are identified.     IMPRESSION:  Benign left mammographic findings.      DEXA Bone Density Axial (02/03/2025 08:25)     The BMD measured at the Left Femur " Neck is 0.814 gm/cm2 with a T-score  of -0.3.  Previous T score right femur neck: -1.0.     IMPRESSION:  The patient is considered to be normal according to the World Health  Organization criteria. Fracture risk is low.    Mammo Diagnostic Digital Tomosynthesis Bilateral With CAD (10/23/2024 08:38)     FINDINGS: The breasts are heterogeneously dense, which may obscure small  masses.     The bilateral fibroglandular pattern is stable in appearance including  postoperative changes being followed in the lateral aspect of the left  breast. There are stable bilateral calcifications. No new or suspicious  findings are identified in either breast.     IMPRESSION:     1. Benign right mammographic findings.     2. Probably benign left mammographic findings. Recommend continued  follow-up.    Mammo Diagnostic Digital Tomosynthesis Left With CAD (04/25/2024 10:42)     FINDINGS: The left breast is heterogeneously dense, which may obscure  small masses.     The fibroglandular pattern is stable in appearance including  postoperative changes being followed in the lateral aspect of the left  breast. There are stable scattered calcifications. Mild skin thickening  is again noted and likely treatment related. No new or suspicious  findings are identified.     Left breast ultrasound: Focused sonographic evaluation of the patient's  area of pain extending from her back into the left 3:00 region was  performed. This demonstrates no underlying sonographic abnormality. No  solid or cystic mass is seen nor is there an abnormal area of shadowing  in the imaged portion of the left breast.     IMPRESSION:  Probably benign left mammographic findings. Recommend  continued follow-up.    Mammo Diagnostic Digital Tomosynthesis Bilateral With CAD (10/30/2023 09:49)   IMPRESSION:     1. Benign right mammographic findings.     2. Probably benign left mammographic findings. Recommend continued  follow-up.       Mammo Diagnostic Digital Tomosynthesis  Left With CAD (06/15/2023 12:07)      COMPARISON: 12/29/2022, 12/14/2022, 12/06/2022, 10/27/2022, 06/19/2020,  02/07/2019, 11/11/2017     FINDINGS: The left breast is heterogeneously dense, which may obscure  small masses.     Presumed postoperative changes are now noted in the lateral aspect of  the left breast from the patient's interval conservation surgery. There  is mild trabecular thickening and skin thickening which is likely  treatment related. There are stable scattered calcifications.     IMPRESSION:  Probably benign left mammographic findings. Recommend  continued follow-up.    DEXA Bone Density Axial (01/27/2023 13:22)  FINDINGS:    RIGHT FEMORAL NECK BONE MINERAL DENSITY:  BMD of the right femur neck  is 0.899 with T score -1 with density.      UNITS OF MEASURE:    Bone mineral density is measured in g/cm2.    Z-score is the number of standard deviations above age-matched  controls.    T-score is the number of standard deviations above healthy young  adults.      WORLD HEALTH ORGANIZATION GUIDELINES:    T-score at or above -1 is normal bone mineral density.    T-score between -1 and -2.5 is osteopenia.    T-score at or below -2.5 is osteoporosis.     IMPRESSION:    BMD of the right femur neck is 0.899 with T score -1 with density.    Mammo Diagnostic Left With CAD (12/06/2022 09:21)  FINDINGS: Magnification imaging of the left breast demonstrates a small  group of heterogeneous calcifications in the mid left 3:00 to 4:00  region. Recommend stereotactic guided core biopsy.     IMPRESSION:  Grouped calcifications in the left 3:00 to 4:00 region.     BI-RADS CATEGORY:  4, SUSPICIOUS     RECOMMENDATION:  Recommend stereotactic guided core biopsy of the left  breast.    Mammo Screening Digital Tomosynthesis Bilateral With CAD (10/27/2022 07:29)  FINDINGS:     Breast Composition: The breasts are heterogenously dense, which may  obscure small masses.   Important Findings:    Biopsy related changes right breast  with marker clip. Stable benign  calcifications right breast are noted. Calcifications left breast  middle-posterior third lateral aspect have developed. Other  calcifications throughout the left breast appear stable. Biopsy related  changes upper outer quadrant left breast with marker clip noted.   No suspicious calcifications or areas of architectural distortion.  No  dominant masses or skin thickening      Pathology:   Procedure: Excision (less than total mastectomy)   Specimen Laterality: Left   Tumor Site: Not specified   Histologic Type: Ductal carcinoma in situ   Size of DCIS: 10 x 3 x 2 mm, 4 of 19 blocks   Architectural Patterns: Cribriform   Nuclear Grade: Grade II (intermediate)   Necrosis: Present, focal (small foci of necrosis)   Microcalcifications: Present in DCIS and in nonneoplastic tissue   Margin Status: All margins negative for DCIS   Distance from DCIS to Closest Margin: 1 mm from inferior margin   Distance from DCIS to Anterior Margin: 2 mm   Distance from DCIS to Posterior Margin: 4 mm   Regional Lymph Node Status: No regional lymph nodes submitted or found   PATHOLOGIC STAGE (pTNM, AJCC 8th Edition): pTis (DCIS), pNx   Biomarker Testing Performed on Previous Biopsy:  (ER) Positive 100 %   (PgR) Positive 5 %     CLINICAL HISTORY:   Ductal carcinoma in situ (DCIS) of left breast     Assessment / Plan         Assessment and Plan   74 y.o. female who presents today with hormone positive left breast ductal carcinoma in situ, status post lumpectomy. Currently on adjuvant AI.    Ductal carcinoma in situ, left breast, hormone positive  -Final pathology with 10 x 3 x 2 mm ductal carcinoma in situ, grade 2, 1 mm from inferior margin, 2 mm from anterior margin, 4 mm from posterior margin  -Her case ws discussed in tumor board on 1/25; it was deemed she should receive radiation therapy given close margins <2mm inferior margin  -Completed adjuvant radiation therapy: 3/7/23  -Tolerating adjuvant hormonal  therapy which has shown to decrease recurrence by at least 33%. Taking anastrozole 1 mg daily for 5 years. (To be completed 3/2028)  -Last mammogram with benign findings 10/2024; Benign left mammogram 4/30/2025. Repeat b/l mammogram 10/2025.   -She will continue history and physical and exam every 3-6 months for the first 5 years, yearly thereafter according to NCCN     2. Bone health  -DEXA scan 2/3/2025: Left femur T score -0.3; normal with low fracture risk. Next DEXA 2/2027   -Continue vitamin D and calcium supplement    3. Fatigue   - Anemia work up showed iron deficiency and marginal B12 level  -Started patient on oral ferrous sulfate 325  and ascorbic acid 500 mg to be taken every other day; if she has worsened baseline constipation, she is to contact us for parenteral iron   -Started on B12 injections to be given monthly; receiving with PCP        Discussed possible differential diagnoses, testing, treatment, recommended non-pharmacological interventions, risks, warning signs to monitor for that would indicate need for follow-up in clinic or ER. If no improvement with these regimens or you have new or worsening symptoms follow-up. Patient verbalizes understanding and agreement with plan of care. Denies further needs or concerns.     Patient was given instructions and counseling regarding her condition and for health maintenance advised.    I spent 30 minutes with Shey King today.  In the office today, more than 50% of this time was spent face-to-face with her  in counseling / coordination of care, reviewing her interim medical history and counseling on the current treatment plan.  All questions were answered to her satisfaction.      Meds ordered during this visit  No orders of the defined types were placed in this encounter.      Visit Diagnoses    ICD-10-CM ICD-9-CM   1. Ductal carcinoma in situ (DCIS) of left breast  D05.12 233.0   2. Folate deficiency  E53.8 266.2   3. Iron deficiency  E61.1 280.9    4. B12 deficiency  E53.8 266.2   5. Anemia, unspecified type  D64.9 285.9                     Follow Up   In 4 months for H&P  Repeat CBC, iron studies, ferritin, B12, folate          This document has been electronically signed by Maia Ford MD   June 5, 2025 09:41 EDT    Dictated Utilizing Dragon Dictation: Part of this note may be an electronic transcription/translation of spoken language to printed text using the Dragon Dictation System.

## 2025-06-11 ENCOUNTER — OFFICE VISIT (OUTPATIENT)
Dept: ORTHOPEDIC SURGERY | Facility: CLINIC | Age: 75
End: 2025-06-11
Payer: MEDICARE

## 2025-06-11 VITALS — HEIGHT: 62 IN | WEIGHT: 189 LBS | BODY MASS INDEX: 34.78 KG/M2

## 2025-06-11 DIAGNOSIS — M65.311 TRIGGER THUMB, RIGHT THUMB: Primary | ICD-10-CM

## 2025-06-11 RX ADMIN — LIDOCAINE HYDROCHLORIDE 2 ML: 10 INJECTION, SOLUTION EPIDURAL; INFILTRATION; INTRACAUDAL; PERINEURAL at 20:04

## 2025-06-11 RX ADMIN — METHYLPREDNISOLONE ACETATE 40 MG: 40 INJECTION, SUSPENSION INTRA-ARTICULAR; INTRALESIONAL; INTRAMUSCULAR; SOFT TISSUE at 20:04

## 2025-06-11 NOTE — PROGRESS NOTES
Established New Problem         Patient: Shey King  YOB: 1950  Date of Encounter: 06/11/2025      Chief  Complaint:   Chief Complaint   Patient presents with   • Right Hand - Follow-up, Pain         HPI:  Shey King, 74 y.o. female presents in follow-up no complaints of locking of her right thumb and for 2 months duration.  She has had a similar problem left hand in the past and responded to steroid injection.  She denies weakness or numbness to her right hand.  Has undergone carpal tunnel release partial palmar fasciectomy August 2024 and continues to do well.        Medical History:  Patient Active Problem List   Diagnosis   • Abdominal pain, LLQ (left lower quadrant)   • Cystitis   • Diarrhea   • Hiatal hernia   • Hyperglycemia   • Hyperlipidemia   • Adult onset hypothyroidism   • Muscle spasm   • OAB (overactive bladder)   • Recurrent UTI   • Gallstones   • B12 deficiency   • Pulmonary nodule   • Class 2 obesity due to excess calories without serious comorbidity with body mass index (BMI) of 35.0 to 35.9 in adult   • Diabetes mellitus   • Lymphadenopathy   • Palpitations   • ELKINS (dyspnea on exertion)   • Ductal carcinoma in situ (DCIS) of left breast   • Grade I LV diastolic dysfunction     Past Medical History:   Diagnosis Date   • Abdominal pain, LLQ (left lower quadrant)    • Abnormal ECG aug 2021   • Abnormal Pap smear of cervix     several years ago   • Anemia     years ago   • Arthritis of back 2024   • Cataract early stage   • Chronic kidney disease    • Cystitis    • Diabetes mellitus    • Diarrhea    • Diverticulitis of colon    • Diverticulosis    • Ductal carcinoma in situ (DCIS) of left breast 12/21/2022   • Elevated cholesterol    • Encounter for cholecystectomy 2018   • Gallstones    • GERD (gastroesophageal reflux disease)    • Hiatal hernia    • Hyperglycemia    • Hyperlipidemia    • Hyperlipidemia    • Hypothyroidism    • Knee swelling    • Leaky heart valve    • Lung  nodule    • Muscle spasm     FLANK PAIN   • OAB (overactive bladder)    • Pneumonia     years ago   • Rectal bleeding    • UTI (urinary tract infection)            Social History:  Social History     Socioeconomic History   • Marital status:    Tobacco Use   • Smoking status: Never   • Smokeless tobacco: Never   • Tobacco comments:     never   Vaping Use   • Vaping status: Never Used   Substance and Sexual Activity   • Alcohol use: No   • Drug use: No   • Sexual activity: Defer     Partners: Male           Current Medications:    Current Outpatient Medications:   •  acetaminophen (TYLENOL) 325 MG tablet, Take 2 tablets by mouth Every 4 (Four) Hours As Needed for Mild Pain., Disp: 30 tablet, Rfl: 0  •  anastrozole (ARIMIDEX) 1 MG tablet, Take 1 tablet by mouth Daily., Disp: 30 tablet, Rfl: 11  •  aspirin 81 MG chewable tablet, Chew 1 tablet Daily., Disp: , Rfl:   •  empagliflozin (Jardiance) 10 MG tablet tablet, Take 1 tablet by mouth Daily., Disp: 90 tablet, Rfl: 1  •  ferrous sulfate 325 (65 FE) MG tablet, Take 1 tablet by mouth Every Other Day., Disp: 30 tablet, Rfl: 3  •  levothyroxine (Synthroid) 112 MCG tablet, Take 1 tablet by mouth Daily., Disp: 90 tablet, Rfl: 1  •  Probiotic Product (PROBIOTIC DAILY PO), Take  by mouth Daily., Disp: , Rfl:   •  rosuvastatin (Crestor) 5 MG tablet, Take 1 tablet by mouth Daily., Disp: 90 tablet, Rfl: 1  •  sennosides-docusate (PERICOLACE) 8.6-50 MG per tablet, Take 1 tablet by mouth Daily., Disp: 30 tablet, Rfl: 11  •  vitamin C (ASCORBIC ACID) 500 MG tablet, TAKE 1 TABLET BY MOUTH EVERY OTHER DAY, Disp: 30 tablet, Rfl: 3  •  vitamin D (ERGOCALCIFEROL) 1.25 MG (08626 UT) capsule capsule, TAKE 1 CAPSULE BY MOUTH 1 TIME EVERY WEEK, Disp: 5 capsule, Rfl: 2  •  AZO-CRANBERRY PO, Take 1 tablet by mouth Daily As Needed. (Patient not taking: Reported on 6/11/2025), Disp: , Rfl:   •  Continuous Glucose  (Dexcom G6 ) device, Use 1 each Continuous. (Patient not  taking: Reported on 6/11/2025), Disp: 1 each, Rfl: 0  •  Continuous Glucose Sensor (Dexcom G6 Sensor), Use Every 10 (Ten) Days. (Patient not taking: Reported on 6/11/2025), Disp: 10 each, Rfl: 3  •  Continuous Glucose Transmitter (Dexcom G6 Transmitter) misc, USE AS DIRECTED THREE TIMES DAILY (Patient not taking: Reported on 6/11/2025), Disp: 1 each, Rfl: 0  •  cyanocobalamin 1000 MCG/ML injection, Inject 1 mL into the appropriate muscle as directed by prescriber Every 28 (Twenty-Eight) Days. (Patient not taking: Reported on 6/11/2025), Disp: 1 mL, Rfl: 6  •  fenofibrate (TRICOR) 145 MG tablet, Take 1 tablet by mouth Daily. (Patient not taking: Reported on 6/11/2025), Disp: 90 tablet, Rfl: 1  •  glucose monitor monitoring kit, 1 each as needed (DM II). (Patient not taking: Reported on 6/11/2025), Disp: 1 each, Rfl: 0  •  Lancets (OneTouch Delica Plus [id number]) misc, USE TO TEST BLOOD SUGAR DAILY AS DIRECTED (Patient not taking: Reported on 6/11/2025), Disp: , Rfl:   •  Lancets Misc. misc, For Daily testing  E11.65 (Patient not taking: Reported on 6/11/2025), Disp: 50 each, Rfl: 11  •  nitrofurantoin, macrocrystal-monohydrate, (MACROBID) 100 MG capsule, TAKE 1 CAPSULE BY MOUTH TWICE DAILY FOR 7 DAYS, THEN TAKE 1 CAPSULE EVERY NIGHT AT BEDTIME AS DIRECTED (Patient not taking: Reported on 6/11/2025), Disp: 30 capsule, Rfl: 0  •  OneTouch Ultra test strip, USE TO TEST BLOOD SUGAR DAILY AS DIRECTED (Patient not taking: Reported on 6/11/2025), Disp: 50 each, Rfl: 5  •  Semaglutide,0.25 or 0.5MG/DOS, (OZEMPIC) 2 MG/1.5ML solution pen-injector, Inject 0.25 mg under the skin into the appropriate area as directed 1 (One) Time Per Week. (Patient not taking: Reported on 6/11/2025), Disp: 1.5 mL, Rfl: 2        Allergies:  Allergies   Allergen Reactions   • Other Unknown - High Severity     Blisters from EKG stickers   • Bactrim [Sulfamethoxazole-Trimethoprim] Hives   • Ciprofloxacin Hives   • Penicillins Hives   • Steri-Strip  "Compound Benzoin [Benzoin] Hives   • Sulfa Antibiotics Hives           Family History:  Family History   Adopted: Yes   Problem Relation Age of Onset   • Heart disease Mother         also had cancer kidney   • Cancer Mother         kidney   • Thyroid disease Mother    • Heart disease Father    • Diabetes Brother    • Arthritis Maternal Aunt         [place]   • Asthma Son    • Diabetes Brother    • Breast cancer Neg Hx            Surgical History:  Past Surgical History:   Procedure Laterality Date   • BREAST BIOPSY Bilateral 2005    benign   • BREAST BIOPSY Left 12/29/2022    Procedure: BREAST BIOPSY WITH NEEDLE LOCALIZATION;  Surgeon: Dong Mata MD;  Location: [facility];  Service: General;  Laterality: Left;   • BREAST LUMPECTOMY Left    • CARPAL TUNNEL RELEASE Right 08/22/2024    Procedure: CARPAL TUNNEL RELEASE RIGHT;  Surgeon: Christopher Peter MD;  Location: [facility];  Service: Orthopedics;  Laterality: Right;   • CATARACT EXTRACTION WITH INTRAOCULAR LENS IMPLANT Right 01/29/2024   • CHOLECYSTECTOMY  2018?   • D & C HYSTEROSCOPY     • DUPUYTREN CONTRACTURE RELEASE Right 08/22/2024    Procedure: PALMAR FASCIECTOMY RIGHT HAND;  Surgeon: Christopher Peter MD;  Location: [facility];  Service: Orthopedics;  Laterality: Right;   • ENDOSCOPY     • ENDOSCOPY N/A 01/11/2018    Procedure: ESOPHAGOGASTRODUODENOSCOPY;  Surgeon: Dong Mata MD;  Location: [facility];  Service:    • EYE SURGERY     • MAMMO BILATERAL  09/2015   • OVARIAN CYST DRAINAGE     • MN LAPAROSCOPY SURG CHOLECYSTECTOMY N/A 01/11/2018    Procedure: CHOLECYSTECTOMY LAPAROSCOPIC;  Surgeon: Dong Mata MD;  Location: [facility];  Service: General   • SKIN BIOPSY     • US GUIDED LYMPH NODE BIOPSY  04/11/2018         Vitals:  Vitals:    06/11/25 0912   Weight: 85.7 kg (189 lb)   Height: 157.5 cm (62\")     Body mass index is 34.57 kg/m².        Orthopedic Examination:   Right hand demonstrates tenderness directly over A1 " pulley of thumb with triggering.  She has normal sensation active motion of her thumbs with negative grind negative Finkelstein's test.          Assessment & Plan:   74 y.o. female presents with right trigger thumb 2 months duration she request steroid injection and is given Depo-Medrol 40 mg lidocaine block within the flexor tendon sheath right thumb.  She will return as needed.           Diagnosis Plan   1. Trigger thumb, right thumb              - Hand/Upper Extremity Injection: R thumb A1 for trigger finger on 6/11/2025 8:04 PM  Indications: pain  Details: 25 G needle, volar approach  Medications: 2 mL lidocaine PF 1% 1 %; 40 mg methylPREDNISolone acetate 40 MG/ML  Outcome: tolerated well, no immediate complications  Procedure, treatment alternatives, risks and benefits explained, specific risks discussed. Consent was given by the patient. Immediately prior to procedure a time out was called to verify the correct patient, procedure, equipment, support staff and site/side marked as required. Patient was prepped and draped in the usual sterile fashion.           Cc:  Tashia Vallejo APRN              This document has been electronically signed by Christopher Peter MD   June 12, 2025 20:03 EDT

## 2025-06-13 ENCOUNTER — OFFICE VISIT (OUTPATIENT)
Dept: FAMILY MEDICINE CLINIC | Facility: CLINIC | Age: 75
End: 2025-06-13
Payer: MEDICARE

## 2025-06-13 VITALS
WEIGHT: 187.6 LBS | SYSTOLIC BLOOD PRESSURE: 124 MMHG | BODY MASS INDEX: 34.52 KG/M2 | HEIGHT: 62 IN | DIASTOLIC BLOOD PRESSURE: 62 MMHG | HEART RATE: 74 BPM | TEMPERATURE: 97.1 F | OXYGEN SATURATION: 96 %

## 2025-06-13 DIAGNOSIS — J18.9 ATYPICAL PNEUMONIA: Primary | ICD-10-CM

## 2025-06-13 PROCEDURE — 1126F AMNT PAIN NOTED NONE PRSNT: CPT | Performed by: STUDENT IN AN ORGANIZED HEALTH CARE EDUCATION/TRAINING PROGRAM

## 2025-06-13 PROCEDURE — 99213 OFFICE O/P EST LOW 20 MIN: CPT | Performed by: STUDENT IN AN ORGANIZED HEALTH CARE EDUCATION/TRAINING PROGRAM

## 2025-06-13 PROCEDURE — 3044F HG A1C LEVEL LT 7.0%: CPT | Performed by: STUDENT IN AN ORGANIZED HEALTH CARE EDUCATION/TRAINING PROGRAM

## 2025-06-13 PROCEDURE — 87428 SARSCOV & INF VIR A&B AG IA: CPT | Performed by: STUDENT IN AN ORGANIZED HEALTH CARE EDUCATION/TRAINING PROGRAM

## 2025-06-13 RX ORDER — DOXYCYCLINE 100 MG/1
100 CAPSULE ORAL 2 TIMES DAILY
Qty: 20 CAPSULE | Refills: 0 | Status: SHIPPED | OUTPATIENT
Start: 2025-06-13 | End: 2025-06-23

## 2025-06-13 NOTE — PROGRESS NOTES
Chief Complaint  URI    HPI:   URI        Shey King is a 74 y.o. female who presents today to [facility] for URI.  History of Present Illness  The patient presents for evaluation of a cough.    She reports a persistent cough that has been present for approximately one week, accompanied by the production of sputum that varies in color from clear to green. She also experiences shortness of breath, particularly when she is on the verge of falling asleep, during which she momentarily loses his ability to breathe. Additionally, she mentions intermittent fevers over the past few days.       Previous History:   Past Medical History:   Diagnosis Date    Abdominal pain, LLQ (left lower quadrant)     Abnormal ECG aug 2021    Abnormal Pap smear of cervix     several years ago    Anemia     years ago    Arthritis of back 2024    Cataract early stage    Chronic kidney disease     Cystitis     Diabetes mellitus     Diarrhea     Diverticulitis of colon     Diverticulosis     Ductal carcinoma in situ (DCIS) of left breast 12/21/2022    Elevated cholesterol     Encounter for cholecystectomy 2018    Gallstones     GERD (gastroesophageal reflux disease)     Hiatal hernia     Hyperglycemia     Hyperlipidemia     Hyperlipidemia     Hypothyroidism     Knee swelling     Leaky heart valve     Lung nodule     Muscle spasm     FLANK PAIN    OAB (overactive bladder)     Pneumonia     years ago    Rectal bleeding     UTI (urinary tract infection)       Past Surgical History:   Procedure Laterality Date    BREAST BIOPSY Bilateral 2005    benign    BREAST BIOPSY Left 12/29/2022    Procedure: BREAST BIOPSY WITH NEEDLE LOCALIZATION;  Surgeon: Dong Mata MD;  Location: [facility];  Service: General;  Laterality: Left;    BREAST LUMPECTOMY Left     CARPAL TUNNEL RELEASE Right 08/22/2024    Procedure: CARPAL TUNNEL RELEASE RIGHT;  Surgeon: Christopher Peter MD;  Location: [facility] OR;  Service:  Orthopedics;  Laterality: Right;    CATARACT EXTRACTION WITH INTRAOCULAR LENS IMPLANT Right 01/29/2024    CHOLECYSTECTOMY  2018?    D & C HYSTEROSCOPY      DUPUYTREN CONTRACTURE RELEASE Right 08/22/2024    Procedure: PALMAR FASCIECTOMY RIGHT HAND;  Surgeon: Christopher Peter MD;  Location: [facility] OR;  Service: Orthopedics;  Laterality: Right;    ENDOSCOPY      ENDOSCOPY N/A 01/11/2018    Procedure: ESOPHAGOGASTRODUODENOSCOPY;  Surgeon: Dong Mata MD;  Location: [facility] OR;  Service:     EYE SURGERY      MAMMO BILATERAL  09/2015    OVARIAN CYST DRAINAGE      CT LAPAROSCOPY SURG CHOLECYSTECTOMY N/A 01/11/2018    Procedure: CHOLECYSTECTOMY LAPAROSCOPIC;  Surgeon: Dong Mata MD;  Location: [facility] OR;  Service: General    SKIN BIOPSY      US GUIDED LYMPH NODE BIOPSY  04/11/2018      Social History     Socioeconomic History    Marital status:    Tobacco Use    Smoking status: Never    Smokeless tobacco: Never    Tobacco comments:     never   Vaping Use    Vaping status: Never Used   Substance and Sexual Activity    Alcohol use: No    Drug use: No    Sexual activity: Defer     Partners: Male      Health Maintenance Due   Topic Date Due    DIABETIC FOOT EXAM  Never done    TDAP/TD VACCINES (1 - Tdap) Never done    HEPATITIS C SCREENING  Never done    DIABETIC EYE EXAM  01/11/2025        Current Medications:  Current Outpatient Medications   Medication Sig Dispense Refill    acetaminophen (TYLENOL) 325 MG tablet Take 2 tablets by mouth Every 4 (Four) Hours As Needed for Mild Pain. 30 tablet 0    anastrozole (ARIMIDEX) 1 MG tablet Take 1 tablet by mouth Daily. 30 tablet 11    aspirin 81 MG chewable tablet Chew 1 tablet Daily.      empagliflozin (Jardiance) 10 MG tablet tablet Take 1 tablet by mouth Daily. 90 tablet 1    levothyroxine (Synthroid) 112 MCG tablet Take 1 tablet by mouth Daily. 90 tablet 1    Probiotic Product (PROBIOTIC DAILY PO) Take  by mouth Daily.      rosuvastatin (Crestor) 5  MG tablet Take 1 tablet by mouth Daily. 90 tablet 1    sennosides-docusate (PERICOLACE) 8.6-50 MG per tablet Take 1 tablet by mouth Daily. 30 tablet 11    vitamin C (ASCORBIC ACID) 500 MG tablet TAKE 1 TABLET BY MOUTH EVERY OTHER DAY 30 tablet 3    vitamin D (ERGOCALCIFEROL) 1.25 MG (40996 UT) capsule capsule TAKE 1 CAPSULE BY MOUTH 1 TIME EVERY WEEK 5 capsule 2    AZO-CRANBERRY PO Take 1 tablet by mouth Daily As Needed. (Patient not taking: Reported on 6/11/2025)      Continuous Glucose  (Dexcom G6 ) device Use 1 each Continuous. (Patient not taking: Reported on 6/11/2025) 1 each 0    Continuous Glucose Sensor (Dexcom G6 Sensor) Use Every 10 (Ten) Days. (Patient not taking: Reported on 6/11/2025) 10 each 3    Continuous Glucose Transmitter (Dexcom G6 Transmitter) misc USE AS DIRECTED THREE TIMES DAILY (Patient not taking: Reported on 6/11/2025) 1 each 0    cyanocobalamin 1000 MCG/ML injection Inject 1 mL into the appropriate muscle as directed by prescriber Every 28 (Twenty-Eight) Days. (Patient not taking: Reported on 6/11/2025) 1 mL 6    doxycycline (VIBRAMYCIN) 100 MG capsule Take 1 capsule by mouth 2 (Two) Times a Day for 10 days. 20 capsule 0    fenofibrate (TRICOR) 145 MG tablet Take 1 tablet by mouth Daily. (Patient not taking: Reported on 6/11/2025) 90 tablet 1    glucose monitor monitoring kit 1 each as needed (DM II). (Patient not taking: Reported on 6/11/2025) 1 each 0    Lancets (OneTouch Delica Plus Ibdxlu80G) misc USE TO TEST BLOOD SUGAR DAILY AS DIRECTED (Patient not taking: Reported on 6/11/2025)      Lancets Misc. misc For Daily testing  E11.65 (Patient not taking: Reported on 6/11/2025) 50 each 11    nitrofurantoin, macrocrystal-monohydrate, (MACROBID) 100 MG capsule TAKE 1 CAPSULE BY MOUTH TWICE DAILY FOR 7 DAYS, THEN TAKE 1 CAPSULE EVERY NIGHT AT BEDTIME AS DIRECTED (Patient not taking: Reported on 6/11/2025) 30 capsule 0    OneTouch Ultra test strip USE TO TEST BLOOD SUGAR DAILY  "AS DIRECTED (Patient not taking: Reported on 6/11/2025) 50 each 5    Semaglutide,0.25 or 0.5MG/DOS, (OZEMPIC) 2 MG/1.5ML solution pen-injector Inject 0.25 mg under the skin into the appropriate area as directed 1 (One) Time Per Week. (Patient not taking: Reported on 6/11/2025) 1.5 mL 2     No current facility-administered medications for this visit.       Allergies:   Allergies   Allergen Reactions    Other Unknown - High Severity     Blisters from EKG stickers    Bactrim [Sulfamethoxazole-Trimethoprim] Hives    Ciprofloxacin Hives    Penicillins Hives    Steri-Strip Compound Benzoin [Benzoin] Hives    Sulfa Antibiotics Hives       Vitals:   /62 (BP Location: Right arm, Patient Position: Sitting, Cuff Size: Adult)   Pulse 74   Temp 97.1 °F (36.2 °C) (Temporal)   Ht 157.5 cm (62\")   Wt 85.1 kg (187 lb 9.6 oz)   SpO2 96%   BMI 34.31 kg/m²     Estimated body mass index is 34.31 kg/m² as calculated from the following:    Height as of this encounter: 157.5 cm (62\").    Weight as of this encounter: 85.1 kg (187 lb 9.6 oz).    Shey King  reports that she has never smoked. She has never used smokeless tobacco.            Physical Exam:   Physical Exam  Constitutional:       Appearance: Normal appearance.   HENT:      Mouth/Throat:      Mouth: Mucous membranes are moist.   Cardiovascular:      Rate and Rhythm: Normal rate and regular rhythm.   Pulmonary:      Effort: Pulmonary effort is normal.      Breath sounds: Normal breath sounds. No wheezing or rhonchi.   Musculoskeletal:         General: Normal range of motion.   Skin:     General: Skin is warm and dry.   Neurological:      Mental Status: She is alert.   Psychiatric:         Mood and Affect: Mood normal.          Lab Results:   Office Visit on 06/13/2025   Component Date Value Ref Range Status    SARS Antigen 06/13/2025 Not Detected  Not Detected, Presumptive Negative Final    Influenza A Antigen SHERRON 06/13/2025 Not Detected  Not Detected Final    " Influenza B Antigen SHERRON 06/13/2025 Not Detected  Not Detected Final    Internal Control 06/13/2025 Passed  Passed Final    Lot Number 06/13/2025 [id number]   Final    Expiration Date 06/13/2025 2026-03-05   Final   Lab on 06/05/2025   Component Date Value Ref Range Status    Iron 06/05/2025 55  37 - 145 mcg/dL Final    Iron Saturation (TSAT) 06/05/2025 14 (L)  20 - 50 % Final    Transferrin 06/05/2025 264  200 - 360 mg/dL Final    TIBC 06/05/2025 393  298 - 536 mcg/dL Final    Folate 06/05/2025 10.90  4.78 - 24.20 ng/mL Final    Ferritin 06/05/2025 114.00  13.00 - 150.00 ng/mL Final    Vitamin B-12 06/05/2025 301  211 - 946 pg/mL Final    WBC 06/05/2025 7.97  3.40 - 10.80 10*3/mm3 Final    RBC 06/05/2025 4.25  3.77 - 5.28 10*6/mm3 Final    Hemoglobin 06/05/2025 12.8  12.0 - 15.9 g/dL Final    Hematocrit 06/05/2025 38.9  34.0 - 46.6 % Final    MCV 06/05/2025 91.5  79.0 - 97.0 fL Final    MCH 06/05/2025 30.1  26.6 - 33.0 pg Final    MCHC 06/05/2025 32.9  31.5 - 35.7 g/dL Final    RDW 06/05/2025 14.2  12.3 - 15.4 % Final    RDW-SD 06/05/2025 47.4  37.0 - 54.0 fl Final    MPV 06/05/2025 10.9  6.0 - 12.0 fL Final    Platelets 06/05/2025 254  140 - 450 10*3/mm3 Final    Neutrophil % 06/05/2025 75.2  42.7 - 76.0 % Final    Lymphocyte % 06/05/2025 14.9 (L)  19.6 - 45.3 % Final    Monocyte % 06/05/2025 5.8  5.0 - 12.0 % Final    Eosinophil % 06/05/2025 2.9  0.3 - 6.2 % Final    Basophil % 06/05/2025 0.6  0.0 - 1.5 % Final    Immature Grans % 06/05/2025 0.6 (H)  0.0 - 0.5 % Final    Neutrophils, Absolute 06/05/2025 5.99  1.70 - 7.00 10*3/mm3 Final    Lymphocytes, Absolute 06/05/2025 1.19  0.70 - 3.10 10*3/mm3 Final    Monocytes, Absolute 06/05/2025 0.46  0.10 - 0.90 10*3/mm3 Final    Eosinophils, Absolute 06/05/2025 0.23  0.00 - 0.40 10*3/mm3 Final    Basophils, Absolute 06/05/2025 0.05  0.00 - 0.20 10*3/mm3 Final    Immature Grans, Absolute 06/05/2025 0.05  0.00 - 0.05 10*3/mm3 Final    nRBC 06/05/2025 0.0  0.0 - 0.2 /100 WBC  Final   Lab on 05/12/2025   Component Date Value Ref Range Status    WBC 05/12/2025 7.95  3.40 - 10.80 10*3/mm3 Final    RBC 05/12/2025 4.33  3.77 - 5.28 10*6/mm3 Final    Hemoglobin 05/12/2025 13.0  12.0 - 15.9 g/dL Final    Hematocrit 05/12/2025 38.5  34.0 - 46.6 % Final    MCV 05/12/2025 88.9  79.0 - 97.0 fL Final    MCH 05/12/2025 30.0  26.6 - 33.0 pg Final    MCHC 05/12/2025 33.8  31.5 - 35.7 g/dL Final    RDW 05/12/2025 13.1  12.3 - 15.4 % Final    RDW-SD 05/12/2025 42.5  37.0 - 54.0 fl Final    MPV 05/12/2025 11.3  6.0 - 12.0 fL Final    Platelets 05/12/2025 266  140 - 450 10*3/mm3 Final    Neutrophil % 05/12/2025 74.1  42.7 - 76.0 % Final    Lymphocyte % 05/12/2025 14.8 (L)  19.6 - 45.3 % Final    Monocyte % 05/12/2025 6.7  5.0 - 12.0 % Final    Eosinophil % 05/12/2025 2.9  0.3 - 6.2 % Final    Basophil % 05/12/2025 0.9  0.0 - 1.5 % Final    Immature Grans % 05/12/2025 0.6 (H)  0.0 - 0.5 % Final    Neutrophils, Absolute 05/12/2025 5.89  1.70 - 7.00 10*3/mm3 Final    Lymphocytes, Absolute 05/12/2025 1.18  0.70 - 3.10 10*3/mm3 Final    Monocytes, Absolute 05/12/2025 0.53  0.10 - 0.90 10*3/mm3 Final    Eosinophils, Absolute 05/12/2025 0.23  0.00 - 0.40 10*3/mm3 Final    Basophils, Absolute 05/12/2025 0.07  0.00 - 0.20 10*3/mm3 Final    Immature Grans, Absolute 05/12/2025 0.05  0.00 - 0.05 10*3/mm3 Final    nRBC 05/12/2025 0.0  0.0 - 0.2 /100 WBC Final    Glucose 05/12/2025 144 (H)  65 - 99 mg/dL Final    BUN 05/12/2025 13  8 - 23 mg/dL Final    Creatinine 05/12/2025 0.99  0.57 - 1.00 mg/dL Final    Sodium 05/12/2025 140  136 - 145 mmol/L Final    Potassium 05/12/2025 4.3  3.5 - 5.2 mmol/L Final    Chloride 05/12/2025 105  98 - 107 mmol/L Final    CO2 05/12/2025 20.7 (L)  22.0 - 29.0 mmol/L Final    Calcium 05/12/2025 9.7  8.6 - 10.5 mg/dL Final    Total Protein 05/12/2025 8.4  6.0 - 8.5 g/dL Final    Albumin 05/12/2025 4.4  3.5 - 5.2 g/dL Final    ALT (SGPT) 05/12/2025 16  1 - 33 U/L Final    AST (SGOT)  05/12/2025 19  1 - 32 U/L Final    Alkaline Phosphatase 05/12/2025 122 (H)  39 - 117 U/L Final    Total Bilirubin 05/12/2025 0.7  0.0 - 1.2 mg/dL Final    Globulin 05/12/2025 4.0  gm/dL Final    A/G Ratio 05/12/2025 1.1  g/dL Final    BUN/Creatinine Ratio 05/12/2025 13.1  7.0 - 25.0 Final    Anion Gap 05/12/2025 14.3  5.0 - 15.0 mmol/L Final    eGFR 05/12/2025 60.7  >60.0 mL/min/1.73 Final    Hemoglobin A1C 05/12/2025 6.80 (H)  4.80 - 5.60 % Final    Total Cholesterol 05/12/2025 153  0 - 200 mg/dL Final    Triglycerides 05/12/2025 161 (H)  0 - 150 mg/dL Final    HDL Cholesterol 05/12/2025 37 (L)  40 - 60 mg/dL Final    LDL Cholesterol  05/12/2025 88  0 - 100 mg/dL Final    VLDL Cholesterol 05/12/2025 28  5 - 40 mg/dL Final    LDL/HDL Ratio 05/12/2025 2.26   Final    TSH 05/12/2025 0.755  0.270 - 4.200 uIU/mL Final    Iron 05/12/2025 63  37 - 145 mcg/dL Final    Iron Saturation (TSAT) 05/12/2025 15 (L)  20 - 50 % Final    Transferrin 05/12/2025 287  200 - 360 mg/dL Final    TIBC 05/12/2025 428  298 - 536 mcg/dL Final    Ferritin 05/12/2025 145.00  13.00 - 150.00 ng/mL Final   Office Visit on 04/02/2025   Component Date Value Ref Range Status    Color 04/02/2025 Yellow  Yellow, Straw, Dark Yellow, Ana Final    Clarity, UA 04/02/2025 Clear  Clear Final    Specific Gravity  04/02/2025 1.015  1.005 - 1.030 Final    pH, Urine 04/02/2025 6.0  5.0 - 8.0 Final    Leukocytes 04/02/2025 Negative  Negative Final    Nitrite, UA 04/02/2025 Negative  Negative Final    Protein, POC 04/02/2025 Negative  Negative mg/dL Final    Glucose, UA 04/02/2025 3+ (A)  Negative mg/dL Final    Ketones, UA 04/02/2025 Negative  Negative Final    Urobilinogen, UA 04/02/2025 Normal  Normal, 0.2 E.U./dL Final    Bilirubin 04/02/2025 Negative  Negative Final    Blood, UA 04/02/2025 Negative  Negative Final    Lot Number 04/02/2025 [id number]   Final    Expiration Date 04/02/2025 7,102,026   Final   Lab on 02/11/2025   Component Date Value Ref  Range Status    Glucose 02/11/2025 169 (H)  65 - 99 mg/dL Final    BUN 02/11/2025 9  8 - 23 mg/dL Final    Creatinine 02/11/2025 1.08 (H)  0.57 - 1.00 mg/dL Final    Sodium 02/11/2025 138  136 - 145 mmol/L Final    Potassium 02/11/2025 4.5  3.5 - 5.2 mmol/L Final    Chloride 02/11/2025 103  98 - 107 mmol/L Final    CO2 02/11/2025 23.6  22.0 - 29.0 mmol/L Final    Calcium 02/11/2025 9.6  8.6 - 10.5 mg/dL Final    Total Protein 02/11/2025 7.8  6.0 - 8.5 g/dL Final    Albumin 02/11/2025 4.2  3.5 - 5.2 g/dL Final    ALT (SGPT) 02/11/2025 20  1 - 33 U/L Final    AST (SGOT) 02/11/2025 20  1 - 32 U/L Final    Alkaline Phosphatase 02/11/2025 89  39 - 117 U/L Final    Total Bilirubin 02/11/2025 0.6  0.0 - 1.2 mg/dL Final    Globulin 02/11/2025 3.6  gm/dL Final    A/G Ratio 02/11/2025 1.2  g/dL Final    BUN/Creatinine Ratio 02/11/2025 8.3  7.0 - 25.0 Final    Anion Gap 02/11/2025 11.4  5.0 - 15.0 mmol/L Final    eGFR 02/11/2025 54.7 (L)  >60.0 mL/min/1.73 Final    Hemoglobin A1C 02/11/2025 6.70 (H)  4.80 - 5.60 % Final    Total Cholesterol 02/11/2025 205 (H)  0 - 200 mg/dL Final    Triglycerides 02/11/2025 168 (H)  0 - 150 mg/dL Final    HDL Cholesterol 02/11/2025 36 (L)  40 - 60 mg/dL Final    LDL Cholesterol  02/11/2025 139 (H)  0 - 100 mg/dL Final    VLDL Cholesterol 02/11/2025 30  5 - 40 mg/dL Final    LDL/HDL Ratio 02/11/2025 3.76   Final    TSH 02/11/2025 1.240  0.270 - 4.200 uIU/mL Final    Color, UA 02/11/2025 Yellow  Yellow, Straw Final    Appearance, UA 02/11/2025 Clear  Clear Final    pH, UA 02/11/2025 5.5  5.0 - 8.0 Final    Specific Gravity, UA 02/11/2025 1.027  1.005 - 1.030 Final    Glucose, UA 02/11/2025 >=1000 mg/dL (3+) (A)  Negative Final    Ketones, UA 02/11/2025 Negative  Negative Final    Bilirubin, UA 02/11/2025 Negative  Negative Final    Blood, UA 02/11/2025 Negative  Negative Final    Protein, UA 02/11/2025 Negative  Negative Final    Leuk Esterase, UA 02/11/2025 Trace (A)  Negative Final    Nitrite,  UA 02/11/2025 Negative  Negative Final    Urobilinogen, UA 02/11/2025 0.2 E.U./dL  0.2 - 1.0 E.U./dL Final    Extra Tube 02/11/2025 Hold for add-ons.   Final    Auto resulted.    RBC, UA 02/11/2025 0-2  None Seen, 0-2 /HPF Final    WBC, UA 02/11/2025 11-20 (A)  None Seen, 0-2 /HPF Final    Bacteria, UA 02/11/2025 None Seen  None Seen /HPF Final    Squamous Epithelial Cells, UA 02/11/2025 3-6 (A)  None Seen, 0-2 /HPF Final    Hyaline Casts, UA 02/11/2025 None Seen  None Seen /LPF Final    Methodology 02/11/2025 Automated Microscopy   Final    Urine Culture 02/11/2025 No growth   Final   Lab on 02/05/2025   Component Date Value Ref Range Status    Iron 02/05/2025 83  37 - 145 mcg/dL Final    Iron Saturation (TSAT) 02/05/2025 16 (L)  20 - 50 % Final    Transferrin 02/05/2025 342  200 - 360 mg/dL Final    TIBC 02/05/2025 510  298 - 536 mcg/dL Final    Folate 02/05/2025 9.34  4.78 - 24.20 ng/mL Final    Ferritin 02/05/2025 158.10 (H)  13.00 - 150.00 ng/mL Final    Vitamin B-12 02/05/2025 354  211 - 946 pg/mL Final    WBC 02/05/2025 6.99  3.40 - 10.80 10*3/mm3 Final    RBC 02/05/2025 4.17  3.77 - 5.28 10*6/mm3 Final    Hemoglobin 02/05/2025 12.5  12.0 - 15.9 g/dL Final    Hematocrit 02/05/2025 38.9  34.0 - 46.6 % Final    MCV 02/05/2025 93.3  79.0 - 97.0 fL Final    MCH 02/05/2025 30.0  26.6 - 33.0 pg Final    MCHC 02/05/2025 32.1  31.5 - 35.7 g/dL Final    RDW 02/05/2025 13.4  12.3 - 15.4 % Final    RDW-SD 02/05/2025 45.8  37.0 - 54.0 fl Final    MPV 02/05/2025 10.4  6.0 - 12.0 fL Final    Platelets 02/05/2025 270  140 - 450 10*3/mm3 Final    Neutrophil % 02/05/2025 72.0  42.7 - 76.0 % Final    Lymphocyte % 02/05/2025 16.5 (L)  19.6 - 45.3 % Final    Monocyte % 02/05/2025 6.6  5.0 - 12.0 % Final    Eosinophil % 02/05/2025 3.3  0.3 - 6.2 % Final    Basophil % 02/05/2025 1.0  0.0 - 1.5 % Final    Immature Grans % 02/05/2025 0.6 (H)  0.0 - 0.5 % Final    Neutrophils, Absolute 02/05/2025 5.04  1.70 - 7.00 10*3/mm3 Final     Lymphocytes, Absolute 02/05/2025 1.15  0.70 - 3.10 10*3/mm3 Final    Monocytes, Absolute 02/05/2025 0.46  0.10 - 0.90 10*3/mm3 Final    Eosinophils, Absolute 02/05/2025 0.23  0.00 - 0.40 10*3/mm3 Final    Basophils, Absolute 02/05/2025 0.07  0.00 - 0.20 10*3/mm3 Final    Immature Grans, Absolute 02/05/2025 0.04  0.00 - 0.05 10*3/mm3 Final    nRBC 02/05/2025 0.0  0.0 - 0.2 /100 WBC Final   Lab on 01/02/2025   Component Date Value Ref Range Status    Urine Culture 01/02/2025 25,000 CFU/mL Normal Urogenital Kathy   Final   Lab on 12/18/2024   Component Date Value Ref Range Status    Urine Culture 12/18/2024 >100,000 CFU/mL Escherichia coli ESBL (A)   Final       Assessment and Plan  Diagnoses and all orders for this visit:    1. Atypical pneumonia (Primary)  -     POCT SARS-CoV-2 Antigen SHERRON + Flu  -     doxycycline (VIBRAMYCIN) 100 MG capsule; Take 1 capsule by mouth 2 (Two) Times a Day for 10 days.  Dispense: 20 capsule; Refill: 0      Assessment & Plan  1. Atypical pneumonia  - Symptoms include coughing with occasional green sputum, shortness of breath, and intermittent fever for the past week.  - The condition is likely viral, possibly RSV, and not bacterial, COVID-19, or influenza.  - Antibiotics are not typically recommended for viral infections, but doxycycline 100 mg has been prescribed to cover for atypical pneumonia due to the patient's request and potential anti-inflammatory benefits and it being the weekend to prevent need for further eval.   - Advised to temporarily discontinue iron supplements and vitamins C and D during the course of doxycycline to avoid potential nausea.               New Medications:   New Medications Ordered This Visit   Medications    doxycycline (VIBRAMYCIN) 100 MG capsule     Sig: Take 1 capsule by mouth 2 (Two) Times a Day for 10 days.     Dispense:  20 capsule     Refill:  0       Discontinued Medications:   Medications Discontinued During This Encounter   Medication Reason     ferrous sulfate 325 (65 FE) MG tablet                  Follow Up:   No follow-ups on file.    Patient was given instructions and counseling regarding her condition or for health maintenance advice. Please see specific information pulled into the AVS if appropriate.     Patient or patient representative verbalized consent for the use of Ambient Listening during the visit with  Navneet Hernández DO for chart documentation. 6/13/2025  14:11 EDT       This document has been electronically signed by Navneet Hernández DO   June 13, 2025 14:12 EDT      Dictated Utilizing Dragon Dictation: Part of this note may be an electronic transcription/translation of spoken language to printed text using the Dragon Dictation System.

## 2025-06-16 RX ORDER — LIDOCAINE HYDROCHLORIDE 10 MG/ML
2 INJECTION, SOLUTION EPIDURAL; INFILTRATION; INTRACAUDAL; PERINEURAL
Status: COMPLETED | OUTPATIENT
Start: 2025-06-11 | End: 2025-06-11

## 2025-06-16 RX ORDER — METHYLPREDNISOLONE ACETATE 40 MG/ML
40 INJECTION, SUSPENSION INTRA-ARTICULAR; INTRALESIONAL; INTRAMUSCULAR; SOFT TISSUE
Status: COMPLETED | OUTPATIENT
Start: 2025-06-11 | End: 2025-06-11

## 2025-06-27 ENCOUNTER — TELEPHONE (OUTPATIENT)
Dept: FAMILY MEDICINE CLINIC | Facility: CLINIC | Age: 75
End: 2025-06-27
Payer: COMMERCIAL

## 2025-06-27 ENCOUNTER — RESULTS FOLLOW-UP (OUTPATIENT)
Dept: FAMILY MEDICINE CLINIC | Facility: CLINIC | Age: 75
End: 2025-06-27

## 2025-06-27 ENCOUNTER — HOSPITAL ENCOUNTER (OUTPATIENT)
Facility: HOSPITAL | Age: 75
Discharge: HOME | End: 2025-06-27
Payer: COMMERCIAL

## 2025-06-27 ENCOUNTER — OFFICE VISIT (OUTPATIENT)
Dept: FAMILY MEDICINE CLINIC | Facility: CLINIC | Age: 75
End: 2025-06-27
Payer: MEDICARE

## 2025-06-27 VITALS
BODY MASS INDEX: 34.41 KG/M2 | SYSTOLIC BLOOD PRESSURE: 128 MMHG | HEART RATE: 79 BPM | DIASTOLIC BLOOD PRESSURE: 66 MMHG | WEIGHT: 187 LBS | TEMPERATURE: 98 F | OXYGEN SATURATION: 95 % | HEIGHT: 62 IN

## 2025-06-27 DIAGNOSIS — R10.2 PELVIC PRESSURE IN FEMALE: ICD-10-CM

## 2025-06-27 DIAGNOSIS — M79.604 RIGHT LEG PAIN: Primary | ICD-10-CM

## 2025-06-27 DIAGNOSIS — M79.604 RIGHT LEG PAIN: ICD-10-CM

## 2025-06-27 DIAGNOSIS — R60.9 EDEMA, UNSPECIFIED TYPE: ICD-10-CM

## 2025-06-27 LAB
BILIRUB BLD-MCNC: NEGATIVE MG/DL
CLARITY, POC: ABNORMAL
COLOR UR: YELLOW
EXPIRATION DATE: ABNORMAL
GLUCOSE UR STRIP-MCNC: ABNORMAL MG/DL
KETONES UR QL: NEGATIVE
LEUKOCYTE EST, POC: NEGATIVE
Lab: ABNORMAL
NITRITE UR-MCNC: NEGATIVE MG/ML
PH UR: 6 [PH] (ref 5–8)
PROT UR STRIP-MCNC: NEGATIVE MG/DL
RBC # UR STRIP: NEGATIVE /UL
SP GR UR: 1.02 (ref 1–1.03)
UROBILINOGEN UR QL: ABNORMAL

## 2025-06-27 PROCEDURE — 93970 EXTREMITY STUDY: CPT | Performed by: RADIOLOGY

## 2025-06-27 PROCEDURE — 1125F AMNT PAIN NOTED PAIN PRSNT: CPT | Performed by: NURSE PRACTITIONER

## 2025-06-27 PROCEDURE — 81003 URINALYSIS AUTO W/O SCOPE: CPT | Performed by: NURSE PRACTITIONER

## 2025-06-27 PROCEDURE — 3044F HG A1C LEVEL LT 7.0%: CPT | Performed by: NURSE PRACTITIONER

## 2025-06-27 PROCEDURE — 93970 EXTREMITY STUDY: CPT

## 2025-06-27 PROCEDURE — 99214 OFFICE O/P EST MOD 30 MIN: CPT | Performed by: NURSE PRACTITIONER

## 2025-06-27 PROCEDURE — 1160F RVW MEDS BY RX/DR IN RCRD: CPT | Performed by: NURSE PRACTITIONER

## 2025-06-27 PROCEDURE — 1159F MED LIST DOCD IN RCRD: CPT | Performed by: NURSE PRACTITIONER

## 2025-06-27 PROCEDURE — 81001 URINALYSIS AUTO W/SCOPE: CPT | Performed by: NURSE PRACTITIONER

## 2025-06-27 RX ORDER — FERROUS SULFATE 325(65) MG
325 TABLET ORAL
COMMUNITY

## 2025-06-27 RX ORDER — NITROFURANTOIN 25; 75 MG/1; MG/1
100 CAPSULE ORAL 2 TIMES DAILY
Qty: 14 CAPSULE | Refills: 0 | Status: SHIPPED | OUTPATIENT
Start: 2025-06-27 | End: 2025-07-04

## 2025-06-27 NOTE — TELEPHONE ENCOUNTER
Caller: Shey King    Relationship: Self    Best call back number: [phone]    What orders are you requesting (i.e. lab or imaging):     VENOUS DOPPLER    Additional notes:     [facility] SAID WE NEED TO CALL THE HOSPITAL TO SCHEDULE THE VENUS DOPPLER SCAN.  THEY HAVE THE ORDER BUT THEY NEED OUR OFFICE TO REACH OUT TO THEM TO SCHEDULE IT

## 2025-06-27 NOTE — PROGRESS NOTES
[facility]     Chief Complaint  Leg Pain (Right leg, posterior knee) and Groin Pain    Shey King is a 74 y.o. female who presents today to [facility] for Leg Pain (Right leg, posterior knee) and Groin Pain.    HPI:   Leg Pain     Groin Pain       History of Present Illness  The patient presents for evaluation of lower extremity pain and a urinary tract infection.    She reports persistent pain localized to the posterior aspect of her leg, which has been ongoing for several weeks. The pain is described as sore and uncomfortable, with no associated paresthesia. She denies any history of deep vein thrombosis or recent trauma. She recalls an incident last week where she experienced acute pain on the dorsum of her foot, as if something had fallen on it. She also mentions a sedentary lifestyle. She does not perceive any edema in her leg. The patient has a history of breast cancer, with the last treatment administered approximately 2.5 years ago. She denies dyspnea or chest pain. Additionally, she reports worsening muscle spasms that have now extended to her back. She has a history of knee issues, possibly related to osteoarthritis, and occasionally speculates if she may have twisted it.    She reports experiencing pressure during micturition and back pain. She has a history of recurrent bladder infection and typically takes antibiotics for treatment. She denies dysuria but notes the presence of pressure. She does not report increased urinary frequency, nausea, or vomiting. She has previously taken phenazopyridine (AZO) daily but has exhausted her supply. She was previously on a prophylactic regimen of nitrofurantoin (Macrobid), prescribed by Dr. Morton's office, which she tolerated well. She has also taken cephalexin (Keflex) and ceftriaxone (Rocephin) in the past without any adverse reactions.    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    Elevated cholesterol     Encounter for cholecystectomy 2018    Gallstones     GERD (gastroesophageal reflux disease)     Hiatal hernia     Hyperglycemia     Hyperlipidemia     Hyperlipidemia     Hypothyroidism     Knee swelling     Leaky heart valve     Lung nodule     Muscle spasm     FLANK PAIN    OAB (overactive bladder)     Pneumonia     years ago    Rectal bleeding     UTI (urinary tract infection)       Past Surgical History:   Procedure Laterality Date    BREAST BIOPSY Bilateral 2005    benign    BREAST BIOPSY Left 12/29/2022    Procedure: BREAST BIOPSY WITH NEEDLE LOCALIZATION;  Surgeon: Dong Mata MD;  Location: [facility] OR;  Service: General;  Laterality: Left;    BREAST LUMPECTOMY Left     CARPAL TUNNEL RELEASE Right 08/22/2024    Procedure: CARPAL TUNNEL RELEASE RIGHT;  Surgeon: Christopher Peter MD;  Location: [facility] OR;  Service: Orthopedics;  Laterality: Right;    CATARACT EXTRACTION WITH INTRAOCULAR LENS IMPLANT Right 01/29/2024    CHOLECYSTECTOMY  2018?    D & C HYSTEROSCOPY      DUPUYTREN CONTRACTURE RELEASE Right 08/22/2024    Procedure: PALMAR FASCIECTOMY RIGHT HAND;  Surgeon: Christopher Petre MD;  Location: [facility] OR;  Service: Orthopedics;  Laterality: Right;    ENDOSCOPY      ENDOSCOPY N/A 01/11/2018    Procedure: ESOPHAGOGASTRODUODENOSCOPY;  Surgeon: Dong Mata MD;  Location: [facility] OR;  Service:     EYE SURGERY      MAMMO BILATERAL  09/2015    OVARIAN CYST DRAINAGE      [place] LAPAROSCOPY SURG CHOLECYSTECTOMY N/A 01/11/2018    Procedure: CHOLECYSTECTOMY LAPAROSCOPIC;  Surgeon: Dong Mata MD;   Location: [facility] OR;  Service: General    SKIN BIOPSY      US GUIDED LYMPH NODE BIOPSY  04/11/2018      Social History     Socioeconomic History    Marital status:    Tobacco Use    Smoking status: Never    Smokeless tobacco: Never    Tobacco comments:     never   Vaping Use    Vaping status: Never Used   Substance and Sexual Activity    Alcohol use: No    Drug use: No    Sexual activity: Defer     Partners: Male      Health Maintenance Due   Topic Date Due    DIABETIC FOOT EXAM  Never done    TDAP/TD VACCINES (1 - Tdap) Never done    HEPATITIS C SCREENING  Never done    DIABETIC EYE EXAM  01/11/2025        Current Medications:  Current Outpatient Medications   Medication Sig Dispense Refill    anastrozole (ARIMIDEX) 1 MG tablet Take 1 tablet by mouth Daily. 30 tablet 11    aspirin 81 MG chewable tablet Chew 1 tablet Daily.      AZO-CRANBERRY PO Take 1 tablet by mouth Daily As Needed.      empagliflozin (Jardiance) 10 MG tablet tablet Take 1 tablet by mouth Daily. 90 tablet 1    ferrous sulfate 325 (65 FE) MG tablet Take 1 tablet by mouth Daily With Breakfast.      levothyroxine (Synthroid) 112 MCG tablet Take 1 tablet by mouth Daily. 90 tablet 1    Probiotic Product (PROBIOTIC DAILY PO) Take  by mouth Daily.      rosuvastatin (Crestor) 5 MG tablet Take 1 tablet by mouth Daily. 90 tablet 1    sennosides-docusate (PERICOLACE) 8.6-50 MG per tablet Take 1 tablet by mouth Daily. 30 tablet 11    vitamin C (ASCORBIC ACID) 500 MG tablet TAKE 1 TABLET BY MOUTH EVERY OTHER DAY 30 tablet 3    vitamin D (ERGOCALCIFEROL) 1.25 MG (23747 UT) capsule capsule TAKE 1 CAPSULE BY MOUTH 1 TIME EVERY WEEK 5 capsule 2    acetaminophen (TYLENOL) 325 MG tablet Take 2 tablets by mouth Every 4 (Four) Hours As Needed for Mild Pain. (Patient not taking: Reported on 6/27/2025) 30 tablet 0    Continuous Glucose  (Dexcom G6 ) device Use 1 each Continuous. (Patient not taking: Reported on 6/27/2025) 1 each 0    Continuous  "Glucose Sensor (Dexcom G6 Sensor) Use Every 10 (Ten) Days. (Patient not taking: Reported on 6/27/2025) 10 each 3    Continuous Glucose Transmitter (Dexcom G6 Transmitter) misc USE AS DIRECTED THREE TIMES DAILY (Patient not taking: Reported on 6/27/2025) 1 each 0    cyanocobalamin 1000 MCG/ML injection Inject 1 mL into the appropriate muscle as directed by prescriber Every 28 (Twenty-Eight) Days. (Patient not taking: Reported on 6/27/2025) 1 mL 6    fenofibrate (TRICOR) 145 MG tablet Take 1 tablet by mouth Daily. (Patient not taking: Reported on 6/27/2025) 90 tablet 1    glucose monitor monitoring kit 1 each as needed (DM II). (Patient not taking: Reported on 6/27/2025) 1 each 0    Lancets (OneTouch Delica Plus [id number]) misc USE TO TEST BLOOD SUGAR DAILY AS DIRECTED (Patient not taking: Reported on 6/27/2025)      Lancets Misc. misc For Daily testing  E11.65 (Patient not taking: Reported on 6/27/2025) 50 each 11    nitrofurantoin, macrocrystal-monohydrate, (Macrobid) 100 MG capsule Take 1 capsule by mouth 2 (Two) Times a Day for 7 days. 14 capsule 0    OneTouch Ultra test strip USE TO TEST BLOOD SUGAR DAILY AS DIRECTED (Patient not taking: Reported on 6/27/2025) 50 each 5    Semaglutide,0.25 or 0.5MG/DOS, (OZEMPIC) 2 MG/1.5ML solution pen-injector Inject 0.25 mg under the skin into the appropriate area as directed 1 (One) Time Per Week. (Patient not taking: Reported on 6/27/2025) 1.5 mL 2     No current facility-administered medications for this visit.       Allergies:   Allergies   Allergen Reactions    Other Unknown - High Severity     Blisters from EKG stickers    Bactrim [Sulfamethoxazole-Trimethoprim] Hives    Ciprofloxacin Hives    Penicillins Hives    Steri-Strip Compound Benzoin [Benzoin] Hives    Sulfa Antibiotics Hives       Vitals:   /66 (BP Location: Right arm, Patient Position: Sitting)   Pulse 79   Temp 98 °F (36.7 °C)   Ht 157.5 cm (62\")   Wt 84.8 kg (187 lb)   SpO2 95%   BMI 34.20 kg/m² " "  Estimated body mass index is 34.2 kg/m² as calculated from the following:    Height as of this encounter: 157.5 cm (62\").    Weight as of this encounter: 84.8 kg (187 lb).               Physical Exam:   Physical Exam  Vitals reviewed.   Constitutional:       Appearance: Normal appearance.   HENT:      Head: Normocephalic.   Eyes:      Extraocular Movements: Extraocular movements intact.      Conjunctiva/sclera: Conjunctivae normal.   Cardiovascular:      Rate and Rhythm: Normal rate.      Heart sounds: Normal heart sounds.   Pulmonary:      Effort: Pulmonary effort is normal.      Breath sounds: Normal breath sounds.   Abdominal:      General: Bowel sounds are normal.      Palpations: Abdomen is soft.   Musculoskeletal:        Legs:       Comments: Small area of swelling and tednerness noted    Skin:     General: Skin is warm and dry.   Neurological:      Mental Status: She is alert. Mental status is at baseline.      Comments: Normal gait    Psychiatric:         Mood and Affect: Mood normal.        Physical Exam         Lab Results:   Office Visit on 06/27/2025   Component Date Value Ref Range Status    Color 06/27/2025 Yellow  Yellow, Straw, Dark Yellow, Ana Final    Clarity, UA 06/27/2025 Cloudy (A)  Clear Final    Specific Gravity  06/27/2025 1.020  1.005 - 1.030 Final    pH, Urine 06/27/2025 6.0  5.0 - 8.0 Final    Leukocytes 06/27/2025 Negative  Negative Final    Nitrite, UA 06/27/2025 Negative  Negative Final    Protein, POC 06/27/2025 Negative  Negative mg/dL Final    Glucose, UA 06/27/2025 3+ (A)  Negative mg/dL Final    Ketones, UA 06/27/2025 Negative  Negative Final    Urobilinogen, UA 06/27/2025 0.2 E.U./dL  Normal, 0.2 E.U./dL Final    Bilirubin 06/27/2025 Negative  Negative Final    Blood, UA 06/27/2025 Negative  Negative Final    Lot Number 06/27/2025 [id number]   Final    Expiration Date 06/27/2025 7/10/2026   Final   Office Visit on 06/13/2025   Component Date Value Ref Range Status    SARS " Antigen 06/13/2025 Not Detected  Not Detected, Presumptive Negative Final    Influenza A Antigen SHERRON 06/13/2025 Not Detected  Not Detected Final    Influenza B Antigen SHERRON 06/13/2025 Not Detected  Not Detected Final    Internal Control 06/13/2025 Passed  Passed Final    Lot Number 06/13/2025 [id number]   Final    Expiration Date 06/13/2025 2026-03-05   Final   Lab on 06/05/2025   Component Date Value Ref Range Status    Iron 06/05/2025 55  37 - 145 mcg/dL Final    Iron Saturation (TSAT) 06/05/2025 14 (L)  20 - 50 % Final    Transferrin 06/05/2025 264  200 - 360 mg/dL Final    TIBC 06/05/2025 393  298 - 536 mcg/dL Final    Folate 06/05/2025 10.90  4.78 - 24.20 ng/mL Final    Ferritin 06/05/2025 114.00  13.00 - 150.00 ng/mL Final    Vitamin B-12 06/05/2025 301  211 - 946 pg/mL Final    WBC 06/05/2025 7.97  3.40 - 10.80 10*3/mm3 Final    RBC 06/05/2025 4.25  3.77 - 5.28 10*6/mm3 Final    Hemoglobin 06/05/2025 12.8  12.0 - 15.9 g/dL Final    Hematocrit 06/05/2025 38.9  34.0 - 46.6 % Final    MCV 06/05/2025 91.5  79.0 - 97.0 fL Final    MCH 06/05/2025 30.1  26.6 - 33.0 pg Final    MCHC 06/05/2025 32.9  31.5 - 35.7 g/dL Final    RDW 06/05/2025 14.2  12.3 - 15.4 % Final    RDW-SD 06/05/2025 47.4  37.0 - 54.0 fl Final    MPV 06/05/2025 10.9  6.0 - 12.0 fL Final    Platelets 06/05/2025 254  140 - 450 10*3/mm3 Final    Neutrophil % 06/05/2025 75.2  42.7 - 76.0 % Final    Lymphocyte % 06/05/2025 14.9 (L)  19.6 - 45.3 % Final    Monocyte % 06/05/2025 5.8  5.0 - 12.0 % Final    Eosinophil % 06/05/2025 2.9  0.3 - 6.2 % Final    Basophil % 06/05/2025 0.6  0.0 - 1.5 % Final    Immature Grans % 06/05/2025 0.6 (H)  0.0 - 0.5 % Final    Neutrophils, Absolute 06/05/2025 5.99  1.70 - 7.00 10*3/mm3 Final    Lymphocytes, Absolute 06/05/2025 1.19  0.70 - 3.10 10*3/mm3 Final    Monocytes, Absolute 06/05/2025 0.46  0.10 - 0.90 10*3/mm3 Final    Eosinophils, Absolute 06/05/2025 0.23  0.00 - 0.40 10*3/mm3 Final    Basophils, Absolute 06/05/2025  0.05  0.00 - 0.20 10*3/mm3 Final    Immature Grans, Absolute 06/05/2025 0.05  0.00 - 0.05 10*3/mm3 Final    nRBC 06/05/2025 0.0  0.0 - 0.2 /100 WBC Final   Lab on 05/12/2025   Component Date Value Ref Range Status    WBC 05/12/2025 7.95  3.40 - 10.80 10*3/mm3 Final    RBC 05/12/2025 4.33  3.77 - 5.28 10*6/mm3 Final    Hemoglobin 05/12/2025 13.0  12.0 - 15.9 g/dL Final    Hematocrit 05/12/2025 38.5  34.0 - 46.6 % Final    MCV 05/12/2025 88.9  79.0 - 97.0 fL Final    MCH 05/12/2025 30.0  26.6 - 33.0 pg Final    MCHC 05/12/2025 33.8  31.5 - 35.7 g/dL Final    RDW 05/12/2025 13.1  12.3 - 15.4 % Final    RDW-SD 05/12/2025 42.5  37.0 - 54.0 fl Final    MPV 05/12/2025 11.3  6.0 - 12.0 fL Final    Platelets 05/12/2025 266  140 - 450 10*3/mm3 Final    Neutrophil % 05/12/2025 74.1  42.7 - 76.0 % Final    Lymphocyte % 05/12/2025 14.8 (L)  19.6 - 45.3 % Final    Monocyte % 05/12/2025 6.7  5.0 - 12.0 % Final    Eosinophil % 05/12/2025 2.9  0.3 - 6.2 % Final    Basophil % 05/12/2025 0.9  0.0 - 1.5 % Final    Immature Grans % 05/12/2025 0.6 (H)  0.0 - 0.5 % Final    Neutrophils, Absolute 05/12/2025 5.89  1.70 - 7.00 10*3/mm3 Final    Lymphocytes, Absolute 05/12/2025 1.18  0.70 - 3.10 10*3/mm3 Final    Monocytes, Absolute 05/12/2025 0.53  0.10 - 0.90 10*3/mm3 Final    Eosinophils, Absolute 05/12/2025 0.23  0.00 - 0.40 10*3/mm3 Final    Basophils, Absolute 05/12/2025 0.07  0.00 - 0.20 10*3/mm3 Final    Immature Grans, Absolute 05/12/2025 0.05  0.00 - 0.05 10*3/mm3 Final    nRBC 05/12/2025 0.0  0.0 - 0.2 /100 WBC Final    Glucose 05/12/2025 144 (H)  65 - 99 mg/dL Final    BUN 05/12/2025 13  8 - 23 mg/dL Final    Creatinine 05/12/2025 0.99  0.57 - 1.00 mg/dL Final    Sodium 05/12/2025 140  136 - 145 mmol/L Final    Potassium 05/12/2025 4.3  3.5 - 5.2 mmol/L Final    Chloride 05/12/2025 105  98 - 107 mmol/L Final    CO2 05/12/2025 20.7 (L)  22.0 - 29.0 mmol/L Final    Calcium 05/12/2025 9.7  8.6 - 10.5 mg/dL Final    Total Protein  05/12/2025 8.4  6.0 - 8.5 g/dL Final    Albumin 05/12/2025 4.4  3.5 - 5.2 g/dL Final    ALT (SGPT) 05/12/2025 16  1 - 33 U/L Final    AST (SGOT) 05/12/2025 19  1 - 32 U/L Final    Alkaline Phosphatase 05/12/2025 122 (H)  39 - 117 U/L Final    Total Bilirubin 05/12/2025 0.7  0.0 - 1.2 mg/dL Final    Globulin 05/12/2025 4.0  gm/dL Final    A/G Ratio 05/12/2025 1.1  g/dL Final    BUN/Creatinine Ratio 05/12/2025 13.1  7.0 - 25.0 Final    Anion Gap 05/12/2025 14.3  5.0 - 15.0 mmol/L Final    eGFR 05/12/2025 60.7  >60.0 mL/min/1.73 Final    Hemoglobin A1C 05/12/2025 6.80 (H)  4.80 - 5.60 % Final    Total Cholesterol 05/12/2025 153  0 - 200 mg/dL Final    Triglycerides 05/12/2025 161 (H)  0 - 150 mg/dL Final    HDL Cholesterol 05/12/2025 37 (L)  40 - 60 mg/dL Final    LDL Cholesterol  05/12/2025 88  0 - 100 mg/dL Final    VLDL Cholesterol 05/12/2025 28  5 - 40 mg/dL Final    LDL/HDL Ratio 05/12/2025 2.26   Final    TSH 05/12/2025 0.755  0.270 - 4.200 uIU/mL Final    Iron 05/12/2025 63  37 - 145 mcg/dL Final    Iron Saturation (TSAT) 05/12/2025 15 (L)  20 - 50 % Final    Transferrin 05/12/2025 287  200 - 360 mg/dL Final    TIBC 05/12/2025 428  298 - 536 mcg/dL Final    Ferritin 05/12/2025 145.00  13.00 - 150.00 ng/mL Final   Office Visit on 04/02/2025   Component Date Value Ref Range Status    Color 04/02/2025 Yellow  Yellow, Straw, Dark Yellow, Ana Final    Clarity, UA 04/02/2025 Clear  Clear Final    Specific Gravity  04/02/2025 1.015  1.005 - 1.030 Final    pH, Urine 04/02/2025 6.0  5.0 - 8.0 Final    Leukocytes 04/02/2025 Negative  Negative Final    Nitrite, UA 04/02/2025 Negative  Negative Final    Protein, POC 04/02/2025 Negative  Negative mg/dL Final    Glucose, UA 04/02/2025 3+ (A)  Negative mg/dL Final    Ketones, UA 04/02/2025 Negative  Negative Final    Urobilinogen, UA 04/02/2025 Normal  Normal, 0.2 E.U./dL Final    Bilirubin 04/02/2025 Negative  Negative Final    Blood, UA 04/02/2025 Negative  Negative Final     Lot Number 04/02/2025 [id number]   Final    Expiration Date 04/02/2025 7,374,883   Final     Results      Results review: During today's encounter, all relevant clinical data has been reviewed.      Assessment and Plan  Diagnoses and all orders for this visit:    1. Right leg pain (Primary)  -     US Venous Doppler Lower Extremity Bilateral (duplex); Future    2. Pelvic pressure in female  -     POCT urinalysis dipstick, automated  -     Urine Culture - Urine, Urine, Clean Catch; Future  -     Urinalysis With Microscopic - Urine, Clean Catch; Future  -     nitrofurantoin, macrocrystal-monohydrate, (Macrobid) 100 MG capsule; Take 1 capsule by mouth 2 (Two) Times a Day for 7 days.  Dispense: 14 capsule; Refill: 0    3. Edema, unspecified type      Assessment & Plan  Leg pain.  - The DVT WELLS SCORE indicates a moderate likelihood of deep vein thrombosis (DVT), with a score of 2.  - Diagnostic test for DVT ordered; patient will be contacted with appointment details.  - Advised to schedule a follow-up appointment with pcp if test results are negative and symptoms do not improve.    Urinary tract infection (UTI).  - Patient reports pressure with urination and back pain, but no burning sensation.  - Urine culture will be conducted to confirm the diagnosis.  - Prescription for Macrobid provided; patient can collect if necessary if her symptoms worsen as right now she is only feeling bladder pressure but reports sometimes this starts and a uti follows right after  - Advised to monitor symptoms and start antibiotics if condition worsens.            New Medications:   New Medications Ordered This Visit   Medications    nitrofurantoin, macrocrystal-monohydrate, (Macrobid) 100 MG capsule     Sig: Take 1 capsule by mouth 2 (Two) Times a Day for 7 days.     Dispense:  14 capsule     Refill:  0       Discontinued Medications:   Medications Discontinued During This Encounter   Medication Reason    nitrofurantoin,  macrocrystal-monohydrate, (MACROBID) 100 MG capsule *Therapy completed              Visit Diagnoses:    ICD-10-CM ICD-9-CM   1. Right leg pain  M79.604 729.5   2. Pelvic pressure in female  R10.2 625.8   3. Edema, unspecified type  R60.9 782.3            Follow Up:   No follow-ups on file.    Patient was given instructions and counseling regarding her condition or for health maintenance advice. Please see specific information pulled into the AVS if appropriate.     Patient or patient representative verbalized consent for the use of Ambient Listening during the visit with  BERT Costa for chart documentation. 6/27/2025  10:35 EDT      This document has been electronically signed by BERT Costa   June 27, 2025 10:35 EDT    Dictated Utilizing Dragon Dictation: Part of this note may be an electronic transcription/translation of spoken language to printed text using the Dragon Dictation System.

## 2025-06-28 LAB
BACTERIA UR QL AUTO: ABNORMAL /HPF
BILIRUB UR QL STRIP: NEGATIVE
CLARITY UR: ABNORMAL
COLOR UR: YELLOW
GLUCOSE UR STRIP-MCNC: ABNORMAL MG/DL
HGB UR QL STRIP.AUTO: ABNORMAL
HYALINE CASTS UR QL AUTO: ABNORMAL /LPF
KETONES UR QL STRIP: NEGATIVE
LEUKOCYTE ESTERASE UR QL STRIP.AUTO: ABNORMAL
NITRITE UR QL STRIP: NEGATIVE
PH UR STRIP.AUTO: 5.5 [PH] (ref 5–8)
PROT UR QL STRIP: ABNORMAL
RBC # UR STRIP: ABNORMAL /HPF
REF LAB TEST METHOD: ABNORMAL
SP GR UR STRIP: >1.03 (ref 1–1.03)
SQUAMOUS #/AREA URNS HPF: ABNORMAL /HPF
UROBILINOGEN UR QL STRIP: ABNORMAL
WBC # UR STRIP: ABNORMAL /HPF
YEAST URNS QL MICRO: PRESENT /HPF

## 2025-06-30 ENCOUNTER — LAB (OUTPATIENT)
Dept: LAB | Facility: HOSPITAL | Age: 75
End: 2025-06-30
Payer: COMMERCIAL

## 2025-06-30 ENCOUNTER — RESULTS FOLLOW-UP (OUTPATIENT)
Dept: FAMILY MEDICINE CLINIC | Facility: CLINIC | Age: 75
End: 2025-06-30
Payer: COMMERCIAL

## 2025-06-30 DIAGNOSIS — R10.2 PELVIC PRESSURE IN FEMALE: ICD-10-CM

## 2025-06-30 PROCEDURE — 87086 URINE CULTURE/COLONY COUNT: CPT

## 2025-06-30 RX ORDER — FLUCONAZOLE 150 MG/1
150 TABLET ORAL ONCE
Qty: 1 TABLET | Refills: 0 | Status: SHIPPED | OUTPATIENT
Start: 2025-06-30 | End: 2025-06-30

## 2025-06-30 NOTE — PROGRESS NOTES
Can we let her know her urine culture is pending, but is she feeling any better after starting antibiotics?

## 2025-06-30 NOTE — TELEPHONE ENCOUNTER
Spoke with patient, she states she has not started taking the abx yet. She was waiting to see the results of the culture first.

## 2025-06-30 NOTE — TELEPHONE ENCOUNTER
Called the lab and they are faxing the results. Spoke with patient and she said she is not having any significant itching or irritation, but is still requesting something for yeast.   Outpatient Medications Marked as Taking for the 8/14/23 encounter (Refill) with Cameron Mayberry, DO   Medication Sig Dispense Refill   • hydrochlorothiazide (MICROZIDE) 12.5 MG capsule Take 1 capsule by mouth daily. 90 capsule 1

## 2025-06-30 NOTE — TELEPHONE ENCOUNTER
Spoke with patient, she verbalized understanding. She is also asking if she should come in for a Rocephin shot. She says she usually gets one for UTIs.

## 2025-06-30 NOTE — PROGRESS NOTES
Can we let her know I just called lab and they said unfortunately after looking they did not collect the urine culture, and they have already thrown the urine away. Would she be able to run by and provide another sample?

## 2025-07-02 LAB — BACTERIA SPEC AEROBE CULT: NO GROWTH

## 2025-07-03 DIAGNOSIS — E11.22 TYPE 2 DIABETES MELLITUS WITH STAGE 1 CHRONIC KIDNEY DISEASE, WITHOUT LONG-TERM CURRENT USE OF INSULIN: ICD-10-CM

## 2025-07-03 DIAGNOSIS — Z00.00 MEDICARE ANNUAL WELLNESS VISIT, SUBSEQUENT: ICD-10-CM

## 2025-07-03 DIAGNOSIS — N18.1 TYPE 2 DIABETES MELLITUS WITH STAGE 1 CHRONIC KIDNEY DISEASE, WITHOUT LONG-TERM CURRENT USE OF INSULIN: ICD-10-CM

## 2025-07-03 RX ORDER — EMPAGLIFLOZIN 10 MG/1
10 TABLET, FILM COATED ORAL DAILY
Qty: 90 TABLET | Refills: 1 | OUTPATIENT
Start: 2025-07-03

## 2025-07-08 ENCOUNTER — TELEPHONE (OUTPATIENT)
Dept: FAMILY MEDICINE CLINIC | Facility: CLINIC | Age: 75
End: 2025-07-08
Payer: COMMERCIAL

## 2025-07-16 NOTE — TELEPHONE ENCOUNTER
PA for Ozempic has been approval through 07/10/2026. I contacted the pt to inform her of the approval and informed her to contact us back with any further problems. The pt verbalized understanding.

## 2025-08-12 ENCOUNTER — OFFICE VISIT (OUTPATIENT)
Dept: FAMILY MEDICINE CLINIC | Facility: CLINIC | Age: 75
End: 2025-08-12
Payer: MEDICARE

## 2025-08-12 VITALS
HEIGHT: 62 IN | TEMPERATURE: 97.9 F | HEART RATE: 79 BPM | SYSTOLIC BLOOD PRESSURE: 126 MMHG | WEIGHT: 188.6 LBS | OXYGEN SATURATION: 97 % | DIASTOLIC BLOOD PRESSURE: 68 MMHG | BODY MASS INDEX: 34.71 KG/M2

## 2025-08-12 DIAGNOSIS — N18.1 TYPE 2 DIABETES MELLITUS WITH STAGE 1 CHRONIC KIDNEY DISEASE, WITHOUT LONG-TERM CURRENT USE OF INSULIN: ICD-10-CM

## 2025-08-12 DIAGNOSIS — E11.22 TYPE 2 DIABETES MELLITUS WITH STAGE 1 CHRONIC KIDNEY DISEASE, WITHOUT LONG-TERM CURRENT USE OF INSULIN: ICD-10-CM

## 2025-08-12 DIAGNOSIS — R30.0 DYSURIA: Primary | ICD-10-CM

## 2025-08-12 DIAGNOSIS — E55.9 VITAMIN D DEFICIENCY: ICD-10-CM

## 2025-08-12 DIAGNOSIS — R45.89 DEPRESSED MOOD: ICD-10-CM

## 2025-08-12 LAB
BILIRUB BLD-MCNC: NEGATIVE MG/DL
CLARITY, POC: ABNORMAL
COLOR UR: YELLOW
EXPIRATION DATE: ABNORMAL
GLUCOSE UR STRIP-MCNC: ABNORMAL MG/DL
KETONES UR QL: NEGATIVE
LEUKOCYTE EST, POC: NEGATIVE
Lab: ABNORMAL
NITRITE UR-MCNC: NEGATIVE MG/ML
PH UR: 6 [PH] (ref 5–8)
PROT UR STRIP-MCNC: NEGATIVE MG/DL
RBC # UR STRIP: NEGATIVE /UL
SP GR UR: 1.01 (ref 1–1.03)
UROBILINOGEN UR QL: NORMAL

## 2025-08-12 PROCEDURE — 87086 URINE CULTURE/COLONY COUNT: CPT | Performed by: NURSE PRACTITIONER

## 2025-08-12 RX ORDER — ERGOCALCIFEROL 1.25 MG/1
50000 CAPSULE, LIQUID FILLED ORAL WEEKLY
Qty: 5 CAPSULE | Refills: 2 | Status: SHIPPED | OUTPATIENT
Start: 2025-08-12

## 2025-08-13 ENCOUNTER — RESULTS FOLLOW-UP (OUTPATIENT)
Dept: FAMILY MEDICINE CLINIC | Facility: CLINIC | Age: 75
End: 2025-08-13
Payer: COMMERCIAL

## 2025-08-13 LAB — BACTERIA SPEC AEROBE CULT: NORMAL

## 2025-08-15 ENCOUNTER — LAB (OUTPATIENT)
Dept: FAMILY MEDICINE CLINIC | Facility: CLINIC | Age: 75
End: 2025-08-15
Payer: MEDICARE

## 2025-08-15 DIAGNOSIS — E55.9 VITAMIN D DEFICIENCY: ICD-10-CM

## 2025-08-15 DIAGNOSIS — N18.1 TYPE 2 DIABETES MELLITUS WITH STAGE 1 CHRONIC KIDNEY DISEASE, WITHOUT LONG-TERM CURRENT USE OF INSULIN: ICD-10-CM

## 2025-08-15 DIAGNOSIS — E11.22 TYPE 2 DIABETES MELLITUS WITH STAGE 1 CHRONIC KIDNEY DISEASE, WITHOUT LONG-TERM CURRENT USE OF INSULIN: ICD-10-CM

## 2025-08-15 LAB
ALBUMIN SERPL-MCNC: 4 G/DL (ref 3.5–5.2)
ALBUMIN/GLOB SERPL: 1.3 G/DL
ALP SERPL-CCNC: 115 U/L (ref 39–117)
ALT SERPL W P-5'-P-CCNC: 19 U/L (ref 1–33)
ANION GAP SERPL CALCULATED.3IONS-SCNC: 12.7 MMOL/L (ref 5–15)
AST SERPL-CCNC: 19 U/L (ref 1–32)
BASOPHILS # BLD AUTO: 0.07 10*3/MM3 (ref 0–0.2)
BASOPHILS NFR BLD AUTO: 1.1 % (ref 0–1.5)
BILIRUB SERPL-MCNC: 0.7 MG/DL (ref 0–1.2)
BUN SERPL-MCNC: 7 MG/DL (ref 8–23)
BUN/CREAT SERPL: 9 (ref 7–25)
CALCIUM SPEC-SCNC: 9.3 MG/DL (ref 8.6–10.5)
CHLORIDE SERPL-SCNC: 106 MMOL/L (ref 98–107)
CHOLEST SERPL-MCNC: 140 MG/DL (ref 0–200)
CO2 SERPL-SCNC: 23.3 MMOL/L (ref 22–29)
CREAT SERPL-MCNC: 0.78 MG/DL (ref 0.57–1)
DEPRECATED RDW RBC AUTO: 45.7 FL (ref 37–54)
EGFRCR SERPLBLD CKD-EPI 2021: 80.8 ML/MIN/1.73
EOSINOPHIL # BLD AUTO: 0.19 10*3/MM3 (ref 0–0.4)
EOSINOPHIL NFR BLD AUTO: 2.9 % (ref 0.3–6.2)
ERYTHROCYTE [DISTWIDTH] IN BLOOD BY AUTOMATED COUNT: 14.2 % (ref 12.3–15.4)
GLOBULIN UR ELPH-MCNC: 3.2 GM/DL
GLUCOSE SERPL-MCNC: 144 MG/DL (ref 65–99)
HBA1C MFR BLD: 7.1 % (ref 4.8–5.6)
HCT VFR BLD AUTO: 39.6 % (ref 34–46.6)
HDLC SERPL-MCNC: 33 MG/DL (ref 40–60)
HGB BLD-MCNC: 13 G/DL (ref 12–15.9)
IMM GRANULOCYTES # BLD AUTO: 0.05 10*3/MM3 (ref 0–0.05)
IMM GRANULOCYTES NFR BLD AUTO: 0.8 % (ref 0–0.5)
LDLC SERPL CALC-MCNC: 71 MG/DL (ref 0–100)
LDLC/HDLC SERPL: 1.93 {RATIO}
LYMPHOCYTES # BLD AUTO: 1.13 10*3/MM3 (ref 0.7–3.1)
LYMPHOCYTES NFR BLD AUTO: 17.4 % (ref 19.6–45.3)
MCH RBC QN AUTO: 29.5 PG (ref 26.6–33)
MCHC RBC AUTO-ENTMCNC: 32.8 G/DL (ref 31.5–35.7)
MCV RBC AUTO: 89.8 FL (ref 79–97)
MONOCYTES # BLD AUTO: 0.42 10*3/MM3 (ref 0.1–0.9)
MONOCYTES NFR BLD AUTO: 6.5 % (ref 5–12)
NEUTROPHILS NFR BLD AUTO: 4.64 10*3/MM3 (ref 1.7–7)
NEUTROPHILS NFR BLD AUTO: 71.3 % (ref 42.7–76)
NRBC BLD AUTO-RTO: 0 /100 WBC (ref 0–0.2)
PLATELET # BLD AUTO: 243 10*3/MM3 (ref 140–450)
PMV BLD AUTO: 11.7 FL (ref 6–12)
POTASSIUM SERPL-SCNC: 4.2 MMOL/L (ref 3.5–5.2)
PROT SERPL-MCNC: 7.2 G/DL (ref 6–8.5)
RBC # BLD AUTO: 4.41 10*6/MM3 (ref 3.77–5.28)
SODIUM SERPL-SCNC: 142 MMOL/L (ref 136–145)
TRIGL SERPL-MCNC: 217 MG/DL (ref 0–150)
TSH SERPL DL<=0.05 MIU/L-ACNC: 1.17 UIU/ML (ref 0.27–4.2)
VIT B12 BLD-MCNC: 267 PG/ML (ref 211–946)
VLDLC SERPL-MCNC: 36 MG/DL (ref 5–40)
WBC NRBC COR # BLD AUTO: 6.5 10*3/MM3 (ref 3.4–10.8)

## 2025-08-15 PROCEDURE — 85025 COMPLETE CBC W/AUTO DIFF WBC: CPT | Performed by: NURSE PRACTITIONER

## 2025-08-15 PROCEDURE — 83036 HEMOGLOBIN GLYCOSYLATED A1C: CPT | Performed by: NURSE PRACTITIONER

## 2025-08-15 PROCEDURE — 84443 ASSAY THYROID STIM HORMONE: CPT | Performed by: NURSE PRACTITIONER

## 2025-08-15 PROCEDURE — 82607 VITAMIN B-12: CPT | Performed by: NURSE PRACTITIONER

## 2025-08-15 PROCEDURE — 80053 COMPREHEN METABOLIC PANEL: CPT | Performed by: NURSE PRACTITIONER

## 2025-08-15 PROCEDURE — 80061 LIPID PANEL: CPT | Performed by: NURSE PRACTITIONER

## (undated) DEVICE — PK BASIC 70

## (undated) DEVICE — SKIN AFFIX SURG ADHESIVE 72/CS 0.55ML: Brand: MEDLINE

## (undated) DEVICE — BANDAGE,ELASTIC,ESMARK,STERILE,4"X9',LF: Brand: MEDLINE

## (undated) DEVICE — APPL CHLORAPREP HI/LITE 26ML ORNG

## (undated) DEVICE — GLV SURG SENSICARE W/ALOE PF LF 7.5 STRL

## (undated) DEVICE — GLV SURG SENSICARE MICRO PF LF 8 STRL

## (undated) DEVICE — ENDOCUT SCISSOR TIP, DISPOSABLE: Brand: RENEW

## (undated) DEVICE — APPL CHLORAPREP W/TINT 26ML ORNG

## (undated) DEVICE — ENDOPOUCH RETRIEVER SPECIMEN RETRIEVAL BAGS: Brand: ENDOPOUCH RETRIEVER

## (undated) DEVICE — PENCL ES MEGADINE EZ/CLEAN BUTN W/HOLSTR 10FT

## (undated) DEVICE — PK EXTREM UPPR 70

## (undated) DEVICE — SUT ETHLN 3/0 FS1 663G

## (undated) DEVICE — BNDG,ELSTC,MATRIX,STRL,2"X5YD,LF,HOOK&LP: Brand: MEDLINE

## (undated) DEVICE — DBD-DRAPE,LAP,CHOLE,W/TROUGHS,STERILE: Brand: MEDLINE

## (undated) DEVICE — SUT MNCRYL 4/0 PS2 18 IN

## (undated) DEVICE — DEV TRNSPEC

## (undated) DEVICE — SUT VIC 0/0 UR6 27IN DYED J603H

## (undated) DEVICE — TBG PENCL TELESCP MEGADYNE SMOKE EVAC 10FT

## (undated) DEVICE — BNDG ELAS MATRX V/CLS 2INX5YD LF

## (undated) DEVICE — BNDG ELAS CO-FLEX SLF ADHR 4IN5YD LF STRL

## (undated) DEVICE — INSUFFLATION NEEDLE TO ESTABLISH PNEUMOPERITONEUM.: Brand: INSUFFLATION NEEDLE

## (undated) DEVICE — ENDOPATH XCEL BLADELESS TROCARS WITH STABILITY SLEEVES: Brand: ENDOPATH XCEL

## (undated) DEVICE — THE BITE BLOCK MAXI, LATEX FREE STRAP IS USED TO PROTECT THE ENDOSCOPE INSERTION TUBE FROM BEING BITTEN BY THE PATIENT.

## (undated) DEVICE — CUFF TOURNI 1BLADDER 1PRT 18IN STRL

## (undated) DEVICE — Device

## (undated) DEVICE — HOLDER: Brand: DEROYAL

## (undated) DEVICE — GOWN,REINF,POLY,ECL,PP SLV,XL: Brand: MEDLINE

## (undated) DEVICE — PATIENT RETURN ELECTRODE, SINGLE-USE, CONTACT QUALITY MONITORING, ADULT, WITH 9FT CORD, FOR PATIENTS WEIGING OVER 33LBS. (15KG): Brand: MEGADYNE

## (undated) DEVICE — SYR LUERLOK 30CC

## (undated) DEVICE — DRSNG WND GZ CURAD OIL EMULSION 3X3IN STRL

## (undated) DEVICE — KT INK TISS MARGINMARKER STD 6COLOR

## (undated) DEVICE — GLV SURG TRIUMPH MICRO PF LTX 8 STRL

## (undated) DEVICE — SUT SILK 2/0 FS BLK 18IN 685G

## (undated) DEVICE — ENDOPATH XCEL UNIVERSAL TROCAR STABLILITY SLEEVES: Brand: ENDOPATH XCEL

## (undated) DEVICE — SYRINGE 10CC LUER LOCK: Brand: CARDINAL HEALTH

## (undated) DEVICE — DRAPE,UTILTY,TAPE,15X26, 4EA/PK: Brand: MEDLINE

## (undated) DEVICE — PAD GRND REM POLYHESIVE A/ DISP

## (undated) DEVICE — ADHS SKIN PREMIERPRO EXOFIN TOPICAL HI/VISC .5ML

## (undated) DEVICE — [HIGH FLOW INSUFFLATOR,  DO NOT USE IF PACKAGE IS DAMAGED,  KEEP DRY,  KEEP AWAY FROM SUNLIGHT,  PROTECT FROM HEAT AND RADIOACTIVE SOURCES.]: Brand: PNEUMOSURE

## (undated) DEVICE — COR LAP CHOLE: Brand: MEDLINE INDUSTRIES, INC.

## (undated) DEVICE — ENCORE® LATEX MICRO SIZE 7.5, STERILE LATEX POWDER-FREE SURGICAL GLOVE: Brand: ENCORE

## (undated) DEVICE — GLV SURG PREMIERPRO MIC LTX PF SZ8 BRN